# Patient Record
Sex: FEMALE | Race: WHITE | NOT HISPANIC OR LATINO | Employment: FULL TIME | URBAN - METROPOLITAN AREA
[De-identification: names, ages, dates, MRNs, and addresses within clinical notes are randomized per-mention and may not be internally consistent; named-entity substitution may affect disease eponyms.]

---

## 2017-02-28 ENCOUNTER — OFFICE VISIT (OUTPATIENT)
Dept: FAMILY MEDICINE | Facility: CLINIC | Age: 42
End: 2017-02-28
Payer: COMMERCIAL

## 2017-02-28 VITALS
HEIGHT: 65 IN | WEIGHT: 238 LBS | BODY MASS INDEX: 39.65 KG/M2 | HEART RATE: 95 BPM | TEMPERATURE: 99 F | DIASTOLIC BLOOD PRESSURE: 77 MMHG | SYSTOLIC BLOOD PRESSURE: 115 MMHG | RESPIRATION RATE: 16 BRPM

## 2017-02-28 DIAGNOSIS — J20.9 ACUTE BRONCHITIS, UNSPECIFIED ORGANISM: ICD-10-CM

## 2017-02-28 DIAGNOSIS — R07.0 THROAT PAIN: Primary | ICD-10-CM

## 2017-02-28 LAB
DEPRECATED S PYO AG THROAT QL EIA: NORMAL
MICRO REPORT STATUS: NORMAL
SPECIMEN SOURCE: NORMAL

## 2017-02-28 PROCEDURE — 87081 CULTURE SCREEN ONLY: CPT | Performed by: FAMILY MEDICINE

## 2017-02-28 PROCEDURE — 87880 STREP A ASSAY W/OPTIC: CPT | Performed by: FAMILY MEDICINE

## 2017-02-28 PROCEDURE — 99214 OFFICE O/P EST MOD 30 MIN: CPT | Performed by: FAMILY MEDICINE

## 2017-02-28 RX ORDER — ALBUTEROL SULFATE 0.83 MG/ML
1 SOLUTION RESPIRATORY (INHALATION) EVERY 6 HOURS PRN
Qty: 25 VIAL | Refills: 11 | Status: SHIPPED | OUTPATIENT
Start: 2017-02-28 | End: 2017-07-26

## 2017-02-28 RX ORDER — ALBUTEROL SULFATE 90 UG/1
2 AEROSOL, METERED RESPIRATORY (INHALATION) EVERY 6 HOURS PRN
Qty: 1 INHALER | Refills: 11 | Status: SHIPPED | OUTPATIENT
Start: 2017-02-28 | End: 2019-12-16

## 2017-02-28 NOTE — PATIENT INSTRUCTIONS
(R07.0) Throat pain  (primary encounter diagnosis)  Comment:   Plan: Rapid strep screen, Beta strep group A culture        We will do the 24 hour test and call the results. If this is positive then the Rx will be called in. This could be done for other family members, if ill in the next week.   Use the symptomatic therapies with fluids and vaporizer blowing away from you. Use cool things to drink and Tylenol and Advil as needed. Chloraseptic and Cepastat for swallowing may be used.   Avoid contagious exposures.     (J20.9) Acute bronchitis, unspecified organism  Comment:   Plan: albuterol (PROAIR HFA/PROVENTIL HFA/VENTOLIN         HFA) 108 (90 BASE) MCG/ACT Inhaler        Use the inhaler as needed. Consider the home peak flow meter.

## 2017-02-28 NOTE — NURSING NOTE
"Chief Complaint   Patient presents with     URI     Symptoms of fever, cough, sore throat for four days.  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       Initial /77  Pulse 95  Temp 99  F (37.2  C) (Tympanic)  Resp 16  Ht 5' 4.5\" (1.638 m)  Wt 238 lb (108 kg)  BMI 40.22 kg/m2 Estimated body mass index is 40.22 kg/(m^2) as calculated from the following:    Height as of this encounter: 5' 4.5\" (1.638 m).    Weight as of this encounter: 238 lb (108 kg).  Medication Reconciliation: complete  "

## 2017-02-28 NOTE — PROGRESS NOTES
"  SUBJECTIVE:                                                    Elza Greer is a 41 year old female who presents to clinic today for the following health issues:      ENT Symptoms             Symptoms: cc Present Absent Comment   Fever/Chills  X     Fatigue  X     Muscle Aches  X     Eye Irritation   X    Sneezing  X     Nasal Wili/Drg   X    Sinus Pressure/Pain   X    Loss of smell   X    Dental pain   X    Sore Throat  X     Swollen Glands  X     Ear Pain/Fullness   X    Cough  X     Wheeze  X     Chest Pain   X    Shortness of breath  X     Rash   X    Other  X  HEADACHES     Symptom duration:  FOUR DAYS   Symptom severity:  MODERATE   Treatments tried:  TYLENOL, IBUPROFEN, DAYQUIL & NYQUIL   Contacts:  POSITIVE STREP EXPOSURE         Current Outpatient Prescriptions:      albuterol (PROAIR HFA, PROVENTIL HFA, VENTOLIN HFA) 108 (90 BASE) MCG/ACT inhaler, Inhale 2 puffs into the lungs every 6 hours as needed for shortness of breath / dyspnea or wheezing, Disp: 1 Inhaler, Rfl: 1     IBUPROFEN PO, Take 600 mg by mouth every 6 hours as needed for moderate pain, Disp: , Rfl:      nystatin (MYCOSTATIN) [id number] UNIT/GM POWD, Apply topically daily as needed , Disp: , Rfl:      [DISCONTINUED] albuterol (PROAIR HFA, PROVENTIL HFA, VENTOLIN HFA) 108 (90 BASE) MCG/ACT inhaler, Inhale 1-2 puffs into the lungs every 6 hours as needed , Disp: , Rfl:     Patient Active Problem List   Diagnosis     Tobacco use disorder       Blood pressure 115/77, pulse 95, temperature 99  F (37.2  C), temperature source Tympanic, resp. rate 16, height 5' 4.5\" (1.638 m), weight 238 lb (108 kg), not currently breastfeeding.    Exam:  GENERAL APPEARANCE: healthy, alert and no distress  EYES: EOMI,  PERRL  HENT: ear canals and TM's normal and nose and mouth without ulcers or lesions  HENT: tonsillar erythema  NECK: no adenopathy, no asymmetry, masses, or scars and thyroid normal to palpation  RESP: lungs clear to auscultation - no rales, rhonchi or " wheezes  CV: regular rates and rhythm, normal S1 S2, no S3 or S4 and no murmur, click or rub -  SKIN: no suspicious lesions or rashes  PSYCH: mentation appears normal and affect normal/bright    RST is negative. 24 hour is pending.     (R07.0) Throat pain  (primary encounter diagnosis)  Comment:   Plan: Rapid strep screen, Beta strep group A culture        We will do the 24 hour test and call the results. If this is positive then the Rx will be called in. This could be done for other family members, if ill in the next week.   Use the symptomatic therapies with fluids and vaporizer blowing away from you. Use cool things to drink and Tylenol and Advil as needed. Chloraseptic and Cepastat for swallowing may be used.   Avoid contagious exposures.     (J20.9) Acute bronchitis, unspecified organism  Comment:   Plan: albuterol (PROAIR HFA/PROVENTIL HFA/VENTOLIN         HFA) 108 (90 BASE) MCG/ACT Inhaler        Use the inhaler as needed. Consider the home peak flow meter.     Jarret Ghotra

## 2017-02-28 NOTE — MR AVS SNAPSHOT
After Visit Summary   2/28/2017    Elza Greer    MRN: [id number]           Patient Information     Date Of Birth          1975        Visit Information        Provider Department      2/28/2017 9:40 AM Jarret Ghotra MD [facility]        Today's Diagnoses     Throat pain    -  1    Acute bronchitis, unspecified organism          Care Instructions    (R07.0) Throat pain  (primary encounter diagnosis)  Comment:   Plan: Rapid strep screen, Beta strep group A culture        We will do the 24 hour test and call the results. If this is positive then the Rx will be called in. This could be done for other family members, if ill in the next week.   Use the symptomatic therapies with fluids and vaporizer blowing away from you. Use cool things to drink and Tylenol and Advil as needed. Chloraseptic and Cepastat for swallowing may be used.   Avoid contagious exposures.     (J20.9) Acute bronchitis, unspecified organism  Comment:   Plan: albuterol (PROAIR HFA/PROVENTIL HFA/VENTOLIN         HFA) 108 (90 BASE) MCG/ACT Inhaler        Use the inhaler as needed. Consider the home peak flow meter.         Follow-ups after your visit        Who to contact     If you have questions or need follow up information about today's clinic visit or your schedule please contact [facility] directly at [phone].  Normal or non-critical lab and imaging results will be communicated to you by MyChart, letter or phone within 4 business days after the clinic has received the results. If you do not hear from us within 7 days, please contact the clinic through MyChart or phone. If you have a critical or abnormal lab result, we will notify you by phone as soon as possible.  Submit refill requests through [employer] or call your pharmacy and they will forward the refill request to us. Please allow 3 business days for your refill to be completed.          Additional Information About Your Visit       "  [employer]ge[employer] Information     [employer] gives you secure access to your electronic health record. If you see a primary care provider, you can also send messages to your care team and make appointments. If you have questions, please call your primary care clinic.  If you do not have a primary care provider, please call [phone] and they will assist you.        Care EveryWhere ID     This is your Care EveryWhere ID. This could be used by other organizations to access your [facility] medical records  [id number]        Your Vitals Were     Pulse Temperature Respirations Height BMI (Body Mass Index)       95 99  F (37.2  C) (Tympanic) 16 5' 4.5\" (1.638 m) 40.22 kg/m2        Blood Pressure from Last 3 Encounters:   02/28/17 115/77   11/14/16 112/75   04/04/16 132/78    Weight from Last 3 Encounters:   02/28/17 238 lb (108 kg)   11/14/16 234 lb (106.1 kg)   08/02/13 226 lb (102.5 kg)              We Performed the Following     Beta strep group A culture     Rapid strep screen          Today's Medication Changes          These changes are accurate as of: 2/28/17 10:37 AM.  If you have any questions, ask your nurse or doctor.               These medicines have changed or have updated prescriptions.        Dose/Directions    * albuterol 108 (90 BASE) MCG/ACT Inhaler   Commonly known as:  PROAIR HFA/PROVENTIL HFA/VENTOLIN HFA   This may have changed:  Another medication with the same name was added. Make sure you understand how and when to take each.   Used for:  Acute bronchitis, unspecified organism   Changed by:  Jarret Ghotra MD        Dose:  2 puff   Inhale 2 puffs into the lungs every 6 hours as needed for shortness of breath / dyspnea or wheezing   Quantity:  1 Inhaler   Refills:  11       * albuterol (2.5 MG/3ML) 0.083% neb solution   This may have changed:  You were already taking a medication with the same name, and this prescription was added. Make sure you understand how and when to take each.   Used for:  " Acute bronchitis, unspecified organism   Changed by:  Jarret Ghotra MD        Dose:  1 vial   Take 1 vial (2.5 mg) by nebulization every 6 hours as needed for shortness of breath / dyspnea or wheezing   Quantity:  25 vial   Refills:  11       * Notice:  This list has 2 medication(s) that are the same as other medications prescribed for you. Read the directions carefully, and ask your doctor or other care provider to review them with you.         Where to get your medicines      These medications were sent to [facility] [address] [place] [address]  [address] [place] [address]     Phone:  [phone]     albuterol (2.5 MG/3ML) 0.083% neb solution    albuterol 108 (90 BASE) MCG/ACT Inhaler                Primary Care Provider Office Phone # Fax #    NASH Odell [facility] [phone]       [facility] [address] [place] [address]        Thank you!     Thank you for choosing [facility]  for your care. Our goal is always to provide you with excellent care. Hearing back from our patients is one way we can continue to improve our services. Please take a few minutes to complete the written survey that you may receive in the mail after your visit with us. Thank you!             Your Updated Medication List - Protect others around you: Learn how to safely use, store and throw away your medicines at www.disposemymeds.org.          This list is accurate as of: 2/28/17 10:37 AM.  Always use your most recent med list.                   Brand Name Dispense Instructions for use    * albuterol 108 (90 BASE) MCG/ACT Inhaler    PROAIR HFA/PROVENTIL HFA/VENTOLIN HFA    1 Inhaler    Inhale 2 puffs into the lungs every 6 hours as needed for shortness of breath / dyspnea or wheezing       * albuterol (2.5 MG/3ML) 0.083% neb solution     25 vial    Take 1 vial (2.5 mg) by nebulization every 6 hours as needed for shortness of breath / dyspnea or wheezing        IBUPROFEN PO      Take 600 mg by mouth every 6 hours as needed for moderate pain       nystatin [id number] UNIT/GM Powd    MYCOSTATIN     Apply topically daily as needed       * Notice:  This list has 2 medication(s) that are the same as other medications prescribed for you. Read the directions carefully, and ask your doctor or other care provider to review them with you.

## 2017-02-28 NOTE — LETTER
[facility]  [address] [facility] [address]  Phone: [phone]    March 8, 2017    Elza Greer  [address] [facility] [address]              Dear Ms. Greer,      The results of your recent throat culture were negative.  If you have any further questions or concerns please contact the clinic              Sincerely,      Jarret Ghotra MD / carrillo

## 2017-03-02 LAB
BACTERIA SPEC CULT: NORMAL
MICRO REPORT STATUS: NORMAL
SPECIMEN SOURCE: NORMAL

## 2017-07-26 ENCOUNTER — RADIANT APPOINTMENT (OUTPATIENT)
Dept: GENERAL RADIOLOGY | Facility: CLINIC | Age: 42
End: 2017-07-26
Attending: NURSE PRACTITIONER
Payer: COMMERCIAL

## 2017-07-26 ENCOUNTER — OFFICE VISIT (OUTPATIENT)
Dept: FAMILY MEDICINE | Facility: CLINIC | Age: 42
End: 2017-07-26
Payer: COMMERCIAL

## 2017-07-26 VITALS
HEIGHT: 65 IN | WEIGHT: 238 LBS | TEMPERATURE: 98.1 F | SYSTOLIC BLOOD PRESSURE: 130 MMHG | DIASTOLIC BLOOD PRESSURE: 80 MMHG | BODY MASS INDEX: 39.65 KG/M2 | HEART RATE: 84 BPM

## 2017-07-26 DIAGNOSIS — M79.671 RIGHT FOOT PAIN: ICD-10-CM

## 2017-07-26 DIAGNOSIS — M79.671 RIGHT FOOT PAIN: Primary | ICD-10-CM

## 2017-07-26 PROCEDURE — 99213 OFFICE O/P EST LOW 20 MIN: CPT | Performed by: NURSE PRACTITIONER

## 2017-07-26 PROCEDURE — 73630 X-RAY EXAM OF FOOT: CPT | Mod: RT

## 2017-07-26 RX ORDER — HYDROCODONE BITARTRATE AND ACETAMINOPHEN 5; 325 MG/1; MG/1
1 TABLET ORAL 2 TIMES DAILY PRN
Qty: 20 TABLET | Refills: 0 | Status: SHIPPED | OUTPATIENT
Start: 2017-07-26 | End: 2018-01-17

## 2017-07-26 NOTE — NURSING NOTE
"Chief Complaint   Patient presents with     Musculoskeletal Problem     Right foot pain for one week, gets a shooting pain every time she curls her toes.        Initial /80  Pulse 84  Temp 98.1  F (36.7  C) (Tympanic)  Ht 5' 4.5\" (1.638 m)  Wt 238 lb (108 kg)  BMI 40.22 kg/m2 Estimated body mass index is 40.22 kg/(m^2) as calculated from the following:    Height as of this encounter: 5' 4.5\" (1.638 m).    Weight as of this encounter: 238 lb (108 kg).  Medication Reconciliation: complete  "

## 2017-07-26 NOTE — PROGRESS NOTES
"  SUBJECTIVE:                                                    Elza Greer is a 42 year old female who presents to clinic today for the following health issues:    Foot  Pain    Onset: one week, tripped over 10 pairs of shoes and then went paddle boating and got her foot stuck in between the paddles     Description:   Location: right foot  Character: Dull ache most of the time, sometimes get sharps pain, when she puts pressure on the bottom of her foot she feels a shooting pain up to her shin. Bruising on the top of her foot      Intensity: 4/10, when she walks 8/10    Progression of Symptoms: worse, some swelling on the top of her foot, toes are cramping     Accompanying Signs & Symptoms:  Other symptoms: radiation of pain to leg     History:   Previous similar pain: no       Precipitating factors:   Trauma or overuse: no     Alleviating factors:  Improved by: nothing    Therapies Tried and outcome: Ice, Advil       Problem list and histories reviewed & adjusted, as indicated.  Additional history: as documented    Reviewed and updated as needed this visit by clinical staffTobacco  Allergies  Med Hx  Surg Hx  Fam Hx  Soc Hx      Reviewed and updated as needed this visit by Provider         ROS:  Constitutional, HEENT, cardiovascular, pulmonary, gi and gu systems are negative, except as otherwise noted.      OBJECTIVE:   /80  Pulse 84  Temp 98.1  F (36.7  C) (Tympanic)  Ht 5' 4.5\" (1.638 m)  Wt 238 lb (108 kg)  BMI 40.22 kg/m2  Body mass index is 40.22 kg/(m^2).  GENERAL: healthy, alert and no distress  MS: Right Ankle: No visible echymosis. No visible edema or effusion. Normal DP artery pulse. Normal PT artery pulse.   Full range of motion with dorsiflexion, plantarflexion, inversion and eversion. Achilles palpably intact. Way test negative.   No instability noted on exam with anterior drawer, talar tilt or external rotation testing. Right foot-the top of the foot is tender to palpation, " there is no edema and deformities noted.  SKIN: no suspicious lesions or rashes  PSYCH: mentation appears normal, affect normal/bright    Diagnostic Test Results:  Right foot Xray-no visible fractures, or dislocations     ASSESSMENT/PLAN:     1. Right foot pain  -due to foot sprain   -ACE wraps as needed for edema  -Ice packs as needed for pain  - XR Foot Right G/E 3 Views; Future  - HYDROcodone-acetaminophen (NORCO) 5-325 MG per tablet; Take 1 tablet by mouth 2 times daily as needed for pain maximum 2 tablet(s) per day  Dispense: 20 tablet; Refill: 0  -if no improvement in 2 weeks follow up with ortho    See Patient Instructions    NASH Huston [facility]

## 2017-07-26 NOTE — MR AVS SNAPSHOT
After Visit Summary   7/26/2017    Elza Greer    MRN: [id number]           Patient Information     Date Of Birth          1975        Visit Information        Provider Department      7/26/2017 10:00 AM Di Shea APRN CNP [facility]        Today's Diagnoses     Right foot pain    -  1      Care Instructions    Norco 1 tablet twice daily as needed  ACE wraps and ice packs as needed for swelling  Avoid prolong standing, walking and running.  Avoid activities that aggravate pain  Elevate leg when possible               Follow-ups after your visit        Who to contact     If you have questions or need follow up information about today's clinic visit or your schedule please contact [facility] directly at [phone].  Normal or non-critical lab and imaging results will be communicated to you by [employer], letter or phone within 4 business days after the clinic has received the results. If you do not hear from us within 7 days, please contact the clinic through [employer] or phone. If you have a critical or abnormal lab result, we will notify you by phone as soon as possible.  Submit refill requests through [employer] or call your pharmacy and they will forward the refill request to us. Please allow 3 business days for your refill to be completed.          Additional Information About Your Visit        MyChart Information     [employer] gives you secure access to your electronic health record. If you see a primary care provider, you can also send messages to your care team and make appointments. If you have questions, please call your primary care clinic.  If you do not have a primary care provider, please call [phone] and they will assist you.        Care EveryWhere ID     This is your Care EveryWhere ID. This could be used by other organizations to access your [facility] medical records  [id number]        Your Vitals Were     Pulse Temperature Height BMI (Body  "Mass Index)          84 98.1  F (36.7  C) (Tympanic) 5' 4.5\" (1.638 m) 40.22 kg/m2         Blood Pressure from Last 3 Encounters:   07/26/17 130/80   02/28/17 115/77   11/14/16 112/75    Weight from Last 3 Encounters:   07/26/17 238 lb (108 kg)   02/28/17 238 lb (108 kg)   11/14/16 234 lb (106.1 kg)                 Today's Medication Changes          These changes are accurate as of: 7/26/17 11:06 AM.  If you have any questions, ask your nurse or doctor.               Start taking these medicines.        Dose/Directions    HYDROcodone-acetaminophen 5-325 MG per tablet   Commonly known as:  NORCO   Used for:  Right foot pain   Started by:  Di Shea APRN CNP        Dose:  1 tablet   Take 1 tablet by mouth 2 times daily as needed for pain maximum 2 tablet(s) per day   Quantity:  20 tablet   Refills:  0            Where to get your medicines      Some of these will need a paper prescription and others can be bought over the counter.  Ask your nurse if you have questions.     Bring a paper prescription for each of these medications     HYDROcodone-acetaminophen 5-325 MG per tablet                Primary Care Provider Office Phone # Fax #    Rayne Dona NASH Ramirez [phone]       [facility] [address] [facility] [address]        Equal Access to Services     SILVINA MARIN AH: Hadii lotus reyes hadasho Soantioneali, waaxda luqadaha, qaybta kaalmada adeegyada, waxay judah londono . So [facility] [phone].    ATENCIÓN: Si habla español, tiene a ibarra disposición servicios gratuitos de asistencia lingüística. Christian saldana [phone].    We comply with applicable federal civil rights laws and [place] laws. We do not discriminate on the basis of race, color, national origin, age, disability sex, sexual orientation or gender identity.            Thank you!     Thank you for choosing [facility]  for your care. Our goal is always to provide you with excellent care. " Hearing back from our patients is one way we can continue to improve our services. Please take a few minutes to complete the written survey that you may receive in the mail after your visit with us. Thank you!             Your Updated Medication List - Protect others around you: Learn how to safely use, store and throw away your medicines at www.disposemymeds.org.          This list is accurate as of: 7/26/17 11:06 AM.  Always use your most recent med list.                   Brand Name Dispense Instructions for use Diagnosis    albuterol 108 (90 BASE) MCG/ACT Inhaler    PROAIR HFA/PROVENTIL HFA/VENTOLIN HFA    1 Inhaler    Inhale 2 puffs into the lungs every 6 hours as needed for shortness of breath / dyspnea or wheezing    Acute bronchitis, unspecified organism       HYDROcodone-acetaminophen 5-325 MG per tablet    NORCO    20 tablet    Take 1 tablet by mouth 2 times daily as needed for pain maximum 2 tablet(s) per day    Right foot pain       IBUPROFEN PO      Take 600 mg by mouth every 6 hours as needed for moderate pain        nystatin [id number] UNIT/GM Powd    MYCOSTATIN     Apply topically daily as needed

## 2017-07-26 NOTE — PATIENT INSTRUCTIONS
Norco 1 tablet twice daily as needed  ACE wraps and ice packs as needed for swelling  Avoid prolong standing, walking and running.  Avoid activities that aggravate pain  Elevate leg when possible

## 2017-08-22 ENCOUNTER — TELEPHONE (OUTPATIENT)
Dept: FAMILY MEDICINE | Facility: CLINIC | Age: 42
End: 2017-08-22

## 2017-08-22 NOTE — TELEPHONE ENCOUNTER
Patient calling to inquire  About her immune status with measles  Patient had a titer drawn  So she can volunteer   Results are  telling her she must have  Another MMR   Patient  State  She had the MMR   As a child     She also had at age 22   MMRx2  Injections  - She was  Volunteering at  [employer]   Please call and advise  May Alvarez- CSS

## 2017-08-22 NOTE — TELEPHONE ENCOUNTER
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, they told her she can never receive MMR vaccinations again, if she is not immune after these doses, she will never be immune.  Patient wants to know if that remains the standard practice, or does she need the vaccines  Please advise    Routing to provider.    Gay KWOK Rn

## 2017-08-23 ENCOUNTER — ALLIED HEALTH/NURSE VISIT (OUTPATIENT)
Dept: FAMILY MEDICINE | Facility: CLINIC | Age: 42
End: 2017-08-23

## 2017-08-23 DIAGNOSIS — Z23 NEED FOR VACCINATION: Primary | ICD-10-CM

## 2017-08-23 PROCEDURE — 90707 MMR VACCINE SC: CPT

## 2017-08-23 PROCEDURE — 99207 ZZC NO CHARGE NURSE ONLY: CPT

## 2017-08-23 PROCEDURE — 90471 IMMUNIZATION ADMIN: CPT

## 2017-08-23 NOTE — TELEPHONE ENCOUNTER
I checked [facility], I don't see that MMR was ever given to her, I cannot give any recommendation based on only patients verbal reports.  General recommendations is to repeat vaccinations if titers show no immunity.     NASH Huston CNP

## 2017-08-23 NOTE — TELEPHONE ENCOUNTER
Notified her. She asked to be transferred to the appt desk to schedule the CMA only visit for MMR today.     Jesusita HADLEY

## 2018-01-17 ENCOUNTER — HOSPITAL ENCOUNTER (EMERGENCY)
Facility: CLINIC | Age: 43
Discharge: HOME | End: 2018-01-17
Attending: NURSE PRACTITIONER | Admitting: NURSE PRACTITIONER
Payer: OTHER MISCELLANEOUS

## 2018-01-17 ENCOUNTER — APPOINTMENT (OUTPATIENT)
Dept: GENERAL RADIOLOGY | Facility: CLINIC | Age: 43
End: 2018-01-17
Attending: NURSE PRACTITIONER
Payer: OTHER MISCELLANEOUS

## 2018-01-17 VITALS
WEIGHT: 241 LBS | HEIGHT: 64 IN | HEART RATE: 90 BPM | BODY MASS INDEX: 41.15 KG/M2 | RESPIRATION RATE: 18 BRPM | DIASTOLIC BLOOD PRESSURE: 89 MMHG | SYSTOLIC BLOOD PRESSURE: 140 MMHG | TEMPERATURE: 97.9 F | OXYGEN SATURATION: 97 %

## 2018-01-17 DIAGNOSIS — S30.0XXA CONTUSION, BUTTOCK, INITIAL ENCOUNTER: ICD-10-CM

## 2018-01-17 DIAGNOSIS — S30.0XXA LUMBAR CONTUSION, INITIAL ENCOUNTER: ICD-10-CM

## 2018-01-17 DIAGNOSIS — S22.32XA CLOSED FRACTURE OF ONE RIB OF LEFT SIDE, INITIAL ENCOUNTER: ICD-10-CM

## 2018-01-17 PROCEDURE — 71101 X-RAY EXAM UNILAT RIBS/CHEST: CPT | Mod: LT

## 2018-01-17 PROCEDURE — 72100 X-RAY EXAM L-S SPINE 2/3 VWS: CPT

## 2018-01-17 PROCEDURE — 99213 OFFICE O/P EST LOW 20 MIN: CPT | Performed by: NURSE PRACTITIONER

## 2018-01-17 PROCEDURE — G0463 HOSPITAL OUTPT CLINIC VISIT: HCPCS

## 2018-01-17 PROCEDURE — 73502 X-RAY EXAM HIP UNI 2-3 VIEWS: CPT

## 2018-01-17 NOTE — ED PROVIDER NOTES
"  History     Chief Complaint   Patient presents with     Hip Pain     HPI  Elza Greer is a 42 year old female who presents to urgent care for evaluation of left hip, low back and left rib pain after falling while leaving work on Sunday.  Patient reports slipping on ice at the top of stairs outside and fell landing on her left back side. Denies hitting her head or LOC. Pain has gradually worsened since this occurred.       Problem List:    Patient Active Problem List    Diagnosis Date Noted     Tobacco use disorder 11/14/2016     Priority: Medium        Past Medical History:    History reviewed. No pertinent past medical history.    Past Surgical History:    History reviewed. No pertinent surgical history.    Family History:    Family History   Problem Relation Age of Onset     Coronary Artery Disease Mother      Hypertension Mother      Hyperlipidemia Mother      Depression Mother      Colon Cancer Maternal Grandmother      Breast Cancer Maternal Grandmother      Other Cancer Maternal Grandmother        Social History:  Marital Status:   [2]  Social History   Substance Use Topics     Smoking status: Current Every Day Smoker     Packs/day: 0.25     Types: Cigarettes     Smokeless tobacco: Never Used      Comment: Electronic cigarette      Alcohol use No        Medications:      albuterol (PROAIR HFA/PROVENTIL HFA/VENTOLIN HFA) 108 (90 BASE) MCG/ACT Inhaler   IBUPROFEN PO   nystatin (MYCOSTATIN) 452167 UNIT/GM POWD         Review of Systems  As mentioned above in the history present illness. All other systems were reviewed and are negative.    Physical Exam   BP: 140/89  Pulse: 90  Temp: 97.9  F (36.6  C)  Resp: 18  Height: 162.6 cm (5' 4\")  Weight: 109.3 kg (241 lb)  SpO2: 97 %      Physical Exam   Constitutional: She is oriented to person, place, and time. She appears well-developed and well-nourished. No distress.   HENT:   Head: Normocephalic and atraumatic.   Right Ear: External ear normal.   Left Ear: " External ear normal.   Nose: Nose normal.   Mouth/Throat: Oropharynx is clear and moist.   Eyes: Conjunctivae and EOM are normal.   Neck: Normal range of motion. Neck supple.   Cardiovascular: Normal rate, regular rhythm and normal heart sounds.    Pulmonary/Chest: Effort normal and breath sounds normal. She exhibits no crepitus.   Posterior left rib tenderness.   Musculoskeletal: Normal range of motion.        Lumbar back: She exhibits tenderness. She exhibits no swelling, no edema and no deformity.        Back:    Neurological: She is alert and oriented to person, place, and time.   Skin: Skin is warm and dry.       ED Course     ED Course     Procedures           Results for orders placed or performed during the hospital encounter of 01/17/18 (from the past 24 hour(s))   Lumbar spine XR, 2-3 views    Narrative    LUMBAR SPINE 2-3 VIEWS, PELVIS AND HIP LEFT 1 VIEW 1/17/2018 4:07 PM     HISTORY: Fall, low back and left hip pain.     COMPARISON: None.      Impression    IMPRESSION:     Lumbar spine: Five functional lumbar vertebral segments. Normal  alignment. 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 No acute bony abnormality. Mild degenerative disc  space narrowing at L3-4.    Pelvis and left hip: No bony abnormality. 0.4 cm rounded calcification  projecting in the medial left thigh is likely a phlebolith.    WEST KRISHNAMURTHY MD   Ribs XR, unilat 3 views + PA chest,  left    Narrative    RIBS AND CHEST LEFT THREE VIEW 1/17/2018 4:07 PM     HISTORY: Fall, posterior left low rib pain.     COMPARISON: Two-view chest 2/19/2016.      Impression    IMPRESSION: PA chest shows no active cardiopulmonary disease or change  since the prior exam. Left rib detail views show question of a  nondisplaced fracture of the left posterolateral 10th rib, seen only  on one view.    WEST KRISHNAMURTHY MD   Pelvis XR w/ unilateral hip left    Narrative    LUMBAR SPINE 2-3 VIEWS, PELVIS AND HIP LEFT 1 VIEW  1/17/2018 4:07 PM     HISTORY: Fall, low back and left hip pain.     COMPARISON: None.      Impression    IMPRESSION:     Lumbar spine: Five functional lumbar vertebral segments. Normal  alignment. 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 No acute bony abnormality. Mild degenerative disc  space narrowing at L3-4.    Pelvis and left hip: No bony abnormality. 0.4 cm rounded calcification  projecting in the medial left thigh is likely a phlebolith.    WEST KRISHNAMURTHY MD       Labs Ordered and Resulted from Time of ED Arrival Up to the Time of Departure from the ED - No data to display    Assessments & Plan (with Medical Decision Making)   Xrays of chest/rib, lumbar and hip/pelvis reveal a nondisplaced 10th rib fracture- seen only on the posterior view. This correlates with clinical exam. I discussed the results with patient.     Patient instructed as follows:  Heat.  Tylenol or Ibuprofen.  Rest.  Lifting restrictions for the next 7 days. Workability note provided.     I have reviewed the nursing notes.    I have reviewed the findings, diagnosis, plan and need for follow up with the patient.      Discharge Medication List as of 1/17/2018  4:27 PM          Final diagnoses:   Closed fracture of one rib of left side, initial encounter- 10th rib   Lumbar contusion, initial encounter   Contusion, buttock, initial encounter       1/17/2018   [facility] EMERGENCY DEPARTMENT     sEtela Maurer, NASH CNP  01/17/18 2473

## 2018-01-17 NOTE — ED NOTES
Patient here for pain in the left hip - fell down stairs at work on Sunday. Patient presents ambualtory to the urgent care.

## 2018-01-17 NOTE — ED AVS SNAPSHOT
[facility] Emergency Department    [address] [address]    Phone:  [phone]    Fax:  [phone]                                       Elza Greer   MRN: [id number]    Department:  [facility] Emergency Department   Date of Visit:  1/17/2018           After Visit Summary Signature Page     I have received my discharge instructions, and my questions have been answered. I have discussed any challenges I see with this plan with the nurse or doctor.    ..........................................................................................................................................  Patient/Patient Representative Signature      ..........................................................................................................................................  Patient Representative Print Name and Relationship to Patient    ..................................................               ................................................  Date                                            Time    ..........................................................................................................................................  Reviewed by Signature/Title    ...................................................              ..............................................  Date                                                            Time

## 2018-01-17 NOTE — ED AVS SNAPSHOT
[facility] Emergency Department    [address] [address]    Phone:  [phone]    Fax:  [phone]                                       Elza Greer   MRN: [id number]    Department:  [facility] Emergency Department   Date of Visit:  1/17/2018           Patient Information     Date Of Birth          1975        Your diagnoses for this visit were:     Closed fracture of one rib of left side, initial encounter- 10th rib     Lumbar contusion, initial encounter     Contusion, buttock, initial encounter        You were seen by Estela Maurer APRN CNP.      Follow-up Information     Follow up with Rayne Ramirez APRN CNP.    Specialty:  Nurse Practitioner    Why:  As needed    Contact information:    [address] [facility] [address]  [phone]          Discharge Instructions       Heat.  Tylenol or Ibuprofen.  Rest.  Lifting restrictions for the next 7 days.          Back Contusion  You have a contusion to your back. A contusion is also called a bruise. There is swelling and some bleeding under the skin. The skin may be purplish. You may have muscle aching and stiffness in the area of the bruise. There are no broken bones.  Contusions heal on their own, without further treatment. However, pain and skin discoloration may take weeks to months to go away.   Home care    Rest. Avoid heavy lifting, strenuous exertion, or any activity that causes pain.    Ice the area to reduce pain and swelling. Put ice cubes in a plastic bag or use a cold pack. (Wrap the cold source in a thin towel. Don't place it directly on your skin.) Ice the injured area for 20 minutes every 1 to 2 hours the first day. Continue with ice packs 3 to 4 times a day for the next 2 days, then as needed for the relief of pain and swelling.    Take any prescribed pain medicine. If none was prescribed, take acetaminophen, ibuprofen, or naproxen to control pain, unless you have other medical conditions that  prevent taking these medicines. If you are unsure about medicines, ask your healthcare provider before you leave the hospital.  Follow-up care  Follow up with your healthcare provider, or as directed. Call if you are not better in 1 to 2 weeks.  When to seek medical advice  Call your healthcare provider for any of the following:    New or worsening pain    Increased swelling around the bruise    Pain spreads to one or both legs    Weakness or numbness in one or both legs     Loss of bowel or bladder control    Numbness in the groin or genital area    Fever of 100.4 F (38 C) or higher, or as directed by your healthcare provider  Date Last Reviewed: 7/1/2017 2000-2017 [email or web address] [address] [place] [address], [place] [address]. All rights reserved. This information is not intended as a substitute for professional medical care. Always follow your healthcare professional's instructions.          Discharge References/Attachments     RIB FRACTURE (ENGLISH)      24 Hour Appointment Hotline       To make an appointment at any [facility], call [phone] ([phone]). If you don't have a family doctor or clinic, we will help you find one. [facility] clinics are conveniently located to serve the needs of you and your family.             Review of your medicines      Our records show that you are taking the medicines listed below. If these are incorrect, please call your family doctor or clinic.        Dose / Directions Last dose taken    albuterol 108 (90 BASE) MCG/ACT Inhaler   Commonly known as:  PROAIR HFA/PROVENTIL HFA/VENTOLIN HFA   Dose:  2 puff   Quantity:  1 Inhaler        Inhale 2 puffs into the lungs every 6 hours as needed for shortness of breath / dyspnea or wheezing   Refills:  11        IBUPROFEN PO   Dose:  600 mg        Take 600 mg by mouth every 6 hours as needed for moderate pain   Refills:  0        nystatin 437044 UNIT/GM Powd   Commonly known as:  MYCOSTATIN        Apply topically  daily as needed   Refills:  0                Procedures and tests performed during your visit     Lumbar spine XR, 2-3 views    Pelvis XR w/ unilateral hip left    Ribs XR, unilat 3 views + PA chest,  left      Orders Needing Specimen Collection     None      Pending Results     Date and Time Order Name Status Description    1/17/2018 1535 Ribs XR, unilat 3 views + PA chest,  left Preliminary     1/17/2018 1535 Lumbar spine XR, 2-3 views Preliminary     1/17/2018 1535 Pelvis XR w/ unilateral hip left Preliminary             Pending Culture Results     No orders found from 1/15/2018 to 1/18/2018.            Pending Results Instructions     If you had any lab results that were not finalized at the time of your Discharge, you can call the ED Lab Result RN at [phone]. You will be contacted by this team for any positive Lab results or changes in treatment. The nurses are available 7 days a week from 10A to 6:30P.  You can leave a message 24 hours per day and they will return your call.        Test Results From Your Hospital Stay        1/17/2018  4:13 PM      Narrative     LUMBAR SPINE 2-3 VIEWS, PELVIS AND HIP LEFT 1 VIEW 1/17/2018 4:07 PM     HISTORY: Fall, low back and left hip pain.     COMPARISON: None.        Impression     IMPRESSION:     Lumbar spine: Five functional lumbar vertebral segments. Normal  alignment. 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 No acute bony abnormality. Mild degenerative disc  space narrowing at L3-4.    Pelvis and left hip: No bony abnormality. 0.4 cm rounded calcification  projecting in the medial left thigh is likely a phlebolith.         1/17/2018  4:13 PM      Narrative     LUMBAR SPINE 2-3 VIEWS, PELVIS AND HIP LEFT 1 VIEW 1/17/2018 4:07 PM     HISTORY: Fall, low back and left hip pain.     COMPARISON: None.        Impression     IMPRESSION:     Lumbar spine: Five functional lumbar vertebral segments. Normal  alignment. Congenital or acquired  limbus vertebrae anterior superior  endplate of L4. No acute bony abnormality. Mild degenerative disc  space narrowing at L3-4.    Pelvis and left hip: No bony abnormality. 0.4 cm rounded calcification  projecting in the medial left thigh is likely a phlebolith.         1/17/2018  4:14 PM      Narrative     RIBS AND CHEST LEFT THREE VIEW 1/17/2018 4:07 PM     HISTORY: Fall, posterior left low rib pain.     COMPARISON: Two-view chest 2/19/2016.        Impression     IMPRESSION: PA chest shows no active cardiopulmonary disease or change  since the prior exam. Left rib detail views show question of a  nondisplaced fracture of the left posterolateral 10th rib, seen only  on one view.                Thank you for choosing [facility]       Thank you for choosing [facility] for your care. Our goal is always to provide you with excellent care. Hearing back from our patients is one way we can continue to improve our services. Please take a few minutes to complete the written survey that you may receive in the mail after you visit with us. Thank you!        [email or web address] [facility] Information     [employer] gives you secure access to your electronic health record. If you see a primary care provider, you can also send messages to your care team and make appointments. If you have questions, please call your primary care clinic.  If you do not have a primary care provider, please call [phone] and they will assist you.        Care EveryWhere ID     This is your Care EveryWhere ID. This could be used by other organizations to access your [facility] medical records  [id number]        Equal Access to Services     SILVINA MARIN : Hadii lotus Bloom, qi tsai, qaybhumble kelsey . So [facility] [phone].    ATENCIÓN: Si habla español, tiene a ibarra disposición servicios gratuitos de asistencia lingüística. Llame al [phone].    We comply with applicable federal civil rights laws and  [place] laws. We do not discriminate on the basis of race, color, national origin, age, disability, sex, sexual orientation, or gender identity.            After Visit Summary       This is your record. Keep this with you and show to your community pharmacist(s) and doctor(s) at your next visit.

## 2018-01-17 NOTE — DISCHARGE INSTRUCTIONS
Heat.  Tylenol or Ibuprofen.  Rest.  Lifting restrictions for the next 7 days.          Back Contusion  You have a contusion to your back. A contusion is also called a bruise. There is swelling and some bleeding under the skin. The skin may be purplish. You may have muscle aching and stiffness in the area of the bruise. There are no broken bones.  Contusions heal on their own, without further treatment. However, pain and skin discoloration may take weeks to months to go away.   Home care    Rest. Avoid heavy lifting, strenuous exertion, or any activity that causes pain.    Ice the area to reduce pain and swelling. Put ice cubes in a plastic bag or use a cold pack. (Wrap the cold source in a thin towel. Don't place it directly on your skin.) Ice the injured area for 20 minutes every 1 to 2 hours the first day. Continue with ice packs 3 to 4 times a day for the next 2 days, then as needed for the relief of pain and swelling.    Take any prescribed pain medicine. If none was prescribed, take acetaminophen, ibuprofen, or naproxen to control pain, unless you have other medical conditions that prevent taking these medicines. If you are unsure about medicines, ask your healthcare provider before you leave the hospital.  Follow-up care  Follow up with your healthcare provider, or as directed. Call if you are not better in 1 to 2 weeks.  When to seek medical advice  Call your healthcare provider for any of the following:    New or worsening pain    Increased swelling around the bruise    Pain spreads to one or both legs    Weakness or numbness in one or both legs     Loss of bowel or bladder control    Numbness in the groin or genital area    Fever of 100.4 F (38 C) or higher, or as directed by your healthcare provider  Date Last Reviewed: 7/1/2017 2000-2017 [employer]. [address] [place] [address], [place] [address]. All rights reserved. This information is not intended as a substitute for professional medical  care. Always follow your healthcare professional's instructions.

## 2018-01-17 NOTE — ED NOTES
Sunday evening Slipped on outside steps 3-4 steps landed on backside has had hip & back discomfort   No LOC walks without out difficulty no CMS changes  Pain is helped by ibuprofen but has not taken any today

## 2018-02-07 ENCOUNTER — TELEPHONE (OUTPATIENT)
Dept: FAMILY MEDICINE | Facility: CLINIC | Age: 43
End: 2018-02-07

## 2018-05-18 ENCOUNTER — TELEPHONE (OUTPATIENT)
Dept: FAMILY MEDICINE | Facility: CLINIC | Age: 43
End: 2018-05-18

## 2018-05-18 NOTE — LETTER
[facility]  [address]  [facility] [address]  [phone]        May 18, 2018  Elza Greer  [address] [facility] [address]    Dear Elza,    I care about your health and have reviewed your health plan. I have reviewed your medical conditions, medication list, and lab results and am making recommendations based on this review, to better manage your health.    You are in particular need of attention regarding:  -Cervical Cancer Screening    I am recommending that you:  -schedule a PAP SMEAR EXAM which is due.  Please disregard this reminder if you have had this exam elsewhere within the last year.  It would be helpful for us to have a copy of your recent pap smear report in our file so that we can best coordinate your care.    Here is a list of Health Maintenance topics that are due now or due soon:  Health Maintenance Due   Topic Date Due     HIV SCREEN (SYSTEM ASSIGNED)  07/02/1995       Please call us at [phone] (or use [employer]) to address the above recommendations.     Thank you for trusting [facility] and we appreciate the opportunity to serve you.  We look forward to supporting your healthcare needs in the future.    Healthy Regards,    [facility] care team

## 2018-05-18 NOTE — TELEPHONE ENCOUNTER
Panel Management Review          Composite cancer screening  Chart review shows that this patient is due/due soon for the following Pap Smear  Summary:    Patient is due/failing the following:   PAP    Action needed:   Patient needs office visit for physical and pap .    Type of outreach:    Sent letter.    Questions for provider review:    None                                                                                                                                    Lia Messina

## 2018-12-08 ENCOUNTER — APPOINTMENT (OUTPATIENT)
Dept: GENERAL RADIOLOGY | Facility: CLINIC | Age: 43
End: 2018-12-08
Attending: PHYSICIAN ASSISTANT
Payer: COMMERCIAL

## 2018-12-08 ENCOUNTER — HOSPITAL ENCOUNTER (EMERGENCY)
Facility: CLINIC | Age: 43
Discharge: HOME | End: 2018-12-08
Attending: PHYSICIAN ASSISTANT | Admitting: PHYSICIAN ASSISTANT
Payer: COMMERCIAL

## 2018-12-08 VITALS
RESPIRATION RATE: 16 BRPM | SYSTOLIC BLOOD PRESSURE: 125 MMHG | TEMPERATURE: 98 F | BODY MASS INDEX: 42.34 KG/M2 | HEART RATE: 89 BPM | HEIGHT: 64 IN | DIASTOLIC BLOOD PRESSURE: 83 MMHG | WEIGHT: 248 LBS

## 2018-12-08 DIAGNOSIS — J06.9 VIRAL URI WITH COUGH: ICD-10-CM

## 2018-12-08 PROCEDURE — G0463 HOSPITAL OUTPT CLINIC VISIT: HCPCS | Mod: 25 | Performed by: PHYSICIAN ASSISTANT

## 2018-12-08 PROCEDURE — 99214 OFFICE O/P EST MOD 30 MIN: CPT | Mod: Z6 | Performed by: PHYSICIAN ASSISTANT

## 2018-12-08 PROCEDURE — 71046 X-RAY EXAM CHEST 2 VIEWS: CPT

## 2018-12-08 RX ORDER — ALBUTEROL SULFATE 90 UG/1
2 AEROSOL, METERED RESPIRATORY (INHALATION) EVERY 6 HOURS PRN
Qty: 1 INHALER | Refills: 0 | Status: SHIPPED | OUTPATIENT
Start: 2018-12-08 | End: 2019-12-16

## 2018-12-08 RX ORDER — PREDNISONE 20 MG/1
40 TABLET ORAL DAILY
Qty: 10 TABLET | Refills: 0 | Status: SHIPPED | OUTPATIENT
Start: 2018-12-08 | End: 2018-12-13

## 2018-12-08 RX ORDER — BENZONATATE 100 MG/1
100-200 CAPSULE ORAL 3 TIMES DAILY PRN
Qty: 42 CAPSULE | Refills: 0 | Status: SHIPPED | OUTPATIENT
Start: 2018-12-08 | End: 2019-12-16

## 2018-12-08 NOTE — ED AVS SNAPSHOT
[facility] Emergency Department    [address] [facility] [address]    Phone:  [phone]    Fax:  [phone]                                       Elza Greer   MRN: [id number]    Department:  [facility] Emergency Department   Date of Visit:  12/8/2018           After Visit Summary Signature Page     I have received my discharge instructions, and my questions have been answered. I have discussed any challenges I see with this plan with the nurse or doctor.    ..........................................................................................................................................  Patient/Patient Representative Signature      ..........................................................................................................................................  Patient Representative Print Name and Relationship to Patient    ..................................................               ................................................  Date                                   Time    ..........................................................................................................................................  Reviewed by Signature/Title    ...................................................              ..............................................  Date                                               Time          22EPIC Rev 08/18

## 2018-12-08 NOTE — ED AVS SNAPSHOT
[facility] Emergency Department    [address] [facility] [address]    Phone:  [phone]    Fax:  [phone]                                       Elza Greer   MRN: [id number]    Department:  [facility] Emergency Department   Date of Visit:  12/8/2018           Patient Information     Date Of Birth          1975        Your diagnoses for this visit were:     Viral URI with cough        You were seen by Lalitha Bowers PA-C.      Follow-up Information     Follow up with Rayne Ramirez APRN CNP In 1 week.    Specialty:  Nurse Practitioner    Why:  As needed, If symptoms worsen    Contact information:    [address] [facility] [address]  [phone]          Discharge Instructions         Viral Upper Respiratory Illness (Adult)  You have a viral upper respiratory illness (URI), which is another term for the common cold. This illness is contagious during the first few days. It is spread through the air by coughing and sneezing. It may also be spread by direct contact (touching the sick person and then touching your own eyes, nose, or mouth). Frequent handwashing will decrease risk of spread. Most viral illnesses go away within 7 to 10 days with rest and simple home remedies. Sometimes the illness may last for several weeks. Antibiotics will not kill a virus, and they are generally not prescribed for this condition.    Home care    If symptoms are severe, rest at home for the first 2 to 3 days. When you resume activity, don't let yourself get too tired.    Don't smoke. If you need help stopping, talk with your healthcare provider.    Avoid being exposed to cigarette smoke (yours or others ).    You may use acetaminophen or ibuprofen to control pain and fever, unless another medicine was prescribed. If you have chronic liver or kidney disease, have ever had a stomach ulcer or gastrointestinal bleeding, or are taking blood-thinning medicines, talk with your healthcare provider before  using these medicines. Aspirin should never be given to anyone under 18 years of age who is ill with a viral infection or fever. It may cause severe liver or brain damage.    Your appetite may be poor, so a light diet is fine. Stay well hydrated by drinking 6 to 8 glasses of fluids per day (water, soft drinks, juices, tea, or soup). Extra fluids will help loosen secretions in the nose and lungs.    Over-the-counter cold medicines will not shorten the length of time you re sick, but they may be helpful for the following symptoms: cough, sore throat, and nasal and sinus congestion. If you take prescription medicines, ask your healthcare provider or pharmacist which over-the-counter medicines are safe to use. (Note: Don't use decongestants if you have high blood pressure.)  Follow-up care  Follow up with your healthcare provider, or as advised.  When to seek medical advice  Call your healthcare provider right away if any of these occur:    Cough with lots of colored sputum (mucus)    Severe headache; face, neck, or ear pain    Difficulty swallowing due to throat pain    Fever of 100.4 F (38 C) or higher, or as directed by your healthcare provider  Call 911  Call 911 if any of these occur:    Chest pain, shortness of breath, wheezing, or difficulty breathing    Coughing up blood    Very severe pain with swallowing, especially if it goes along with a muffled voice   Date Last Reviewed: 6/1/2018 2000-2018 [employer]. [address] [place] [address]. All rights reserved. This information is not intended as a substitute for professional medical care. Always follow your healthcare professional's instructions.          24 Hour Appointment Hotline       To make an appointment at any [facility] clinic, call [phone] ([phone]). If you don't have a family doctor or clinic, we will help you find one. [facility] clinics are conveniently located to serve the needs of you and your family.              Review of your medicines      START taking        Dose / Directions Last dose taken    benzonatate 100 MG capsule   Commonly known as:  TESSALON   Dose:  100-200 mg   Quantity:  42 capsule        Take 1-2 capsules (100-200 mg) by mouth 3 times daily as needed for cough   Refills:  0        predniSONE 20 MG tablet   Commonly known as:  DELTASONE   Dose:  40 mg   Quantity:  10 tablet        Take 40 mg by mouth daily for 5 days.   Refills:  0          CONTINUE these medicines which may have CHANGED, or have new prescriptions. If we are uncertain of the size of tablets/capsules you have at home, strength may be listed as something that might have changed.        Dose / Directions Last dose taken    * albuterol 108 (90 Base) MCG/ACT inhaler   Commonly known as:  PROAIR HFA/PROVENTIL HFA/VENTOLIN HFA   Dose:  2 puff   What changed:  Another medication with the same name was added. Make sure you understand how and when to take each.   Quantity:  1 Inhaler        Inhale 2 puffs into the lungs every 6 hours as needed for shortness of breath / dyspnea or wheezing   Refills:  11        * albuterol 108 (90 Base) MCG/ACT inhaler   Commonly known as:  PROAIR HFA/PROVENTIL HFA/VENTOLIN HFA   Dose:  2 puff   What changed:  You were already taking a medication with the same name, and this prescription was added. Make sure you understand how and when to take each.   Quantity:  1 Inhaler        Inhale 2 puffs into the lungs every 6 hours as needed for shortness of breath / dyspnea or wheezing   Refills:  0        * Notice:  This list has 2 medication(s) that are the same as other medications prescribed for you. Read the directions carefully, and ask your doctor or other care provider to review them with you.      Our records show that you are taking the medicines listed below. If these are incorrect, please call your family doctor or clinic.        Dose / Directions Last dose taken    IBUPROFEN PO   Dose:  600 mg        Take 600  mg by mouth every 6 hours as needed for moderate pain   Refills:  0                Prescriptions were sent or printed at these locations (3 Prescriptions)                   Other Prescriptions                Printed at Department/Unit printer (3 of 3)         albuterol (PROAIR HFA/PROVENTIL HFA/VENTOLIN HFA) 108 (90 Base) MCG/ACT inhaler               benzonatate (TESSALON) 100 MG capsule               predniSONE (DELTASONE) 20 MG tablet                Procedures and tests performed during your visit     Chest XR,  PA & LAT      Orders Needing Specimen Collection     None      Pending Results     No orders found from 12/6/2018 to 12/9/2018.            Pending Culture Results     No orders found from 12/6/2018 to 12/9/2018.            Pending Results Instructions     If you had any lab results that were not finalized at the time of your Discharge, you can call the ED Lab Result RN at [phone]. You will be contacted by this team for any positive Lab results or changes in treatment. The nurses are available 7 days a week from 10A to 6:30P.  You can leave a message 24 hours per day and they will return your call.        Test Results From Your Hospital Stay        12/8/2018  8:28 PM      Narrative     XR CHEST 2 VW 12/8/2018 8:26 PM    HISTORY: Cough.     COMPARISON: January 17, 2018.        Impression     IMPRESSION: The lungs are clear. No focal pulmonary opacities. Heart  and mediastinum are unremarkable. No acute cardiopulmonary  abnormalities.    DAYNA VALLES MD                Thank you for choosing [facility]       Thank you for choosing [facility] for your care. Our goal is always to provide you with excellent care. Hearing back from our patients is one way we can continue to improve our services. Please take a few minutes to complete the written survey that you may receive in the mail after you visit with us. Thank you!        [facility] Information     [facility] gives you secure access to your electronic health  record. If you see a primary care provider, you can also send messages to your care team and make appointments. If you have questions, please call your primary care clinic.  If you do not have a primary care provider, please call [phone] and they will assist you.        Care EveryWhere ID     This is your Care EveryWhere ID. This could be used by other organizations to access your [facility] medical records  [id number]        Equal Access to Services     SILVINA MARIN : Yoav Bloom, qi tsai, rhea alejo, humble lockett. So [facility] [phone].    ATENCIÓN: Si habla español, tiene a ibarra disposición servicios gratuitos de asistencia lingüística. Llame al [phone].    We comply with applicable federal civil rights laws and [place] laws. We do not discriminate on the basis of race, color, national origin, age, disability, sex, sexual orientation, or gender identity.            After Visit Summary       This is your record. Keep this with you and show to your community pharmacist(s) and doctor(s) at your next visit.

## 2018-12-09 NOTE — ED PROVIDER NOTES
History     Chief Complaint   Patient presents with     Cough     HPI  Elza Greer is a 43 year old female who presents to the urgent care with concern over cough which has been present for approximately last 7-10 days.  Patient did have subjective fever, chills, myalgias earlier which have since improved.  She continues to complain of nasal congestion, sore throat, shortness of breath, wheezing. In the last  3-4 days she has noted diarrhea and has had worsening right sided chest pain with coughing.  She has not had any melena, hematochezia, abdominal pain. She has attempted to treat with robitussin and an OTC cold medication with minimal improvement. She states that she has had a prior history of asthma has increased her albuterol inhaler use to approximately 1-2 times daily.  She also notes that she has had prior history of pneumonia.  She is a pack-a-day smoker.      Problem List:    Patient Active Problem List    Diagnosis Date Noted     Tobacco use disorder 11/14/2016     Priority: Medium      Past Medical History:    History reviewed. No pertinent past medical history.    Past Surgical History:    Past Surgical History:   Procedure Laterality Date     HYSTERECTOMY       Family History:    Family History   Problem Relation Age of Onset     Coronary Artery Disease Mother      Hypertension Mother      Hyperlipidemia Mother      Depression Mother      Colon Cancer Maternal Grandmother      Breast Cancer Maternal Grandmother      Other Cancer Maternal Grandmother      Social History:  Marital Status:   [2]  Social History   Substance Use Topics     Smoking status: Current Every Day Smoker     Packs/day: 0.25     Types: Cigarettes     Smokeless tobacco: Never Used      Comment: Electronic cigarette      Alcohol use No      Medications:      albuterol (PROAIR HFA/PROVENTIL HFA/VENTOLIN HFA) 108 (90 BASE) MCG/ACT Inhaler   IBUPROFEN PO     Review of Systems  CONSTITUTIONAL:POSITIVE  for improved subjective  "fever, chills myalgias   INTEGUMENTARY/SKIN: NEGATIVE for worrisome rashes, moles or lesions  EYES: NEGATIVE for vision changes or irritation  ENT/MOUTH: POSITIVE for nasal congestion, sore throat and NEGATIVE for ear pain   RESP:POSITIVE for cough, shortness of breath, wheezing  GI: POSITIVE for diarrhea NEGATIVE for abdominal pain, nausea, vomiting  Physical Exam   BP: 125/83  Pulse: 89  Temp: 98  F (36.7  C)  Resp: 16  Height: 162.6 cm (5' 4\")  Weight: 112.5 kg (248 lb)  Physical Exam  GENERAL APPEARANCE: healthy, alert and no distress  EYES: EOMI,  PERRL, conjunctiva clear  HENT: ear canals and TM's normal.  Nose and mouth without ulcers, erythema or lesions  NECK: supple, nontender, no lymphadenopathy  RESP: lungs clear to auscultation - no rales, rhonchi or wheezes  CV: regular rates and rhythm, normal S1 S2, no murmur noted  ABDOMEN:  soft, nontender, no HSM or masses and bowel sounds normal  SKIN: no suspicious lesions or rashes  ED Course     ED Course     Procedures        Critical Care time:  none        Results for orders placed or performed during the hospital encounter of 12/08/18 (from the past 24 hour(s))   Chest XR,  PA & LAT    Narrative    XR CHEST 2 VW 12/8/2018 8:26 PM    HISTORY: Cough.     COMPARISON: January 17, 2018.      Impression    IMPRESSION: The lungs are clear. No focal pulmonary opacities. Heart  and mediastinum are unremarkable. No acute cardiopulmonary  abnormalities.    DAYNA VALLES MD     Medications - No data to display    Assessments & Plan (with Medical Decision Making)     I have reviewed the nursing notes.    I have reviewed the findings, diagnosis, plan and need for follow up with the patient.       This SmartLink is deprecated. Use AVSMEDLIST instead to display the medication list for a patient.        Review of your medicines      UNREVIEWED medicines. Ask your doctor about these medicines      Dose / Directions   IBUPROFEN PO      Dose:  600 mg  Take 600 mg by mouth " every 6 hours as needed for moderate pain  Refills:  0        START taking      Dose / Directions   benzonatate 100 MG capsule  Commonly known as:  TESSALON      Dose:  100-200 mg  Take 1-2 capsules (100-200 mg) by mouth 3 times daily as needed for cough  Quantity:  42 capsule  Refills:  0     predniSONE 20 MG tablet  Commonly known as:  DELTASONE      Dose:  40 mg  Take 40 mg by mouth daily for 5 days.  Quantity:  10 tablet  Refills:  0        CONTINUE these medicines which may have CHANGED, or have new prescriptions. If we are uncertain of the size of tablets/capsules you have at home, strength may be listed as something that might have changed.      Dose / Directions   * albuterol 108 (90 Base) MCG/ACT inhaler  Commonly known as:  PROAIR HFA/PROVENTIL HFA/VENTOLIN HFA  This may have changed:  Another medication with the same name was added. Make sure you understand how and when to take each.  Used for:  Acute bronchitis, unspecified organism      Dose:  2 puff  Inhale 2 puffs into the lungs every 6 hours as needed for shortness of breath / dyspnea or wheezing  Quantity:  1 Inhaler  Refills:  11     * albuterol 108 (90 Base) MCG/ACT inhaler  Commonly known as:  PROAIR HFA/PROVENTIL HFA/VENTOLIN HFA  This may have changed:  You were already taking a medication with the same name, and this prescription was added. Make sure you understand how and when to take each.      Dose:  2 puff  Inhale 2 puffs into the lungs every 6 hours as needed for shortness of breath / dyspnea or wheezing  Quantity:  1 Inhaler  Refills:  0         * This list has 2 medication(s) that are the same as other medications prescribed for you. Read the directions carefully, and ask your doctor or other care provider to review them with you.               Where to get your medicines      Some of these will need a paper prescription and others can be bought over the counter. Ask your nurse if you have questions.    Bring a paper prescription for  each of these medications    albuterol 108 (90 Base) MCG/ACT inhaler    benzonatate 100 MG capsule    predniSONE 20 MG tablet       Final diagnoses:   Viral URI with cough     43-year-old female presents to urgent care with concern over Present for Approximately Last Week.  She Had Stable Vital Signs upon Arrival.  Physical Exam Findings As Described above Were Benign.  As Part of Evaluation She Did Have Chest X-Ray Which Is Negative for Acute Infiltrate, Pneumothorax, Pleural Effusion or Change in Cardiopulmonary Vasculature.  Symptoms Most Consistent with Viral URI.  Differential Also Include Bronchitis, asthma exacerbation.  I Do Not Suspect Pertussis, PE, significant asthma exacerbation and will defer further evaluationat this time.  She was discharged home stable with prescriptions for refill of albuterol as well as tessalon, prednisone for symptomatic relief.  Follow up with PCP if no improvement in the next 7 days.  Worrisome reasons to return to ER/UC sooner discussed.     Disclaimer: This note consists of symbols derived from keyboarding, dictation, and/or voice recognition software. As a result, there may be errors in the script that have gone undetected.  Please consider this when interpreting information found in the chart.    12/8/2018   [facility] EMERGENCY DEPARTMENT     Lalitha Bowers PA-C  12/09/18 1933

## 2018-12-09 NOTE — ED TRIAGE NOTES
"persistent cough \"for months\" occassionally productive \"whitish\" is a smoker  Thinks it is turning into bronchitis no fever   "

## 2018-12-09 NOTE — DISCHARGE INSTRUCTIONS
Viral Upper Respiratory Illness (Adult)  You have a viral upper respiratory illness (URI), which is another term for the common cold. This illness is contagious during the first few days. It is spread through the air by coughing and sneezing. It may also be spread by direct contact (touching the sick person and then touching your own eyes, nose, or mouth). Frequent handwashing will decrease risk of spread. Most viral illnesses go away within 7 to 10 days with rest and simple home remedies. Sometimes the illness may last for several weeks. Antibiotics will not kill a virus, and they are generally not prescribed for this condition.    Home care    If symptoms are severe, rest at home for the first 2 to 3 days. When you resume activity, don't let yourself get too tired.    Don't smoke. If you need help stopping, talk with your healthcare provider.    Avoid being exposed to cigarette smoke (yours or others ).    You may use acetaminophen or ibuprofen to control pain and fever, unless another medicine was prescribed. If you have chronic liver or kidney disease, have ever had a stomach ulcer or gastrointestinal bleeding, or are taking blood-thinning medicines, talk with your healthcare provider before using these medicines. Aspirin should never be given to anyone under 18 years of age who is ill with a viral infection or fever. It may cause severe liver or brain damage.    Your appetite may be poor, so a light diet is fine. Stay well hydrated by drinking 6 to 8 glasses of fluids per day (water, soft drinks, juices, tea, or soup). Extra fluids will help loosen secretions in the nose and lungs.    Over-the-counter cold medicines will not shorten the length of time you re sick, but they may be helpful for the following symptoms: cough, sore throat, and nasal and sinus congestion. If you take prescription medicines, ask your healthcare provider or pharmacist which over-the-counter medicines are safe to use. (Note: Don't use  decongestants if you have high blood pressure.)  Follow-up care  Follow up with your healthcare provider, or as advised.  When to seek medical advice  Call your healthcare provider right away if any of these occur:    Cough with lots of colored sputum (mucus)    Severe headache; face, neck, or ear pain    Difficulty swallowing due to throat pain    Fever of 100.4 F (38 C) or higher, or as directed by your healthcare provider  Call 911  Call 911 if any of these occur:    Chest pain, shortness of breath, wheezing, or difficulty breathing    Coughing up blood    Very severe pain with swallowing, especially if it goes along with a muffled voice   Date Last Reviewed: 6/1/2018 2000-2018 [employer]. [address] [place] [address], [place] [address]. All rights reserved. This information is not intended as a substitute for professional medical care. Always follow your healthcare professional's instructions.

## 2019-03-18 ENCOUNTER — APPOINTMENT (OUTPATIENT)
Dept: GENERAL RADIOLOGY | Facility: CLINIC | Age: 44
End: 2019-03-18
Attending: PHYSICIAN ASSISTANT
Payer: OTHER MISCELLANEOUS

## 2019-03-18 ENCOUNTER — HOSPITAL ENCOUNTER (EMERGENCY)
Facility: CLINIC | Age: 44
Discharge: HOME | End: 2019-03-18
Attending: PHYSICIAN ASSISTANT | Admitting: PHYSICIAN ASSISTANT
Payer: OTHER MISCELLANEOUS

## 2019-03-18 VITALS
SYSTOLIC BLOOD PRESSURE: 149 MMHG | TEMPERATURE: 98.6 F | DIASTOLIC BLOOD PRESSURE: 99 MMHG | BODY MASS INDEX: 42 KG/M2 | HEIGHT: 64 IN | OXYGEN SATURATION: 98 % | WEIGHT: 246 LBS

## 2019-03-18 DIAGNOSIS — M54.2 CERVICALGIA: ICD-10-CM

## 2019-03-18 PROCEDURE — G0463 HOSPITAL OUTPT CLINIC VISIT: HCPCS

## 2019-03-18 PROCEDURE — 99213 OFFICE O/P EST LOW 20 MIN: CPT | Mod: Z6 | Performed by: PHYSICIAN ASSISTANT

## 2019-03-18 PROCEDURE — 73030 X-RAY EXAM OF SHOULDER: CPT | Mod: LT

## 2019-03-18 PROCEDURE — 72040 X-RAY EXAM NECK SPINE 2-3 VW: CPT

## 2019-03-18 RX ORDER — CYCLOBENZAPRINE HCL 10 MG
10 TABLET ORAL 3 TIMES DAILY PRN
Qty: 20 TABLET | Refills: 0 | Status: SHIPPED | OUTPATIENT
Start: 2019-03-18 | End: 2019-03-24

## 2019-03-18 ASSESSMENT — MIFFLIN-ST. JEOR: SCORE: 1765.85

## 2019-03-18 NOTE — ED AVS SNAPSHOT
[facility] Emergency Department  [address] [facility] [address]  Phone:  [phone]  Fax:  [phone]                                    Elza Greer   MRN: [id number]    Department:  [facility] Emergency Department   Date of Visit:  3/18/2019           After Visit Summary Signature Page    I have received my discharge instructions, and my questions have been answered. I have discussed any challenges I see with this plan with the nurse or doctor.    ..........................................................................................................................................  Patient/Patient Representative Signature      ..........................................................................................................................................  Patient Representative Print Name and Relationship to Patient    ..................................................               ................................................  Date                                   Time    ..........................................................................................................................................  Reviewed by Signature/Title    ...................................................              ..............................................  Date                                               Time          22EPIC Rev 08/18
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
## 2019-03-22 ASSESSMENT — ENCOUNTER SYMPTOMS
COLOR CHANGE: 0
NUMBNESS: 1
NAUSEA: 0
COUGH: 0
SEIZURES: 0
DIZZINESS: 0
WOUND: 0
NECK PAIN: 1
FEVER: 0
ABDOMINAL PAIN: 0
LIGHT-HEADEDNESS: 0
PALPITATIONS: 0
VOMITING: 0
BACK PAIN: 1
WEAKNESS: 0
HEADACHES: 1
SHORTNESS OF BREATH: 0
CHILLS: 0
DIARRHEA: 0

## 2019-04-11 ENCOUNTER — HOSPITAL ENCOUNTER (EMERGENCY)
Facility: CLINIC | Age: 44
Discharge: HOME | End: 2019-04-11
Attending: PHYSICIAN ASSISTANT | Admitting: PHYSICIAN ASSISTANT
Payer: COMMERCIAL

## 2019-04-11 ENCOUNTER — APPOINTMENT (OUTPATIENT)
Dept: GENERAL RADIOLOGY | Facility: CLINIC | Age: 44
End: 2019-04-11
Attending: PHYSICIAN ASSISTANT
Payer: COMMERCIAL

## 2019-04-11 VITALS
TEMPERATURE: 97.9 F | WEIGHT: 246 LBS | BODY MASS INDEX: 42.23 KG/M2 | SYSTOLIC BLOOD PRESSURE: 149 MMHG | OXYGEN SATURATION: 98 % | RESPIRATION RATE: 14 BRPM | DIASTOLIC BLOOD PRESSURE: 74 MMHG

## 2019-04-11 DIAGNOSIS — M25.561 ACUTE PAIN OF RIGHT KNEE: ICD-10-CM

## 2019-04-11 PROCEDURE — 99214 OFFICE O/P EST MOD 30 MIN: CPT | Mod: Z6 | Performed by: PHYSICIAN ASSISTANT

## 2019-04-11 PROCEDURE — 73562 X-RAY EXAM OF KNEE 3: CPT | Mod: RT

## 2019-04-11 PROCEDURE — G0463 HOSPITAL OUTPT CLINIC VISIT: HCPCS | Performed by: PHYSICIAN ASSISTANT

## 2019-04-11 NOTE — LETTER
[facility] EMERGENCY DEPARTMENT  [address] [address]  Phone: [phone]  Fax: [phone]    April 11, 2019        Elza Greer  [address] [facility] [address]          To whom it may concern:    RE: Elza Bertrand was evaluated in the  for a right knee injury on 4/11/19.  I recommend she rest the right knee with minimal activity only as tolerated by her pain level for the next seven days or until her next follow up appointment.  During this time she will need to avoid any kneeling, squatting and may need to avoid prolonged periods of standing.      Please contact me for questions or concerns.      Sincerely,        Lalitha Bowers PA-C

## 2019-04-11 NOTE — ED PROVIDER NOTES
History     Chief Complaint   Patient presents with     Knee Pain     fell on saturday hit right knee having right knee pain     HPI  Elza Greer is a 43 year old female who presents to the urgent care with concern of her right knee pain present for approximately last 6 days.  Patient states that she tripped on uneven ground outside and fell onto grass landing directly onto her right knee.  She had immediate swelling, pain which has been improving until she attempted to step towards something in the last 24 hours and had worsening of her symptoms.  She denies any recent fever, chills, myalgias, distal numbness or paresthesias, other areas of pain.  She has not had a history of problems in her right knee but states that she did tear tendons in her left knee which required casting for several months.  She has attempted to treat with ibuprofen, last dose was approximately 8 hours prior to arrival    Allergies:  Allergies   Allergen Reactions     Azithromycin Anaphylaxis     Latex Hives     Oxycodone Nausea and Vomiting       Problem List:    Patient Active Problem List    Diagnosis Date Noted     Tobacco use disorder 11/14/2016     Priority: Medium        Past Medical History:    No past medical history on file.    Past Surgical History:    Past Surgical History:   Procedure Laterality Date     HYSTERECTOMY         Family History:    Family History   Problem Relation Age of Onset     Coronary Artery Disease Mother      Hypertension Mother      Hyperlipidemia Mother      Depression Mother      Colon Cancer Maternal Grandmother      Breast Cancer Maternal Grandmother      Other Cancer Maternal Grandmother        Social History:  Marital Status:   [2]  Social History     Tobacco Use     Smoking status: Current Every Day Smoker     Packs/day: 0.25     Types: Cigarettes     Smokeless tobacco: Never Used     Tobacco comment: Electronic cigarette    Substance Use Topics     Alcohol use: No     Drug use: No         Medications:      albuterol (PROAIR HFA/PROVENTIL HFA/VENTOLIN HFA) 108 (90 Base) MCG/ACT inhaler   albuterol (PROAIR HFA/PROVENTIL HFA/VENTOLIN HFA) 108 (90 BASE) MCG/ACT Inhaler   benzonatate (TESSALON) 100 MG capsule   IBUPROFEN PO     Review of Systems  CONSTITUTIONAL:NEGATIVE for fever, chills, change in weight  INTEGUMENTARY/SKIN: NEGATIVE for worrisome rashes, moles or lesions  RESP:NEGATIVE for significant cough or SOB  MUSCULOSKELETAL: POSITIVE  for right knee pain, swelling and NEGATIVE for other concerning arthralgias or myalgias  NEURO: NEGATIVE for numbness, paresthesias   Physical Exam   BP: 149/74  Heart Rate: 105  Temp: 97.9  F (36.6  C)  Resp: 14  Weight: 111.6 kg (246 lb)  SpO2: 98 %  Physical Exam   Constitutional: She is oriented to person, place, and time. She appears well-developed and well-nourished. No distress.   Cardiovascular:   Pulses:       Posterior tibial pulses are 2+ on the right side.   Musculoskeletal:        Right knee: She exhibits decreased range of motion (With flexion beyond 90 degrees), swelling and ecchymosis. She exhibits no deformity, no laceration, no erythema and normal alignment. Tenderness found.        Right ankle: Normal.        Right lower leg: She exhibits tenderness. She exhibits no swelling, no edema, no deformity and no laceration.   Neurological: She is alert and oriented to person, place, and time. No sensory deficit.   Skin: Skin is warm, dry and intact. Bruising (2 cm old appearing just superior and medially to patella ) noted. No rash noted.     ED Course        Procedures        Critical Care time:  none            Results for orders placed or performed during the hospital encounter of 04/11/19   XR Knee Right 3 Views    Narrative    XR RIGHT KNEE THREE VIEWS   4/11/2019 4:34 PM     HISTORY: Pain after fall five days ago.    COMPARISON: None.       Impression    IMPRESSION: No acute fracture or dislocation.    CLAUDIA PARRA MD     Medications - No  data to display    Assessments & Plan (with Medical Decision Making)     I have reviewed the nursing notes.    I have reviewed the findings, diagnosis, plan and need for follow up with the patient.          Medication List      ASK your doctor about these medications    cyclobenzaprine 10 MG tablet  Commonly known as:  FLEXERIL  10 mg, Oral, 3 TIMES DAILY PRN  Ask about: Should I take this medication?     predniSONE 20 MG tablet  Commonly known as:  DELTASONE  40 mg, Oral, DAILY  Ask about: Should I take this medication?          Final diagnoses:   Acute pain of right knee     43-year-old female presents to the urgent care with concern over right knee pain after fall 6 days ago.  She had stable vital signs upon arrival.  Physical exam findings as described above.  As part of evaluation she did have x-ray of her right knee which is negative for acute fracture, dislocation.  Differential for her symptoms would include knee contusion, meniscal injury.  No ligamentous instability to suggest full-thickness tear.  There was no evidence of bursitis.  He did initially discuss symptomatic treatment with knee immobilizer however did not adequately fit patient.  She was given Ace wrap for comfort, crutches at home as needed.  Follow-up with Ortho for recheck within the next 3-5 days,  referral placed.    Disclaimer: This note consists of symbols derived from keyboarding, dictation, and/or voice recognition software. As a result, there may be errors in the script that have gone undetected.  Please consider this when interpreting information found in the chart.      4/11/2019   [facility] EMERGENCY DEPARTMENT     Lalitha Bowers PA-C  04/13/19 [id number]

## 2019-04-11 NOTE — ED AVS SNAPSHOT
[facility] Emergency Department  [address] [facility] [address]  Phone:  [phone]  Fax:  [phone]                                    Elza Greer   MRN: [id number]    Department:  [facility] Emergency Department   Date of Visit:  4/11/2019           After Visit Summary Signature Page    I have received my discharge instructions, and my questions have been answered. I have discussed any challenges I see with this plan with the nurse or doctor.    ..........................................................................................................................................  Patient/Patient Representative Signature      ..........................................................................................................................................  Patient Representative Print Name and Relationship to Patient    ..................................................               ................................................  Date                                   Time    ..........................................................................................................................................  Reviewed by Signature/Title    ...................................................              ..............................................  Date                                               Time          22EPIC Rev 08/18

## 2019-04-17 ENCOUNTER — HOSPITAL ENCOUNTER (OUTPATIENT)
Dept: MRI IMAGING | Facility: CLINIC | Age: 44
Discharge: HOME | End: 2019-04-17
Attending: ORTHOPAEDIC SURGERY | Admitting: ORTHOPAEDIC SURGERY
Payer: COMMERCIAL

## 2019-04-17 DIAGNOSIS — M25.569 KNEE PAIN: ICD-10-CM

## 2019-04-17 PROCEDURE — 73721 MRI JNT OF LWR EXTRE W/O DYE: CPT | Mod: RT

## 2019-07-15 ENCOUNTER — HOSPITAL ENCOUNTER (EMERGENCY)
Facility: CLINIC | Age: 44
Discharge: HOME | End: 2019-07-16
Attending: EMERGENCY MEDICINE | Admitting: EMERGENCY MEDICINE

## 2019-07-15 DIAGNOSIS — M76.60 ACHILLES TENDON PAIN: ICD-10-CM

## 2019-07-15 DIAGNOSIS — M79.662 PAIN OF LEFT LOWER LEG: ICD-10-CM

## 2019-07-15 PROCEDURE — 99284 EMERGENCY DEPT VISIT MOD MDM: CPT | Mod: Z6 | Performed by: EMERGENCY MEDICINE

## 2019-07-15 PROCEDURE — 99284 EMERGENCY DEPT VISIT MOD MDM: CPT | Performed by: EMERGENCY MEDICINE

## 2019-07-15 RX ORDER — ACETAMINOPHEN 325 MG/1
650 TABLET ORAL
Status: COMPLETED | OUTPATIENT
Start: 2019-07-15 | End: 2019-07-16

## 2019-07-15 ASSESSMENT — ENCOUNTER SYMPTOMS
EYES NEGATIVE: 1
CARDIOVASCULAR NEGATIVE: 1
RESPIRATORY NEGATIVE: 1
PSYCHIATRIC NEGATIVE: 1
CONSTITUTIONAL NEGATIVE: 1
ALLERGIC/IMMUNOLOGIC NEGATIVE: 1
NEUROLOGICAL NEGATIVE: 1
ENDOCRINE NEGATIVE: 1
GASTROINTESTINAL NEGATIVE: 1
HEMATOLOGIC/LYMPHATIC NEGATIVE: 1

## 2019-07-15 ASSESSMENT — MIFFLIN-ST. JEOR: SCORE: 1756.31

## 2019-07-15 NOTE — LETTER
July 16, 2019      To Whom It May Concern:      Elza Greer was seen in our Emergency Department today, 07/16/19.  She is excused from prolonged standing no more than 5 minutes at a time until follow-up care with orthopedic/podiatry.  If her symptoms worsen she may need to return to the emergency department for reevaluation additional care.  This work excuse is valid until July 22, 2019.       Sincerely,         Sushant Silva MD, FACEP

## 2019-07-16 ENCOUNTER — APPOINTMENT (OUTPATIENT)
Dept: GENERAL RADIOLOGY | Facility: CLINIC | Age: 44
End: 2019-07-16
Attending: EMERGENCY MEDICINE

## 2019-07-16 VITALS
HEIGHT: 64 IN | WEIGHT: 245 LBS | RESPIRATION RATE: 16 BRPM | TEMPERATURE: 98.8 F | OXYGEN SATURATION: 98 % | DIASTOLIC BLOOD PRESSURE: 90 MMHG | BODY MASS INDEX: 41.83 KG/M2 | HEART RATE: 78 BPM | SYSTOLIC BLOOD PRESSURE: 141 MMHG

## 2019-07-16 PROCEDURE — 25000132 ZZH RX MED GY IP 250 OP 250 PS 637: Performed by: EMERGENCY MEDICINE

## 2019-07-16 PROCEDURE — 73610 X-RAY EXAM OF ANKLE: CPT | Mod: LT

## 2019-07-16 RX ADMIN — ACETAMINOPHEN 650 MG: 325 TABLET, FILM COATED ORAL at 00:15

## 2019-07-16 RX ADMIN — IBUPROFEN 600 MG: 400 TABLET ORAL at 00:15

## 2019-07-16 NOTE — ED PROVIDER NOTES
"  History     Chief Complaint   Patient presents with     Leg Pain     left ankle and calf pain  heel pain worst     HPI  Elza Greer is a 44 year old female with history of tobacco use disorder who presents for evaluation for leg pain.  Patient arrived by private car with her daughter (dirk) for evaluation for 1 week history of pain and discomfort involving her left ankle and foot and tightness extending from her Achilles tendon up to her to the mid calf.  Pain worsened over the last 2 days. She reports she is not certain what happened and how she injured her leg or ankle but that she [place].  Patient reports that she was around [place] and does not know if [place].  She reports she has near complete tear of her Achilles tendon about 4 years ago \"It was 90% torn\".  She reports it was not surgically repaired because she is a smoker.  She admits that she smokes a pack per day.  She is not on any active prescriptions but reports she has Flexeril which was prescribed after her visit in urgent care in March 2019..  Because of increasing pain with weightbearing and a sense that she has numbness in her left toes and swelling about the base of her ankle and a pulling sensation  about her achilles she presents to emergency department for further care and evaluation.    Allergies:  Allergies   Allergen Reactions     Azithromycin Anaphylaxis     Latex Hives     Oxycodone Nausea and Vomiting       Problem List:    Patient Active Problem List    Diagnosis Date Noted     Tobacco use disorder 11/14/2016     Priority: Medium        Past Medical History:    No past medical history on file.    Past Surgical History:    Past Surgical History:   Procedure Laterality Date     HYSTERECTOMY         Family History:    Family History   Problem Relation Age of Onset     Coronary Artery Disease Mother      Hypertension Mother      " "Hyperlipidemia Mother      Depression Mother      Colon Cancer Maternal Grandmother      Breast Cancer Maternal Grandmother      Other Cancer Maternal Grandmother        Social History:  Marital Status:   [2]  Social History     Tobacco Use     Smoking status: Current Every Day Smoker     Packs/day: 0.25     Types: Cigarettes     Smokeless tobacco: Never Used     Tobacco comment: Electronic cigarette    Substance Use Topics     Alcohol use: No     Drug use: No        Medications:      albuterol (PROAIR HFA/PROVENTIL HFA/VENTOLIN HFA) 108 (90 Base) MCG/ACT inhaler   albuterol (PROAIR HFA/PROVENTIL HFA/VENTOLIN HFA) 108 (90 BASE) MCG/ACT Inhaler   benzonatate (TESSALON) 100 MG capsule   IBUPROFEN PO         Review of Systems   Constitutional: Negative.    HENT: Negative.    Eyes: Negative.    Respiratory: Negative.    Cardiovascular: Negative.    Gastrointestinal: Negative.    Endocrine: Negative.    Genitourinary: Negative.    Musculoskeletal:        Left ankle pain and discomfort, pain in the posterior left leg for 1 week   Skin: Negative.    Allergic/Immunologic: Negative.    Neurological: Negative.    Hematological: Negative.    Psychiatric/Behavioral: Negative.    All other systems reviewed and are negative.      Physical Exam   BP: (!) 145/95  Pulse: 93  Temp: 98.8  F (37.1  C)  Resp: 16  Height: 162.6 cm (5' 4\")  Weight: 111.1 kg (245 lb)  SpO2: 95 %      Physical Exam   Constitutional: She is oriented to person, place, and time. She appears well-developed and well-nourished. No distress.   HENT:   Head: Normocephalic and atraumatic.   Eyes: Pupils are equal, round, and reactive to light. Right eye exhibits no discharge. Left eye exhibits no discharge. No scleral icterus.   Neck: Normal range of motion. Neck supple. No JVD present. No tracheal deviation present. No thyromegaly present.   Cardiovascular: Normal rate. Exam reveals no gallop and no friction rub.   No murmur heard.  Pulmonary/Chest: Effort " "normal.   Abdominal: Soft.   Musculoskeletal: She exhibits tenderness. She exhibits no deformity.        Left foot: There is tenderness and swelling. There is normal range of motion, no crepitus, no deformity and no laceration.        Feet:    Lymphadenopathy:     She has no cervical adenopathy.   Neurological: She is alert and oriented to person, place, and time. She displays normal reflexes. No cranial nerve deficit or sensory deficit. She exhibits normal muscle tone. Coordination normal.   Skin: Capillary refill takes less than 2 seconds. She is not diaphoretic.   Psychiatric: She has a normal mood and affect. Her behavior is normal. Judgment and thought content normal.       ED Course        Procedures               Critical Care time:  none                 ED medications:  Medications   acetaminophen (TYLENOL) tablet 650 mg (650 mg Oral Given 7/16/19 0015)   ibuprofen (ADVIL/MOTRIN) tablet 600 mg (600 mg Oral Given 7/16/19 0015)       ED labs and imaging:  Results for orders placed or performed during the hospital encounter of 07/15/19   XR Ankle Left G/E 3 Views    Narrative    LEFT ANKLE 3 VIEWS  7/16/2019 12:35 AM     HISTORY: One week history of increasing pain over the base of the  ankle.    COMPARISON: None.      Impression    IMPRESSION: No visualized acute fracture, malalignment or other acute  osseous abnormality of the left ankle.           ED Vitals:  Vitals:    07/15/19 2330 07/15/19 2345 07/16/19 0015 07/16/19 0018   BP: 133/88 (!) 143/98 141/90 141/90   Pulse: 87 88  78   Resp:       Temp:       TempSrc:       SpO2: 95% 97%  98%   Weight:       Height:             Assessments & Plan (with Medical Decision Making)   Clinical impression: 44-year-old female who presented for evaluation  for leg pain and discomfort.  Because of her pain and discomfort over the posterior left ankle along the Achilles tendon is not clear. Patient reported an unclear mechanism of injury. \" I don't know what I did\".  she " "arrived by private car with her daughter (peg).  She reports she may have [place] ago.  Over the course of week and 2 days ago she had increasing pain of the base of her left heel and a pulling and tugging sensation over her posterior left lower leg.  No prior history of DVT or PE.  She reported  a history of Achilles tendon injury 4 years ago \" 90% torn \" but no repair  was completed by report because she was a poor surgical candidate because she is a smoker.  She admits to smoking a pack per day.  She took some ibuprofen earlier in the morning but no other medications prior to arrival in the department..  She also reports that she has muscle relaxers that was provided after her visit in urgent care in the spring (March/April) after knee injury.  Patient was concerned that she was having increasing pain with weightbearing about the ankle and swelling about her ankle and a tugging sensation and pulling along her Achilles tendon.  On exam she is in no acute distress.  She has some mild non-pitting soft tissue swelling about the base of her ankle.  No bony deformity about the ankle mortise and no pain to palpation about the midfoot or calcaneus.  She has no pitting leg edema.  Way test is negative, no foot drop, no calf pain to palpation.  Normal perfusion and gross sensation on  exam.  She was afebrile.  There is no erythema about the foot or leg.      ED course and Plan:  I reviewed patient's medical records including her recent visit in urgent care on March 18, 2019 and visit on April 11, 2019.  Patient was given flexeril.  We discussed options for care including x-ray versus ultrasound.  With her sense of tightening over her Achilles tendon and report of a near complete tear 4 years ago without surgical repair we agreed to proceed with ultrasound to evaluate the integrity of the Achilles tendon, and to exclude " DVT with her report of numbness in her toes, and swelling in the base of her ankle.  X-ray imaging of her left ankle and foot was also obtained to evaluate for bony process due to unclear mechanism of injury and pain with weightbearing and discomfort about the base of her left ankle.      I had a discussion with the on-duty ultrasound technician who expressed that she was not comfortable completing a musculoskeletal ultrasound to exclude an Achilles tendon injury. Ultrasound was canceled.  After patient was informed that a musculoskeletal ultrasound to assess the integrity of her Achilles tendon given prior injury could not be completed tonight in the emergency department patient declined a vascular ultrasound to exclude DVT.  Suspicion for DVT is low however with patient's report of ankle swelling, numbness in her toes and pain that feels like a tugging and pulling over the Achilles tendon in the posterior leg. We discussed the utility of ultrasound and other options for imaging.  MRI is not available after 9PM.  After reviewing risk and options for care because musculoskeletal ultrasound could not be completed including possibility of transferring her to another facility versus plan for outpatient follow-up including referral through the orthopedic  referral service with sports medicine/ podiatry  for outpatient follow-up patient elected to proceed with x-ray imaging, declined transfer.    X-ray imaging of her left foot was reviewed independently which did not show any acute bony process that would raise concern of suspicion for fracture or dislocation. no significant soft tissue changes were appreciated on my independent review.  Please see the interpreting radiologist report above.  She reports she has been followed by Dr. Jacob Hubbard for her knee injury and knee pain.  Patient reported some mild improvement in her discomfort and support with using a cam boot that was placed prior to discharge.  She  was also given Tylenol and ibuprofen.  She plans to take Flexeril which she has leftover from prior prescriptions for pain control and comfort.    An orthopedic  referral was placed to help with definitive follow-up care including discussion about next steps for diagnostic imaging. We discussed and reviewed reasons to return to the emergency department for care.  Patient expressed comfort and understanding of plan of care.        Disclaimer: This note consists of symbols derived from keyboarding, dictation and/or voice recognition software. As a result, there may be errors in the script that have gone undetected. Please consider this when interpreting information found in this chart.  I have reviewed the nursing notes.    I have reviewed the findings, diagnosis, plan and need for follow up with the patient.          Medication List      There are no discharge medications for this visit.         Final diagnoses:   Pain of left lower leg - Recent vacation 1 week ago.  2 day history of increasing pain in the base of the left ankle along the Achilles tendon, swelling and report of numbness in the toes   Achilles tendon pain - Pain and discomfort at the base of the left heel and along the Achilles tendon.  Report of history of Achilles tendon tear without repair in 2015       7/15/2019   [facility] EMERGENCY DEPARTMENT     Sabino Silva MD  07/16/19 0135

## 2019-07-16 NOTE — DISCHARGE INSTRUCTIONS
1) We  discussed and reviewed options for care.  You have declined an ultrasound of your leg to exclude blood clot.  We discussed the limitations of ultrasound and unfortunately we are unable to complete a musculoskeletal ultrasound today to evaluate the integrity of your Achilles tendon with report of prior tear without repair 4 years prior.    2) We have reviewed next steps for care including offering to transfer you for further evaluation versus a trial of outpatient care with orthopedic / podiatry referral for follow-up care.    3) because you do not currently have a primary care provider an out patient diagnostic test like an MRI could not be ordered to help facilitate outpatient care.    4) A referral has been placed to the orthopedic  referral line to help with follow-up care in the next 3 to 5 days.  You should be called for follow-up appointment.  I have offered to schedule a clinic appointment in the clinic as a back-up plan which you have declined tonight.    5) You are placed in a cam boot for comfort today.  We discussed options for pain control.  If symptoms worsen or new concerns arise please return to the emergency department for reevaluation additional care.

## 2019-07-16 NOTE — ED NOTES
[place]  Patient has wrapped it  Ice and used over the counter medication  Patient pulses Present   +1 edema noted at ankle. Patient states heel feels like it did when she injured achillis tendon.  Pulling pain felt  up into calf.   Patient still has good use of the foot able to bear weight

## 2019-07-17 ENCOUNTER — TELEPHONE (OUTPATIENT)
Dept: PODIATRY | Facility: CLINIC | Age: 44
End: 2019-07-17

## 2019-07-17 DIAGNOSIS — M76.62 TENDONITIS, ACHILLES, LEFT: Primary | ICD-10-CM

## 2019-07-17 NOTE — TELEPHONE ENCOUNTER
I can put a referral into [facility] in [place] where the provider can perform an ultrasound and provide treatment options in the same visit.

## 2019-07-17 NOTE — TELEPHONE ENCOUNTER
I can put a referral into [facility] in [place] where the provider can perform an ultrasound and provide treatment options in the same visit.

## 2019-07-17 NOTE — TELEPHONE ENCOUNTER
Basically pt will have ins in 2 weeks and doesnt want multiple visits out of pocket.  She would like an ultrasound - are these ever done at the same appointment or would she have to return.  She has a new job and doesnt want to take a lot of time off either.    achilles tendon pain

## 2019-07-18 ENCOUNTER — OFFICE VISIT (OUTPATIENT)
Dept: PODIATRY | Facility: CLINIC | Age: 44
End: 2019-07-18

## 2019-07-18 VITALS
SYSTOLIC BLOOD PRESSURE: 142 MMHG | DIASTOLIC BLOOD PRESSURE: 95 MMHG | WEIGHT: 245 LBS | BODY MASS INDEX: 41.83 KG/M2 | HEART RATE: 84 BPM | HEIGHT: 64 IN

## 2019-07-18 DIAGNOSIS — M76.62 TENDONITIS, ACHILLES, LEFT: Primary | ICD-10-CM

## 2019-07-18 PROCEDURE — 99203 OFFICE O/P NEW LOW 30 MIN: CPT | Performed by: PODIATRIST

## 2019-07-18 RX ORDER — ERGOCALCIFEROL 1.25 MG/1
50000 CAPSULE, LIQUID FILLED ORAL PRN
COMMUNITY
Start: 2014-08-18 | End: 2020-08-24

## 2019-07-18 ASSESSMENT — MIFFLIN-ST. JEOR: SCORE: 1756.31

## 2019-07-18 ASSESSMENT — PAIN SCALES - GENERAL: PAINLEVEL: SEVERE PAIN (6)

## 2019-07-18 NOTE — PROGRESS NOTES
PATIENT HISTORY:  Elza Greer is a 44 year old female who presents to clinic for a painful left ankle.  The patient describes the pain as sharp aching.  The patient relates the pain level is moderate.  The patient relates pain is located on the Achilles tendon on the left.  The patient relates the pain has been present for the past several weeks.  The patient relates pain with ambulation.  The patient has tried a cam boot with little relief.  The patient was sent by Dr. Silva for consultation on the left foot.       REVIEW OF SYSTEMS:  Constitutional, HEENT, cardiovascular, pulmonary, GI, , musculoskeletal, neuro, skin, endocrine and psych systems are negative, except as otherwise noted.     PAST MEDICAL HISTORY: History reviewed. No pertinent past medical history.     PAST SURGICAL HISTORY:   Past Surgical History:   Procedure Laterality Date     HYSTERECTOMY          MEDICATIONS:   Current Outpatient Medications:      albuterol (PROAIR HFA/PROVENTIL HFA/VENTOLIN HFA) 108 (90 Base) MCG/ACT inhaler, Inhale 2 puffs into the lungs every 6 hours as needed for shortness of breath / dyspnea or wheezing, Disp: 1 Inhaler, Rfl: 0     albuterol (PROAIR HFA/PROVENTIL HFA/VENTOLIN HFA) 108 (90 BASE) MCG/ACT Inhaler, Inhale 2 puffs into the lungs every 6 hours as needed for shortness of breath / dyspnea or wheezing, Disp: 1 Inhaler, Rfl: 11     benzonatate (TESSALON) 100 MG capsule, Take 1-2 capsules (100-200 mg) by mouth 3 times daily as needed for cough, Disp: 42 capsule, Rfl: 0     IBUPROFEN PO, Take 600 mg by mouth every 6 hours as needed for moderate pain, Disp: , Rfl:      order for DME, Knee Walker Length of use: Three months The patient was prescribed a knee walker. The patient is unsteady utilizing crutches, quad walker or a cane.  The knee walker will allow the patient to ambulate safely without the use of the operative foot and reduce risk of falls., Disp: 1 Units, Rfl: 0     vitamin D2 (ERGOCALCIFEROL) 93891  units (1250 mcg) capsule, Take 50,000 Units by mouth, Disp: , Rfl:      ALLERGIES:    Allergies   Allergen Reactions     Azithromycin Anaphylaxis     Latex Hives     Oxycodone Nausea and Vomiting        SOCIAL HISTORY:   Social History     Socioeconomic History     Marital status:      Spouse name: Not on file     Number of children: Not on file     Years of education: Not on file     Highest education level: Not on file   Occupational History     Not on file   Social Needs     Financial resource strain: Not on file     Food insecurity:     Worry: Not on file     Inability: Not on file     Transportation needs:     Medical: Not on file     Non-medical: Not on file   Tobacco Use     Smoking status: Current Every Day Smoker     Packs/day: 0.25     Types: Cigarettes     Smokeless tobacco: Never Used     Tobacco comment: Electronic cigarette    Substance and Sexual Activity     Alcohol use: No     Drug use: No     Sexual activity: Not on file   Lifestyle     Physical activity:     Days per week: Not on file     Minutes per session: Not on file     Stress: Not on file   Relationships     Social connections:     Talks on phone: Not on file     Gets together: Not on file     Attends [employer] service: Not on file     Active member of club or organization: Not on file     Attends meetings of clubs or organizations: Not on file     Relationship status: Not on file     Intimate partner violence:     Fear of current or ex partner: Not on file     Emotionally abused: Not on file     Physically abused: Not on file     Forced sexual activity: Not on file   Other Topics Concern     Parent/sibling w/ CABG, MI or angioplasty before 65F 55M? Yes     Comment: mom -- age 55 stent placement   Social History Narrative     Not on file        FAMILY HISTORY:   Family History   Problem Relation Age of Onset     Coronary Artery Disease Mother      Hypertension Mother      Hyperlipidemia Mother      Depression Mother      Colon Cancer  "Maternal Grandmother      Breast Cancer Maternal Grandmother      Other Cancer Maternal Grandmother         EXAM:Vitals: BP (!) 142/95   Pulse 84   Ht 1.626 m (5' 4\")   Wt 111.1 kg (245 lb)   BMI 42.05 kg/m    BMI= Body mass index is 42.05 kg/m .    Weight management plan: Patient was referred to their PCP to discuss a diet and exercise plan.    General appearance: Patient is alert and fully cooperative with history & exam.  No sign of distress is noted during the visit.     Psychiatric: Affect is pleasant & appropriate.  Patient appears motivated to improve health.     Respiratory: Breathing is regular & unlabored while sitting.     HEENT: Hearing is intact to spoken word.  Speech is clear.  No gross evidence of visual impairment that would impact ambulation.     Dermatologic: Skin is intact to both lower extremities without significant lesions, rash or abrasion.  No paronychia or evidence of soft tissue infection is noted.     Vascular: DP & PT pulses are intact & regular bilaterally.  No significant edema or varicosities noted.  CFT and skin temperature is normal to both lower extremities.     Neurologic: Lower extremity sensation is intact to light touch.  No evidence of weakness or contracture in the lower extremities.  No evidence of neuropathy.     Musculoskeletal: Patient is ambulatory without assistive device or brace.  No gross ankle deformity noted.  No foot or ankle joint effusion is noted.  Noted pain on palpation along the Achilles tendon on the left.  No palpable gap noted.  Negative Way's test noted.  No ecchymosis noted.    Radiographs were evaluated including AP, lateral and medial oblique views of the left foot reveals  no cortical erosions or periosteal elevation.  All joint margins appear stable.  There is no apparent fracture or tumor formation noted.  There is no evidence of foreign body.    ASSESSMENT / PLAN:     ICD-10-CM    1. Tendonitis, Achilles, left M76.62 order for DME       I " have explained to Elza  about the conditions.  We discussed the nature of the condition as well as the treatment plan and expected length of recovery.  At this time, the patient was prescribed a knee scooter to completely offload tension along the Achilles tendon on the left.  Patient will return in 1 month for reevaluation.      Disclaimer: This note consists of symbols derived from keyboarding, dictation and/or voice recognition software. As a result, there may be errors in the script that have gone undetected. Please consider this when interpreting information found in this chart.       MUMTAZ Jones D.P.M., [email or web address].

## 2019-07-18 NOTE — LETTER
7/18/2019         RE: Elza Greer  [address] [facility] [place] [address]        Dear Colleague,    Thank you for referring your patient, Elza Greer, to the [place]. Please see a copy of my visit note below.    PATIENT HISTORY:  Elza Greer is a 44 year old female who presents to clinic for a painful left ankle.  The patient describes the pain as sharp aching.  The patient relates the pain level is moderate.  The patient relates pain is located on the Achilles tendon on the left.  The patient relates the pain has been present for the past several weeks.  The patient relates pain with ambulation.  The patient has tried a cam boot with little relief.  The patient was sent by Dr. Silva for consultation on the left foot.       REVIEW OF SYSTEMS:  Constitutional, HEENT, cardiovascular, pulmonary, GI, , musculoskeletal, neuro, skin, endocrine and psych systems are negative, except as otherwise noted.     PAST MEDICAL HISTORY: History reviewed. No pertinent past medical history.     PAST SURGICAL HISTORY:   Past Surgical History:   Procedure Laterality Date     HYSTERECTOMY          MEDICATIONS:   Current Outpatient Medications:      albuterol (PROAIR HFA/PROVENTIL HFA/VENTOLIN HFA) 108 (90 Base) MCG/ACT inhaler, Inhale 2 puffs into the lungs every 6 hours as needed for shortness of breath / dyspnea or wheezing, Disp: 1 Inhaler, Rfl: 0     albuterol (PROAIR HFA/PROVENTIL HFA/VENTOLIN HFA) 108 (90 BASE) MCG/ACT Inhaler, Inhale 2 puffs into the lungs every 6 hours as needed for shortness of breath / dyspnea or wheezing, Disp: 1 Inhaler, Rfl: 11     benzonatate (TESSALON) 100 MG capsule, Take 1-2 capsules (100-200 mg) by mouth 3 times daily as needed for cough, Disp: 42 capsule, Rfl: 0     IBUPROFEN PO, Take 600 mg by mouth every 6 hours as needed for moderate pain, Disp: , Rfl:      order for DME, Knee Walker Length of use: Three months The patient was prescribed a knee walker.  The patient is unsteady utilizing crutches, quad walker or a cane.  The knee walker will allow the patient to ambulate safely without the use of the operative foot and reduce risk of falls., Disp: 1 Units, Rfl: 0     vitamin D2 (ERGOCALCIFEROL) 73398 units (1250 mcg) capsule, Take 50,000 Units by mouth, Disp: , Rfl:      ALLERGIES:    Allergies   Allergen Reactions     Azithromycin Anaphylaxis     Latex Hives     Oxycodone Nausea and Vomiting        SOCIAL HISTORY:   Social History     Socioeconomic History     Marital status:      Spouse name: Not on file     Number of children: Not on file     Years of education: Not on file     Highest education level: Not on file   Occupational History     Not on file   Social Needs     Financial resource strain: Not on file     Food insecurity:     Worry: Not on file     Inability: Not on file     Transportation needs:     Medical: Not on file     Non-medical: Not on file   Tobacco Use     Smoking status: Current Every Day Smoker     Packs/day: 0.25     Types: Cigarettes     Smokeless tobacco: Never Used     Tobacco comment: Electronic cigarette    Substance and Sexual Activity     Alcohol use: No     Drug use: No     Sexual activity: Not on file   Lifestyle     Physical activity:     Days per week: Not on file     Minutes per session: Not on file     Stress: Not on file   Relationships     Social connections:     Talks on phone: Not on file     Gets together: Not on file     Attends Restorationism service: Not on file     Active member of club or organization: Not on file     Attends meetings of clubs or organizations: Not on file     Relationship status: Not on file     Intimate partner violence:     Fear of current or ex partner: Not on file     Emotionally abused: Not on file     Physically abused: Not on file     Forced sexual activity: Not on file   Other Topics Concern     Parent/sibling w/ CABG, MI or angioplasty before 65F 55M? Yes     Comment: mom -- age 55 stent  "placement   Social History Narrative     Not on file        FAMILY HISTORY:   Family History   Problem Relation Age of Onset     Coronary Artery Disease Mother      Hypertension Mother      Hyperlipidemia Mother      Depression Mother      Colon Cancer Maternal Grandmother      Breast Cancer Maternal Grandmother      Other Cancer Maternal Grandmother         EXAM:Vitals: BP (!) 142/95   Pulse 84   Ht 1.626 m (5' 4\")   Wt 111.1 kg (245 lb)   BMI 42.05 kg/m     BMI= Body mass index is 42.05 kg/m .    Weight management plan: Patient was referred to their PCP to discuss a diet and exercise plan.    General appearance: Patient is alert and fully cooperative with history & exam.  No sign of distress is noted during the visit.     Psychiatric: Affect is pleasant & appropriate.  Patient appears motivated to improve health.     Respiratory: Breathing is regular & unlabored while sitting.     HEENT: Hearing is intact to spoken word.  Speech is clear.  No gross evidence of visual impairment that would impact ambulation.     Dermatologic: Skin is intact to both lower extremities without significant lesions, rash or abrasion.  No paronychia or evidence of soft tissue infection is noted.     Vascular: DP & PT pulses are intact & regular bilaterally.  No significant edema or varicosities noted.  CFT and skin temperature is normal to both lower extremities.     Neurologic: Lower extremity sensation is intact to light touch.  No evidence of weakness or contracture in the lower extremities.  No evidence of neuropathy.     Musculoskeletal: Patient is ambulatory without assistive device or brace.  No gross ankle deformity noted.  No foot or ankle joint effusion is noted.  Noted pain on palpation along the Achilles tendon on the left.  No palpable gap noted.  Negative Way's test noted.  No ecchymosis noted.    Radiographs were evaluated including AP, lateral and medial oblique views of the left foot reveals  no cortical " erosions or periosteal elevation.  All joint margins appear stable.  There is no apparent fracture or tumor formation noted.  There is no evidence of foreign body.    ASSESSMENT / PLAN:     ICD-10-CM    1. Tendonitis, Achilles, left M76.62 order for DME       I have explained to Elza  about the conditions.  We discussed the nature of the condition as well as the treatment plan and expected length of recovery.  At this time, the patient was prescribed a knee scooter to completely offload tension along the Achilles tendon on the left.  Patient will return in 1 month for reevaluation.      Disclaimer: This note consists of symbols derived from keyboarding, dictation and/or voice recognition software. As a result, there may be errors in the script that have gone undetected. Please consider this when interpreting information found in this chart.       JAZ Tejada.P.SARAH., F.A.C.F.A.S.        Again, thank you for allowing me to participate in the care of your patient.        Sincerely,        Serjio Jones DPM

## 2019-07-18 NOTE — PATIENT INSTRUCTIONS
SMOKING CESSATION  What's in cigarette smoke? - Cigarette smoke contains over 4,000 chemicals. Nicotine is one of the main ingredients which is an insecticide/herbicide. It is poisonous to our nervous system, increases blood clotting risk, and decreases the body's defenses to fight off infection. Another chemical is Carbon Monoxide is an asphyxiating gas that permanently binds to blood cells and blocks the transport of oxygen. This leads to tissue death and decreases your metabolism. Tar is a chemical that coats your lungs and trachea which impairs new oxygen coming in and carbon dioxide getting out of your body.   How does smoking impact surgery? - Smoking is particularly hazardous with regards to surgery. Surgery puts stress on the body and a smoker's body is already under strain from these chemicals. Putting the two together, especially for an elective surgery, could be a recipe for disaster. Smoking before and after surgery increases your risk of heart problems, slow wound healing, delayed bone healing, blood clots, wound infection and anesthesia complications.   What are the benefits of quitting? - In 20 minutes your blood pressure will drop back down to normal. In 8 hours the carbon monoxide (a toxic gas) levels in your blood stream will drop by half, and oxygen levels will return to normal. In 48 hours your chance of having a heart attack will have decreased. All nicotine will have left your body. Your sense of taste and smell will return to a normal level. In 72 hours your bronchial tubes will relax, and your energy levels will increase. In 2 weeks your circulation will increase, and it will continue to improve for the next 10 weeks.    Recommendations for elective surgery - Ideally, patients should quit smoking 8 weeks before and at least 2 weeks after elective surgery in order to avoid complications. Simply cutting back on the amount of cigarettes smoked per day does not offer any benefit or decrease the  risk of poor wound healing, heart problems, and infection. Smokers should also start taking Vitamin C and B for two weeks before surgery and two weeks after surgery.    Ways to Stop Smokin. Nicotine patches, lozenges, or gum  2. Support Groups  3. Medications (see below)    List of Medications:  1. Varenicline Tartrate (CHANTIX)   2. Bupropion HCL (WELLBUTRIN, ZYBAN) - note: make sure Wellbutrin is for smoking cessation and not other issues   3. Nicotine Patch (NICODERM)   4. Nicotine Inhaler (NICOTROL)   5. Nicotine Gum Nicotine Polacrilex   6. Nicotine Lozenge: Nicotine Polacrilex (COMMIT)   * [employer] offers a smoking support group as well!  Please visit: [email or web address]  If you are interested in these, ask about getting a prescription or talk to your primary care doctor about what may be the best way for you to quit.       Elza to follow up with Primary Care provider regarding elevated blood pressure.

## 2019-07-18 NOTE — NURSING NOTE
"Chief Complaint   Patient presents with     Consult     ///04/Frederic left tendon pain, XR 7/15/19, Torn in past 07/04/15       Initial BP (!) 142/95   Pulse 84   Ht 1.626 m (5' 4\")   Wt 111.1 kg (245 lb)   BMI 42.05 kg/m   Estimated body mass index is 42.05 kg/m  as calculated from the following:    Height as of this encounter: 1.626 m (5' 4\").    Weight as of this encounter: 111.1 kg (245 lb).  Medications and allergies reviewed.      She SEBASTIAN MA    "

## 2019-07-18 NOTE — LETTER
July 18, 2019      Elza Greer  [address] [facility] [address]        To Whom It May Concern:    Elza Greer was seen in our clinic. She may return to work with the following: limited to light duty - lifting no greater than 10 pounds, no bending/stooping, no climbing, standing limited to 2 hrs and walking limited to 2 hrs on or about 7/18/19 for approximately 2-3 months.  The patient will be reevaluated in one month and be given an updated work status.      Sincerely,        Serjio Jones DPM

## 2019-09-05 ENCOUNTER — OFFICE VISIT (OUTPATIENT)
Dept: PODIATRY | Facility: CLINIC | Age: 44
End: 2019-09-05

## 2019-09-05 VITALS
WEIGHT: 245 LBS | SYSTOLIC BLOOD PRESSURE: 134 MMHG | DIASTOLIC BLOOD PRESSURE: 89 MMHG | HEART RATE: 83 BPM | HEIGHT: 64 IN | BODY MASS INDEX: 41.83 KG/M2

## 2019-09-05 DIAGNOSIS — M76.62 TENDONITIS, ACHILLES, LEFT: Primary | ICD-10-CM

## 2019-09-05 PROCEDURE — 99213 OFFICE O/P EST LOW 20 MIN: CPT | Performed by: PODIATRIST

## 2019-09-05 ASSESSMENT — PAIN SCALES - GENERAL: PAINLEVEL: MILD PAIN (3)

## 2019-09-05 ASSESSMENT — MIFFLIN-ST. JEOR: SCORE: 1756.31

## 2019-09-05 NOTE — NURSING NOTE
"Chief Complaint   Patient presents with     RECHECK     Achilies left tendon pain       Initial /89   Pulse 83   Ht 1.626 m (5' 4\")   Wt 111.1 kg (245 lb)   BMI 42.05 kg/m   Estimated body mass index is 42.05 kg/m  as calculated from the following:    Height as of this encounter: 1.626 m (5' 4\").    Weight as of this encounter: 111.1 kg (245 lb).  Medications and allergies reviewed.      She SEBASTIAN MA    "

## 2019-09-05 NOTE — LETTER
September 5, 2019      Elza Greer  [address] [facility] [address]        To Whom It May Concern:    Elza Greer was seen in our clinic. She may return to work with the following: limited to light duty - lifting no greater than 10 pounds, no bending/stooping, no climbing, standing limited to 2 hrs and walking limited to 2 hrs on or about 9/5/19 for approximately 1-2 months.  The patient will be reevaluated in one month and be given an updated work status.      Sincerely,        Serjio Jones DPM

## 2019-09-05 NOTE — PATIENT INSTRUCTIONS
SMOKING CESSATION  What's in cigarette smoke? - Cigarette smoke contains over 4,000 chemicals. Nicotine is one of the main ingredients which is an insecticide/herbicide. It is poisonous to our nervous system, increases blood clotting risk, and decreases the body's defenses to fight off infection. Another chemical is Carbon Monoxide is an asphyxiating gas that permanently binds to blood cells and blocks the transport of oxygen. This leads to tissue death and decreases your metabolism. Tar is a chemical that coats your lungs and trachea which impairs new oxygen coming in and carbon dioxide getting out of your body.   How does smoking impact surgery? - Smoking is particularly hazardous with regards to surgery. Surgery puts stress on the body and a smoker's body is already under strain from these chemicals. Putting the two together, especially for an elective surgery, could be a recipe for disaster. Smoking before and after surgery increases your risk of heart problems, slow wound healing, delayed bone healing, blood clots, wound infection and anesthesia complications.   What are the benefits of quitting? - In 20 minutes your blood pressure will drop back down to normal. In 8 hours the carbon monoxide (a toxic gas) levels in your blood stream will drop by half, and oxygen levels will return to normal. In 48 hours your chance of having a heart attack will have decreased. All nicotine will have left your body. Your sense of taste and smell will return to a normal level. In 72 hours your bronchial tubes will relax, and your energy levels will increase. In 2 weeks your circulation will increase, and it will continue to improve for the next 10 weeks.    Recommendations for elective surgery - Ideally, patients should quit smoking 8 weeks before and at least 2 weeks after elective surgery in order to avoid complications. Simply cutting back on the amount of cigarettes smoked per day does not offer any benefit or decrease the  risk of poor wound healing, heart problems, and infection. Smokers should also start taking Vitamin C and B for two weeks before surgery and two weeks after surgery.    Ways to Stop Smokin. Nicotine patches, lozenges, or gum  2. Support Groups  3. Medications (see below)    List of Medications:  1. Varenicline Tartrate (CHANTIX)   2. Bupropion HCL (WELLBUTRIN, ZYBAN) - note: make sure Wellbutrin is for smoking cessation and not other issues   3. Nicotine Patch (NICODERM)   4. Nicotine Inhaler (NICOTROL)   5. Nicotine Gum Nicotine Polacrilex   6. Nicotine Lozenge: Nicotine Polacrilex (COMMIT)   * [employer] offers a smoking support group as well!  Please visit: [email or web address]  If you are interested in these, ask about getting a prescription or talk to your primary care doctor about what may be the best way for you to quit.     Initial musculoskeletal treatment recommendation:    1.  Wear supportive CAM boot and ankle brace  2.  Stretch the calf muscles as instructed once an hour.  3.  Massage the soft tissues around the injured area in the morning to loosen the tissue with an over the counter product like Icy Hot with Lidocaine  4.  Ice the injured area in the evening; 20 min on/off.  5. Take antiinflammatory medication as indicated.    If no improvement in symptoms within four to six weeks, return to clinic for reevaluation.

## 2019-09-05 NOTE — PROGRESS NOTES
Elza returns to the office for reevaluation of the left foot.  The patient relates following the instructions given at the last visit with noted less pain.  The patient relates overall more improvement in pain and function of the left foot.  The patient relates no other problems.    PAST MEDICAL HISTORY: No past medical history on file.    BMI= There is no height or weight on file to calculate BMI.        Physical Exam:    General: The patient appears to have a pleasant mental affect.    Lower extremity physical exam:  Neurovascular status is intact with palpable pedal pulses and intact epicritic sensations.  Muscular exam is within normal limits to major muscle groups.  Integument is intact.      One notes decreased edema.  One notes decreased pain on palpation of the Achilles tendon on the left lower extremity.  No surrounding erythema or edema noted.  One notes a tight gastroc complex on the left lower extremity.       Assessment:      ICD-10-CM    1. Tendonitis, Achilles, left M76.62        Plan:  I have explained to Elza about the conditions.  At this time, the patient was instructed on icing, stretching, tissue massage and support.  The patient was referred to [place] physical therapy for left ankle rehabilitation.  The patient will return in four weeks for reevaluation if the symptoms do not resolve.      Disclaimer: This note consists of symbols derived from keyboarding, dictation and/or voice recognition software. As a result, there may be errors in the script that have gone undetected. Please consider this when interpreting information found in this chart.       MUMTAZ Jones D.P.M., SY.PRESLEY.

## 2019-09-05 NOTE — LETTER
9/5/2019         RE: Elza Greer  [address] [facility] [address]        Dear Colleague,    Thank you for referring your patient, Elza Greer, to the [facility] [place]. Please see a copy of my visit note below.    Elza returns to the office for reevaluation of the left foot.  The patient relates following the instructions given at the last visit with noted less pain.  The patient relates overall more improvement in pain and function of the left foot.  The patient relates no other problems.    PAST MEDICAL HISTORY: No past medical history on file.    BMI= There is no height or weight on file to calculate BMI.        Physical Exam:    General: The patient appears to have a pleasant mental affect.    Lower extremity physical exam:  Neurovascular status is intact with palpable pedal pulses and intact epicritic sensations.  Muscular exam is within normal limits to major muscle groups.  Integument is intact.      One notes decreased edema.  One notes decreased pain on palpation of the Achilles tendon on the left lower extremity.  No surrounding erythema or edema noted.  One notes a tight gastroc complex on the left lower extremity.       Assessment:      ICD-10-CM    1. Tendonitis, Achilles, left M76.62        Plan:  I have explained to Elza about the conditions.  At this time, the patient was instructed on icing, stretching, tissue massage and support.  The patient was referred to [place] [facility] for left ankle rehabilitation.  The patient will return in four weeks for reevaluation if the symptoms do not resolve.      Disclaimer: This note consists of symbols derived from keyboarding, dictation and/or voice recognition software. As a result, there may be errors in the script that have gone undetected. Please consider this when interpreting information found in this chart.       MUMTAZ Jones D.P.M., FKIRAN.C.F.A.S.      Again, thank you for allowing me to participate in  the care of your patient.        Sincerely,        Serjio Jones DPM

## 2019-09-29 ENCOUNTER — HEALTH MAINTENANCE LETTER (OUTPATIENT)
Age: 44
End: 2019-09-29

## 2019-12-16 ENCOUNTER — HOSPITAL ENCOUNTER (EMERGENCY)
Facility: CLINIC | Age: 44
Discharge: HOME | End: 2019-12-16
Attending: FAMILY MEDICINE | Admitting: FAMILY MEDICINE

## 2019-12-16 ENCOUNTER — APPOINTMENT (OUTPATIENT)
Dept: GENERAL RADIOLOGY | Facility: CLINIC | Age: 44
End: 2019-12-16
Attending: FAMILY MEDICINE

## 2019-12-16 VITALS
TEMPERATURE: 98.4 F | RESPIRATION RATE: 20 BRPM | HEIGHT: 64 IN | SYSTOLIC BLOOD PRESSURE: 130 MMHG | OXYGEN SATURATION: 99 % | WEIGHT: 240 LBS | BODY MASS INDEX: 40.97 KG/M2 | DIASTOLIC BLOOD PRESSURE: 80 MMHG | HEART RATE: 92 BPM

## 2019-12-16 DIAGNOSIS — J06.9 UPPER RESPIRATORY TRACT INFECTION, UNSPECIFIED TYPE: ICD-10-CM

## 2019-12-16 PROCEDURE — 99284 EMERGENCY DEPT VISIT MOD MDM: CPT | Mod: Z6 | Performed by: FAMILY MEDICINE

## 2019-12-16 PROCEDURE — 71046 X-RAY EXAM CHEST 2 VIEWS: CPT

## 2019-12-16 PROCEDURE — 99283 EMERGENCY DEPT VISIT LOW MDM: CPT | Mod: 25 | Performed by: FAMILY MEDICINE

## 2019-12-16 RX ORDER — BENZONATATE 200 MG/1
200 CAPSULE ORAL 3 TIMES DAILY PRN
Qty: 21 CAPSULE | Refills: 0 | Status: SHIPPED | OUTPATIENT
Start: 2019-12-16 | End: 2020-09-02

## 2019-12-16 RX ORDER — ALBUTEROL SULFATE 90 UG/1
2 AEROSOL, METERED RESPIRATORY (INHALATION) EVERY 6 HOURS PRN
Qty: 1 INHALER | Refills: 0 | Status: SHIPPED | OUTPATIENT
Start: 2019-12-16 | End: 2020-12-09

## 2019-12-16 ASSESSMENT — MIFFLIN-ST. JEOR: SCORE: 1733.63

## 2019-12-16 NOTE — ED AVS SNAPSHOT
[facility] Emergency Department  [address] [facility] [address]  Phone:  [phone]  Fax:  [phone]                                    Elza Greer   MRN: [id number]    Department:  [facility] Emergency Department   Date of Visit:  12/16/2019           After Visit Summary Signature Page    I have received my discharge instructions, and my questions have been answered. I have discussed any challenges I see with this plan with the nurse or doctor.    ..........................................................................................................................................  Patient/Patient Representative Signature      ..........................................................................................................................................  Patient Representative Print Name and Relationship to Patient    ..................................................               ................................................  Date                                   Time    ..........................................................................................................................................  Reviewed by Signature/Title    ...................................................              ..............................................  Date                                               Time          22EPIC Rev 08/18

## 2019-12-17 NOTE — ED TRIAGE NOTES
Cough was productive yesterday today no    Low grade temp at home    Sore throat last night none today    Manages group homes

## 2019-12-17 NOTE — ED PROVIDER NOTES
History     Chief Complaint   Patient presents with     Cough     HPI  Elza Greer is a 44 year old female who presents with a history of tobacco abuse, intermittent asthma, prior pneumonia,  who has a cough intermittently productive for the last 2 to 3 days associated with pharyngitis some congestion.  No associated wheezing.  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  Has pain in the chest particularly on the right side when coughing.  No other chest pain.  Some sense of dyspnea.  No active cancer history or other VTE risks but does have a history of ovarian cancer 14 years ago  Denies recent prolonged travel (>3 hours) by car or plane, history or FHx of venous thromboembolism, recent surgery (last 4 weeks), active cancer history, hypercoagulable state, estrogen or other medications/conditions causing VTE or  new unilateral swelling or pain in the legs or calves.      Allergies:  Allergies   Allergen Reactions     Azithromycin Anaphylaxis     Latex Hives     Oxycodone Nausea and Vomiting       Problem List:    Patient Active Problem List    Diagnosis Date Noted     Tobacco use disorder 11/14/2016     Priority: Medium        Past Medical History:    No past medical history on file.    Past Surgical History:    Past Surgical History:   Procedure Laterality Date     HYSTERECTOMY         Family History:    Family History   Problem Relation Age of Onset     Coronary Artery Disease Mother      Hypertension Mother      Hyperlipidemia Mother      Depression Mother      Colon Cancer Maternal Grandmother      Breast Cancer Maternal Grandmother      Other Cancer Maternal Grandmother        Social History:  Marital Status:   [2]  Social History     Tobacco Use     Smoking status: Current Every Day Smoker     Packs/day: 0.25     Types: Cigarettes     Smokeless tobacco: Never Used   Substance Use Topics     Alcohol use: No     Drug use: No        Medications:    albuterol (PROAIR HFA/PROVENTIL  "HFA/VENTOLIN HFA) 108 (90 Base) MCG/ACT inhaler  albuterol (PROAIR HFA/PROVENTIL HFA/VENTOLIN HFA) 108 (90 BASE) MCG/ACT Inhaler  benzonatate (TESSALON) 100 MG capsule  IBUPROFEN PO  order for DME  vitamin D2 (ERGOCALCIFEROL) 67735 units (1250 mcg) capsule          Review of Systems ROS:  5 point ROS negative except as noted above in HPI, including Gen., Resp., CV, GI &  system review.    Physical Exam   BP: (!) 156/93  Pulse: 104  Temp: 98.4  F (36.9  C)  Resp: 20  Height: 162.6 cm (5' 4\")  Weight: 108.9 kg (240 lb)  SpO2: 98 %      Physical Exam  HENT:      Head: Atraumatic.      Right Ear: Tympanic membrane normal.      Left Ear: Tympanic membrane normal.      Nose: Nose normal.      Mouth/Throat:      Mouth: Mucous membranes are moist.   Eyes:      Conjunctiva/sclera: Conjunctivae normal.   Neck:      Musculoskeletal: Neck supple.   Cardiovascular:      Rate and Rhythm: Normal rate and regular rhythm.      Heart sounds: No murmur.   Pulmonary:      Effort: Pulmonary effort is normal. No respiratory distress.      Breath sounds: Normal breath sounds. No stridor. No wheezing or rhonchi.   Skin:     Coloration: Skin is not pale.      Findings: No rash.   Neurological:      Mental Status: She is alert.         ED Course        Procedures               Critical Care time:  none               No results found for this or any previous visit (from the past 24 hour(s)).    Medications - No data to display    Assessments & Plan (with Medical Decision Making)     MDM: Elza Greer is a 44 year old female who presented who presented with upper respiratory symptoms for the last several days prior pneumonia history and asthma history.  No VTE risk and her symptoms before this had all been low-grade fever congestion and cough.  She did have a sense of pain with the coughing at the lateral and anterior rib margin but had no VTE risk and again all of her symptoms appeared to be upper respiratory.  I have made recommendations " as below.  Have given precautions for return including for significant dyspnea, chest pain, high fever, persistent symptoms    I have reviewed the nursing notes.    I have reviewed the findings, diagnosis, plan and need for follow up with the patient.       New Prescriptions    No medications on file       Final diagnoses:   Upper respiratory tract infection, unspecified type - use nasal saline for congestion, albuterol as needed for cough spasms/wheezing related to asthma.  if needing to use the inhaler frequently (more than 2-3x/day when having a cold or daily when no having a cold, then return for further eval)       12/16/2019   [facility] EMERGENCY DEPARTMENT     Riki Smith MD  12/16/19 0148

## 2019-12-17 NOTE — DISCHARGE INSTRUCTIONS
ICD-10-CM    1. Upper respiratory tract infection, unspecified type J06.9     use nasal saline for congestion, albuterol as needed for cough spasms/wheezing related to asthma.  if needing to use the inhaler frequently (more than 2-3x/day when having a cold or daily when no having a cold, then return for further eval)

## 2019-12-17 NOTE — ED NOTES
Pt c/o cough since this weekend. Sore throat last night.  Low grade fever.  Cough is non productive.  Pain in chest and rt rib when coughing.  No n/v/d.

## 2020-01-06 ENCOUNTER — OFFICE VISIT (OUTPATIENT)
Dept: FAMILY MEDICINE | Facility: CLINIC | Age: 45
End: 2020-01-06
Payer: COMMERCIAL

## 2020-01-06 VITALS
DIASTOLIC BLOOD PRESSURE: 88 MMHG | TEMPERATURE: 98.9 F | OXYGEN SATURATION: 97 % | BODY MASS INDEX: 41.32 KG/M2 | WEIGHT: 242 LBS | RESPIRATION RATE: 15 BRPM | HEART RATE: 90 BPM | SYSTOLIC BLOOD PRESSURE: 131 MMHG | HEIGHT: 64 IN

## 2020-01-06 DIAGNOSIS — J02.9 SORE THROAT: ICD-10-CM

## 2020-01-06 DIAGNOSIS — J01.90 ACUTE SINUSITIS WITH SYMPTOMS > 10 DAYS: Primary | ICD-10-CM

## 2020-01-06 DIAGNOSIS — R68.89 FLU-LIKE SYMPTOMS: ICD-10-CM

## 2020-01-06 LAB
DEPRECATED S PYO AG THROAT QL EIA: NORMAL
FLUAV+FLUBV AG SPEC QL: NEGATIVE
FLUAV+FLUBV AG SPEC QL: NEGATIVE
SPECIMEN SOURCE: NORMAL
SPECIMEN SOURCE: NORMAL

## 2020-01-06 PROCEDURE — 87880 STREP A ASSAY W/OPTIC: CPT | Performed by: PHYSICIAN ASSISTANT

## 2020-01-06 PROCEDURE — 87804 INFLUENZA ASSAY W/OPTIC: CPT | Performed by: PHYSICIAN ASSISTANT

## 2020-01-06 PROCEDURE — 99214 OFFICE O/P EST MOD 30 MIN: CPT | Performed by: PHYSICIAN ASSISTANT

## 2020-01-06 PROCEDURE — 87070 CULTURE OTHR SPECIMN AEROBIC: CPT | Performed by: PHYSICIAN ASSISTANT

## 2020-01-06 RX ORDER — AMOXICILLIN 875 MG
875 TABLET ORAL 2 TIMES DAILY
Qty: 20 TABLET | Refills: 0 | Status: SHIPPED | OUTPATIENT
Start: 2020-01-06 | End: 2020-12-23

## 2020-01-06 ASSESSMENT — MIFFLIN-ST. JEOR: SCORE: 1742.7

## 2020-01-06 NOTE — PROGRESS NOTES
"Subjective     Elza Greer is a 44 year old female who presents to clinic today for the following health issues:    HPI   ENT Symptoms             Symptoms: cc Present Absent Comment   Fever/Chills  x  Fevers 100.2   Fatigue  x     Muscle Aches  x  Aches all over    Eye Irritation   x    Sneezing  x     Nasal Wili/Drg x x  Congestion   Sinus Pressure/Pain x x  A lot of sinus pressure and pain   Loss of smell   x    Dental pain   x    Sore Throat  x     Swollen Glands  x     Ear Pain/Fullness   x Ears feel plugged   Cough x x  When she gets into coughing fit she gets really dizzy, lightheaded   Wheeze  x     Chest Pain   x Chest tightness   Shortness of breath  x     Rash   x    Other x x  Vomiting, headache     Symptom duration:  Cold has been going on for the last 3 weeks, but on Friday she started with fever, aches, sinus pressure and pain, headaches   Symptom severity:  moderate   Treatments tried:  Dayquil with no relief, tessalon pearls with no relief, Advil for headaches. 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      Objective    /88   Pulse 90   Temp 98.9  F (37.2  C) (Tympanic)   Resp 15   Ht 1.626 m (5' 4\")   Wt 109.8 kg (242 lb)   SpO2 97%   BMI 41.54 kg/m    Body mass index is 41.54 kg/m .  Physical Exam   GENERAL: alert and no distress  EYES: Eyes grossly normal to inspection, PERRL and " "conjunctivae and sclerae normal  HENT: normal cephalic/atraumatic, both ears: clear effusion, rhinorrhea yellow, oropharynx clear, oral mucous membranes moist and sinuses: maxillary tenderness on left  NECK: no adenopathy  RESP: lungs clear to auscultation - no rales, rhonchi or wheezes  CV: regular rate and rhythm, normal S1 S2, no S3 or S4, no murmur, click or rub, no peripheral edema and peripheral pulses strong  MS: no gross musculoskeletal defects noted, no edema  SKIN: no suspicious lesions or rashes    Diagnostic Test Results:  Results for orders placed or performed in visit on 01/06/20 (from the past 24 hour(s))   Strep, Rapid Screen   Result Value Ref Range    Specimen Description Throat     Rapid Strep A Screen       NEGATIVE: No Group A streptococcal antigen detected by immunoassay, await culture report.   Influenza A/B antigen   Result Value Ref Range    Influenza A/B Agn Specimen Nasal     Influenza A Negative NEG^Negative    Influenza B Negative NEG^Negative           Assessment & Plan       ICD-10-CM    1. Acute sinusitis with symptoms > 10 days J01.90 amoxicillin (AMOXIL) 875 MG tablet   2. Flu-like symptoms R68.89 Influenza A/B antigen   3. Sore throat J02.9 Strep, Rapid Screen     Throat Culture Aerobic Bacterial        Tobacco Cessation:   reports that she has been smoking cigarettes. She has been smoking about 0.25 packs per day. She has never used smokeless tobacco.  Tobacco Cessation Action Plan: Information offered: Patient not interested at this time      BMI:   Estimated body mass index is 41.54 kg/m  as calculated from the following:    Height as of this encounter: 1.626 m (5' 4\").    Weight as of this encounter: 109.8 kg (242 lb).   Weight management plan: Patient was referred to their PCP to discuss a diet and exercise plan.        See Patient Instructions    No follow-ups on file.    Tiarra Flores PA-C  [facility]    "

## 2020-01-06 NOTE — PATIENT INSTRUCTIONS
Patient Education     Sinusitis (Antibiotic Treatment)    The sinuses are air-filled spaces within the bones of the face. They connect to the inside of the nose. Sinusitis is an inflammation of the tissue that lines the sinuses. Sinusitis can occur during a cold. It can also happen due to allergies to pollens and other particles in the air. Sinusitis can cause symptoms of sinus congestion and a feeling of fullness. A sinus infection causes fever, headache, and facial pain. There is often green or yellow fluid draining from the nose or into the back of the throat (post-nasal drip). You have been given antibiotics to treat this condition.  Home care    Take the full course of antibiotics as instructed. Do not stop taking them, even when you feel better.    Drink plenty of water, hot tea, and other liquids. This may help thin nasal mucus. It also may help your sinuses drain fluids.    Heat may help soothe painful areas of your face. Use a towel soaked in hot water. Or,  the shower and direct the warm spray onto your face. Using a vaporizer along with a menthol rub at night may also help soothe symptoms.     An expectorant with guaifenesin may help thin nasal mucus and help your sinuses drain fluids.    You can use an over-the-counter decongestant, unless a similar medicine was prescribed to you. Nasal sprays work the fastest. Use one that contains phenylephrine or oxymetazoline. First blow your nose gently. Then use the spray. Do not use these medicines more often than directed on the label. If you do, your symptoms may get worse. You may also take pills that contain pseudoephedrine. Don t use products that combine multiple medicines. This is because side effects may be increased. Read labels. You can also ask the pharmacist for help. (People with high blood pressure should not use decongestants. They can raise blood pressure.)    Over-the-counter antihistamines may help if allergies contributed to your  sinusitis.      Do not use nasal rinses or irrigation during an acute sinus infection, unless your healthcare provider tells you to. Rinsing may spread the infection to other areas in your sinuses.    Use acetaminophen or ibuprofen to control pain, unless another pain medicine was prescribed to you. If you have chronic liver or kidney disease or ever had a stomach ulcer, talk with your healthcare provider before using these medicines. (Aspirin should never be taken by anyone under age 18 who is ill with a fever. It may cause severe liver damage.)    Don't smoke. This can make symptoms worse.  Follow-up care  Follow up with your healthcare provider or our staff if you are not better in 1 week.  When to seek medical advice  Call your healthcare provider if any of these occur:    Facial pain or headache that gets worse    Stiff neck    Unusual drowsiness or confusion    Swelling of your forehead or eyelids    Vision problems, such as blurred or double vision    Fever of 100.4 F (38 C) or higher, or as directed by your healthcare provider    Seizure    Breathing problems    Symptoms don't go away in 10 days  Prevention  Here are steps you can take to help prevent an infection:    Keep good hand washing habits.    Don t have close contact with people who have sore throats, colds, or other upper respiratory infections.    Don t smoke, and stay away from secondhand smoke.    Stay up to date with of your vaccines.  Date Last Reviewed: 11/1/2017 2000-2019 [employer] [address] [place] [address], [place] [address]. All rights reserved. This information is not intended as a substitute for professional medical care. Always follow your healthcare professional's instructions.

## 2020-01-08 LAB
BACTERIA SPEC CULT: NORMAL
Lab: NORMAL
SPECIMEN SOURCE: NORMAL
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
## 2020-02-03 ENCOUNTER — OFFICE VISIT (OUTPATIENT)
Dept: FAMILY MEDICINE | Facility: CLINIC | Age: 45
End: 2020-02-03
Payer: COMMERCIAL

## 2020-02-03 VITALS
OXYGEN SATURATION: 95 % | RESPIRATION RATE: 18 BRPM | HEIGHT: 64 IN | TEMPERATURE: 98.5 F | SYSTOLIC BLOOD PRESSURE: 100 MMHG | BODY MASS INDEX: 41.86 KG/M2 | WEIGHT: 245.2 LBS | HEART RATE: 92 BPM | DIASTOLIC BLOOD PRESSURE: 70 MMHG

## 2020-02-03 DIAGNOSIS — E55.9 VITAMIN D DEFICIENCY: ICD-10-CM

## 2020-02-03 DIAGNOSIS — Z00.00 ROUTINE GENERAL MEDICAL EXAMINATION AT A HEALTH CARE FACILITY: Primary | ICD-10-CM

## 2020-02-03 DIAGNOSIS — N39.3 FEMALE STRESS INCONTINENCE: ICD-10-CM

## 2020-02-03 DIAGNOSIS — F41.9 ANXIETY: ICD-10-CM

## 2020-02-03 DIAGNOSIS — Z12.31 ENCOUNTER FOR SCREENING MAMMOGRAM FOR BREAST CANCER: ICD-10-CM

## 2020-02-03 DIAGNOSIS — R19.00 ABDOMINAL MASS, UNSPECIFIED ABDOMINAL LOCATION: ICD-10-CM

## 2020-02-03 LAB
ANION GAP SERPL CALCULATED.3IONS-SCNC: 4 MMOL/L (ref 3–14)
BUN SERPL-MCNC: 13 MG/DL (ref 7–30)
CALCIUM SERPL-MCNC: 9.2 MG/DL (ref 8.5–10.1)
CHLORIDE SERPL-SCNC: 105 MMOL/L (ref 94–109)
CHOLEST SERPL-MCNC: 187 MG/DL
CO2 SERPL-SCNC: 28 MMOL/L (ref 20–32)
CREAT SERPL-MCNC: 0.78 MG/DL (ref 0.52–1.04)
GFR SERPL CREATININE-BSD FRML MDRD: >90 ML/MIN/{1.73_M2}
GLUCOSE SERPL-MCNC: 92 MG/DL (ref 70–99)
HDLC SERPL-MCNC: 39 MG/DL
LDLC SERPL CALC-MCNC: 111 MG/DL
NONHDLC SERPL-MCNC: 148 MG/DL
POTASSIUM SERPL-SCNC: 4.2 MMOL/L (ref 3.4–5.3)
SODIUM SERPL-SCNC: 137 MMOL/L (ref 133–144)
TRIGL SERPL-MCNC: 187 MG/DL

## 2020-02-03 PROCEDURE — 80061 LIPID PANEL: CPT | Performed by: FAMILY MEDICINE

## 2020-02-03 PROCEDURE — 82306 VITAMIN D 25 HYDROXY: CPT | Performed by: FAMILY MEDICINE

## 2020-02-03 PROCEDURE — 36415 COLL VENOUS BLD VENIPUNCTURE: CPT | Performed by: FAMILY MEDICINE

## 2020-02-03 PROCEDURE — 80048 BASIC METABOLIC PNL TOTAL CA: CPT | Performed by: FAMILY MEDICINE

## 2020-02-03 PROCEDURE — 99396 PREV VISIT EST AGE 40-64: CPT | Performed by: FAMILY MEDICINE

## 2020-02-03 PROCEDURE — 99213 OFFICE O/P EST LOW 20 MIN: CPT | Mod: 25 | Performed by: FAMILY MEDICINE

## 2020-02-03 RX ORDER — BUPROPION HYDROCHLORIDE 75 MG/1
75 TABLET ORAL 2 TIMES DAILY
Qty: 60 TABLET | Refills: 0 | Status: SHIPPED | OUTPATIENT
Start: 2020-02-03 | End: 2020-09-02

## 2020-02-03 ASSESSMENT — ANXIETY QUESTIONNAIRES
2. NOT BEING ABLE TO STOP OR CONTROL WORRYING: MORE THAN HALF THE DAYS
GAD7 TOTAL SCORE: 16
6. BECOMING EASILY ANNOYED OR IRRITABLE: NEARLY EVERY DAY
IF YOU CHECKED OFF ANY PROBLEMS ON THIS QUESTIONNAIRE, HOW DIFFICULT HAVE THESE PROBLEMS MADE IT FOR YOU TO DO YOUR WORK, TAKE CARE OF THINGS AT HOME, OR GET ALONG WITH OTHER PEOPLE: VERY DIFFICULT
3. WORRYING TOO MUCH ABOUT DIFFERENT THINGS: MORE THAN HALF THE DAYS
5. BEING SO RESTLESS THAT IT IS HARD TO SIT STILL: NEARLY EVERY DAY
7. FEELING AFRAID AS IF SOMETHING AWFUL MIGHT HAPPEN: SEVERAL DAYS
1. FEELING NERVOUS, ANXIOUS, OR ON EDGE: NEARLY EVERY DAY

## 2020-02-03 ASSESSMENT — PATIENT HEALTH QUESTIONNAIRE - PHQ9
5. POOR APPETITE OR OVEREATING: MORE THAN HALF THE DAYS
SUM OF ALL RESPONSES TO PHQ QUESTIONS 1-9: 16

## 2020-02-03 ASSESSMENT — PAIN SCALES - GENERAL: PAINLEVEL: MILD PAIN (2)

## 2020-02-03 ASSESSMENT — MIFFLIN-ST. JEOR: SCORE: 1749.28

## 2020-02-03 NOTE — LETTER
February 4, 2020      Elza Greer  [address] [facility] [address]        Dear ,    We are writing to inform you of your test results.    Test results showed elevated cholesterol.   Encouraged to work on diet and exercise.   I included a packet inside to help lower cholesterol.  Other labs were within normal limits     Resulted Orders   Lipid panel reflex to direct LDL Fasting   Result Value Ref Range    Cholesterol 187 <200 mg/dL    Triglycerides 187 (H) <150 mg/dL      Comment:      Borderline high:  150-199 mg/dl  High:             200-499 mg/dl  Very high:       >499 mg/dl  Fasting specimen      HDL Cholesterol 39 (L) >49 mg/dL    LDL Cholesterol Calculated 111 (H) <100 mg/dL      Comment:      Above desirable:  100-129 mg/dl  Borderline High:  130-159 mg/dL  High:             160-189 mg/dL  Very high:       >189 mg/dl      Non HDL Cholesterol 148 (H) <130 mg/dL      Comment:      Above Desirable:  130-159 mg/dl  Borderline high:  160-189 mg/dl  High:             190-219 mg/dl  Very high:       >219 mg/dl     Basic metabolic panel   Result Value Ref Range    Sodium 137 133 - 144 mmol/L    Potassium 4.2 3.4 - 5.3 mmol/L    Chloride 105 94 - 109 mmol/L    Carbon Dioxide 28 20 - 32 mmol/L    Anion Gap 4 3 - 14 mmol/L    Glucose 92 70 - 99 mg/dL      Comment:      Fasting specimen    Urea Nitrogen 13 7 - 30 mg/dL    Creatinine 0.78 0.52 - 1.04 mg/dL    GFR Estimate >90 >60 mL/min/[1.73_m2]      Comment:      Non  GFR Calc  Starting 12/18/2018, serum creatinine based estimated GFR (eGFR) will be   calculated using the Chronic Kidney Disease Epidemiology Collaboration   (CKD-EPI) equation.      GFR Estimate If Black >90 >60 mL/min/[1.73_m2]      Comment:       GFR Calc  Starting 12/18/2018, serum creatinine based estimated GFR (eGFR) will be   calculated using the Chronic Kidney Disease Epidemiology Collaboration   (CKD-EPI) equation.      Calcium 9.2 8.5 - 10.1 mg/dL        If you have any questions or concerns, please call the clinic at the number listed above.       Sincerely,        Vic Lane MD

## 2020-02-04 LAB — DEPRECATED CALCIDIOL+CALCIFEROL SERPL-MC: 15 UG/L (ref 20–75)

## 2020-02-04 ASSESSMENT — ANXIETY QUESTIONNAIRES: GAD7 TOTAL SCORE: 16

## 2020-02-04 NOTE — RESULT ENCOUNTER NOTE
Please inform patient that test result showed elevated cholesterol.  Patient is encouraged to work on diet and exercise. Other labs were within normal limits    Thank you.     Vic Lane M.D.

## 2020-02-05 DIAGNOSIS — E55.9 VITAMIN D DEFICIENCY: Primary | ICD-10-CM

## 2020-02-05 RX ORDER — ERGOCALCIFEROL 1.25 MG/1
50000 CAPSULE, LIQUID FILLED ORAL WEEKLY
Qty: 8 CAPSULE | Refills: 0 | Status: SHIPPED | OUTPATIENT
Start: 2020-02-05 | End: 2021-06-03

## 2020-02-05 NOTE — RESULT ENCOUNTER NOTE
Please inform patient that test result vitamin d  was very low. I will recommend replacing with high dose weekly vitamin d , this will be faxed to patient's pharmacy.    Thank you.     Vic Lane M.D.

## 2020-02-10 ENCOUNTER — HOSPITAL ENCOUNTER (OUTPATIENT)
Dept: CT IMAGING | Facility: CLINIC | Age: 45
Discharge: HOME | End: 2020-02-10
Attending: FAMILY MEDICINE | Admitting: FAMILY MEDICINE
Payer: COMMERCIAL

## 2020-02-10 DIAGNOSIS — R19.00 ABDOMINAL MASS, UNSPECIFIED ABDOMINAL LOCATION: ICD-10-CM

## 2020-02-10 PROCEDURE — 74177 CT ABD & PELVIS W/CONTRAST: CPT

## 2020-02-10 PROCEDURE — 25000128 H RX IP 250 OP 636: Performed by: RADIOLOGY

## 2020-02-10 PROCEDURE — 25000125 ZZHC RX 250: Performed by: RADIOLOGY

## 2020-02-10 RX ORDER — IOPAMIDOL 755 MG/ML
100 INJECTION, SOLUTION INTRAVASCULAR ONCE
Status: COMPLETED | OUTPATIENT
Start: 2020-02-10 | End: 2020-02-10

## 2020-02-10 RX ADMIN — SODIUM CHLORIDE 70 ML: 9 INJECTION, SOLUTION INTRAVENOUS at 09:39

## 2020-02-10 RX ADMIN — IOPAMIDOL 100 ML: 755 INJECTION, SOLUTION INTRAVENOUS at 09:38

## 2020-02-11 NOTE — RESULT ENCOUNTER NOTE
Please inform patient that test result showed that she has a ventral hernia. If she wants this addressed recommend seeing the general surgeon.    Thank you.     Vic Lane M.D.

## 2020-02-18 PROBLEM — F41.9 ANXIETY: Status: ACTIVE | Noted: 2020-02-18

## 2020-02-26 ENCOUNTER — OFFICE VISIT (OUTPATIENT)
Dept: SURGERY | Facility: CLINIC | Age: 45
End: 2020-02-26
Payer: COMMERCIAL

## 2020-02-26 VITALS
HEIGHT: 64 IN | HEART RATE: 87 BPM | BODY MASS INDEX: 41.85 KG/M2 | DIASTOLIC BLOOD PRESSURE: 71 MMHG | TEMPERATURE: 97.8 F | SYSTOLIC BLOOD PRESSURE: 127 MMHG | WEIGHT: 245.15 LBS

## 2020-02-26 DIAGNOSIS — K43.2 INCISIONAL HERNIA, WITHOUT OBSTRUCTION OR GANGRENE: Primary | ICD-10-CM

## 2020-02-26 PROCEDURE — 99204 OFFICE O/P NEW MOD 45 MIN: CPT | Performed by: SURGERY

## 2020-02-26 ASSESSMENT — MIFFLIN-ST. JEOR: SCORE: 1749.06

## 2020-02-26 NOTE — NURSING NOTE
"Initial /71 (BP Location: Right arm, Patient Position: Sitting, Cuff Size: Adult Large)   Pulse 87   Temp 97.8  F (36.6  C) (Tympanic)   Ht 1.613 m (5' 3.5\")   Wt 111.2 kg (245 lb 2.4 oz)   BMI 42.75 kg/m   Estimated body mass index is 42.75 kg/m  as calculated from the following:    Height as of this encounter: 1.613 m (5' 3.5\").    Weight as of this encounter: 111.2 kg (245 lb 2.4 oz). .    Marianela Crooks MA    "

## 2020-02-26 NOTE — LETTER
2/26/2020         RE: Elza Greer  [address] [facility] [address]        Dear Colleague,    Thank you for referring your patient, Elza Greer, to the [facility]. Please see a copy of my visit note below.    44-year-old female complaining of upper abdominal mass and pain for the past several months.  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  She reported the mass showed up a few months ago and has been increasing in size.  She has localized discomfort 1-2 out of 10 without radiation.  Pain is described as dull and achy.  She denies fevers chills nausea and vomiting.  Recent CT scan was done showing  incisional hernia with incarcerated colon.  Patient also has a much smaller supraumbilical fat-containing defect as well.  Patient has had multiple abdominal operations including laparoscopic cholecystectomy complicated by bile duct injury and resulting hepaticojejunostomy.  Further, patient reports having an open abdominal wall hernia repair with mesh.  Patient is also had a hysterectomy and appendectomy both done open.    Patient Active Problem List   Diagnosis     Tobacco use disorder     Anxiety       History reviewed. No pertinent past medical history.    Past Surgical History:   Procedure Laterality Date     APPENDECTOMY OPEN       HEPATICOJEJUNOSTOMY  2000    RnY jejunostomy from bile duct injury     HERNIORRHAPHY VENTRAL      open with mesh     HYSTERECTOMY       LAPAROSCOPIC CHOLECYSTECTOMY  2000    complicated by bile duct injury       Family History   Problem Relation Age of Onset     Coronary Artery Disease Mother      Hypertension Mother      Hyperlipidemia Mother      Depression Mother      Colon Cancer Maternal Grandmother      Breast Cancer Maternal Grandmother      Other Cancer Maternal Grandmother        Social History     Tobacco Use     Smoking status: Current Every Day Smoker     Packs/day: 0.25     Types: Cigarettes     Smokeless tobacco: Never  Used   Substance Use Topics     Alcohol use: No        History   Drug Use No       Current Outpatient Medications   Medication Sig Dispense Refill     albuterol (PROAIR HFA/PROVENTIL HFA/VENTOLIN HFA) 108 (90 Base) MCG/ACT inhaler Inhale 2 puffs into the lungs every 6 hours as needed for shortness of breath / dyspnea (coughing spasms) 1 Inhaler 0     Ascorbic Acid (VITAMIN C PO) Take 1 tablet by mouth daily       benzonatate (TESSALON) 200 MG capsule Take 1 capsule (200 mg) by mouth 3 times daily as needed for cough (Patient not taking: Reported on 2/3/2020) 21 capsule 0     buPROPion (WELLBUTRIN) 75 MG tablet Take 1 tablet (75 mg) by mouth 2 times daily 60 tablet 0     IBUPROFEN PO Take 600 mg by mouth every 6 hours as needed for moderate pain       Multiple Vitamins-Calcium (ONE-A-DAY WOMENS PO) Take 1 tablet by mouth daily       vitamin D2 (ERGOCALCIFEROL) 83199 units (1250 mcg) capsule Take 1 capsule (50,000 Units) by mouth once a week 8 capsule 0     vitamin D2 (ERGOCALCIFEROL) 95280 units (1250 mcg) capsule Take 50,000 Units by mouth as needed          Allergies   Allergen Reactions     Azithromycin Anaphylaxis     Latex Hives     Oxycodone Nausea and Vomiting     CBC  Recent Labs   Lab Test 02/19/16  1500   WBC 17.7*   RBC 4.28   HGB 13.2   HCT 40.1   MCV 94   MCH 30.8   MCHC 32.9   RDW 12.9          BMP  Recent Labs   Lab Test 02/03/20  0941      POTASSIUM 4.2   DEBBIE 9.2   CHLORIDE 105   CO2 28   BUN 13   CR 0.78   GLC 92     Results for orders placed or performed during the hospital encounter of 02/10/20   CT Abdomen Pelvis w Contrast    Narrative    CT ABDOMEN PELVIS W CONTRAST 2/10/2020 9:48 AM    HISTORY: Palpable upper abdominal lesion. Epigastric pain.    CONTRAST:  100 mL Isovue 370.    TECHNIQUE: CT of the abdomen and pelvis is performed with IV contrast.    Routine assessed structures include the liver, spleen, pancreas,  adrenal glands, and kidneys. Other assessed structures include  the  retroperitoneum, abdominal aorta, visualized gastrointestinal tract,  and abdominal wall. Intrapelvic anatomy is also assessed.    Radiation dose for this scan is reduced using automated exposure  control, adjustment of the mA and/or kV according to patient size, or  iterative reconstruction technique.    COMPARISON: Chest CT dated 4/4/2016.    FINDINGS:    Abdomen: There is a ventral hernia present in the midline,  approximately 9 cm cranial to the umbilicus. This is seen best on  axial images 31-32. It measures approximately 3.5 cm in transverse  dimension. It contains a loop of transverse colon. It is not however  causing bowel obstruction at this time. Another 2.1 cm fat-containing  ventral hernia is suspected on axial image 19, approximately 3 cm  above the main/dominant hernia. However, it's difficult to see on the  discrete fascial plane discontinuity. There is some minimal  inflammatory change around this structure. There also is a minimal  fat-containing ventral hernia just above the level the umbilicus on  axial image 45. There is a subcentimeter liver lesion on axial image 9  which is stable from 2016, consistent with a benign entity. There is a  subcentimeter cyst in the left kidney.    Pelvis:  No significant abnormality is demonstrated.      Impression    IMPRESSION:  There is an upper midline ventral hernia that contains a  loop of transverse colon, as described above. Two other small  fat-containing ventral hernias are also suggested.    KRUNAL FARRELL MD     ROS  Constitutional - Denies fevers, weight loss, malaise, lethargy  Neuro - Denies tremors or seizures  Pulmon - Denies SOB, dyspnea, hemoptysis, chronic cough or use of an inhaler  CV - Denies CP, SOB, lower extremity edema, difficulty w/ stairs, has never used NTG  GI - Denies hematemesis, BRBPR, melena, chronic diarrhea or epigastric pain   - Denies hematuria, difficulty voiding, h/o STDs  Hematology - Denies blood clotting disorders,  "chronic anemias  Dermatology - No melanomas or skin cancers  Rheumatology - No h/o RA  Pysch - Denies depression, bipolar d/o or schizophrenia    Exam:/71 (BP Location: Right arm, Patient Position: Sitting, Cuff Size: Adult Large)   Pulse 87   Temp 97.8  F (36.6  C) (Tympanic)   Ht 1.613 m (5' 3.5\")   Wt 111.2 kg (245 lb 2.4 oz)   BMI 42.75 kg/m       General - Alert and Oriented X4, NAD, well nourished  HEENT - Normocephalic, atraumatic, PERRLA, Nose midline, Throat without lesions  Neck - supple, no LAD, Thyroid nonpalpable, Carotids without bruits  Lungs - Clear to auscultation bilaterally with good inspiratory effort, no tactile fremitus  CV - Heart RRR, no lift's, thrills, murmurs, rubs, or gallops. Carotid, radial, and femoral pulses 2+ bilaterally  Abdomen - Soft, tender to palpation between umbilicus and xiphoid.  Palpable nonreducible mass., +BS, no hepatosplenomegaly  Neuro - Full ROM, Strength 5/5 and major muscle groups, sensation intact  Extremities - No cyanosis, clubbing or edema    Assessment and plan: 44-year-old female with incisional hernia from multiple previous abdominal operations.  Unfortunately, hernia contains a loop of colon which is interfering with her activities of daily living.  Her complicated abdominal wall history make laparoscopic surgery not indicated.  Since it is significantly interfering with her activities of daily living, I would go ahead and repair this using the open technique without mesh.  I explained that there is a higher incidence of hernia recurrence and that she should avoid heavy lifting for at least a month after surgery and probably for the rest of her life.  Patient understood and will call when she is ready to schedule. PATIENT IS CLEARED FOR SURGERY.    Pradip Mckenzie MD     Again, thank you for allowing me to participate in the care of your patient.        Sincerely,        Pradip Mckenzie MD    "

## 2020-02-26 NOTE — PROGRESS NOTES
44-year-old female complaining of upper abdominal mass and pain for the past several months.  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  She reported the mass showed up a few months ago and has been increasing in size.  She has localized discomfort 1-2 out of 10 without radiation.  Pain is described as dull and achy.  She denies fevers chills nausea and vomiting.  Recent CT scan was done showing  incisional hernia with incarcerated colon.  Patient also has a much smaller supraumbilical fat-containing defect as well.  Patient has had multiple abdominal operations including laparoscopic cholecystectomy complicated by bile duct injury and resulting hepaticojejunostomy.  Further, patient reports having an open abdominal wall hernia repair with mesh.  Patient is also had a hysterectomy and appendectomy both done open.    Patient Active Problem List   Diagnosis     Tobacco use disorder     Anxiety       History reviewed. No pertinent past medical history.    Past Surgical History:   Procedure Laterality Date     APPENDECTOMY OPEN       HEPATICOJEJUNOSTOMY  2000    RnY jejunostomy from bile duct injury     HERNIORRHAPHY VENTRAL      open with mesh     HYSTERECTOMY       LAPAROSCOPIC CHOLECYSTECTOMY  2000    complicated by bile duct injury       Family History   Problem Relation Age of Onset     Coronary Artery Disease Mother      Hypertension Mother      Hyperlipidemia Mother      Depression Mother      Colon Cancer Maternal Grandmother      Breast Cancer Maternal Grandmother      Other Cancer Maternal Grandmother        Social History     Tobacco Use     Smoking status: Current Every Day Smoker     Packs/day: 0.25     Types: Cigarettes     Smokeless tobacco: Never Used   Substance Use Topics     Alcohol use: No        History   Drug Use No       Current Outpatient Medications   Medication Sig Dispense Refill     albuterol (PROAIR HFA/PROVENTIL HFA/VENTOLIN HFA) 108 (90 Base) MCG/ACT inhaler  Inhale 2 puffs into the lungs every 6 hours as needed for shortness of breath / dyspnea (coughing spasms) 1 Inhaler 0     Ascorbic Acid (VITAMIN C PO) Take 1 tablet by mouth daily       benzonatate (TESSALON) 200 MG capsule Take 1 capsule (200 mg) by mouth 3 times daily as needed for cough (Patient not taking: Reported on 2/3/2020) 21 capsule 0     buPROPion (WELLBUTRIN) 75 MG tablet Take 1 tablet (75 mg) by mouth 2 times daily 60 tablet 0     IBUPROFEN PO Take 600 mg by mouth every 6 hours as needed for moderate pain       Multiple Vitamins-Calcium (ONE-A-DAY WOMENS PO) Take 1 tablet by mouth daily       vitamin D2 (ERGOCALCIFEROL) 04364 units (1250 mcg) capsule Take 1 capsule (50,000 Units) by mouth once a week 8 capsule 0     vitamin D2 (ERGOCALCIFEROL) 60863 units (1250 mcg) capsule Take 50,000 Units by mouth as needed          Allergies   Allergen Reactions     Azithromycin Anaphylaxis     Latex Hives     Oxycodone Nausea and Vomiting     CBC  Recent Labs   Lab Test 02/19/16  1500   WBC 17.7*   RBC 4.28   HGB 13.2   HCT 40.1   MCV 94   MCH 30.8   MCHC 32.9   RDW 12.9          BMP  Recent Labs   Lab Test 02/03/20  0941      POTASSIUM 4.2   DEBBIE 9.2   CHLORIDE 105   CO2 28   BUN 13   CR 0.78   GLC 92     Results for orders placed or performed during the hospital encounter of 02/10/20   CT Abdomen Pelvis w Contrast    Narrative    CT ABDOMEN PELVIS W CONTRAST 2/10/2020 9:48 AM    HISTORY: Palpable upper abdominal lesion. Epigastric pain.    CONTRAST:  100 mL Isovue 370.    TECHNIQUE: CT of the abdomen and pelvis is performed with IV contrast.    Routine assessed structures include the liver, spleen, pancreas,  adrenal glands, and kidneys. Other assessed structures include the  retroperitoneum, abdominal aorta, visualized gastrointestinal tract,  and abdominal wall. Intrapelvic anatomy is also assessed.    Radiation dose for this scan is reduced using automated exposure  control, adjustment of the mA  and/or kV according to patient size, or  iterative reconstruction technique.    COMPARISON: Chest CT dated 4/4/2016.    FINDINGS:    Abdomen: There is a ventral hernia present in the midline,  approximately 9 cm cranial to the umbilicus. This is seen best on  axial images 31-32. It measures approximately 3.5 cm in transverse  dimension. It contains a loop of transverse colon. It is not however  causing bowel obstruction at this time. Another 2.1 cm fat-containing  ventral hernia is suspected on axial image 19, approximately 3 cm  above the main/dominant hernia. However, it's difficult to see on the  discrete fascial plane discontinuity. There is some minimal  inflammatory change around this structure. There also is a minimal  fat-containing ventral hernia just above the level the umbilicus on  axial image 45. There is a subcentimeter liver lesion on axial image 9  which is stable from 2016, consistent with a benign entity. There is a  subcentimeter cyst in the left kidney.    Pelvis:  No significant abnormality is demonstrated.      Impression    IMPRESSION:  There is an upper midline ventral hernia that contains a  loop of transverse colon, as described above. Two other small  fat-containing ventral hernias are also suggested.    KRUNAL FARRELL MD     ROS  Constitutional - Denies fevers, weight loss, malaise, lethargy  Neuro - Denies tremors or seizures  Pulmon - Denies SOB, dyspnea, hemoptysis, chronic cough or use of an inhaler  CV - Denies CP, SOB, lower extremity edema, difficulty w/ stairs, has never used NTG  GI - Denies hematemesis, BRBPR, melena, chronic diarrhea or epigastric pain   - Denies hematuria, difficulty voiding, h/o STDs  Hematology - Denies blood clotting disorders, chronic anemias  Dermatology - No melanomas or skin cancers  Rheumatology - No h/o RA  Pysch - Denies depression, bipolar d/o or schizophrenia    Exam:/71 (BP Location: Right arm, Patient Position: Sitting, Cuff Size: Adult  "Large)   Pulse 87   Temp 97.8  F (36.6  C) (Tympanic)   Ht 1.613 m (5' 3.5\")   Wt 111.2 kg (245 lb 2.4 oz)   BMI 42.75 kg/m      General - Alert and Oriented X4, NAD, well nourished  HEENT - Normocephalic, atraumatic, PERRLA, Nose midline, Throat without lesions  Neck - supple, no LAD, Thyroid nonpalpable, Carotids without bruits  Lungs - Clear to auscultation bilaterally with good inspiratory effort, no tactile fremitus  CV - Heart RRR, no lift's, thrills, murmurs, rubs, or gallops. Carotid, radial, and femoral pulses 2+ bilaterally  Abdomen - Soft, tender to palpation between umbilicus and xiphoid.  Palpable nonreducible mass., +BS, no hepatosplenomegaly  Neuro - Full ROM, Strength 5/5 and major muscle groups, sensation intact  Extremities - No cyanosis, clubbing or edema    Assessment and plan: 44-year-old female with incisional hernia from multiple previous abdominal operations.  Unfortunately, hernia contains a loop of colon which is interfering with her activities of daily living.  Her complicated abdominal wall history make laparoscopic surgery not indicated.  Since it is significantly interfering with her activities of daily living, I would go ahead and repair this using the open technique without mesh.  I explained that there is a higher incidence of hernia recurrence and that she should avoid heavy lifting for at least a month after surgery and probably for the rest of her life.  Patient understood and will call when she is ready to schedule. PATIENT IS CLEARED FOR SURGERY.    Pradip Mckenzie MD   "

## 2020-06-17 ENCOUNTER — HOSPITAL ENCOUNTER (EMERGENCY)
Facility: CLINIC | Age: 45
Discharge: HOME | End: 2020-06-17
Attending: EMERGENCY MEDICINE | Admitting: EMERGENCY MEDICINE
Payer: COMMERCIAL

## 2020-06-17 ENCOUNTER — APPOINTMENT (OUTPATIENT)
Dept: GENERAL RADIOLOGY | Facility: CLINIC | Age: 45
End: 2020-06-17
Attending: EMERGENCY MEDICINE
Payer: COMMERCIAL

## 2020-06-17 VITALS
TEMPERATURE: 98.5 F | SYSTOLIC BLOOD PRESSURE: 136 MMHG | BODY MASS INDEX: 42.72 KG/M2 | DIASTOLIC BLOOD PRESSURE: 88 MMHG | OXYGEN SATURATION: 97 % | WEIGHT: 245 LBS | HEART RATE: 78 BPM | RESPIRATION RATE: 20 BRPM

## 2020-06-17 DIAGNOSIS — Z20.822 SUSPECTED COVID-19 VIRUS INFECTION: ICD-10-CM

## 2020-06-17 DIAGNOSIS — J40 BRONCHITIS: ICD-10-CM

## 2020-06-17 LAB
ALBUMIN SERPL-MCNC: 3.3 G/DL (ref 3.4–5)
ALBUMIN UR-MCNC: NEGATIVE MG/DL
ALP SERPL-CCNC: 134 U/L (ref 40–150)
ALT SERPL W P-5'-P-CCNC: 25 U/L (ref 0–50)
ANION GAP SERPL CALCULATED.3IONS-SCNC: 5 MMOL/L (ref 3–14)
APPEARANCE UR: ABNORMAL
AST SERPL W P-5'-P-CCNC: 23 U/L (ref 0–45)
BACTERIA #/AREA URNS HPF: ABNORMAL /HPF
BASOPHILS # BLD AUTO: 0.1 10E9/L (ref 0–0.2)
BASOPHILS NFR BLD AUTO: 0.5 %
BILIRUB SERPL-MCNC: 0.2 MG/DL (ref 0.2–1.3)
BILIRUB UR QL STRIP: NEGATIVE
BUN SERPL-MCNC: 14 MG/DL (ref 7–30)
CALCIUM SERPL-MCNC: 8.5 MG/DL (ref 8.5–10.1)
CHLORIDE SERPL-SCNC: 106 MMOL/L (ref 94–109)
CO2 SERPL-SCNC: 27 MMOL/L (ref 20–32)
COLOR UR AUTO: YELLOW
CREAT SERPL-MCNC: 0.75 MG/DL (ref 0.52–1.04)
D DIMER PPP FEU-MCNC: 0.3 UG/ML FEU (ref 0–0.5)
DIFFERENTIAL METHOD BLD: ABNORMAL
EOSINOPHIL # BLD AUTO: 0.1 10E9/L (ref 0–0.7)
EOSINOPHIL NFR BLD AUTO: 1 %
ERYTHROCYTE [DISTWIDTH] IN BLOOD BY AUTOMATED COUNT: 12.6 % (ref 10–15)
GFR SERPL CREATININE-BSD FRML MDRD: >90 ML/MIN/{1.73_M2}
GLUCOSE SERPL-MCNC: 113 MG/DL (ref 70–99)
GLUCOSE UR STRIP-MCNC: NEGATIVE MG/DL
HCT VFR BLD AUTO: 38 % (ref 35–47)
HGB BLD-MCNC: 12.4 G/DL (ref 11.7–15.7)
HGB UR QL STRIP: ABNORMAL
IMM GRANULOCYTES # BLD: 0.1 10E9/L (ref 0–0.4)
IMM GRANULOCYTES NFR BLD: 0.5 %
KETONES UR STRIP-MCNC: NEGATIVE MG/DL
LEUKOCYTE ESTERASE UR QL STRIP: NEGATIVE
LYMPHOCYTES # BLD AUTO: 2.4 10E9/L (ref 0.8–5.3)
LYMPHOCYTES NFR BLD AUTO: 20.1 %
MCH RBC QN AUTO: 31.2 PG (ref 26.5–33)
MCHC RBC AUTO-ENTMCNC: 32.6 G/DL (ref 31.5–36.5)
MCV RBC AUTO: 96 FL (ref 78–100)
MONOCYTES # BLD AUTO: 0.7 10E9/L (ref 0–1.3)
MONOCYTES NFR BLD AUTO: 5.6 %
MUCOUS THREADS #/AREA URNS LPF: PRESENT /LPF
NEUTROPHILS # BLD AUTO: 8.7 10E9/L (ref 1.6–8.3)
NEUTROPHILS NFR BLD AUTO: 72.3 %
NITRATE UR QL: NEGATIVE
NRBC # BLD AUTO: 0 10*3/UL
NRBC BLD AUTO-RTO: 0 /100
PH UR STRIP: 5 PH (ref 5–7)
PLATELET # BLD AUTO: 215 10E9/L (ref 150–450)
POTASSIUM SERPL-SCNC: 4 MMOL/L (ref 3.4–5.3)
PROT SERPL-MCNC: 7.5 G/DL (ref 6.8–8.8)
RBC # BLD AUTO: 3.98 10E12/L (ref 3.8–5.2)
RBC #/AREA URNS AUTO: 11 /HPF (ref 0–2)
SODIUM SERPL-SCNC: 138 MMOL/L (ref 133–144)
SOURCE: ABNORMAL
SP GR UR STRIP: 1.02 (ref 1–1.03)
SQUAMOUS #/AREA URNS AUTO: 20 /HPF (ref 0–1)
TROPONIN I SERPL-MCNC: <0.015 UG/L (ref 0–0.04)
UROBILINOGEN UR STRIP-MCNC: 0 MG/DL (ref 0–2)
WBC # BLD AUTO: 12.1 10E9/L (ref 4–11)
WBC #/AREA URNS AUTO: 1 /HPF (ref 0–5)

## 2020-06-17 PROCEDURE — 93010 ELECTROCARDIOGRAM REPORT: CPT | Mod: Z6 | Performed by: EMERGENCY MEDICINE

## 2020-06-17 PROCEDURE — 94640 AIRWAY INHALATION TREATMENT: CPT | Performed by: EMERGENCY MEDICINE

## 2020-06-17 PROCEDURE — U0003 INFECTIOUS AGENT DETECTION BY NUCLEIC ACID (DNA OR RNA); SEVERE ACUTE RESPIRATORY SYNDROME CORONAVIRUS 2 (SARS-COV-2) (CORONAVIRUS DISEASE [COVID-19]), AMPLIFIED PROBE TECHNIQUE, MAKING USE OF HIGH THROUGHPUT TECHNOLOGIES AS DESCRIBED BY CMS-2020-01-R: HCPCS | Performed by: EMERGENCY MEDICINE

## 2020-06-17 PROCEDURE — 80053 COMPREHEN METABOLIC PANEL: CPT | Performed by: EMERGENCY MEDICINE

## 2020-06-17 PROCEDURE — 71045 X-RAY EXAM CHEST 1 VIEW: CPT

## 2020-06-17 PROCEDURE — C9803 HOPD COVID-19 SPEC COLLECT: HCPCS | Performed by: EMERGENCY MEDICINE

## 2020-06-17 PROCEDURE — 85379 FIBRIN DEGRADATION QUANT: CPT | Performed by: EMERGENCY MEDICINE

## 2020-06-17 PROCEDURE — 25000132 ZZH RX MED GY IP 250 OP 250 PS 637: Performed by: EMERGENCY MEDICINE

## 2020-06-17 PROCEDURE — 85025 COMPLETE CBC W/AUTO DIFF WBC: CPT | Performed by: EMERGENCY MEDICINE

## 2020-06-17 PROCEDURE — 99285 EMERGENCY DEPT VISIT HI MDM: CPT | Mod: 25 | Performed by: EMERGENCY MEDICINE

## 2020-06-17 PROCEDURE — 81001 URINALYSIS AUTO W/SCOPE: CPT | Performed by: EMERGENCY MEDICINE

## 2020-06-17 PROCEDURE — 84484 ASSAY OF TROPONIN QUANT: CPT | Performed by: EMERGENCY MEDICINE

## 2020-06-17 PROCEDURE — 99284 EMERGENCY DEPT VISIT MOD MDM: CPT | Mod: 25 | Performed by: EMERGENCY MEDICINE

## 2020-06-17 PROCEDURE — 93005 ELECTROCARDIOGRAM TRACING: CPT | Performed by: EMERGENCY MEDICINE

## 2020-06-17 RX ORDER — PREDNISONE 20 MG/1
40 TABLET ORAL DAILY
Qty: 14 TABLET | Refills: 0 | Status: SHIPPED | OUTPATIENT
Start: 2020-06-17 | End: 2020-06-24

## 2020-06-17 RX ORDER — DOXYCYCLINE 100 MG/1
100 CAPSULE ORAL 2 TIMES DAILY
Qty: 20 CAPSULE | Refills: 0 | Status: SHIPPED | OUTPATIENT
Start: 2020-06-17 | End: 2020-06-27

## 2020-06-17 RX ORDER — ALBUTEROL SULFATE 90 UG/1
2 AEROSOL, METERED RESPIRATORY (INHALATION) EVERY 6 HOURS PRN
Status: DISCONTINUED | OUTPATIENT
Start: 2020-06-17 | End: 2020-06-17 | Stop reason: HOSPADM

## 2020-06-17 RX ADMIN — ALBUTEROL SULFATE 2 PUFF: 90 AEROSOL, METERED RESPIRATORY (INHALATION) at 12:07

## 2020-06-17 NOTE — ED AVS SNAPSHOT
[facility] Emergency Department  [address] [facility] [address] [facility] [address]  Phone:  [phone]  Fax:  [phone]                                    Elza Greer   MRN: [id number]    Department:  [facility] Emergency Department   Date of Visit:  6/17/2020           After Visit Summary Signature Page    I have received my discharge instructions, and my questions have been answered. I have discussed any challenges I see with this plan with the nurse or doctor.    ..........................................................................................................................................  Patient/Patient Representative Signature      ..........................................................................................................................................  Patient Representative Print Name and Relationship to Patient    ..................................................               ................................................  Date                                   Time    ..........................................................................................................................................  Reviewed by Signature/Title    ...................................................              ..............................................  Date                                               Time          22EPIC Rev 08/18

## 2020-06-17 NOTE — ED TRIAGE NOTES
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 Pt states that she has a cough and soa.

## 2020-06-17 NOTE — ED NOTES
Pt reports chest pain, low grade fever and deep cough with no phlegm started yesterday.   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   Oxygen sats remain above 95% on room air.   Provider in room with pt when pt was roomed.

## 2020-06-17 NOTE — LETTER
June 18, 2020        Elza Greer  [address] [facility] [address]    This letter provides a written record that you were tested for COVID-19 on 6/17/20.       Your result was negative. This means that we didn t find the virus that causes COVID-19 in your sample. A test may show negative when you do actually have the virus. This can happen when the virus is in the early stages of infection, before you feel illness symptoms.    If you have symptoms   Stay home and away from others (self-isolate) until you meet ALL of the guidelines below:    You ve had no fever--and no medicine that reduces fever--for 3 full days (72 hours). And      Your other symptoms have gotten better. For example, your cough or breathing has improved. And     At least 10 days have passed since your symptoms started.    During this time:    Stay home. Don t go to work, school or anywhere else.     Stay in your own room, including for meals. Use your own bathroom if you can.    Stay away from others in your home. No hugging, kissing or shaking hands. No visitors.    Clean  high touch  surfaces often (doorknobs, counters, handles, etc.). Use a household cleaning spray or wipes. You can find a full list on the EPA website at www.epa.gov/pesticide-registration/list-n-disinfectants-use-against-sars-cov-2.    Cover your mouth and nose with a mask, tissue or washcloth to avoid spreading germs.    Wash your hands and face often with soap and water.    Going back to work  Check with your employer for any guidelines to follow for going back to work.    Employers: This document serves as formal notice that your employee tested negative for COVID-19, as of the testing date shown above.

## 2020-06-17 NOTE — DISCHARGE INSTRUCTIONS
Albuterol inhaler 2 puffs every 4 hours as needed for shortness of air  Doxycycline and prednisone as prescribed  Await results of COVID test, the test is being done early in your course of illness and may represent a false negative if results negative.  Recommend quarantine for the next 7 days, repeat testing at that time    Return here for progressive shortness of air, passing out, chest pain or any other concern.      Learn about the COVID19 Study:   A treatment study for those infected with COVID 19.      A research trial conducted by   [facility]    Background Information    Since appearing in [facility] in 2019, the severe acute respiratory syndrome coronavirus 2 (SARS-CoV-2) has caused disease (COVID-19) in over 110,000 people. Over 4,000 people have  in the past several months across 80 countries. The mortality is estimated at up to 3.4% and is especially dangerous for the elderly.    At present there is no current specific treatment for patients with COVID-19. The novel (new) coronavirus is closely related to the severe acute respiratory syndrome coronavirus (SARS-CoV) which caused an outbreak of disease (SARS) in 2003. Attempts to develop a vaccine since SARS have been unsuccessful. New vaccines will take time and may or may not be effective for this or future coronavirus pandemics (wide-spread infections of people around the globe). So far, health officials have been appropriately focused on screening, identification, and containment within and between governments to combat the spread of COVID-19.     Given its rapid spread, patients with COVID-19 would greatly benefit from treatments with an established safety profile readily available for immediate use if the treatment is effective.     What is this study?    This is a multi-center ([facility] and [facility]), double-blinded study of COVID-19 infected patients randomized 1:1 to daily losartan or  placebo for 10 days or treatment failure (hospital admission).    Why is this study being done?    The virus COVID-19 uses a specific protein on the surface of your cells to enter the cell. This protein is important to protect the lung from circulating hormones. COVID-19 blocks this protein and damages the lungs. In this study, we want to see if giving people a medication usually used for high blood pressure (called Losartan) that blocks the activity of this lung damaging hormone might help reduce problems with breathing while you recover from COVID-19.      What Happens in the Study?    People who meet eligibility criteria and agree to participate in this research study will be randomly assigned to receive either Losartan or a placebo (a pill that looks just like Losartan but doesn't have any medicine in it). People enrolled in the research study have a 50:50 chance of taking either pill during this study, like flipping a coin. People will take the study medication for 10 days, at home, with some follow-up phone calls from the research team (up to 28 days from enrolling in the study).    People will also be asked to submit a saliva swab (by pre-paid mail or ) to the research team for analysis.     What if I want to be included in the study?       If you test positive for COVID-19, someone from the COVID-19 testing center will contact you. If you want to learn more or participate in the study, contact the study team. This can be done by calling the hotline, sending an email or visiting the website to answer a few additional pre-screening questions to see if you qualify and to speak with a research study member.    To learn more about COVID19 you can visit the [facility] Website at: [email or web address]    Contact Us  COVID19 Study  [place], [place]    Phone: [phone]  E-mail: [email or web address]    Randomized Controlled  Trial of Losartan for Patients with COVID-19 is a research study to reduce severe respiratory symptoms/lung damage from COVID-19.   ____________________________________  The research study has been reviewed by the [facility] Institutional Review Board (IRB).    Cristina Nickerson protocol number: [id number]  [facility] IRB study number:  [id number]    Website:  [email or web address]

## 2020-06-17 NOTE — ED PROVIDER NOTES
History     Chief Complaint   Patient presents with     Cough     Chest Pain     HPI  Elza Greer is a 44 year old female who presents with cough low-grade fever shortness of air.  Denies history of COPD however does have mild asthma.  Smokes pack of cigarettes per day.  Works at group home.  No known exposure to COVID-19 disease.  Developed symptoms yesterday.  Describes chest tightness with breathing.  No risk factor for or history of DVT/pulmonary embolism.  Denies vomiting or diarrhea.    Allergies:  Allergies   Allergen Reactions     Azithromycin Anaphylaxis     Latex Hives     Oxycodone Nausea and Vomiting       Problem List:    Patient Active Problem List    Diagnosis Date Noted     Anxiety 02/18/2020     Priority: Medium     Tobacco use disorder 11/14/2016     Priority: Medium        Past Medical History:    Past Medical History:   Diagnosis Date     Adjustment disorder with mixed anxiety and depressed mood      IBS (irritable bowel syndrome)      Incisional hernia, without obstruction or gangrene 2020     Lactose intolerance 05/12/2010     Migraine 05/12/2010     Mild intermittent asthma without complication 05/12/2010     PCOS (polycystic ovarian syndrome)      Tobacco use disorder      Vitamin D deficiency        Past Surgical History:    Past Surgical History:   Procedure Laterality Date     APPENDECTOMY OPEN N/A      CYSTECTOMY OVARIAN BENIGN  2001     HEPATICOJEJUNOSTOMY  2000    RnY jejunostomy from bile duct injury     HERNIORRHAPHY VENTRAL N/A 02/17/2012    open with mesh     HYSTERECTOMY TOTAL ABDOMINAL, BILATERAL SALPINGO-OOPHORECTOMY, COMBINED N/A 2005     LAPAROSCOPIC CHOLECYSTECTOMY  2006    complicated by bile duct injury       Family History:    Family History   Problem Relation Age of Onset     Coronary Artery Disease Mother      Hypertension Mother      Hyperlipidemia Mother      Depression Mother      Colon Cancer Maternal Grandmother      Breast Cancer Maternal Grandmother      Other  Cancer Maternal Grandmother        Social History:  Marital Status:   [2]  Social History     Tobacco Use     Smoking status: Current Every Day Smoker     Packs/day: 0.25     Types: Cigarettes     Smokeless tobacco: Never Used   Substance Use Topics     Alcohol use: No     Drug use: No        Medications:    doxycycline hyclate (VIBRAMYCIN) 100 MG capsule  predniSONE (DELTASONE) 20 MG tablet  albuterol (PROAIR HFA/PROVENTIL HFA/VENTOLIN HFA) 108 (90 Base) MCG/ACT inhaler  Ascorbic Acid (VITAMIN C PO)  benzonatate (TESSALON) 200 MG capsule  buPROPion (WELLBUTRIN) 75 MG tablet  IBUPROFEN PO  Multiple Vitamins-Calcium (ONE-A-DAY WOMENS PO)  vitamin D2 (ERGOCALCIFEROL) 75919 units (1250 mcg) capsule  vitamin D2 (ERGOCALCIFEROL) 88159 units (1250 mcg) capsule          Review of Systems  All other systems reviewed and are negative.    Physical Exam   BP: (!) 143/92  Pulse: 92  Heart Rate: 115  Temp: 98.5  F (36.9  C)  Resp: 26  Weight: 111.1 kg (245 lb)  SpO2: 98 %      Physical Exam  Nontoxic appearing mild respiratory distress in the form of tachypnea audible expiratory wheeze, alert and oriented x3  Head atraumatic normocephalic   Neck supple full active painless range of motion  Lungs diminished, poor air movement  Heart regular no murmur  Abdomen soft nontender bowel sounds positive   Strength and sensation grossly intact throughout the extremities, gait and station normal  Speech is fluent, good eye contact, thought processes are rational  Lower extremities without swelling, redness or tenderness  Pedal pulses symmetrical and strong    ED Course        Procedures  ECG: Normal rate and rhythm, axis and intervals within normal limits, no acute ST or T wave changes. Read by Dr. Riki Burnette               Critical Care time:  none               Results for orders placed or performed during the hospital encounter of 06/17/20 (from the past 24 hour(s))   CBC with platelets differential   Result Value Ref Range     WBC 12.1 (H) 4.0 - 11.0 10e9/L    RBC Count 3.98 3.8 - 5.2 10e12/L    Hemoglobin 12.4 11.7 - 15.7 g/dL    Hematocrit 38.0 35.0 - 47.0 %    MCV 96 78 - 100 fl    MCH 31.2 26.5 - 33.0 pg    MCHC 32.6 31.5 - 36.5 g/dL    RDW 12.6 10.0 - 15.0 %    Platelet Count 215 150 - 450 10e9/L    Diff Method Automated Method     % Neutrophils 72.3 %    % Lymphocytes 20.1 %    % Monocytes 5.6 %    % Eosinophils 1.0 %    % Basophils 0.5 %    % Immature Granulocytes 0.5 %    Nucleated RBCs 0 0 /100    Absolute Neutrophil 8.7 (H) 1.6 - 8.3 10e9/L    Absolute Lymphocytes 2.4 0.8 - 5.3 10e9/L    Absolute Monocytes 0.7 0.0 - 1.3 10e9/L    Absolute Eosinophils 0.1 0.0 - 0.7 10e9/L    Absolute Basophils 0.1 0.0 - 0.2 10e9/L    Abs Immature Granulocytes 0.1 0 - 0.4 10e9/L    Absolute Nucleated RBC 0.0    Comprehensive metabolic panel   Result Value Ref Range    Sodium 138 133 - 144 mmol/L    Potassium 4.0 3.4 - 5.3 mmol/L    Chloride 106 94 - 109 mmol/L    Carbon Dioxide 27 20 - 32 mmol/L    Anion Gap 5 3 - 14 mmol/L    Glucose 113 (H) 70 - 99 mg/dL    Urea Nitrogen 14 7 - 30 mg/dL    Creatinine 0.75 0.52 - 1.04 mg/dL    GFR Estimate >90 >60 mL/min/[1.73_m2]    GFR Estimate If Black >90 >60 mL/min/[1.73_m2]    Calcium 8.5 8.5 - 10.1 mg/dL    Bilirubin Total 0.2 0.2 - 1.3 mg/dL    Albumin 3.3 (L) 3.4 - 5.0 g/dL    Protein Total 7.5 6.8 - 8.8 g/dL    Alkaline Phosphatase 134 40 - 150 U/L    ALT 25 0 - 50 U/L    AST 23 0 - 45 U/L   Troponin I   Result Value Ref Range    Troponin I ES <0.015 0.000 - 0.045 ug/L   D dimer quantitative   Result Value Ref Range    D Dimer 0.3 0.0 - 0.50 ug/ml FEU   UA reflex to Microscopic   Result Value Ref Range    Color Urine Yellow     Appearance Urine Slightly Cloudy     Glucose Urine Negative NEG^Negative mg/dL    Bilirubin Urine Negative NEG^Negative    Ketones Urine Negative NEG^Negative mg/dL    Specific Gravity Urine 1.019 1.003 - 1.035    Blood Urine Moderate (A) NEG^Negative    pH Urine 5.0 5.0 - 7.0 pH     Protein Albumin Urine Negative NEG^Negative mg/dL    Urobilinogen mg/dL 0.0 0.0 - 2.0 mg/dL    Nitrite Urine Negative NEG^Negative    Leukocyte Esterase Urine Negative NEG^Negative    Source Midstream Urine     RBC Urine 11 (H) 0 - 2 /HPF    WBC Urine 1 0 - 5 /HPF    Bacteria Urine Few (A) NEG^Negative /HPF    Squamous Epithelial /HPF Urine 20 (H) 0 - 1 /HPF    Mucous Urine Present (A) NEG^Negative /LPF   XR Chest Port 1 View    Narrative    CHEST ONE VIEW PORTABLE   6/17/2020 12:34 PM     HISTORY: Shortness of breath. Chest pain.    COMPARISON: Chest x-ray dated 12/16/2019.    FINDINGS:  Cardiac silhouette again appears enlarged which may be  partially due to technique. This still likely represents a component  of cardiomegaly. No pneumothorax, significant pleural fluid  collection, or pulmonary infiltrate is identified. Pulmonary  vascularity is grossly within normal limits. No fracture.      Impression    IMPRESSION: Cardiomegaly. No other evidence of acute cardiopulmonary  disease is seen.        Medications   albuterol (PROAIR HFA/PROVENTIL HFA/VENTOLIN HFA) 108 (90 Base) MCG/ACT inhaler 2 puff (2 puffs Inhalation Given 6/17/20 1207)       Assessments & Plan (with Medical Decision Making)  44-year-old female smoker cough shortness of air history of mild asthma presents with wheezing 2 days of symptoms.  No known COVID-19 exposure.  Chest x-ray by my review as well as radiology negative for acute finding, mild elevation white count nonspecific.  Seem to respond subjectively to albuterol inhaler.  Findings consistent with bronchitis, doxycycline, prednisone, COVID testing pending.  Quarantine for the next 7 days.  Return criteria reviewed     I have reviewed the nursing notes.    I have reviewed the findings, diagnosis, plan and need for follow up with the patient.            New Prescriptions    DOXYCYCLINE HYCLATE (VIBRAMYCIN) 100 MG CAPSULE    Take 1 capsule (100 mg) by mouth 2 times daily for 10 days     PREDNISONE (DELTASONE) 20 MG TABLET    Take 2 tablets (40 mg) by mouth daily for 7 days       Final diagnoses:   Bronchitis   Suspected COVID-19 virus infection       6/17/2020   [facility] EMERGENCY DEPARTMENT     Riki Burnette MD  06/17/20 1300

## 2020-06-18 LAB
SARS-COV-2 RNA SPEC QL NAA+PROBE: NOT DETECTED
SPECIMEN SOURCE: NORMAL

## 2020-08-24 ENCOUNTER — OFFICE VISIT (OUTPATIENT)
Dept: FAMILY MEDICINE | Facility: CLINIC | Age: 45
End: 2020-08-24
Payer: COMMERCIAL

## 2020-08-24 VITALS
HEART RATE: 84 BPM | WEIGHT: 247 LBS | TEMPERATURE: 98.2 F | DIASTOLIC BLOOD PRESSURE: 80 MMHG | BODY MASS INDEX: 43.07 KG/M2 | SYSTOLIC BLOOD PRESSURE: 118 MMHG | RESPIRATION RATE: 12 BRPM

## 2020-08-24 DIAGNOSIS — M25.572 PAIN IN JOINT INVOLVING ANKLE AND FOOT, LEFT: Primary | ICD-10-CM

## 2020-08-24 DIAGNOSIS — S86.002S ACHILLES TENDON INJURY, LEFT, SEQUELA: ICD-10-CM

## 2020-08-24 PROCEDURE — 99213 OFFICE O/P EST LOW 20 MIN: CPT | Performed by: NURSE PRACTITIONER

## 2020-08-24 ASSESSMENT — ANXIETY QUESTIONNAIRES
1. FEELING NERVOUS, ANXIOUS, OR ON EDGE: SEVERAL DAYS
GAD7 TOTAL SCORE: 9
7. FEELING AFRAID AS IF SOMETHING AWFUL MIGHT HAPPEN: SEVERAL DAYS
2. NOT BEING ABLE TO STOP OR CONTROL WORRYING: SEVERAL DAYS
6. BECOMING EASILY ANNOYED OR IRRITABLE: SEVERAL DAYS
3. WORRYING TOO MUCH ABOUT DIFFERENT THINGS: SEVERAL DAYS
5. BEING SO RESTLESS THAT IT IS HARD TO SIT STILL: MORE THAN HALF THE DAYS

## 2020-08-24 ASSESSMENT — PATIENT HEALTH QUESTIONNAIRE - PHQ9
SUM OF ALL RESPONSES TO PHQ QUESTIONS 1-9: 6
5. POOR APPETITE OR OVEREATING: MORE THAN HALF THE DAYS

## 2020-08-24 ASSESSMENT — PAIN SCALES - GENERAL: PAINLEVEL: MODERATE PAIN (4)

## 2020-08-24 NOTE — PATIENT INSTRUCTIONS
Given past history of left ankle achilles tendon injury recommend that you see podiatry to assess need for further imagining regarding this- they will call to set up appointment   Recommend wearing walking boot  Stay of off left foot as much as possible to allow to heal     Take foot out of boot three times a day and do range of motion- ABCs twice     Tylenol/ibuprofen   Ice

## 2020-08-24 NOTE — LETTER
[facility]  [address] [place] [id number]  Phone: [phone]  Fax: [phone]    August 24, 2020        Elza Greer  [address] [facility] [address]          To whom it may concern:    RE: Elza Greer  Patient may return to work 8/25/20 with the following:  Light duty-minimal weightbearing on left ankle- and keep leg elevated as much as possible until evaluated by podiatry     Please contact me for questions or concerns.      Sincerely,        Alyse Stevenson NP

## 2020-08-25 ASSESSMENT — ANXIETY QUESTIONNAIRES: GAD7 TOTAL SCORE: 9

## 2020-08-27 ENCOUNTER — ANCILLARY PROCEDURE (OUTPATIENT)
Dept: GENERAL RADIOLOGY | Facility: CLINIC | Age: 45
End: 2020-08-27
Attending: PODIATRIST
Payer: COMMERCIAL

## 2020-08-27 ENCOUNTER — OFFICE VISIT (OUTPATIENT)
Dept: PODIATRY | Facility: CLINIC | Age: 45
End: 2020-08-27
Payer: COMMERCIAL

## 2020-08-27 VITALS
HEIGHT: 64 IN | BODY MASS INDEX: 42.17 KG/M2 | SYSTOLIC BLOOD PRESSURE: 136 MMHG | DIASTOLIC BLOOD PRESSURE: 91 MMHG | WEIGHT: 247 LBS | HEART RATE: 84 BPM

## 2020-08-27 DIAGNOSIS — S86.002S ACHILLES TENDON INJURY, LEFT, SEQUELA: ICD-10-CM

## 2020-08-27 DIAGNOSIS — M25.572 PAIN IN JOINT INVOLVING ANKLE AND FOOT, LEFT: ICD-10-CM

## 2020-08-27 PROCEDURE — 99213 OFFICE O/P EST LOW 20 MIN: CPT | Performed by: PODIATRIST

## 2020-08-27 PROCEDURE — 73610 X-RAY EXAM OF ANKLE: CPT | Mod: LT

## 2020-08-27 RX ORDER — MELOXICAM 7.5 MG/1
7.5 TABLET ORAL DAILY
Qty: 30 TABLET | Refills: 1 | Status: SHIPPED | OUTPATIENT
Start: 2020-08-27 | End: 2022-03-15

## 2020-08-27 ASSESSMENT — PAIN SCALES - GENERAL: PAINLEVEL: SEVERE PAIN (6)

## 2020-08-27 ASSESSMENT — MIFFLIN-ST. JEOR: SCORE: 1752.44

## 2020-08-27 NOTE — LETTER
8/27/2020         RE: Elza Greer  [address] [facility] [address]        Dear Colleague,    Thank you for referring your patient, Elza Greer, to the [facility]. Please see a copy of my visit note below.    Elza returns to the office for reevaluation of the left foot.  The patient relates following the instructions given at the last visit with noted more pain.  The patient relates overall less  improvement in pain and function of the left foot.  The patient relates no other problems.    PAST MEDICAL HISTORY:   Past Medical History:   Diagnosis Date     Adjustment disorder with mixed anxiety and depressed mood      IBS (irritable bowel syndrome)      Incisional hernia, without obstruction or gangrene 2020     Lactose intolerance 05/12/2010     Migraine 05/12/2010     Mild intermittent asthma without complication 05/12/2010     PCOS (polycystic ovarian syndrome)      Tobacco use disorder      Vitamin D deficiency        BMI= Body mass index is 43.07 kg/m .    Weight management plan: Patient was referred to their PCP to discuss a diet and exercise plan.    Physical Exam:    General: The patient appears to have a pleasant mental affect.    Lower extremity physical exam:  Neurovascular status is intact with palpable pedal pulses and intact epicritic sensations.  Muscular exam is within normal limits to major muscle groups.  Integument is intact.      One notes decreased edema.  One notes pain on palpation over the distal aspect of the Achilles tendon on the left one notes pain at the insertion point of the Achilles tendon on the calcaneus on the left.  No surrounding erythema noted.  No significant edema of the tendon is palpated.    Radiograph evaluation including weightbearing AP, lateral and mortise views of the left ankle reveals no degenerative changes or spurring noted.    Assessment:      ICD-10-CM    1. Pain in joint involving ankle and foot, left  M25.572 Orthopedic  & Spine  Referral   2. Achilles tendon injury, left, sequela  S86.002S Orthopedic & Spine  Referral     XR Ankle Left G/E 3 Views       Plan:  I have explained to Elza about the conditions.  At this time, the patient was instructed on icing, stretching, tissue massage and support.  The patient will continue wearing her cam boot that will aid in offloading the tension forces to the soft tissues and prevent further inflammation.  To reduce the amount of current inflammation, the patient was prescribed Mobic 7.5 mg to be taken daily with food and instructed to stop taking if any stomach irritation or swelling in extremities are noted.  The patient will return in four weeks for reevaluation if the symptoms do not resolve.      Disclaimer: This note consists of symbols derived from keyboarding, dictation and/or voice recognition software. As a result, there may be errors in the script that have gone undetected. Please consider this when interpreting information found in this chart.       JAZ Tejada.P.M., F.A.C.F.A.S.    Again, thank you for allowing me to participate in the care of your patient.        Sincerely,        Serjio Jones DPM

## 2020-08-27 NOTE — PATIENT INSTRUCTIONS
Initial musculoskeletal treatment recommendation:    1.  Wear supportive foot wear (stiff soles) and/or arch supports (rigid not cushion).  2.  Stretch the calf muscles as instructed once an hour.  3.  Massage the soft tissues around the injured area in the morning to loosen the tissue with an over the counter product like Icy Hot with Lidocaine  4.  Ice the injured area in the evening; 20 min on/off.  5. Take antiinflammatory medication as indicated.    If no improvement in symptoms within four to six weeks, return to clinic for reevaluation.  SMOKING CESSATION  What's in cigarette smoke? - Cigarette smoke contains over 4,000 chemicals. Nicotine is one of the main ingredients which is an insecticide/herbicide. It is poisonous to our nervous system, increases blood clotting risk, and decreases the body's defenses to fight off infection. Another chemical is Carbon Monoxide is an asphyxiating gas that permanently binds to blood cells and blocks the transport of oxygen. This leads to tissue death and decreases your metabolism. Tar is a chemical that coats your lungs and trachea which impairs new oxygen coming in and carbon dioxide getting out of your body.   How does smoking impact surgery? - Smoking is particularly hazardous with regards to surgery. Surgery puts stress on the body and a smoker's body is already under strain from these chemicals. Putting the two together, especially for an elective surgery, could be a recipe for disaster. Smoking before and after surgery increases your risk of heart problems, slow wound healing, delayed bone healing, blood clots, wound infection and anesthesia complications.   What are the benefits of quitting? - In 20 minutes your blood pressure will drop back down to normal. In 8 hours the carbon monoxide (a toxic gas) levels in your blood stream will drop by half, and oxygen levels will return to normal. In 48 hours your chance of having a heart attack will have decreased. All  nicotine will have left your body. Your sense of taste and smell will return to a normal level. In 72 hours your bronchial tubes will relax, and your energy levels will increase. In 2 weeks your circulation will increase, and it will continue to improve for the next 10 weeks.    Recommendations for elective surgery - Ideally, patients should quit smoking 8 weeks before and at least 2 weeks after elective surgery in order to avoid complications. Simply cutting back on the amount of cigarettes smoked per day does not offer any benefit or decrease the risk of poor wound healing, heart problems, and infection. Smokers should also start taking Vitamin C and B for two weeks before surgery and two weeks after surgery.    Ways to Stop Smokin. Nicotine patches, lozenges, or gum  2. Support Groups  3. Medications (see below)    List of Medications:  1. Varenicline Tartrate (CHANTIX)   2. Bupropion HCL (WELLBUTRIN, ZYBAN) - note: make sure Wellbutrin is for smoking cessation and not other issues   3. Nicotine Patch (NICODERM)   4. Nicotine Inhaler (NICOTROL)   5. Nicotine Gum Nicotine Polacrilex   6. Nicotine Lozenge: Nicotine Polacrilex (COMMIT)   * [place] offers a smoking support group as well!  Please visit: [email or web address]  If you are interested in these, ask about getting a prescription or talk to your primary care doctor about what may be the best way for you to quit.       Elza to follow up with Primary Care provider regarding elevated blood pressure.

## 2020-08-27 NOTE — LETTER
August 27, 2020      Elza Greer  [address] [facility] [address]        To Whom It May Concern:    Elza Greer was seen in our clinic. She may return to work with the following: limited to light duty - lifting no greater than 10 pounds on or about 8/27/2020 to 9/27/2020.      Sincerely,        ROBERTO SanchesM

## 2020-08-27 NOTE — PROGRESS NOTES
Elza returns to the office for reevaluation of the left foot.  The patient relates following the instructions given at the last visit with noted more pain.  The patient relates overall less  improvement in pain and function of the left foot.  The patient relates no other problems.    PAST MEDICAL HISTORY:   Past Medical History:   Diagnosis Date     Adjustment disorder with mixed anxiety and depressed mood      IBS (irritable bowel syndrome)      Incisional hernia, without obstruction or gangrene 2020     Lactose intolerance 05/12/2010     Migraine 05/12/2010     Mild intermittent asthma without complication 05/12/2010     PCOS (polycystic ovarian syndrome)      Tobacco use disorder      Vitamin D deficiency        BMI= Body mass index is 43.07 kg/m .    Weight management plan: Patient was referred to their PCP to discuss a diet and exercise plan.    Physical Exam:    General: The patient appears to have a pleasant mental affect.    Lower extremity physical exam:  Neurovascular status is intact with palpable pedal pulses and intact epicritic sensations.  Muscular exam is within normal limits to major muscle groups.  Integument is intact.      One notes decreased edema.  One notes pain on palpation over the distal aspect of the Achilles tendon on the left one notes pain at the insertion point of the Achilles tendon on the calcaneus on the left.  No surrounding erythema noted.  No significant edema of the tendon is palpated.    Radiograph evaluation including weightbearing AP, lateral and mortise views of the left ankle reveals no degenerative changes or spurring noted.    Assessment:      ICD-10-CM    1. Pain in joint involving ankle and foot, left  M25.572 Orthopedic & Spine  Referral   2. Achilles tendon injury, left, sequela  S86.002S Orthopedic & Spine  Referral     XR Ankle Left G/E 3 Views       Plan:  I have explained to Elza about the conditions.  At this time, the patient was instructed  on icing, stretching, tissue massage and support.  The patient will continue wearing her cam boot that will aid in offloading the tension forces to the soft tissues and prevent further inflammation.  To reduce the amount of current inflammation, the patient was prescribed Mobic 7.5 mg to be taken daily with food and instructed to stop taking if any stomach irritation or swelling in extremities are noted.  The patient will return in four weeks for reevaluation if the symptoms do not resolve.      Disclaimer: This note consists of symbols derived from keyboarding, dictation and/or voice recognition software. As a result, there may be errors in the script that have gone undetected. Please consider this when interpreting information found in this chart.       MUMTAZ Jones D.P.M., F.MAHIN.C.F.A.S.

## 2020-08-27 NOTE — NURSING NOTE
"Chief Complaint   Patient presents with     Consult     Foot and ankle pain, left side       Initial BP (!) 136/91   Pulse 84   Ht 1.613 m (5' 3.5\")   Wt 112 kg (247 lb)   BMI 43.07 kg/m   Estimated body mass index is 43.07 kg/m  as calculated from the following:    Height as of this encounter: 1.613 m (5' 3.5\").    Weight as of this encounter: 112 kg (247 lb).  Medications and allergies reviewed.      She SEBASTIAN MA    "

## 2020-09-02 ENCOUNTER — OFFICE VISIT (OUTPATIENT)
Dept: FAMILY MEDICINE | Facility: CLINIC | Age: 45
End: 2020-09-02
Payer: COMMERCIAL

## 2020-09-02 ENCOUNTER — TELEPHONE (OUTPATIENT)
Dept: FAMILY MEDICINE | Facility: CLINIC | Age: 45
End: 2020-09-02

## 2020-09-02 VITALS
DIASTOLIC BLOOD PRESSURE: 88 MMHG | HEART RATE: 99 BPM | HEIGHT: 64 IN | BODY MASS INDEX: 42.08 KG/M2 | OXYGEN SATURATION: 95 % | RESPIRATION RATE: 16 BRPM | WEIGHT: 246.5 LBS | TEMPERATURE: 98.8 F | SYSTOLIC BLOOD PRESSURE: 126 MMHG

## 2020-09-02 DIAGNOSIS — J45.21 MILD INTERMITTENT ASTHMA WITH ACUTE EXACERBATION: ICD-10-CM

## 2020-09-02 DIAGNOSIS — J20.9 ACUTE BRONCHITIS, UNSPECIFIED ORGANISM: Primary | ICD-10-CM

## 2020-09-02 PROCEDURE — 99213 OFFICE O/P EST LOW 20 MIN: CPT | Performed by: NURSE PRACTITIONER

## 2020-09-02 RX ORDER — PREDNISONE 20 MG/1
40 TABLET ORAL DAILY
Qty: 10 TABLET | Refills: 0 | Status: SHIPPED | OUTPATIENT
Start: 2020-09-02 | End: 2020-09-07

## 2020-09-02 RX ORDER — BENZONATATE 200 MG/1
200 CAPSULE ORAL 3 TIMES DAILY PRN
Qty: 21 CAPSULE | Refills: 0 | Status: SHIPPED | OUTPATIENT
Start: 2020-09-02 | End: 2021-07-07

## 2020-09-02 RX ORDER — ALBUTEROL SULFATE 0.83 MG/ML
2.5 SOLUTION RESPIRATORY (INHALATION) EVERY 6 HOURS PRN
Qty: 1 BOX | Refills: 0 | Status: SHIPPED | OUTPATIENT
Start: 2020-09-02 | End: 2020-12-09

## 2020-09-02 ASSESSMENT — ENCOUNTER SYMPTOMS
COUGH: 1
APPETITE CHANGE: 0
CHILLS: 0
DIARRHEA: 0
SORE THROAT: 0
VOMITING: 0
RHINORRHEA: 1
FATIGUE: 1
NAUSEA: 0
WHEEZING: 0
SINUS PRESSURE: 1
FEVER: 0

## 2020-09-02 ASSESSMENT — MIFFLIN-ST. JEOR: SCORE: 1750.18

## 2020-09-02 NOTE — PATIENT INSTRUCTIONS
You have a viral Bronchitis. Your cough can last up to 3 weeks.  1. Antibiotics are not recommended at this time.  2. Continue to use OTC antitussives, expectorants, antihistamines, and decongestants (do not use decongestants if you have high blood pressure) for symptom management.  3. Continue to use NSAIDs (such as ibuprofen) and/or Tylenol for pain and/or fevers.  4. Push fluids and rest.  5. Use steroid as prescribed.   6. Use Benzonatate as prescribed for cough.   7. Continue to use inhaler as prescribed.  8. If symptoms do not improve or worsen over the next week, please follow-up with your PCP.

## 2020-09-02 NOTE — TELEPHONE ENCOUNTER
Reason for call:  Patient reporting a symptom    Symptom or request: Bronchitis    Duration (how long have symptoms been present): Reoccurance started 2 days ago, 8/31/2020    Have you been treated for this before? Yes    Additional comments: Patient was seen in ED two weeks ago for Bronchitis, now her chest is hurting again and has cough. Patient has had 4 negative Covid tests    Phone Number patient can be reached at:  Home number on file [phone] (home)    Best Time:  Any    Can we leave a detailed message on this number:  YES    Call taken on 9/2/2020 at 9:31 AM by Cha Braxton

## 2020-09-02 NOTE — PROGRESS NOTES
"Subjective     Elza Greer is a 45 year old female who presents to clinic today for the following health issues:    HPI       Acute Illness  Acute illness concerns: Cough   Onset/Duration: x 2-3 days   Symptoms:  Fever: no  Chills/Sweats: no  Headache (location?): no  Sinus Pressure: YES  Conjunctivitis:  no  Ear Pain: YES- fullness - bilateral ears   Rhinorrhea: YES  Congestion: YES  Sore Throat: no  Cough: YES - sometimes productive, yellow this morning. More coughing when laying down or talking fast/   Wheeze: no  Decreased Appetite: no  Nausea: no  Vomiting: no  Diarrhea: no  Dysuria/Freq.: no  Dysuria or Hematuria: no  Fatigue/Achiness: YES  Chest pain started today   Sick/Strep Exposure: no  Therapies tried and outcome: IBU.   Pt seen in ER 06/17/2020 for bronchitis - prescribed doxycycline and prednisone. States that the doxycycline made her feel sick and \"out of it\" did not finish this course of abx. Did complete the prednisone though.     Additional provider notes: Hx asthma with recurrent bronchitis. Hx of PNA if bronchitis doesn't clear up. Has not felt well lately with congestion, cough, and now sternal chest pain. States she was treated for bronchitis 2-3 months ago with prednisone and doxycycline. She had a reaction to doxy and did not finish it. Prednisone helped with symptoms.     Asthma: states her nebulizer machine she has had since 1998 recently broke. She is requesting new machine and solution.        Review of Systems   Constitutional: Positive for fatigue. Negative for appetite change, chills and fever.   HENT: Positive for congestion, rhinorrhea and sinus pressure. Negative for sore throat.    Respiratory: Positive for cough (productive, yellow). Negative for wheezing.    Cardiovascular: Positive for chest pain (sternal/right under breast).   Gastrointestinal: Negative for diarrhea, nausea and vomiting.   Genitourinary: Negative.       Past Medical History:   Diagnosis Date     Adjustment " "disorder with mixed anxiety and depressed mood      IBS (irritable bowel syndrome)      Incisional hernia, without obstruction or gangrene 2020     Lactose intolerance 05/12/2010     Migraine 05/12/2010     Mild intermittent asthma without complication 05/12/2010     PCOS (polycystic ovarian syndrome)      Tobacco use disorder      Vitamin D deficiency      Current Outpatient Medications   Medication     albuterol (PROAIR HFA/PROVENTIL HFA/VENTOLIN HFA) 108 (90 Base) MCG/ACT inhaler     albuterol (PROVENTIL) (2.5 MG/3ML) 0.083% neb solution     Ascorbic Acid (VITAMIN C PO)     benzonatate (TESSALON) 200 MG capsule     IBUPROFEN PO     Multiple Vitamins-Calcium (ONE-A-DAY WOMENS PO)     predniSONE (DELTASONE) 20 MG tablet     vitamin D2 (ERGOCALCIFEROL) 35485 units (1250 mcg) capsule     meloxicam (MOBIC) 7.5 MG tablet     No current facility-administered medications for this visit.           Objective    /88 (BP Location: Right arm, Patient Position: Sitting, Cuff Size: Adult Large)   Pulse 99   Temp 98.8  F (37.1  C) (Tympanic)   Resp 16   Ht 1.613 m (5' 3.5\")   Wt 111.8 kg (246 lb 8 oz)   SpO2 95%   BMI 42.98 kg/m    Body mass index is 42.98 kg/m .  Physical Exam  Vitals signs and nursing note reviewed.   Constitutional:       General: She is not in acute distress.     Appearance: Normal appearance. She is not ill-appearing or toxic-appearing.   HENT:      Head: Normocephalic and atraumatic.      Right Ear: Tympanic membrane, ear canal and external ear normal.      Left Ear: Tympanic membrane, ear canal and external ear normal.      Nose: Nose normal.      Mouth/Throat:      Pharynx: Posterior oropharyngeal erythema (mild) present.   Cardiovascular:      Rate and Rhythm: Normal rate and regular rhythm.      Pulses: Normal pulses.      Heart sounds: Normal heart sounds.   Pulmonary:      Effort: Pulmonary effort is normal.      Breath sounds: Normal breath sounds.   Skin:     General: Skin is warm and " dry.   Neurological:      Mental Status: She is alert and oriented to person, place, and time.   Psychiatric:         Behavior: Behavior normal.                    Assessment & Plan     Acute bronchitis, unspecified organism  Prednisone prescribed. Side effects, risks and benefits of medication were discussed with patient. Discussed how and when to take medication. Tessalon perls prescribed. Side effects, risks and benefits of medication were discussed with patient. Discussed how and when to take medication. Advised that if symptoms do not improve or worsen she can call and I will send an antibiotic script in. Concerning signs/sx that would warrant urgent evaluation were discussed.  All questions were answered, patient understands and agrees with plan.      - predniSONE (DELTASONE) 20 MG tablet; Take 2 tablets (40 mg) by mouth daily for 5 days  - benzonatate (TESSALON) 200 MG capsule; Take 1 capsule (200 mg) by mouth 3 times daily as needed for cough    Mild intermittent asthma with acute exacerbation  Nebulizer and supplies ordered as well as neb solution. Printed nebulizer script given to patient to take to pharmacy. Pharmacy will verify if insurance will cover. Patient to follow-up with PCP for further questions/concerns.     - Nebulizer and Supplies Order for DME - ONLY FOR DME  - albuterol (PROVENTIL) (2.5 MG/3ML) 0.083% neb solution; Take 1 vial (2.5 mg) by nebulization every 6 hours as needed for shortness of breath / dyspnea or wheezing       Patient Instructions   You have a viral Bronchitis. Your cough can last up to 3 weeks.  1. Antibiotics are not recommended at this time.  2. Continue to use OTC antitussives, expectorants, antihistamines, and decongestants (do not use decongestants if you have high blood pressure) for symptom management.  3. Continue to use NSAIDs (such as ibuprofen) and/or Tylenol for pain and/or fevers.  4. Push fluids and rest.  5. Use steroid as prescribed.   6. Use Benzonatate as  prescribed for cough.   7. Continue to use inhaler as prescribed.  8. If symptoms do not improve or worsen over the next week, please follow-up with your PCP.          Return if symptoms worsen or fail to improve.    [facility]

## 2020-09-02 NOTE — TELEPHONE ENCOUNTER
Patient reports she was diagnosed with bronchitis back in June (although she thought is was a few weeks ago?).  She states is cleared and feels like it has returned.  Cough, settled in chest - hurts to cough.  She is tested on a regular basis at work - all negative.  No fever.  Appt scheduled for today for evaluation.    Yuliana KWOK RN BSN

## 2020-09-14 ENCOUNTER — TELEPHONE (OUTPATIENT)
Dept: FAMILY MEDICINE | Facility: CLINIC | Age: 45
End: 2020-09-14

## 2020-09-14 NOTE — LETTER
September 14, 2020      Elza Greer  [address] [place] [id number]        Dear Elza,     Your [facility] Team works hard to make sure that you and your family receive exceptional care. Enclosed you will find a copy of the Asthma Control Test (ACT) that our clinic uses to monitor and manage your asthma. This test is an assessment tool that we use to determine how well your asthma is controlled.  Please complete the enclosed form and return in the provided envelope.  Thank you for trusting us with your health care.    Sincerely,        Your [facility]/shante

## 2020-09-14 NOTE — TELEPHONE ENCOUNTER
Patient Quality Outreach Summary      Summary:    Patient is due/failing the following:   ACT needed    Type of outreach:    Sent letter.    Questions for provider review:    None                                                                                                                    GLADYS Escobar MA

## 2020-09-15 ENCOUNTER — TELEPHONE (OUTPATIENT)
Dept: FAMILY MEDICINE | Facility: CLINIC | Age: 45
End: 2020-09-15

## 2020-09-15 DIAGNOSIS — J20.9 ACUTE BRONCHITIS, UNSPECIFIED ORGANISM: Primary | ICD-10-CM

## 2020-09-15 NOTE — TELEPHONE ENCOUNTER
Please let patient know:    Augmentin sent to pharmacy.     Recommend probiotic or yogurt daily while on antibiotic to prevent GI upset.     Increase fluid intake and rest.     Follow-up if symptoms do not improve with antibiotic treatment.     Thanks!  Nia Thornton DNP, NP-C 9/15/2020 4:29 PM

## 2020-09-15 NOTE — TELEPHONE ENCOUNTER
"S-(situation): pt calling for abx    B-(background): 9/2/2020 seen in clinic    A-(assessment):   She got better when she was on the prednisone.  The Tessalon did help with the cough.  She has the sternal chest pain again.  Nose is \"stuffed up and running at the same time.\"  She is not coughing as much now as she was then: \"not yet.\"   She has hx bronchitis turning into pneumonia.  Pt reports \"that in the clinic she said she would prescribe me an antibiotic if this still persists.\"  \"I don't have a primary care provider.\"  [facility] in [place] is preferred pharmacy.   She has never ran a fever then or now.  She says she is \"a little short of breath.\" Uses inhaler, nebs etc.  Allergies reviewed.    R-(recommendations): Routing to RICCO SWAIN RN, BSN        "

## 2020-09-15 NOTE — TELEPHONE ENCOUNTER
Reason for call:  Patient reporting a symptom    Symptom or request: Cough, Runny nose - Pt was placed on Prednisone.   Chest Congestion. Fatigue   Told to call back when she completed the Prednisone. Now the symptoms have returned.    Phone Number patient can be reached at:  Home number on file [phone] (home)    Best Time:  Any Time      Can we leave a detailed message on this number:  YES    Call taken on 9/15/2020 at 4:11 PM by Latha Cotto

## 2020-09-28 ENCOUNTER — OFFICE VISIT (OUTPATIENT)
Dept: PODIATRY | Facility: CLINIC | Age: 45
End: 2020-09-28
Payer: COMMERCIAL

## 2020-09-28 VITALS
DIASTOLIC BLOOD PRESSURE: 85 MMHG | HEIGHT: 64 IN | SYSTOLIC BLOOD PRESSURE: 128 MMHG | WEIGHT: 246 LBS | BODY MASS INDEX: 42 KG/M2 | HEART RATE: 90 BPM

## 2020-09-28 DIAGNOSIS — S86.002S ACHILLES TENDON INJURY, LEFT, SEQUELA: Primary | ICD-10-CM

## 2020-09-28 PROCEDURE — 99213 OFFICE O/P EST LOW 20 MIN: CPT | Performed by: PODIATRIST

## 2020-09-28 ASSESSMENT — PAIN SCALES - GENERAL: PAINLEVEL: MILD PAIN (2)

## 2020-09-28 ASSESSMENT — MIFFLIN-ST. JEOR: SCORE: 1747.91

## 2020-09-28 NOTE — PROGRESS NOTES
Elza returns to the office for reevaluation of the left foot.  The patient relates following the instructions given at the last visit with noted less pain.  The patient relates overall more  improvement in pain and function of the left foot.  The patient relates no other problems.    PAST MEDICAL HISTORY:   Past Medical History:   Diagnosis Date     Adjustment disorder with mixed anxiety and depressed mood      IBS (irritable bowel syndrome)      Incisional hernia, without obstruction or gangrene 2020     Lactose intolerance 05/12/2010     Migraine 05/12/2010     Mild intermittent asthma without complication 05/12/2010     PCOS (polycystic ovarian syndrome)      Tobacco use disorder      Vitamin D deficiency        BMI= Body mass index is 42.89 kg/m .         Physical Exam:    General: The patient appears to have a pleasant mental affect.    Lower extremity physical exam:  Neurovascular status is intact with palpable pedal pulses and intact epicritic sensations.  Muscular exam is within normal limits to major muscle groups.  Integument is intact.      One notes decreased edema.  One notes a palpable mass of the distal Achilles tendon on the left.  No surrounding erythema noted.  No pain on palpation noted.  On notes a tight gastroc complex on the left lower extremity.       Assessment:      ICD-10-CM    1. Achilles tendon injury, left, sequela  S86.002S        Plan:  I have explained to Elza about the conditions.  At this time, the patient was instructed on icing, stretching, tissue massage and support.  The patient was fitted with 1/4 inch heel lift that will aid in offloading the tension forces to the soft tissues and prevent further inflammation.  The patient may transition from the Cam boot to an ankle brace for continued offloading support of the Achilles tendon.  The patient will return in four weeks for reevaluation if the symptoms do not resolve.      Disclaimer: This note consists of symbols derived  from keyboarding, dictation and/or voice recognition software. As a result, there may be errors in the script that have gone undetected. Please consider this when interpreting information found in this chart.       MUMTAZ Jones D.P.M., SY.F.A.S.

## 2020-09-28 NOTE — PATIENT INSTRUCTIONS
Initial musculoskeletal treatment recommendation:    1.  Wear supportive foot wear (stiff soles) and/or arch supports (rigid not cushion).  2.  Stretch the calf muscles as instructed once an hour.  3.  Massage the soft tissues around the injured area in the morning to loosen the tissue with an over the counter product like Icy Hot with Lidocaine  4.  Ice the injured area in the evening; 20 min on/off.  5. Take antiinflammatory medication as indicated.    If no improvement in symptoms within four to six weeks, return to clinic for reevaluation.    SMOKING CESSATION  What's in cigarette smoke? - Cigarette smoke contains over 4,000 chemicals. Nicotine is one of the main ingredients which is an insecticide/herbicide. It is poisonous to our nervous system, increases blood clotting risk, and decreases the body's defenses to fight off infection. Another chemical is Carbon Monoxide is an asphyxiating gas that permanently binds to blood cells and blocks the transport of oxygen. This leads to tissue death and decreases your metabolism. Tar is a chemical that coats your lungs and trachea which impairs new oxygen coming in and carbon dioxide getting out of your body.   How does smoking impact surgery? - Smoking is particularly hazardous with regards to surgery. Surgery puts stress on the body and a smoker's body is already under strain from these chemicals. Putting the two together, especially for an elective surgery, could be a recipe for disaster. Smoking before and after surgery increases your risk of heart problems, slow wound healing, delayed bone healing, blood clots, wound infection and anesthesia complications.   What are the benefits of quitting? - In 20 minutes your blood pressure will drop back down to normal. In 8 hours the carbon monoxide (a toxic gas) levels in your blood stream will drop by half, and oxygen levels will return to normal. In 48 hours your chance of having a heart attack will have decreased. All  nicotine will have left your body. Your sense of taste and smell will return to a normal level. In 72 hours your bronchial tubes will relax, and your energy levels will increase. In 2 weeks your circulation will increase, and it will continue to improve for the next 10 weeks.    Recommendations for elective surgery - Ideally, patients should quit smoking 8 weeks before and at least 2 weeks after elective surgery in order to avoid complications. Simply cutting back on the amount of cigarettes smoked per day does not offer any benefit or decrease the risk of poor wound healing, heart problems, and infection. Smokers should also start taking Vitamin C and B for two weeks before surgery and two weeks after surgery.    Ways to Stop Smokin. Nicotine patches, lozenges, or gum  2. Support Groups  3. Medications (see below)    List of Medications:  1. Varenicline Tartrate (CHANTIX)   2. Bupropion HCL (WELLBUTRIN, ZYBAN) - note: make sure Wellbutrin is for smoking cessation and not other issues   3. Nicotine Patch (NICODERM)   4. Nicotine Inhaler (NICOTROL)   5. Nicotine Gum Nicotine Polacrilex   6. Nicotine Lozenge: Nicotine Polacrilex (COMMIT)   * [place] offers a smoking support group as well!  Please visit: [email or web address]  If you are interested in these, ask about getting a prescription or talk to your primary care doctor about what may be the best way for you to quit.

## 2020-09-28 NOTE — NURSING NOTE
"Chief Complaint   Patient presents with     RECHECK      Achilles tendon injury, left,walking with out boot for 2 days-still has mild swelling but feeling better\"        Initial /85   Pulse 90   Ht 1.613 m (5' 3.5\")   Wt 111.6 kg (246 lb)   BMI 42.89 kg/m   Estimated body mass index is 42.89 kg/m  as calculated from the following:    Height as of this encounter: 1.613 m (5' 3.5\").    Weight as of this encounter: 111.6 kg (246 lb).  Medications and allergies reviewed.      She SEBASTIAN MA    "

## 2020-09-28 NOTE — LETTER
9/28/2020         RE: Elza Greer  [address] [facility] [address]        Dear Colleague,    Thank you for referring your patient, Elza Greer, to the [facility]. Please see a copy of my visit note below.    Elza returns to the office for reevaluation of the left foot.  The patient relates following the instructions given at the last visit with noted less pain.  The patient relates overall more  improvement in pain and function of the left foot.  The patient relates no other problems.    PAST MEDICAL HISTORY:   Past Medical History:   Diagnosis Date     Adjustment disorder with mixed anxiety and depressed mood      IBS (irritable bowel syndrome)      Incisional hernia, without obstruction or gangrene 2020     Lactose intolerance 05/12/2010     Migraine 05/12/2010     Mild intermittent asthma without complication 05/12/2010     PCOS (polycystic ovarian syndrome)      Tobacco use disorder      Vitamin D deficiency        BMI= Body mass index is 42.89 kg/m .         Physical Exam:    General: The patient appears to have a pleasant mental affect.    Lower extremity physical exam:  Neurovascular status is intact with palpable pedal pulses and intact epicritic sensations.  Muscular exam is within normal limits to major muscle groups.  Integument is intact.      One notes decreased edema.  One notes a palpable mass of the distal Achilles tendon on the left.  No surrounding erythema noted.  No pain on palpation noted.  On notes a tight gastroc complex on the left lower extremity.       Assessment:      ICD-10-CM    1. Achilles tendon injury, left, sequela  S86.002S        Plan:  I have explained to Elza about the conditions.  At this time, the patient was instructed on icing, stretching, tissue massage and support.  The patient was fitted with 1/4 inch heel lift that will aid in offloading the tension forces to the soft tissues and prevent further inflammation.  The patient may  transition from the Cam boot to an ankle brace for continued offloading support of the Achilles tendon.  The patient will return in four weeks for reevaluation if the symptoms do not resolve.      Disclaimer: This note consists of symbols derived from keyboarding, dictation and/or voice recognition software. As a result, there may be errors in the script that have gone undetected. Please consider this when interpreting information found in this chart.       MUMTAZ Jones D.P.M., F.A.C.F.A.S.      Again, thank you for allowing me to participate in the care of your patient.        Sincerely,        Serjio Jones DPM

## 2020-11-12 ENCOUNTER — HOSPITAL ENCOUNTER (EMERGENCY)
Facility: CLINIC | Age: 45
Discharge: HOME | End: 2020-11-12
Attending: FAMILY MEDICINE | Admitting: FAMILY MEDICINE
Payer: COMMERCIAL

## 2020-11-12 ENCOUNTER — APPOINTMENT (OUTPATIENT)
Dept: GENERAL RADIOLOGY | Facility: CLINIC | Age: 45
End: 2020-11-12
Attending: FAMILY MEDICINE
Payer: COMMERCIAL

## 2020-11-12 VITALS
TEMPERATURE: 99.1 F | SYSTOLIC BLOOD PRESSURE: 144 MMHG | BODY MASS INDEX: 44.3 KG/M2 | DIASTOLIC BLOOD PRESSURE: 91 MMHG | HEIGHT: 63 IN | HEART RATE: 94 BPM | WEIGHT: 250 LBS | OXYGEN SATURATION: 96 %

## 2020-11-12 DIAGNOSIS — R06.2 WHEEZING: ICD-10-CM

## 2020-11-12 DIAGNOSIS — B34.9 VIRAL SYNDROME: ICD-10-CM

## 2020-11-12 LAB
DEPRECATED S PYO AG THROAT QL EIA: NEGATIVE
SPECIMEN SOURCE: NORMAL
SPECIMEN SOURCE: NORMAL
STREP GROUP A PCR: NOT DETECTED

## 2020-11-12 PROCEDURE — U0003 INFECTIOUS AGENT DETECTION BY NUCLEIC ACID (DNA OR RNA); SEVERE ACUTE RESPIRATORY SYNDROME CORONAVIRUS 2 (SARS-COV-2) (CORONAVIRUS DISEASE [COVID-19]), AMPLIFIED PROBE TECHNIQUE, MAKING USE OF HIGH THROUGHPUT TECHNOLOGIES AS DESCRIBED BY CMS-2020-01-R: HCPCS | Performed by: FAMILY MEDICINE

## 2020-11-12 PROCEDURE — 99284 EMERGENCY DEPT VISIT MOD MDM: CPT | Performed by: FAMILY MEDICINE

## 2020-11-12 PROCEDURE — C9803 HOPD COVID-19 SPEC COLLECT: HCPCS | Performed by: FAMILY MEDICINE

## 2020-11-12 PROCEDURE — 71045 X-RAY EXAM CHEST 1 VIEW: CPT

## 2020-11-12 PROCEDURE — 87651 STREP A DNA AMP PROBE: CPT | Performed by: FAMILY MEDICINE

## 2020-11-12 PROCEDURE — 999N001174 HC STATISTIC STREP A RAPID: Performed by: FAMILY MEDICINE

## 2020-11-12 RX ORDER — PREDNISONE 20 MG/1
20 TABLET ORAL 2 TIMES DAILY
Qty: 10 TABLET | Refills: 0 | Status: SHIPPED | OUTPATIENT
Start: 2020-11-12 | End: 2020-11-17

## 2020-11-12 ASSESSMENT — MIFFLIN-ST. JEOR: SCORE: 1758.12

## 2020-11-12 NOTE — ED NOTES
Since July pt has been seen mult times for cough, cp, and soa,  Pt was told she had bronchitis and was given abx, neb, and steroids with relief but symptoms come back a few weeks later.  Has had mult - covid tests.  Pt smoker.

## 2020-11-12 NOTE — RESULT ENCOUNTER NOTE
Group A Streptococcus PCR is NEGATIVE  No treatment or change in treatment [facility] ED lab result protocol - Strep protocol.

## 2020-11-12 NOTE — LETTER
November 12, 2020      To Whom It May Concern:      Elza Greer was seen in our Emergency Department today, 11/12/20.  She cannot go back to work until her testing has returned as unremarkable or negative.  This may take up to 72 hours.    Sincerely,        Calvin Aguirre MD

## 2020-11-12 NOTE — ED PROVIDER NOTES
"  HPI   The patient is a 45-year-old female presenting with no sore throat, rhinorrhea, upper respiratory congestion, and cough.  She has a relatively recent course of recurring bronchitis.  Since July, 2020 she has had multiple bouts of recurring productive cough with wheezing and occasional chest pain.  Her symptoms have consistently improved with antibiotic and prednisone.  She was recently on both of these at the end of October.  She smokes on a regular basis.  She has tried to stop multiple times in the past, all unsuccessfully.  She has both nebulizers and inhalers at home for wheezing.  She has been using these over the past week with mild improvement of symptoms.    The patient has had recurrent shortness of breath and wheezing over the past week.  With her wheezing she has occasional chest discomfort that will come on on the left side and then the right.  Her chest pain is not reproducible or predictable.  She has had a cough with yellow sputum.  She denies having a fever.  This morning she awoke with new rhinorrhea and upper respiratory congestion.  She has had fatigue, headache, and sore throat as well.  No nausea or vomiting.  No diarrhea.  No skin rash.        Allergies:  Allergies   Allergen Reactions     Azithromycin Anaphylaxis     Doxycycline Other (See Comments)     Upset stomach and felt \"really out of it\"     Latex Hives     Oxycodone Nausea and Vomiting     Problem List:    Patient Active Problem List    Diagnosis Date Noted     Anxiety 02/18/2020     Priority: Medium     Tobacco use disorder 11/14/2016     Priority: Medium     Mild intermittent asthma without complication 05/12/2010     Priority: Medium      Past Medical History:    Past Medical History:   Diagnosis Date     Adjustment disorder with mixed anxiety and depressed mood      IBS (irritable bowel syndrome)      Incisional hernia, without obstruction or gangrene 2020     Lactose intolerance 05/12/2010     Migraine 05/12/2010     Mild " "intermittent asthma without complication 05/12/2010     PCOS (polycystic ovarian syndrome)      Tobacco use disorder      Vitamin D deficiency      Past Surgical History:    Past Surgical History:   Procedure Laterality Date     APPENDECTOMY OPEN N/A      CYSTECTOMY OVARIAN BENIGN  2001     HEPATICOJEJUNOSTOMY  2000    RnY jejunostomy from bile duct injury     HERNIORRHAPHY VENTRAL N/A 02/17/2012    open with mesh     HYSTERECTOMY TOTAL ABDOMINAL, BILATERAL SALPINGO-OOPHORECTOMY, COMBINED N/A 2005     LAPAROSCOPIC CHOLECYSTECTOMY  2006    complicated by bile duct injury     Family History:    Family History   Problem Relation Age of Onset     Coronary Artery Disease Mother      Hypertension Mother      Hyperlipidemia Mother      Depression Mother      Colon Cancer Maternal Grandmother      Breast Cancer Maternal Grandmother      Other Cancer Maternal Grandmother      Social History:  Marital Status:   [2]  Social History     Tobacco Use     Smoking status: Current Every Day Smoker     Packs/day: 0.25     Types: Cigarettes     Smokeless tobacco: Never Used   Substance Use Topics     Alcohol use: No     Drug use: No      Medications:         predniSONE (DELTASONE) 20 MG tablet       albuterol (PROAIR HFA/PROVENTIL HFA/VENTOLIN HFA) 108 (90 Base) MCG/ACT inhaler       albuterol (PROVENTIL) (2.5 MG/3ML) 0.083% neb solution       Ascorbic Acid (VITAMIN C PO)       benzonatate (TESSALON) 200 MG capsule       IBUPROFEN PO       meloxicam (MOBIC) 7.5 MG tablet       Multiple Vitamins-Calcium (ONE-A-DAY WOMENS PO)       vitamin D2 (ERGOCALCIFEROL) 96266 units (1250 mcg) capsule      Review of Systems   All other systems reviewed and are negative.      PE   BP: (!) 144/91  Pulse: 94  Temp: 99.1  F (37.3  C)  Height: 160 cm (5' 3\")  Weight: 113.4 kg (250 lb)  SpO2: 96 %  Physical Exam  Vitals signs reviewed.   Constitutional:       General: She is not in acute distress.     Appearance: She is well-developed.   HENT:     "  Head: Normocephalic and atraumatic.      Right Ear: External ear normal.      Left Ear: External ear normal.      Nose: Nose normal.      Mouth/Throat:      Mouth: Mucous membranes are moist.      Pharynx: Oropharynx is clear.   Eyes:      Extraocular Movements: Extraocular movements intact.      Conjunctiva/sclera: Conjunctivae normal.      Pupils: Pupils are equal, round, and reactive to light.   Neck:      Musculoskeletal: Normal range of motion.   Cardiovascular:      Rate and Rhythm: Normal rate and regular rhythm.   Pulmonary:      Effort: Pulmonary effort is normal.      Comments: Occasional coughing.  Musculoskeletal: Normal range of motion.   Skin:     General: Skin is warm and dry.   Neurological:      Mental Status: She is alert and oriented to person, place, and time.   Psychiatric:         Behavior: Behavior normal.         ED COURSE and MDM   1019.  The patient presents with a week of recurrent wheezing and chest discomfort.  She has had new upper respiratory symptoms with headache, sore throat, myalgias since this morning.  Repeat COVID-19 swab.  Chest x-ray pending.    1147.  The patient has an unremarkable work-up here.  Low concern for pneumonia.  No emergent need for antibiotics.  Prednisone prescription given.  I am suggesting she hold onto this and use it if her wheezing is worsening.  Long-term smoking cessation discussed.    LABS  Labs Ordered and Resulted from Time of ED Arrival Up to the Time of Departure from the ED   COVID-19 VIRUS (CORONAVIRUS) BY PCR   STREPTOCOCCUS A RAPID SCR W REFLX TO PCR   GROUP A STREPTOCOCCUS PCR THROAT SWAB       IMAGING  Images reviewed by me.  Radiology report also reviewed.  XR Chest Port 1 View   Final Result   IMPRESSION: Normal chest x-ray.      WILL REYNOLDS MD          Procedures    Medications - No data to display      IMPRESSION       ICD-10-CM    1. Viral syndrome  B34.9    2. Wheezing  R06.2             Medication List      Started    predniSONE 20  MG tablet  Commonly known as: DELTASONE  20 mg, Oral, 2 TIMES DAILY                          Calvin Aguirre MD  11/12/20 2301

## 2020-11-12 NOTE — ED AVS SNAPSHOT
[facility] Emergency Dept  [address] [address]  Phone: [phone]  Fax: [phone]                                    Elaz Greer   MRN: [id number]    Department: [facility] Emergency Dept   Date of Visit: 11/12/2020           After Visit Summary Signature Page    I have received my discharge instructions, and my questions have been answered. I have discussed any challenges I see with this plan with the nurse or doctor.    ..........................................................................................................................................  Patient/Patient Representative Signature      ..........................................................................................................................................  Patient Representative Print Name and Relationship to Patient    ..................................................               ................................................  Date                                   Time    ..........................................................................................................................................  Reviewed by Signature/Title    ...................................................              ..............................................  Date                                               Time          22EPIC Rev 08/18

## 2020-11-12 NOTE — DISCHARGE INSTRUCTIONS
Return to the Emergency Room if the following occurs:     Severely worsened breathing, dehydration, or for any concern at anytime.    Or, follow-up with the following provider as we discussed:     Return to your primary doctor in two weeks for reevaluation.    Medications discussed:    Prednisone ONLY if worsened wheezing.      If you received pain-relieving or sedating medication during your time in the ER, avoid alcohol, driving automobiles, or working with machinery.  Also, a responsible adult must stay with you.        Call the Nurse Advice Line at [phone] or [phone] for any concern at anytime.

## 2020-11-13 LAB
SARS-COV-2 RNA SPEC QL NAA+PROBE: NOT DETECTED
SPECIMEN SOURCE: NORMAL

## 2020-12-08 ENCOUNTER — APPOINTMENT (OUTPATIENT)
Dept: GENERAL RADIOLOGY | Facility: CLINIC | Age: 45
End: 2020-12-08
Attending: FAMILY MEDICINE
Payer: COMMERCIAL

## 2020-12-08 ENCOUNTER — NURSE TRIAGE (OUTPATIENT)
Dept: NURSING | Facility: CLINIC | Age: 45
End: 2020-12-08

## 2020-12-08 ENCOUNTER — HOSPITAL ENCOUNTER (EMERGENCY)
Facility: CLINIC | Age: 45
Discharge: HOME | End: 2020-12-09
Attending: FAMILY MEDICINE | Admitting: FAMILY MEDICINE
Payer: COMMERCIAL

## 2020-12-08 DIAGNOSIS — B34.9 VIRAL SYNDROME: ICD-10-CM

## 2020-12-08 LAB
ALBUMIN SERPL-MCNC: 3.5 G/DL (ref 3.4–5)
ALP SERPL-CCNC: 137 U/L (ref 40–150)
ALT SERPL W P-5'-P-CCNC: 27 U/L (ref 0–50)
ANION GAP SERPL CALCULATED.3IONS-SCNC: 5 MMOL/L (ref 3–14)
AST SERPL W P-5'-P-CCNC: 48 U/L (ref 0–45)
BASOPHILS # BLD AUTO: 0 10E9/L (ref 0–0.2)
BASOPHILS NFR BLD AUTO: 0.3 %
BILIRUB SERPL-MCNC: 0.5 MG/DL (ref 0.2–1.3)
BUN SERPL-MCNC: 11 MG/DL (ref 7–30)
CALCIUM SERPL-MCNC: 8.8 MG/DL (ref 8.5–10.1)
CHLORIDE SERPL-SCNC: 105 MMOL/L (ref 94–109)
CO2 SERPL-SCNC: 30 MMOL/L (ref 20–32)
CREAT SERPL-MCNC: 0.61 MG/DL (ref 0.52–1.04)
DIFFERENTIAL METHOD BLD: ABNORMAL
EOSINOPHIL # BLD AUTO: 0.2 10E9/L (ref 0–0.7)
EOSINOPHIL NFR BLD AUTO: 1.3 %
ERYTHROCYTE [DISTWIDTH] IN BLOOD BY AUTOMATED COUNT: 13 % (ref 10–15)
GFR SERPL CREATININE-BSD FRML MDRD: >90 ML/MIN/{1.73_M2}
GLUCOSE SERPL-MCNC: 101 MG/DL (ref 70–99)
HCT VFR BLD AUTO: 38.8 % (ref 35–47)
HGB BLD-MCNC: 12.7 G/DL (ref 11.7–15.7)
IMM GRANULOCYTES # BLD: 0 10E9/L (ref 0–0.4)
IMM GRANULOCYTES NFR BLD: 0.3 %
LYMPHOCYTES # BLD AUTO: 2.3 10E9/L (ref 0.8–5.3)
LYMPHOCYTES NFR BLD AUTO: 18.9 %
MCH RBC QN AUTO: 31.3 PG (ref 26.5–33)
MCHC RBC AUTO-ENTMCNC: 32.7 G/DL (ref 31.5–36.5)
MCV RBC AUTO: 96 FL (ref 78–100)
MONOCYTES # BLD AUTO: 0.8 10E9/L (ref 0–1.3)
MONOCYTES NFR BLD AUTO: 6.6 %
NEUTROPHILS # BLD AUTO: 8.8 10E9/L (ref 1.6–8.3)
NEUTROPHILS NFR BLD AUTO: 72.6 %
NRBC # BLD AUTO: 0 10*3/UL
NRBC BLD AUTO-RTO: 0 /100
PLATELET # BLD AUTO: 149 10E9/L (ref 150–450)
POTASSIUM SERPL-SCNC: 4.8 MMOL/L (ref 3.4–5.3)
PROT SERPL-MCNC: 8 G/DL (ref 6.8–8.8)
RBC # BLD AUTO: 4.06 10E12/L (ref 3.8–5.2)
SODIUM SERPL-SCNC: 140 MMOL/L (ref 133–144)
TROPONIN I SERPL-MCNC: <0.015 UG/L (ref 0–0.04)
WBC # BLD AUTO: 12.1 10E9/L (ref 4–11)

## 2020-12-08 PROCEDURE — 84484 ASSAY OF TROPONIN QUANT: CPT | Performed by: FAMILY MEDICINE

## 2020-12-08 PROCEDURE — 99285 EMERGENCY DEPT VISIT HI MDM: CPT | Mod: 25 | Performed by: FAMILY MEDICINE

## 2020-12-08 PROCEDURE — 93005 ELECTROCARDIOGRAM TRACING: CPT | Performed by: FAMILY MEDICINE

## 2020-12-08 PROCEDURE — 93010 ELECTROCARDIOGRAM REPORT: CPT | Performed by: FAMILY MEDICINE

## 2020-12-08 PROCEDURE — 71045 X-RAY EXAM CHEST 1 VIEW: CPT

## 2020-12-08 PROCEDURE — 85025 COMPLETE CBC W/AUTO DIFF WBC: CPT | Performed by: FAMILY MEDICINE

## 2020-12-08 PROCEDURE — 80053 COMPREHEN METABOLIC PANEL: CPT | Performed by: FAMILY MEDICINE

## 2020-12-08 ASSESSMENT — MIFFLIN-ST. JEOR: SCORE: 1773.99

## 2020-12-08 NOTE — TELEPHONE ENCOUNTER
Triage Call:     tested positive for COVID yesterday and daughter also tested positive.   Patient having SOB with activity.   Pt also wondering if she should start the prednisone that she has.  RN advised that RN is unable to make that determination if she should start the prednisone and that would need to be discussed with a provider.  Patient stated understanding.    Constant heaviness in the chest, pt unsure if it is related to COVID or anxiety.  RN advised that due to the constant heaviness in the chest RN would recommend ED evaluation for symptoms.     Pt was advised of protocol recommendation/disposition of go to ED now.  Patient advised of recommendation. Pt stated she is going to try a neb treatment first.  RN again advised of recommendation to go to ED now. Patient still states she will attempt a neb treatment first.  Pt is aware of risks of not following protocol recommendation.      Penny Kenny RN 12/08/20 4:10 PM  [facility] Nurse Advisor    COVID 19 Nurse Triage Plan/Patient Instructions    Please be aware that novel coronavirus (COVID-19) may be circulating in the community. If you develop symptoms such as fever, cough, or SOB or if you have concerns about the presence of another infection including coronavirus (COVID-19), please contact your health care provider or visit [email or web address].     Disposition/Instructions    ED Visit recommended. Follow protocol based instructions.     Bring Your Own Device:  Please also bring your smart device(s) (smart phones, tablets, laptops) and their charging cables for your personal use and to communicate with your care team during your visit.    Thank you for taking steps to prevent the spread of this virus.  o Limit your contact with others.  o Wear a simple mask to cover your cough.  o Wash your hands well and often.    Resources    [facility]: About COVID-19: [email or web address]    CDC: What to Do If You're Sick:  www.cdc.gov/coronavirus/2019-ncov/about/steps-when-sick.html    CDC: Ending Home Isolation: www.cdc.gov/coronavirus/2019-ncov/hcp/disposition-in-home-patients.html     CDC: Caring for Someone: www.cdc.gov/coronavirus/2019-ncov/if-you-are-sick/care-for-someone.html     [facility]: Interim Guidance for Hospital Discharge to Home: www.health.[facility].[place]./diseases/coronavirus/hcp/hospdischarge.pdf    [facility] clinical trials (COVID-19 research studies): clinicalaffairs.[facility]-clinical-trials     Below are the COVID-19 hotlines at the [place] Department of Health ([facility]). Interpreters are available.   o For health questions: Call 300-772-8796 or 1-693.468.3912 (7 a.m. to 7 p.m.)  o For questions about schools and childcare: Call 179-647-9210 or 1-948.661.3642 (7 a.m. to 7 p.m.)       Reason for Disposition    SEVERE or constant chest pain or pressure (Exception: mild central chest pain, present only when coughing)    Additional Information    Negative: Patient sounds very sick or weak to the triager    Negative: MODERATE difficulty breathing (e.g., speaks in phrases, SOB even at rest, pulse 100-120)    Negative: [1] COVID-19 exposure AND [2] no symptoms    Negative: COVID-19 and Breastfeeding, questions about    Negative: [1] Adult with possible COVID-19 symptoms AND [2] triager concerned about severity of symptoms or other causes    Negative: SEVERE difficulty breathing (e.g., struggling for each breath, speaks in single words)    Negative: Difficult to awaken or acting confused (e.g., disoriented, slurred speech)    Negative: Bluish (or gray) lips or face now    Negative: Shock suspected (e.g., cold/pale/clammy skin, too weak to stand, low BP, rapid pulse)    Negative: Sounds like a life-threatening emergency to the triager    Protocols used: CORONAVIRUS (COVID-19) DIAGNOSED OR [id number] 8.4.20

## 2020-12-08 NOTE — ED AVS SNAPSHOT
[facility] Emergency Dept  [address] [address]  Phone: [phone]  Fax: [phone]                                    Elza Greer   MRN: [id number]    Department: [facility] Emergency Dept   Date of Visit: 12/8/2020           After Visit Summary Signature Page    I have received my discharge instructions, and my questions have been answered. I have discussed any challenges I see with this plan with the nurse or doctor.    ..........................................................................................................................................  Patient/Patient Representative Signature      ..........................................................................................................................................  Patient Representative Print Name and Relationship to Patient    ..................................................               ................................................  Date                                   Time    ..........................................................................................................................................  Reviewed by Signature/Title    ...................................................              ..............................................  Date                                               Time          22EPIC Rev 08/18

## 2020-12-09 VITALS
BODY MASS INDEX: 42.68 KG/M2 | SYSTOLIC BLOOD PRESSURE: 122 MMHG | DIASTOLIC BLOOD PRESSURE: 75 MMHG | RESPIRATION RATE: 16 BRPM | WEIGHT: 250 LBS | HEART RATE: 100 BPM | TEMPERATURE: 98.6 F | HEIGHT: 64 IN | OXYGEN SATURATION: 95 %

## 2020-12-09 RX ORDER — ALBUTEROL SULFATE 0.83 MG/ML
2.5 SOLUTION RESPIRATORY (INHALATION) EVERY 4 HOURS PRN
Qty: 1 BOX | Refills: 0 | Status: SHIPPED | OUTPATIENT
Start: 2020-12-09 | End: 2021-06-03

## 2020-12-09 NOTE — DISCHARGE INSTRUCTIONS
"Return to the Emergency Room if the following occurs:     Worsened breathing, dehydration, or for any concern at anytime.    Discharge Instructions for COVID-19 Patients  You have--or may have--COVID-19. Please follow the instructions listed below.   If you have a weakened immune system, discuss with your doctor any other actions you need to take.  How can I protect others?  If you have symptoms (fever, cough, body aches or trouble breathing):  Stay home and away from others (self-isolate) until:  At least 10 days have passed since your symptoms started, And   You've had no fever--and no medicine that reduces fever--for 1 full day (24 hours), And    Your other symptoms have resolved (gotten better).  If you don't show symptoms, but testing showed that you have COVID-19:  Stay home and away from others (self-isolate). Follow the tips under \"How do I self-isolate?\" below for 10 days (20 days if you have a weak immune system).  You don't need to be retested for COVID-19 before going back to school or work. As long as you're fever-free and feeling better, you can go back to school, work and other activities after waiting the 10 or 20 days.   How do I self-isolate?  Stay in your own room, even for meals. Use your own bathroom if you can.  Stay away from others in your home. No hugging, kissing or shaking hands. No visitors.  Don't go to work, school or anywhere else.  Clean \"high touch\" surfaces often (doorknobs, counters, handles). Use household cleaning spray or wipes. You'll find a full list of  on the EPA website: www.epa.gov/pesticide-registration/list-n-disinfectants-use-against-sars-cov-2.  Cover your mouth and nose with a mask or other face covering to avoid spreading germs.  Wash your hands and face often. Use soap and water.  Caregivers in these groups are at risk for severe illness due to COVID-19:  People 65 years and older  People who live in a nursing home or long-term care facility  People with " chronic disease (lung, heart, cancer, diabetes, kidney, liver, immunologic)  People who have a weakened immune system, including those who:  Are in cancer treatment  Take medicine that weakens the immune system, such as corticosteroids  Had a bone marrow or organ transplant  Have an immune deficiency  Have poorly controlled HIV or AIDS  Are obese (body mass index of 40 or higher)  Smoke regularly  Caregivers should wear gloves while washing dishes, handling laundry and cleaning bedrooms and bathrooms.  Use caution when washing and drying laundry: Don't shake dirty laundry and use the warmest water setting that you can.  For more tips on managing your health at home, go to www.cdc.gov/coronavirus/2019-ncov/downloads/10Things.pdf.  How can I take care of myself at home?  Get lots of rest. Drink extra fluids (unless a doctor has told you not to).    Take Tylenol (acetaminophen) for fever or pain. If you have liver or kidney problems, ask your family doctor if it's okay to take Tylenol.     Adults can take either:  650 mg (two 325 mg pills) every 4 to 6 hours, or   1,000 mg (two 500 mg pills) every 8 hours as needed.  Note: Don't take more than 3,000 mg in one day. Acetaminophen is found in many medicines (both prescribed and over-the-counter medicines). Read all labels to be sure you don't take too much.   For children, check the Tylenol bottle for the right dose. The dose is based on the child's age or weight.  If you have other health problems (like cancer, heart failure, an organ transplant or severe kidney disease): Call your specialty clinic if you don't feel better in the next 2 days.    Know when to call 911. Emergency warning signs include:  Trouble breathing or shortness of breath  Pain or pressure in the chest that doesn't go away  Feeling confused like you haven't felt before, or not being able to wake up  Bluish-colored lips or face    Your doctor may have prescribed a blood thinner medicine. Follow their  instructions.  Where can I get more information?  [facility] - About COVID-19: [email or web address]  CDC - What to Do If You're Sick: www.cdc.gov/coronavirus/2019-ncov/about/steps-when-sick.html  CDC - Ending Home Isolation: www.cdc.gov/coronavirus/2019-ncov/hcp/disposition-in-home-patients.html  CDC - Caring for Someone: www.cdc.gov/coronavirus/2019-ncov/if-you-are-sick/care-for-someone.html  [facility] - Interim Guidance for Hospital Discharge to Home: www.health.[facility].mn./diseases/coronavirus/hcp/hospdischarge.pdf  [facility] clinical trials (COVID-19 research studies): clinicalaffairs.[facility]-clinical-trials  Below are the COVID-19 hotlines at the [place] Department of Health ([facility]). Interpreters are available.  For health questions: Call 459-840-4264 or 1-639.885.8294 (7 a.m. to 7 p.m.)  For questions about schools and childcare: Call 217-359-9319 or 1-838.307.2970 (7 a.m. to 7 p.m.)    For informational purposes only. Not to replace the advice of your health care provider. Clinically reviewed by the Infection Prevention Team. Copyright   2020 [facility]. All rights reserved. [employer] [id number] - REV 08/04/20.

## 2020-12-09 NOTE — ED PROVIDER NOTES
"  HPI   The patient is a 45-year-old female presenting with chest pressure and shortness of breath.  Her  and daughter have been diagnosed with COVID-19 within the past week.  The patient was tested yesterday and is awaiting her result.  She describes fever with coughing and nausea and diarrhea since last Friday, 4 days ago.  This morning she awoke with shortness of breath.  This is been constant but waxing and waning throughout the day.  Activity seems to worsen her dyspnea.  She describes chest pressure that is constant.  She denies chest pain.  No fever today.  No leg pain or swelling.  No reflux.  No trauma or injury.  No new activities.        Allergies:  Allergies   Allergen Reactions     Azithromycin Anaphylaxis     Doxycycline Other (See Comments)     Upset stomach and felt \"really out of it\"     Latex Hives     Oxycodone Nausea and Vomiting     Problem List:    Patient Active Problem List    Diagnosis Date Noted     Anxiety 02/18/2020     Priority: Medium     Tobacco use disorder 11/14/2016     Priority: Medium     Mild intermittent asthma without complication 05/12/2010     Priority: Medium      Past Medical History:    Past Medical History:   Diagnosis Date     Adjustment disorder with mixed anxiety and depressed mood      IBS (irritable bowel syndrome)      Incisional hernia, without obstruction or gangrene 2020     Lactose intolerance 05/12/2010     Migraine 05/12/2010     Mild intermittent asthma without complication 05/12/2010     PCOS (polycystic ovarian syndrome)      Tobacco use disorder      Vitamin D deficiency      Past Surgical History:    Past Surgical History:   Procedure Laterality Date     APPENDECTOMY OPEN N/A      CYSTECTOMY OVARIAN BENIGN  2001     HEPATICOJEJUNOSTOMY  2000    RnY jejunostomy from bile duct injury     HERNIORRHAPHY VENTRAL N/A 02/17/2012    open with mesh     HYSTERECTOMY TOTAL ABDOMINAL, BILATERAL SALPINGO-OOPHORECTOMY, COMBINED N/A 2005     LAPAROSCOPIC " "CHOLECYSTECTOMY  2006    complicated by bile duct injury     Family History:    Family History   Problem Relation Age of Onset     Coronary Artery Disease Mother      Hypertension Mother      Hyperlipidemia Mother      Depression Mother      Colon Cancer Maternal Grandmother      Breast Cancer Maternal Grandmother      Other Cancer Maternal Grandmother      Social History:  Marital Status:   [2]  Social History     Tobacco Use     Smoking status: Current Every Day Smoker     Packs/day: 0.25     Types: Cigarettes     Smokeless tobacco: Never Used   Substance Use Topics     Alcohol use: No     Drug use: No      Medications:         albuterol (PROVENTIL) (2.5 MG/3ML) 0.083% neb solution       Ascorbic Acid (VITAMIN C PO)       benzonatate (TESSALON) 200 MG capsule       IBUPROFEN PO       meloxicam (MOBIC) 7.5 MG tablet       Multiple Vitamins-Calcium (ONE-A-DAY WOMENS PO)       varenicline (CHANTIX JERRY) 0.5 MG X 11 & 1 MG X 42 tablet       vitamin D2 (ERGOCALCIFEROL) 52380 units (1250 mcg) capsule      Review of Systems   All other systems reviewed and are negative.      PE   BP: (!) 163/86  Pulse: 104  Temp: 98.6  F (37  C)  Resp: 20  Height: 162.6 cm (5' 4\")  Weight: 113.4 kg (250 lb)  SpO2: 98 %  Physical Exam  Vitals signs reviewed.   Constitutional:       General: She is not in acute distress.     Appearance: She is well-developed.   HENT:      Head: Normocephalic and atraumatic.      Right Ear: External ear normal.      Left Ear: External ear normal.      Nose: Nose normal.      Mouth/Throat:      Mouth: Mucous membranes are moist.      Pharynx: Oropharynx is clear.   Eyes:      Extraocular Movements: Extraocular movements intact.      Conjunctiva/sclera: Conjunctivae normal.      Pupils: Pupils are equal, round, and reactive to light.   Neck:      Musculoskeletal: Normal range of motion.   Cardiovascular:      Rate and Rhythm: Normal rate and regular rhythm.   Pulmonary:      Effort: Pulmonary effort is " normal.      Breath sounds: Normal breath sounds.   Musculoskeletal: Normal range of motion.   Skin:     General: Skin is warm and dry.   Neurological:      Mental Status: She is alert and oriented to person, place, and time.   Psychiatric:         Behavior: Behavior normal.         ED COURSE and MDM   2252.  The patient is a 45-year-old female presenting with chest pressure and shortness of breath.  She has had infectious symptoms since 4 days ago.  Covid swab obtained yesterday and pending.  Chest x-ray pending here.  She is concerned about pneumonia versus bronchitis.  She tells me that this feels different than prior episodes of bronchitis.  She has been using her treatments at home every 3 hours today which is increased compared to her baseline.  Today she was going to start the prednisone prescription provided in mid November but she thought it was best to do have an evaluation for pneumonia first.    0014.  X-ray is read by radiology as showing no acute pathology.  No emergent need for antibiotics.  She has a prescription of prednisone available to her.  She will consider using this if she is requiring albuterol more frequently than recommended.  A refill of the albuterol is provided.  Return here for worsening as discussed.    EKG  (2248)   Interpretation performed by me.  Rate: 64     Rhythm: sinus     Axis: L  Intervals: TX (12-2) 162, QRS (<12) 90, QTc (>5) 393  P wave: nl     QRS complex: nl  ST segment / T-wave: nl  Conclusion: L axis deviation, otherwise unremarkable.    LABS  Labs Ordered and Resulted from Time of ED Arrival Up to the Time of Departure from the ED   CBC WITH PLATELETS DIFFERENTIAL - Abnormal; Notable for the following components:       Result Value    WBC 12.1 (*)     Platelet Count 149 (*)     Absolute Neutrophil 8.8 (*)     All other components within normal limits   COMPREHENSIVE METABOLIC PANEL - Abnormal; Notable for the following components:    Glucose 101 (*)     AST 48 (*)      All other components within normal limits   TROPONIN I       IMAGING  Images reviewed by me.  Radiology report also reviewed.  XR Chest Port 1 View   Final Result   IMPRESSION: Negative chest.          Procedures    Medications - No data to display      IMPRESSION       ICD-10-CM    1. Viral syndrome  B34.9             Medication List      Modified    albuterol (2.5 MG/3ML) 0.083% neb solution  Commonly known as: PROVENTIL  2.5 mg, Nebulization, EVERY 4 HOURS PRN  What changed:     when to take this    Another medication with the same name was removed. Continue taking this medication, and follow the directions you see here.                          Calvin Aguirre MD  12/09/20 0014

## 2020-12-23 ENCOUNTER — VIRTUAL VISIT (OUTPATIENT)
Dept: FAMILY MEDICINE | Facility: CLINIC | Age: 45
End: 2020-12-23
Payer: COMMERCIAL

## 2020-12-23 DIAGNOSIS — U07.1 CLINICAL DIAGNOSIS OF COVID-19: Primary | ICD-10-CM

## 2020-12-23 DIAGNOSIS — Z71.6 ENCOUNTER FOR SMOKING CESSATION COUNSELING: ICD-10-CM

## 2020-12-23 DIAGNOSIS — J40 BRONCHITIS: ICD-10-CM

## 2020-12-23 PROCEDURE — 99214 OFFICE O/P EST MOD 30 MIN: CPT | Mod: 95 | Performed by: FAMILY MEDICINE

## 2020-12-23 RX ORDER — PREDNISONE 20 MG/1
TABLET ORAL
Qty: 20 TABLET | Refills: 0 | Status: SHIPPED | OUTPATIENT
Start: 2020-12-23 | End: 2022-02-03

## 2020-12-23 RX ORDER — VARENICLINE TARTRATE 1 MG/1
1 TABLET, FILM COATED ORAL 2 TIMES DAILY
Qty: 60 TABLET | Refills: 1 | Status: SHIPPED | OUTPATIENT
Start: 2020-12-23 | End: 2021-01-21

## 2020-12-23 RX ORDER — AMOXICILLIN 875 MG
875 TABLET ORAL 2 TIMES DAILY
Qty: 20 TABLET | Refills: 0 | Status: SHIPPED | OUTPATIENT
Start: 2020-12-23 | End: 2021-01-13

## 2020-12-23 NOTE — PROGRESS NOTES
"Elza Greer is a 45 year old female who is being evaluated via a billable telephone visit.      The patient has been notified of following:     \"This telephone visit will be conducted via a call between you and your physician/provider. We have found that certain health care needs can be provided without the need for a physical exam.  This service lets us provide the care you need with a short phone conversation.  If a prescription is necessary we can send it directly to your pharmacy.  If lab work is needed we can place an order for that and you can then stop by our lab to have the test done at a later time.    Telephone visits are billed at different rates depending on your insurance coverage. During this emergency period, for some insurers they may be billed the same as an in-person visit.  Please reach out to your insurance provider with any questions.    If during the course of the call the physician/provider feels a telephone visit is not appropriate, you will not be charged for this service.\"    Patient has given verbal consent for Telephone visit?  Yes    What phone number would you like to be contacted at? [phone]    How would you like to obtain your AVS? Ryanhart    Subjective     Elza Greer is a 45 year old female who presents via phone visit today for the following health issues:    HPI     Patient is a 45 yr old female here for recheck of COVID symptoms - was diagnosed on 12/11 . She is still having cough which is productive of yellowish sputum. She is still \"out of it \" she says she is still very tired. She also smokes and is trying to quit , she is requesting refills on her Chantix . She is also needing a letter stating when she can resume work.     Medication Followup of Chantix    Taking Medication as prescribed: NO-she stopped taking meds with the Covid virus    Side Effects:  She felt 'out of it'    Medication Helping Symptoms:  yes         Concern - ongoing cough  Onset: tested positive " for Covid on 12/11  Description: productive, intermittent episodes  Intensity: mild  Progression of Symptoms:  Mild improvement and intermittent  Accompanying Signs & Symptoms: headache  Therapies tried and outcome: She is taking dayquil with some results      Review of Systems   CONSTITUTIONAL:POSITIVE  for fatigue  INTEGUMENTARY/SKIN: NEGATIVE for worrisome rashes, moles or lesions  ENT/MOUTH: NEGATIVE for ear, mouth and throat problems  RESP:POSITIVE for cough-productive, dyspnea on exertion, SOB/dyspnea and sputum yellowish  CV: NEGATIVE for chest pain, palpitations or peripheral edema  MUSCULOSKELETAL: NEGATIVE for significant arthralgias or myalgia  NEURO: NEGATIVE for weakness, dizziness or paresthesias  ENDOCRINE: NEGATIVE for temperature intolerance, skin/hair changes  PSYCHIATRIC: NEGATIVE for changes in mood or affect       Objective          Vitals:  No vitals were obtained today due to virtual visit.    healthy, alert and no distress  PSYCH: Alert and oriented times 3; coherent speech, normal   rate and volume, able to articulate logical thoughts, able   to abstract reason, no tangential thoughts, no hallucinations   or delusions  Her affect is normal  RESP: still  coughing, no audible wheezing, able to talk in full sentences  Remainder of exam unable to be completed due to telephone visits    No results found for this or any previous visit (from the past 24 hour(s)).        Assessment/Plan:    Assessment & Plan   Problem List Items Addressed This Visit     None      Visit Diagnoses     Clinical diagnosis of COVID-19    -  Primary    Bronchitis        Relevant Medications    predniSONE (DELTASONE) 20 MG tablet    amoxicillin (AMOXIL) 875 MG tablet    Encounter for smoking cessation counseling        Relevant Medications    varenicline (CHANTIX) 1 MG tablet         Antibiotics faxed- recommend rest and increase fluids.       FUTURE APPOINTMENTS:       - Follow-up visit in 1-2 weeks or sooner as  needed.  Return in about 2 weeks (around 1/6/2021), or if symptoms worsen or fail to improve, for Follow up.    Vic Lane MD  [facility]    Phone call duration:  11 minutes

## 2020-12-23 NOTE — LETTER
December 23, 2020      Elza Greer  [address] [facility] [address]        To Whom It May Concern:    Elza Greer  was seen on 12/23/2020.  Patient is still having some symptoms related to COVID and will be needing to stay home till Dec 28 th. Please call if you have any questions.         Sincerely,        Vic Lane MD

## 2021-01-13 ENCOUNTER — OFFICE VISIT (OUTPATIENT)
Dept: SURGERY | Facility: CLINIC | Age: 46
End: 2021-01-13
Payer: COMMERCIAL

## 2021-01-13 VITALS
HEART RATE: 94 BPM | WEIGHT: 250 LBS | SYSTOLIC BLOOD PRESSURE: 135 MMHG | BODY MASS INDEX: 42.68 KG/M2 | DIASTOLIC BLOOD PRESSURE: 84 MMHG | TEMPERATURE: 98.6 F | HEIGHT: 64 IN

## 2021-01-13 DIAGNOSIS — Z11.59 ENCOUNTER FOR SCREENING FOR OTHER VIRAL DISEASES: Primary | ICD-10-CM

## 2021-01-13 DIAGNOSIS — K43.9 VENTRAL HERNIA WITHOUT OBSTRUCTION OR GANGRENE: Primary | ICD-10-CM

## 2021-01-13 PROCEDURE — 99214 OFFICE O/P EST MOD 30 MIN: CPT | Performed by: SURGERY

## 2021-01-13 ASSESSMENT — MIFFLIN-ST. JEOR: SCORE: 1773.99

## 2021-01-13 NOTE — PROGRESS NOTES
"Initial There were no vitals taken for this visit. Estimated body mass index is 42.91 kg/m  as calculated from the following:    Height as of 12/8/20: 1.626 m (5' 4\").    Weight as of 12/8/20: 113.4 kg (250 lb). .    Chelsey Stanley MA on 1/13/2021 at 11:05 AM    "

## 2021-01-13 NOTE — LETTER
"    1/13/2021         RE: Elza Greer  [address] [facility] [address]        Dear Colleague,    Thank you for referring your patient, Elza Greer, to the [facility]. Please see a copy of my visit note below.    Initial There were no vitals taken for this visit. Estimated body mass index is 42.91 kg/m  as calculated from the following:    Height as of 12/8/20: 1.626 m (5' 4\").    Weight as of 12/8/20: 113.4 kg (250 lb). .    Chelsey Stanley MA on 1/13/2021 at 11:05 AM      45-year-old female complaining of upper midline mass.  Patient was seen by me about a year ago but was not symptomatic enough to warrant fixing.  After a recent diagnosis with Covid and the coughing fit, patient reports increasing pain from her upper hernia.  Pain is localized 1-2 out of 10 without radiation.  Aggravated by straining alleviated by rest.  No other associated symptoms.  Patient has had prior umbilical hernia repair with mesh and prior abdominal surgery with him to hepaticojejunostomy including an hepaticojejunostomy.    Patient Active Problem List   Diagnosis     Tobacco use disorder     Anxiety     Mild intermittent asthma without complication     Ventral hernia without obstruction or gangrene       Past Medical History:   Diagnosis Date     Adjustment disorder with mixed anxiety and depressed mood      IBS (irritable bowel syndrome)      Incisional hernia, without obstruction or gangrene 2020     Lactose intolerance 05/12/2010     Migraine 05/12/2010     Mild intermittent asthma without complication 05/12/2010     PCOS (polycystic ovarian syndrome)      Tobacco use disorder      Vitamin D deficiency        Past Surgical History:   Procedure Laterality Date     APPENDECTOMY OPEN N/A      CYSTECTOMY OVARIAN BENIGN  2001     HEPATICOJEJUNOSTOMY  2000    RnY jejunostomy from bile duct injury     HERNIORRHAPHY VENTRAL N/A 02/17/2012    open with mesh     HYSTERECTOMY TOTAL ABDOMINAL, BILATERAL " SALPINGO-OOPHORECTOMY, COMBINED N/A 2005     LAPAROSCOPIC CHOLECYSTECTOMY  2006    complicated by bile duct injury       Family History   Problem Relation Age of Onset     Coronary Artery Disease Mother      Hypertension Mother      Hyperlipidemia Mother      Depression Mother      Colon Cancer Maternal Grandmother      Breast Cancer Maternal Grandmother      Other Cancer Maternal Grandmother        Social History     Tobacco Use     Smoking status: Current Every Day Smoker     Packs/day: 0.25     Types: Cigarettes     Smokeless tobacco: Never Used   Substance Use Topics     Alcohol use: No        History   Drug Use No       Current Outpatient Medications   Medication Sig Dispense Refill     Ascorbic Acid (VITAMIN C PO) Take 1 tablet by mouth daily       dextromethorphan (TUSSIN COUGH) 15 MG/5ML syrup Take 10 mLs by mouth 4 times daily as needed for cough       IBUPROFEN PO Take 600 mg by mouth every 6 hours as needed for moderate pain       meloxicam (MOBIC) 7.5 MG tablet Take 1 tablet (7.5 mg) by mouth daily 30 tablet 1     Multiple Vitamins-Calcium (ONE-A-DAY WOMENS PO) Take 1 tablet by mouth daily       predniSONE (DELTASONE) 20 MG tablet Take 3 tabs by mouth daily x 3 days, then 2 tabs daily x 3 days, then 1 tab daily x 3 days, then 1/2 tab daily x 3 days. (Patient taking differently: as needed Take 3 tabs by mouth daily x 3 days, then 2 tabs daily x 3 days, then 1 tab daily x 3 days, then 1/2 tab daily x 3 days.) 20 tablet 0     albuterol (PROVENTIL) (2.5 MG/3ML) 0.083% neb solution Take 1 vial (2.5 mg) by nebulization every 4 hours as needed for shortness of breath / dyspnea or wheezing 1 Box 0     benzonatate (TESSALON) 200 MG capsule Take 1 capsule (200 mg) by mouth 3 times daily as needed for cough (Patient not taking: Reported on 12/23/2020) 21 capsule 0     varenicline (CHANTIX JERRY) 0.5 MG X 11 & 1 MG X 42 tablet Take 0.5 mg tab daily for 3 days, THEN 0.5 mg tab twice daily for 4 days, THEN 1 mg twice  "daily. (Patient not taking: Reported on 12/23/2020) 53 tablet 0     varenicline (CHANTIX) 1 MG tablet Take 1 tablet (1 mg) by mouth 2 times daily (Patient not taking: Reported on 1/13/2021) 60 tablet 1     vitamin D2 (ERGOCALCIFEROL) 34061 units (1250 mcg) capsule Take 1 capsule (50,000 Units) by mouth once a week (Patient not taking: Reported on 12/23/2020) 8 capsule 0       Allergies   Allergen Reactions     Azithromycin Anaphylaxis     Doxycycline Other (See Comments)     Upset stomach and felt \"really out of it\"     Latex Hives     Oxycodone Nausea and Vomiting      CBC  Recent Labs   Lab Test 12/08/20 2247   WBC 12.1*   RBC 4.06   HGB 12.7   HCT 38.8   MCV 96   MCH 31.3   MCHC 32.7   RDW 13.0   *       BMP  Recent Labs   Lab Test 12/08/20 2247      POTASSIUM 4.8   DEBBIE 8.8   CHLORIDE 105   CO2 30   BUN 11   CR 0.61   *       LFTs  Recent Labs   Lab Test 12/08/20 2247   PROTTOTAL 8.0   ALBUMIN 3.5   BILITOTAL 0.5   ALKPHOS 137   AST 48*   ALT 27     Results for orders placed or performed during the hospital encounter of 12/08/20   XR Chest Port 1 View    Narrative    EXAM: XR CHEST PORT 1 VW  LOCATION: [facility]  DATE/TIME: 12/8/2020 11:37 PM    INDICATION: Cough, shortness of breath  COMPARISON: 11/12/2020      Impression    IMPRESSION: Negative chest.     CT abdomen and pelvis from November 2020, midline incisional hernia with incarcerated colon.    ROS  Constitutional - Denies fevers, weight loss, malaise, lethargy  Neuro - Denies tremors or seizures  Pulmon - Denies SOB, dyspnea, hemoptysis, chronic cough or use of an inhaler  CV - Denies CP, SOB, lower extremity edema, difficulty w/ stairs, has never used NTG  GI - Denies hematemesis, BRBPR, melena, chronic diarrhea or epigastric pain   - Denies hematuria, difficulty voiding, h/o STDs  Hematology - Denies blood clotting disorders, chronic anemias  Dermatology - No melanomas or skin cancers  Rheumatology - No h/o " "AV Billy - Denies depression, bipolar d/o or schizophrenia    Exam:/84 (BP Location: Right arm, Patient Position: Sitting, Cuff Size: Adult Large)   Pulse 94   Temp 98.6  F (37  C) (Tympanic)   Ht 1.626 m (5' 4\")   Wt 113.4 kg (250 lb)   BMI 42.91 kg/m      General - Alert and Oriented X4, NAD, well nourished  HEENT - Normocephalic, atraumatic, PERRLA, Nose midline, Throat without lesions  Neck - supple, no LAD, Thyroid normal, Carotids without bruits  Lungs - Clear to auscultation bilaterally with good inspiratory effort, no tactile fremitus  CV - Heart RRR, no lift's, thrills, murmurs, rubs, or gallops. Carotid, radial, and femoral pulses 2+ bilaterally  Abdomen - Soft, non-tender, +BS, no hepatosplenomegaly, no palpable masses  Neuro - Full ROM, Strength 5/5 and major muscle groups, sensation intact  Extremities - No cyanosis, clubbing or edema    Assessment and plan: 45-year-old female with incarcerated ventral incisional hernia.  Patient is good candidate for open repair with primary fascial closure and at least an attempted laparoscopic mesh placement.  Given her prior surgeries, we may not be able to put the mesh in safely.  Patient understood and agrees to proceed.  We will schedule a lap assisted open ventral hernia repair.  Risks benefits alternatives and complications were discussed with the patient including the possibility of infection bleeding or hernia recurrence.  Patient understood and wished to proceed.  PATIENT IS CLEARED FOR SURGERY.    Pradip Mckenzie MD       Again, thank you for allowing me to participate in the care of your patient.        Sincerely,        Pradip Mckenzie MD    "

## 2021-01-13 NOTE — H&P (VIEW-ONLY)
45-year-old female complaining of upper midline mass.  Patient was seen by me about a year ago but was not symptomatic enough to warrant fixing.  After a recent diagnosis with Covid and the coughing fit, patient reports increasing pain from her upper hernia.  Pain is localized 1-2 out of 10 without radiation.  Aggravated by straining alleviated by rest.  No other associated symptoms.  Patient has had prior umbilical hernia repair with mesh and prior abdominal surgery with him to hepaticojejunostomy including an hepaticojejunostomy.    Patient Active Problem List   Diagnosis     Tobacco use disorder     Anxiety     Mild intermittent asthma without complication     Ventral hernia without obstruction or gangrene       Past Medical History:   Diagnosis Date     Adjustment disorder with mixed anxiety and depressed mood      IBS (irritable bowel syndrome)      Incisional hernia, without obstruction or gangrene 2020     Lactose intolerance 05/12/2010     Migraine 05/12/2010     Mild intermittent asthma without complication 05/12/2010     PCOS (polycystic ovarian syndrome)      Tobacco use disorder      Vitamin D deficiency        Past Surgical History:   Procedure Laterality Date     APPENDECTOMY OPEN N/A      CYSTECTOMY OVARIAN BENIGN  2001     HEPATICOJEJUNOSTOMY  2000    RnY jejunostomy from bile duct injury     HERNIORRHAPHY VENTRAL N/A 02/17/2012    open with mesh     HYSTERECTOMY TOTAL ABDOMINAL, BILATERAL SALPINGO-OOPHORECTOMY, COMBINED N/A 2005     LAPAROSCOPIC CHOLECYSTECTOMY  2006    complicated by bile duct injury       Family History   Problem Relation Age of Onset     Coronary Artery Disease Mother      Hypertension Mother      Hyperlipidemia Mother      Depression Mother      Colon Cancer Maternal Grandmother      Breast Cancer Maternal Grandmother      Other Cancer Maternal Grandmother        Social History     Tobacco Use     Smoking status: Current Every Day Smoker     Packs/day: 0.25     Types: Cigarettes  "    Smokeless tobacco: Never Used   Substance Use Topics     Alcohol use: No        History   Drug Use No       Current Outpatient Medications   Medication Sig Dispense Refill     Ascorbic Acid (VITAMIN C PO) Take 1 tablet by mouth daily       dextromethorphan (TUSSIN COUGH) 15 MG/5ML syrup Take 10 mLs by mouth 4 times daily as needed for cough       IBUPROFEN PO Take 600 mg by mouth every 6 hours as needed for moderate pain       meloxicam (MOBIC) 7.5 MG tablet Take 1 tablet (7.5 mg) by mouth daily 30 tablet 1     Multiple Vitamins-Calcium (ONE-A-DAY WOMENS PO) Take 1 tablet by mouth daily       predniSONE (DELTASONE) 20 MG tablet Take 3 tabs by mouth daily x 3 days, then 2 tabs daily x 3 days, then 1 tab daily x 3 days, then 1/2 tab daily x 3 days. (Patient taking differently: as needed Take 3 tabs by mouth daily x 3 days, then 2 tabs daily x 3 days, then 1 tab daily x 3 days, then 1/2 tab daily x 3 days.) 20 tablet 0     albuterol (PROVENTIL) (2.5 MG/3ML) 0.083% neb solution Take 1 vial (2.5 mg) by nebulization every 4 hours as needed for shortness of breath / dyspnea or wheezing 1 Box 0     benzonatate (TESSALON) 200 MG capsule Take 1 capsule (200 mg) by mouth 3 times daily as needed for cough (Patient not taking: Reported on 12/23/2020) 21 capsule 0     varenicline (CHANTIX JERRY) 0.5 MG X 11 & 1 MG X 42 tablet Take 0.5 mg tab daily for 3 days, THEN 0.5 mg tab twice daily for 4 days, THEN 1 mg twice daily. (Patient not taking: Reported on 12/23/2020) 53 tablet 0     varenicline (CHANTIX) 1 MG tablet Take 1 tablet (1 mg) by mouth 2 times daily (Patient not taking: Reported on 1/13/2021) 60 tablet 1     vitamin D2 (ERGOCALCIFEROL) 04021 units (1250 mcg) capsule Take 1 capsule (50,000 Units) by mouth once a week (Patient not taking: Reported on 12/23/2020) 8 capsule 0       Allergies   Allergen Reactions     Azithromycin Anaphylaxis     Doxycycline Other (See Comments)     Upset stomach and felt \"really out of it\" " "    Latex Hives     Oxycodone Nausea and Vomiting      CBC  Recent Labs   Lab Test 12/08/20 2247   WBC 12.1*   RBC 4.06   HGB 12.7   HCT 38.8   MCV 96   MCH 31.3   MCHC 32.7   RDW 13.0   *       BMP  Recent Labs   Lab Test 12/08/20 2247      POTASSIUM 4.8   DEBBIE 8.8   CHLORIDE 105   CO2 30   BUN 11   CR 0.61   *       LFTs  Recent Labs   Lab Test 12/08/20 2247   PROTTOTAL 8.0   ALBUMIN 3.5   BILITOTAL 0.5   ALKPHOS 137   AST 48*   ALT 27     Results for orders placed or performed during the hospital encounter of 12/08/20   XR Chest Port 1 View    Narrative    EXAM: XR CHEST PORT 1 VW  LOCATION: [facility]  DATE/TIME: 12/8/2020 11:37 PM    INDICATION: Cough, shortness of breath  COMPARISON: 11/12/2020      Impression    IMPRESSION: Negative chest.     CT abdomen and pelvis from November 2020, midline incisional hernia with incarcerated colon.    ROS  Constitutional - Denies fevers, weight loss, malaise, lethargy  Neuro - Denies tremors or seizures  Pulmon - Denies SOB, dyspnea, hemoptysis, chronic cough or use of an inhaler  CV - Denies CP, SOB, lower extremity edema, difficulty w/ stairs, has never used NTG  GI - Denies hematemesis, BRBPR, melena, chronic diarrhea or epigastric pain   - Denies hematuria, difficulty voiding, h/o STDs  Hematology - Denies blood clotting disorders, chronic anemias  Dermatology - No melanomas or skin cancers  Rheumatology - No h/o RA  Pysch - Denies depression, bipolar d/o or schizophrenia    Exam:/84 (BP Location: Right arm, Patient Position: Sitting, Cuff Size: Adult Large)   Pulse 94   Temp 98.6  F (37  C) (Tympanic)   Ht 1.626 m (5' 4\")   Wt 113.4 kg (250 lb)   BMI 42.91 kg/m      General - Alert and Oriented X4, NAD, well nourished  HEENT - Normocephalic, atraumatic, PERRLA, Nose midline, Throat without lesions  Neck - supple, no LAD, Thyroid normal, Carotids without bruits  Lungs - Clear to auscultation bilaterally with good " inspiratory effort, no tactile fremitus  CV - Heart RRR, no lift's, thrills, murmurs, rubs, or gallops. Carotid, radial, and femoral pulses 2+ bilaterally  Abdomen - Soft, non-tender, +BS, no hepatosplenomegaly, no palpable masses  Neuro - Full ROM, Strength 5/5 and major muscle groups, sensation intact  Extremities - No cyanosis, clubbing or edema    Assessment and plan: 45-year-old female with incarcerated ventral incisional hernia.  Patient is good candidate for open repair with primary fascial closure and at least an attempted laparoscopic mesh placement.  Given her prior surgeries, we may not be able to put the mesh in safely.  Patient understood and agrees to proceed.  We will schedule a lap assisted open ventral hernia repair.  Risks benefits alternatives and complications were discussed with the patient including the possibility of infection bleeding or hernia recurrence.  Patient understood and wished to proceed.  PATIENT IS CLEARED FOR SURGERY.    Pradip Mckenzie MD

## 2021-01-13 NOTE — PROGRESS NOTES
45-year-old female complaining of upper midline mass.  Patient was seen by me about a year ago but was not symptomatic enough to warrant fixing.  After a recent diagnosis with Covid and the coughing fit, patient reports increasing pain from her upper hernia.  Pain is localized 1-2 out of 10 without radiation.  Aggravated by straining alleviated by rest.  No other associated symptoms.  Patient has had prior umbilical hernia repair with mesh and prior abdominal surgery with him to hepaticojejunostomy including an hepaticojejunostomy.    Patient Active Problem List   Diagnosis     Tobacco use disorder     Anxiety     Mild intermittent asthma without complication     Ventral hernia without obstruction or gangrene       Past Medical History:   Diagnosis Date     Adjustment disorder with mixed anxiety and depressed mood      IBS (irritable bowel syndrome)      Incisional hernia, without obstruction or gangrene 2020     Lactose intolerance 05/12/2010     Migraine 05/12/2010     Mild intermittent asthma without complication 05/12/2010     PCOS (polycystic ovarian syndrome)      Tobacco use disorder      Vitamin D deficiency        Past Surgical History:   Procedure Laterality Date     APPENDECTOMY OPEN N/A      CYSTECTOMY OVARIAN BENIGN  2001     HEPATICOJEJUNOSTOMY  2000    RnY jejunostomy from bile duct injury     HERNIORRHAPHY VENTRAL N/A 02/17/2012    open with mesh     HYSTERECTOMY TOTAL ABDOMINAL, BILATERAL SALPINGO-OOPHORECTOMY, COMBINED N/A 2005     LAPAROSCOPIC CHOLECYSTECTOMY  2006    complicated by bile duct injury       Family History   Problem Relation Age of Onset     Coronary Artery Disease Mother      Hypertension Mother      Hyperlipidemia Mother      Depression Mother      Colon Cancer Maternal Grandmother      Breast Cancer Maternal Grandmother      Other Cancer Maternal Grandmother        Social History     Tobacco Use     Smoking status: Current Every Day Smoker     Packs/day: 0.25     Types: Cigarettes  "    Smokeless tobacco: Never Used   Substance Use Topics     Alcohol use: No        History   Drug Use No       Current Outpatient Medications   Medication Sig Dispense Refill     Ascorbic Acid (VITAMIN C PO) Take 1 tablet by mouth daily       dextromethorphan (TUSSIN COUGH) 15 MG/5ML syrup Take 10 mLs by mouth 4 times daily as needed for cough       IBUPROFEN PO Take 600 mg by mouth every 6 hours as needed for moderate pain       meloxicam (MOBIC) 7.5 MG tablet Take 1 tablet (7.5 mg) by mouth daily 30 tablet 1     Multiple Vitamins-Calcium (ONE-A-DAY WOMENS PO) Take 1 tablet by mouth daily       predniSONE (DELTASONE) 20 MG tablet Take 3 tabs by mouth daily x 3 days, then 2 tabs daily x 3 days, then 1 tab daily x 3 days, then 1/2 tab daily x 3 days. (Patient taking differently: as needed Take 3 tabs by mouth daily x 3 days, then 2 tabs daily x 3 days, then 1 tab daily x 3 days, then 1/2 tab daily x 3 days.) 20 tablet 0     albuterol (PROVENTIL) (2.5 MG/3ML) 0.083% neb solution Take 1 vial (2.5 mg) by nebulization every 4 hours as needed for shortness of breath / dyspnea or wheezing 1 Box 0     benzonatate (TESSALON) 200 MG capsule Take 1 capsule (200 mg) by mouth 3 times daily as needed for cough (Patient not taking: Reported on 12/23/2020) 21 capsule 0     varenicline (CHANTIX JERRY) 0.5 MG X 11 & 1 MG X 42 tablet Take 0.5 mg tab daily for 3 days, THEN 0.5 mg tab twice daily for 4 days, THEN 1 mg twice daily. (Patient not taking: Reported on 12/23/2020) 53 tablet 0     varenicline (CHANTIX) 1 MG tablet Take 1 tablet (1 mg) by mouth 2 times daily (Patient not taking: Reported on 1/13/2021) 60 tablet 1     vitamin D2 (ERGOCALCIFEROL) 92005 units (1250 mcg) capsule Take 1 capsule (50,000 Units) by mouth once a week (Patient not taking: Reported on 12/23/2020) 8 capsule 0       Allergies   Allergen Reactions     Azithromycin Anaphylaxis     Doxycycline Other (See Comments)     Upset stomach and felt \"really out of it\" " "    Latex Hives     Oxycodone Nausea and Vomiting      CBC  Recent Labs   Lab Test 12/08/20 2247   WBC 12.1*   RBC 4.06   HGB 12.7   HCT 38.8   MCV 96   MCH 31.3   MCHC 32.7   RDW 13.0   *       BMP  Recent Labs   Lab Test 12/08/20 2247      POTASSIUM 4.8   DEBBIE 8.8   CHLORIDE 105   CO2 30   BUN 11   CR 0.61   *       LFTs  Recent Labs   Lab Test 12/08/20 2247   PROTTOTAL 8.0   ALBUMIN 3.5   BILITOTAL 0.5   ALKPHOS 137   AST 48*   ALT 27     Results for orders placed or performed during the hospital encounter of 12/08/20   XR Chest Port 1 View    Narrative    EXAM: XR CHEST PORT 1 VW  LOCATION: [facility]  DATE/TIME: 12/8/2020 11:37 PM    INDICATION: Cough, shortness of breath  COMPARISON: 11/12/2020      Impression    IMPRESSION: Negative chest.     CT abdomen and pelvis from November 2020, midline incisional hernia with incarcerated colon.    ROS  Constitutional - Denies fevers, weight loss, malaise, lethargy  Neuro - Denies tremors or seizures  Pulmon - Denies SOB, dyspnea, hemoptysis, chronic cough or use of an inhaler  CV - Denies CP, SOB, lower extremity edema, difficulty w/ stairs, has never used NTG  GI - Denies hematemesis, BRBPR, melena, chronic diarrhea or epigastric pain   - Denies hematuria, difficulty voiding, h/o STDs  Hematology - Denies blood clotting disorders, chronic anemias  Dermatology - No melanomas or skin cancers  Rheumatology - No h/o RA  Pysch - Denies depression, bipolar d/o or schizophrenia    Exam:/84 (BP Location: Right arm, Patient Position: Sitting, Cuff Size: Adult Large)   Pulse 94   Temp 98.6  F (37  C) (Tympanic)   Ht 1.626 m (5' 4\")   Wt 113.4 kg (250 lb)   BMI 42.91 kg/m      General - Alert and Oriented X4, NAD, well nourished  HEENT - Normocephalic, atraumatic, PERRLA, Nose midline, Throat without lesions  Neck - supple, no LAD, Thyroid normal, Carotids without bruits  Lungs - Clear to auscultation bilaterally with good " inspiratory effort, no tactile fremitus  CV - Heart RRR, no lift's, thrills, murmurs, rubs, or gallops. Carotid, radial, and femoral pulses 2+ bilaterally  Abdomen - Soft, non-tender, +BS, no hepatosplenomegaly, no palpable masses  Neuro - Full ROM, Strength 5/5 and major muscle groups, sensation intact  Extremities - No cyanosis, clubbing or edema    Assessment and plan: 45-year-old female with incarcerated ventral incisional hernia.  Patient is good candidate for open repair with primary fascial closure and at least an attempted laparoscopic mesh placement.  Given her prior surgeries, we may not be able to put the mesh in safely.  Patient understood and agrees to proceed.  We will schedule a lap assisted open ventral hernia repair.  Risks benefits alternatives and complications were discussed with the patient including the possibility of infection bleeding or hernia recurrence.  Patient understood and wished to proceed.  PATIENT IS CLEARED FOR SURGERY.    Pradip Mckenzie MD

## 2021-01-14 ENCOUNTER — HEALTH MAINTENANCE LETTER (OUTPATIENT)
Age: 46
End: 2021-01-14

## 2021-01-19 ENCOUNTER — HOSPITAL ENCOUNTER (EMERGENCY)
Facility: CLINIC | Age: 46
Discharge: HOME | End: 2021-01-20
Attending: EMERGENCY MEDICINE | Admitting: EMERGENCY MEDICINE
Payer: COMMERCIAL

## 2021-01-19 DIAGNOSIS — H92.01 RIGHT EAR PAIN: ICD-10-CM

## 2021-01-19 DIAGNOSIS — R09.81 NASAL CONGESTION: ICD-10-CM

## 2021-01-19 PROCEDURE — 99284 EMERGENCY DEPT VISIT MOD MDM: CPT | Mod: 25 | Performed by: EMERGENCY MEDICINE

## 2021-01-19 PROCEDURE — 99284 EMERGENCY DEPT VISIT MOD MDM: CPT | Performed by: EMERGENCY MEDICINE

## 2021-01-19 RX ORDER — OXYMETAZOLINE HYDROCHLORIDE 0.05 G/100ML
2 SPRAY NASAL ONCE
Status: COMPLETED | OUTPATIENT
Start: 2021-01-20 | End: 2021-01-20

## 2021-01-19 ASSESSMENT — MIFFLIN-ST. JEOR: SCORE: 1801.21

## 2021-01-20 ENCOUNTER — TELEPHONE (OUTPATIENT)
Dept: SURGERY | Facility: CLINIC | Age: 46
End: 2021-01-20

## 2021-01-20 ENCOUNTER — APPOINTMENT (OUTPATIENT)
Dept: CT IMAGING | Facility: CLINIC | Age: 46
End: 2021-01-20
Attending: EMERGENCY MEDICINE
Payer: COMMERCIAL

## 2021-01-20 VITALS
TEMPERATURE: 96.4 F | WEIGHT: 256 LBS | BODY MASS INDEX: 43.71 KG/M2 | SYSTOLIC BLOOD PRESSURE: 134 MMHG | HEIGHT: 64 IN | HEART RATE: 86 BPM | RESPIRATION RATE: 16 BRPM | OXYGEN SATURATION: 99 % | DIASTOLIC BLOOD PRESSURE: 85 MMHG

## 2021-01-20 PROCEDURE — 250N000009 HC RX 250: Performed by: EMERGENCY MEDICINE

## 2021-01-20 PROCEDURE — 70480 CT ORBIT/EAR/FOSSA W/O DYE: CPT

## 2021-01-20 RX ORDER — OXYMETAZOLINE HCL 0.05 %
2 SPRAY, NON-AEROSOL (ML) NASAL 2 TIMES DAILY
Refills: 0 | COMMUNITY
Start: 2021-01-20 | End: 2021-01-23

## 2021-01-20 RX ORDER — ACETAMINOPHEN 500 MG
1000 TABLET ORAL EVERY 8 HOURS PRN
Qty: 30 TABLET | Refills: 0 | COMMUNITY
Start: 2021-01-20 | End: 2021-01-25

## 2021-01-20 RX ADMIN — Medication 2 SPRAY: at 00:15

## 2021-01-20 ASSESSMENT — ENCOUNTER SYMPTOMS
NAUSEA: 0
NECK PAIN: 0
NUMBNESS: 0
SINUS PRESSURE: 0
FATIGUE: 0
FEVER: 0
BACK PAIN: 0
APPETITE CHANGE: 0
TROUBLE SWALLOWING: 0
CHILLS: 0
WOUND: 0
COUGH: 0
WEAKNESS: 0
HEADACHES: 1
SINUS PAIN: 0
SORE THROAT: 0
LIGHT-HEADEDNESS: 0
VOICE CHANGE: 0
RHINORRHEA: 1
FACIAL SWELLING: 0
CHEST TIGHTNESS: 0
ABDOMINAL PAIN: 0
SHORTNESS OF BREATH: 0
NECK STIFFNESS: 0

## 2021-01-20 NOTE — ED PROVIDER NOTES
"  History     Chief Complaint   Patient presents with     Otalgia     HPI  Elza Greer is a 45 year old female with no significant contributing past medical history presenting for evaluation of right-sided ear pain.  Patient reports symptoms progressively worsened with worsening pain in the right ear but also with some slight new discomfort in the left ear.  Patient reports a pressure sensation in the ears with associated hyperacusis.  Denies any drainage in the ear.  Denies fever chills.  Ros reports some nasal congestion and ongoing rhinorrhea as well as an ongoing cough since suffering from Covid last month.  Does have a history of ear infections as a child and has had some allergy related ear pain in the past.  Denies fevers or chills.  Does report a right-sided headache more prominent behind her right ear.  Denies neck pain or stiffness.  Denies any trauma or injury.  Tried some over-the-counter pain relieving eardrops with minimal improvement.  Also has been taking ibuprofen over the past few days which has helped however, she is scheduled for hernia surgery in 1 week and was told to stop all NSAIDs today due to that upcoming surgery.  Without ibuprofen she has had notably increasing pain.  Tried 1000 milligrams of Tylenol several hours ago without any relief.    Allergies:  Allergies   Allergen Reactions     Azithromycin Anaphylaxis     Doxycycline Other (See Comments)     Upset stomach and felt \"really out of it\"     Latex Hives     Oxycodone Nausea and Vomiting       Problem List:    Patient Active Problem List    Diagnosis Date Noted     Ventral hernia without obstruction or gangrene 01/13/2021     Priority: Medium     Added automatically from request for surgery [id number]       Anxiety 02/18/2020     Priority: Medium     Tobacco use disorder 11/14/2016     Priority: Medium     Mild intermittent asthma without complication 05/12/2010     Priority: Medium        Past Medical History:    Past Medical " History:   Diagnosis Date     Adjustment disorder with mixed anxiety and depressed mood      IBS (irritable bowel syndrome)      Incisional hernia, without obstruction or gangrene 2020     Lactose intolerance 05/12/2010     Migraine 05/12/2010     Mild intermittent asthma without complication 05/12/2010     PCOS (polycystic ovarian syndrome)      Tobacco use disorder      Vitamin D deficiency        Past Surgical History:    Past Surgical History:   Procedure Laterality Date     APPENDECTOMY OPEN N/A      CYSTECTOMY OVARIAN BENIGN  2001     HEPATICOJEJUNOSTOMY  2000    RnY jejunostomy from bile duct injury     HERNIORRHAPHY VENTRAL N/A 02/17/2012    open with mesh     HYSTERECTOMY TOTAL ABDOMINAL, BILATERAL SALPINGO-OOPHORECTOMY, COMBINED N/A 2005     LAPAROSCOPIC CHOLECYSTECTOMY  2006    complicated by bile duct injury       Family History:    Family History   Problem Relation Age of Onset     Coronary Artery Disease Mother      Hypertension Mother      Hyperlipidemia Mother      Depression Mother      Colon Cancer Maternal Grandmother      Breast Cancer Maternal Grandmother      Other Cancer Maternal Grandmother        Social History:  Marital Status:   [2]  Social History     Tobacco Use     Smoking status: Current Every Day Smoker     Packs/day: 0.25     Types: Cigarettes     Smokeless tobacco: Never Used   Substance Use Topics     Alcohol use: No     Drug use: No        Medications:         acetaminophen (TYLENOL) 500 MG tablet       oxymetazoline (AFRIN NASAL SPRAY) 0.05 % nasal spray       albuterol (PROVENTIL) (2.5 MG/3ML) 0.083% neb solution       Ascorbic Acid (VITAMIN C PO)       benzonatate (TESSALON) 200 MG capsule       dextromethorphan (TUSSIN COUGH) 15 MG/5ML syrup       IBUPROFEN PO       meloxicam (MOBIC) 7.5 MG tablet       Multiple Vitamins-Calcium (ONE-A-DAY WOMENS PO)       predniSONE (DELTASONE) 20 MG tablet       varenicline (CHANTIX JERRY) 0.5 MG X 11 & 1 MG X 42 tablet        "varenicline (CHANTIX) 1 MG tablet       vitamin D2 (ERGOCALCIFEROL) 42756 units (1250 mcg) capsule          Review of Systems   Constitutional: Negative for appetite change, chills, fatigue and fever.   HENT: Positive for ear pain and rhinorrhea. Negative for congestion, dental problem, ear discharge, facial swelling, hearing loss (Patient reports increased sensitivity to hearing/sounds), postnasal drip, sinus pressure, sinus pain, sore throat, trouble swallowing and voice change.    Eyes: Negative for visual disturbance.   Respiratory: Negative for cough, chest tightness and shortness of breath.    Cardiovascular: Negative for chest pain.   Gastrointestinal: Negative for abdominal pain and nausea.   Musculoskeletal: Negative for back pain, neck pain and neck stiffness.   Skin: Negative for rash and wound.   Neurological: Positive for headaches. Negative for weakness, light-headedness and numbness.   All other systems reviewed and are negative.      Physical Exam   BP: (!) 163/113  Pulse: 97  Temp: 96  F (35.6  C)  Resp: 18  Height: 162.6 cm (5' 4\")  Weight: 116.1 kg (256 lb)  SpO2: 99 %      Physical Exam  Vitals signs and nursing note reviewed.   Constitutional:       Appearance: Normal appearance. She is not ill-appearing or diaphoretic.   HENT:      Head: Atraumatic.      Right Ear: Tympanic membrane, ear canal and external ear normal.      Left Ear: Tympanic membrane, ear canal and external ear normal.      Ears:      Comments: Entirely normal-appearing tympanic membrane on the right and left.  Your canals also both normal however the patient does have tenderness on the posterior aspect of the right canal when speculum is inserted to the ear.  Notable tenderness to the mastoid on the right without visible external abnormality overlying the mastoid     Nose: Nose normal. No congestion.      Right Sinus: No maxillary sinus tenderness or frontal sinus tenderness.      Left Sinus: No maxillary sinus tenderness or " frontal sinus tenderness.      Mouth/Throat:      Mouth: Mucous membranes are moist.   Eyes:      Conjunctiva/sclera: Conjunctivae normal.   Cardiovascular:      Rate and Rhythm: Normal rate and regular rhythm.      Pulses: Normal pulses.   Pulmonary:      Effort: Pulmonary effort is normal.   Musculoskeletal: Normal range of motion.   Skin:     General: Skin is warm and dry.      Capillary Refill: Capillary refill takes less than 2 seconds.   Neurological:      Mental Status: She is alert and oriented to person, place, and time.   Psychiatric:         Mood and Affect: Mood normal.         ED Course        Procedures                   Results for orders placed or performed during the hospital encounter of 01/19/21 (from the past 24 hour(s))   CT Temporal Bones wo Contrast    Narrative    EXAM: CT TEMPORAL WO CONTRAST  LOCATION: [facility]  DATE/TIME: 1/20/2021 12:06 AM    INDICATION: Mastoiditis  COMPARISON: None.  TECHNIQUE:  Routine without contrast including dedicated thin section multiplanar reformats of each temporal bone. Dose reduction techniques were used.    FINDINGS:  RIGHT TEMPORAL BONE: Normal external auditory canal and tympanic membrane. Normal middle ear cavity and ossicles. No mastoid effusion. No evidence for otosclerosis. Normal cochlea, semicircular canals, vestibule, and vestibular aqueduct. Intact bony   carotid canal and facial nerve canal.     LEFT TEMPORAL BONE: Normal external auditory canal and tympanic membrane. Normal middle ear cavity and ossicles. No mastoid effusion. No evidence for otosclerosis. Normal cochlea, semicircular canals, vestibule, and vestibular aqueduct. Intact bony   carotid canal and facial nerve canal.     OTHER: Prominent left maxillary sinus mucous retention cyst. Small right maxillary sinus mucous retention cysts are noted. A mucous retention cyst with minimal aerated secretions are noted within the left sphenoid sinus. The visualized intracranial    compartment is unremarkable.      Impression    IMPRESSION:  1.  Normal temporal bone CT.       Medications   oxymetazoline (AFRIN) 0.05 % spray 2 spray (2 sprays Both Nostrils Given 1/20/21 0015)     12:51 AM: Reassessed at bedside.  Congestion improved after oxymetazoline but still with ear pain not significantly changed.  I instilled 1 mL of 0.5% bupivacaine into the ear canal for topical analgesia.  Sent patient home with a small syringe of this for ongoing as needed pain relief.  Since there is no signs of infection, no indication for antibiotics.  Anticipate treating the congestion and symptoms will lead to resolution of the symptoms in the next few days.  Recommended follow-up in 2 days if symptoms have not notably improved    Assessments & Plan (with Medical Decision Making)  45-year-old female presenting for evaluation of roughly 2 to 3 days of progressive right ear pain.  Also with minimal left ear pain states mobile.  No fevers or chills.  Has had some nasal congestion and ongoing cough since having Covid last month.  Well-appearing in ED no distress.  Ears entirely benign appearing with no evidence of infection or inflammation in the canal or tympanic membrane.  Patient does have some congestion but without evidence of acute sinusitis.  Afrin given for her subjective congestion with hopes of improving her ability to decompress the eustachian tube and her inner ear as a possible cause of her symptoms.  Also evaluated her for mastoiditis given the notable tenderness of mastoid on exam.  CT negative for mastoiditis.  Treated with topical bupivacaine for further symptom relief.  Patient is trying to avoid NSAIDs due to surgery in 1 week for hernia repair.  Symptoms appear to be benign and not infectious at this time however recommended close follow-up in 2 days in the clinic to assure resolution     I have reviewed the nursing notes.    I have reviewed the findings, diagnosis, plan and need for follow up  with the patient.       New Prescriptions    ACETAMINOPHEN (TYLENOL) 500 MG TABLET    Take 2 tablets (1,000 mg) by mouth every 8 hours as needed for fever or pain    OXYMETAZOLINE (AFRIN NASAL SPRAY) 0.05 % NASAL SPRAY    Spray 2 sprays in nostril 2 times daily for 3 days       Final diagnoses:   Right ear pain   Nasal congestion       1/19/2021   [facility] EMERGENCY DEPT     Ralph, Magan Moseley MD  01/20/21 0055

## 2021-01-20 NOTE — ED TRIAGE NOTES
"Bilateral ear pain starting with the right approximately a few days ago. Hx of ear infections in the past but nothing for \"years.\" Now left ear bothering pt prompting evaluation. Denies fevers, drainage, other pertinent concerns.  "

## 2021-01-20 NOTE — TELEPHONE ENCOUNTER
Reason for Call:  Form, our goal is to have forms completed with 72 hours, however, some forms may require a visit or additional information.    Type of letter, form or note:  FMLA    Who is the form from?: Patient    Where did the form come from: form was faxed in    What clinic location was the form placed at?: [facility] (ENT, Neurology, Rheumatology, Surgery, Urology, Vascular Surgery)    Where the form was placed: [facility]    What number is listed as a contact on the form?: [phone]       Additional comments: forms completed and faxed back to [phone]    Call taken on 1/20/2021 at 3:09 PM by Carmita Mitchell

## 2021-01-20 NOTE — DISCHARGE INSTRUCTIONS
Otitis media topical pain relief drops:   You were given pain relieving medicine to provide pain relief for your ear infection. Before placing any drops, look in the ear to be sure there is no drainage of fluid or blood. If drainage is seen, this could indicate the ear drum has ruptured (ear drum is damaged). If there is any draining fluid, do not put in the pain relief drops as they can damage the hearing if given when there is drainage coming from the ear. As long as no drainage is present, place 0.5mL of the numbing drops in the ear every 4-6 hours as needed if the ear pain is not controlled by use of ibuprofen and acetaminophen alone.

## 2021-01-21 RX ORDER — NICOTINE 21 MG/24HR
1 PATCH, TRANSDERMAL 24 HOURS TRANSDERMAL EVERY 24 HOURS
COMMUNITY
End: 2022-03-15

## 2021-01-22 ENCOUNTER — ANESTHESIA EVENT (OUTPATIENT)
Dept: SURGERY | Facility: CLINIC | Age: 46
End: 2021-01-22
Payer: COMMERCIAL

## 2021-01-22 NOTE — ANESTHESIA PREPROCEDURE EVALUATION
"Anesthesia Pre-Procedure Evaluation    Patient: Elza Greer   MRN: [id number] : 1975        Preoperative Diagnosis: Ventral hernia without obstruction or gangrene [K43.9]   Procedure : Procedure(s):  Laparoscopic assisted open ventral hernia repair     Past Medical History:   Diagnosis Date     Adjustment disorder with mixed anxiety and depressed mood      IBS (irritable bowel syndrome)      Incisional hernia, without obstruction or gangrene      Lactose intolerance 2010     Migraine 2010     Mild intermittent asthma without complication 2010     PCOS (polycystic ovarian syndrome)      Tobacco use disorder      Vitamin D deficiency       Past Surgical History:   Procedure Laterality Date     APPENDECTOMY OPEN N/A      CYSTECTOMY OVARIAN BENIGN       HEPATICOJEJUNOSTOMY      RnY jejunostomy from bile duct injury     HERNIORRHAPHY VENTRAL N/A 2012    open with mesh     HYSTERECTOMY TOTAL ABDOMINAL, BILATERAL SALPINGO-OOPHORECTOMY, COMBINED N/A      LAPAROSCOPIC CHOLECYSTECTOMY      complicated by bile duct injury      Allergies   Allergen Reactions     Azithromycin Anaphylaxis     Doxycycline Other (See Comments)     Upset stomach and felt \"really out of it\"     Latex Hives     Oxycodone Nausea and Vomiting      Social History     Tobacco Use     Smoking status: Current Every Day Smoker     Packs/day: 0.25     Types: Cigarettes     Smokeless tobacco: Never Used   Substance Use Topics     Alcohol use: No      Wt Readings from Last 1 Encounters:   21 116.1 kg (256 lb)        Anesthesia Evaluation   Pt has had prior anesthetic. Type: General.        ROS/MED HX  ENT/Pulmonary:     (+) SHWETA risk factors, obese, tobacco use, Current use, 1 packs/day, asthma Treatment: Inhaler prn and Nebulizer prn,      Neurologic:     (+) migraines,     Cardiovascular:     (+) -----Previous cardiac testing   Echo: Date: Results:    Stress Test: Date: Results:    ECG Reviewed: Date: " 12/8/2020 Results:  Sinus Rhythm   WITHIN NORMAL LIMITS  Cath: Date: Results:      METS/Exercise Tolerance:     Hematologic:  - neg hematologic  ROS     Musculoskeletal:  - neg musculoskeletal ROS     GI/Hepatic: Comment: Hx gastric bypass 2000    (+) Inflammatory bowel disease,     Renal/Genitourinary:  - neg Renal ROS     Endo: Comment: PCOS    (+) Obesity (Morbid),     Psychiatric/Substance Use:     (+) psychiatric history anxiety and depression     Infectious Disease:  - neg infectious disease ROS     Malignancy:  - neg malignancy ROS     Other:  - neg other ROS          Physical Exam    Airway        Mallampati: III   TM distance: > 3 FB   Neck ROM: full   Mouth opening: > 3 cm    Respiratory Devices and Support         Dental       (+) upper dentures and lower dentures      Cardiovascular   cardiovascular exam normal          Pulmonary   pulmonary exam normal                OUTSIDE LABS:  CBC:   Lab Results   Component Value Date    WBC 12.1 (H) 12/08/2020    WBC 12.1 (H) 06/17/2020    HGB 12.7 12/08/2020    HGB 12.4 06/17/2020    HCT 38.8 12/08/2020    HCT 38.0 06/17/2020     (L) 12/08/2020     06/17/2020     BMP:   Lab Results   Component Value Date     12/08/2020     06/17/2020    POTASSIUM 4.8 12/08/2020    POTASSIUM 4.0 06/17/2020    CHLORIDE 105 12/08/2020    CHLORIDE 106 06/17/2020    CO2 30 12/08/2020    CO2 27 06/17/2020    BUN 11 12/08/2020    BUN 14 06/17/2020    CR 0.61 12/08/2020    CR 0.75 06/17/2020     (H) 12/08/2020     (H) 06/17/2020     COAGS: No results found for: PTT, INR, FIBR  POC: No results found for: BGM, HCG, HCGS  HEPATIC:   Lab Results   Component Value Date    ALBUMIN 3.5 12/08/2020    PROTTOTAL 8.0 12/08/2020    ALT 27 12/08/2020    AST 48 (H) 12/08/2020    ALKPHOS 137 12/08/2020    BILITOTAL 0.5 12/08/2020     OTHER:   Lab Results   Component Value Date    DEBBIE 8.8 12/08/2020       Anesthesia Plan     History & Physical Review      ASA  Status:  3. NPO Status:  NPO Appropriate. .  Plan for General with Intravenous and Propofol induction. Device: ETT Maintenance will be Balanced.     Additional equipment: Videolaryngoscope.    PONV prophylaxis:  Ondansetron (or other 5HT-3) and Dexamethasone or Solumedrol.       Consents  Anesthesia Plan(s) and associated risks, benefits, and realistic alternatives discussed.    Questions answered and patient/representative(s) expressed understanding.    Discussed with:  Patient.       Extended Intubation/Ventilatory Support Discussed No No       Postoperative Care  Postoperative pain management: Oral pain medications, IV analgesics and Multi-modal analgesia.           NASH Todd CRNA

## 2021-01-26 ENCOUNTER — HOSPITAL ENCOUNTER (OUTPATIENT)
Facility: CLINIC | Age: 46
Discharge: HOME | End: 2021-01-26
Attending: SURGERY | Admitting: SURGERY
Payer: COMMERCIAL

## 2021-01-26 ENCOUNTER — ANESTHESIA (OUTPATIENT)
Dept: SURGERY | Facility: CLINIC | Age: 46
End: 2021-01-26
Payer: COMMERCIAL

## 2021-01-26 VITALS
HEIGHT: 64 IN | TEMPERATURE: 97.8 F | WEIGHT: 256 LBS | DIASTOLIC BLOOD PRESSURE: 72 MMHG | SYSTOLIC BLOOD PRESSURE: 106 MMHG | OXYGEN SATURATION: 93 % | RESPIRATION RATE: 14 BRPM | HEART RATE: 88 BPM | BODY MASS INDEX: 43.71 KG/M2

## 2021-01-26 DIAGNOSIS — K43.9 VENTRAL HERNIA WITHOUT OBSTRUCTION OR GANGRENE: ICD-10-CM

## 2021-01-26 DIAGNOSIS — G89.18 POSTOPERATIVE PAIN: Primary | ICD-10-CM

## 2021-01-26 PROCEDURE — 250N000009 HC RX 250: Performed by: NURSE ANESTHETIST, CERTIFIED REGISTERED

## 2021-01-26 PROCEDURE — 710N000009 HC RECOVERY PHASE 1, LEVEL 1, PER MIN: Performed by: SURGERY

## 2021-01-26 PROCEDURE — 250N000009 HC RX 250: Performed by: SURGERY

## 2021-01-26 PROCEDURE — 370N000017 HC ANESTHESIA TECHNICAL FEE, PER MIN: Performed by: SURGERY

## 2021-01-26 PROCEDURE — 49568 PR REPAIR HERNIA WITH MESH: CPT | Performed by: SURGERY

## 2021-01-26 PROCEDURE — 250N000013 HC RX MED GY IP 250 OP 250 PS 637: Performed by: NURSE ANESTHETIST, CERTIFIED REGISTERED

## 2021-01-26 PROCEDURE — 250N000013 HC RX MED GY IP 250 OP 250 PS 637: Performed by: SURGERY

## 2021-01-26 PROCEDURE — 250N000011 HC RX IP 250 OP 636: Performed by: SURGERY

## 2021-01-26 PROCEDURE — 49561 PR REPAIR INITIAL INCISIONAL HERNIA; INCARCERATED OR STRANGULATED: CPT | Performed by: SURGERY

## 2021-01-26 PROCEDURE — 88302 TISSUE EXAM BY PATHOLOGIST: CPT | Mod: 26 | Performed by: PATHOLOGY

## 2021-01-26 PROCEDURE — 360N000077 HC SURGERY LEVEL 4, PER MIN: Performed by: SURGERY

## 2021-01-26 PROCEDURE — 258N000003 HC RX IP 258 OP 636: Performed by: NURSE ANESTHETIST, CERTIFIED REGISTERED

## 2021-01-26 PROCEDURE — 272N000001 HC OR GENERAL SUPPLY STERILE: Performed by: SURGERY

## 2021-01-26 PROCEDURE — 250N000011 HC RX IP 250 OP 636: Performed by: NURSE ANESTHETIST, CERTIFIED REGISTERED

## 2021-01-26 PROCEDURE — 999N000141 HC STATISTIC PRE-PROCEDURE NURSING ASSESSMENT: Performed by: SURGERY

## 2021-01-26 PROCEDURE — 88304 TISSUE EXAM BY PATHOLOGIST: CPT | Mod: TC | Performed by: SURGERY

## 2021-01-26 PROCEDURE — 710N000012 HC RECOVERY PHASE 2, PER MINUTE: Performed by: SURGERY

## 2021-01-26 PROCEDURE — 250N000025 HC SEVOFLURANE, PER MIN: Performed by: SURGERY

## 2021-01-26 PROCEDURE — 49568 PR REPAIR HERNIA WITH MESH: CPT | Mod: AS | Performed by: PHYSICIAN ASSISTANT

## 2021-01-26 PROCEDURE — 49561 PR REPAIR INITIAL INCISIONAL HERNIA; INCARCERATED OR STRANGULATED: CPT | Mod: AS | Performed by: PHYSICIAN ASSISTANT

## 2021-01-26 RX ORDER — FENTANYL CITRATE 50 UG/ML
INJECTION, SOLUTION INTRAMUSCULAR; INTRAVENOUS PRN
Status: DISCONTINUED | OUTPATIENT
Start: 2021-01-26 | End: 2021-01-26

## 2021-01-26 RX ORDER — CEFAZOLIN SODIUM 1 G/50ML
1 INJECTION, SOLUTION INTRAVENOUS SEE ADMIN INSTRUCTIONS
Status: DISCONTINUED | OUTPATIENT
Start: 2021-01-26 | End: 2021-01-26 | Stop reason: HOSPADM

## 2021-01-26 RX ORDER — ALBUTEROL SULFATE 1.25 MG/3ML
1.25 SOLUTION RESPIRATORY (INHALATION) EVERY 6 HOURS PRN
COMMUNITY
End: 2021-12-26

## 2021-01-26 RX ORDER — HYDROCODONE BITARTRATE AND ACETAMINOPHEN 10; 325 MG/1; MG/1
1 TABLET ORAL EVERY 4 HOURS PRN
Qty: 20 TABLET | Refills: 0 | Status: SHIPPED | OUTPATIENT
Start: 2021-01-26 | End: 2021-07-07

## 2021-01-26 RX ORDER — CELECOXIB 200 MG/1
200 CAPSULE ORAL ONCE
Status: COMPLETED | OUTPATIENT
Start: 2021-01-26 | End: 2021-01-26

## 2021-01-26 RX ORDER — PROPOFOL 10 MG/ML
INJECTION, EMULSION INTRAVENOUS PRN
Status: DISCONTINUED | OUTPATIENT
Start: 2021-01-26 | End: 2021-01-26

## 2021-01-26 RX ORDER — MAGNESIUM SULFATE HEPTAHYDRATE 40 MG/ML
2 INJECTION, SOLUTION INTRAVENOUS ONCE
Status: COMPLETED | OUTPATIENT
Start: 2021-01-26 | End: 2021-01-26

## 2021-01-26 RX ORDER — ACETAMINOPHEN 325 MG/1
975 TABLET ORAL ONCE
Status: COMPLETED | OUTPATIENT
Start: 2021-01-26 | End: 2021-01-26

## 2021-01-26 RX ORDER — ONDANSETRON 2 MG/ML
INJECTION INTRAMUSCULAR; INTRAVENOUS PRN
Status: DISCONTINUED | OUTPATIENT
Start: 2021-01-26 | End: 2021-01-26

## 2021-01-26 RX ORDER — HYDROXYZINE HYDROCHLORIDE 25 MG/1
25 TABLET, FILM COATED ORAL EVERY 6 HOURS PRN
Status: DISCONTINUED | OUTPATIENT
Start: 2021-01-26 | End: 2021-01-26 | Stop reason: HOSPADM

## 2021-01-26 RX ORDER — NALOXONE HYDROCHLORIDE 0.4 MG/ML
0.2 INJECTION, SOLUTION INTRAMUSCULAR; INTRAVENOUS; SUBCUTANEOUS
Status: DISCONTINUED | OUTPATIENT
Start: 2021-01-26 | End: 2021-01-26 | Stop reason: HOSPADM

## 2021-01-26 RX ORDER — FENTANYL CITRATE 50 UG/ML
25-50 INJECTION, SOLUTION INTRAMUSCULAR; INTRAVENOUS
Status: DISCONTINUED | OUTPATIENT
Start: 2021-01-26 | End: 2021-01-26 | Stop reason: HOSPADM

## 2021-01-26 RX ORDER — ALBUTEROL SULFATE 90 UG/1
AEROSOL, METERED RESPIRATORY (INHALATION) PRN
Status: DISCONTINUED | OUTPATIENT
Start: 2021-01-26 | End: 2021-01-26

## 2021-01-26 RX ORDER — ONDANSETRON 2 MG/ML
4 INJECTION INTRAMUSCULAR; INTRAVENOUS EVERY 30 MIN PRN
Status: DISCONTINUED | OUTPATIENT
Start: 2021-01-26 | End: 2021-01-26 | Stop reason: HOSPADM

## 2021-01-26 RX ORDER — HYDROXYZINE HYDROCHLORIDE 50 MG/1
50 TABLET, FILM COATED ORAL EVERY 6 HOURS PRN
Status: DISCONTINUED | OUTPATIENT
Start: 2021-01-26 | End: 2021-01-26 | Stop reason: HOSPADM

## 2021-01-26 RX ORDER — SODIUM CHLORIDE, SODIUM LACTATE, POTASSIUM CHLORIDE, CALCIUM CHLORIDE 600; 310; 30; 20 MG/100ML; MG/100ML; MG/100ML; MG/100ML
INJECTION, SOLUTION INTRAVENOUS CONTINUOUS
Status: DISCONTINUED | OUTPATIENT
Start: 2021-01-26 | End: 2021-01-26 | Stop reason: HOSPADM

## 2021-01-26 RX ORDER — GLYCOPYRROLATE 0.2 MG/ML
INJECTION, SOLUTION INTRAMUSCULAR; INTRAVENOUS PRN
Status: DISCONTINUED | OUTPATIENT
Start: 2021-01-26 | End: 2021-01-26

## 2021-01-26 RX ORDER — ACETAMINOPHEN 325 MG/1
975 TABLET ORAL ONCE
Status: CANCELLED | OUTPATIENT
Start: 2021-01-26 | End: 2021-01-26

## 2021-01-26 RX ORDER — PHENYLEPHRINE HYDROCHLORIDE 10 MG/ML
INJECTION INTRAVENOUS PRN
Status: DISCONTINUED | OUTPATIENT
Start: 2021-01-26 | End: 2021-01-26

## 2021-01-26 RX ORDER — CEFAZOLIN SODIUM 2 G/100ML
2 INJECTION, SOLUTION INTRAVENOUS
Status: COMPLETED | OUTPATIENT
Start: 2021-01-26 | End: 2021-01-26

## 2021-01-26 RX ORDER — LIDOCAINE 40 MG/G
CREAM TOPICAL
Status: DISCONTINUED | OUTPATIENT
Start: 2021-01-26 | End: 2021-01-26 | Stop reason: HOSPADM

## 2021-01-26 RX ORDER — KETAMINE HYDROCHLORIDE 10 MG/ML
INJECTION, SOLUTION INTRAMUSCULAR; INTRAVENOUS PRN
Status: DISCONTINUED | OUTPATIENT
Start: 2021-01-26 | End: 2021-01-26

## 2021-01-26 RX ORDER — DEXAMETHASONE SODIUM PHOSPHATE 4 MG/ML
INJECTION, SOLUTION INTRA-ARTICULAR; INTRALESIONAL; INTRAMUSCULAR; INTRAVENOUS; SOFT TISSUE PRN
Status: DISCONTINUED | OUTPATIENT
Start: 2021-01-26 | End: 2021-01-26

## 2021-01-26 RX ORDER — LIDOCAINE HYDROCHLORIDE 10 MG/ML
INJECTION, SOLUTION INFILTRATION; PERINEURAL PRN
Status: DISCONTINUED | OUTPATIENT
Start: 2021-01-26 | End: 2021-01-26

## 2021-01-26 RX ORDER — NALOXONE HYDROCHLORIDE 0.4 MG/ML
0.4 INJECTION, SOLUTION INTRAMUSCULAR; INTRAVENOUS; SUBCUTANEOUS
Status: DISCONTINUED | OUTPATIENT
Start: 2021-01-26 | End: 2021-01-26 | Stop reason: HOSPADM

## 2021-01-26 RX ORDER — MEPERIDINE HYDROCHLORIDE 25 MG/ML
12.5 INJECTION INTRAMUSCULAR; INTRAVENOUS; SUBCUTANEOUS
Status: DISCONTINUED | OUTPATIENT
Start: 2021-01-26 | End: 2021-01-26 | Stop reason: HOSPADM

## 2021-01-26 RX ORDER — LIDOCAINE HYDROCHLORIDE AND EPINEPHRINE 10; 10 MG/ML; UG/ML
INJECTION, SOLUTION INFILTRATION; PERINEURAL PRN
Status: DISCONTINUED | OUTPATIENT
Start: 2021-01-26 | End: 2021-01-26 | Stop reason: HOSPADM

## 2021-01-26 RX ORDER — HYDROCODONE BITARTRATE AND ACETAMINOPHEN 5; 325 MG/1; MG/1
2 TABLET ORAL EVERY 4 HOURS PRN
Status: DISCONTINUED | OUTPATIENT
Start: 2021-01-26 | End: 2021-01-26 | Stop reason: HOSPADM

## 2021-01-26 RX ORDER — ONDANSETRON 4 MG/1
4 TABLET, ORALLY DISINTEGRATING ORAL EVERY 30 MIN PRN
Status: DISCONTINUED | OUTPATIENT
Start: 2021-01-26 | End: 2021-01-26 | Stop reason: HOSPADM

## 2021-01-26 RX ADMIN — GLYCOPYRROLATE 0.2 MG: 0.2 INJECTION, SOLUTION INTRAMUSCULAR; INTRAVENOUS at 10:58

## 2021-01-26 RX ADMIN — KETAMINE HYDROCHLORIDE 20 MG: 10 INJECTION INTRAMUSCULAR; INTRAVENOUS at 12:00

## 2021-01-26 RX ADMIN — FENTANYL CITRATE 50 MCG: 50 INJECTION, SOLUTION INTRAMUSCULAR; INTRAVENOUS at 16:33

## 2021-01-26 RX ADMIN — FENTANYL CITRATE 150 MCG: 50 INJECTION, SOLUTION INTRAMUSCULAR; INTRAVENOUS at 10:58

## 2021-01-26 RX ADMIN — FENTANYL CITRATE 100 MCG: 50 INJECTION, SOLUTION INTRAMUSCULAR; INTRAVENOUS at 11:22

## 2021-01-26 RX ADMIN — HYDROCODONE BITARTRATE AND ACETAMINOPHEN 1 TABLET: 5; 325 TABLET ORAL at 14:22

## 2021-01-26 RX ADMIN — PROPOFOL 200 MG: 10 INJECTION, EMULSION INTRAVENOUS at 10:58

## 2021-01-26 RX ADMIN — LIDOCAINE HYDROCHLORIDE 0.1 ML: 10 INJECTION, SOLUTION EPIDURAL; INFILTRATION; INTRACAUDAL; PERINEURAL at 10:03

## 2021-01-26 RX ADMIN — MAGNESIUM SULFATE HEPTAHYDRATE 2 G: 40 INJECTION, SOLUTION INTRAVENOUS at 11:20

## 2021-01-26 RX ADMIN — PHENYLEPHRINE HYDROCHLORIDE 200 MCG: 10 INJECTION INTRAVENOUS at 11:44

## 2021-01-26 RX ADMIN — MIDAZOLAM 2 MG: 1 INJECTION INTRAMUSCULAR; INTRAVENOUS at 11:39

## 2021-01-26 RX ADMIN — PHENYLEPHRINE HYDROCHLORIDE 100 MCG: 10 INJECTION INTRAVENOUS at 11:29

## 2021-01-26 RX ADMIN — HYDROMORPHONE HYDROCHLORIDE 1 MG: 1 INJECTION, SOLUTION INTRAMUSCULAR; INTRAVENOUS; SUBCUTANEOUS at 11:27

## 2021-01-26 RX ADMIN — CELECOXIB 200 MG: 200 CAPSULE ORAL at 10:01

## 2021-01-26 RX ADMIN — FENTANYL CITRATE 25 MCG: 50 INJECTION, SOLUTION INTRAMUSCULAR; INTRAVENOUS at 13:11

## 2021-01-26 RX ADMIN — CEFAZOLIN SODIUM 2 G: 2 INJECTION, SOLUTION INTRAVENOUS at 10:51

## 2021-01-26 RX ADMIN — MIDAZOLAM 2 MG: 1 INJECTION INTRAMUSCULAR; INTRAVENOUS at 10:51

## 2021-01-26 RX ADMIN — ACETAMINOPHEN 975 MG: 325 TABLET, FILM COATED ORAL at 10:01

## 2021-01-26 RX ADMIN — ALBUTEROL SULFATE 6 PUFF: 90 AEROSOL, METERED RESPIRATORY (INHALATION) at 12:03

## 2021-01-26 RX ADMIN — KETAMINE HYDROCHLORIDE 30 MG: 10 INJECTION INTRAMUSCULAR; INTRAVENOUS at 10:58

## 2021-01-26 RX ADMIN — SUGAMMADEX 200 MG: 100 INJECTION, SOLUTION INTRAVENOUS at 12:07

## 2021-01-26 RX ADMIN — HYDROCODONE BITARTRATE AND ACETAMINOPHEN 1 TABLET: 5; 325 TABLET ORAL at 16:33

## 2021-01-26 RX ADMIN — SODIUM CHLORIDE, POTASSIUM CHLORIDE, SODIUM LACTATE AND CALCIUM CHLORIDE: 600; 310; 30; 20 INJECTION, SOLUTION INTRAVENOUS at 10:03

## 2021-01-26 RX ADMIN — LIDOCAINE HYDROCHLORIDE 100 MG: 10 INJECTION, SOLUTION INFILTRATION; PERINEURAL at 10:58

## 2021-01-26 RX ADMIN — ALBUTEROL SULFATE 6 PUFF: 90 AEROSOL, METERED RESPIRATORY (INHALATION) at 11:02

## 2021-01-26 RX ADMIN — DEXAMETHASONE SODIUM PHOSPHATE 8 MG: 4 INJECTION, SOLUTION INTRA-ARTICULAR; INTRALESIONAL; INTRAMUSCULAR; INTRAVENOUS; SOFT TISSUE at 10:58

## 2021-01-26 RX ADMIN — ROCURONIUM BROMIDE 50 MG: 10 INJECTION INTRAVENOUS at 10:58

## 2021-01-26 RX ADMIN — HYDROXYZINE HYDROCHLORIDE 25 MG: 25 TABLET, FILM COATED ORAL at 14:22

## 2021-01-26 RX ADMIN — SODIUM CHLORIDE, POTASSIUM CHLORIDE, SODIUM LACTATE AND CALCIUM CHLORIDE 1000 ML: 600; 310; 30; 20 INJECTION, SOLUTION INTRAVENOUS at 13:49

## 2021-01-26 RX ADMIN — ONDANSETRON 4 MG: 2 INJECTION INTRAMUSCULAR; INTRAVENOUS at 12:07

## 2021-01-26 RX ADMIN — PHENYLEPHRINE HYDROCHLORIDE 150 MCG: 10 INJECTION INTRAVENOUS at 11:37

## 2021-01-26 RX ADMIN — FENTANYL CITRATE 25 MCG: 50 INJECTION, SOLUTION INTRAMUSCULAR; INTRAVENOUS at 13:45

## 2021-01-26 ASSESSMENT — LIFESTYLE VARIABLES: TOBACCO_USE: 1

## 2021-01-26 ASSESSMENT — MIFFLIN-ST. JEOR: SCORE: 1801.21

## 2021-01-26 NOTE — OP NOTE
Preop diagnosis: Recurrent ventral hernia    Postop diagnosis:  recurrent incarcerated ventral hernia x2    Procedure: Open repair of recurrent incarcerated ventral hernia x2    Surgeon: Jonah    Assistant surgeon: James Peñaloza PA-C (needed for expertise in retraction hemostasis wound closure and suctioning)    Anesthesia: General endotracheal Styles CRNA    Procedure: Patient's anterior abdomen was cleaned and draped in a sterile manner.  1% lidocaine with epinephrine was used anesthetize the skin overlying patient's palpable ventral mass.  20 cm vertical incision made overlying patient's previous scar and subcutaneous tissues dissected to palpable mass.  There were 2 masses in upper and lower.  The upper was dissected free from the surrounding subcutaneous tissue which was densely scarred in place.  This took approximately 45 minutes until the defect was located.  It only measured about 1 cm and had protruding incarcerated preperitoneal fat which was amputated.  This defect was closed using an 0 Prolene figure-of-eight suture.  Beneath this was a much larger hernia.  This was also scarred in place and took quite some time dissecting down to the fascia.  It had incarcerated:.  The sac was opened and the colon was adhered to the inside of the sac.  Once this was dissected free to the level of the fascia of the sac was then amputated and sent to pathology.  Remaining attachments of the colon to the anterior abdominal wall were taken down to make room for closure.  Due to the amount of intra-abdominal adhesions, no attempt was made to place intra-abdominal mesh or to convert this to a laparoscopic procedure.  Instead, the fascial defect was measured approximately 8 cm was closed using 10 interrupted 0 Prolene sutures.  When complete the fascial defect was completely obliterated and the hernia contents satisfactorily reduced.  The entire fascia was then injected with lidocaine.  Subcutaneous tissue was irrigated with  normal saline.  Subcutaneous tissue was approximated with interrupted 2-0 Vicryl sutures and Karl's fascia was then approximated using a running 3-0 Vicryl deep dermal suture and finally the skin was closed with a 4-0 Vicryl running subcuticular stitch and then dressed with Dermabond.    Estimated blood loss: 10 mL    IV fluid: 700 mL    Addendum: Greater than 100 mg morphine equivalents of postoperative pain medication will be given to the patient due to the extensive nature of the operation and lysis of adhesions.

## 2021-01-26 NOTE — ANESTHESIA PROCEDURE NOTES
Airway   Date/Time: 1/26/2021 11:01 AM   Patient location during procedure: OR  Staff -   CRNA: Louise Styles APRN CRNA  Performed By: CRNA    Consent for Airway   Urgency: elective    Indications and Patient Condition  Indications for airway management: puja-procedural and airway protection  Induction type:intravenousMask difficulty assessment: 2 - vent by mask + OA or adjuvant +/- NMBA    Final Airway Details  Final airway type: endotracheal airway  Successful airway:ETT - single  Endotracheal Airway Details   ETT size (mm): 7.5  Cuffed: yes  Successful intubation technique: video laryngoscopy  Grade View of Cords: 2  Adjucts: stylet  Measured from: gums/teeth  Secured at (cm): 23  Secured with: paper tape  Bite block used: None    Post intubation assessment   Placement verified by: capnometry, equal breath sounds and chest rise   Number of attempts at approach: 1  Secured with:paper tape  Ease of procedure: easy  Dentition: Intact

## 2021-01-26 NOTE — ANESTHESIA POSTPROCEDURE EVALUATION
Patient: Elza Greer    Procedure(s):  Open recurrent incarcerated ventral hernia repair X2    Diagnosis:Ventral hernia without obstruction or gangrene [K43.9]  Diagnosis Additional Information: No value filed.    Anesthesia Type:  General    Note:  Disposition: Outpatient   Postop Pain Control: Uneventful            Sign Out: Well controlled pain   PONV: No   Neuro/Psych: Uneventful            Sign Out: Acceptable/Baseline neuro status   Airway/Respiratory: Uneventful            Sign Out: Acceptable/Baseline resp. status   CV/Hemodynamics:    Other NRE: NONE   DID A NON-ROUTINE EVENT OCCUR? No         Last vitals:  Vitals:    01/26/21 1400 01/26/21 1430 01/26/21 1450   BP: 104/72 100/67    Pulse: 81 75    Resp: 10 16    Temp:      SpO2: 98% 97% 97%       Electronically Signed By: NASH Abreu CRNA  January 26, 2021  2:59 PM

## 2021-01-26 NOTE — ANESTHESIA CARE TRANSFER NOTE
Patient: Elza Greer    Procedure(s):  Open recurrent incarcerated ventral hernia repair X2    Diagnosis: Ventral hernia without obstruction or gangrene [K43.9]  Diagnosis Additional Information: No value filed.    Anesthesia Type:   General     Note:    Oropharynx: oropharynx clear of all foreign objects  Level of Consciousness: drowsy  Oxygen Supplementation: face mask    Independent Airway: airway patency satisfactory and stable  Dentition: dentition unchanged  Vital Signs Stable: post-procedure vital signs reviewed and stable  Report to RN Given: handoff report given  Patient transferred to: PACU    Handoff Report: Identifed the Patient, Identified the Reponsible Provider, Reviewed the pertinent medical history, Discussed the surgical course, Reviewed Intra-OP anesthesia mangement and issues during anesthesia, Set expectations for post-procedure period and Allowed opportunity for questions and acknowledgement of understanding      Vitals: (Last set prior to Anesthesia Care Transfer)  CRNA VITALS  1/26/2021 1209 - 1/26/2021 1244      1/26/2021             Pulse:  106    SpO2:  98 %    Resp Rate (observed):  8        Electronically Signed By: NASH Zapata CRNA  January 26, 2021  12:44 PM

## 2021-01-26 NOTE — OR NURSING
Report given to Renea PATHAK RN. Pt care transferred.    Cortney Anthony RN on 1/26/2021 at 3:05 PM

## 2021-01-26 NOTE — DISCHARGE INSTRUCTIONS
DISCHARGE INSTRUCTIONS     1.  Wear abdominal binder as necessary for pain control.    2. No heavy lifting (>30lbs)  for 6 weeks.    3. You will have some discomfort at the incision sites. This is expected. This should improve over the next 2-3 days. Ice and pain medication will help with this pain. Use prescribed pain medication as instructed.     4. Some bruising and mild swelling is normal after surgery. The area below and around the incision(s) may be hard and elevated. This is part of the normal healing process. This will resolve slowly over the next several months.     5. Your wounds are covered with glue. The glue is water tight and so you can shower or bathe immediately following surgery.     6. Use the following medications (in addition to your normal meds) as shown:      Tylenol (acetaminophen) 500 mg every 6 hours as needed for pain.    Do not take more than 1000 mg of Tylenol every 6 hours -OR- 4g in a day    Motrin (ibuprophen) 600 mg every 6 hours as needed for pain. Take with food.     8. Notify Clinic at [phone] if:     Your discomfort is not relieved by your pain medication     You have signs of infection such as temperature above 100.4 degrees orally,  chills, or increasing daily discomfort.     Incision site is becoming more red and/or there is purulent drainage.      9. Follow up with Dr Mckenzie in 1-2 weeks.                        Same Day Surgery Discharge Instructions  Special Precautions After Surgery - Adult    1. It is not unusual to feel lightheaded or faint, up to 24 hours after surgery or while taking pain medication.  If you have these symptoms; sit for a few minutes before standing and have someone assist you when getting up.  2. You should rest and relax for the next 24 hours and must have someone stay with you for at least 24 hours after your discharge.  3. DO NOT DRIVE any vehicle or operate mechanical equipment for 24 hours following the end of your surgery.  DO NOT DRIVE while  taking narcotic pain medications that have been prescribed by your physician.  If you had a limb operated on, you must be able to use it fully to drive.  4. DO NOT drink alcoholic beverages for 24 hours following surgery or while taking prescription pain medication.  5. Drink clear liquids (apple juice, ginger ale, broth, 7-Up, etc.).  Progress to your regular diet as you feel able.  6. Any questions call your physician and do not make important decisions for 24 hours.    ACTIVITY  ? Rest today.  No activity or diet restrictions.  ? Resume activity as tolerated.  ? Restrictions: per Dr Mckenzie  ? See printed discharge sheet.     INCISIONAL CARE  ? Apply ice 1/2 hour on and 1/2 hour off while awake.  ? Be alert for signs of infection:  redness, swelling, heat, drainage of pus, and/or elevated temperature.  Contact your doctor if these occur.        Call for an appointment to return to the clinic as directed    Medications:  ? Hydrocodone (Norco, Vicodin):  Last  dose: 4:30.  ? Ibuprofen (Motrin, Advil):  Next dose: 8 pm.  ? Stool softeners to prevent constipation  ? Follow the instructions on the bottle.    __________________________________________________________________________________________________________________________________  IMPORTANT NUMBERS:    [facility] Main Number:  [phone], [phone]  Pharmacy:  [phone]  Same Day Surgery:  [phone], Monday - Friday until 8:30 p.m.  Urgent Care:  [phone]  Emergency Room:  [phone]      [facility]:  [phone]

## 2021-01-26 NOTE — ANESTHESIA POSTPROCEDURE EVALUATION
Patient: Elza Greer    Procedure(s):  Open recurrent incarcerated ventral hernia repair X2    Diagnosis:Ventral hernia without obstruction or gangrene [K43.9]  Diagnosis Additional Information: No value filed.    Anesthesia Type:  General    Note:  Disposition: Outpatient   Postop Pain Control: Uneventful            Sign Out: Well controlled pain   PONV: No   Neuro/Psych:    Airway/Respiratory: Uneventful            Sign Out: Acceptable/Baseline resp. status   CV/Hemodynamics: Uneventful            Sign Out: Acceptable CV status   Other NRE: NONE   DID A NON-ROUTINE EVENT OCCUR? No         Last vitals:  Vitals:    01/26/21 1400 01/26/21 1430 01/26/21 1450   BP: 104/72 100/67    Pulse: 81 75    Resp: 10 16    Temp:      SpO2: 98% 97% 97%       Electronically Signed By: NASH Abreu CRNA  January 26, 2021  2:57 PM

## 2021-01-28 LAB — COPATH REPORT: NORMAL

## 2021-02-03 ENCOUNTER — OFFICE VISIT (OUTPATIENT)
Dept: SURGERY | Facility: CLINIC | Age: 46
End: 2021-02-03
Payer: COMMERCIAL

## 2021-02-03 VITALS — TEMPERATURE: 98.1 F | HEART RATE: 91 BPM | DIASTOLIC BLOOD PRESSURE: 88 MMHG | SYSTOLIC BLOOD PRESSURE: 153 MMHG

## 2021-02-03 DIAGNOSIS — Z09 POSTOP CHECK: ICD-10-CM

## 2021-02-03 DIAGNOSIS — G89.18 POSTOPERATIVE PAIN: Primary | ICD-10-CM

## 2021-02-03 PROCEDURE — 99024 POSTOP FOLLOW-UP VISIT: CPT | Performed by: SURGERY

## 2021-02-03 RX ORDER — HYDROCODONE BITARTRATE AND ACETAMINOPHEN 10; 325 MG/1; MG/1
1 TABLET ORAL EVERY 4 HOURS PRN
Qty: 20 TABLET | Refills: 0 | Status: SHIPPED | OUTPATIENT
Start: 2021-02-03 | End: 2021-07-07

## 2021-02-03 NOTE — LETTER
2/3/2021         RE: Elza Greer  [address] [facility] [address]        Dear Colleague,    Thank you for referring your patient, Elza Greer, to the [facility]. Please see a copy of my visit note below.    Patient here for follow-up.  She reports some drainage from her midline wound.  It is mostly serous and bloody.  She also reports a palpable lump beneath the wound.  Otherwise pain is controlled although she still taking the pain medication mostly at night.    Exam:  Wound appears clean dry and intact but there is some areas where a hematoma is obviously decompressing.      Assessment and plan: Hematoma /seroma underneath wound not completely unexpected.  Advised patient to wash daily with soap and water expecting some drainage no need for antibiotics.  Prescription for Vicodin renewed.    Return to clinic as necessary.    Pradip Mckenzie MD       Again, thank you for allowing me to participate in the care of your patient.        Sincerely,        Pradip Mckenzie MD

## 2021-02-03 NOTE — NURSING NOTE
"Chief Complaint   Patient presents with     Surgical Followup     Ventral Hernia Repair        Initial BP (!) 153/88 (BP Location: Left arm, Patient Position: Chair, Cuff Size: Adult Large)   Pulse 91   Temp 98.1  F (36.7  C) (Tympanic)  Estimated body mass index is 43.94 kg/m  as calculated from the following:    Height as of 1/26/21: 1.626 m (5' 4\").    Weight as of 1/26/21: 116.1 kg (256 lb).  BP completed using cuff size: large  Medications and allergies reviewed.      Bozena SMITH CMA     "

## 2021-02-03 NOTE — PROGRESS NOTES
Patient here for follow-up.  She reports some drainage from her midline wound.  It is mostly serous and bloody.  She also reports a palpable lump beneath the wound.  Otherwise pain is controlled although she still taking the pain medication mostly at night.    Exam:  Wound appears clean dry and intact but there is some areas where a hematoma is obviously decompressing.      Assessment and plan: Hematoma /seroma underneath wound not completely unexpected.  Advised patient to wash daily with soap and water expecting some drainage no need for antibiotics.  Prescription for Vicodin renewed.    Return to clinic as necessary.    Pradip Mckenzie MD

## 2021-02-03 NOTE — PATIENT INSTRUCTIONS
Per physician instructions.    If you have questions or concerns on any instructions given to you by your provider today or if you need to schedule an appointment, you can reach us at [phone].    Thank you!

## 2021-02-03 NOTE — LETTER
[facility]  [address] [facility] [address]  [phone]          February 3, 2021    RE:  Elza Greer                                                                                                                                                       [address] [facility] [address]            To whom it may concern:    Elza Greer is under my professional care for recent surgery.  She is Ok to return to work on Feb 22, 2021 but can not lift greater than 10 lbs until March 15, 2021.       Sincerely,      Pradip Mckenzie MD

## 2021-03-09 ENCOUNTER — TELEPHONE (OUTPATIENT)
Dept: SURGERY | Facility: CLINIC | Age: 46
End: 2021-03-09

## 2021-03-09 NOTE — TELEPHONE ENCOUNTER
Reason for Call:  Other      Detailed comments: Pt had hernia surgery on 01/26. For the last week pt has been getting right sided pain in her upper stomach, near the incision. Has gotten really bad in the last couple days. Has been icing and taking Advil - helps some, but does not totally relieve the pain. No fever. Says the incision area does not look bruised or infected. - Please contact pt     Phone Number Patient can be reached at: Home number on file [phone] (home)    Best Time: Any    Can we leave a detailed message on this number? YES    Call taken on 3/9/2021 at 9:44 AM by Denise Behrendt

## 2021-03-09 NOTE — TELEPHONE ENCOUNTER
Call placed to discuss.  Scheduled appt for pt to obtain reassurance.    Latha SUAREZ   [facility]

## 2021-03-10 ENCOUNTER — OFFICE VISIT (OUTPATIENT)
Dept: SURGERY | Facility: CLINIC | Age: 46
End: 2021-03-10
Payer: COMMERCIAL

## 2021-03-10 VITALS
TEMPERATURE: 97.9 F | HEIGHT: 64 IN | HEART RATE: 92 BPM | SYSTOLIC BLOOD PRESSURE: 132 MMHG | WEIGHT: 262.4 LBS | DIASTOLIC BLOOD PRESSURE: 69 MMHG | BODY MASS INDEX: 44.8 KG/M2

## 2021-03-10 DIAGNOSIS — Z09 POSTOP CHECK: ICD-10-CM

## 2021-03-10 DIAGNOSIS — T81.49XA INCISIONAL INFECTION: Primary | ICD-10-CM

## 2021-03-10 PROCEDURE — 99024 POSTOP FOLLOW-UP VISIT: CPT | Performed by: SURGERY

## 2021-03-10 RX ORDER — CEPHALEXIN 500 MG/1
500 CAPSULE ORAL 2 TIMES DAILY
Qty: 28 CAPSULE | Refills: 0 | Status: SHIPPED | OUTPATIENT
Start: 2021-03-10 | End: 2021-07-07

## 2021-03-10 ASSESSMENT — MIFFLIN-ST. JEOR: SCORE: 1830.24

## 2021-03-10 NOTE — NURSING NOTE
"Initial /69 (BP Location: Right arm, Patient Position: Sitting, Cuff Size: Adult Large)   Pulse 92   Temp 97.9  F (36.6  C) (Tympanic)   Ht 1.626 m (5' 4\")   Wt 119 kg (262 lb 6.4 oz)   BMI 45.04 kg/m   Estimated body mass index is 45.04 kg/m  as calculated from the following:    Height as of this encounter: 1.626 m (5' 4\").    Weight as of this encounter: 119 kg (262 lb 6.4 oz). .    Marianela Crooks MA    "

## 2021-03-10 NOTE — PROGRESS NOTES
Patient seen for second follow-up visit.  Having some slight muscle cramping on the right side.  Her laparoscopic incision in the right upper quadrant slightly erythematous and has occasional serous drainage.  There is a small 5 mm exposed Vicryl suture in the lower edge of her midline wound.  This is not bothering her.      Assessment and plan: Small wound infection of laparoscopic incision.  Started patient on 1 week of Keflex.  Everything else is normal postoperative healing.  Return to clinic again as necessary.    Pradip Mckenzie MD

## 2021-03-10 NOTE — LETTER
3/10/2021         RE: Elza Greer  [address] [facility] [address]        Dear Colleague,    Thank you for referring your patient, Elza Greer, to the [facility]. Please see a copy of my visit note below.    Patient seen for second follow-up visit.  Having some slight muscle cramping on the right side.  Her laparoscopic incision in the right upper quadrant slightly erythematous and has occasional serous drainage.  There is a small 5 mm exposed Vicryl suture in the lower edge of her midline wound.  This is not bothering her.      Assessment and plan: Small wound infection of laparoscopic incision.  Started patient on 1 week of Keflex.  Everything else is normal postoperative healing.  Return to clinic again as necessary.    Pradip Mckenzie MD       Again, thank you for allowing me to participate in the care of your patient.        Sincerely,        Pradip Mckenzie MD

## 2021-03-14 ENCOUNTER — HEALTH MAINTENANCE LETTER (OUTPATIENT)
Age: 46
End: 2021-03-14

## 2021-04-22 ENCOUNTER — AMBULATORY - HEALTHEAST (OUTPATIENT)
Dept: NURSING | Facility: CLINIC | Age: 46
End: 2021-04-22

## 2021-05-02 ENCOUNTER — HOSPITAL ENCOUNTER (EMERGENCY)
Facility: CLINIC | Age: 46
Discharge: HOME | End: 2021-05-02
Attending: PHYSICIAN ASSISTANT | Admitting: PHYSICIAN ASSISTANT
Payer: COMMERCIAL

## 2021-05-02 ENCOUNTER — APPOINTMENT (OUTPATIENT)
Dept: GENERAL RADIOLOGY | Facility: CLINIC | Age: 46
End: 2021-05-02
Attending: PHYSICIAN ASSISTANT
Payer: COMMERCIAL

## 2021-05-02 VITALS
HEART RATE: 96 BPM | OXYGEN SATURATION: 97 % | BODY MASS INDEX: 43.77 KG/M2 | WEIGHT: 255 LBS | SYSTOLIC BLOOD PRESSURE: 142 MMHG | TEMPERATURE: 98.9 F | DIASTOLIC BLOOD PRESSURE: 91 MMHG | RESPIRATION RATE: 14 BRPM

## 2021-05-02 DIAGNOSIS — S89.91XA KNEE INJURY, RIGHT, INITIAL ENCOUNTER: ICD-10-CM

## 2021-05-02 PROCEDURE — G0463 HOSPITAL OUTPT CLINIC VISIT: HCPCS | Performed by: PHYSICIAN ASSISTANT

## 2021-05-02 PROCEDURE — 73560 X-RAY EXAM OF KNEE 1 OR 2: CPT | Mod: RT

## 2021-05-02 PROCEDURE — 99213 OFFICE O/P EST LOW 20 MIN: CPT | Performed by: PHYSICIAN ASSISTANT

## 2021-05-02 RX ORDER — MELOXICAM 7.5 MG/1
7.5 TABLET ORAL DAILY
Qty: 14 TABLET | Refills: 0 | Status: SHIPPED | OUTPATIENT
Start: 2021-05-02 | End: 2021-06-03

## 2021-05-02 ASSESSMENT — ENCOUNTER SYMPTOMS: CONSTITUTIONAL NEGATIVE: 1

## 2021-05-02 NOTE — LETTER
May 2, 2021      To Whom It May Concern:      Elza Greer was seen in our Emergency Department today, 05/02/21.  Please allow patient to rest her right knee as needed this next week.  Thank you.      Sincerely,        Cecilia Capps PA-C

## 2021-05-02 NOTE — ED PROVIDER NOTES
"  History     Chief Complaint   Patient presents with     Knee Injury     working out twisted with knee, pt ambulated into triage onset yesterday     HPI  Elza Greer is a 45 year old female who presents with complaints of right knee pain.  Patient states she injured her knee while working out yesterday.  She states she twisted her knee and has had pain and swelling throughout since that time.  Patient reports history of meniscal tear in this knee in the past.  She is able to put weight on the knee but has pain with doing so.  She states the knee also feels like it locks or catches and gives out.        Allergies:  Allergies   Allergen Reactions     Azithromycin Anaphylaxis     Doxycycline Other (See Comments)     Upset stomach and felt \"really out of it\"     Latex Hives     Oxycodone Nausea and Vomiting       Problem List:    Patient Active Problem List    Diagnosis Date Noted     Ventral hernia without obstruction or gangrene 01/13/2021     Priority: Medium     Added automatically from request for surgery [id number]       Anxiety 02/18/2020     Priority: Medium     Tobacco use disorder 11/14/2016     Priority: Medium     Mild intermittent asthma without complication 05/12/2010     Priority: Medium        Past Medical History:    Past Medical History:   Diagnosis Date     Adjustment disorder with mixed anxiety and depressed mood      IBS (irritable bowel syndrome)      Incisional hernia, without obstruction or gangrene 2020     Lactose intolerance 05/12/2010     Migraine 05/12/2010     Mild intermittent asthma without complication 05/12/2010     PCOS (polycystic ovarian syndrome)      Tobacco use disorder      Vitamin D deficiency        Past Surgical History:    Past Surgical History:   Procedure Laterality Date     APPENDECTOMY OPEN N/A      CYSTECTOMY OVARIAN BENIGN  2001     HEPATICOJEJUNOSTOMY  2000    RnY jejunostomy from bile duct injury     HERNIORRHAPHY VENTRAL N/A 02/17/2012    open with mesh     " HYSTERECTOMY TOTAL ABDOMINAL, BILATERAL SALPINGO-OOPHORECTOMY, COMBINED N/A 2005     LAPAROSCOPIC CHOLECYSTECTOMY  2006    complicated by bile duct injury     LAPAROSCOPIC HERNIORRHAPHY VENTRAL N/A 1/26/2021    Procedure: Open recurrent incarcerated ventral hernia repair X2;  Surgeon: Pradip Mckenzie MD;  Location: [facility] OR       Family History:    Family History   Problem Relation Age of Onset     Coronary Artery Disease Mother      Hypertension Mother      Hyperlipidemia Mother      Depression Mother      Colon Cancer Maternal Grandmother      Breast Cancer Maternal Grandmother      Other Cancer Maternal Grandmother        Social History:  Marital Status:   [2]  Social History     Tobacco Use     Smoking status: Current Every Day Smoker     Packs/day: 0.25     Types: Cigarettes     Smokeless tobacco: Never Used   Substance Use Topics     Alcohol use: No     Drug use: No        Medications:    meloxicam (MOBIC) 7.5 MG tablet  albuterol (ACCUNEB) 1.25 MG/3ML neb solution  albuterol (PROVENTIL) (2.5 MG/3ML) 0.083% neb solution  Ascorbic Acid (VITAMIN C PO)  benzonatate (TESSALON) 200 MG capsule  cephALEXin (KEFLEX) 500 MG capsule  dextromethorphan (TUSSIN COUGH) 15 MG/5ML syrup  HYDROcodone-acetaminophen (NORCO)  MG per tablet  HYDROcodone-acetaminophen (NORCO)  MG per tablet  IBUPROFEN PO  meloxicam (MOBIC) 7.5 MG tablet  Multiple Vitamins-Calcium (ONE-A-DAY WOMENS PO)  nicotine (NICODERM CQ) 14 MG/24HR 24 hr patch  predniSONE (DELTASONE) 20 MG tablet  vitamin D2 (ERGOCALCIFEROL) 18146 units (1250 mcg) capsule          Review of Systems   Constitutional: Negative.    Musculoskeletal:        Right knee pain and swelling   Skin: Negative.    All other systems reviewed and are negative.      Physical Exam   BP: (!) 142/91  Pulse: 96  Temp: 98.9  F (37.2  C)  Resp: 14  Weight: 115.7 kg (255 lb)  SpO2: 97 %      Physical Exam  Constitutional:       General: She is not in acute distress.     Appearance:  Normal appearance. She is not ill-appearing, toxic-appearing or diaphoretic.   HENT:      Head: Normocephalic and atraumatic.   Eyes:      Extraocular Movements: Extraocular movements intact.      Conjunctiva/sclera: Conjunctivae normal.      Pupils: Pupils are equal, round, and reactive to light.   Neck:      Musculoskeletal: Normal range of motion and neck supple.   Cardiovascular:      Pulses: Normal pulses.   Pulmonary:      Effort: Pulmonary effort is normal.   Musculoskeletal:      Right knee: She exhibits decreased range of motion, swelling and effusion. She exhibits no ecchymosis, no deformity, no laceration, no erythema, no LCL laxity and no MCL laxity. Tenderness found. Medial joint line, lateral joint line and patellar tendon tenderness noted.      Right ankle: Normal. She exhibits normal range of motion, no swelling and no ecchymosis. No tenderness.      Right lower leg: Normal. She exhibits no tenderness, no bony tenderness and no swelling.   Skin:     General: Skin is warm and dry.      Capillary Refill: Capillary refill takes less than 2 seconds.   Neurological:      General: No focal deficit present.      Mental Status: She is alert.      Sensory: Sensation is intact.      Motor: Motor function is intact.         ED Course        Procedures    Results for orders placed or performed during the hospital encounter of 05/02/21 (from the past 24 hour(s))   XR Knee Right 1/2 Views    Narrative    EXAM: XR KNEE RT 1 /2 VW  LOCATION: [facility]  DATE/TIME: 5/2/2021 5:26 PM    INDICATION: Twisting injury, pain.  COMPARISON: None.      Impression    IMPRESSION: Slight right knee joint effusion but no evidence for fracture or significant compartmental narrowing.       Medications - No data to display    Assessments & Plan (with Medical Decision Making)     Pt is a 45 year old female who presents with complaints of right knee pain.  Patient states she injured her knee while working out yesterday.   She states she twisted her knee and has had pain and swelling throughout since that time.  Patient reports history of meniscal tear in this knee in the past.      Pt is afebrile on arrival.  Exam as above.  X-rays of right knee show a slight right knee joint effusion but no fracture or malalignment.  Discussed results with patient.  Encouraged symptomatic treatments at home.  Return precautions were reviewed.  Hand-outs were provided.    Patient was sent with a refill of her meloxicam and was instructed to follow-up with orthopedics for continued care and management.  She is to return to the ED for persistent and/or worsening symptoms.  Patient expressed understanding of the diagnosis and plan and was discharged home in good condition.    I have reviewed the nursing notes.    I have reviewed the findings, diagnosis, plan and need for follow up with the patient.    Discharge Medication List as of 5/2/2021  5:54 PM      START taking these medications    Details   !! meloxicam (MOBIC) 7.5 MG tablet Take 1 tablet (7.5 mg) by mouth daily for 14 days, Disp-14 tablet, R-0, E-Prescribe       !! - Potential duplicate medications found. Please discuss with provider.          Final diagnoses:   Knee injury, right, initial encounter       5/2/2021   [facility] EMERGENCY DEPT      Disclaimer:  This note consists of symbols derived from keyboarding, dictation and/or voice recognition software.  As a result, there may be errors in the script that have gone undetected.  Please consider this when interpreting information found in this chart.     Cecilia Capps PA-C  05/02/21 0821

## 2021-05-10 ENCOUNTER — HOSPITAL ENCOUNTER (EMERGENCY)
Facility: CLINIC | Age: 46
Discharge: HOME | End: 2021-05-10
Attending: NURSE PRACTITIONER | Admitting: NURSE PRACTITIONER
Payer: COMMERCIAL

## 2021-05-10 VITALS
BODY MASS INDEX: 43.54 KG/M2 | DIASTOLIC BLOOD PRESSURE: 89 MMHG | SYSTOLIC BLOOD PRESSURE: 135 MMHG | OXYGEN SATURATION: 98 % | HEART RATE: 88 BPM | WEIGHT: 255 LBS | TEMPERATURE: 98.1 F | HEIGHT: 64 IN

## 2021-05-10 DIAGNOSIS — J02.9 SORE THROAT: ICD-10-CM

## 2021-05-10 DIAGNOSIS — J01.90 ACUTE SINUSITIS: ICD-10-CM

## 2021-05-10 LAB
DEPRECATED S PYO AG THROAT QL EIA: NEGATIVE
LABORATORY COMMENT REPORT: NORMAL
SARS-COV-2 RNA RESP QL NAA+PROBE: NEGATIVE
SPECIMEN SOURCE: NORMAL
STREP GROUP A PCR: NOT DETECTED

## 2021-05-10 PROCEDURE — 999N001174 HC STATISTIC STREP A RAPID: Performed by: NURSE PRACTITIONER

## 2021-05-10 PROCEDURE — G0463 HOSPITAL OUTPT CLINIC VISIT: HCPCS | Performed by: NURSE PRACTITIONER

## 2021-05-10 PROCEDURE — 99214 OFFICE O/P EST MOD 30 MIN: CPT | Performed by: NURSE PRACTITIONER

## 2021-05-10 PROCEDURE — 87651 STREP A DNA AMP PROBE: CPT | Performed by: NURSE PRACTITIONER

## 2021-05-10 PROCEDURE — 87635 SARS-COV-2 COVID-19 AMP PRB: CPT | Performed by: NURSE PRACTITIONER

## 2021-05-10 PROCEDURE — C9803 HOPD COVID-19 SPEC COLLECT: HCPCS | Performed by: NURSE PRACTITIONER

## 2021-05-10 RX ORDER — FLUTICASONE PROPIONATE 50 MCG
2 SPRAY, SUSPENSION (ML) NASAL DAILY
Qty: 16 G | Refills: 0 | Status: SHIPPED | OUTPATIENT
Start: 2021-05-10 | End: 2021-08-25

## 2021-05-10 ASSESSMENT — ENCOUNTER SYMPTOMS
SORE THROAT: 1
EYE DISCHARGE: 0
SINUS PRESSURE: 0
DIZZINESS: 0
RHINORRHEA: 0
ARTHRALGIAS: 0
NUMBNESS: 0
FEVER: 0
HEADACHES: 1
SHORTNESS OF BREATH: 0
TROUBLE SWALLOWING: 0
WEAKNESS: 0
VOMITING: 0
LIGHT-HEADEDNESS: 0
MYALGIAS: 0
NAUSEA: 0
COUGH: 0
FATIGUE: 0
ABDOMINAL PAIN: 0
CHILLS: 0
SINUS PAIN: 0
EYE REDNESS: 0
JOINT SWELLING: 0

## 2021-05-10 ASSESSMENT — MIFFLIN-ST. JEOR: SCORE: 1796.67

## 2021-05-10 NOTE — Clinical Note
Elza Greer was seen and treated in our emergency department on 5/10/2021.  She may return to work on 05/11/2021.       If you have any questions or concerns, please don't hesitate to call.      Rambo Rodgers, RAMAN

## 2021-05-10 NOTE — ED PROVIDER NOTES
"  History     Chief Complaint   Patient presents with     Pharyngitis     st, ear pain, headache     HPI  Elza Greer is a 45 year old female who presents urgent care for evaluation of pharyngitis, headache and ear pressure.  Concerned about strep throat.  Prior to arrival took DayQuil without improvement.  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  No fever, dizziness, congestion, cough, chest pain, shortness breath, abdominal pain, nausea, vomiting, diarrhea, dysuria and rash.    Allergies:  Allergies   Allergen Reactions     Azithromycin Anaphylaxis     Doxycycline Other (See Comments)     Upset stomach and felt \"really out of it\"     Latex Hives     Oxycodone Nausea and Vomiting       Problem List:    Patient Active Problem List    Diagnosis Date Noted     Ventral hernia without obstruction or gangrene 01/13/2021     Priority: Medium     Added automatically from request for surgery [id number]       Anxiety 02/18/2020     Priority: Medium     Tobacco use disorder 11/14/2016     Priority: Medium     Mild intermittent asthma without complication 05/12/2010     Priority: Medium        Past Medical History:    Past Medical History:   Diagnosis Date     Adjustment disorder with mixed anxiety and depressed mood      IBS (irritable bowel syndrome)      Incisional hernia, without obstruction or gangrene 2020     Lactose intolerance 05/12/2010     Migraine 05/12/2010     Mild intermittent asthma without complication 05/12/2010     PCOS (polycystic ovarian syndrome)      Tobacco use disorder      Vitamin D deficiency        Past Surgical History:    Past Surgical History:   Procedure Laterality Date     APPENDECTOMY OPEN N/A      CYSTECTOMY OVARIAN BENIGN  2001     HEPATICOJEJUNOSTOMY  2000    RnY jejunostomy from bile duct injury     HERNIORRHAPHY VENTRAL N/A 02/17/2012    open with mesh     HYSTERECTOMY TOTAL ABDOMINAL, BILATERAL SALPINGO-OOPHORECTOMY, COMBINED N/A 2005     LAPAROSCOPIC CHOLECYSTECTOMY  2006 "    complicated by bile duct injury     LAPAROSCOPIC HERNIORRHAPHY VENTRAL N/A 1/26/2021    Procedure: Open recurrent incarcerated ventral hernia repair X2;  Surgeon: Pradip Mckenzie MD;  Location: [facility] OR       Family History:    Family History   Problem Relation Age of Onset     Coronary Artery Disease Mother      Hypertension Mother      Hyperlipidemia Mother      Depression Mother      Colon Cancer Maternal Grandmother      Breast Cancer Maternal Grandmother      Other Cancer Maternal Grandmother        Social History:  Marital Status:   [2]  Social History     Tobacco Use     Smoking status: Current Every Day Smoker     Packs/day: 0.25     Types: Cigarettes     Smokeless tobacco: Never Used   Substance Use Topics     Alcohol use: No     Drug use: No        Medications:    fluticasone (FLONASE) 50 MCG/ACT nasal spray  albuterol (ACCUNEB) 1.25 MG/3ML neb solution  albuterol (PROVENTIL) (2.5 MG/3ML) 0.083% neb solution  Ascorbic Acid (VITAMIN C PO)  benzonatate (TESSALON) 200 MG capsule  cephALEXin (KEFLEX) 500 MG capsule  dextromethorphan (TUSSIN COUGH) 15 MG/5ML syrup  HYDROcodone-acetaminophen (NORCO)  MG per tablet  HYDROcodone-acetaminophen (NORCO)  MG per tablet  IBUPROFEN PO  meloxicam (MOBIC) 7.5 MG tablet  meloxicam (MOBIC) 7.5 MG tablet  Multiple Vitamins-Calcium (ONE-A-DAY WOMENS PO)  nicotine (NICODERM CQ) 14 MG/24HR 24 hr patch  predniSONE (DELTASONE) 20 MG tablet  vitamin D2 (ERGOCALCIFEROL) 46587 units (1250 mcg) capsule          Review of Systems   Constitutional: Negative for chills, fatigue and fever.   HENT: Positive for ear pain and sore throat. Negative for congestion, ear discharge, rhinorrhea, sinus pressure, sinus pain and trouble swallowing.    Eyes: Negative for discharge and redness.   Respiratory: Negative for cough and shortness of breath.    Cardiovascular: Negative for chest pain.   Gastrointestinal: Negative for abdominal pain, nausea and vomiting.   Musculoskeletal:  "Negative for arthralgias, joint swelling and myalgias.   Skin: Negative for rash.   Neurological: Positive for headaches. Negative for dizziness, weakness, light-headedness and numbness.   All other systems reviewed and are negative.    Physical Exam   BP: 135/89  Pulse: 88  Temp: 98.1  F (36.7  C)  Height: 162.6 cm (5' 4\")  Weight: 115.7 kg (255 lb)  SpO2: 98 %    Physical Exam  Constitutional:       General: She is not in acute distress.     Appearance: She is well-developed. She is not diaphoretic.   HENT:      Head: Normocephalic.      Mouth/Throat:      Mouth: Mucous membranes are moist.      Pharynx: Uvula midline. No posterior oropharyngeal erythema.      Tonsils: No tonsillar exudate. 0 on the right. 0 on the left.   Eyes:      Conjunctiva/sclera: Conjunctivae normal.      Pupils: Pupils are equal, round, and reactive to light.   Neck:      Musculoskeletal: Normal range of motion and neck supple.   Cardiovascular:      Rate and Rhythm: Normal rate and regular rhythm.      Pulses: Normal pulses.   Pulmonary:      Effort: Pulmonary effort is normal. No respiratory distress.      Breath sounds: Normal breath sounds and air entry. No decreased air movement. No decreased breath sounds, wheezing or rhonchi.   Abdominal:      General: There is no distension.      Palpations: Abdomen is soft.      Tenderness: There is no abdominal tenderness.   Musculoskeletal: Normal range of motion.   Skin:     General: Skin is warm.      Capillary Refill: Capillary refill takes less than 2 seconds.   Neurological:      General: No focal deficit present.      Mental Status: She is alert and oriented to person, place, and time.   Psychiatric:         Mood and Affect: Mood normal.         ED Course        Procedures    Results for orders placed or performed during the hospital encounter of 05/10/21 (from the past 24 hour(s))   Streptococcus A Rapid Scr w Reflx to PCR    Specimen: Throat   Result Value Ref Range    Strep Specimen " Description Throat     Streptococcus Group A Rapid Screen Negative NEG^Negative   Symptomatic SARS-CoV-2 COVID-19 Virus (Coronavirus) by PCR    Specimen: Nasopharyngeal   Result Value Ref Range    SARS-CoV-2 Virus Specimen Source Nasopharyngeal     SARS-CoV-2 PCR Result NEGATIVE     SARS-CoV-2 PCR Comment (Note)        Medications - No data to display    Assessments & Plan (with Medical Decision Making)   Elza Greer is a 45 year old female who presents urgent care for evaluation of pharyngitis, headache and ear pressure.  Vital signs normal.  No worrisome findings on physical exam.  Rapid strep negative, culture pending.  COVID-19 swab negative.  Patient updated on findings.  We will plan to discharge home with symptom management.  Encouraged to begin fluticasone nasal spray. May use over the counter medications as needed and appropriate. Increase rest and hydration. Return precautions reviewed, all questions answered. Patient agreeable to plan of care and discharged in good condition. Work note provided.     I have reviewed the nursing notes.    I have reviewed the findings, diagnosis, plan and need for follow up with the patient.    New Prescriptions    FLUTICASONE (FLONASE) 50 MCG/ACT NASAL SPRAY    Spray 2 sprays into both nostrils daily       Final diagnoses:   Acute sinusitis   Sore throat       5/10/2021   [facility] EMERGENCY DEPT     Fide Jin, APRN CNP  05/10/21 [id number]

## 2021-05-11 NOTE — RESULT ENCOUNTER NOTE
Group A Streptococcus PCR is NEGATIVE   No treatment or change in treatment [facility] ED lab result Strep Group A protocol.

## 2021-05-13 ENCOUNTER — AMBULATORY - HEALTHEAST (OUTPATIENT)
Dept: NURSING | Facility: CLINIC | Age: 46
End: 2021-05-13

## 2021-06-03 ENCOUNTER — OFFICE VISIT (OUTPATIENT)
Dept: FAMILY MEDICINE | Facility: CLINIC | Age: 46
End: 2021-06-03
Payer: COMMERCIAL

## 2021-06-03 VITALS
SYSTOLIC BLOOD PRESSURE: 124 MMHG | HEART RATE: 89 BPM | BODY MASS INDEX: 43.23 KG/M2 | OXYGEN SATURATION: 97 % | HEIGHT: 64 IN | TEMPERATURE: 98 F | WEIGHT: 253.2 LBS | DIASTOLIC BLOOD PRESSURE: 80 MMHG

## 2021-06-03 DIAGNOSIS — B36.9 FUNGAL RASH OF TORSO: Primary | ICD-10-CM

## 2021-06-03 DIAGNOSIS — N60.11 FIBROCYSTIC BREAST CHANGES OF BOTH BREASTS: ICD-10-CM

## 2021-06-03 DIAGNOSIS — N63.0 LUMP OR MASS IN BREAST: ICD-10-CM

## 2021-06-03 DIAGNOSIS — N60.12 FIBROCYSTIC BREAST CHANGES OF BOTH BREASTS: ICD-10-CM

## 2021-06-03 DIAGNOSIS — M54.2 NECK PAIN: ICD-10-CM

## 2021-06-03 DIAGNOSIS — F17.200 TOBACCO USE DISORDER: ICD-10-CM

## 2021-06-03 DIAGNOSIS — E55.9 VITAMIN D DEFICIENCY: ICD-10-CM

## 2021-06-03 PROCEDURE — 99214 OFFICE O/P EST MOD 30 MIN: CPT | Performed by: NURSE PRACTITIONER

## 2021-06-03 RX ORDER — NYSTATIN 100000 [USP'U]/G
POWDER TOPICAL 2 TIMES DAILY PRN
Qty: 45 G | Refills: 1 | Status: SHIPPED | OUTPATIENT
Start: 2021-06-03 | End: 2022-03-15

## 2021-06-03 RX ORDER — CLOTRIMAZOLE 1 %
CREAM (GRAM) TOPICAL 2 TIMES DAILY
Qty: 45 G | Refills: 1 | Status: SHIPPED | OUTPATIENT
Start: 2021-06-03 | End: 2021-08-25

## 2021-06-03 RX ORDER — ERGOCALCIFEROL 1.25 MG/1
50000 CAPSULE, LIQUID FILLED ORAL WEEKLY
Qty: 8 CAPSULE | Refills: 0 | Status: SHIPPED | OUTPATIENT
Start: 2021-06-03 | End: 2022-09-01

## 2021-06-03 ASSESSMENT — ASTHMA QUESTIONNAIRES
QUESTION_5 LAST FOUR WEEKS HOW WOULD YOU RATE YOUR ASTHMA CONTROL: SOMEWHAT CONTROLLED
QUESTION_2 LAST FOUR WEEKS HOW OFTEN HAVE YOU HAD SHORTNESS OF BREATH: THREE TO SIX TIMES A WEEK
QUESTION_3 LAST FOUR WEEKS HOW OFTEN DID YOUR ASTHMA SYMPTOMS (WHEEZING, COUGHING, SHORTNESS OF BREATH, CHEST TIGHTNESS OR PAIN) WAKE YOU UP AT NIGHT OR EARLIER THAN USUAL IN THE MORNING: TWO OR THREE NIGHTS A WEEK
QUESTION_1 LAST FOUR WEEKS HOW MUCH OF THE TIME DID YOUR ASTHMA KEEP YOU FROM GETTING AS MUCH DONE AT WORK, SCHOOL OR AT HOME: A LITTLE OF THE TIME
ACT_TOTALSCORE: 15
QUESTION_4 LAST FOUR WEEKS HOW OFTEN HAVE YOU USED YOUR RESCUE INHALER OR NEBULIZER MEDICATION (SUCH AS ALBUTEROL): TWO OR THREE TIMES PER WEEK

## 2021-06-03 ASSESSMENT — MIFFLIN-ST. JEOR: SCORE: 1788.51

## 2021-06-03 NOTE — PATIENT INSTRUCTIONS
Patient Education     What Are Fibrocystic Breasts?  Do your breasts ever feel lumpy, sore, or tender? If so, you may have fibrocystic breasts. This is a very common condition. It is not a disease, and it is not cancer. Your healthcare provider can rule out problems and help you ease your symptoms.    Normal breasts  Your breasts are made up of 3 kinds of tissue:    Glandular tissue is made up of the milk ducts and glands.    Fibrous tissue supports the breast.    Fatty tissue fills the spaces between the other tissues. It gives the breast its size.  Fibrocystic breasts  Hormones are chemicals that control your menstrual cycle. Hormones can also make glandular tissue swell. This stretches fibrous tissue, making your breasts feel sore. Hormones may also cause one or more fluid-filled lumps to form. These lumps are called cysts. They may be large or small. These cysts are not cancer. This means they are benign. Just before your period, cysts may become larger. Your breasts may feel more sore.  What you may feel  Changes in hormone levels during your menstrual cycle affect your breasts. If you have fibrocystic breasts, your breasts may become sore or even painful. This can often happen before your period. Your breasts may also swell or feel lumpier at this time.  Caring for your breasts  Breast self-awareness is the key to caring for your breasts. Self-awareness means knowing how your breasts normally look and feel. This helps you notice any changes right away. Call your provider if you notice new lumps or changes that feel different than normal. See your provider for regular exams. And have a mammogram as often as your provider suggests.  [employer] last reviewed this educational content on 6/1/2020 2000-2021 [employer]. All rights reserved. This information is not intended as a substitute for professional medical care. Always follow your healthcare professional's instructions.

## 2021-06-03 NOTE — PROGRESS NOTES
"    Assessment & Plan     Fungal rash of torso     - nystatin (MYCOSTATIN) 579346 UNIT/GM external powder  Dispense: 45 g; Refill: 1  - clotrimazole (LOTRIMIN) 1 % external cream  Dispense: 45 g; Refill: 1    Fibrocystic breast changes of both breasts  Discussed screening  High risk with MGM with breast ca and personal history of ovarian ca    - MA Diagnostic Digital Bilateral  - US Breast Left Complete 4 Quadrants    Lump or mass in breast     - MA Diagnostic Digital Bilateral  - US Breast Left Complete 4 Quadrants    Neck pain  Suspect more MS related - recommended she try using her Meloxicam which she has at home.  FOLLOW UP in 1-2 weeks if not improved.  Stretches.    Tobacco use disorder       Vitamin D deficiency     - vitamin D2 (ERGOCALCIFEROL) 41856 units (1250 mcg) capsule  Dispense: 8 capsule; Refill: 0         Tobacco Cessation:   reports that she has been smoking cigarettes. She has a 5.75 pack-year smoking history. She has never used smokeless tobacco.      BMI:   Estimated body mass index is 43.46 kg/m  as calculated from the following:    Height as of this encounter: 1.626 m (5' 4\").    Weight as of this encounter: 114.9 kg (253 lb 3.2 oz).   Weight management plan: Patient was referred to their PCP to discuss a diet and exercise plan.     Patient Instructions     Patient Education     What Are Fibrocystic Breasts?  Do your breasts ever feel lumpy, sore, or tender? If so, you may have fibrocystic breasts. This is a very common condition. It is not a disease, and it is not cancer. Your healthcare provider can rule out problems and help you ease your symptoms.    Normal breasts  Your breasts are made up of 3 kinds of tissue:    Glandular tissue is made up of the milk ducts and glands.    Fibrous tissue supports the breast.    Fatty tissue fills the spaces between the other tissues. It gives the breast its size.  Fibrocystic breasts  Hormones are chemicals that control your menstrual cycle. Hormones can " also make glandular tissue swell. This stretches fibrous tissue, making your breasts feel sore. Hormones may also cause one or more fluid-filled lumps to form. These lumps are called cysts. They may be large or small. These cysts are not cancer. This means they are benign. Just before your period, cysts may become larger. Your breasts may feel more sore.  What you may feel  Changes in hormone levels during your menstrual cycle affect your breasts. If you have fibrocystic breasts, your breasts may become sore or even painful. This can often happen before your period. Your breasts may also swell or feel lumpier at this time.  Caring for your breasts  Breast self-awareness is the key to caring for your breasts. Self-awareness means knowing how your breasts normally look and feel. This helps you notice any changes right away. Call your provider if you notice new lumps or changes that feel different than normal. See your provider for regular exams. And have a mammogram as often as your provider suggests.  [employer] last reviewed this educational content on 6/1/2020 2000-2021 [employer]. All rights reserved. This information is not intended as a substitute for professional medical care. Always follow your healthcare professional's instructions.               No follow-ups on file.    Krupa Prabhakar NP  [facility]    Mao Bertrand is a 45 year old who presents for the following health issues     HPI     Breast Concern  Onset/Duration: 1 month   Description:   Location: Left upper outer quadrant  Pain or tenderness: little bit   Redness:  no , but there is underneath both breasts. Pt requesting nystatin powder.    Breast feels heavier than the right breast.   Intensity: moderate  Progression of Symptoms: same  Accompanying Signs & Symptoms:  Any lumps in axillary region: Unknown   Movable: YES  Nipple discharge: none   Changes in the skin or nipple: none   On Hormone  "therapy:  no   Does it change with menstrual cycle: not applicable  Previous history of similar problem: history of mastitis years prior.  History of hysterectomy  due to ovarian cancer - fibroid tumors.  First degree relative with breast cancer: a positive family history for breast cancer in her maternal grandmother; had it twice and passed away - diagnosed age 60 1st -  at age 75. MGM had colon and lung cancer as well.     Maternal aunt with cervical cancer.  Precipitating factors:           Worsened by: none   Alleviating factors:            Improved by: none  Therapies tried and outcome: None  Patient's last menstrual period was 2005.    Current everyday smoker     Was getting mammograms due to high risk - post hysterectomy.  Not done in last several years.    Review of Systems   Constitutional, HEENT, cardiovascular, pulmonary, GI, , musculoskeletal, neuro, skin, endocrine and psych systems are negative, except as otherwise noted.  POS for rash under both breast present for 1-2 months, does not itch worse with sweating and heat. No new products, soaps, detergents. No other skin rashes.  POS left neck swelling, pain and stiffness - new onset in the last 1-2 weeks.    NEGATIVE recent illness, night sweats, weight changes.      Objective    /80   Pulse 89   Temp 98  F (36.7  C) (Tympanic)   Ht 1.626 m (5' 4\")   Wt 114.9 kg (253 lb 3.2 oz)   LMP 2005   SpO2 97%   BMI 43.46 kg/m    Body mass index is 43.46 kg/m .  Physical Exam   GENERAL: alert, no distress and over weight  HENT: ear canals and TM's normal, nose and mouth without ulcers or lesions  NECK: no adenopathy, no asymmetry, masses, or scars, thyroid normal to palpation and tenderness left neck/trap area on exam.  No mass on exam.  RESP: lungs clear to auscultation - no rales, rhonchi or wheezes  BREAST: no palpable axillary masses or adenopathy, fibrocystic changes bilateral and mass small pea size breast 9 o'clock - " tender.  CV: regular rate and rhythm, normal S1 S2, no S3 or S4, no murmur, click or rub, no peripheral edema and peripheral pulses strong  ABDOMEN: soft, nontender, no hepatosplenomegaly, no masses and bowel sounds normal  MS: no gross musculoskeletal defects noted, no edema

## 2021-06-04 ASSESSMENT — ASTHMA QUESTIONNAIRES: ACT_TOTALSCORE: 15

## 2021-06-11 ENCOUNTER — ANCILLARY PROCEDURE (OUTPATIENT)
Dept: MAMMOGRAPHY | Facility: CLINIC | Age: 46
End: 2021-06-11
Attending: NURSE PRACTITIONER
Payer: COMMERCIAL

## 2021-06-11 ENCOUNTER — ANCILLARY PROCEDURE (OUTPATIENT)
Dept: ULTRASOUND IMAGING | Facility: CLINIC | Age: 46
End: 2021-06-11
Attending: NURSE PRACTITIONER
Payer: COMMERCIAL

## 2021-06-11 DIAGNOSIS — N63.0 LUMP OR MASS IN BREAST: ICD-10-CM

## 2021-06-11 DIAGNOSIS — N60.11 FIBROCYSTIC BREAST CHANGES OF BOTH BREASTS: ICD-10-CM

## 2021-06-11 DIAGNOSIS — N60.12 FIBROCYSTIC BREAST CHANGES OF BOTH BREASTS: ICD-10-CM

## 2021-06-11 PROCEDURE — 76642 ULTRASOUND BREAST LIMITED: CPT | Mod: LT

## 2021-06-11 PROCEDURE — G0279 TOMOSYNTHESIS, MAMMO: HCPCS

## 2021-06-11 PROCEDURE — 77066 DX MAMMO INCL CAD BI: CPT

## 2021-07-07 ENCOUNTER — OFFICE VISIT (OUTPATIENT)
Dept: FAMILY MEDICINE | Facility: CLINIC | Age: 46
End: 2021-07-07
Payer: OTHER MISCELLANEOUS

## 2021-07-07 VITALS
TEMPERATURE: 98.5 F | HEIGHT: 64 IN | SYSTOLIC BLOOD PRESSURE: 122 MMHG | DIASTOLIC BLOOD PRESSURE: 86 MMHG | BODY MASS INDEX: 42.41 KG/M2 | HEART RATE: 93 BPM | RESPIRATION RATE: 16 BRPM | OXYGEN SATURATION: 97 % | WEIGHT: 248.4 LBS

## 2021-07-07 DIAGNOSIS — M54.50 CHRONIC BILATERAL LOW BACK PAIN WITHOUT SCIATICA: Primary | ICD-10-CM

## 2021-07-07 DIAGNOSIS — G89.29 CHRONIC BILATERAL LOW BACK PAIN WITHOUT SCIATICA: Primary | ICD-10-CM

## 2021-07-07 PROCEDURE — 99213 OFFICE O/P EST LOW 20 MIN: CPT | Performed by: NURSE PRACTITIONER

## 2021-07-07 RX ORDER — CYCLOBENZAPRINE HCL 10 MG
10 TABLET ORAL 3 TIMES DAILY PRN
Qty: 30 TABLET | Refills: 1 | Status: SHIPPED | OUTPATIENT
Start: 2021-07-07 | End: 2022-03-15

## 2021-07-07 RX ORDER — HYDROCODONE BITARTRATE AND ACETAMINOPHEN 5; 325 MG/1; MG/1
1 TABLET ORAL 2 TIMES DAILY PRN
Qty: 10 TABLET | Refills: 0 | Status: SHIPPED | OUTPATIENT
Start: 2021-07-07 | End: 2022-02-03

## 2021-07-07 ASSESSMENT — MIFFLIN-ST. JEOR: SCORE: 1761.74

## 2021-07-07 NOTE — PROGRESS NOTES
"    Assessment & Plan     Chronic bilateral low back pain without sciatica  -signed restriction form for work, recommended to avoid heavy lifting, recommended physical therapy, patient would like to think about physical therapy, she will continue with chiropractor treatment for now and will my chart me for physical therapy referral.   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        HPI         Review of Systems   Constitutional, HEENT, cardiovascular, pulmonary, gi and gu systems are negative, except as otherwise noted.      Objective    /86 (BP Location: Right arm, Patient Position: Sitting, Cuff Size: Adult Large)   Pulse 93   Temp 98.5  F (36.9  C) (Tympanic)   Resp 16   Ht 1.626 m (5' 4\")   Wt 112.7 kg (248 lb 6.4 oz)   LMP 08/26/2005   SpO2 97%   BMI 42.64 kg/m    Body mass index is 42.64 kg/m .  Physical Exam   GENERAL: healthy, alert and no distress  MS: no gross musculoskeletal defects noted, no edema  SKIN: no suspicious lesions or rashes  NEURO: Normal strength and tone, mentation intact and speech normal  Comprehensive back pain exam:  Pain limits the following motions: standing, bending, turning   PSYCH: mentation appears normal, affect normal/bright                "

## 2021-08-04 ENCOUNTER — MYC MEDICAL ADVICE (OUTPATIENT)
Dept: FAMILY MEDICINE | Facility: CLINIC | Age: 46
End: 2021-08-04

## 2021-08-04 DIAGNOSIS — M54.50 CHRONIC BILATERAL LOW BACK PAIN WITHOUT SCIATICA: Primary | ICD-10-CM

## 2021-08-04 DIAGNOSIS — G89.29 CHRONIC BILATERAL LOW BACK PAIN WITHOUT SCIATICA: Primary | ICD-10-CM

## 2021-08-09 ENCOUNTER — MYC MEDICAL ADVICE (OUTPATIENT)
Dept: FAMILY MEDICINE | Facility: CLINIC | Age: 46
End: 2021-08-09

## 2021-08-09 DIAGNOSIS — Z98.890 HISTORY OF VENTRAL HERNIA REPAIR: Primary | ICD-10-CM

## 2021-08-09 DIAGNOSIS — Z87.19 HISTORY OF VENTRAL HERNIA REPAIR: Primary | ICD-10-CM

## 2021-08-10 NOTE — TELEPHONE ENCOUNTER
Di,    Please see my chart message.  Do you want to see her or should she see Dr. Mckenzie. Amarilis CHE RN

## 2021-08-11 NOTE — TELEPHONE ENCOUNTER
Recommend follow up with Dr. Mckenzie, or other surgeon. I signed referral, please let patient know.     NASH Huston CNP

## 2021-08-13 ENCOUNTER — OFFICE VISIT (OUTPATIENT)
Dept: SURGERY | Facility: CLINIC | Age: 46
End: 2021-08-13
Payer: COMMERCIAL

## 2021-08-13 ENCOUNTER — HOSPITAL ENCOUNTER (OUTPATIENT)
Dept: CT IMAGING | Facility: CLINIC | Age: 46
Discharge: HOME | End: 2021-08-13
Attending: SURGERY | Admitting: SURGERY
Payer: COMMERCIAL

## 2021-08-13 VITALS
HEART RATE: 84 BPM | SYSTOLIC BLOOD PRESSURE: 130 MMHG | BODY MASS INDEX: 42 KG/M2 | DIASTOLIC BLOOD PRESSURE: 75 MMHG | TEMPERATURE: 98 F | WEIGHT: 246 LBS | HEIGHT: 64 IN

## 2021-08-13 DIAGNOSIS — Z87.19 HISTORY OF VENTRAL HERNIA REPAIR: ICD-10-CM

## 2021-08-13 DIAGNOSIS — Z09 POSTOP CHECK: Primary | ICD-10-CM

## 2021-08-13 DIAGNOSIS — Z98.890 HISTORY OF VENTRAL HERNIA REPAIR: ICD-10-CM

## 2021-08-13 PROCEDURE — 99212 OFFICE O/P EST SF 10 MIN: CPT | Performed by: SURGERY

## 2021-08-13 PROCEDURE — 250N000011 HC RX IP 250 OP 636: Performed by: RADIOLOGY

## 2021-08-13 PROCEDURE — 74177 CT ABD & PELVIS W/CONTRAST: CPT

## 2021-08-13 PROCEDURE — 250N000009 HC RX 250: Performed by: RADIOLOGY

## 2021-08-13 RX ORDER — IOPAMIDOL 755 MG/ML
100 INJECTION, SOLUTION INTRAVASCULAR ONCE
Status: COMPLETED | OUTPATIENT
Start: 2021-08-13 | End: 2021-08-13

## 2021-08-13 RX ADMIN — SODIUM CHLORIDE 70 ML: 9 INJECTION, SOLUTION INTRAVENOUS at 14:53

## 2021-08-13 RX ADMIN — IOPAMIDOL 100 ML: 755 INJECTION, SOLUTION INTRAVENOUS at 14:53

## 2021-08-13 ASSESSMENT — MIFFLIN-ST. JEOR: SCORE: 1750.85

## 2021-08-13 NOTE — NURSING NOTE
"Initial /75   Pulse 84   Temp 98  F (36.7  C) (Tympanic)   Ht 1.626 m (5' 4\")   Wt 111.6 kg (246 lb)   LMP 08/26/2005   BMI 42.23 kg/m   Estimated body mass index is 42.23 kg/m  as calculated from the following:    Height as of this encounter: 1.626 m (5' 4\").    Weight as of this encounter: 111.6 kg (246 lb). .    Marianela Crooks MA    "

## 2021-08-13 NOTE — PROGRESS NOTES
46-year-old female is now about 8 months out from an open ventral hernia repair.  At that time the repair was done primarily due to the dense amount of intra-abdominal adhesions.  She reports over the past several days feeling a lump beneath her scar.  She was coughing but does not remember an other inciting event.  She reports localized pain over the area.    Exam:  Upper abdomen just beneath scar is some palpable scar tissue.  I cannot appreciate a reducible mass.      Assessment and plan: 46-year-old female with upper abdominal pain and lump.  This could be scar tissue from her previous operation or recurrent hernia.  I ordered a CT scan of her abdomen to better evaluate the abdominal wall.  Should this turn out to be a recurrent hernia, I would recommend referral down to the [place] abdominal wall reconstruction team for better evaluation.    Pradip Mckenzie MD

## 2021-08-13 NOTE — LETTER
8/13/2021         RE: Elza Greer  [address] [facility] [address]        Dear Colleague,    Thank you for referring your patient, Elza Greer, to the [facility]. Please see a copy of my visit note below.    46-year-old female is now about 8 months out from an open ventral hernia repair.  At that time the repair was done primarily due to the dense amount of intra-abdominal adhesions.  She reports over the past several days feeling a lump beneath her scar.  She was coughing but does not remember an other inciting event.  She reports localized pain over the area.    Exam:  Upper abdomen just beneath scar is some palpable scar tissue.  I cannot appreciate a reducible mass.      Assessment and plan: 46-year-old female with upper abdominal pain and lump.  This could be scar tissue from her previous operation or recurrent hernia.  I ordered a CT scan of her abdomen to better evaluate the abdominal wall.  Should this turn out to be a recurrent hernia, I would recommend referral down to the [place] abdominal wall reconstruction team for better evaluation.    Pradip Mckenzie MD       Again, thank you for allowing me to participate in the care of your patient.        Sincerely,        Pradip Mckenzie MD

## 2021-08-18 ENCOUNTER — OFFICE VISIT (OUTPATIENT)
Dept: SURGERY | Facility: CLINIC | Age: 46
End: 2021-08-18
Payer: COMMERCIAL

## 2021-08-18 ENCOUNTER — HOSPITAL ENCOUNTER (OUTPATIENT)
Dept: PHYSICAL THERAPY | Facility: CLINIC | Age: 46
Setting detail: THERAPIES SERIES
End: 2021-08-18
Attending: NURSE PRACTITIONER
Payer: OTHER MISCELLANEOUS

## 2021-08-18 VITALS
HEART RATE: 85 BPM | DIASTOLIC BLOOD PRESSURE: 73 MMHG | HEIGHT: 64 IN | BODY MASS INDEX: 42 KG/M2 | TEMPERATURE: 98.7 F | SYSTOLIC BLOOD PRESSURE: 133 MMHG | WEIGHT: 246.03 LBS

## 2021-08-18 DIAGNOSIS — G89.29 CHRONIC BILATERAL LOW BACK PAIN WITHOUT SCIATICA: ICD-10-CM

## 2021-08-18 DIAGNOSIS — Z87.19 S/P REPAIR OF RECURRENT VENTRAL HERNIA: ICD-10-CM

## 2021-08-18 DIAGNOSIS — M54.50 CHRONIC BILATERAL LOW BACK PAIN WITHOUT SCIATICA: ICD-10-CM

## 2021-08-18 DIAGNOSIS — K43.2 RECURRENT VENTRAL HERNIA: Primary | ICD-10-CM

## 2021-08-18 DIAGNOSIS — Z98.890 S/P REPAIR OF RECURRENT VENTRAL HERNIA: ICD-10-CM

## 2021-08-18 PROCEDURE — 97162 PT EVAL MOD COMPLEX 30 MIN: CPT | Mod: GP | Performed by: PHYSICAL THERAPIST

## 2021-08-18 PROCEDURE — 99212 OFFICE O/P EST SF 10 MIN: CPT | Performed by: SURGERY

## 2021-08-18 PROCEDURE — 97110 THERAPEUTIC EXERCISES: CPT | Mod: GP | Performed by: PHYSICAL THERAPIST

## 2021-08-18 ASSESSMENT — MIFFLIN-ST. JEOR: SCORE: 1751

## 2021-08-18 NOTE — NURSING NOTE
"Initial /73 (BP Location: Right arm, Patient Position: Sitting, Cuff Size: Adult Large)   Pulse 85   Temp 98.7  F (37.1  C) (Tympanic)   Ht 1.626 m (5' 4\")   Wt 111.6 kg (246 lb 0.5 oz)   LMP 08/26/2005   BMI 42.23 kg/m   Estimated body mass index is 42.23 kg/m  as calculated from the following:    Height as of this encounter: 1.626 m (5' 4\").    Weight as of this encounter: 111.6 kg (246 lb 0.5 oz). .      Marianela Crooks MA    "

## 2021-08-18 NOTE — PROGRESS NOTES
46-year-old female here for follow-up of CT scan.  We reviewed the images together and her ventral hernia seems to have recurred in the same place it had been previously.  Given the fact that she has had multiple abdominal operations with adhesions and has now a second recurrence of this hernia, I am referring her to the [place] surgeons for possible robotic repair versus abdominal wall reconstruction.  Patient understood and will await a phone call from the [place].    Pradip Mckenzie MD

## 2021-08-18 NOTE — PROGRESS NOTES
Elza SARAH Greer   PHYSICAL THERAPY EVALUATION    08/18/21 0900   General Information   Type of Visit Initial OP Ortho PT Evaluation   Start of Care Date 08/18/21   Referring Physician Di Shea NP   Patient/Family Goals Statement decrease pain, be able to work regular duty, learn some core strengthening   Orders Evaluate and Treat   Date of Order 08/05/21   Medical Diagnosis chronic LBP   Surgical/Medical history reviewed Yes  (hernia repair Jan 20212)   Body Part(s)   Body Part(s) Lumbar Spine/SI   Presentation and Etiology   Pertinent history of current problem (include personal factors and/or comorbidities that impact the POC) Was lifting  a client at work 6/24/21, works at 6 person [employer] home. Could tell she did something right away. Has been seeing chiropractor. Had a failed hernia surgery, sees surgeon right after this, needs full repair, will be having surgery again. On restrictions, trying to avoid lifting, she is the manager. Sx increase with walking too long, walking her larger dog pulls on her too much, lifting , push, pull, sitting too long. Better with ice, chiro has helped. Pain 2-6/10. Occas has pain out to L butt.    Onset date of current episode/exacerbation 06/24/21   Prior Level of Function   Functional Level Prior Comment ex video, work can be physical   Current Level of Function   Patient role/employment history A. Employed   Fall Risk Screen   Fall screen completed by PT   Have you fallen 2 or more times in the past year? No   Have you fallen and had an injury in the past year? No   Is patient a fall risk? No   Abuse Screen (yes response referral indicated)   Feels Unsafe at Home or Work/School no   Feels Threatened by Someone no   Does Anyone Try to Keep You From Having Contact with Others or Doing Things Outside Your Home? no   Physical Signs of Abuse Present no   Lumbar Spine/SI Objective Findings   Posture increased lordosis   Flexion ROM 80% pulls LB, L butt   Extension  % no pain   Right Side Bending ROM WNL, pulls L LB   Left Side Bending ROM WNL   Pelvic Screen supine R ASIS slightly higher, fwd bend unremarkable   Hip Screen hip ROM WNL   Hip Abduction Strength 4   Hip Extension Strength 4   Hamstring Flexibility 90* B   Hip Flexor Flexibility mild tight B   Quadricep Flexibility WNL   SLR neg B   Palpation tender L SI, L sacral edge, piriformis   Planned Therapy Interventions   Planned Therapy Interventions manual therapy;stretching;strengthening   Clinical Impression   Criteria for Skilled Therapeutic Interventions Met yes, treatment indicated   PT Diagnosis LBP, SI dysfunction   Functional limitations due to impairments bending ,sitting, walking, lifting, push, pull, work duties   Clinical Presentation Evolving/Changing   Clinical Presentation Rationale LB, SI, post hernia surgery and 5 other abdominal surgeries   Clinical Decision Making (Complexity) Moderate complexity   Therapy Frequency 1 time/week   Predicted Duration of Therapy Intervention (days/wks) x4-6 wks, still going to chiro 2x/wk   Risk & Benefits of therapy have been explained Yes   Patient, Family & other staff in agreement with plan of care Yes   Education Assessment   Preferred Learning Style Listening   Barriers to Learning No barriers   ORTHO GOALS   PT Ortho Eval Goals 1;2   Ortho Goal 1   Goal Identifier 1   Goal Description pt will be able to work full duty in 6wk   Target Date 09/29/21   Ortho Goal 2   Goal Identifier 2   Goal Description pt will be able to walk both dogs w/o LBP in 4wk   Target Date 09/15/21   Total Evaluation Time   PT Brigid, Moderate Complexity Minutes (25990) 20   M [facility]  Kris Hoenk  PT  [facility]  [address] [place] [address]  [email or web address]   Office: [phone]  Voicemail: [phone]

## 2021-08-18 NOTE — LETTER
8/18/2021         RE: Elza Greer  [address] [facility] [address]        Dear Colleague,    Thank you for referring your patient, Elza Greer, to the [facility]. Please see a copy of my visit note below.    46-year-old female here for follow-up of CT scan.  We reviewed the images together and her ventral hernia seems to have recurred in the same place it had been previously.  Given the fact that she has had multiple abdominal operations with adhesions and has now a second recurrence of this hernia, I am referring her to the [place] surgeons for possible robotic repair versus abdominal wall reconstruction.  Patient understood and will await a phone call from the [place].    Pradip Mckenzie MD       Again, thank you for allowing me to participate in the care of your patient.        Sincerely,        Pradip Mckenzie MD

## 2021-08-18 NOTE — TELEPHONE ENCOUNTER
REFERRAL INFORMATION:    Referring Provider:  Dr. Pradip Mckenzie     Referring Clinic:  [facility]     Reason for Visit/Diagnosis: Recurrent ventral hernia        FUTURE VISIT INFORMATION:    Appointment Date: 8/27/2021    Appointment Time: 10 AM     NOTES RECORD STATUS  DETAILS   OFFICE NOTE from Referring Provider Internal 8/18/2021, 8/13/2021, 2/26/2020 Office visit with Dr. Mckenzie   OFFICE NOTE from Other Specialists N/A    HOSPITAL DISCHARGE SUMMARY/ ED VISITS  Internal 1/26/2021 ([facility])    OPERATIVE REPORT Internal Ventral hernia without obstruction or gangrene : 1/26/2021   ENDOSCOPY (EGD)  N/A    PERTINENT LABS Internal    PATHOLOGY REPORTS (RELATED) N/A    IMAGING (CT, MRI, US, XR)  Internal CT Abdomen Pelvis: 8/13/2021, 2/10/2020

## 2021-08-20 DIAGNOSIS — J01.90 ACUTE SINUSITIS WITH SYMPTOMS > 10 DAYS: Primary | ICD-10-CM

## 2021-08-20 DIAGNOSIS — B36.9 FUNGAL RASH OF TORSO: ICD-10-CM

## 2021-08-20 NOTE — TELEPHONE ENCOUNTER
Patient called for refills on her albuterol inhaler , Flonase and Lotrimin please.      Cha August, SEN [place]

## 2021-08-25 RX ORDER — FLUTICASONE PROPIONATE 50 MCG
2 SPRAY, SUSPENSION (ML) NASAL DAILY
Qty: 16 G | Refills: 0 | Status: SHIPPED | OUTPATIENT
Start: 2021-08-25 | End: 2021-12-23

## 2021-08-25 RX ORDER — CLOTRIMAZOLE 1 %
CREAM (GRAM) TOPICAL 2 TIMES DAILY
Qty: 45 G | Refills: 1 | Status: SHIPPED | OUTPATIENT
Start: 2021-08-25 | End: 2022-03-15

## 2021-08-27 ENCOUNTER — VIRTUAL VISIT (OUTPATIENT)
Dept: SURGERY | Facility: CLINIC | Age: 46
End: 2021-08-27
Attending: SURGERY
Payer: COMMERCIAL

## 2021-08-27 ENCOUNTER — PRE VISIT (OUTPATIENT)
Dept: SURGERY | Facility: CLINIC | Age: 46
End: 2021-08-27

## 2021-08-27 VITALS — BODY MASS INDEX: 41.32 KG/M2 | HEIGHT: 64 IN | WEIGHT: 242 LBS

## 2021-08-27 DIAGNOSIS — K43.2 RECURRENT VENTRAL HERNIA: ICD-10-CM

## 2021-08-27 PROCEDURE — 99214 OFFICE O/P EST MOD 30 MIN: CPT | Mod: 95 | Performed by: SURGERY

## 2021-08-27 ASSESSMENT — PAIN SCALES - GENERAL: PAINLEVEL: MILD PAIN (3)

## 2021-08-27 ASSESSMENT — MIFFLIN-ST. JEOR: SCORE: 1732.7

## 2021-08-27 NOTE — PROGRESS NOTES
Elza is a 46 year old who is being evaluated via a billable video visit.      How would you like to obtain your AVS? MyChart  If the video visit is dropped, the invitation should be resent by: Text to cell phone: [phone]  Will anyone else be joining your video visit? No      Video Start Time: 1000  Video-Visit Details    Type of service:  Video Visit    Video End Time:10:17 AM    Originating Location (pt. Location): Home    Distant Location (provider location):  [facility] GENERAL SURGERY [facility]     Platform used for Video Visit: [facility] Hernia Consultation Note      Elza Greer  [id number]  1975    Requesting Provider: Pradip Mckenzie    [facility],    I was asked by Pradip Mckenzie to see this patient for the following problem: Elza Greer is a 46 year old female who presents to clinic today for the following health issues      CHIEF COMPLAINT:  Painful bulge    ASSESSMENT/PLAN:  incisional  Hernia size is less than 5cm in size.    Assessment & Plan   Problem List Items Addressed This Visit     None      Visit Diagnoses     Recurrent ventral hernia             Small but mutliple and recurrent midline hernias. I anticipate AWR or advanced technique may be required for durable repair    -Hernia conference; she will be called w wt loss target after conference  -MWM for weight loss-  -3 mo smoking cessation required      30 minutes spent on the date of the encounter doing chart review, history and exam, documentation and further activities per the note    HISTORY OF PRESENT ILLNESS:  Location: upper ventral wall  Severity: Moderate     Multiple abdominal operations  -2012 Open IHR with 4.3cm Proceed mesh patch  -2020- Open primary IHR  -Now with recurrence, small, with colon as well as a smaller sub-umbilical defect    History of lap dm 2006, with subsequent laparotomy for bile duct injury requiring HJ  Opemn appy  Ovarian cystectomy  TAHBSO    Multiple  incisions, but now 2 hernias in midline (a xiphoid to pubis scar)        BMI 41.5, SMoker        NUTRITIONAL STATUS:  Lab Results   Component Value Date    ALBUMIN 3.5 12/08/2020       Body mass index is 41.54 kg/m .    Patient is not immunosuppressed.    Patient is a current smoker.    Past Medical History:   Diagnosis Date     Adjustment disorder with mixed anxiety and depressed mood      IBS (irritable bowel syndrome)      Incisional hernia, without obstruction or gangrene 2020     Lactose intolerance 05/12/2010     Migraine 05/12/2010     Mild intermittent asthma without complication 05/12/2010     PCOS (polycystic ovarian syndrome)      Tobacco use disorder      Vitamin D deficiency        Patient Active Problem List   Diagnosis     Tobacco use disorder     Anxiety     Mild intermittent asthma without complication     Ventral hernia without obstruction or gangrene       Past Surgical History:   Procedure Laterality Date     APPENDECTOMY OPEN N/A      CYSTECTOMY OVARIAN BENIGN  2001     HEPATICOJEJUNOSTOMY  2000    RnY jejunostomy from bile duct injury     HERNIORRHAPHY VENTRAL N/A 02/17/2012    open with mesh     HYSTERECTOMY TOTAL ABDOMINAL, BILATERAL SALPINGO-OOPHORECTOMY, COMBINED N/A 2005     LAPAROSCOPIC CHOLECYSTECTOMY  2006    complicated by bile duct injury     LAPAROSCOPIC HERNIORRHAPHY VENTRAL N/A 1/26/2021    Procedure: Open recurrent incarcerated ventral hernia repair X2;  Surgeon: Pradip Mckenzie MD;  Location: [facility] OR       MEDICATIONS:  Current Outpatient Medications   Medication     albuterol (ACCUNEB) 1.25 MG/3ML neb solution     Ascorbic Acid (VITAMIN C PO)     clotrimazole (LOTRIMIN) 1 % external cream     cyclobenzaprine (FLEXERIL) 10 MG tablet     dextromethorphan (TUSSIN COUGH) 15 MG/5ML syrup     fluticasone (FLONASE) 50 MCG/ACT nasal spray     HYDROcodone-acetaminophen (NORCO) 5-325 MG tablet     IBUPROFEN PO     meloxicam (MOBIC) 7.5 MG tablet     Multiple Vitamins-Calcium (ONE-A-DAY  "WOMENS PO)     nicotine (NICODERM CQ) 14 MG/24HR 24 hr patch     predniSONE (DELTASONE) 20 MG tablet     vitamin D2 (ERGOCALCIFEROL) 72835 units (1250 mcg) capsule     nystatin (MYCOSTATIN) 192218 UNIT/GM external powder     No current facility-administered medications for this visit.       ALLERGIES:  Allergies   Allergen Reactions     Azithromycin Anaphylaxis     Doxycycline Other (See Comments)     Upset stomach and felt \"really out of it\"     Latex Hives     Oxycodone Nausea and Vomiting       Social History     Socioeconomic History     Marital status:      Spouse name: None     Number of children: None     Years of education: None     Highest education level: None   Occupational History     None   Tobacco Use     Smoking status: Current Every Day Smoker     Packs/day: 0.50     Years: 23.00     Pack years: 11.50     Types: Cigarettes     Smokeless tobacco: Never Used   Substance and Sexual Activity     Alcohol use: Yes     Comment: social     Drug use: No     Sexual activity: None   Other Topics Concern     Parent/sibling w/ CABG, MI or angioplasty before 65F 55M? 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     Social Determinants of Health     Financial Resource Strain:      Difficulty of Paying Living Expenses:    Food Insecurity:      Worried About Running Out of Food in the Last Year:      Ran Out of Food in the Last Year:    Transportation Needs:      Lack of Transportation (Medical):      Lack of Transportation (Non-Medical):    Physical Activity:      Days of Exercise per Week:      Minutes of Exercise per Session:    Stress:      Feeling of Stress :    Social Connections:      Frequency of Communication with Friends and Family:      Frequency of Social Gatherings with Friends and Family:      Attends [employer] Services:      Active Member of Clubs or Organizations:      Attends Club or Organization Meetings:      Marital Status:  " "  Intimate Partner Violence:      Fear of Current or Ex-Partner:      Emotionally Abused:      Physically Abused:      Sexually Abused:        Family History   Problem Relation Age of Onset     Coronary Artery Disease Mother      Hypertension Mother      Hyperlipidemia Mother      Depression Mother      Colon Cancer Maternal Grandmother      Breast Cancer Maternal Grandmother      Other Cancer Maternal Grandmother        ROS    CT Reviewed    PHYSICAL EXAM:  Objective    Ht 1.626 m (5' 4\")   Wt 109.8 kg (242 lb)   LMP 08/26/2005   BMI 41.54 kg/m    Vitals - Patient Reported  Pain Score: Mild Pain (3)  Pain Loc: Abdomen        Physical Exam   GENERAL: Healthy, alert and no distress  EYES: Eyes grossly normal to inspection.  No discharge or erythema, or obvious scleral/conjunctival abnormalities.  RESP: No audible wheeze, cough, or visible cyanosis.  No visible retractions or increased work of breathing.    SKIN: Visible skin clear. No significant rash, abnormal pigmentation or lesions.  NEURO: Cranial nerves grossly intact.  Mentation and speech appropriate for age.  PSYCH: Mentation appears normal, affect normal/bright, judgement and insight intact, normal speech and appearance well-groomed.    PHYSICAL EXAM AREA OF INTEREST:  easily reducible.    IMAGING:  CT scan reviewed: yes      DISCUSSION OF RISKS:  I discussed the alternatives, benefits, risks and possible complications of hernia repair with the patient. The risks of hernia surgery with and without mesh are described below.    Based on FDA s analysis of medical device adverse event reports and of peer-reviewed, scientific literature, the most common adverse events for all surgical repair of hernias--with or without mesh--are pain, infection, hernia recurrence, scar-like tissue that sticks tissues together (adhesion), blockage of the large or small intestine (obstruction), bleeding, abnormal connection between organs, vessels, or intestines (fistula), fluid " build-up at the surgical site (seroma), and a hole in neighboring tissues or organs (perforation).  Some other potential adverse events that can occur following hernia repair with mesh are mesh migration and mesh shrinkage (contraction).    http://www.fda.gov/MedicalDevices/ProductsandMedicalProcedures/ImplantsandProsthetics/HerniaSurgicalMesh/default.htm    PLAN:  Hernia surgery is indicated        Sincerely,    Garret De Luna MD

## 2021-08-27 NOTE — NURSING NOTE
"Chief Complaint   Patient presents with     Consult     Recurrent ventral hernia.        Vitals:    08/27/21 0937   Weight: 109.8 kg (242 lb)   Height: 1.626 m (5' 4\")       Body mass index is 41.54 kg/m .                          Eboni Israel, EMT    "

## 2021-08-27 NOTE — LETTER
8/27/2021       RE: Elza Greer  [address] [facility] [address]     Dear Colleague,    Thank you for referring your patient, Elza rGeer, to the [facility] [place] at [facility]. Please see a copy of my visit note below.    New Hernia Consultation Note    Elza Greer  [id number]  1975    Requesting Provider: Pradip Mckenzie    Dear Clinic, [facility],    I was asked by Pradip Mckenzie to see this patient for the following problem: Elza Greer is a 46 year old female who presents to clinic today for the following health issues      CHIEF COMPLAINT:  Painful bulge    ASSESSMENT/PLAN:  incisional  Hernia size is less than 5cm in size.    Assessment & Plan   Problem List Items Addressed This Visit     None      Visit Diagnoses     Recurrent ventral hernia             Small but mutliple and recurrent midline hernias. I anticipate AWR or advanced technique may be required for durable repair    -Hernia conference; she will be called w wt loss target after conference  -MWM for weight loss-  -3 mo smoking cessation required      30 minutes spent on the date of the encounter doing chart review, history and exam, documentation and further activities per the note    HISTORY OF PRESENT ILLNESS:  Location: upper ventral wall  Severity: Moderate     Multiple abdominal operations  -2012 Open IHR with 4.3cm Proceed mesh patch  -2020- Open primary IHR  -Now with recurrence, small, with colon as well as a smaller sub-umbilical defect    History of lap dm 2006, with subsequent laparotomy for bile duct injury requiring HJ  Opemn appy  Ovarian cystectomy  TAHBSO    Multiple incisions, but now 2 hernias in midline (a xiphoid to pubis scar)        BMI 41.5, SMoker        NUTRITIONAL STATUS:  Lab Results   Component Value Date    ALBUMIN 3.5 12/08/2020       Body mass index is 41.54 kg/m .    Patient is not immunosuppressed.    Patient is  a current smoker.    Past Medical History:   Diagnosis Date     Adjustment disorder with mixed anxiety and depressed mood      IBS (irritable bowel syndrome)      Incisional hernia, without obstruction or gangrene 2020     Lactose intolerance 05/12/2010     Migraine 05/12/2010     Mild intermittent asthma without complication 05/12/2010     PCOS (polycystic ovarian syndrome)      Tobacco use disorder      Vitamin D deficiency        Patient Active Problem List   Diagnosis     Tobacco use disorder     Anxiety     Mild intermittent asthma without complication     Ventral hernia without obstruction or gangrene       Past Surgical History:   Procedure Laterality Date     APPENDECTOMY OPEN N/A      CYSTECTOMY OVARIAN BENIGN  2001     HEPATICOJEJUNOSTOMY  2000    RnY jejunostomy from bile duct injury     HERNIORRHAPHY VENTRAL N/A 02/17/2012    open with mesh     HYSTERECTOMY TOTAL ABDOMINAL, BILATERAL SALPINGO-OOPHORECTOMY, COMBINED N/A 2005     LAPAROSCOPIC CHOLECYSTECTOMY  2006    complicated by bile duct injury     LAPAROSCOPIC HERNIORRHAPHY VENTRAL N/A 1/26/2021    Procedure: Open recurrent incarcerated ventral hernia repair X2;  Surgeon: Pradip Mckenzie MD;  Location: [facility] OR       MEDICATIONS:  Current Outpatient Medications   Medication     albuterol (ACCUNEB) 1.25 MG/3ML neb solution     Ascorbic Acid (VITAMIN C PO)     clotrimazole (LOTRIMIN) 1 % external cream     cyclobenzaprine (FLEXERIL) 10 MG tablet     dextromethorphan (TUSSIN COUGH) 15 MG/5ML syrup     fluticasone (FLONASE) 50 MCG/ACT nasal spray     HYDROcodone-acetaminophen (NORCO) 5-325 MG tablet     IBUPROFEN PO     meloxicam (MOBIC) 7.5 MG tablet     Multiple Vitamins-Calcium (ONE-A-DAY WOMENS PO)     nicotine (NICODERM CQ) 14 MG/24HR 24 hr patch     predniSONE (DELTASONE) 20 MG tablet     vitamin D2 (ERGOCALCIFEROL) 91767 units (1250 mcg) capsule     nystatin (MYCOSTATIN) 996350 UNIT/GM external powder     No current facility-administered medications  "for this visit.       ALLERGIES:  Allergies   Allergen Reactions     Azithromycin Anaphylaxis     Doxycycline Other (See Comments)     Upset stomach and felt \"really out of it\"     Latex Hives     Oxycodone Nausea and Vomiting       Social History     Socioeconomic History     Marital status:      Spouse name: None     Number of children: None     Years of education: None     Highest education level: None   Occupational History     None   Tobacco Use     Smoking status: Current Every Day Smoker     Packs/day: 0.50     Years: 23.00     Pack years: 11.50     Types: Cigarettes     Smokeless tobacco: Never Used   Substance and Sexual Activity     Alcohol use: Yes     Comment: social     Drug use: No     Sexual activity: None   Other Topics Concern     Parent/sibling w/ CABG, MI or angioplasty before 65F 55M? 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     Social Determinants of Health     Financial Resource Strain:      Difficulty of Paying Living Expenses:    Food Insecurity:      Worried About Running Out of Food in the Last Year:      Ran Out of Food in the Last Year:    Transportation Needs:      Lack of Transportation (Medical):      Lack of Transportation (Non-Medical):    Physical Activity:      Days of Exercise per Week:      Minutes of Exercise per Session:    Stress:      Feeling of Stress :    Social Connections:      Frequency of Communication with Friends and Family:      Frequency of Social Gatherings with Friends and Family:      Attends [employer] Services:      Active Member of Clubs or Organizations:      Attends Club or Organization Meetings:      Marital Status:    Intimate Partner Violence:      Fear of Current or Ex-Partner:      Emotionally Abused:      Physically Abused:      Sexually Abused:        Family History   Problem Relation Age of Onset     Coronary Artery Disease Mother      Hypertension Mother      Hyperlipidemia Mother  " "    Depression Mother      Colon Cancer Maternal Grandmother      Breast Cancer Maternal Grandmother      Other Cancer Maternal Grandmother        ROS    CT Reviewed    PHYSICAL EXAM:  Objective    Ht 1.626 m (5' 4\")   Wt 109.8 kg (242 lb)   LMP 08/26/2005   BMI 41.54 kg/m    Vitals - Patient Reported  Pain Score: Mild Pain (3)  Pain Loc: Abdomen        Physical Exam   GENERAL: Healthy, alert and no distress  EYES: Eyes grossly normal to inspection.  No discharge or erythema, or obvious scleral/conjunctival abnormalities.  RESP: No audible wheeze, cough, or visible cyanosis.  No visible retractions or increased work of breathing.    SKIN: Visible skin clear. No significant rash, abnormal pigmentation or lesions.  NEURO: Cranial nerves grossly intact.  Mentation and speech appropriate for age.  PSYCH: Mentation appears normal, affect normal/bright, judgement and insight intact, normal speech and appearance well-groomed.    PHYSICAL EXAM AREA OF INTEREST:  easily reducible.    IMAGING:  CT scan reviewed: yes      DISCUSSION OF RISKS:  I discussed the alternatives, benefits, risks and possible complications of hernia repair with the patient. The risks of hernia surgery with and without mesh are described below.    Based on FDA s analysis of medical device adverse event reports and of peer-reviewed, scientific literature, the most common adverse events for all surgical repair of hernias--with or without mesh--are pain, infection, hernia recurrence, scar-like tissue that sticks tissues together (adhesion), blockage of the large or small intestine (obstruction), bleeding, abnormal connection between organs, vessels, or intestines (fistula), fluid build-up at the surgical site (seroma), and a hole in neighboring tissues or organs (perforation).  Some other potential adverse events that can occur following hernia repair with mesh are mesh migration and mesh shrinkage " (contraction).    http://www.fda.gov/MedicalDevices/ProductsandMedicalProcedures/ImplantsandProsthetics/HerniaSurgicalMesh/default.htm    PLAN:  Hernia surgery is indicated        Sincerely,    Garret De Luna MD

## 2021-09-21 ENCOUNTER — CARE COORDINATION (OUTPATIENT)
Dept: ENDOCRINOLOGY | Facility: CLINIC | Age: 46
End: 2021-09-21

## 2021-10-01 NOTE — PROGRESS NOTES
"Elza Greer is a 46 year old female who is being evaluated via a billable video visit.      The patient has been notified of following:     \"This video visit will be conducted via a call between you and your physician/provider. We have found that certain health care needs can be provided without the need for an in-person physical exam.  This service lets us provide the care you need with a video conversation.  If a prescription is necessary we can send it directly to your pharmacy.  If lab work is needed we can place an order for that and you can then stop by our lab to have the test done at a later time.    Video visits are billed at different rates depending on your insurance coverage.  Please reach out to your insurance provider with any questions.    If during the course of the call the physician/provider feels a video visit is not appropriate, you will not be charged for this service.\"    Patient has given verbal consent for Video visit? Yes  How would you like to obtain your AVS? MyChart  Will anyone else be joining your video visit? No  {If patient encounters technical issues they should call [phone]      Video-Visit Details    Type of service:  Video Visit    Video Start Time:9:27  Video End Time: 10:03    Originating Location (pt. Location): Home    Distant Location (provider location):  [facility] [place]     Platform used for Video Visit: [employer]    During this virtual visit the patient is located in [place], patient verifies this as the location during the entirety of this visit.     New Weight Management Nutrition Consultation    Elza Greer is a 46 year old female presents today for new weight management nutrition consultation.  Patient referred by Dr. Reno on October 4, 2021.    Patient with Co-morbidities of obesity including:  Type II DM no  Renal Failure no  Sleep apnea no  Hypertension no   Dyslipidemia no  Joint pain no  Back pain yes  GERD no " "    Hysterectomy in 2005, significant wt gain following this.   Needs to lose weight prior to hernia repair.     Anthropometrics:  Estimated body mass index is 41.54 kg/m  as calculated from the following:    Height as of 8/27/21: 1.626 m (5' 4\").    Weight as of 8/27/21: 109.8 kg (242 lb).     Pt goal to be <200 lbs    Medications for Weight Loss:  Qsymia    NUTRITION HISTORY  See MD note for details.  Does not tolerate dairy.   Vitamin/minerals: MVI and vitamin C   H/o eating disorder as a teen.   Has lost 29 lbs since starting to work on wt loss in May - reduced dining out, reduced pop work, eating more salads at dinner.   Quick breakfast and lunch meals  Advised to follow 1200 jcarlos/day.  Does not like chicken very much   Does shopping and cooking  With hernia - tougher to eat larger meals, can feel nauseas.     Recent food recall:  Breakfast: skipping lately; waffles; eggs sausage   Lunch: fast-food (switched to fish/chicken, Gyros, jalapeno popper)  Dinner: meat (beef roast, hamburger) and veggie   Snacks: Not usually during week, more on wknds  Beverages: Dr Pepper (4 cans/day, much less than she drank previously), water and Monster Coffee drink    Alcohol: lately not often, typically on Friday   Dining out: Fast-food (2 days/week), pizza     Physical Activity:  Works on her feet. Recently bought a [employer] membership.     Nutrition Prescription  Recommended energy/nutrient modification.  1200 calories/day (per MD)    Nutrition Diagnosis  Obesity r/t long history of self-monitoring deficit and excessive energy intake aeb BMI >30.    Nutrition Intervention  Materials/education provided on 1200 calorie/day diet, Volumetric eating to help satiety level on fewer calories; portion control and healthy food choices (Plate Method and Volumetrics handouts), 100 calorie snack choices, meal and snack planning and websites. Co-developed goals to work towards using motivational interviewing.     Patient demonstrates " understanding.    Expected Engagement: good  Follow-Up Plans: Meal planning     Nutrition Goals  1) Limit Fast-food to once per week   - Check menu/nutrition info before going   2) Reduce pop intake, limit 3 cans per day  3) Gym 2-3 times per week for 30 mins each time.   4) 1200 jcarlos/day - download an conrad to help count calories - My Fitness Pal, Lose It, etc.     The Plate Method  [email or web address]    Protein Sources   [email or web address]     Carbohydrates  [email or web address]     Mindful Eating  [email or web address]     Summary of Volumetrics Eating Plan  [email or web address]     Simple Low-Carb, High-Protein Recipes:  Follow suggested serving sizes   Breakfast:  - https://www.Kyn[employer]/recipes/spinach-monsalve-gruyere-breakfast-strata  - https://www.Innovari/recipe/wild-mushroom-egg-bake  - https://www.[employer]/recipe/cheesy-vegetable-moussaka  - https://wwwVela[employer] Communications/recipes/anxwti-pjvkdhriv-plcf-6-minute-egg  - Cut into at least 8 servings: https://www.Innovari/recipe/swiss-chard-ricotta-salata-egg-bake  - https://www.[employer]nov[employer]/recipe/baked-eggs-spinach-tomatoes    Lunch and Dinner:  Chicken, carrot, cucumber salad on lettuce wrap (sub homero for lettuce):   https://www.Innovari/recipe/chicken-carrot-cucumber-salad    Cuellar chicken wrap:   https://www.[employer]/recipe/chicken-sat-wraps    Caprese Chicken wrap:   https://www.Innovari/recipe/caprese-wraps-with-chicken    Turkey shreya dumpling soup:   https://www.Kynded/recipes/instant-pot-turkey-and-shreya-dumpling-soup    Greek Lemon Chicken soup:  https://www.[employer].com/recipes/lemony-greek-chicken-soup    Pan-roasted Pork Tenderloin and vegetable salad:   https://www.[employer].com/recipes/pan-roasted-pork-baby-vegetable-salad    Sesame shrimp with cucumber salad (can skip the chili flakes):  https://www.Thwapr/recipes/sesame-shrimp-smashed-cucumber-salad    Beef tenderloin and Balsamic Asparagus:  https://www.Thwapr/recipes/beef-tenderloin-balsamic-asparagus    Chicken and root Vegetable Pot Pie:  https://www.Thwapr/recipes/skillet-chicken-root-vegetable-potpie    Maple-Glazed Chicken and Apple-Brussle Sprout Slaw (can sub raisins for currants):  https://www.[employer]Fire/recipe/maple-glazed-chicken      Follow-Up:  1 month, PRN    Time spent with patient: 36 minutes.  Iveth Turcios RD, LD

## 2021-10-04 ENCOUNTER — VIRTUAL VISIT (OUTPATIENT)
Dept: ENDOCRINOLOGY | Facility: CLINIC | Age: 46
End: 2021-10-04
Payer: COMMERCIAL

## 2021-10-04 VITALS — HEIGHT: 64 IN | BODY MASS INDEX: 40.8 KG/M2 | WEIGHT: 239 LBS

## 2021-10-04 DIAGNOSIS — E66.9 OBESITY: ICD-10-CM

## 2021-10-04 DIAGNOSIS — Z71.3 NUTRITIONAL COUNSELING: Primary | ICD-10-CM

## 2021-10-04 DIAGNOSIS — K43.2 RECURRENT VENTRAL HERNIA: ICD-10-CM

## 2021-10-04 DIAGNOSIS — E66.01 MORBID OBESITY (H): Primary | ICD-10-CM

## 2021-10-04 PROCEDURE — 97802 MEDICAL NUTRITION INDIV IN: CPT | Mod: 95 | Performed by: DIETITIAN, REGISTERED

## 2021-10-04 PROCEDURE — 99203 OFFICE O/P NEW LOW 30 MIN: CPT | Mod: 95 | Performed by: INTERNAL MEDICINE

## 2021-10-04 ASSESSMENT — MIFFLIN-ST. JEOR: SCORE: 1723.07

## 2021-10-04 ASSESSMENT — PAIN SCALES - GENERAL: PAINLEVEL: SEVERE PAIN (7)

## 2021-10-04 NOTE — NURSING NOTE
"Chief Complaint   Patient presents with     New Patient     New MWM consult       Vitals:    10/04/21 0816   Weight: 108.4 kg (239 lb)   Height: 1.632 m (5' 4.25\")       Body mass index is 40.71 kg/m .                            "
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
## 2021-10-04 NOTE — PROGRESS NOTES
Elza is a 46 year old who is being evaluated via a billable video visit.      How would you like to obtain your AVS? MyChart  If the video visit is dropped, the invitation should be resent by: Text to cell phone: [phone]  Will anyone else be joining your video visit? No     During this virtual visit the patient is located in [place], patient verifies this as the location during the entirety of this visit.     Video-Visit Details    Type of service:  Video Visit    Video call duration: 30 minutes.  I explained the conditions and plans (more than 50% of time was counseling/coordination of weight management).    Originating Location (pt. Location): Home    Distant Location (provider location):  [facility] WE[facility] MANAGEMENT CLINIC [place]     Platform used for Video Visit: [employer]

## 2021-10-04 NOTE — LETTER
"10/4/2021       RE: Elza Greer  [address] [facility] [address]     Dear Colleague,    Thank you for referring your patient, Elza Greer, to the [facility] WEIGHT MANAGEMENT CLINIC [place] at [facility]. Please see a copy of my visit note below.    Elza Greer is a 46 year old female who is being evaluated via a billable video visit.      The patient has been notified of following:     \"This video visit will be conducted via a call between you and your physician/provider. We have found that certain health care needs can be provided without the need for an in-person physical exam.  This service lets us provide the care you need with a video conversation.  If a prescription is necessary we can send it directly to your pharmacy.  If lab work is needed we can place an order for that and you can then stop by our lab to have the test done at a later time.    Video visits are billed at different rates depending on your insurance coverage.  Please reach out to your insurance provider with any questions.    If during the course of the call the physician/provider feels a video visit is not appropriate, you will not be charged for this service.\"    Patient has given verbal consent for Video visit? Yes  How would you like to obtain your AVS? MyChart  Will anyone else be joining your video visit? No  {If patient encounters technical issues they should call [phone]      Video-Visit Details    Type of service:  Video Visit    Video Start Time:9:27  Video End Time: 10:03    Originating Location (pt. Location): Home    Distant Location (provider location):  [facility] WE[facility] MANAGEMENT CLINIC [place]     Platform used for Video Visit: [employer]    During this virtual visit the patient is located in [place], patient verifies this as the location during the entirety of this visit.     New Weight Management Nutrition Consultation    Elza Greer is a 46 " "year old female presents today for new weight management nutrition consultation.  Patient referred by Dr. Reno on October 4, 2021.    Patient with Co-morbidities of obesity including:  Type II DM no  Renal Failure no  Sleep apnea no  Hypertension no   Dyslipidemia no  Joint pain no  Back pain yes  GERD no     Hysterectomy in 2005, significant wt gain following this.   Needs to lose weight prior to hernia repair.     Anthropometrics:  Estimated body mass index is 41.54 kg/m  as calculated from the following:    Height as of 8/27/21: 1.626 m (5' 4\").    Weight as of 8/27/21: 109.8 kg (242 lb).     Pt goal to be <200 lbs    Medications for Weight Loss:  Qsymia    NUTRITION HISTORY  See MD note for details.  Does not tolerate dairy.   Vitamin/minerals: MVI and vitamin C   H/o eating disorder as a teen.   Has lost 29 lbs since starting to work on wt loss in May - reduced dining out, reduced pop work, eating more salads at dinner.   Quick breakfast and lunch meals  Advised to follow 1200 jcarlos/day.  Does not like chicken very much   Does shopping and cooking  With hernia - tougher to eat larger meals, can feel nauseas.     Recent food recall:  Breakfast: skipping lately; waffles; eggs sausage   Lunch: fast-food (switched to fish/chicken, Gyros, jalapeno popper)  Dinner: meat (beef roast, hamburger) and veggie   Snacks: Not usually during week, more on wknds  Beverages: Dr Pepper (4 cans/day, much less than she drank previously), water and Monster Coffee drink    Alcohol: lately not often, typically on Friday   Dining out: Fast-food (2 days/week), pizza     Physical Activity:  Works on her feet. Recently bought a [employer] membership.     Nutrition Prescription  Recommended energy/nutrient modification.  1200 calories/day (per MD)    Nutrition Diagnosis  Obesity r/t long history of self-monitoring deficit and excessive energy intake aeb BMI >30.    Nutrition Intervention  Materials/education provided on 1200 calorie/day " diet, Volumetric eating to help satiety level on fewer calories; portion control and healthy food choices (Plate Method and Volumetrics handouts), 100 calorie snack choices, meal and snack planning and websites. Co-developed goals to work towards using motivational interviewing.     Patient demonstrates understanding.    Expected Engagement: good  Follow-Up Plans: Meal planning     Nutrition Goals  1) Limit Fast-food to once per week   - Check menu/nutrition info before going   2) Reduce pop intake, limit 3 cans per day  3) Gym 2-3 times per week for 30 mins each time.   4) 1200 jcarlos/day - download an conrad to help count calories - My Fitness Pal, Lose It, etc.     The Plate Method  [email or web address]    Protein Sources   [email or web address]     Carbohydrates  [email or web address]     Mindful Eating  [email or web address]     Summary of Volumetrics Eating Plan  [email or web address]     Simple Low-Carb, High-Protein Recipes:  Follow suggested serving sizes   Breakfast:  - https://www.Bag Borrow or Steal/recipes/spinach-monsalve-gruyere-breakfast-strata  - https://www.Flipzu/recipe/wild-mushroom-egg-bake  - https://www.Flipzu/recipe/cheesy-vegetable-moussaka  - https://www.Bag Borrow or Steal/recipes/fxeiki-httciertz-ffux-6-minute-egg  - Cut into at least 8 servings: https://www.Flipzu/recipe/swiss-chard-ricotta-salata-egg-bake  - https://www.Flipzu/recipe/baked-eggs-spinach-tomatoes    Lunch and Dinner:  Chicken, carrot, cucumber salad on lettuce wrap (sub homero for lettuce):   https://www.Healthonomy.Tales2Go/recipe/chicken-carrot-cucumber-salad    Cuellar chicken wrap:   https://www.Flipzu/recipe/chicken-sat-wraps    Caprese Chicken wrap:   https://www.Healthonomy.Tales2Go/recipe/caprese-wraps-with-chicken    Turkey shreya dumpling soup:   https://www.AllPeers.Tales2Go/recipes/instant-pot-turkey-and-shreya-dumpling-soup    Greek Lemon Chicken  soup:  https://www.Camera Agroalimentos/recipes/lemony-greek-chicken-soup    Pan-roasted Pork Tenderloin and vegetable salad:   https://www.Camera Agroalimentos/recipes/pan-roasted-pork-baby-vegetable-salad    Sesame shrimp with cucumber salad (can skip the chili flakes): https://wwwTetraphase Pharmaceuticals/recipes/sesame-shrimp-smashed-cucumber-salad    Beef tenderloin and Balsamic Asparagus:  https://wwwTetraphase Pharmaceuticals/recipes/beef-tenderloin-balsamic-asparagus    Chicken and root Vegetable Pot Pie:  https://wwwTetraphase Pharmaceuticals/recipes/skillet-chicken-root-vegetable-potpie    Maple-Glazed Chicken and Apple-Brussle Sprout Slaw (can sub raisins for currants):  https://www.ProntoForms/recipe/maple-glazed-chicken      Follow-Up:  1 month, PRN    Time spent with patient: 36 minutes.  Iveth Turcios RD, LD

## 2021-10-04 NOTE — PATIENT INSTRUCTIONS
Mahamed Bertrand,     Follow-up with RD in 1 month.     Thank you,    Iveth Turcios, RD, LD  If you would like to schedule or reschedule an appointment with the RD, please call [phone]    Nutrition Goals  1) Limit Fast-food to once per week   - Check menu/nutrition info before going   2) Reduce pop intake, limit 3 cans per day  3) Gym 2-3 times per week for 30 mins each time.   4) 1200 jcarlos/day - download an conrad to help count calories - My Fitness Pal, Lose It, etc.     The Plate Method  [email or web address]    Protein Sources   [email or web address]://[email or web address]     Carbohydrates  [email or web address]     Mindful Eating  [email or web address]://[email or web address].[email or web address]     Summary of Volumetrics Eating Plan  [email or web address]     Simple Low-Carb, High-Protein Recipes:  Follow suggested serving sizes   Breakfast:  - https://www.vip.com/recipes/spinach-monsalve-gruyere-breakfast-strata  - https://www.Hightower/recipe/wild-mushroom-egg-bake  - https://www.Hightower/recipe/cheesy-vegetable-moussaka  - https://www.vip.com/recipes/belees-tgehahyoa-ygpj-6-minute-egg  - Cut into at least 8 servings: https://www.Hightower/recipe/swiss-chard-ricotta-salata-egg-bake  - https://www.Hightower/recipe/baked-eggs-spinach-tomatoes    Lunch and Dinner:  Chicken, carrot, cucumber salad on lettuce wrap (sub homero for lettuce):   https://www.Comparabien.com.[employer]/recipe/chicken-carrot-cucumber-salad    Cuellar chicken wrap:   https://www.Hightower/recipe/chicken-sat-wraps    Caprese Chicken wrap:   https://www.Hightower/recipe/caprese-wraps-with-chicken    Turkey shreya dumpling soup:   https://www.vip.com/recipes/instant-pot-turkey-and-shreya-dumpling-soup    Greek Lemon Chicken soup:  https://www.vip.com/recipes/lemony-greek-chicken-soup    Pan-roasted Pork Tenderloin and vegetable salad:   https://www.vip.com/recipes/pan-roasted-pork-baby-vegetable-salad    Sesame shrimp  with cucumber salad (can skip the chili flakes): https://www.KupiKupon/recipes/sesame-shrimp-smashed-cucumber-salad    Beef tenderloin and Balsamic Asparagus:  https://www.KupiKupon/recipes/beef-tenderloin-balsamic-asparagus    Chicken and root Vegetable Pot Pie:  https://www.KupiKupon/recipes/skillet-chicken-root-vegetable-potpie    Maple-Glazed Chicken and Apple-Brussle Sprout Slaw (can sub raisins for currants):  https://www.BigEvidence/recipe/maple-glazed-chicken    Interested in working with a health ?  Health coaches work with you to improve your overall health and wellbeing.  They look at the whole person, and may involve discussion of different areas of life, including, but not limited to the four pillars of health (sleep, exercise, nutrition, and stress management). Discuss with your care team if you would like to start working a health .    Health Coaching-3 Pack:    $99 for three health coaching visits    Visits may be done in person or via phone    Coaching is a partnership between the  and the client; Coaches do not prescribe or diagnose    Coaching helps inspire the client to reach his/her personal goals      COMPREHENSIVE WEIGHT MANAGEMENT PROGRAM  VIRTUAL SUPPORT GROUPS    For Support Group Information:      We offer support groups for patients who are working on weight loss and considering, preparing for or have had weight loss surgery.   There is no cost for this opportunity.  You are invited to attend the?Virtual Support Groups?provided by any of the following locations:    1. [facility] [place] via [employer] Teams with Summer Iglesias RN  2.   [place] via [employer] with Tomasz Booker, PhD, LP  3.   [place] via [employer] Teams with Jewell Dimas RN  4.   [facility] via [employer] Teams with Jewell Garnica [facility]    The following Support Group information can also be found on our  "website:  [email or web address]://[email or web address]/[email or web address]-loss-[email or web address]-support-groups      [facility] Weight Loss Surgery Support Group    [facility] Weight Loss Surgery Support Group  The support group is a patient-lead forum that meets monthly to share experiences, encouragement and education. It is open to those who have had weight loss surgery, are scheduled for surgery, and those who are considering surgery.   WHEN: This group meets on the 3rd Wednesday of each month from 5:00PM - 6:00PM virtually using Microsoft Teams.   FACILITATOR: Led by Summer Iglesias, the program's Clinical Coordinator.   TO REGISTER: Please contact the clinic via Skai or call the nurse line directly at [phone] to inform our staff that you would like an invite sent to you and to let us know the email you would like the invite sent to. Prior to the meeting, a link with directions on how to join the meeting will be sent to you.    2021 Meetings  August 18: \"Let's Talk\" a time for the group to share.  September 15: \"Let's Talk\" a time for the group to share.  October 20: \"Let's Talk\" a time for the group to share.  November 17: Heidy Soto RD, CORNELIUS \"Protein, Metabolism and Meal Planning\"  December 15: Josue Oliver RD, LD will speak, \"Recipe Modification\"    [facility] Support Groups    Connections: Bariatric Care Support Group?  This is open to all [facility] (and those external to this program) pre- and post- operative bariatric surgery patients as well as their support system.   WHEN: This group meets the 2nd Tuesday of each month from 6:30 PM - 8:00 PM virtually using Microsoft Teams.   FACILITATOR: Led by Tomasz Booker, Ph.D who is a Licensed Psychologist with the [facility] Comprehensive Weight Management Program.   TO REGISTER: Please send an email to Tomasz Booker, Ph.D., LP at?[email or web address]?if you would like an invitation to the " "group and to learn about using Microsoft Teams.    2021 Meetings  August 10: Open Forum  September 14: Guest Speaker: Jewell Dimas RN,CBN, CIC, [facility] Comprehensive Weight Management Program, \"Your Hospital Stay and Post-Operative Compliance\"  October 12: Open Forum  November 9: Guest Speaker: Gaby Asencio RD,LD, [facility] Comprehensive Weight Management Program,\"Holiday Eating\".  December 14: Guest Speaker Kelsea Reed MD, MPH, [facility], [facility], \"Body Contouring Surgery for Bariatric Surgery Patients\"     Connections: Post-Operative Bariatric Surgery Support Group  This is a support group for [facility] bariatric patients (and those external to [facility]) who have had bariatric surgery and are at least 3 months post-surgery.  WHEN: This support group meets the 4th Wednesday of the month from 11:00 AM - 12:00 PM virtually using Microsoft Teams.   FACILITATOR: Led by Certified Bariatric Nurse, Jewell Dimas RN.   TO REGISTER: Please send an email to Jewell at [email or web address] if you would like an invitation to the group and to learn about using Petflow.      [facility] Healthy Lifestyle Virtual Support Group    Healthy Lifestyle Virtual Support Group?  This is 60 minutes of small group guided discussion, support and resources. All are welcome who want a healthy lifestyle.  WHEN: This group meets monthly on a Friday from 12:30 PM - to 1:30 PM virtually using Microsoft Teams.   FACILITATOR: Led by National Board Certified Health , Jewell Garnica, [facility].   TO REGISTER: Please send an email to Jewell [email or web address] to receive monthly invites to the group or if you have any questions about having a health .  Prior to the meeting, a link with directions on how to join the meeting will be sent to you.    2021 Meetings  August 27: Open Forum September 24: Sleep and the 7 Types of " Rest  October 29: Open Forum  November 19: Gratitude     December 10: Open Forum
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
## 2021-10-05 ENCOUNTER — TELEPHONE (OUTPATIENT)
Dept: ENDOCRINOLOGY | Facility: CLINIC | Age: 46
End: 2021-10-05

## 2021-10-05 NOTE — TELEPHONE ENCOUNTER
lvm to schedule4 week follow up with Iveth Turcios, left call center phone number and sent [employer].

## 2021-10-06 ENCOUNTER — TELEPHONE (OUTPATIENT)
Dept: ENDOCRINOLOGY | Facility: CLINIC | Age: 46
End: 2021-10-06

## 2021-10-06 NOTE — TELEPHONE ENCOUNTER
MTM referral from: [facility] visit (referral by provider)    MTM referral outreach attempt #2 on October 6, 2021 at 1:49 PM      Outcome: Patient not reachable after several attempts, will route to MTM Pharmacist/Provider as an FYI.  MT scheduling number is [phone].  Thank you for the referral.    Johan Escoto, MTM coordinator

## 2021-10-06 NOTE — PROGRESS NOTES
Discharge Note -Physical Therapy    NAME:  Elza Greer  MRN:   [id number]    S:    Pt did not follow up for therapy as recommended. Pt had unrelated medical procedure after initial visit.    O:  Objective information is not available as pt has not returned for therapy.  Last objective information or progress note will serve as final entry.    A:   Pt did not return for further treatment.    Status of goals is unknown due to lack of followup by patient.    P:  Discharge from PT this date.      [facility]  Kris Hoenk  PT  [facility]  [address] [place] [address]  [email or web address]   Office: [phone]  Voicemail: [phone]

## 2021-10-07 NOTE — PROGRESS NOTES
Elza Greer  :  1975  DOS: 10/7/2021  MRN: [id number]    Medical Spine Clinic Visit    PCP: Mukesh, [facility]     Elza Greer is a 46 year old female with a history of anxiety, morbid obesity, migraine headaches referred by No ref. provider found for: subacute low back pain     Hurt her back at work late  while helping a patient at work (pulling).   Got an XRay with chiropractor. Was told there was swelling. Improved with chiropractic but pain worsened again after returning to work.  Pain is in left buttock at the level of her iliac crest. Radiates up her spine to upper lumbar   She also has pain radiating down posterior left thigh to popliteal fossa. Pain is constant and sharp. No numbness or tingling. No weakess. Sitting, standing, car rides are exacerbating, pushing, pulling.   On weight restrictions. Lying with pillow between legs on a hard surface on back is alleviating.     Other evaluation and/or treatments so far consists of: Ice, Ibuprofen and PT and chiropractic. 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 Likes to play mine craft for fun    Past Medical History:   Diagnosis Date     Adjustment disorder with mixed anxiety and depressed mood      IBS (irritable bowel syndrome)      Incisional hernia, without obstruction or gangrene      Lactose intolerance 2010     Migraine 2010     Mild intermittent asthma without complication 2010     PCOS (polycystic ovarian syndrome)      Tobacco use disorder      Vitamin D deficiency      Past Surgical History:   Procedure Laterality Date      APPENDECTOMY OPEN N/A      CYSTECTOMY OVARIAN BENIGN  2001     HEPATICOJEJUNOSTOMY  2000    RnY jejunostomy from bile duct injury     HERNIORRHAPHY VENTRAL N/A 02/17/2012    open with mesh     HYSTERECTOMY TOTAL ABDOMINAL, BILATERAL SALPINGO-OOPHORECTOMY, COMBINED N/A 2005     LAPAROSCOPIC CHOLECYSTECTOMY  2006    complicated by bile duct injury     LAPAROSCOPIC HERNIORRHAPHY VENTRAL N/A 1/26/2021    Procedure: Open recurrent incarcerated ventral hernia repair X2;  Surgeon: Pradip Mckenzie MD;  Location: [place] OR     Family History   Problem Relation Age of Onset     Coronary Artery Disease Mother      Hypertension Mother      Hyperlipidemia Mother      Depression Mother      Colon Cancer Maternal Grandmother      Breast Cancer Maternal Grandmother      Other Cancer Maternal Grandmother        Objective  /85   Pulse 91   LMP 08/26/2005       General: healthy, alert and in no distress      HEENT: no scleral icterus or conjunctival erythema     Skin: no suspicious lesions or rash. No jaundice.     CV: normal rate      Resp: normal respiratory effort without conversational dyspnea     Psych: normal mood and affect      Gait: nonantalgic, appropriate coordination and balance     Neuro: Motor strength as noted below     Low back exam:    THORACIC/LUMBAR SPINE  Inspection:    No gross deformity/asymmetry, scapular winging  Palpation:    Tender about the left para lumbar muscles, right para lumbar muscles, left SI joint and left sciatic notch. Otherwise remainder of landmarks are nontender.  Range of Motion:     Lumbar flexion limited slightly by pain    Lumbar extension show full range    Right rotation within normal limits but causes R buttock pain    Left rotation within normal limits  Strength:    able to toe walk, 5/5 - quadriceps, hamstrings, tibialis anterior, gastrocsoleus, and extensor hallicus longus except 4+/5 ankle plantar flexion on L, although has had L sided achilles tendon tear x2  Special Tests:     Positive: LEFT straight leg raise (left), femoral stretch test (left)  Negative: BILATERAL slump test (bilateral), facet compression test (), SI joint compression (bilateral), PARVIN (L), FADIR (L - on L caused groin pain), Gaenslen's test    Reflexes:       patellar (L3, L4) symmetric normal       achilles tendons (S1) symmetric normal  no ankle clonus bilaterally    Sensation:      grossly intact throughout lower extremities       Except decreased sensation in posterior L led at midline    Skin:       well perfused       capillary refill brisk    Radiology:  NO imaging available    Assessment:  1. Lumbar radiculopathy      47yo with subacute left LB and posterior thigh pain with associated positive SLR on L and decreased sensation to light touch in S1 distribution, consistent with L S1 radiculopathy. Pain not improving with PT and conservative care. She would like to pursue imaging and likely injection. She would like to avoid neuropathic medications due to possibility for sedation and weight gain.    Plan:  Discussed the assessment with the patient.  MRI of the lumbar spine  Possible Epidural Steroid injection done at [facility]  Follow up: I will call with MRI results    BILLING TIME DOCUMENTATION:   The total TIME spent on this patient on the date of the encounter/appointment was 32 minutes.      TOTAL TIME includes:   Time spent preparing to see the patient (reviewing records and tests) - 1 min  Time spent face to face (or over the phone) with the patient - 25 min  Time spent ordering tests, medications, procedures and referrals - 0 min  Time spent Referring and communicating with other healthcare professionals - 0 min  Time spent documenting clinical information in Epic - 5 min     Jaylin Pickett MD  [facility] Management     Disclaimer: This note consists of symbols derived from keyboarding, dictation and/or voice recognition software. As a result, there may be errors in the script  that have gone undetected. Please consider this when interpreting information found in this chart.

## 2021-10-11 ENCOUNTER — OFFICE VISIT (OUTPATIENT)
Dept: PALLIATIVE MEDICINE | Facility: CLINIC | Age: 46
End: 2021-10-11
Payer: OTHER MISCELLANEOUS

## 2021-10-11 VITALS — DIASTOLIC BLOOD PRESSURE: 85 MMHG | HEART RATE: 91 BPM | SYSTOLIC BLOOD PRESSURE: 126 MMHG

## 2021-10-11 DIAGNOSIS — M54.16 LUMBAR RADICULOPATHY: Primary | ICD-10-CM

## 2021-10-11 PROCEDURE — 99203 OFFICE O/P NEW LOW 30 MIN: CPT | Performed by: STUDENT IN AN ORGANIZED HEALTH CARE EDUCATION/TRAINING PROGRAM

## 2021-10-11 ASSESSMENT — PAIN SCALES - GENERAL: PAINLEVEL: SEVERE PAIN (7)

## 2021-10-11 NOTE — PATIENT INSTRUCTIONS
I think your low back and left leg pain is most likely due to an irritated nerve in your low back (this is called lumbar radiculopathy). It is also possible that you have a muscle strain or joint pain, but I think this is less likely     For this we will:     - Get an MRI of your low back  - Likely do an epidural steroid injection for your pain  - Take Tylenol and ibuprofen as needed.   Tylenol 1000mg up to three times a day with ibuprofen 800mg up to three times a day. Tylenol and ibuprofen tend to work best when used together.     Your next steps:  1. Schedule your MRI. [facility] [phone] - please call to schedule appointment    Follow up:   - I will call you with the results of your MRI and we can talk about next steps    Jaylin Pickett MD  [facility]     ----------------------------------------------------------------  Clinic Number:  [phone]     Call with any questions about your care and for scheduling assistance.     Calls are returned Monday through Friday between 8 AM and 4:30 PM. We usually get back to you within 2 business days depending on the issue/request.    If we are prescribing your medications:    For opioid medication refills, call the clinic or send a [facility] message 7 days in advance.  Please include:    Name of requested medication    Name of the pharmacy.    For non-opioid medications, call your pharmacy directly to request a refill. Please allow 3-4 days to be processed.     Per [place] State Law:    All controlled substance prescriptions must be filled within 30 days of being written.      For those controlled substances allowing refills, pickup must occur within 30 days of last fill.      We believe regular attendance is key to your success in our program!      Any time you are unable to keep your appointment we ask that you call us at least 24 hours in advance to cancel.This will allow us to offer the appointment time to another  patient.     Multiple missed appointments may lead to dismissal from the clinic.

## 2021-10-24 ENCOUNTER — HEALTH MAINTENANCE LETTER (OUTPATIENT)
Age: 46
End: 2021-10-24

## 2021-10-26 ENCOUNTER — HOSPITAL ENCOUNTER (OUTPATIENT)
Dept: MRI IMAGING | Facility: CLINIC | Age: 46
Discharge: HOME | End: 2021-10-26
Attending: STUDENT IN AN ORGANIZED HEALTH CARE EDUCATION/TRAINING PROGRAM | Admitting: STUDENT IN AN ORGANIZED HEALTH CARE EDUCATION/TRAINING PROGRAM
Payer: OTHER MISCELLANEOUS

## 2021-10-26 DIAGNOSIS — M54.16 LUMBAR RADICULOPATHY: ICD-10-CM

## 2021-10-26 PROCEDURE — 72148 MRI LUMBAR SPINE W/O DYE: CPT

## 2021-10-28 ENCOUNTER — TELEPHONE (OUTPATIENT)
Dept: PALLIATIVE MEDICINE | Facility: CLINIC | Age: 46
End: 2021-10-28

## 2021-10-28 DIAGNOSIS — M47.816 LUMBAR FACET ARTHROPATHY: ICD-10-CM

## 2021-10-28 DIAGNOSIS — M54.50 CHRONIC LEFT-SIDED LOW BACK PAIN WITHOUT SCIATICA: Primary | ICD-10-CM

## 2021-10-28 DIAGNOSIS — G89.29 CHRONIC LEFT-SIDED LOW BACK PAIN WITHOUT SCIATICA: Primary | ICD-10-CM

## 2021-10-28 NOTE — TELEPHONE ENCOUNTER
Called Pt discussed left L5/S1 facet arthropathy with associated edema.     Will do left L5/S1 facet joint injection    Jaylin Pickett MD  [facility] Pain Management

## 2021-11-01 ENCOUNTER — TELEPHONE (OUTPATIENT)
Dept: PALLIATIVE MEDICINE | Facility: CLINIC | Age: 46
End: 2021-11-01
Payer: COMMERCIAL

## 2021-11-01 DIAGNOSIS — Z11.52 ENCOUNTER FOR SCREENING LABORATORY TESTING FOR COVID-19 VIRUS: Primary | ICD-10-CM

## 2021-11-01 NOTE — TELEPHONE ENCOUNTER
Screening Questions for Radiology Injections:    Injection to be done at which interventional clinic site? [facility]    If [facility] location, tell patient that this procedure requires a COVID-19 lab test be done within 4 days of the procedure. Would you still like to move forward with scheduling the procedure?  YES:    If YES, let patient know that someone will call them to schedule the COVID-19 test and that they will only receive a call back if the result is positive. Route to nursing to enter order.     Instruct patient to arrive as directed prior to the scheduled appointment time:    [place] minutes before      [place]aim[place]: 30 minutes before; if IV needed 1 hour before     Procedure ordered by Piyush    Procedure ordered? Left L5/S1      Transforaminal Cervical ILIA -  [place] does NOT have providers that do these- [facility] and [facility] do have providers that do    As a reminder, receiving steroids can decrease your body's ability to fight infection.   Would you still like to move forward with scheduling the injection?  Yes    What insurance would patient like us to bill for this procedure? WC      Worker's comp or MVA (motor vehicle accident) -Any injection DO NOT SCHEDULE and route to Vanessa Baron.      [facility] - For SI joint injections, DO NOT SCHEDULE and route Vanessa Baron.       ALL BCBS, [employer] and [employer]-Route to Vanessa for review DO NOT SCHEDULE      IF SCHEDULING IN [place] AND NEEDS A PA, IT IS OKAY TO SCHEDULE. [place] HANDLES THEIR OWN PA'S AFTER THE PATIENT IS SCHEDULED. PLEASE SCHEDULE AT LEAST 1 WEEK OUT SO A PA CAN BE OBTAINED.    Any chance of pregnancy? NO   If YES, do NOT schedule and route to RN pool    Is an  needed? No     Patient has a drive home? (mandatory) YES: INFORMED    Is patient taking any blood thinners (That is: Coumadin, Warfarin, Jantoven, Pradaxa Xarelto, Eliquis, Edoxaban, Enoxaparin, Lovenox, Heparin, Arixtra,  Fondaparinux, or Fragmin? OR Antiplatelet medication such as Plavix, Brilinta, or Effient? )? No   If hold needed, do NOT schedule, route to RN pool     Is patient taking any aspirin products (includes Excedrin and Fiorinal)? No     If more than 325mg/day, OK to schedule; Instruct pt to decrease to less than 325 mg for 7 days AND route to RN pool    For CERVICAL procedures, hold all aspirin products for 6 days.     Tell pt that if aspirin product is not held for 6 days, the procedure WILL BE cancelled.      Does the patient have a bleeding or clotting disorder? No     If YES, okay to schedule AND route to RN nurse pool    For any patients with platelet count <100, must be forwarded to provider    Any allergies to contrast dye, iodine, shellfish, or numbing and steroid medications? No    If YES, add allergy information to appointment notes AND route to the RN pool     If ILIA and Contrast Dye / Iodine Allergy? DO NOT SCHEDULE, route to RN pool    Allergies: Azithromycin, Doxycycline, Latex, and Oxycodone     Is patient diabetic?  No  If YES, instruct them to bring their glucometer.    Does patient have an active infection or treated for one within the past week? No     Is patient currently taking any antibiotics?  No     For patients on chronic, preventative, or prophylactic antibiotics, procedures may be scheduled.     For patients on antibiotics for active or recent infection:antibiotic course must have been completed for 4 days    Is patient currently taking any steroid medications? (i.e. Prednisone, Medrol)  No     For patients on steroid medications, course must have been completed for 4 days    Is patient actively being treated for cancer or immunocompromised? No  If YES, do NOT schedule and route to RN pool     Are you able to get on and off an exam table with minimal or no assistance? Yes  If NO, do NOT schedule and route to RN pool    Are you able to roll over and lay on your stomach with minimal or no  assistance? Yes  If NO, do NOT schedule and route to RN pool     Has the patient had a flu shot or any other vaccinations within 7 days before or after the procedure.  No     Have you recently had a COVID vaccine or have plans to get it in the near future? No    If yes, explain that for the vaccine to work best they need to:       wait 1 week before and 1 week after getting Vaccine #1    wait 1 week before and 2 weeks after getting Vaccine #2    wait 1 week before and 2 weeks after getting Vaccine BOOSTER    If patient has concerns about the timing, send to RN pool     Does patient have an MRI/CT?  YES: 2021  Check Procedure Scheduling Grid to see if required.      Was the MRI done within the last 3 years?  Yes    If yes, where was the MRI done i.e.[facility], [facility], [place], [facility] etc? [facility]      If no, do not schedule and route to RN pool    If MRI was not done at [place], [facility] or [facility] do NOT schedule and route to RN pool.      If pt has an imaging disc, the injection MAY be scheduled but pt has to bring disc to appt.     If they show up without the disc the injection cannot be done    Procedure Specific Instructions:      If celiac plexus block, informed patient NPO for 6 hours and that it is okay to take medications with sips of water, especially blood pressure medications  Not Applicable         If this is for a cervical procedure, informed patient that aspirin needs to be held for 6 days.   Not Applicable      If IV needed:    Do not schedule procedures requiring IV placement in the first appointment of the day or first appointment after lunch. Do NOT schedule at 0745, 0815 or 1245.     Instructed pt to arrive 30 minutes early for IV start if required. (Check Procedure Scheduling Grid)  Not Applicable    Reminders:      If you are started on any steroids or antibiotics between now and your appointment, you must contact us because the procedure may need to be cancelled.  Yes      For  all procedures except radiofrequency ablations (RFAs) and spinal cord stimulator (SCS) trials, informed patient:    IV sedation is not provided for this procedure.  If you feel that an oral anti-anxiety medication is needed, you can discuss this further with your referring provider or primary care provider.  The Pain Clinic provider will discuss specifics of what the procedure includes at your appointment.  Most procedures last 10-20 minutes.  We use numbing medications to help with any discomfort during the procedure.  Not Applicable      For patients 85 or older we recommend having an adult stay w/ them for the remainder of the day.       Does the patient have any questions?  NO  Radha Valenzuela  [facility]

## 2021-11-01 NOTE — TELEPHONE ENCOUNTER
Covid  Test ordered     Riki Wolff, RN  Patient Care Supervisor   [facility] [place]

## 2021-11-11 NOTE — TELEPHONE ENCOUNTER
Pt calling to state there are no available test appointments before Wednesday for her covid test. Pt states her work does PCR testing and she can have it done there on Monday. Please call pt back.    Radha MARCUS    [facility]

## 2021-11-12 NOTE — TELEPHONE ENCOUNTER
11/18/21: lumbar epidural steroid injection.    Pt can do a PCR outside of [facility]. The results need to be faxed to [facility] radiology fax #:  [phone].    Has to be done on/after11/14/21    Called pt and relayed the above.  She will have the PCR test at her work on Monday, 11/15/21 and fax the results to the above # on Tuesday, 11/16/21 or Wednesday, 11/17/21.     Radha RN-BSN  [facility]

## 2021-11-18 ENCOUNTER — HOSPITAL ENCOUNTER (OUTPATIENT)
Dept: PALLIATIVE MEDICINE | Facility: CLINIC | Age: 46
Discharge: HOME | End: 2021-11-18
Admitting: STUDENT IN AN ORGANIZED HEALTH CARE EDUCATION/TRAINING PROGRAM
Payer: OTHER MISCELLANEOUS

## 2021-11-18 VITALS
TEMPERATURE: 96.3 F | DIASTOLIC BLOOD PRESSURE: 85 MMHG | HEART RATE: 86 BPM | SYSTOLIC BLOOD PRESSURE: 132 MMHG | RESPIRATION RATE: 16 BRPM

## 2021-11-18 DIAGNOSIS — M54.50 CHRONIC LEFT-SIDED LOW BACK PAIN WITHOUT SCIATICA: ICD-10-CM

## 2021-11-18 DIAGNOSIS — M54.16 LUMBAR RADICULOPATHY: ICD-10-CM

## 2021-11-18 DIAGNOSIS — M47.816 LUMBAR FACET ARTHROPATHY: ICD-10-CM

## 2021-11-18 DIAGNOSIS — G89.29 CHRONIC LEFT-SIDED LOW BACK PAIN WITHOUT SCIATICA: ICD-10-CM

## 2021-11-18 PROCEDURE — 64493 INJ PARAVERT F JNT L/S 1 LEV: CPT | Mod: LT | Performed by: STUDENT IN AN ORGANIZED HEALTH CARE EDUCATION/TRAINING PROGRAM

## 2021-11-18 PROCEDURE — 255N000002 HC RX 255 OP 636: Performed by: STUDENT IN AN ORGANIZED HEALTH CARE EDUCATION/TRAINING PROGRAM

## 2021-11-18 PROCEDURE — 250N000011 HC RX IP 250 OP 636: Performed by: STUDENT IN AN ORGANIZED HEALTH CARE EDUCATION/TRAINING PROGRAM

## 2021-11-18 PROCEDURE — 250N000009 HC RX 250: Performed by: STUDENT IN AN ORGANIZED HEALTH CARE EDUCATION/TRAINING PROGRAM

## 2021-11-18 RX ORDER — TRIAMCINOLONE ACETONIDE 40 MG/ML
40 INJECTION, SUSPENSION INTRA-ARTICULAR; INTRAMUSCULAR ONCE
Status: COMPLETED | OUTPATIENT
Start: 2021-11-18 | End: 2021-11-18

## 2021-11-18 RX ORDER — LIDOCAINE HYDROCHLORIDE 10 MG/ML
5 INJECTION, SOLUTION EPIDURAL; INFILTRATION; INTRACAUDAL; PERINEURAL ONCE
Status: COMPLETED | OUTPATIENT
Start: 2021-11-18 | End: 2021-11-18

## 2021-11-18 RX ORDER — BUPIVACAINE HYDROCHLORIDE 2.5 MG/ML
10 INJECTION, SOLUTION EPIDURAL; INFILTRATION; INTRACAUDAL ONCE
Status: COMPLETED | OUTPATIENT
Start: 2021-11-18 | End: 2021-11-18

## 2021-11-18 RX ORDER — IOPAMIDOL 408 MG/ML
10 INJECTION, SOLUTION INTRATHECAL ONCE
Status: COMPLETED | OUTPATIENT
Start: 2021-11-18 | End: 2021-11-18

## 2021-11-18 RX ADMIN — IOPAMIDOL 1 ML: 408 INJECTION, SOLUTION INTRATHECAL at 14:42

## 2021-11-18 RX ADMIN — TRIAMCINOLONE ACETONIDE 40 MG: 40 INJECTION, SUSPENSION INTRA-ARTICULAR; INTRAMUSCULAR at 14:42

## 2021-11-18 RX ADMIN — BUPIVACAINE HYDROCHLORIDE 1 ML: 2.5 INJECTION, SOLUTION EPIDURAL; INFILTRATION; INTRACAUDAL at 14:42

## 2021-11-18 RX ADMIN — LIDOCAINE HYDROCHLORIDE 1 ML: 10 INJECTION, SOLUTION EPIDURAL; INFILTRATION; INTRACAUDAL; PERINEURAL at 14:42

## 2021-11-18 NOTE — PROGRESS NOTES
Pre-procedure Intake  If YES to any questions or NO to having a   Please complete laminated checklist and leave on the computer keyboard for Provider, verbally inform provider if able.    For SCS Trial, RFA's or any sedation procedure:  Have you been fasting? NA    If yes, for how long?     Are you taking any any blood thinners such as Coumadin, Warfarin, Jantoven, Pradaxa Xarelto, Eliquis, Edoxaban, Enoxaparin, Lovenox, Heparin, Arixtra, Fondaparinux, or Fragmin? OR Antiplatelet medication such as Plavix, Brilinta, or Effient?   No     If yes, when did you take your last dose?     Do you take aspirin?  No    If cervical procedure, have you held aspirin for 6 days?   NA    Do you have any allergies to contrast dye, iodine, steroid and/or numbing medications?  NO    Are you currently taking antibiotics or have an active infection?  NO    Have you had a fever/elevated temperature within the past week? NO    Are you currently taking oral steroids? NO    Do you have a ? Yes    Are you pregnant or breastfeeding?  NO    Have you received the COVID-19 vaccine? Yes    If yes, was it your 1st, 2nd or only dose needed? 2nd    Date of most recent vaccine: 5/13/21    Notify provider and RNs if systolic BP >170, diastolic BP >100, P >100 or O2 sats < 90%

## 2021-11-18 NOTE — DISCHARGE INSTRUCTIONS
[facility]   Procedure Discharge Instructions    Today you saw:         Dr. Jaylin Pickett    You had an: Left L5/S1  facet joint injection       Medications used:  Lidocaine   Bupivacaine  Kenalog  IsoVue             Be cautious when walking. Numbness and/or weakness in the lower extremities may occur for up to 6-8 hours after the procedure due to effect of the local anesthetic    Do not drive for 6 hours. The effect of the local anesthetic could slow your reflexes.     You may resume your regular activities after 24 hours    Avoid strenuous activity for the first 24 hours    You may shower, however avoid swimming, tub baths or hot tubs for 24 hours following your procedure    You may have a mild to moderate increase in pain for several days following the injection.    It may take up to 14 days for the steroid medication to start working although you may feel the effect as early as a few days after the procedure.       You may use ice packs for 10-15 minutes, 3 to 4 times a day at the injection site for comfort    Do not use heat to painful areas for 6 to 8 hours. This will give the local anesthetic time to wear off and prevent you from accidentally burning your skin.     Unless you have been directed to avoid the use of anti-inflammatory medications (NSAIDS), you may use medications such as ibuprofen, Aleve or Tylenol for pain control if needed.     If you were fasting, you may resume your normal diet and medications after the procedure    Possible side effects of steroids that you may experience include flushing, elevated blood pressure, increased appetite, mild headaches and restlessness.  All of these symptoms will get better with time.    If you experience any of the following, call the Pain Clinic during work hours (Mon-Friday 8-4:30 pm) at [phone] or the Provider Line after hours at [phone]:  -Fever over 100 degree F  -Swelling, bleeding, redness, drainage, warmth  at the injection site  -Progressive weakness or numbness in your legs   -Loss of bowel or bladder function  -Unusual headache that is not relieved by Tylenol or other pain reliever  -Unusual new onset of pain that is not improving

## 2021-11-23 NOTE — PROGRESS NOTES
[facility] - Procedure Note    Date of Visit: 11/22/2021    Pre procedure Diagnosis: Lumbar facet arthropathy   Post procedure Diagnosis: Same  Procedure performed: LEFT L5/S1 facet joint injection  Anesthesia: none  Complications: none  Operators: Jaylin Pickett MD    Indications:   Elza Greer is a 46 year old female who is well known to me from previous visits, here for L5/S1 facet joint injection.  she has a history of left-sided low back pain after an accident at work.  Exam shows Tenderness to palpation of left low lumbar paraspinal muscles/facet joints and she has tried conservative treatment including medications, PT, ice, chiropractic.    Options/alternatives, benefits and risks were discussed with the patient including bleeding, infection, flared pain, tissue trauma, exposure to radiation, reaction to medications including seizure, spinal cord injury, paralysis, weakness, numbness and headache.    Questions were answered to her satisfaction and she agrees to proceed. Voluntary informed consent was obtained and signed.     Vitals were reviewed: Yes  Allergies were reviewed:  Yes   Medications were reviewed:  Yes   Pre-procedure pain score: 6/10    LUMBAR MRI: 10/26/2021   1. Moderate facet arthropathy on the left at L5-S1 with mild marrow  edema.    Procedure:  After getting informed consent, patient was brought into the procedure suite and was placed in a prone position on the procedure table.   A Pause for the Cause was performed.  Patient was prepped and draped in sterile fashion.     Under AP fluoroscopic guidance the L5/S1 facet joint on the LEFT side were identified, and the C-arm was rotated obliquely to the affected side to open the joint space. A 25 gauge 5 inch quincke type spinal needle was inserted and advanced under fluoroscopic guidance targeting the superior articular pillar of each joint. Once the needle made a contact with SAP, 1mL of preservative-free  1% lidocaine was injected around the joint and the needle was rotated and was then advanced into the joint.    AP fluoroscopic views were obtained to confirm the needle placement. Then, Isovue 300 contrast dye was injected after negative aspiration for heme and CSF in each joint, confirming appropriate placement.  A total of 1mL of Isovue 300 was used and 14mL was wasted.    The injection was then accomplished using a solution containing 0.25ml of 0.25% bupivacaine mixed 40mg of kenolog, divided between the 1 joints. The needles were removed..     Hemostasis was achieved, the area was cleaned, and bandaids were placed when appropriate.  The patient tolerated the procedure well, and was taken to the recovery room.    Images were saved to PACS.    Post-procedure pain score: 3/10  Follow-up includes:   -f/u with the referring provider    Jaylin Pickett MD   Pain Management

## 2021-12-23 ENCOUNTER — MYC REFILL (OUTPATIENT)
Dept: FAMILY MEDICINE | Facility: CLINIC | Age: 46
End: 2021-12-23
Payer: COMMERCIAL

## 2021-12-23 DIAGNOSIS — J01.90 ACUTE SINUSITIS WITH SYMPTOMS > 10 DAYS: ICD-10-CM

## 2021-12-26 ENCOUNTER — APPOINTMENT (OUTPATIENT)
Dept: GENERAL RADIOLOGY | Facility: CLINIC | Age: 46
End: 2021-12-26
Attending: EMERGENCY MEDICINE
Payer: COMMERCIAL

## 2021-12-26 ENCOUNTER — HOSPITAL ENCOUNTER (EMERGENCY)
Facility: CLINIC | Age: 46
Discharge: HOME | End: 2021-12-26
Attending: EMERGENCY MEDICINE | Admitting: EMERGENCY MEDICINE
Payer: COMMERCIAL

## 2021-12-26 VITALS
SYSTOLIC BLOOD PRESSURE: 134 MMHG | TEMPERATURE: 98.5 F | RESPIRATION RATE: 25 BRPM | OXYGEN SATURATION: 99 % | BODY MASS INDEX: 40.88 KG/M2 | WEIGHT: 240 LBS | DIASTOLIC BLOOD PRESSURE: 56 MMHG | HEART RATE: 87 BPM

## 2021-12-26 DIAGNOSIS — J45.21 MILD INTERMITTENT ASTHMA WITH EXACERBATION: ICD-10-CM

## 2021-12-26 DIAGNOSIS — R06.02 SHORTNESS OF BREATH: ICD-10-CM

## 2021-12-26 DIAGNOSIS — Z87.09 HISTORY OF BRONCHITIS: ICD-10-CM

## 2021-12-26 LAB
ANION GAP SERPL CALCULATED.3IONS-SCNC: 5 MMOL/L (ref 3–14)
BASOPHILS # BLD AUTO: 0 10E3/UL (ref 0–0.2)
BASOPHILS NFR BLD AUTO: 0 %
BUN SERPL-MCNC: 10 MG/DL (ref 7–30)
CALCIUM SERPL-MCNC: 8.9 MG/DL (ref 8.5–10.1)
CHLORIDE BLD-SCNC: 110 MMOL/L (ref 94–109)
CO2 SERPL-SCNC: 28 MMOL/L (ref 20–32)
CREAT SERPL-MCNC: 0.62 MG/DL (ref 0.52–1.04)
D DIMER PPP FEU-MCNC: <0.27 UG/ML FEU (ref 0–0.5)
EOSINOPHIL # BLD AUTO: 0.1 10E3/UL (ref 0–0.7)
EOSINOPHIL NFR BLD AUTO: 1 %
ERYTHROCYTE [DISTWIDTH] IN BLOOD BY AUTOMATED COUNT: 13 % (ref 10–15)
FLUAV RNA SPEC QL NAA+PROBE: NEGATIVE
FLUBV RNA RESP QL NAA+PROBE: NEGATIVE
GFR SERPL CREATININE-BSD FRML MDRD: >90 ML/MIN/1.73M2
GLUCOSE BLD-MCNC: 104 MG/DL (ref 70–99)
HCT VFR BLD AUTO: 39.3 % (ref 35–47)
HGB BLD-MCNC: 12.8 G/DL (ref 11.7–15.7)
HOLD SPECIMEN: NORMAL
IMM GRANULOCYTES # BLD: 0 10E3/UL
IMM GRANULOCYTES NFR BLD: 0 %
LYMPHOCYTES # BLD AUTO: 2.8 10E3/UL (ref 0.8–5.3)
LYMPHOCYTES NFR BLD AUTO: 22 %
MCH RBC QN AUTO: 30.5 PG (ref 26.5–33)
MCHC RBC AUTO-ENTMCNC: 32.6 G/DL (ref 31.5–36.5)
MCV RBC AUTO: 94 FL (ref 78–100)
MONOCYTES # BLD AUTO: 0.7 10E3/UL (ref 0–1.3)
MONOCYTES NFR BLD AUTO: 5 %
NEUTROPHILS # BLD AUTO: 9 10E3/UL (ref 1.6–8.3)
NEUTROPHILS NFR BLD AUTO: 72 %
NRBC # BLD AUTO: 0 10E3/UL
NRBC BLD AUTO-RTO: 0 /100
PLATELET # BLD AUTO: 206 10E3/UL (ref 150–450)
POTASSIUM BLD-SCNC: 3.9 MMOL/L (ref 3.4–5.3)
RBC # BLD AUTO: 4.2 10E6/UL (ref 3.8–5.2)
SARS-COV-2 RNA RESP QL NAA+PROBE: NEGATIVE
SODIUM SERPL-SCNC: 143 MMOL/L (ref 133–144)
TROPONIN I SERPL HS-MCNC: 5 NG/L
WBC # BLD AUTO: 12.6 10E3/UL (ref 4–11)

## 2021-12-26 PROCEDURE — 99285 EMERGENCY DEPT VISIT HI MDM: CPT | Mod: 25 | Performed by: EMERGENCY MEDICINE

## 2021-12-26 PROCEDURE — 93010 ELECTROCARDIOGRAM REPORT: CPT | Performed by: EMERGENCY MEDICINE

## 2021-12-26 PROCEDURE — 84484 ASSAY OF TROPONIN QUANT: CPT | Performed by: EMERGENCY MEDICINE

## 2021-12-26 PROCEDURE — 85025 COMPLETE CBC W/AUTO DIFF WBC: CPT | Performed by: EMERGENCY MEDICINE

## 2021-12-26 PROCEDURE — 85379 FIBRIN DEGRADATION QUANT: CPT | Performed by: EMERGENCY MEDICINE

## 2021-12-26 PROCEDURE — 71045 X-RAY EXAM CHEST 1 VIEW: CPT

## 2021-12-26 PROCEDURE — 93005 ELECTROCARDIOGRAM TRACING: CPT | Performed by: EMERGENCY MEDICINE

## 2021-12-26 PROCEDURE — 87636 SARSCOV2 & INF A&B AMP PRB: CPT | Performed by: EMERGENCY MEDICINE

## 2021-12-26 PROCEDURE — 82374 ASSAY BLOOD CARBON DIOXIDE: CPT | Performed by: EMERGENCY MEDICINE

## 2021-12-26 PROCEDURE — 36415 COLL VENOUS BLD VENIPUNCTURE: CPT | Performed by: EMERGENCY MEDICINE

## 2021-12-26 RX ORDER — ALBUTEROL SULFATE 1.25 MG/3ML
1.25 SOLUTION RESPIRATORY (INHALATION) EVERY 6 HOURS PRN
Qty: 90 ML | Refills: 0 | Status: SHIPPED | OUTPATIENT
Start: 2021-12-26 | End: 2022-09-01

## 2021-12-26 ASSESSMENT — ENCOUNTER SYMPTOMS
EYES NEGATIVE: 1
SHORTNESS OF BREATH: 1
MUSCULOSKELETAL NEGATIVE: 1
ALLERGIC/IMMUNOLOGIC NEGATIVE: 1
PSYCHIATRIC NEGATIVE: 1
GASTROINTESTINAL NEGATIVE: 1
NEUROLOGICAL NEGATIVE: 1
CONSTITUTIONAL NEGATIVE: 1
HEMATOLOGIC/LYMPHATIC NEGATIVE: 1
ENDOCRINE NEGATIVE: 1

## 2021-12-26 NOTE — ED NOTES
"Pt reports \"feels like an elephant sitting on my chest\" Denies hx of blood clots. Not currently on blood thinners. Ambulatory without problem.   "

## 2021-12-26 NOTE — LETTER
December 26, 2021      To Whom It May Concern:      Elza Greer was seen in our Emergency Department today, 12/26/21.  If lEza has to miss work due to her symptoms please excuse her from missing work as she would benefit from monitoring his symptoms closely.  If her symptoms are improved she may return for reevaluation given negative serial testing over the last 1 week.  If her symptoms worsen she may need to be reevaluated.  This work note is valid until December 29, 2021.      Sincerely,           Sushant Silva MD

## 2021-12-26 NOTE — ED TRIAGE NOTES
"short of breath, pressure in chest, cough since tuesday, covid test Weds negative. Vaccinated against covid, no booster. No influenza vaccine this year. Reports at home times of \" oxygen dipping to 89%\"    "

## 2021-12-26 NOTE — DISCHARGE INSTRUCTIONS
1) Your valuation suggested likely a viral process that could be triggering bronchitis and making her asthma worse.  X imaging not showing evidence of suggest COVID-19 pneumonia or bacterial pneumonia.  We have agreed that you can use albuterol nebulizers and inhaler.  We have also discussed using your home prednisone therapy as reported,    2) Based on your exam and work-up we have agreed that there is no cleat indication to initiate antibiotics at this time however follow-up care for symptom recheck in the next 5 to 7 days with your clinic provider may be helpful if symptoms worsen.    3) although you appear stable for discharge to home if your symptoms worsen or you have new concerns you should return to the emergency department to be reevaluated

## 2021-12-26 NOTE — ED PROVIDER NOTES
"  History     Chief Complaint   Patient presents with     Shortness of Breath     short of breath, pressure in chest, cough since tuesday, covid test Weds negative.      HPI  Elza Greer is a 46 year old female who presents for evaluation with shortness of breath chest pain and back pain.  Patient reports symptoms began 6 days earlier and she reports that she had a negative Covid test 5 days earlier.  Patient has a history of tobacco use disorder, anxiety, mild intermittent asthma and a history of morbid obesity.  Patient's medical record shows she is prescribed Flonase, Flexeril, phentermine, albuterol and multivitamins.  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  She works as a .  She confirmed that she is received Pfizer vaccine and is not eligible for COVID-19 booster but is yet to receive this.  She smokes about a pack per day.  She reports her family members also have developed symptoms including spouse.  Patient reports initially she felt a sensation that an elephant was standing on her chest but she currently does not have symptoms on exam.  She reports no leg swelling.  No unintentional weight gain or loss.  No night sweats.  She has reports no history of DVT or PE and no prior diagnosis of heart disease.    Allergies:  Allergies   Allergen Reactions     Azithromycin Anaphylaxis     Doxycycline Other (See Comments)     Upset stomach and felt \"really out of it\"     Latex Hives     Oxycodone Nausea and Vomiting       Problem List:    Patient Active Problem List    Diagnosis Date Noted     Morbid obesity (H) 10/04/2021     Priority: Medium     Ventral hernia without obstruction or gangrene 01/13/2021     Priority: Medium     Added automatically from request for surgery [id number]       Anxiety 02/18/2020     Priority: Medium     Tobacco use disorder 11/14/2016     Priority: Medium     Mild intermittent asthma without " complication 05/12/2010     Priority: Medium        Past Medical History:    Past Medical History:   Diagnosis Date     Adjustment disorder with mixed anxiety and depressed mood      IBS (irritable bowel syndrome)      Incisional hernia, without obstruction or gangrene 2020     Lactose intolerance 05/12/2010     Migraine 05/12/2010     Mild intermittent asthma without complication 05/12/2010     PCOS (polycystic ovarian syndrome)      Tobacco use disorder      Vitamin D deficiency        Past Surgical History:    Past Surgical History:   Procedure Laterality Date     APPENDECTOMY OPEN N/A      CYSTECTOMY OVARIAN BENIGN  2001     HEPATICOJEJUNOSTOMY  2000    RnY jejunostomy from bile duct injury     HERNIORRHAPHY VENTRAL N/A 02/17/2012    open with mesh     HYSTERECTOMY TOTAL ABDOMINAL, BILATERAL SALPINGO-OOPHORECTOMY, COMBINED N/A 2005     LAPAROSCOPIC CHOLECYSTECTOMY  2006    complicated by bile duct injury     LAPAROSCOPIC HERNIORRHAPHY VENTRAL N/A 1/26/2021    Procedure: Open recurrent incarcerated ventral hernia repair X2;  Surgeon: Pradip Mckenzie MD;  Location: [facility] OR       Family History:    Family History   Problem Relation Age of Onset     Coronary Artery Disease Mother      Hypertension Mother      Hyperlipidemia Mother      Depression Mother      Colon Cancer Maternal Grandmother      Breast Cancer Maternal Grandmother      Other Cancer Maternal Grandmother        Social History:  Marital Status:   [2]  Social History     Tobacco Use     Smoking status: Current Every Day Smoker     Packs/day: 0.50     Years: 23.00     Pack years: 11.50     Types: Cigarettes     Smokeless tobacco: Never Used   Substance Use Topics     Alcohol use: Yes     Comment: social     Drug use: No        Medications:    albuterol (ACCUNEB) 1.25 MG/3ML neb solution  Ascorbic Acid (VITAMIN C PO)  clotrimazole (LOTRIMIN) 1 % external cream  cyclobenzaprine (FLEXERIL) 10 MG tablet  dextromethorphan (TUSSIN COUGH) 15 MG/5ML  syrup  fluticasone (FLONASE) 50 MCG/ACT nasal spray  HYDROcodone-acetaminophen (NORCO) 5-325 MG tablet  IBUPROFEN PO  meloxicam (MOBIC) 7.5 MG tablet  Multiple Vitamins-Calcium (ONE-A-DAY WOMENS PO)  nicotine (NICODERM CQ) 14 MG/24HR 24 hr patch  nystatin (MYCOSTATIN) 804959 UNIT/GM external powder  Phentermine-Topiramate 7.5-46 MG CP24  predniSONE (DELTASONE) 20 MG tablet  vitamin D2 (ERGOCALCIFEROL) 55188 units (1250 mcg) capsule          Review of Systems   Constitutional: Negative.    HENT: Negative.    Eyes: Negative.    Respiratory: Positive for shortness of breath.    Cardiovascular: Positive for chest pain.   Gastrointestinal: Negative.    Endocrine: Negative.    Genitourinary: Negative.    Musculoskeletal: Negative.    Skin: Negative.    Allergic/Immunologic: Negative.    Neurological: Negative.    Hematological: Negative.    Psychiatric/Behavioral: Negative.    All other systems reviewed and are negative.      Physical Exam   BP: (!) 158/88  Pulse: 85  Temp: 98.5  F (36.9  C)  Resp: 18  Weight: 108.9 kg (240 lb)  SpO2: 96 %      Physical Exam  Constitutional:       General: She is not in acute distress.     Appearance: She is well-developed. She is not ill-appearing, toxic-appearing or diaphoretic.      Interventions: She is not intubated.  HENT:      Head: Normocephalic and atraumatic.   Eyes:      Extraocular Movements: Extraocular movements intact.      Pupils: Pupils are equal, round, and reactive to light.   Cardiovascular:      Rate and Rhythm: Normal rate and regular rhythm.   Pulmonary:      Effort: Pulmonary effort is normal. No tachypnea, bradypnea, accessory muscle usage or respiratory distress. She is not intubated.      Breath sounds: Normal breath sounds. No stridor.   Chest:      Chest wall: No mass, deformity, tenderness, crepitus or edema. There is no dullness to percussion.   Abdominal:      Palpations: There is no hepatomegaly, splenomegaly or mass.      Tenderness: There is no abdominal  tenderness. There is no guarding or rebound.   Musculoskeletal:      Cervical back: Normal range of motion and neck supple.      Right lower leg: No tenderness. No edema.      Left lower leg: No tenderness. No edema.   Skin:     Capillary Refill: Capillary refill takes less than 2 seconds.      Coloration: Skin is not cyanotic or pale.      Findings: No ecchymosis, erythema or rash.      Nails: There is no clubbing.   Neurological:      General: No focal deficit present.      Mental Status: She is alert and oriented to person, place, and time.   Psychiatric:         Mood and Affect: Mood normal. Mood is not anxious.         Behavior: Behavior normal. Behavior is not agitated.         ED Course                 Procedures              EKG Interpretation:      Interpreted by Sabino Silva MD  Time reviewed: 1305  Symptoms at time of EKG: Chest pain, SOA   Rhythm: normal sinus   Rate: Normal  Axis: Normal  Ectopy: none  Conduction: normal  ST Segments/ T Waves: Non-specific ST-T wave changes  Q Waves: nonspecific  Comparison to prior: Unchanged from 12/8/2020    Clinical Impression: no acute changes        Critical Care time:  none               ED medications: none        ED vitals:  Vitals:    12/26/21 1230 12/26/21 1300 12/26/21 1400 12/26/21 1420   BP:    134/56   Pulse: 87 94 83 87   Resp: 21 20 18 25   Temp:       TempSrc:       SpO2: 97% 96% 97% 99%   Weight:         ED labs and imaging:  Results for orders placed or performed during the hospital encounter of 12/26/21   XR Chest Port 1 View     Status: None    Narrative    EXAM: XR CHEST PORT 1 VIEW  LOCATION: [facility]  DATE/TIME: 12/26/2021 1:35 PM    INDICATION: Chest pain. Shortness of breath.  COMPARISON: 12/08/2020.      Impression    IMPRESSION: Negative chest. Lungs clear. No pneumothorax. Pulmonary vascularity is within normal limits.   Basic metabolic panel     Status: Abnormal   Result Value Ref Range    Sodium 143  133 - 144 mmol/L    Potassium 3.9 3.4 - 5.3 mmol/L    Chloride 110 (H) 94 - 109 mmol/L    Carbon Dioxide (CO2) 28 20 - 32 mmol/L    Anion Gap 5 3 - 14 mmol/L    Urea Nitrogen 10 7 - 30 mg/dL    Creatinine 0.62 0.52 - 1.04 mg/dL    Calcium 8.9 8.5 - 10.1 mg/dL    Glucose 104 (H) 70 - 99 mg/dL    GFR Estimate >90 >60 mL/min/1.73m2   D dimer quantitative     Status: Normal   Result Value Ref Range    D-Dimer Quantitative <0.27 0.00 - 0.50 ug/mL FEU    Narrative    This D-dimer assay is intended for use in conjunction with a clinical pretest probability assessment model to exclude pulmonary embolism (PE) and deep venous thrombosis (DVT) in outpatients suspected of PE or DVT. The cut-off value is 0.50 ug/mL FEU.   Symptomatic; Yes; 12/21/2021 Influenza A/B & SARS-CoV2 (COVID-19) Virus PCR Multiplex Nasopharyngeal     Status: Normal    Specimen: Nasopharyngeal; Swab   Result Value Ref Range    Influenza A PCR Negative Negative    Influenza B PCR Negative Negative    SARS CoV2 PCR Negative Negative    Narrative    Testing was performed using the irving SARS-CoV-2 & Influenza A/B Assay on the irving Emily System. This test should be ordered for the detection of SARS-CoV-2 and influenza viruses in individuals who meet clinical and/or epidemiological criteria. Test performance is unknown in asymptomatic patients. This test is for in vitro diagnostic use under the FDA EUA for laboratories certified under CLIA to perform moderate and/or high complexity testing. This test has not been FDA cleared or approved. A negative result does not rule out the presence of PCR inhibitors in the specimen or target RNA in concentration below the limit of detection for the assay. If only one viral target is positive but coinfection with multiple targets is suspected, the sample should be re-tested with another FDA cleared, approved or authorized test, if coinfection would change clinical management. [facility] are certified  under the Clinical Laboratory Improvement Amendments of 1988 (CLIA-88) as  qualified to perform moderate and/or high complexity laboratory testing.   CBC with platelets and differential     Status: Abnormal   Result Value Ref Range    WBC Count 12.6 (H) 4.0 - 11.0 10e3/uL    RBC Count 4.20 3.80 - 5.20 10e6/uL    Hemoglobin 12.8 11.7 - 15.7 g/dL    Hematocrit 39.3 35.0 - 47.0 %    MCV 94 78 - 100 fL    MCH 30.5 26.5 - 33.0 pg    MCHC 32.6 31.5 - 36.5 g/dL    RDW 13.0 10.0 - 15.0 %    Platelet Count 206 150 - 450 10e3/uL    % Neutrophils 72 %    % Lymphocytes 22 %    % Monocytes 5 %    % Eosinophils 1 %    % Basophils 0 %    % Immature Granulocytes 0 %    NRBCs per 100 WBC 0 <1 /100    Absolute Neutrophils 9.0 (H) 1.6 - 8.3 10e3/uL    Absolute Lymphocytes 2.8 0.8 - 5.3 10e3/uL    Absolute Monocytes 0.7 0.0 - 1.3 10e3/uL    Absolute Eosinophils 0.1 0.0 - 0.7 10e3/uL    Absolute Basophils 0.0 0.0 - 0.2 10e3/uL    Absolute Immature Granulocytes 0.0 <=0.4 10e3/uL    Absolute NRBCs 0.0 10e3/uL   Extra Blue Top Tube     Status: None   Result Value Ref Range    Hold Specimen JIC    Extra Red Top Tube     Status: None   Result Value Ref Range    Hold Specimen JIC    Extra Green Top (Lithium Heparin) Tube     Status: None   Result Value Ref Range    Hold Specimen JIC    Extra Purple Top Tube     Status: None   Result Value Ref Range    Hold Specimen JIC    Troponin I     Status: Normal   Result Value Ref Range    Troponin I High Sensitivity 5 <54 ng/L   CBC with platelets differential     Status: Abnormal    Narrative    The following orders were created for panel order CBC with platelets differential.  Procedure                               Abnormality         Status                     ---------                               -----------         ------                     CBC with platelets and d...[[id number]]  Abnormal            Final result                 Please view results for these tests on the individual orders.    [place] Draw     Status: None    Narrative    The following orders were created for panel order [place] Draw.  Procedure                               Abnormality         Status                     ---------                               -----------         ------                     Extra Blue Top Tube[[id number]]                              Final result               Extra Red Top Tube[[id number]]                               Final result               Extra Green Top (Lithium...[[id number]]                      Final result               Extra Purple Top Tube[[id number]]                            Final result                 Please view results for these tests on the individual orders.           Assessments & Plan (with Medical Decision Making)   Assessment Summary and Clinical Impression: 46-year-old female who presented with 6-day history of symptoms including shortness of breath chest pain and back pain.  Symptoms suspicious for a viral process query bronchitis.  Need to monitor for COVID-19 illness.  She has a history of lumbar radiculopathy morbid obesity and a known ventral hernia.  Patient admits that she smokes a pack per day.  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  She also reports that household contacts have symptoms.  She reports no history of heart disease and that she has received COVID-19 vaccine and is now eligible for booster.  Chest pain was initially described as a sensation of elephant sitting on her chest but on my exam she reported her symptoms had resolved and that she has persistent central chest pressure.  She also reports that she used her albuterol inhaler and nebulizer before coming in.  On my exam she is in no acute distress.  She is 96% on room air.  She is afebrile.  Lungs are clear to auscultation.  Patient did note that she does have prednisone at home that she is used when she  has bronchitis which she is prone to getting.  She was reassured by her evaluation discharged home with supportive care measures with plan to use her prednisone at home with no claudications initiate antibiotics at this time however low threshold to be reevaluated if symptoms persist within the next 5 to 7 days.    ED course and Plan:  Reviewed the medical record.  We discussed possible causes for her symptoms and a broad differential was considered.  Given history of asthma with use of bronchodilator therapy in a patient who smokes with symptoms that could be related to COVID-19 infection or illness blood work was obtained cardiac enzyme and D-dimer.  Chest imaging was also obtained. EKG during her ED course revealed no acute ischemia with normal sinus rhythm.  No acute change from comparison from 12/8/2020.  Work-up in the department revealed normal troponin.  White count 12.6.  D-dimer was unrevealing.  Normal electrolytes.  Glucose was normal.  Imaging was reviewed independently and the radiology reports reviewed.  No findings to suggest bacterial pneumonia or COVID-19 pneumonia.    We discussed care measures at home and I recommended that she continue to use her albuterol nebulizers and inhaler.  Patient also reported that she has prednisone which she has used in the past for bronchitis flare.  Based on her history as reported her exam and chest imaging I do not feel that antimicrobials are warranted at this point.  Patient expressed understanding we discussed follow-up in clinic if symptoms persist for recheck in the next 5- 7 days.  We also discussed quarantine measures given her concern about COVID-19 infection given household contacts and her work is .  Patient was provided a work note.      Disclaimer: This note consists of symbols derived from keyboarding, dictation and/or voice recognition software. As a result, there may be errors in the script that have gone undetected. Please  consider this when interpreting information found in this chart.  I have reviewed the nursing notes.    I have reviewed the findings, diagnosis, plan and need for follow up with the patient.       Discharge Medication List as of 12/26/2021  2:18 PM          Final diagnoses:   Shortness of breath   Mild intermittent asthma with exacerbation   History of bronchitis       12/26/2021   [facility] EMERGENCY DEPT     Sabino Silva MD  12/26/21 0089

## 2021-12-26 NOTE — LETTER
December 26, 2021      To Whom It May Concern:      Elza Greer was seen in our Emergency Department today, 12/26/21.  If Elza has to miss work due to her symptoms please excuse her from missing work she would benefit from monitoring her symptoms closely.  If her symptoms are improved she may return to work given she has had negative serial testing over the last 1 week.  If her symptoms worsen she may need to return to be reevaluated.  This work note is valid until December 29, 2021.      Sincerely,          Sushant Silva MD

## 2021-12-27 RX ORDER — FLUTICASONE PROPIONATE 50 MCG
2 SPRAY, SUSPENSION (ML) NASAL DAILY
Qty: 16 G | Refills: 0 | Status: SHIPPED | OUTPATIENT
Start: 2021-12-27 | End: 2022-09-01

## 2022-01-03 ENCOUNTER — TELEPHONE (OUTPATIENT)
Dept: FAMILY MEDICINE | Facility: CLINIC | Age: 47
End: 2022-01-03
Payer: COMMERCIAL

## 2022-01-03 NOTE — TELEPHONE ENCOUNTER
Patient has been sick with URI, same symptoms for over a week.  Was seen in ER 12/26/21 and had work-up, including cardiac and lung, blood work, influenza/COVID-19, chest x-ray, all negative.  She continues with same symptoms-congestion, SOB.  She has oximeter, which is 97% right now and usually at this level during the day, but sometimes drops to 88-94% overnight when sleeping.  She sometimes gets a bit dizzy.   Patient states on [place] she had a guest who then 2 days later found out they were COVID-19 positive.  She may want to get retested now and is also wanting another chest x-ray to r/o pneumonia.    RN advised provider visit and transferred to scheduling for f/u appt.  Reviewed symptoms that would warrant return to ED, such as worsening SOB, persistent chest pain, O2 sat consistently below 90%.  Understanding voiced.     Kalpana Bowers RN  [facility]

## 2022-01-03 NOTE — TELEPHONE ENCOUNTER
Reason for call:  Patient reporting a symptom    Symptom or request: congestion, sob    Duration (how long have symptoms been present): over a week    Have you been treated for this before? Yes    Additional comments: Pt neg for covid and influenza, still miserable and looking for direction    Phone Number patient can be reached at:  Cell number on file:    Telephone Information:   Mobile [phone]       Best Time:  any    Can we leave a detailed message on this number:  YES    Call taken on 1/3/2022 at 8:35 AM by Linette Machuca

## 2022-01-04 ENCOUNTER — APPOINTMENT (OUTPATIENT)
Dept: GENERAL RADIOLOGY | Facility: CLINIC | Age: 47
End: 2022-01-04
Attending: PHYSICIAN ASSISTANT
Payer: COMMERCIAL

## 2022-01-04 ENCOUNTER — HOSPITAL ENCOUNTER (EMERGENCY)
Facility: CLINIC | Age: 47
Discharge: HOME | End: 2022-01-04
Attending: PHYSICIAN ASSISTANT | Admitting: PHYSICIAN ASSISTANT
Payer: COMMERCIAL

## 2022-01-04 VITALS
SYSTOLIC BLOOD PRESSURE: 147 MMHG | TEMPERATURE: 98.5 F | BODY MASS INDEX: 40.88 KG/M2 | DIASTOLIC BLOOD PRESSURE: 85 MMHG | OXYGEN SATURATION: 96 % | WEIGHT: 240 LBS | HEART RATE: 93 BPM | RESPIRATION RATE: 20 BRPM

## 2022-01-04 DIAGNOSIS — K43.9 VENTRAL HERNIA: ICD-10-CM

## 2022-01-04 DIAGNOSIS — R06.2 WHEEZING: ICD-10-CM

## 2022-01-04 DIAGNOSIS — Z20.822 SUSPECTED 2019 NOVEL CORONAVIRUS INFECTION: ICD-10-CM

## 2022-01-04 LAB
ALBUMIN UR-MCNC: NEGATIVE MG/DL
APPEARANCE UR: CLEAR
BILIRUB UR QL STRIP: NEGATIVE
COLOR UR AUTO: YELLOW
DEPRECATED S PYO AG THROAT QL EIA: NEGATIVE
FLUAV RNA SPEC QL NAA+PROBE: NEGATIVE
FLUBV RNA RESP QL NAA+PROBE: NEGATIVE
GLUCOSE UR STRIP-MCNC: NEGATIVE MG/DL
HGB UR QL STRIP: ABNORMAL
KETONES UR STRIP-MCNC: NEGATIVE MG/DL
LEUKOCYTE ESTERASE UR QL STRIP: NEGATIVE
NITRATE UR QL: NEGATIVE
PH UR STRIP: 6 [PH] (ref 5–7)
RBC URINE: 2 /HPF
SARS-COV-2 RNA RESP QL NAA+PROBE: NEGATIVE
SP GR UR STRIP: 1.01 (ref 1–1.03)
SQUAMOUS EPITHELIAL: <1 /HPF
UROBILINOGEN UR STRIP-MCNC: NORMAL MG/DL
WBC URINE: 2 /HPF

## 2022-01-04 PROCEDURE — 81001 URINALYSIS AUTO W/SCOPE: CPT | Performed by: PHYSICIAN ASSISTANT

## 2022-01-04 PROCEDURE — 71045 X-RAY EXAM CHEST 1 VIEW: CPT

## 2022-01-04 PROCEDURE — 87636 SARSCOV2 & INF A&B AMP PRB: CPT | Performed by: PHYSICIAN ASSISTANT

## 2022-01-04 PROCEDURE — G0463 HOSPITAL OUTPT CLINIC VISIT: HCPCS | Mod: 25 | Performed by: PHYSICIAN ASSISTANT

## 2022-01-04 PROCEDURE — 87651 STREP A DNA AMP PROBE: CPT | Performed by: PHYSICIAN ASSISTANT

## 2022-01-04 PROCEDURE — C9803 HOPD COVID-19 SPEC COLLECT: HCPCS | Performed by: PHYSICIAN ASSISTANT

## 2022-01-04 PROCEDURE — 99214 OFFICE O/P EST MOD 30 MIN: CPT | Performed by: PHYSICIAN ASSISTANT

## 2022-01-04 RX ORDER — BENZONATATE 100 MG/1
100 CAPSULE ORAL 3 TIMES DAILY PRN
Qty: 30 CAPSULE | Refills: 0 | Status: SHIPPED | OUTPATIENT
Start: 2022-01-04 | End: 2022-01-14

## 2022-01-04 ASSESSMENT — ENCOUNTER SYMPTOMS
FEVER: 1
SORE THROAT: 1
RHINORRHEA: 1
COUGH: 1
HEADACHES: 1
GASTROINTESTINAL NEGATIVE: 1
ACTIVITY CHANGE: 0
STRIDOR: 0
MUSCULOSKELETAL NEGATIVE: 1
APPETITE CHANGE: 0
FATIGUE: 1
CARDIOVASCULAR NEGATIVE: 1
CHOKING: 0
APNEA: 0
CHEST TIGHTNESS: 0
EYES NEGATIVE: 1
SHORTNESS OF BREATH: 1
WHEEZING: 1

## 2022-01-04 NOTE — DISCHARGE INSTRUCTIONS
Symptomatic cares discussed including: pushing fluids, rest, OTC cold medication such as DayQuil/NyQuil and Zyrtec. For the sore throat patient can use salt water gargles, cough drops, chloraseptic spray/drops and tylenol/ibuprofen. Follow up with PCP if no improvement in 1 week. Seek urgent medical evaluation if there are new or worsening symptoms such as fever of 104 degrees F or greater, chest tightness, wheezing, facial pressure, severe headaches, trouble breathing, trouble swallowing, severe or worsening nausea/vomiting, or severe abdominal pain.    Continue monitoring oxygen saturation at home.  Return to clinic for urgent medical evaluation if oxygen saturation remains at 90% or less.

## 2022-01-04 NOTE — Clinical Note
Elza Greer was seen and treated in our emergency department on 1/4/2022.  She may return to work on 01/10/2022.  Elza was seen and evaluated in my clinic today.  Due to medical reasons I feel it is necessary for her to remain out of work until January 10, 2022 or until symptoms resolve with no need for pain or fever reducing medications.     If you have any questions or concerns, please don't hesitate to call.      Serjio Panda PA-C

## 2022-01-04 NOTE — ED TRIAGE NOTES
Pt here with cough, congestion, body aches fatigue. Negative covid test last week,wants another test. Pt also wants a chest xray.

## 2022-01-04 NOTE — ED PROVIDER NOTES
History     Chief Complaint   Patient presents with     Cough     Nasal Congestion     Fatigue     Pharyngitis     HPI  Elza Greer is a 46 year old female with a history of IBS who presents with complaints of URI symptoms which began on 12/21/21  Associated symptoms include dry nonproductive cough, low grade fever, fatigue/fogginess, chest discomfort, and shortness of breath with exertion.  States that she currently has chest discomfort on the right anterior rib cage which radiates to her back just underneath her shoulder blade.  The pain is exacerbated with pressure over the affected areas as well as coughing.  Chest discomfort is minimal at rest.  She has had occasional wheezing over the past 3 to 4 days as well.  Has a smart watch which measures oxygen saturation, she is consistently dipped into the upper 80s to 90% overnight for 15 to 30-minute intervals over the past week.  Sometimes she wakes up with a headache.  States that she has gotten up at night when she feels like her oxygen saturation is low, oxygen saturation improved after she gets up and moves around.   She was previously seen and evaluated at [facility] emergency department on December 26, 2021 for shortness of breath and back pain with no acute concerns identified.  Her shortness of breath has improved since being seen here on December 26.  She was sent home with prescriptions for albuterol rescue inhaler and prednisone.  Just started prescribed prednisone 2 days ago.  The patient has been taking Advil, prednisone as prescribed, albuterol inhaler, Marinelli's cough drops, Dayquil, Airborne, and albuterol nebulizer with some relief of symptoms.  She is here today because her symptoms have persisted, especially her cough and chest tightness.  Feels like similar to what she had last year when she tested positive for COVID-19.  No concerns for swallowing, abdominal pain, joint pain or rashes, headaches, acute vision changes, nausea or vomiting,  "constipation or diarrhea, or leg pain/swelling. Normal bowel and bladder function. Normal food and fluid intake. She is vaccinated for COVID-19 but has not had a booster. She has been exposed to COVID-19 over the past 2 weeks. Has a long term history of tobacco use.  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  She has been going to work every day wearing a mask.  She is concerned about potentially spreading COVID-19 to her group home members.          Allergies:  Allergies   Allergen Reactions     Azithromycin Anaphylaxis     Doxycycline Other (See Comments)     Upset stomach and felt \"really out of it\"     Latex Hives     Oxycodone Nausea and Vomiting       Problem List:    Patient Active Problem List    Diagnosis Date Noted     Morbid obesity (H) 10/04/2021     Priority: Medium     Ventral hernia without obstruction or gangrene 01/13/2021     Priority: Medium     Added automatically from request for surgery [id number]       Anxiety 02/18/2020     Priority: Medium     Tobacco use disorder 11/14/2016     Priority: Medium     Mild intermittent asthma without complication 05/12/2010     Priority: Medium        Past Medical History:    Past Medical History:   Diagnosis Date     Adjustment disorder with mixed anxiety and depressed mood      IBS (irritable bowel syndrome)      Incisional hernia, without obstruction or gangrene 2020     Lactose intolerance 05/12/2010     Migraine 05/12/2010     Mild intermittent asthma without complication 05/12/2010     PCOS (polycystic ovarian syndrome)      Tobacco use disorder      Vitamin D deficiency        Past Surgical History:    Past Surgical History:   Procedure Laterality Date     APPENDECTOMY OPEN N/A      CYSTECTOMY OVARIAN BENIGN  2001     HEPATICOJEJUNOSTOMY  2000    RnY jejunostomy from bile duct injury     HERNIORRHAPHY VENTRAL N/A 02/17/2012    open with mesh     HYSTERECTOMY TOTAL ABDOMINAL, BILATERAL SALPINGO-OOPHORECTOMY, COMBINED N/A 2005     LAPAROSCOPIC CHOLECYSTECTOMY  " 2006    complicated by bile duct injury     LAPAROSCOPIC HERNIORRHAPHY VENTRAL N/A 1/26/2021    Procedure: Open recurrent incarcerated ventral hernia repair X2;  Surgeon: Pradip Mckenzie MD;  Location: [facility] OR       Family History:    Family History   Problem Relation Age of Onset     Coronary Artery Disease Mother      Hypertension Mother      Hyperlipidemia Mother      Depression Mother      Colon Cancer Maternal Grandmother      Breast Cancer Maternal Grandmother      Other Cancer Maternal Grandmother        Social History:  Marital Status:   [2]  Social History     Tobacco Use     Smoking status: Current Every Day Smoker     Packs/day: 0.50     Years: 23.00     Pack years: 11.50     Types: Cigarettes     Smokeless tobacco: Never Used   Substance Use Topics     Alcohol use: Yes     Comment: social     Drug use: No        Medications:    amoxicillin-clavulanate (AUGMENTIN) 875-125 MG tablet  benzonatate (TESSALON) 100 MG capsule  ipratropium-albuterol (COMBIVENT RESPIMAT)  MCG/ACT inhaler  albuterol (ACCUNEB) 1.25 MG/3ML neb solution  Ascorbic Acid (VITAMIN C PO)  clotrimazole (LOTRIMIN) 1 % external cream  cyclobenzaprine (FLEXERIL) 10 MG tablet  dextromethorphan (TUSSIN COUGH) 15 MG/5ML syrup  fluticasone (FLONASE) 50 MCG/ACT nasal spray  HYDROcodone-acetaminophen (NORCO) 5-325 MG tablet  IBUPROFEN PO  meloxicam (MOBIC) 7.5 MG tablet  Multiple Vitamins-Calcium (ONE-A-DAY WOMENS PO)  nicotine (NICODERM CQ) 14 MG/24HR 24 hr patch  nystatin (MYCOSTATIN) 420926 UNIT/GM external powder  Phentermine-Topiramate 7.5-46 MG CP24  predniSONE (DELTASONE) 20 MG tablet  vitamin D2 (ERGOCALCIFEROL) 72530 units (1250 mcg) capsule      Review of Systems   Constitutional: Positive for fatigue and fever. Negative for activity change and appetite change.   HENT: Positive for congestion, rhinorrhea and sore throat. Negative for postnasal drip.    Eyes: Negative.    Respiratory: Positive for cough, shortness of breath and  wheezing. Negative for apnea, choking, chest tightness and stridor.    Cardiovascular: Negative.    Gastrointestinal: Negative.    Genitourinary: Negative.    Musculoskeletal: Negative.    Neurological: Positive for headaches.       Physical Exam   BP: (!) 147/85  Pulse: 93  Temp: 98.5  F (36.9  C)  Resp: 20  Weight: 108.9 kg (240 lb)  SpO2: 96 %      Physical Exam  Vitals reviewed.   Constitutional:       General: She is not in acute distress.     Appearance: Normal appearance. She is not ill-appearing, toxic-appearing or diaphoretic.   HENT:      Head: Normocephalic and atraumatic.      Right Ear: Tympanic membrane, ear canal and external ear normal. No drainage, swelling or tenderness. No middle ear effusion. There is no impacted cerumen. No mastoid tenderness. Tympanic membrane is not injected, perforated, erythematous, retracted or bulging.      Left Ear: Tympanic membrane, ear canal and external ear normal. No drainage, swelling or tenderness.  No middle ear effusion. There is no impacted cerumen. No mastoid tenderness. Tympanic membrane is not injected, perforated, erythematous, retracted or bulging.      Nose: Nose normal. No congestion or rhinorrhea.      Right Sinus: No maxillary sinus tenderness or frontal sinus tenderness.      Left Sinus: No maxillary sinus tenderness or frontal sinus tenderness.      Mouth/Throat:      Mouth: Mucous membranes are moist.      Pharynx: No oropharyngeal exudate or posterior oropharyngeal erythema.   Eyes:      General: No scleral icterus.        Right eye: No discharge.         Left eye: No discharge.      Extraocular Movements: Extraocular movements intact.      Conjunctiva/sclera: Conjunctivae normal.   Cardiovascular:      Rate and Rhythm: Normal rate and regular rhythm.      Pulses: Normal pulses.      Heart sounds: Normal heart sounds. No murmur heard.      Pulmonary:      Effort: Pulmonary effort is normal. No tachypnea, accessory muscle usage, prolonged expiration  or respiratory distress.      Breath sounds: Decreased air movement present. No stridor. Examination of the right-lower field reveals decreased breath sounds. Decreased breath sounds present. No wheezing, rhonchi or rales.      Comments: Slightly decreased breath sounds in the right lower lobe.  Abdominal:      General: Abdomen is flat. Bowel sounds are normal. There is no distension.      Palpations: Abdomen is soft. There is no mass.      Tenderness: There is no abdominal tenderness. There is no right CVA tenderness, left CVA tenderness, guarding or rebound.      Hernia: A hernia is present. Hernia is present in the ventral area.          Comments: Ventral hernia present, no tenderness over ventral hernia.   Musculoskeletal:      Cervical back: Neck supple. No rigidity. No muscular tenderness.   Lymphadenopathy:      Head:      Left side of head: Submandibular and preauricular adenopathy present. No posterior auricular adenopathy.      Cervical: Cervical adenopathy present.      Right cervical: No superficial, deep or posterior cervical adenopathy.     Left cervical: Superficial cervical adenopathy present. No deep or posterior cervical adenopathy.   Skin:     General: Skin is warm and dry.   Neurological:      General: No focal deficit present.      Mental Status: She is alert and oriented to person, place, and time.      Sensory: No sensory deficit.      Motor: No weakness.      Coordination: Coordination normal.   Psychiatric:         Mood and Affect: Mood normal.         Behavior: Behavior normal.         Thought Content: Thought content normal.         Judgment: Judgment normal.         ED Course                 Procedures                No results found for this or any previous visit (from the past 24 hour(s)).    Medications - No data to display    Assessments & Plan (with Medical Decision Making)   The patient is a 46 year old female with a history of IBS who presents with complaints of URI symptoms which  began on 12/21/21  Associated symptoms include dry nonproductive cough, low grade fever, fatigue/foginess, chest discomfort, and shortness of breath with exertion.  We will hold      Considered potential for pulmonary embolism today, however the patient denies having chest pain today, no shortness of breath currently, oxygen saturation is normal, is not tachycardic.  I feel there is low likelihood for pulmonary embolism today especially since her symptoms have improved since being seen 1 week ago.  D-dimer was negative on presentation to [facility] emergency department on December 26, 2021.     Completed a chest x-ray due to decreased breath sounds in the right lower lobe and pulmonary concerns.  Chest x-ray showed no acute cardiopulmonary disease today.  Also completed a UA since the patient complained of back pain in her right lower rib cage and urinary frequency.  Urinalysis is grossly normal today.    I am starting the patient on Combivent due to her long term history of tobacco use.  Discussed that she continue albuterol nebs along with Combivent.  I am also starting the patient on Augmentin due to longer-term cough or shortness of breath.  I am concerned about the presence of atypical pneumonia today even though her chest x-ray shows no evidence of consolidation.    Pt having symptoms of a viral URI.  Negative results for strep pharyngitis, influenza, and COVID-19 today.  I suspect infection with COVID-19 given her recent exposures and symptoms currently.  Symptomatic cares discussed including: pushing fluids, rest, OTC cold medication such as DayQuil/NyQuil and Zyrtec. For the sore throat patient can use salt water gargles, cough drops, chloraseptic spray/drops and tylenol/ibuprofen. Follow up with PCP if no improvement in 1 week. Seek urgent medical evaluation if there are new or worsening symptoms such as fever of 104 degrees F or greater, chest tightness, wheezing, facial pressure, severe headaches,  trouble breathing, trouble swallowing, severe or worsening nausea/vomiting, or severe abdominal pain. Pt/guardian verbalized understanding and agrees with the treatment plan.      Recommended that she remain out of work until her symptoms resolve without the need for pain or fever reducing medications.      I have reviewed the nursing notes.    I have reviewed the findings, diagnosis, plan and need for follow up with the patient.    Discharge Medication List as of 1/4/2022  5:06 PM      START taking these medications    Details   amoxicillin-clavulanate (AUGMENTIN) 875-125 MG tablet Take 1 tablet by mouth 2 times daily for 7 days, Disp-14 tablet, R-0, E-Prescribe      benzonatate (TESSALON) 100 MG capsule Take 1 capsule (100 mg) by mouth 3 times daily as needed for cough, Disp-30 capsule, R-0, E-Prescribe      ipratropium-albuterol (COMBIVENT RESPIMAT)  MCG/ACT inhaler Inhale 1 puff into the lungs 4 times daily, Disp-4 g, R-0, E-Prescribe             Final diagnoses:   Suspected 2019 novel coronavirus infection   Wheezing - concern for community-acquired pneumonia given history of long-term tobacco use and persistent wheezing   Ventral hernia       1/4/2022   [facility] EMERGENCY DEPT     Serjio Panda PA-C  01/07/22 [id number]

## 2022-01-05 LAB — GROUP A STREP BY PCR: NOT DETECTED

## 2022-01-27 ENCOUNTER — CARE COORDINATION (OUTPATIENT)
Dept: ENDOCRINOLOGY | Facility: CLINIC | Age: 47
End: 2022-01-27
Payer: COMMERCIAL

## 2022-01-27 NOTE — PROGRESS NOTES
"Elza Greer was discussed at Hernia Conference on January 26, 2022.      Attendees:  Dr. Byrnes, Dr. Jerez, Dr. De Luna, Dr. Luevano, Dr. Haley, Marisabel Veras RN, Marianela Golden RN  Background:  Recurrent hernia:  YES  Mesh in place:  No  History of multiple abdominal surgeries/previous hernia repair:  YES  Co-morbidities:    Patient Active Problem List   Diagnosis     Tobacco use disorder     Anxiety     Mild intermittent asthma without complication     Ventral hernia without obstruction or gangrene     Morbid obesity (H)      HgbA1C:  No results found for: A1C  Fistula:  No  Hernia greater than 5 cm:  No  Multiple hernias present:  YES  Is patient malnourished:  No  Is patient immunosuppressed:  No  Nicotine/Continine Test needed:  YES    Current BMI:  Estimated body mass index is 40.88 kg/m  as calculated from the following:    Height as of 10/4/21: 1.632 m (5' 4.25\").    Weight as of 1/4/22: 108.9 kg (240 lb).  Weight at time of initial consultation:  242  Required weight loss:  48 lb.(20%)  Goal weight/BMI goal:  194    Recommended Plan:   1. Stop smoking at least three months before surgery.  Must have nicotine test at least 1 month after smoking cessation.    2. Lose 20% of weight (48 lb).    3. Schedule appointment with Dr. De Luna once she has met the criteria.    Patient will be contacted by Marianela Golden RN regarding plan of care.    Patient notified. Will work with weight management and dietician.  Patient is 246 pounds as of today.  HPI      ROS      Physical Exam      "

## 2022-02-01 ENCOUNTER — TELEPHONE (OUTPATIENT)
Dept: FAMILY MEDICINE | Facility: CLINIC | Age: 47
End: 2022-02-01
Payer: COMMERCIAL

## 2022-02-01 NOTE — TELEPHONE ENCOUNTER
Reason for call:  Patient reporting a symptom    Symptom or request: On going symptoms since December patient wants to know what to do next she isn't getting any better.     Phone Number patient can be reached at:  Home number on file [phone] (home)    Best Time:  any    Can we leave a detailed message on this number:  YES    Call taken on 2/1/2022 at 2:44 PM by Mila Radford

## 2022-02-03 ENCOUNTER — OFFICE VISIT (OUTPATIENT)
Dept: FAMILY MEDICINE | Facility: CLINIC | Age: 47
End: 2022-02-03
Payer: COMMERCIAL

## 2022-02-03 ENCOUNTER — HOSPITAL ENCOUNTER (OUTPATIENT)
Dept: CT IMAGING | Facility: CLINIC | Age: 47
Discharge: HOME | End: 2022-02-03
Attending: NURSE PRACTITIONER | Admitting: NURSE PRACTITIONER
Payer: COMMERCIAL

## 2022-02-03 VITALS
HEART RATE: 100 BPM | OXYGEN SATURATION: 98 % | DIASTOLIC BLOOD PRESSURE: 86 MMHG | SYSTOLIC BLOOD PRESSURE: 138 MMHG | TEMPERATURE: 98.6 F

## 2022-02-03 DIAGNOSIS — F17.200 TOBACCO USE DISORDER: ICD-10-CM

## 2022-02-03 DIAGNOSIS — J20.9 ACUTE BRONCHITIS WITH SYMPTOMS > 10 DAYS: Primary | ICD-10-CM

## 2022-02-03 DIAGNOSIS — B37.31 YEAST INFECTION OF THE VAGINA: ICD-10-CM

## 2022-02-03 DIAGNOSIS — R06.02 SOB (SHORTNESS OF BREATH): ICD-10-CM

## 2022-02-03 DIAGNOSIS — R05.9 COUGH: ICD-10-CM

## 2022-02-03 LAB
BASOPHILS # BLD AUTO: 0 10E3/UL (ref 0–0.2)
BASOPHILS NFR BLD AUTO: 0 %
EOSINOPHIL # BLD AUTO: 0.2 10E3/UL (ref 0–0.7)
EOSINOPHIL NFR BLD AUTO: 1 %
ERYTHROCYTE [DISTWIDTH] IN BLOOD BY AUTOMATED COUNT: 13.2 % (ref 10–15)
HCT VFR BLD AUTO: 39.9 % (ref 35–47)
HGB BLD-MCNC: 12.6 G/DL (ref 11.7–15.7)
LYMPHOCYTES # BLD AUTO: 3 10E3/UL (ref 0.8–5.3)
LYMPHOCYTES NFR BLD AUTO: 20 %
MCH RBC QN AUTO: 31 PG (ref 26.5–33)
MCHC RBC AUTO-ENTMCNC: 31.6 G/DL (ref 31.5–36.5)
MCV RBC AUTO: 98 FL (ref 78–100)
MONOCYTES # BLD AUTO: 0.8 10E3/UL (ref 0–1.3)
MONOCYTES NFR BLD AUTO: 5 %
NEUTROPHILS # BLD AUTO: 10.9 10E3/UL (ref 1.6–8.3)
NEUTROPHILS NFR BLD AUTO: 73 %
PLATELET # BLD AUTO: 218 10E3/UL (ref 150–450)
RBC # BLD AUTO: 4.06 10E6/UL (ref 3.8–5.2)
WBC # BLD AUTO: 14.9 10E3/UL (ref 4–11)

## 2022-02-03 PROCEDURE — 71275 CT ANGIOGRAPHY CHEST: CPT

## 2022-02-03 PROCEDURE — 85025 COMPLETE CBC W/AUTO DIFF WBC: CPT | Performed by: NURSE PRACTITIONER

## 2022-02-03 PROCEDURE — 250N000011 HC RX IP 250 OP 636: Performed by: RADIOLOGY

## 2022-02-03 PROCEDURE — 36415 COLL VENOUS BLD VENIPUNCTURE: CPT | Performed by: NURSE PRACTITIONER

## 2022-02-03 PROCEDURE — 250N000009 HC RX 250: Performed by: RADIOLOGY

## 2022-02-03 PROCEDURE — 99214 OFFICE O/P EST MOD 30 MIN: CPT | Mod: 95 | Performed by: NURSE PRACTITIONER

## 2022-02-03 RX ORDER — PREDNISONE 20 MG/1
20 TABLET ORAL 2 TIMES DAILY
Qty: 10 TABLET | Refills: 0 | Status: SHIPPED | OUTPATIENT
Start: 2022-02-03 | End: 2022-02-08

## 2022-02-03 RX ORDER — VARENICLINE TARTRATE 1 MG/1
1 TABLET, FILM COATED ORAL 2 TIMES DAILY
Qty: 60 TABLET | Refills: 0 | Status: SHIPPED | OUTPATIENT
Start: 2022-02-03 | End: 2022-03-15

## 2022-02-03 RX ORDER — IOPAMIDOL 755 MG/ML
100 INJECTION, SOLUTION INTRAVASCULAR ONCE
Status: COMPLETED | OUTPATIENT
Start: 2022-02-03 | End: 2022-02-03

## 2022-02-03 RX ORDER — FLUCONAZOLE 150 MG/1
150 TABLET ORAL DAILY
Qty: 3 TABLET | Refills: 0 | Status: SHIPPED | OUTPATIENT
Start: 2022-02-03 | End: 2022-02-06

## 2022-02-03 RX ADMIN — SODIUM CHLORIDE 100 ML: 9 INJECTION, SOLUTION INTRAVENOUS at 15:50

## 2022-02-03 RX ADMIN — IOPAMIDOL 91 ML: 755 INJECTION, SOLUTION INTRAVENOUS at 15:49

## 2022-02-03 ASSESSMENT — ASTHMA QUESTIONNAIRES: ACT_TOTALSCORE: 15

## 2022-02-03 NOTE — TELEPHONE ENCOUNTER
Routed to provider.  ALONZO    I scheduled pt to see provider in same day appt 2/3/22 due to fever and cough.  Pt reports ongoing upper respiratory symptoms for 5 weeks but now has fever and chest heaviness.    Carla Contreras RN

## 2022-02-03 NOTE — PROGRESS NOTES
Assessment & Plan     Acute bronchitis with symptoms > 10 days    - CBC with platelets and differential; Future  - amoxicillin-clavulanate (AUGMENTIN) 875-125 MG tablet; Take 1 tablet by mouth 2 times daily for 10 days  - predniSONE (DELTASONE) 20 MG tablet; Take 1 tablet (20 mg) by mouth 2 times daily for 5 days  - CBC with platelets and differential    SOB (shortness of breath)  -STAT CT scan was ordered to rule out possible PE, or other acute pathology, results normal, discussed with patient   - CBC with platelets and differential; Future  - CBC with platelets and differential    Tobacco use disorder    - varenicline (CHANTIX JERRY) 0.5 MG X 11 & 1 MG X 42 tablet; Take 0.5 mg tab daily for 3 days, THEN 0.5 mg tab twice daily for 4 days, THEN 1 mg twice daily.  - varenicline (CHANTIX) 1 MG tablet; Take 1 tablet (1 mg) by mouth 2 times daily    Yeast infection of the vagina    - fluconazole (DIFLUCAN) 150 MG tablet; Take 1 tablet (150 mg) by mouth daily for 3 days    [id number]}     Tobacco Cessation:   reports that she has been smoking cigarettes. She has a 11.50 pack-year smoking history. She has never used smokeless tobacco.  Tobacco Cessation Action Plan: Pharmacotherapies : Chantix        Return in about 1 week (around 2/10/2022), or if symptoms worsen or fail to improve.    [facility]    Mao Bertrand is a 46 year old who presents for the following health issues shortness of breast, mild chest heaviness, states she got new apple watch with oximeter and noted that her O2 sats dropping g down at night time to 85% for the last few weeks. Complains of fevers, chills, sinus congestion. Tested Negative for Covid yesterday, PCR test at [facility]-results negative     Chief Complaint   Patient presents with     Cough     Covid test was negative today          HPI     Acute Illness  Acute illness concerns: COUGH   Onset/Duration: December 21st   Symptoms:  Fever:  YES- yesterday was 100.7, 02 drops to 88-89 when she is sleeping   Chills/Sweats: no  Headache (location?): YES   Sinus Pressure: no  Conjunctivitis:  no  Ear Pain: YES: left   Rhinorrhea: YES  Congestion: YES  Sore Throat: no  Cough: YES-non-productive, sometimes coughs of mucus   Wheeze: YES  Decreased Appetite: no  Nausea: no  Vomiting: no  Diarrhea: no  Dysuria/Freq.: no  Dysuria or Hematuria: no  Fatigue/Achiness: YES- fatigue   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      Objective    /86 (BP Location: Right arm, Patient Position: Sitting, Cuff Size: Adult Large)   Pulse 100   Temp 98.6  F (37  C) (Tympanic)   LMP 08/26/2005   SpO2 98%   There is no height or weight on file to calculate BMI.  Physical Exam   GENERAL: healthy, alert and no distress  EYES: Eyes grossly normal to inspection, PERRL and conjunctivae and sclerae normal  HENT: ear canals and TM's normal, nose and mouth without ulcers or lesions  RESP: lungs clear to auscultation - no rales, rhonchi or wheezes  CV: regular rate and rhythm, normal S1 S2, no S3 or S4, no murmur, click or rub, no peripheral edema and peripheral pulses strong  MS: no gross musculoskeletal defects noted, no edema  NEURO: Normal strength and tone, mentation intact and speech normal  PSYCH: mentation appears normal, affect normal/bright

## 2022-02-03 NOTE — TELEPHONE ENCOUNTER
Pt has had Covid-like symptoms since 12/21/22.  Pt has had covid exposures too.  Testing is negative.  Home test yesterday was negative.  PCR test for Covid is pending.    Pt reports cough, shortness of breath, fatigue    O2 sats at home have been in the mid-90's during the day but it drops to 89-90 at night.  Started running a fever again today.  100.7  Shortness of breath.  Inhalers and nebulizers relieve this symptoms.  It is improved but not resolved.  Sinus congestion.  Fatigue is persistent too.  Most mornings, pt wakes and feels rested.  By lunchtime, pt is fatigued.  Pain - pt denies.  Denies chest pain or tightness but chest feels heavy.  Has cough, a little phlegmy.    Quitting smoking too.  Wearing a nicotine patch.    Has history of asthma and allergies.    Pt is concerned that she may have pneumonia.    Carla Contreras RN

## 2022-02-14 ENCOUNTER — VIRTUAL VISIT (OUTPATIENT)
Dept: ENDOCRINOLOGY | Facility: CLINIC | Age: 47
End: 2022-02-14
Payer: COMMERCIAL

## 2022-02-14 VITALS — BODY MASS INDEX: 43.91 KG/M2 | HEIGHT: 63 IN | WEIGHT: 247.8 LBS

## 2022-02-14 DIAGNOSIS — E66.01 MORBID OBESITY (H): Primary | ICD-10-CM

## 2022-02-14 PROCEDURE — 99213 OFFICE O/P EST LOW 20 MIN: CPT | Mod: 95 | Performed by: INTERNAL MEDICINE

## 2022-02-14 RX ORDER — PHENTERMINE HYDROCHLORIDE 15 MG/1
15 CAPSULE ORAL EVERY MORNING
Qty: 30 CAPSULE | Refills: 5 | Status: SHIPPED | OUTPATIENT
Start: 2022-02-14 | End: 2022-10-31

## 2022-02-14 RX ORDER — TOPIRAMATE 25 MG/1
TABLET, FILM COATED ORAL
Qty: 90 TABLET | Refills: 11 | Status: SHIPPED | OUTPATIENT
Start: 2022-02-14 | End: 2022-10-31

## 2022-02-14 ASSESSMENT — MIFFLIN-ST. JEOR: SCORE: 1743.14

## 2022-02-14 ASSESSMENT — PAIN SCALES - GENERAL: PAINLEVEL: MILD PAIN (3)

## 2022-02-14 NOTE — PROGRESS NOTES
Elza is a 46 year old who is being evaluated via a billable video visit.      How would you like to obtain your AVS? MyChart  If the video visit is dropped, the invitation should be resent by: Text to cell phone: [phone]  Will anyone else be joining your video visit? No    Video-Visit Details    Type of service:  Video Visit    Start: 02/14/2022 08:46 am  Stop: 02/14/2022 09:00 am    Originating Location (pt. Location): Home    Distant Location (provider location):  [facility] [place]     Platform used for Video Visit: [employer]

## 2022-02-14 NOTE — NURSING NOTE
"(   Chief Complaint   Patient presents with     Follow Up     4 month weight management follow up.    )    ( Weight: 247 lb 12.8 oz )  ( Height: 5' 3\" )  ( BMI (Calculated): 43.9 )  (   )  ( Cumulative weight loss (lbs): -8.8 )  ( Last Visits Weight: 239 lb )  ( Wt change since last visit (lbs): 8.8 )  (   )  (   )  (   Patient Active Problem List   Diagnosis     Tobacco use disorder     Anxiety     Mild intermittent asthma without complication     Ventral hernia without obstruction or gangrene     Morbid obesity (H)    )  (   Current Outpatient Medications   Medication Sig Dispense Refill     fluticasone (FLONASE) 50 MCG/ACT nasal spray Spray 2 sprays into both nostrils daily 16 g 0     IBUPROFEN PO Take 600 mg by mouth every 6 hours as needed for moderate pain       ipratropium-albuterol (COMBIVENT RESPIMAT)  MCG/ACT inhaler Inhale 1 puff into the lungs 4 times daily 4 g 0     vitamin D2 (ERGOCALCIFEROL) 52385 units (1250 mcg) capsule Take 1 capsule (50,000 Units) by mouth once a week 8 capsule 0     albuterol (ACCUNEB) 1.25 MG/3ML neb solution Take 1 vial (1.25 mg) by nebulization every 6 hours as needed for shortness of breath / dyspnea or wheezing 90 mL 0     Ascorbic Acid (VITAMIN C PO) Take 1 tablet by mouth daily (Patient not taking: Reported on 10/11/2021)       clotrimazole (LOTRIMIN) 1 % external cream Apply topically 2 times daily (Patient not taking: Reported on 2/3/2022) 45 g 1     cyclobenzaprine (FLEXERIL) 10 MG tablet Take 1 tablet (10 mg) by mouth 3 times daily as needed for muscle spasms (Patient not taking: Reported on 10/11/2021) 30 tablet 1     meloxicam (MOBIC) 7.5 MG tablet Take 1 tablet (7.5 mg) by mouth daily (Patient not taking: Reported on 10/11/2021) 30 tablet 1     Multiple Vitamins-Calcium (ONE-A-DAY WOMENS PO) Take 1 tablet by mouth daily (Patient not taking: Reported on 10/11/2021)       nicotine (NICODERM CQ) 14 MG/24HR 24 hr patch Place 1 patch onto the skin every 24 hours " PRN (Patient not taking: Reported on 2/14/2022)       nystatin (MYCOSTATIN) 156151 UNIT/GM external powder Apply topically 2 times daily as needed (Patient not taking: Reported on 7/7/2021) 45 g 1     Phentermine-Topiramate 7.5-46 MG CP24 Take 1 capsule by mouth daily (Patient not taking: Reported on 10/11/2021) 30 capsule 5     varenicline (CHANTIX JERRY) 0.5 MG X 11 & 1 MG X 42 tablet Take 0.5 mg tab daily for 3 days, THEN 0.5 mg tab twice daily for 4 days, THEN 1 mg twice daily. (Patient not taking: Reported on 2/14/2022) 53 tablet 0     varenicline (CHANTIX) 1 MG tablet Take 1 tablet (1 mg) by mouth 2 times daily (Patient not taking: Reported on 2/14/2022) 60 tablet 0    )  ( Diabetes Eval:    )    ( Pain Eval:  Mild Pain (3) )    ( Wound Eval:       )    (   History   Smoking Status     Current Every Day Smoker     Packs/day: 0.50     Years: 23.00     Types: Cigarettes   Smokeless Tobacco     Never Used     Comment: 4 cigs daily     )    ( Signed By:  Miranda Viera CMA; February 14, 2022; 8:22 AM )

## 2022-02-14 NOTE — PROGRESS NOTES
"    Return Medical Weight Management Note     Elza Greer  MRN:  [id number]  :  1975  MCKAYLA:  2022    Dear NASH Huston CNP,    I had the pleasure of seeing your patient Elza Greer.  She is a 46 year old female who I am continuing to see for treatment of obesity related to:     10/3/2021   I have the following health issues associated with obesity: Polycystic Ovarian Syndrome, Asthma   I have the following symptoms associated with obesity: Back Pain     CURRENT WEIGHT:   247 lbs 12.8 oz    Wt Readings from Last 4 Encounters:   22 112.4 kg (247 lb 12.8 oz)   22 108.9 kg (240 lb)   21 108.9 kg (240 lb)   10/04/21 108.4 kg (239 lb)     Height:  5' 3\"  Body Mass Index:  Body mass index is 43.9 kg/m .  Vitals:  B/P: Data Unavailable, P: Data Unavailable    Initial consult weight was 239 on 10/04/21.  Weight change since last seen on 10/04/21 is up 8 pounds.   Total gain is 8 pounds.    INTERVAL HISTORY:  Making some changes to diet, still confined by Covid sequela. Has had prednisone twice. Had lost ~30 lb before illness.Could not get Qsymia due to insurance hostility to health. She wants to try splitting to phentermine and topiramate.    Diet Recall Review with Patient 10/3/2021   Do you typically eat breakfast? Yes   If you do eat breakfast, what types of food do you eat? Waffles, eggs, or cereal   Do you typically eat lunch? Yes   If you do eat lunch, what types of food do you typically eat?  Fast food or healthy choice meals   Do you typically eat supper? Yes   If you do eat supper, what types of food do you typically eat? Homemade   Do you typically eat snacks? No   Do you like vegetables?  Yes   Do you drink water? Yes   How many glasses of juice do you drink in a typical day? 0   How many of glasses of milk do you drink in a typical day? 0   If you do drink milk, what type? 1%   How many 8oz glasses of sugar containing drinks such as Dimitri-Aid/sweet tea do you drink in a " day? 0   How many cans/bottles of sugar pop/soda/tea/sports drinks do you drink in a day? 4   How many cans/bottles of diet pop/soda/tea or sports drink do you drink in a day? 0   How often do you have a drink of alcohol? 2-4 Times a Month   If you do drink, how many drinks might you have in a day? 5-6     MEDICATIONS:   Current Outpatient Medications   Medication     fluticasone (FLONASE) 50 MCG/ACT nasal spray     IBUPROFEN PO     ipratropium-albuterol (COMBIVENT RESPIMAT)  MCG/ACT inhaler     vitamin D2 (ERGOCALCIFEROL) 02862 units (1250 mcg) capsule     albuterol (ACCUNEB) 1.25 MG/3ML neb solution     Ascorbic Acid (VITAMIN C PO)     clotrimazole (LOTRIMIN) 1 % external cream     cyclobenzaprine (FLEXERIL) 10 MG tablet     meloxicam (MOBIC) 7.5 MG tablet     Multiple Vitamins-Calcium (ONE-A-DAY WOMENS PO)     nicotine (NICODERM CQ) 14 MG/24HR 24 hr patch     nystatin (MYCOSTATIN) 394395 UNIT/GM external powder     Phentermine-Topiramate 7.5-46 MG CP24     varenicline (CHANTIX JERRY) 0.5 MG X 11 & 1 MG X 42 tablet     varenicline (CHANTIX) 1 MG tablet     No current facility-administered medications for this visit.     Weight Loss Medication History Reviewed With Patient 2/11/2022   Which weight loss medications are you currently taking on a regular basis?  None   If you are not taking a weight loss medication that was prescribed to you, please indicate why: My insurance does not cover the cost     ASSESSMENT:   Will trial phentermine 15/d and topiramate 25=>75 HS. Reinforce moving away from 4+ cans pop/d.    FOLLOW-UP:    12 weeks.    Sincerely,  Nathan Reno MD

## 2022-02-14 NOTE — LETTER
"2022       RE: Elza Greer  [address] [facility] [address]     Dear Colleague,    Thank you for referring your patient, Elza Greer, to the [facility] [place] at [facility]. Please see a copy of my visit note below.    Elza is a 46 year old who is being evaluated via a billable video visit.      How would you like to obtain your AVS? MyChart  If the video visit is dropped, the invitation should be resent by: Text to cell phone: [phone]  Will anyone else be joining your video visit? No    Video-Visit Details    Type of service:  Video Visit    Start: 2022 08:46 am  Stop: 2022 09:00 am    Originating Location (pt. Location): Home    Distant Location (provider location):  [facility] [place]     Platform used for Video Visit: [employer] Medical Weight Management Note     Elza Greer  MRN:  [id number]  :  1975  MCKAYLA:  2022    Dear Di Shea, NASH CNP,    I had the pleasure of seeing your patient Elza Greer.  She is a 46 year old female who I am continuing to see for treatment of obesity related to:     10/3/2021   I have the following health issues associated with obesity: Polycystic Ovarian Syndrome, Asthma   I have the following symptoms associated with obesity: Back Pain     CURRENT WEIGHT:   247 lbs 12.8 oz    Wt Readings from Last 4 Encounters:   22 112.4 kg (247 lb 12.8 oz)   22 108.9 kg (240 lb)   21 108.9 kg (240 lb)   10/04/21 108.4 kg (239 lb)     Height:  5' 3\"  Body Mass Index:  Body mass index is 43.9 kg/m .  Vitals:  B/P: Data Unavailable, P: Data Unavailable    Initial consult weight was 239 on 10/04/21.  Weight change since last seen on 10/04/21 is up 8 pounds.   Total gain is 8 pounds.    INTERVAL HISTORY:  Making some changes to diet, still confined by Covid sequela. Has had prednisone twice. Had " lost ~30 lb before illness.Could not get Qsymia due to insurance hostility to health. She wants to try splitting to phentermine and topiramate.    Diet Recall Review with Patient 10/3/2021   Do you typically eat breakfast? Yes   If you do eat breakfast, what types of food do you eat? Waffles, eggs, or cereal   Do you typically eat lunch? Yes   If you do eat lunch, what types of food do you typically eat?  Fast food or healthy choice meals   Do you typically eat supper? Yes   If you do eat supper, what types of food do you typically eat? Homemade   Do you typically eat snacks? No   Do you like vegetables?  Yes   Do you drink water? Yes   How many glasses of juice do you drink in a typical day? 0   How many of glasses of milk do you drink in a typical day? 0   If you do drink milk, what type? 1%   How many 8oz glasses of sugar containing drinks such as Dimitri-Aid/sweet tea do you drink in a day? 0   How many cans/bottles of sugar pop/soda/tea/sports drinks do you drink in a day? 4   How many cans/bottles of diet pop/soda/tea or sports drink do you drink in a day? 0   How often do you have a drink of alcohol? 2-4 Times a Month   If you do drink, how many drinks might you have in a day? 5-6     MEDICATIONS:   Current Outpatient Medications   Medication     fluticasone (FLONASE) 50 MCG/ACT nasal spray     IBUPROFEN PO     ipratropium-albuterol (COMBIVENT RESPIMAT)  MCG/ACT inhaler     vitamin D2 (ERGOCALCIFEROL) 17928 units (1250 mcg) capsule     albuterol (ACCUNEB) 1.25 MG/3ML neb solution     Ascorbic Acid (VITAMIN C PO)     clotrimazole (LOTRIMIN) 1 % external cream     cyclobenzaprine (FLEXERIL) 10 MG tablet     meloxicam (MOBIC) 7.5 MG tablet     Multiple Vitamins-Calcium (ONE-A-DAY WOMENS PO)     nicotine (NICODERM CQ) 14 MG/24HR 24 hr patch     nystatin (MYCOSTATIN) 959956 UNIT/GM external powder     Phentermine-Topiramate 7.5-46 MG CP24     varenicline (CHANTIX JERRY) 0.5 MG X 11 & 1 MG X 42 tablet      varenicline (CHANTIX) 1 MG tablet     No current facility-administered medications for this visit.     Weight Loss Medication History Reviewed With Patient 2/11/2022   Which weight loss medications are you currently taking on a regular basis?  None   If you are not taking a weight loss medication that was prescribed to you, please indicate why: My insurance does not cover the cost     ASSESSMENT:   Will trial phentermine 15/d and topiramate 25=>75 HS. Reinforce moving away from 4+ cans pop/d.    FOLLOW-UP:    12 weeks.    Sincerely,  Nathan Reno MD

## 2022-03-14 ENCOUNTER — ANCILLARY PROCEDURE (OUTPATIENT)
Dept: GENERAL RADIOLOGY | Facility: CLINIC | Age: 47
End: 2022-03-14
Attending: NURSE PRACTITIONER
Payer: COMMERCIAL

## 2022-03-14 ENCOUNTER — OFFICE VISIT (OUTPATIENT)
Dept: FAMILY MEDICINE | Facility: CLINIC | Age: 47
End: 2022-03-14
Payer: COMMERCIAL

## 2022-03-14 VITALS
OXYGEN SATURATION: 97 % | BODY MASS INDEX: 43.94 KG/M2 | TEMPERATURE: 99.1 F | SYSTOLIC BLOOD PRESSURE: 126 MMHG | WEIGHT: 248 LBS | HEIGHT: 63 IN | DIASTOLIC BLOOD PRESSURE: 82 MMHG | HEART RATE: 100 BPM

## 2022-03-14 DIAGNOSIS — M25.572 PAIN IN JOINT INVOLVING ANKLE AND FOOT, LEFT: Primary | ICD-10-CM

## 2022-03-14 DIAGNOSIS — M25.572 PAIN IN JOINT INVOLVING ANKLE AND FOOT, LEFT: ICD-10-CM

## 2022-03-14 PROCEDURE — 99213 OFFICE O/P EST LOW 20 MIN: CPT | Performed by: NURSE PRACTITIONER

## 2022-03-14 PROCEDURE — 73610 X-RAY EXAM OF ANKLE: CPT | Mod: RT | Performed by: RADIOLOGY

## 2022-03-14 NOTE — PROGRESS NOTES
Assessment/Plan:   1. Pain in joint involving ankle and foot, left  DD: tendon etiology, ankle sprain, ligamentous injury. Patient has a history of achilles tendon tear on the left ankle. She has some concern that it could be an issue with her right achilles tendon. Achilles tendon is not ruptured. Likely an ankle sprain. I placed an ultrasound order per patient request. We discussed that usually a MRI is what diagnoses Achilles tendon. I spoke to the radiologist at [place] and he said that an Ultrasound is a good method of evaluating the achilles tendon but it is difficulty to find a technician is able to do it. Consider a referral to podiatry.   Walking boot placed on right ankle.   - XR Ankle Right G/E 3 Views; Future  - US MSK Limited; Future    Return in about 3 weeks (around 4/4/2022) for Follow up.      Elena Berry, APRN, CNP    Subjective:   Elza Greer is a 46 year old female who presents today in the clinic with right ankle pain. Pain started last week; she isn't quite clear if this was a trauma or not. She states that she is clumsy and often falls and trips. She may or may not have done that last week. She has right posterior heel pain and ankle lateral malleolus swelling. Difficulty with walking. 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     Patient Active Problem List   Diagnosis     Tobacco use disorder     Anxiety     Mild intermittent asthma without complication     Ventral hernia without obstruction or gangrene     Morbid obesity (H)       Current Outpatient Medications:      fluticasone (FLONASE) 50 MCG/ACT nasal spray, Spray 2 sprays into both nostrils daily, Disp: 16 g, Rfl: 0     IBUPROFEN PO, Take 600 mg by mouth every 6 hours as needed for moderate pain, Disp: , Rfl:      ipratropium-albuterol (COMBIVENT RESPIMAT)  MCG/ACT inhaler, Inhale 1 puff into the lungs 4 times daily, Disp: 4 g, Rfl: 0     phentermine (ADIPEX-P) 15 MG capsule, Take 1  capsule (15 mg) by mouth every morning, Disp: 30 capsule, Rfl: 5     Phentermine-Topiramate 7.5-46 MG CP24, Take 1 capsule by mouth daily, Disp: 30 capsule, Rfl: 5     topiramate (TOPAMAX) 25 MG tablet, 25 mg at bedtime for 1 week, 50 mg at bedtime for 1 week and 75 mg daily at bedtime thereafter, Disp: 90 tablet, Rfl: 11     vitamin D2 (ERGOCALCIFEROL) 14732 units (1250 mcg) capsule, Take 1 capsule (50,000 Units) by mouth once a week, Disp: 8 capsule, Rfl: 0     albuterol (ACCUNEB) 1.25 MG/3ML neb solution, Take 1 vial (1.25 mg) by nebulization every 6 hours as needed for shortness of breath / dyspnea or wheezing, Disp: 90 mL, Rfl: 0     Ascorbic Acid (VITAMIN C PO), Take 1 tablet by mouth daily (Patient not taking: Reported on 10/11/2021), Disp: , Rfl:      clotrimazole (LOTRIMIN) 1 % external cream, Apply topically 2 times daily (Patient not taking: Reported on 2/3/2022), Disp: 45 g, Rfl: 1     cyclobenzaprine (FLEXERIL) 10 MG tablet, Take 1 tablet (10 mg) by mouth 3 times daily as needed for muscle spasms (Patient not taking: Reported on 10/11/2021), Disp: 30 tablet, Rfl: 1     meloxicam (MOBIC) 7.5 MG tablet, Take 1 tablet (7.5 mg) by mouth daily (Patient not taking: Reported on 10/11/2021), Disp: 30 tablet, Rfl: 1     Multiple Vitamins-Calcium (ONE-A-DAY WOMENS PO), Take 1 tablet by mouth daily (Patient not taking: Reported on 10/11/2021), Disp: , Rfl:      nicotine (NICODERM CQ) 14 MG/24HR 24 hr patch, Place 1 patch onto the skin every 24 hours PRN (Patient not taking: Reported on 2/14/2022), Disp: , Rfl:      nystatin (MYCOSTATIN) 221248 UNIT/GM external powder, Apply topically 2 times daily as needed (Patient not taking: Reported on 7/7/2021), Disp: 45 g, Rfl: 1     varenicline (CHANTIX JERRY) 0.5 MG X 11 & 1 MG X 42 tablet, Take 0.5 mg tab daily for 3 days, THEN 0.5 mg tab twice daily for 4 days, THEN 1 mg twice daily. (Patient not taking: Reported on 2/14/2022), Disp: 53 tablet, Rfl: 0     varenicline  "(CHANTIX) 1 MG tablet, Take 1 tablet (1 mg) by mouth 2 times daily (Patient not taking: Reported on 2/14/2022), Disp: 60 tablet, Rfl: 0  Past Medical History:   Diagnosis Date     Adjustment disorder with mixed anxiety and depressed mood      IBS (irritable bowel syndrome)      Incisional hernia, without obstruction or gangrene 2020     Lactose intolerance 05/12/2010     Migraine 05/12/2010     Mild intermittent asthma without complication 05/12/2010     PCOS (polycystic ovarian syndrome)      Tobacco use disorder      Vitamin D deficiency      Allergies   Allergen Reactions     Azithromycin Anaphylaxis     Doxycycline Other (See Comments)     Upset stomach and felt \"really out of it\"     Latex Hives     Oxycodone Nausea and Vomiting       ROS   ROS: 10 point ROS neg other than the symptoms noted above in the HPI.      Objective:  /82 (BP Location: Right arm, Patient Position: Sitting, Cuff Size: Adult Large)   Pulse 100   Temp 99.1  F (37.3  C) (Tympanic)   Ht 1.6 m (5' 3\")   Wt 112.5 kg (248 lb)   LMP 08/26/2005   SpO2 97%   Breastfeeding No   BMI 43.93 kg/m    General: Patient appears to be in no acute distress.  MentalStatus: cooperative and well groomed. Cognitive: Oriented to time, place, and person. Reasoning and memory intact. Normal affect. Speech clear and well enunciated.  Right ankle: has swelling in right posterior lateral malleolus, tenderness in the same area into the heel. No bruising or laxitity of the ankle joint. Limping with walking            "

## 2022-03-16 DIAGNOSIS — M25.571 PAIN IN JOINT INVOLVING ANKLE AND FOOT, RIGHT: Primary | ICD-10-CM

## 2022-03-17 ENCOUNTER — HOSPITAL ENCOUNTER (OUTPATIENT)
Dept: MRI IMAGING | Facility: CLINIC | Age: 47
Discharge: HOME | End: 2022-03-17
Attending: NURSE PRACTITIONER | Admitting: NURSE PRACTITIONER
Payer: COMMERCIAL

## 2022-03-17 DIAGNOSIS — M25.571 PAIN IN JOINT INVOLVING ANKLE AND FOOT, RIGHT: ICD-10-CM

## 2022-03-17 DIAGNOSIS — M72.2 PLANTAR FASCIA SYNDROME: Primary | ICD-10-CM

## 2022-03-17 PROCEDURE — 73721 MRI JNT OF LWR EXTRE W/O DYE: CPT | Mod: RT

## 2022-03-17 PROCEDURE — 73721 MRI JNT OF LWR EXTRE W/O DYE: CPT | Mod: 26 | Performed by: RADIOLOGY

## 2022-03-24 ENCOUNTER — OFFICE VISIT (OUTPATIENT)
Dept: PODIATRY | Facility: CLINIC | Age: 47
End: 2022-03-24
Attending: NURSE PRACTITIONER
Payer: COMMERCIAL

## 2022-03-24 VITALS
HEIGHT: 63 IN | WEIGHT: 248 LBS | BODY MASS INDEX: 43.94 KG/M2 | DIASTOLIC BLOOD PRESSURE: 82 MMHG | HEART RATE: 97 BPM | SYSTOLIC BLOOD PRESSURE: 118 MMHG

## 2022-03-24 DIAGNOSIS — M76.61 TENDONITIS, ACHILLES, RIGHT: Primary | ICD-10-CM

## 2022-03-24 PROCEDURE — 99213 OFFICE O/P EST LOW 20 MIN: CPT | Performed by: PODIATRIST

## 2022-03-24 RX ORDER — MELOXICAM 7.5 MG/1
7.5 TABLET ORAL DAILY
Qty: 30 TABLET | Refills: 1 | Status: SHIPPED | OUTPATIENT
Start: 2022-03-24 | End: 2023-09-06

## 2022-03-24 ASSESSMENT — PAIN SCALES - GENERAL: PAINLEVEL: EXTREME PAIN (8)

## 2022-03-24 NOTE — LETTER
3/24/2022         RE: Elza Greer  [address] [facility] [address]        Dear Colleague,    Thank you for referring your patient, Elza Greer, to the [facility] [place]. Please see a copy of my visit note below.    PATIENT HISTORY:  Elza Greer is a 46 year old female who presents to clinic in consultation at the request of  Elena Reyes C.N.P. with a chief complaint of right ankle injury.  The patient is seen by themselves.  The patient relates the pain is primarily located around the lateal side of ankle, back of the ankle and bottom of heel into leg.  Patient describes injury as she is unsure what happened that has been going on for 2 week(s).  The patient has previously tried walking cast, ice, elevation, rest and advil with little relief.  Denies any prior history of similar issues..  The patient is currently employed as .  Any previous notes and studies that pertain to the patient's condition were reviewed.         Past Medical History:   Past Medical History:   Diagnosis Date     Adjustment disorder with mixed anxiety and depressed mood      IBS (irritable bowel syndrome)      Incisional hernia, without obstruction or gangrene 2020     Lactose intolerance 05/12/2010     Migraine 05/12/2010     Mild intermittent asthma without complication 05/12/2010     PCOS (polycystic ovarian syndrome)      Tobacco use disorder      Vitamin D deficiency        Medications:   Current Outpatient Medications:      meloxicam (MOBIC) 7.5 MG tablet, Take 1 tablet (7.5 mg) by mouth daily, Disp: 30 tablet, Rfl: 1     albuterol (ACCUNEB) 1.25 MG/3ML neb solution, Take 1 vial (1.25 mg) by nebulization every 6 hours as needed for shortness of breath / dyspnea or wheezing, Disp: 90 mL, Rfl: 0     fluticasone (FLONASE) 50 MCG/ACT nasal spray, Spray 2 sprays into both nostrils daily, Disp: 16 g, Rfl: 0     IBUPROFEN PO, Take 600 mg by mouth every 6 hours as needed for  "moderate pain, Disp: , Rfl:      ipratropium-albuterol (COMBIVENT RESPIMAT)  MCG/ACT inhaler, Inhale 1 puff into the lungs 4 times daily, Disp: 4 g, Rfl: 0     phentermine (ADIPEX-P) 15 MG capsule, Take 1 capsule (15 mg) by mouth every morning, Disp: 30 capsule, Rfl: 5     Phentermine-Topiramate 7.5-46 MG CP24, Take 1 capsule by mouth daily, Disp: 30 capsule, Rfl: 5     topiramate (TOPAMAX) 25 MG tablet, 25 mg at bedtime for 1 week, 50 mg at bedtime for 1 week and 75 mg daily at bedtime thereafter, Disp: 90 tablet, Rfl: 11     vitamin D2 (ERGOCALCIFEROL) 85996 units (1250 mcg) capsule, Take 1 capsule (50,000 Units) by mouth once a week, Disp: 8 capsule, Rfl: 0     Allergies:    Allergies   Allergen Reactions     Azithromycin Anaphylaxis     Doxycycline Other (See Comments)     Upset stomach and felt \"really out of it\"     Latex Hives     Oxycodone Nausea and Vomiting       Vitals: /82   Pulse 97   Ht 1.6 m (5' 3\")   Wt 112.5 kg (248 lb)   LMP 08/26/2005   BMI 43.93 kg/m    BMI= Body mass index is 43.93 kg/m .    LOWER EXTREMITY PHYSICAL EXAM    Dermatologic: Skin is intact to right lower extremity without significant lesions, rash or abrasion.        Vascular: DP & PT pulses are intact & regular on the right.   CFT and skin temperature is normal to the right lower extremity.     Neurologic: Lower extremity sensation is intact to light touch.  No evidence of weakness in the right lower extremity.        Musculoskeletal: Patient is ambulatory without assistive device or brace.  No gross ankle deformity noted.  No foot or ankle joint effusion is noted.  Noted pain on palpation over the distal aspect of the Achilles tendon on the right.  No surrounding erythema or edema noted.  No ecchymosis noted.  No palpable gap noted.    Diagnostics: Recent radiographs included three views of the right ankle demonstrating mild calcaneal spurring.  No cortical erosions or periosteal elevation.  All joint margins " appear stable.  There is no apparent fracture or tumor formation noted.  There is no evidence of foreign body.  The images were independently reviewed by myself along with the patient explaining the findings.      ASSESSMENT / PLAN:     ICD-10-CM    1. Tendonitis, Achilles, right  M76.61 meloxicam (MOBIC) 7.5 MG tablet       I have explained to Elza about the conditions.  We discussed the underlying contributing factors to the condition as well as treatment options along with expected length of recovery.  At this time, the patient was educated on the importance of offloading supportive shoes and other devices.  I demonstrated to the patient calf stretches to perform every hour daily until symptoms resolve.  After symptoms resolve, the patient was advised to perform the stretches 3 times daily to prevent future recurrence.  The patient was instructed to perform warm soaks with Epson salt after which to also apply over-the-counter Voltaren gel to deeply massage the injured tissue.  The patient was instructed to do this on a daily basis until symptoms resolve.  The patient may also take over-the-counter NSAID medication, if tolerated, to help further reduce the inflammation tissue.   The patient was advised to take this type of medication with food to prevent stomach irritation and to stop taking the medication if stomach irritation occurs.   To reduce the amount of current inflammation, the patient was prescribed Mobic 7.5 mg to be taken daily with food and instructed to stop taking if any stomach irritation or swelling in extremities are noted.  The patient will return in four weeks for reevaluation if the symptoms do not resolve.      Elza verbalized agreement with and understanding of the rational for the diagnosis and treatment plan.  All questions were answered to best of my ability and the patient's satisfaction. The patient was advised to contact the clinic with any questions that may arise after the clinic  visit.      Disclaimer: This note consists of symbols derived from keyboarding, dictation and/or voice recognition software. As a result, there may be errors in the script that have gone undetected. Please consider this when interpreting information found in this chart.       MUMTAZ Jones D.P.M., F.SINAC.F.A.S.        Again, thank you for allowing me to participate in the care of your patient.        Sincerely,        Serjio Jones DPM

## 2022-03-24 NOTE — PROGRESS NOTES
PATIENT HISTORY:  Elza Greer is a 46 year old female who presents to clinic in consultation at the request of  Elena Reyes C.N.P. with a chief complaint of right ankle injury.  The patient is seen by themselves.  The patient relates the pain is primarily located around the lateal side of ankle, back of the ankle and bottom of heel into leg.  Patient describes injury as she is unsure what happened that has been going on for 2 week(s).  The patient has previously tried walking cast, ice, elevation, rest and advil with little relief.  Denies any prior history of similar issues..  The patient is currently employed as .  Any previous notes and studies that pertain to the patient's condition were reviewed.         Past Medical History:   Past Medical History:   Diagnosis Date     Adjustment disorder with mixed anxiety and depressed mood      IBS (irritable bowel syndrome)      Incisional hernia, without obstruction or gangrene 2020     Lactose intolerance 05/12/2010     Migraine 05/12/2010     Mild intermittent asthma without complication 05/12/2010     PCOS (polycystic ovarian syndrome)      Tobacco use disorder      Vitamin D deficiency        Medications:   Current Outpatient Medications:      meloxicam (MOBIC) 7.5 MG tablet, Take 1 tablet (7.5 mg) by mouth daily, Disp: 30 tablet, Rfl: 1     albuterol (ACCUNEB) 1.25 MG/3ML neb solution, Take 1 vial (1.25 mg) by nebulization every 6 hours as needed for shortness of breath / dyspnea or wheezing, Disp: 90 mL, Rfl: 0     fluticasone (FLONASE) 50 MCG/ACT nasal spray, Spray 2 sprays into both nostrils daily, Disp: 16 g, Rfl: 0     IBUPROFEN PO, Take 600 mg by mouth every 6 hours as needed for moderate pain, Disp: , Rfl:      ipratropium-albuterol (COMBIVENT RESPIMAT)  MCG/ACT inhaler, Inhale 1 puff into the lungs 4 times daily, Disp: 4 g, Rfl: 0     phentermine (ADIPEX-P) 15 MG capsule, Take 1 capsule (15 mg) by mouth every morning, Disp:  "30 capsule, Rfl: 5     Phentermine-Topiramate 7.5-46 MG CP24, Take 1 capsule by mouth daily, Disp: 30 capsule, Rfl: 5     topiramate (TOPAMAX) 25 MG tablet, 25 mg at bedtime for 1 week, 50 mg at bedtime for 1 week and 75 mg daily at bedtime thereafter, Disp: 90 tablet, Rfl: 11     vitamin D2 (ERGOCALCIFEROL) 16925 units (1250 mcg) capsule, Take 1 capsule (50,000 Units) by mouth once a week, Disp: 8 capsule, Rfl: 0     Allergies:    Allergies   Allergen Reactions     Azithromycin Anaphylaxis     Doxycycline Other (See Comments)     Upset stomach and felt \"really out of it\"     Latex Hives     Oxycodone Nausea and Vomiting       Vitals: /82   Pulse 97   Ht 1.6 m (5' 3\")   Wt 112.5 kg (248 lb)   LMP 08/26/2005   BMI 43.93 kg/m    BMI= Body mass index is 43.93 kg/m .    LOWER EXTREMITY PHYSICAL EXAM    Dermatologic: Skin is intact to right lower extremity without significant lesions, rash or abrasion.        Vascular: DP & PT pulses are intact & regular on the right.   CFT and skin temperature is normal to the right lower extremity.     Neurologic: Lower extremity sensation is intact to light touch.  No evidence of weakness in the right lower extremity.        Musculoskeletal: Patient is ambulatory without assistive device or brace.  No gross ankle deformity noted.  No foot or ankle joint effusion is noted.  Noted pain on palpation over the distal aspect of the Achilles tendon on the right.  No surrounding erythema or edema noted.  No ecchymosis noted.  No palpable gap noted.    Diagnostics: Recent radiographs included three views of the right ankle demonstrating mild calcaneal spurring.  No cortical erosions or periosteal elevation.  All joint margins appear stable.  There is no apparent fracture or tumor formation noted.  There is no evidence of foreign body.  The images were independently reviewed by myself along with the patient explaining the findings.      ASSESSMENT / PLAN:     ICD-10-CM    1. " Tendonitis, Achilles, right  M76.61 meloxicam (MOBIC) 7.5 MG tablet       I have explained to Elza about the conditions.  We discussed the underlying contributing factors to the condition as well as treatment options along with expected length of recovery.  At this time, the patient was educated on the importance of offloading supportive shoes and other devices.  I demonstrated to the patient calf stretches to perform every hour daily until symptoms resolve.  After symptoms resolve, the patient was advised to perform the stretches 3 times daily to prevent future recurrence.  The patient was instructed to perform warm soaks with Epson salt after which to also apply over-the-counter Voltaren gel to deeply massage the injured tissue.  The patient was instructed to do this on a daily basis until symptoms resolve.  The patient may also take over-the-counter NSAID medication, if tolerated, to help further reduce the inflammation tissue.   The patient was advised to take this type of medication with food to prevent stomach irritation and to stop taking the medication if stomach irritation occurs.   To reduce the amount of current inflammation, the patient was prescribed Mobic 7.5 mg to be taken daily with food and instructed to stop taking if any stomach irritation or swelling in extremities are noted.  The patient will return in four weeks for reevaluation if the symptoms do not resolve.      Elza verbalized agreement with and understanding of the rational for the diagnosis and treatment plan.  All questions were answered to best of my ability and the patient's satisfaction. The patient was advised to contact the clinic with any questions that may arise after the clinic visit.      Disclaimer: This note consists of symbols derived from keyboarding, dictation and/or voice recognition software. As a result, there may be errors in the script that have gone undetected. Please consider this when interpreting information  found in this chart.       MUMTAZ Jones D.P.M., SY.F.SINAS.

## 2022-03-24 NOTE — PATIENT INSTRUCTIONS
Next Steps:      1. Support:  a. Wear supportive shoes, sandals, boots and/or inserts that have a rigid supportive sole.    i. This will offload the majority of tension forces that travel through your feet every step you take.    1. Skechers Max Cushioning Elite/Premier   2. Skechers Relax Fit D'Lux Walker  3. Reebok Walk Ultra 7 DMX MAX   4. Hoka Bondi walking shoes  5. Superfeet inserts ([email or web address])  b. It is important that you also wear supportive shoe wear in the house to continue providing support to your feet.    c. You may always use a cushioned liner for your shoes if that makes your feet feel better.  2. Stretching  a. Calf stretching is essential to offload the tension forces that travel through your feet every step you take  b. Preferred calf stretch is the Runner's Stretch  i. Place one foot behind the other foot, flat against the ground (it is important to keep the heel on the ground).  The back leg is the one that will be stretched.  1. Start with the knee straight and lean your hips into the wall, counter or whatever you are leaning into - count to ten.  2. Next, bend the knee.  You should feel the stretch lower in the calf muscle - count to ten.  c. Repeat this stretch once an hour to start off with.  When symptoms subside, I recommend performing the stretch 3 times daily to prevent any future problems.                3. Tissue Massage  a. It is important that you physically loosen the inflammation tissue to help your body heal the injured tissue.  b. I recommend soaking your foot in warm water to increase the microcirculation to the soft tissues.  You may add Epson salt to the water if you prefer.  c. You may apply an over-the-counter muscle rub, such as Voltaren gel, and deeply massage the injured tissue.  4. Reduce Inflammation  a. You can ice the injured tissue with an ice pack with a light cloth covering or soaking in ice water 20 minutes to reduce any acute inflammation, typically at the  end of the day.  b. If tolerated, you may take a Non-Steroidal Antiinflammatory medication (NSAID), such as Advil or Aleve, to help reduce the inflammation tissue.  This can help the overall healing of the injured tissue.  i. It is important to take food with any NSAID medication as the most common side effect is stomach irritation.  If you encounter any problems when taking NSAID, it is recommended that you stop taking the medication and notify your provider.    It is important to understand that most problems that develop in the foot and ankle are caused by excessive tension that cause microinjury to the soft tissues and inflammation in the foot and ankle.  By addressing the underlying causes with support and stretching as well as treating the current inflammatory conditions with tissue massage and anti-inflammatory treatments, most foot and ankle musculoskeletal conditions will resolve.  This may take time to heal.  However, if symptoms persist past 4 weeks you should return to the office for reevaluation to determine further treatment options.      SMOKING CESSATION  What's in cigarette smoke? - Cigarette smoke contains over 4,000 chemicals. Nicotine is one of the main ingredients which is an insecticide/herbicide. It is poisonous to our nervous system, increases blood clotting risk, and decreases the body's defenses to fight off infection. Another chemical is Carbon Monoxide is an asphyxiating gas that permanently binds to blood cells and blocks the transport of oxygen. This leads to tissue death and decreases your metabolism. Tar is a chemical that coats your lungs and trachea which impairs new oxygen coming in and carbon dioxide getting out of your body.   How does smoking impact surgery? - Smoking is particularly hazardous with regards to surgery. Surgery puts stress on the body and a smoker's body is already under strain from these chemicals. Putting the two together, especially for an elective surgery,  could be a recipe for disaster. Smoking before and after surgery increases your risk of heart problems, slow wound healing, delayed bone healing, blood clots, wound infection and anesthesia complications.   What are the benefits of quitting? - In 20 minutes your blood pressure will drop back down to normal. In 8 hours the carbon monoxide (a toxic gas) levels in your blood stream will drop by half, and oxygen levels will return to normal. In 48 hours your chance of having a heart attack will have decreased. All nicotine will have left your body. Your sense of taste and smell will return to a normal level. In 72 hours your bronchial tubes will relax, and your energy levels will increase. In 2 weeks your circulation will increase, and it will continue to improve for the next 10 weeks.    Recommendations for elective surgery - Ideally, patients should quit smoking 8 weeks before and at least 2 weeks after elective surgery in order to avoid complications. Simply cutting back on the amount of cigarettes smoked per day does not offer any benefit or decrease the risk of poor wound healing, heart problems, and infection. Smokers should also start taking Vitamin C and B for two weeks before surgery and two weeks after surgery.    Ways to Stop Smokin. Nicotine patches, lozenges, or gum  2. Support Groups  3. Medications (see below)    List of Medications:  1. Varenicline Tartrate (CHANTIX) (may be discontinued by FDA as of 2021)  2. Bupropion HCL (WELLBUTRIN, ZYBAN) - note: make sure Wellbutrin is for smoking cessation and not other issues   3. Nicotine Patch (NICODERM)   4. Nicotine Inhaler (NICOTROL)   5. Nicotine Gum Nicotine Polacrilex   6. Nicotine Lozenge: Nicotine Polacrilex (COMMIT)   * [place] offers a smoking support group as well!  Please visit: [email or web address]  If you are interested in these, ask about getting a prescription or talk to your primary care doctor about what may be  the best way for you to quit.

## 2022-03-24 NOTE — NURSING NOTE
"Chief Complaint   Patient presents with     Consult     right ankle injury       Initial /82   Pulse 97   Ht 1.6 m (5' 3\")   Wt 112.5 kg (248 lb)   LMP 08/26/2005   BMI 43.93 kg/m   Estimated body mass index is 43.93 kg/m  as calculated from the following:    Height as of this encounter: 1.6 m (5' 3\").    Weight as of this encounter: 112.5 kg (248 lb).  Medications and allergies reviewed.      She SEBASTIAN MA    "

## 2022-03-24 NOTE — LETTER
March 24, 2022      Elza Greer  [address] [facility] [place] [address]        To Whom It May Concern:    Elza Greer was seen in our clinic. She may return to work with the following: limited to light duty - lifting no greater than 10 pounds, no climbing, standing limited to 3 hrs and walking limited to 3 hrs on or about 3/24/2022 for one month.  The patient will return in one month for reevaluation when the work status will be modified..      Sincerely,        Serjio Jones DPM

## 2022-04-10 ENCOUNTER — HEALTH MAINTENANCE LETTER (OUTPATIENT)
Age: 47
End: 2022-04-10

## 2022-04-12 ENCOUNTER — E-VISIT (OUTPATIENT)
Dept: FAMILY MEDICINE | Facility: CLINIC | Age: 47
End: 2022-04-12
Payer: COMMERCIAL

## 2022-04-12 ENCOUNTER — HOSPITAL ENCOUNTER (EMERGENCY)
Facility: CLINIC | Age: 47
Discharge: HOME | End: 2022-04-12
Attending: PHYSICIAN ASSISTANT | Admitting: PHYSICIAN ASSISTANT
Payer: COMMERCIAL

## 2022-04-12 VITALS
HEART RATE: 102 BPM | SYSTOLIC BLOOD PRESSURE: 153 MMHG | TEMPERATURE: 98.1 F | WEIGHT: 245 LBS | RESPIRATION RATE: 16 BRPM | DIASTOLIC BLOOD PRESSURE: 97 MMHG | BODY MASS INDEX: 43.4 KG/M2 | OXYGEN SATURATION: 97 %

## 2022-04-12 DIAGNOSIS — R51.9 ACUTE NONINTRACTABLE HEADACHE, UNSPECIFIED HEADACHE TYPE: ICD-10-CM

## 2022-04-12 DIAGNOSIS — Z71.89 ADVICE GIVEN ABOUT 2019-NCOV INFECTION: Primary | ICD-10-CM

## 2022-04-12 DIAGNOSIS — H60.502 ACUTE OTITIS EXTERNA OF LEFT EAR: ICD-10-CM

## 2022-04-12 LAB
DEPRECATED S PYO AG THROAT QL EIA: NEGATIVE
FLUAV RNA SPEC QL NAA+PROBE: NEGATIVE
FLUBV RNA RESP QL NAA+PROBE: NEGATIVE
SARS-COV-2 RNA RESP QL NAA+PROBE: NEGATIVE

## 2022-04-12 PROCEDURE — 87636 SARSCOV2 & INF A&B AMP PRB: CPT | Performed by: PHYSICIAN ASSISTANT

## 2022-04-12 PROCEDURE — C9803 HOPD COVID-19 SPEC COLLECT: HCPCS | Performed by: PHYSICIAN ASSISTANT

## 2022-04-12 PROCEDURE — 99207 PR NON-BILLABLE SERV PER CHARTING: CPT | Performed by: PHYSICIAN ASSISTANT

## 2022-04-12 PROCEDURE — 99214 OFFICE O/P EST MOD 30 MIN: CPT | Mod: FS | Performed by: PHYSICIAN ASSISTANT

## 2022-04-12 PROCEDURE — 87651 STREP A DNA AMP PROBE: CPT | Performed by: PHYSICIAN ASSISTANT

## 2022-04-12 PROCEDURE — 96372 THER/PROPH/DIAG INJ SC/IM: CPT | Performed by: PHYSICIAN ASSISTANT

## 2022-04-12 PROCEDURE — G0463 HOSPITAL OUTPT CLINIC VISIT: HCPCS | Performed by: PHYSICIAN ASSISTANT

## 2022-04-12 PROCEDURE — 250N000013 HC RX MED GY IP 250 OP 250 PS 637: Performed by: PHYSICIAN ASSISTANT

## 2022-04-12 PROCEDURE — 250N000011 HC RX IP 250 OP 636: Performed by: PHYSICIAN ASSISTANT

## 2022-04-12 RX ORDER — NEOMYCIN SULFATE, POLYMYXIN B SULFATE AND HYDROCORTISONE 10; 3.5; 1 MG/ML; MG/ML; [USP'U]/ML
4 SUSPENSION/ DROPS AURICULAR (OTIC) EVERY 8 HOURS SCHEDULED
Status: DISCONTINUED | OUTPATIENT
Start: 2022-04-12 | End: 2022-04-12 | Stop reason: HOSPADM

## 2022-04-12 RX ORDER — KETOROLAC TROMETHAMINE 30 MG/ML
15 INJECTION, SOLUTION INTRAMUSCULAR; INTRAVENOUS ONCE
Status: COMPLETED | OUTPATIENT
Start: 2022-04-12 | End: 2022-04-12

## 2022-04-12 RX ORDER — INDOMETHACIN 25 MG/1
50 CAPSULE ORAL ONCE
Status: COMPLETED | OUTPATIENT
Start: 2022-04-12 | End: 2022-04-12

## 2022-04-12 RX ORDER — CIPROFLOXACIN AND DEXAMETHASONE 3; 1 MG/ML; MG/ML
4 SUSPENSION/ DROPS AURICULAR (OTIC) 2 TIMES DAILY
Status: DISCONTINUED | OUTPATIENT
Start: 2022-04-12 | End: 2022-04-12

## 2022-04-12 RX ORDER — INDOMETHACIN 50 MG/1
50 CAPSULE ORAL
Qty: 30 CAPSULE | Refills: 0 | Status: SHIPPED | OUTPATIENT
Start: 2022-04-12 | End: 2022-07-24

## 2022-04-12 RX ADMIN — KETOROLAC TROMETHAMINE 15 MG: 30 INJECTION, SOLUTION INTRAMUSCULAR at 20:59

## 2022-04-12 RX ADMIN — INDOMETHACIN 50 MG: 25 CAPSULE ORAL at 21:11

## 2022-04-12 RX ADMIN — NEOMYCIN SULFATE, POLYMYXIN B SULFATE AND HYDROCORTISONE 4 DROP: 10; 3.5; 1 SUSPENSION/ DROPS AURICULAR (OTIC) at 21:20

## 2022-04-12 ASSESSMENT — ENCOUNTER SYMPTOMS
MUSCULOSKELETAL NEGATIVE: 1
COUGH: 1
GASTROINTESTINAL NEGATIVE: 1
RHINORRHEA: 1
CARDIOVASCULAR NEGATIVE: 1
CONSTITUTIONAL NEGATIVE: 1

## 2022-04-12 NOTE — PATIENT INSTRUCTIONS
Dear Elza Greer,    We are sorry you are not feeling well. Based on the responses you provided, it is recommended that you be seen in-person in urgent care so we can better evaluate your symptoms. Please click here to find the nearest urgent care location to you.   You will not be charged for this Visit. Thank you for trusting us with your care.    Kayla Godinez PA-C

## 2022-04-12 NOTE — Clinical Note
Elza Greer was seen and treated in our emergency department on 4/12/2022.  She may return to work on 04/18/2022.  Elza was seen and evaluated my clinic today.  Due to medical reasons, I feel it is needful for her to remain out of work until symptoms resolve without the need for pain or fever reducing medications.     If you have any questions or concerns, please don't hesitate to call.      Serjio Panda PA-C

## 2022-04-13 LAB — GROUP A STREP BY PCR: NOT DETECTED

## 2022-04-13 NOTE — ED TRIAGE NOTES
Pt presents with headache, left earache, sore throat since Sunday. Denies fever, n/v/d. Was told to be seen in Urgent Care by e-visit. Has intermittently taken motrin and dayquil (last 2pm) with some relief.

## 2022-04-13 NOTE — DISCHARGE INSTRUCTIONS
Do not take ibuprofen while taking indomethacin.  Symptomatic cares for ear pain include tylenol, jaw maneuvers, and warm compresses (warm pack heated to body temperature, warm wash cloth) 4 times per day. Follow up with pcp if no improvement in 3 days, sooner if there are new or worsening symptoms such as fever up to 102 degrees F or greater, chest tightness, wheezing, facial pressure, headaches, pain/redness/swelling behind the ears or around the eyes, trouble breathing, or trouble swallowing. Pt/guardian verbalized understanding and agrees with the treatment plan.

## 2022-04-13 NOTE — ED PROVIDER NOTES
"  History     Chief Complaint   Patient presents with     Otalgia     HPI  Elza Greer is a 46 year old female who presents with complaints of URI symptoms which began 2 days.  Associated symptoms include occasional cough, fever up to 100.5 degrees F, sore throat, runny, nasal congestion, left ear pain, and headaches. The patient has been taking ibuprofen and Dayquil with some relief of symptoms. Hurts a little to swallow. No concerns for breathing, chest pain, shortness of breath, abdominal pain, joint pain or rashes, acute vision changes, nausea or vomiting, constipation or diarrhea, or leg pain/swelling. Normal bowel and bladder function. Normal food and fluid intake.  She has had two initial vaccinations for COVID-19.      Allergies:  Allergies   Allergen Reactions     Azithromycin Anaphylaxis     Doxycycline Other (See Comments)     Upset stomach and felt \"really out of it\"     Latex Hives     Oxycodone Nausea and Vomiting       Problem List:    Patient Active Problem List    Diagnosis Date Noted     Morbid obesity (H) 10/04/2021     Priority: Medium     Ventral hernia without obstruction or gangrene 01/13/2021     Priority: Medium     Added automatically from request for surgery [id number]       Anxiety 02/18/2020     Priority: Medium     Tobacco use disorder 11/14/2016     Priority: Medium     Mild intermittent asthma without complication 05/12/2010     Priority: Medium        Past Medical History:    Past Medical History:   Diagnosis Date     Adjustment disorder with mixed anxiety and depressed mood      IBS (irritable bowel syndrome)      Incisional hernia, without obstruction or gangrene 2020     Lactose intolerance 05/12/2010     Migraine 05/12/2010     Mild intermittent asthma without complication 05/12/2010     PCOS (polycystic ovarian syndrome)      Tobacco use disorder      Vitamin D deficiency        Past Surgical History:    Past Surgical History:   Procedure Laterality Date     APPENDECTOMY OPEN " N/A      CYSTECTOMY OVARIAN BENIGN  2001     HEPATICOJEJUNOSTOMY  2000    RnY jejunostomy from bile duct injury     HERNIORRHAPHY VENTRAL N/A 02/17/2012    open with mesh     HYSTERECTOMY TOTAL ABDOMINAL, BILATERAL SALPINGO-OOPHORECTOMY, COMBINED N/A 2005     LAPAROSCOPIC CHOLECYSTECTOMY  2006    complicated by bile duct injury     LAPAROSCOPIC HERNIORRHAPHY VENTRAL N/A 1/26/2021    Procedure: Open recurrent incarcerated ventral hernia repair X2;  Surgeon: Pradip Mckenzie MD;  Location: [facility] OR       Family History:    Family History   Problem Relation Age of Onset     Coronary Artery Disease Mother      Hypertension Mother      Hyperlipidemia Mother      Depression Mother      Colon Cancer Maternal Grandmother      Breast Cancer Maternal Grandmother      Other Cancer Maternal Grandmother        Social History:  Marital Status:   [2]  Social History     Tobacco Use     Smoking status: Current Every Day Smoker     Packs/day: 0.50     Years: 23.00     Pack years: 11.50     Types: Cigarettes     Smokeless tobacco: Never Used     Tobacco comment: 4 cigs daily    Substance Use Topics     Alcohol use: Yes     Comment: social     Drug use: No        Medications:    indomethacin (INDOCIN) 50 MG capsule  albuterol (ACCUNEB) 1.25 MG/3ML neb solution  fluticasone (FLONASE) 50 MCG/ACT nasal spray  IBUPROFEN PO  ipratropium-albuterol (COMBIVENT RESPIMAT)  MCG/ACT inhaler  meloxicam (MOBIC) 7.5 MG tablet  phentermine (ADIPEX-P) 15 MG capsule  Phentermine-Topiramate 7.5-46 MG CP24  topiramate (TOPAMAX) 25 MG tablet  vitamin D2 (ERGOCALCIFEROL) 74219 units (1250 mcg) capsule      Review of Systems   Constitutional: Negative.    HENT: Positive for congestion, ear discharge, ear pain and rhinorrhea.    Respiratory: Positive for cough.    Cardiovascular: Negative.    Gastrointestinal: Negative.    Musculoskeletal: Negative.        Physical Exam   BP: (!) 153/97  Pulse: 102  Temp: 98.1  F (36.7  C)  Resp: 16  Weight: 111.1 kg  (245 lb)  SpO2: 97 %      Physical Exam  Constitutional:       General: She is not in acute distress.     Appearance: Normal appearance. She is not ill-appearing, toxic-appearing or diaphoretic.   HENT:      Head: Normocephalic and atraumatic.      Right Ear: Tympanic membrane, ear canal and external ear normal. No drainage, swelling or tenderness. No middle ear effusion. There is no impacted cerumen. No mastoid tenderness. Tympanic membrane is not injected, perforated, erythematous, retracted or bulging.      Left Ear: Tympanic membrane and ear canal normal. Swelling and tenderness present. No drainage.  No middle ear effusion. There is no impacted cerumen. There is mastoid tenderness. Tympanic membrane is not injected, perforated, erythematous, retracted or bulging.      Ears:      Comments: Has moderate tenderness over the mastoid muscle but no erythema or swelling.     Nose: Nose normal. No congestion or rhinorrhea.      Mouth/Throat:      Mouth: Mucous membranes are moist.      Pharynx: No oropharyngeal exudate or posterior oropharyngeal erythema.   Eyes:      General: No scleral icterus.        Right eye: No discharge.         Left eye: No discharge.      Extraocular Movements: Extraocular movements intact.      Conjunctiva/sclera: Conjunctivae normal.   Cardiovascular:      Rate and Rhythm: Normal rate and regular rhythm.      Pulses: Normal pulses.      Heart sounds: Normal heart sounds. No murmur heard.  Pulmonary:      Effort: Pulmonary effort is normal. No respiratory distress.      Breath sounds: Normal breath sounds. No stridor. No wheezing or rhonchi.   Musculoskeletal:      Cervical back: Neck supple. No rigidity. No muscular tenderness.   Lymphadenopathy:      Cervical: Cervical adenopathy present.      Right cervical: No superficial, deep or posterior cervical adenopathy.     Left cervical: Superficial cervical adenopathy present. No deep or posterior cervical adenopathy.   Skin:     General: Skin  is warm and dry.   Neurological:      General: No focal deficit present.      Mental Status: She is alert and oriented to person, place, and time.      Sensory: No sensory deficit.      Motor: No weakness.      Coordination: Coordination normal.   Psychiatric:         Mood and Affect: Mood normal.         Behavior: Behavior normal.         Thought Content: Thought content normal.         Judgment: Judgment normal.         ED Course     Mental Health Risk Assessment      PSS-3    Date and Time Over the past 2 weeks have you felt down, depressed, or hopeless? Over the past 2 weeks have you had thoughts of killing yourself? Have you ever attempted to kill yourself? When did this last happen? User   04/12/22 2002 no no yes more than 6 months ago JLS                Item Assessment   Suicidal Ideation None   Plan N/A   Intent N/A   Suicidal or self-harm behaviors None   Risk Factors None   Protective Factors Identified reasons for living              Procedures                Results for orders placed or performed during the hospital encounter of 04/12/22 (from the past 24 hour(s))   Streptococcus A Rapid Screen w/Reflex to PCR    Specimen: Throat; Swab   Result Value Ref Range    Group A Strep antigen Negative Negative       Medications   neomycin-polymyxin-hydrocortisone (CORTISPORIN) otic suspension 4 drop (4 drops Left Ear Given 4/12/22 2120)   ketorolac (TORADOL) injection 15 mg (15 mg Intramuscular Given 4/12/22 2059)   indomethacin (INDOCIN) capsule 50 mg (50 mg Oral Given 4/12/22 2111)       Assessments & Plan (with Medical Decision Making)     The patient's presentation and physical exam are most consistent with headache in the parietal region and mastoid tenderness.  Her presentation is consistent with trigeminal cervicalgia.    Mastoiditis less likely today considering the patient has had little to no fevers, mild swelling in the ear, and no erythema.    Starting indomethacin tomorrow for treatment of  trigeminal cervicalgia.  Recommend close follow-up with primary care for further evaluation and management.    Treat her pain with Toradol while in clinic, headache significantly improved.    I am also concerned about tenderness/pain/drainage in her left external ear canal.  Starting her on Corticosporin drops for treatment of presumed otitis externa.    Do not take ibuprofen while taking indomethacin.  Symptomatic cares for ear pain include tylenol, jaw maneuvers, and warm compresses (warm pack heated to body temperature, warm wash cloth) 4 times per day. Follow up with pcp if no improvement in 3 days, sooner if there are new or worsening symptoms such as fever up to 102 degrees F or greater, chest tightness, wheezing, facial pressure, headaches, pain/redness/swelling behind the ears or around the eyes, trouble breathing, or trouble swallowing. Pt/guardian verbalized understanding and agrees with the treatment plan.       I have reviewed the nursing notes.    I have reviewed the findings, diagnosis, plan and need for follow up with the patient.    New Prescriptions    INDOMETHACIN (INDOCIN) 50 MG CAPSULE    Take 1 capsule (50 mg) by mouth 3 times daily (with meals) for 10 days       Final diagnoses:   Acute nonintractable headache, unspecified headache type   Acute otitis externa of left ear       4/12/2022   [facility] EMERGENCY DEPT     Serjio Panda PA-C  04/12/22 [id number]

## 2022-04-21 ENCOUNTER — OFFICE VISIT (OUTPATIENT)
Dept: PODIATRY | Facility: CLINIC | Age: 47
End: 2022-04-21
Payer: COMMERCIAL

## 2022-04-21 VITALS
BODY MASS INDEX: 43.41 KG/M2 | HEIGHT: 63 IN | DIASTOLIC BLOOD PRESSURE: 89 MMHG | SYSTOLIC BLOOD PRESSURE: 129 MMHG | HEART RATE: 85 BPM | WEIGHT: 245 LBS

## 2022-04-21 DIAGNOSIS — M76.61 TENDONITIS, ACHILLES, RIGHT: ICD-10-CM

## 2022-04-21 DIAGNOSIS — M72.2 PLANTAR FASCIITIS, RIGHT: Primary | ICD-10-CM

## 2022-04-21 PROCEDURE — 99213 OFFICE O/P EST LOW 20 MIN: CPT | Performed by: PODIATRIST

## 2022-04-21 ASSESSMENT — PAIN SCALES - GENERAL: PAINLEVEL: MODERATE PAIN (4)

## 2022-04-21 NOTE — NURSING NOTE
"Chief Complaint   Patient presents with     RECHECK     Right foot \"feels better when she is off of foot\"       Initial /89   Pulse 85   Ht 1.6 m (5' 3\")   Wt 111.1 kg (245 lb)   LMP 08/26/2005   BMI 43.40 kg/m   Estimated body mass index is 43.4 kg/m  as calculated from the following:    Height as of this encounter: 1.6 m (5' 3\").    Weight as of this encounter: 111.1 kg (245 lb).  Medications and allergies reviewed.      She SEBASTIAN MA    "

## 2022-04-21 NOTE — PROGRESS NOTES
"Elza returns to the office for reevaluation of the right foot.  The patient relates following the instructions given at the last visit with noted overall less pain and more improvement in function of the right foot.   The patient relates no other problems.    Vitals: /89   Pulse 85   Ht 1.6 m (5' 3\")   Wt 111.1 kg (245 lb)   LMP 08/26/2005   BMI 43.40 kg/m    BMI= Body mass index is 43.4 kg/m .    Lower Extremity Physical Exam:      Neurovascular status remains unchanged.  Muscular exam is within normal limits to major muscle groups.  Integument is intact.      Noted pain on palpation of the distal aspect of the Achilles tendon as well as the plantar fascial insertion point to the right heel bone.         Assessment:     ICD-10-CM    1. Plantar fasciitis, right  M72.2 Ankle/Foot Bracing Supplies DME   2. Tendonitis, Achilles, right  M76.61 Ankle/Foot Bracing Supplies DME       Plan:    I have explained to Elza about the progress of the conditions.  At this time, the patient was educated on the importance of offloading supportive shoes and other devices.  I demonstrated to the patient calf stretches to perform every hour daily until symptoms resolve.  After symptoms resolve, the patient was advised to perform the stretches 3 times daily to prevent future recurrence.  The patient was instructed to perform warm soaks with Epson salt after which to also apply over-the-counter Voltaren gel to deeply massage the injured tissue.  The patient was instructed to do this on a daily basis until symptoms resolve.   The patient was fitted with a Tri-Lock ankle brace that will aid in offloading the tension forces to the soft tissues and prevent further inflammation.   The patient will return in four weeks for reevaluation and to determine if any further treatment will be needed.      Elza verbalized agreement with and understanding of the rational for the diagnosis and treatment plan.  All questions were answered " to best of my ability and the patient's satisfaction. The patient was advised to contact the clinic with any questions that may arise after the clinic visit.      Disclaimer: This note consists of symbols derived from keyboarding, dictation and/or voice recognition software. As a result, there may be errors in the script that have gone undetected. Please consider this when interpreting information found in this chart.       MUMTAZ Jones D.P.M., F.[email or web address].S.

## 2022-04-21 NOTE — PATIENT INSTRUCTIONS

## 2022-04-21 NOTE — LETTER
"    4/21/2022         RE: Elza Greer  [address] [facility] [address]        Dear Colleague,    Thank you for referring your patient, Elza Greer, to the [facility]. Please see a copy of my visit note below.    Elza returns to the office for reevaluation of the right foot.  The patient relates following the instructions given at the last visit with noted overall less pain and more improvement in function of the right foot.   The patient relates no other problems.    Vitals: /89   Pulse 85   Ht 1.6 m (5' 3\")   Wt 111.1 kg (245 lb)   LMP 08/26/2005   BMI 43.40 kg/m    BMI= Body mass index is 43.4 kg/m .    Lower Extremity Physical Exam:      Neurovascular status remains unchanged.  Muscular exam is within normal limits to major muscle groups.  Integument is intact.      Noted pain on palpation of the distal aspect of the Achilles tendon as well as the plantar fascial insertion point to the right heel bone.         Assessment:     ICD-10-CM    1. Plantar fasciitis, right  M72.2 Ankle/Foot Bracing Supplies DME   2. Tendonitis, Achilles, right  M76.61 Ankle/Foot Bracing Supplies DME       Plan:    I have explained to Elza about the progress of the conditions.  At this time, the patient was educated on the importance of offloading supportive shoes and other devices.  I demonstrated to the patient calf stretches to perform every hour daily until symptoms resolve.  After symptoms resolve, the patient was advised to perform the stretches 3 times daily to prevent future recurrence.  The patient was instructed to perform warm soaks with Epson salt after which to also apply over-the-counter Voltaren gel to deeply massage the injured tissue.  The patient was instructed to do this on a daily basis until symptoms resolve.   The patient was fitted with a Tri-Lock ankle brace that will aid in offloading the tension forces to the soft tissues and prevent further inflammation. "   The patient will return in four weeks for reevaluation and to determine if any further treatment will be needed.      Elza verbalized agreement with and understanding of the rational for the diagnosis and treatment plan.  All questions were answered to best of my ability and the patient's satisfaction. The patient was advised to contact the clinic with any questions that may arise after the clinic visit.      Disclaimer: This note consists of symbols derived from keyboarding, dictation and/or voice recognition software. As a result, there may be errors in the script that have gone undetected. Please consider this when interpreting information found in this chart.       MUMTAZ Jones D.P.M., F.A.C.F.A.S.        Again, thank you for allowing me to participate in the care of your patient.        Sincerely,        Serjio Jones DPM

## 2022-04-21 NOTE — LETTER
April 21, 2022      Elza Greer  [address] [facility] [address]        To Whom It May Concern:    Elza Greer was seen in our clinic. She may return to work with the following: limited to light duty - pushing or pulling no greater than 50 pounds, no climbing, standing limited to 3 hrs and walking limited to 3 hrs on or about 5/21/2022 for one month.  The patient will return in one month for reevaluation when the work status will be modified..    Sincerely,        Serjio Jones DPM

## 2022-07-11 ENCOUNTER — TELEPHONE (OUTPATIENT)
Dept: FAMILY MEDICINE | Facility: CLINIC | Age: 47
End: 2022-07-11

## 2022-07-11 NOTE — LETTER
July 19, 2022        Elza Greer  [address] [facility] [address]        Dear Elza,         Your healthcare team cares about your health. To provide you with the best care,   we have reviewed your chart and based on our findings, we see that you are due to:     - ASTHMA FOLLOW UP:  Complete and return the attached Asthma Control Test.  If your total score is 19 or less or you have been to the ER or urgent care for your asthma, then please schedule an asthma follow-up appointment.  - BREAST CANCER SCREENING:  Schedule Annual Mammogram. [facility] scheduling number - [phone] or schedule in MyChart (self referall)  - COLON CANCER SCREENING:  Call or mychart the clinic to schedule your colonoscopy or schedule/ your FIT Test, or Cologuard test    If you have already completed these items, please contact the clinic via phone or   [employer] so your care team can review and update your records. Thank you for   choosing [facility] for your healthcare needs. For any questions,   concerns, or to schedule an appointment please contact the clinic.       Healthy Regards,      Your [facility] Care Team      Sincerely,        NASH Huston CNP

## 2022-07-11 NOTE — TELEPHONE ENCOUNTER
Patient Quality Outreach    Patient is due for the following:   Asthma  -  ACT needed  Colon Cancer Screening -    Breast Cancer Screening - Mammogram    NEXT STEPS:   complete act, mammo and colon cancer screening    Type of outreach:    Sent [employer] message.  Will postpone x 1 week, if not viewed or completed please mail ACT.         Questions for provider review:    None     Lulu Escobar, CMA

## 2022-07-19 NOTE — TELEPHONE ENCOUNTER
Patient Quality Outreach    Patient is due for the following:   Asthma  -  ACT needed  Colon Cancer Screening -  FIT, Colonoscopy and Cologuard  Breast Cancer Screening - Mammogram    NEXT STEPS:   Due for Colon cancer screen, mammogram.Update ACT    Type of outreach:    Sent letter. and Copy of ACT mailed to patient.      Questions for provider review:    None     Ricardo Shore, CMA

## 2022-07-24 ENCOUNTER — APPOINTMENT (OUTPATIENT)
Dept: CT IMAGING | Facility: CLINIC | Age: 47
End: 2022-07-24
Attending: FAMILY MEDICINE
Payer: COMMERCIAL

## 2022-07-24 ENCOUNTER — HOSPITAL ENCOUNTER (EMERGENCY)
Facility: CLINIC | Age: 47
Discharge: HOME | End: 2022-07-24
Attending: FAMILY MEDICINE | Admitting: FAMILY MEDICINE
Payer: COMMERCIAL

## 2022-07-24 VITALS
SYSTOLIC BLOOD PRESSURE: 111 MMHG | RESPIRATION RATE: 105 BRPM | WEIGHT: 240 LBS | OXYGEN SATURATION: 97 % | TEMPERATURE: 100.4 F | BODY MASS INDEX: 42.51 KG/M2 | HEART RATE: 122 BPM | DIASTOLIC BLOOD PRESSURE: 66 MMHG

## 2022-07-24 DIAGNOSIS — R07.9 CHEST PAIN, UNSPECIFIED TYPE: ICD-10-CM

## 2022-07-24 DIAGNOSIS — R30.0 DYSURIA: ICD-10-CM

## 2022-07-24 DIAGNOSIS — M54.50 ACUTE RIGHT-SIDED LOW BACK PAIN WITHOUT SCIATICA: ICD-10-CM

## 2022-07-24 LAB
ALBUMIN SERPL-MCNC: 3.3 G/DL (ref 3.4–5)
ALBUMIN UR-MCNC: NEGATIVE MG/DL
ALP SERPL-CCNC: 136 U/L (ref 40–150)
ALT SERPL W P-5'-P-CCNC: 49 U/L (ref 0–50)
ANION GAP SERPL CALCULATED.3IONS-SCNC: 4 MMOL/L (ref 3–14)
APPEARANCE UR: CLEAR
AST SERPL W P-5'-P-CCNC: 25 U/L (ref 0–45)
BASOPHILS # BLD AUTO: 0 10E3/UL (ref 0–0.2)
BASOPHILS NFR BLD AUTO: 0 %
BILIRUB SERPL-MCNC: 0.3 MG/DL (ref 0.2–1.3)
BILIRUB UR QL STRIP: NEGATIVE
BUN SERPL-MCNC: 8 MG/DL (ref 7–30)
CALCIUM SERPL-MCNC: 8.7 MG/DL (ref 8.5–10.1)
CHLORIDE BLD-SCNC: 106 MMOL/L (ref 94–109)
CO2 SERPL-SCNC: 28 MMOL/L (ref 20–32)
COLOR UR AUTO: YELLOW
CREAT SERPL-MCNC: 0.67 MG/DL (ref 0.52–1.04)
CRP SERPL-MCNC: 45.7 MG/L (ref 0–8)
EOSINOPHIL # BLD AUTO: 0 10E3/UL (ref 0–0.7)
EOSINOPHIL NFR BLD AUTO: 0 %
ERYTHROCYTE [DISTWIDTH] IN BLOOD BY AUTOMATED COUNT: 13 % (ref 10–15)
FLUAV RNA SPEC QL NAA+PROBE: NEGATIVE
FLUBV RNA RESP QL NAA+PROBE: NEGATIVE
GFR SERPL CREATININE-BSD FRML MDRD: >90 ML/MIN/1.73M2
GLUCOSE BLD-MCNC: 103 MG/DL (ref 70–99)
GLUCOSE UR STRIP-MCNC: NEGATIVE MG/DL
HCT VFR BLD AUTO: 39.2 % (ref 35–47)
HGB BLD-MCNC: 12.7 G/DL (ref 11.7–15.7)
HGB UR QL STRIP: ABNORMAL
IMM GRANULOCYTES # BLD: 0.1 10E3/UL
IMM GRANULOCYTES NFR BLD: 0 %
KETONES UR STRIP-MCNC: NEGATIVE MG/DL
LACTATE SERPL-SCNC: 1.4 MMOL/L (ref 0.7–2)
LEUKOCYTE ESTERASE UR QL STRIP: NEGATIVE
LIPASE SERPL-CCNC: 70 U/L (ref 73–393)
LYMPHOCYTES # BLD AUTO: 1 10E3/UL (ref 0.8–5.3)
LYMPHOCYTES NFR BLD AUTO: 6 %
MCH RBC QN AUTO: 30.8 PG (ref 26.5–33)
MCHC RBC AUTO-ENTMCNC: 32.4 G/DL (ref 31.5–36.5)
MCV RBC AUTO: 95 FL (ref 78–100)
MONOCYTES # BLD AUTO: 0.9 10E3/UL (ref 0–1.3)
MONOCYTES NFR BLD AUTO: 5 %
MUCOUS THREADS #/AREA URNS LPF: PRESENT /LPF
NEUTROPHILS # BLD AUTO: 15.3 10E3/UL (ref 1.6–8.3)
NEUTROPHILS NFR BLD AUTO: 89 %
NITRATE UR QL: NEGATIVE
NRBC # BLD AUTO: 0 10E3/UL
NRBC BLD AUTO-RTO: 0 /100
PH UR STRIP: 7.5 [PH] (ref 5–7)
PLATELET # BLD AUTO: 198 10E3/UL (ref 150–450)
POTASSIUM BLD-SCNC: 3.9 MMOL/L (ref 3.4–5.3)
PROT SERPL-MCNC: 7.2 G/DL (ref 6.8–8.8)
RBC # BLD AUTO: 4.13 10E6/UL (ref 3.8–5.2)
RBC URINE: 3 /HPF
SARS-COV-2 RNA RESP QL NAA+PROBE: NEGATIVE
SODIUM SERPL-SCNC: 138 MMOL/L (ref 133–144)
SP GR UR STRIP: 1.01 (ref 1–1.03)
SQUAMOUS EPITHELIAL: 2 /HPF
TROPONIN I SERPL HS-MCNC: 4 NG/L
UROBILINOGEN UR STRIP-MCNC: NORMAL MG/DL
WBC # BLD AUTO: 17.3 10E3/UL (ref 4–11)
WBC URINE: <1 /HPF

## 2022-07-24 PROCEDURE — 250N000011 HC RX IP 250 OP 636: Performed by: FAMILY MEDICINE

## 2022-07-24 PROCEDURE — 74177 CT ABD & PELVIS W/CONTRAST: CPT

## 2022-07-24 PROCEDURE — 80053 COMPREHEN METABOLIC PANEL: CPT | Performed by: FAMILY MEDICINE

## 2022-07-24 PROCEDURE — 83605 ASSAY OF LACTIC ACID: CPT | Performed by: FAMILY MEDICINE

## 2022-07-24 PROCEDURE — 81001 URINALYSIS AUTO W/SCOPE: CPT | Performed by: FAMILY MEDICINE

## 2022-07-24 PROCEDURE — C9803 HOPD COVID-19 SPEC COLLECT: HCPCS | Performed by: FAMILY MEDICINE

## 2022-07-24 PROCEDURE — 87636 SARSCOV2 & INF A&B AMP PRB: CPT | Performed by: FAMILY MEDICINE

## 2022-07-24 PROCEDURE — 84484 ASSAY OF TROPONIN QUANT: CPT | Performed by: FAMILY MEDICINE

## 2022-07-24 PROCEDURE — 99285 EMERGENCY DEPT VISIT HI MDM: CPT | Mod: CS,25 | Performed by: FAMILY MEDICINE

## 2022-07-24 PROCEDURE — 36415 COLL VENOUS BLD VENIPUNCTURE: CPT | Performed by: FAMILY MEDICINE

## 2022-07-24 PROCEDURE — 96375 TX/PRO/DX INJ NEW DRUG ADDON: CPT | Performed by: FAMILY MEDICINE

## 2022-07-24 PROCEDURE — 86140 C-REACTIVE PROTEIN: CPT | Performed by: FAMILY MEDICINE

## 2022-07-24 PROCEDURE — 93005 ELECTROCARDIOGRAM TRACING: CPT | Performed by: FAMILY MEDICINE

## 2022-07-24 PROCEDURE — 99285 EMERGENCY DEPT VISIT HI MDM: CPT | Mod: CS | Performed by: FAMILY MEDICINE

## 2022-07-24 PROCEDURE — 83690 ASSAY OF LIPASE: CPT | Performed by: FAMILY MEDICINE

## 2022-07-24 PROCEDURE — 250N000009 HC RX 250: Performed by: FAMILY MEDICINE

## 2022-07-24 PROCEDURE — 93010 ELECTROCARDIOGRAM REPORT: CPT | Performed by: FAMILY MEDICINE

## 2022-07-24 PROCEDURE — 85025 COMPLETE CBC W/AUTO DIFF WBC: CPT | Performed by: FAMILY MEDICINE

## 2022-07-24 PROCEDURE — 70450 CT HEAD/BRAIN W/O DYE: CPT

## 2022-07-24 PROCEDURE — 96374 THER/PROPH/DIAG INJ IV PUSH: CPT | Mod: 59 | Performed by: FAMILY MEDICINE

## 2022-07-24 RX ORDER — OXYCODONE HYDROCHLORIDE 5 MG/1
5 TABLET ORAL EVERY 6 HOURS PRN
Qty: 12 TABLET | Refills: 0 | Status: SHIPPED | OUTPATIENT
Start: 2022-07-24 | End: 2022-07-27

## 2022-07-24 RX ORDER — ONDANSETRON 2 MG/ML
4 INJECTION INTRAMUSCULAR; INTRAVENOUS
Status: DISCONTINUED | OUTPATIENT
Start: 2022-07-24 | End: 2022-07-24 | Stop reason: HOSPADM

## 2022-07-24 RX ORDER — HYDROMORPHONE HYDROCHLORIDE 1 MG/ML
0.5 INJECTION, SOLUTION INTRAMUSCULAR; INTRAVENOUS; SUBCUTANEOUS ONCE
Status: COMPLETED | OUTPATIENT
Start: 2022-07-24 | End: 2022-07-24

## 2022-07-24 RX ORDER — KETOROLAC TROMETHAMINE 15 MG/ML
10 INJECTION, SOLUTION INTRAMUSCULAR; INTRAVENOUS ONCE
Status: COMPLETED | OUTPATIENT
Start: 2022-07-24 | End: 2022-07-24

## 2022-07-24 RX ORDER — HYDROCODONE BITARTRATE AND ACETAMINOPHEN 5; 325 MG/1; MG/1
1 TABLET ORAL EVERY 6 HOURS PRN
Qty: 18 TABLET | Refills: 0 | Status: SHIPPED | OUTPATIENT
Start: 2022-07-24 | End: 2022-07-27

## 2022-07-24 RX ORDER — IOPAMIDOL 755 MG/ML
82 INJECTION, SOLUTION INTRAVASCULAR ONCE
Status: COMPLETED | OUTPATIENT
Start: 2022-07-24 | End: 2022-07-24

## 2022-07-24 RX ADMIN — KETOROLAC TROMETHAMINE 10 MG: 15 INJECTION, SOLUTION INTRAMUSCULAR; INTRAVENOUS at 12:08

## 2022-07-24 RX ADMIN — IOPAMIDOL 82 ML: 755 INJECTION, SOLUTION INTRAVENOUS at 14:04

## 2022-07-24 RX ADMIN — HYDROMORPHONE HYDROCHLORIDE 0.5 MG: 1 INJECTION, SOLUTION INTRAMUSCULAR; INTRAVENOUS; SUBCUTANEOUS at 14:22

## 2022-07-24 RX ADMIN — SODIUM CHLORIDE 100 ML: 9 INJECTION, SOLUTION INTRAVENOUS at 14:04

## 2022-07-24 ASSESSMENT — ENCOUNTER SYMPTOMS
EYES NEGATIVE: 1
PSYCHIATRIC NEGATIVE: 1
ENDOCRINE NEGATIVE: 1
BACK PAIN: 1
NEUROLOGICAL NEGATIVE: 1
SHORTNESS OF BREATH: 1
APPETITE CHANGE: 1
FREQUENCY: 1
ALLERGIC/IMMUNOLOGIC NEGATIVE: 1
ABDOMINAL PAIN: 1
ACTIVITY CHANGE: 1
FATIGUE: 1
HEMATOLOGIC/LYMPHATIC NEGATIVE: 1

## 2022-07-24 NOTE — ED PROVIDER NOTES
"  History     Chief Complaint   Patient presents with     Chest Pain     Back pain     HPI  Elza Greer is a 47 year old female, past medical history is significant for morbid obesity, ventral hernia, anxiety, tobacco use disorder, asthma, IBS, adjustment disorder with mixed anxiety and depressed mood, polycystic ovarian syndrome, vitamin D deficiency, migraine headaches, lactose intolerance, presents to the emergency department with concerns of right low back pain beginning yesterday and then radiating to the right chest and shoulder area today sharp worse with deep breathing or coughing.  Short of breath.  The patient states that some of this happens when she gets asthma flareups.  She tried her asthma medication albuterol and it did not really help very much.  She presents now with concerns of right-sided chest pain right-sided abdominal pain right-sided back pain shortness of air.  She also notes that she has had tingling and numbness to the left side of her face since her mother passed away, approximately 1.5 months constant, and wonders if we can look at this and evaluated for her here today.  No nausea or vomiting.  No fever chills or sweats.  No URI type symptoms.  She notes urinary frequency and urgency but equivocal dysuria.  No recent change in bowel habits and denies constipation or diarrhea.  The patient states that she has had some ongoing issues since having COVID in May of this year.  Allergies:  Allergies   Allergen Reactions     Azithromycin Anaphylaxis     Doxycycline Other (See Comments)     Upset stomach and felt \"really out of it\"     Latex Hives     Oxycodone Nausea and Vomiting       Problem List:    Patient Active Problem List    Diagnosis Date Noted     Morbid obesity (H) 10/04/2021     Priority: Medium     Ventral hernia without obstruction or gangrene 01/13/2021     Priority: Medium     Added automatically from request for surgery [id number]       Anxiety 02/18/2020     Priority: Medium "     Tobacco use disorder 11/14/2016     Priority: Medium     Mild intermittent asthma without complication 05/12/2010     Priority: Medium        Past Medical History:    Past Medical History:   Diagnosis Date     Adjustment disorder with mixed anxiety and depressed mood      IBS (irritable bowel syndrome)      Incisional hernia, without obstruction or gangrene 2020     Lactose intolerance 05/12/2010     Migraine 05/12/2010     Mild intermittent asthma without complication 05/12/2010     PCOS (polycystic ovarian syndrome)      Tobacco use disorder      Vitamin D deficiency        Past Surgical History:    Past Surgical History:   Procedure Laterality Date     APPENDECTOMY OPEN N/A      CYSTECTOMY OVARIAN BENIGN  2001     HEPATICOJEJUNOSTOMY  2000    RnY jejunostomy from bile duct injury     HERNIORRHAPHY VENTRAL N/A 02/17/2012    open with mesh     HYSTERECTOMY TOTAL ABDOMINAL, BILATERAL SALPINGO-OOPHORECTOMY, COMBINED N/A 2005     LAPAROSCOPIC CHOLECYSTECTOMY  2006    complicated by bile duct injury     LAPAROSCOPIC HERNIORRHAPHY VENTRAL N/A 1/26/2021    Procedure: Open recurrent incarcerated ventral hernia repair X2;  Surgeon: Pradip Mckenzie MD;  Location: [facility] OR       Family History:    Family History   Problem Relation Age of Onset     Coronary Artery Disease Mother      Hypertension Mother      Hyperlipidemia Mother      Depression Mother      Colon Cancer Maternal Grandmother      Breast Cancer Maternal Grandmother      Other Cancer Maternal Grandmother        Social History:  Marital Status:   [2]  Social History     Tobacco Use     Smoking status: Current Every Day Smoker     Packs/day: 0.50     Years: 23.00     Pack years: 11.50     Types: Cigarettes     Smokeless tobacco: Never Used     Tobacco comment: 4 cigs daily    Substance Use Topics     Alcohol use: Yes     Comment: social     Drug use: No        Medications:    albuterol (ACCUNEB) 1.25 MG/3ML neb solution  amoxicillin-clavulanate (AUGMENTIN)  875-125 MG tablet  fluticasone (FLONASE) 50 MCG/ACT nasal spray  IBUPROFEN PO  ipratropium-albuterol (COMBIVENT RESPIMAT)  MCG/ACT inhaler  meloxicam (MOBIC) 7.5 MG tablet  oxyCODONE (ROXICODONE) 5 MG tablet  phentermine (ADIPEX-P) 15 MG capsule  topiramate (TOPAMAX) 25 MG tablet  vitamin D2 (ERGOCALCIFEROL) 45436 units (1250 mcg) capsule          Review of Systems   Constitutional: Positive for activity change, appetite change and fatigue.   HENT: Negative.    Eyes: Negative.    Respiratory: Positive for shortness of breath.    Gastrointestinal: Positive for abdominal pain.   Endocrine: Negative.    Genitourinary: Positive for frequency and urgency.   Musculoskeletal: Positive for back pain.   Skin: Negative.    Allergic/Immunologic: Negative.    Neurological: Negative.    Hematological: Negative.    Psychiatric/Behavioral: Negative.        Physical Exam   BP: (!) 146/83  Pulse: 118  Temp: 99.7  F (37.6  C)  Resp: 18  Weight: 108.9 kg (240 lb)  SpO2: 98 %      Physical Exam  Vitals and nursing note reviewed.   Constitutional:       General: She is not in acute distress.     Appearance: Normal appearance. She is obese. She is not ill-appearing.      Comments: Alert, anxious, does not appear ill or toxic.   HENT:      Head: Normocephalic and atraumatic.      Right Ear: Tympanic membrane normal.      Left Ear: Tympanic membrane normal.      Nose: Nose normal.      Mouth/Throat:      Mouth: Mucous membranes are moist.      Pharynx: Oropharynx is clear.   Eyes:      Extraocular Movements: Extraocular movements intact.      Conjunctiva/sclera: Conjunctivae normal.      Pupils: Pupils are equal, round, and reactive to light.   Cardiovascular:      Rate and Rhythm: Normal rate and regular rhythm.      Pulses: Normal pulses.      Heart sounds: Normal heart sounds.   Pulmonary:      Effort: Pulmonary effort is normal.      Breath sounds: Normal breath sounds.   Abdominal:      General: Bowel sounds are normal.       Palpations: Abdomen is soft.   Musculoskeletal:         General: Normal range of motion.      Cervical back: Normal range of motion and neck supple.   Skin:     General: Skin is warm and dry.      Capillary Refill: Capillary refill takes less than 2 seconds.   Neurological:      General: No focal deficit present.      Mental Status: She is alert and oriented to person, place, and time.   Psychiatric:         Mood and Affect: Mood normal.         Behavior: Behavior normal.         ED Course             EKG Interpretation:      Interpreted by Sukhdev Lopes MD  Time reviewed: Time obtained 1117 time interpreted same.  Sinus tachycardia 104 bpm normal axis, normal intervals, no acute ST-T wave changes.  Impression sinus tachycardia           Procedures              Critical Care time:  none               Results for orders placed or performed during the hospital encounter of 07/24/22 (from the past 24 hour(s))   UA with Microscopic reflex to Culture    Specimen: Urine, Clean Catch   Result Value Ref Range    Color Urine Yellow Colorless, Straw, Light Yellow, Yellow    Appearance Urine Clear Clear    Glucose Urine Negative Negative mg/dL    Bilirubin Urine Negative Negative    Ketones Urine Negative Negative mg/dL    Specific Gravity Urine 1.010 1.003 - 1.035    Blood Urine Moderate (A) Negative    pH Urine 7.5 (H) 5.0 - 7.0    Protein Albumin Urine Negative Negative mg/dL    Urobilinogen Urine Normal Normal, 2.0 mg/dL    Nitrite Urine Negative Negative    Leukocyte Esterase Urine Negative Negative    Mucus Urine Present (A) None Seen /LPF    RBC Urine 3 (H) <=2 /HPF    WBC Urine <1 <=5 /HPF    Squamous Epithelials Urine 2 (H) <=1 /HPF    Narrative    Urine Culture not indicated   Lactic acid whole blood   Result Value Ref Range    Lactic Acid 1.4 0.7 - 2.0 mmol/L   CBC with platelets differential    Narrative    The following orders were created for panel order CBC with platelets differential.  Procedure                                Abnormality         Status                     ---------                               -----------         ------                     CBC with platelets and d...[[id number]]  Abnormal            Final result                 Please view results for these tests on the individual orders.   CBC with platelets and differential   Result Value Ref Range    WBC Count 17.3 (H) 4.0 - 11.0 10e3/uL    RBC Count 4.13 3.80 - 5.20 10e6/uL    Hemoglobin 12.7 11.7 - 15.7 g/dL    Hematocrit 39.2 35.0 - 47.0 %    MCV 95 78 - 100 fL    MCH 30.8 26.5 - 33.0 pg    MCHC 32.4 31.5 - 36.5 g/dL    RDW 13.0 10.0 - 15.0 %    Platelet Count 198 150 - 450 10e3/uL    % Neutrophils 89 %    % Lymphocytes 6 %    % Monocytes 5 %    % Eosinophils 0 %    % Basophils 0 %    % Immature Granulocytes 0 %    NRBCs per 100 WBC 0 <1 /100    Absolute Neutrophils 15.3 (H) 1.6 - 8.3 10e3/uL    Absolute Lymphocytes 1.0 0.8 - 5.3 10e3/uL    Absolute Monocytes 0.9 0.0 - 1.3 10e3/uL    Absolute Eosinophils 0.0 0.0 - 0.7 10e3/uL    Absolute Basophils 0.0 0.0 - 0.2 10e3/uL    Absolute Immature Granulocytes 0.1 <=0.4 10e3/uL    Absolute NRBCs 0.0 10e3/uL   CRP inflammation   Result Value Ref Range    CRP Inflammation 45.7 (H) 0.0 - 8.0 mg/L   Comprehensive metabolic panel   Result Value Ref Range    Sodium 138 133 - 144 mmol/L    Potassium 3.9 3.4 - 5.3 mmol/L    Chloride 106 94 - 109 mmol/L    Carbon Dioxide (CO2) 28 20 - 32 mmol/L    Anion Gap 4 3 - 14 mmol/L    Urea Nitrogen 8 7 - 30 mg/dL    Creatinine 0.67 0.52 - 1.04 mg/dL    Calcium 8.7 8.5 - 10.1 mg/dL    Glucose 103 (H) 70 - 99 mg/dL    Alkaline Phosphatase 136 40 - 150 U/L    AST 25 0 - 45 U/L    ALT 49 0 - 50 U/L    Protein Total 7.2 6.8 - 8.8 g/dL    Albumin 3.3 (L) 3.4 - 5.0 g/dL    Bilirubin Total 0.3 0.2 - 1.3 mg/dL    GFR Estimate >90 >60 mL/min/1.73m2   Lipase   Result Value Ref Range    Lipase 70 (L) 73 - 393 U/L   Troponin I   Result Value Ref Range    Troponin I High Sensitivity  4 <54 ng/L   CT Head w/o Contrast    Narrative    EXAM: CT HEAD W/O CONTRAST  LOCATION: [facility]  DATE/TIME: 7/24/2022 1:55 PM    INDICATION: Left sided paresthesias x1.5 months  COMPARISON: None.  TECHNIQUE: Routine CT Head without IV contrast. Multiplanar reformats. Dose reduction techniques were used.    FINDINGS:  INTRACRANIAL CONTENTS: No intracranial hemorrhage, extraaxial collection, or mass effect.  No CT evidence of acute infarct. Normal parenchymal attenuation. Normal ventricles and sulci.     VISUALIZED ORBITS/SINUSES/MASTOIDS: No intraorbital abnormality. No paranasal sinus mucosal disease. No middle ear or mastoid effusion. Moderate left and mild right TMJ arthropathy.    BONES/SOFT TISSUES: No acute abnormality.      Impression    IMPRESSION:  1.  Normal head CT.   CT Chest (PE) Abdomen Pelvis w Contrast    Narrative    EXAM: CT CHEST PE,  ABDOMEN AND PELVIS WITH CONTRAST  LOCATION: [facility]  DATE/TIME: 07/24/2022, 1:56 PM    INDICATION: Right chest and flank pain. Shortness of breath.  COMPARISON: Chest CT performed 02/03/2022. CT of the abdomen and pelvis performed 08/13/2021.  TECHNIQUE: CT chest pulmonary angiogram and routine CT abdomen pelvis with IV contrast. Arterial phase through the chest and venous phase through the abdomen and pelvis. Multiplanar reformats and MIP reconstructions were performed. Dose reduction   techniques were used.   CONTRAST: 82 mL Isovue 370.    FINDINGS:  ANGIOGRAM CHEST: Pulmonary arteries are normal caliber and negative for pulmonary emboli. Thoracic aorta is negative for dissection.     LUNGS AND PLEURA: No pleural effusions. The lungs are clear. No lung masses or consolidations.    MEDIASTINUM/AXILLAE: No enlarged lymph nodes in the chest. No pericardial effusion.    CORONARY ARTERY CALCIFICATION: None.    HEPATOBILIARY: Cholecystectomy. No hepatic masses.    PANCREAS: Normal.    SPLEEN: Mild splenomegaly  is unchanged. The spleen measures 13.3 cm in length.    ADRENAL GLANDS: Normal.    KIDNEYS/BLADDER: No significant mass, stone, or hydronephrosis.    BOWEL: Moderate-sized ventral hernia in the midabdomen right of midline has increased in size, and contains a loop of nondilated transverse colon. A smaller fat-containing ventral hernia more inferiorly right of midline is not significantly changed. No   bowel obstruction. No evidence for colitis or diverticulitis.    LYMPH NODES: No lymphadenopathy.    VASCULATURE: Unremarkable.    PELVIC ORGANS: Hysterectomy.    MUSCULOSKELETAL: Unremarkable.      Impression    IMPRESSION:  1.  No acute abnormality. No evidence for pulmonary embolism.  2.  Moderate-sized ventral hernia in the right midabdomen has increased in size, and contains a loop of nondilated transverse colon.  3.  Mild splenomegaly is unchanged.     Symptomatic; Unknown Influenza A/B & SARS-CoV2 (COVID-19) Virus PCR Multiplex Nasopharyngeal    Specimen: Nasopharyngeal; Swab   Result Value Ref Range    Influenza A PCR Negative Negative    Influenza B PCR Negative Negative    SARS CoV2 PCR Negative Negative    Narrative    Testing was performed using the irving SARS-CoV-2 & Influenza A/B Assay on the irving Emily System. This test should be ordered for the detection of SARS-CoV-2 and influenza viruses in individuals who meet clinical and/or epidemiological criteria. Test performance is unknown in asymptomatic patients. This test is for in vitro diagnostic use under the FDA EUA for laboratories certified under CLIA to perform moderate and/or high complexity testing. This test has not been FDA cleared or approved. A negative result does not rule out the presence of PCR inhibitors in the specimen or target RNA in concentration below the limit of detection for the assay. If only one viral target is positive but coinfection with multiple targets is suspected, the sample should be re-tested with another FDA cleared,  approved or authorized test, if coinfection would change clinical management. [facility] are certified under the Clinical Laboratory Improvement Amendments of 1988 (CLIA-88) as  qualified to perform moderate and/or high complexity laboratory testing.       Medications   ondansetron (ZOFRAN) injection 4 mg (4 mg Intravenous Not Given 7/24/22 1210)   ketorolac (TORADOL) injection 10 mg (10 mg Intravenous Given 7/24/22 1208)   iopamidol (ISOVUE-370) solution 82 mL (82 mLs Intravenous Given 7/24/22 1404)   sodium chloride 0.9 % bag 500mL for CT scan flush use (100 mLs As instructed Given 7/24/22 1404)   HYDROmorphone (PF) (DILAUDID) injection 0.5 mg (0.5 mg Intravenous Given 7/24/22 1422)   3:59 PM  The patient has been up to void several times during her ER visit consistent with noted frequency and urgency.  Not really much in the way of dysuria.  Reviewed all lab diagnostics and imaging in the room with the patient for the most part reassuring however her WBC is elevated, this could just be stress demargination however she has had temps of 100.4 here.  CT imaging does not reveal any acute process CT PE chest abdomen pelvis.  Her urine is notable only for a few red cells had a couple of squamous epithelial cells.  Behavior of symptoms I would be suspicious of potentially a urinary tract etiology even in the context of her normal lactate and clean appearing urine.  I sent this for culture and plan to treat her today with Augmentin until cultures are resulted.  Oxycodone as needed for pain that does not respond to Tylenol/ibuprofen.  I have instructed to return to the emergency department if worse or changes and to otherwise follow-up in clinic with her primary care provider.    Assessments & Plan (with Medical Decision Making)   Assessments and plan with medical decision making at the time stamp above.      Disclaimer: This note consists of symbols derived from keyboarding, dictation and/or voice  recognition software. As a result, there may be errors in the script that have gone undetected. Please consider this when interpreting information found in this chart.      I have reviewed the nursing notes.    I have reviewed the findings, diagnosis, plan and need for follow up with the patient.       New Prescriptions    AMOXICILLIN-CLAVULANATE (AUGMENTIN) 875-125 MG TABLET    Take 1 tablet by mouth 2 times daily for 10 days    OXYCODONE (ROXICODONE) 5 MG TABLET    Take 1 tablet (5 mg) by mouth every 6 hours as needed for breakthrough pain       Final diagnoses:   Acute right-sided low back pain without sciatica   Chest pain, unspecified type   Dysuria       7/24/2022   [facility] EMERGENCY DEPT     Sukhdev Lopes MD  07/24/22 1600

## 2022-07-24 NOTE — ED NOTES
Had onset of right low back pain yesterday, frequency of urination, today pain is going up right side, into breast and shoulder, has had trouble controlling asthma ever since having covid in May

## 2022-07-24 NOTE — ED NOTES
IV attempt X1, unable to access, heat placed to both AC's, informed will give 5-10 minutes in hopes we will be able to visualize veins better

## 2022-07-24 NOTE — ED NOTES
Went to discharge, states allergic to oxycodone, discussed side effect versus allergy, states still can't have it, knows has had other pain meds in past without problem but unsure what, will request MD to change

## 2022-07-24 NOTE — DISCHARGE INSTRUCTIONS
Push fluids, rest.  Tylenol/ibuprofen as needed for pain.  Oxycodone for breakthrough pain for the next couple of days as needed.  Augmentin 875 p.o. twice daily x10 days.      
verbal cues/1 person assist

## 2022-07-24 NOTE — ED TRIAGE NOTES
Lower back pain since yesterday with pain radiating to right side of chest and shoulder today.  Patient had COVID over Mother's day and has had problems with breathing since.  Patient did a neb this morning with some improvement.     Triage Assessment     Row Name 07/24/22 1034       Triage Assessment (Adult)    Airway WDL WDL       Respiratory WDL    Respiratory WDL X;rhythm/pattern    Rhythm/Pattern, Respiratory shortness of breath       Skin Circulation/Temperature WDL    Skin Circulation/Temperature WDL WDL       Cardiac WDL    Cardiac WDL X;chest pain       Chest Pain Assessment    Chest Pain Location anterior chest, right    Chest Pain Radiation shoulder;back    Character sharp       Peripheral/Neurovascular WDL    Peripheral Neurovascular WDL WDL       Cognitive/Neuro/Behavioral WDL    Cognitive/Neuro/Behavioral WDL WDL

## 2022-07-27 ENCOUNTER — TELEPHONE (OUTPATIENT)
Dept: FAMILY MEDICINE | Facility: CLINIC | Age: 47
End: 2022-07-27

## 2022-07-27 NOTE — TELEPHONE ENCOUNTER
Reason for call:  Patient reporting a symptom    Symptom or request: Patient calling stating that she was seen in ER and has been taking her antibiotics 7/24 she has had a fever since. The fever is going down with Advil this am 7:30am and now the temp is 100.3    Duration (how long have symptoms been present): 7/24/2022 was seen in ER    Have you been treated for this before? Yes    Phone Number patient can be reached at:  Home number on file [phone] (home)    Best Time:  any    Can we leave a detailed message on this number:  YES    Call taken on 7/27/2022 at 1:22 PM by Mila Radford

## 2022-07-27 NOTE — TELEPHONE ENCOUNTER
"I spoke with pt.    She was seen in ED 7/24 for presumed UTI and given 10 day course of Augmentin to treat.    Pt wants to be seen by provider for follow up due to severity of illness.  Pt reports that today is the first day her fever has not been 103.  Today is 101.1.  Also, today is first day that she has had an appetite and the first day she has eaten solid food since started Augmentin.  Pt says that she was \"out of it\" and has slept much of the time since her ED visit.  Her  kept waking her to take her abx and to drink water.     Encouraged to complete her 10 day course of abx and to drink plenty of fluids.      Pt asks when should she be seen by PCP?  She is due to complete abx 8/2/22.      Reminded pt to be seen promptly if fails to continue to improve or worsens.    Carla Contreras RN      "

## 2022-07-27 NOTE — TELEPHONE ENCOUNTER
I am out of office next week, I can see patient on Friday if same day appointment still open, r follow up with any of my colleagues next week.    NASH Huston CNP

## 2022-08-03 ENCOUNTER — ANCILLARY PROCEDURE (OUTPATIENT)
Dept: MAMMOGRAPHY | Facility: CLINIC | Age: 47
End: 2022-08-03
Attending: NURSE PRACTITIONER
Payer: COMMERCIAL

## 2022-08-03 DIAGNOSIS — Z12.31 VISIT FOR SCREENING MAMMOGRAM: ICD-10-CM

## 2022-08-03 PROCEDURE — 77067 SCR MAMMO BI INCL CAD: CPT | Mod: TC | Performed by: RADIOLOGY

## 2022-08-03 PROCEDURE — 77063 BREAST TOMOSYNTHESIS BI: CPT | Mod: TC | Performed by: RADIOLOGY

## 2022-08-08 ENCOUNTER — OFFICE VISIT (OUTPATIENT)
Dept: FAMILY MEDICINE | Facility: CLINIC | Age: 47
End: 2022-08-08
Payer: COMMERCIAL

## 2022-08-08 VITALS
WEIGHT: 246.6 LBS | HEART RATE: 95 BPM | BODY MASS INDEX: 43.68 KG/M2 | RESPIRATION RATE: 20 BRPM | TEMPERATURE: 98.8 F | OXYGEN SATURATION: 98 % | SYSTOLIC BLOOD PRESSURE: 128 MMHG | DIASTOLIC BLOOD PRESSURE: 78 MMHG

## 2022-08-08 DIAGNOSIS — R79.82 CRP ELEVATED: ICD-10-CM

## 2022-08-08 DIAGNOSIS — R35.0 URINARY FREQUENCY: Primary | ICD-10-CM

## 2022-08-08 DIAGNOSIS — D72.829 LEUKOCYTOSIS, UNSPECIFIED TYPE: ICD-10-CM

## 2022-08-08 LAB
ALBUMIN UR-MCNC: NEGATIVE MG/DL
APPEARANCE UR: CLEAR
BACTERIA #/AREA URNS HPF: ABNORMAL /HPF
BASOPHILS # BLD AUTO: 0 10E3/UL (ref 0–0.2)
BASOPHILS NFR BLD AUTO: 0 %
BILIRUB UR QL STRIP: NEGATIVE
COLOR UR AUTO: YELLOW
CRP SERPL-MCNC: 16.7 MG/L (ref 0–8)
EOSINOPHIL # BLD AUTO: 0.1 10E3/UL (ref 0–0.7)
EOSINOPHIL NFR BLD AUTO: 1 %
ERYTHROCYTE [DISTWIDTH] IN BLOOD BY AUTOMATED COUNT: 13.2 % (ref 10–15)
GLUCOSE UR STRIP-MCNC: NEGATIVE MG/DL
HCT VFR BLD AUTO: 38.6 % (ref 35–47)
HGB BLD-MCNC: 12.4 G/DL (ref 11.7–15.7)
HGB UR QL STRIP: ABNORMAL
KETONES UR STRIP-MCNC: NEGATIVE MG/DL
LEUKOCYTE ESTERASE UR QL STRIP: NEGATIVE
LYMPHOCYTES # BLD AUTO: 2.6 10E3/UL (ref 0.8–5.3)
LYMPHOCYTES NFR BLD AUTO: 19 %
MCH RBC QN AUTO: 31.2 PG (ref 26.5–33)
MCHC RBC AUTO-ENTMCNC: 32.1 G/DL (ref 31.5–36.5)
MCV RBC AUTO: 97 FL (ref 78–100)
MONOCYTES # BLD AUTO: 0.7 10E3/UL (ref 0–1.3)
MONOCYTES NFR BLD AUTO: 5 %
MUCOUS THREADS #/AREA URNS LPF: PRESENT /LPF
NEUTROPHILS # BLD AUTO: 10.2 10E3/UL (ref 1.6–8.3)
NEUTROPHILS NFR BLD AUTO: 75 %
NITRATE UR QL: NEGATIVE
PH UR STRIP: 5.5 [PH] (ref 5–7)
PLATELET # BLD AUTO: 246 10E3/UL (ref 150–450)
RBC # BLD AUTO: 3.97 10E6/UL (ref 3.8–5.2)
RBC #/AREA URNS AUTO: ABNORMAL /HPF
SP GR UR STRIP: 1.02 (ref 1–1.03)
SQUAMOUS #/AREA URNS AUTO: ABNORMAL /LPF
UROBILINOGEN UR STRIP-ACNC: 0.2 E.U./DL
WBC # BLD AUTO: 13.6 10E3/UL (ref 4–11)
WBC #/AREA URNS AUTO: ABNORMAL /HPF

## 2022-08-08 PROCEDURE — 36415 COLL VENOUS BLD VENIPUNCTURE: CPT | Performed by: FAMILY MEDICINE

## 2022-08-08 PROCEDURE — 87086 URINE CULTURE/COLONY COUNT: CPT | Performed by: FAMILY MEDICINE

## 2022-08-08 PROCEDURE — 81001 URINALYSIS AUTO W/SCOPE: CPT | Performed by: FAMILY MEDICINE

## 2022-08-08 PROCEDURE — 99214 OFFICE O/P EST MOD 30 MIN: CPT | Performed by: FAMILY MEDICINE

## 2022-08-08 PROCEDURE — 85025 COMPLETE CBC W/AUTO DIFF WBC: CPT | Performed by: FAMILY MEDICINE

## 2022-08-08 PROCEDURE — 86140 C-REACTIVE PROTEIN: CPT | Performed by: FAMILY MEDICINE

## 2022-08-08 RX ORDER — CIPROFLOXACIN 500 MG/1
500 TABLET, FILM COATED ORAL 2 TIMES DAILY
Qty: 14 TABLET | Refills: 0 | Status: SHIPPED | OUTPATIENT
Start: 2022-08-08 | End: 2022-08-15

## 2022-08-08 ASSESSMENT — PAIN SCALES - GENERAL: PAINLEVEL: MILD PAIN (2)

## 2022-08-08 NOTE — PROGRESS NOTES
"  Assessment & Plan     Urinary frequency  Patient is still having urinary frequency and dysuria.  Will treat with a different course of an antibiotic.  In addition set up a urine culture to make sure.  She is in a monogamous relationship and has no vaginal discharge. Reviewed ED visit and will also check CBC and CRP again.    - UA macro with reflex to Microscopic and Culture - Clinc Collect  - Urine Culture Aerobic Bacterial - lab collect; Future  - Urine Microscopic  - ciprofloxacin (CIPRO) 500 MG tablet; Take 1 tablet (500 mg) by mouth 2 times daily for 7 days    CRP elevated    - CRP inflammation; Future    Leukocytosis, unspecified type    - CBC with Platelets & Differential; Future           BMI:   Estimated body mass index is 43.68 kg/m  as calculated from the following:    Height as of 4/21/22: 1.6 m (5' 3\").    Weight as of this encounter: 111.9 kg (246 lb 9.6 oz).       See Patient Instructions    Return in about 1 week (around 8/15/2022) for If not better.    Rosalino Way MD  [facility]MINN[facility] Bertrand is a 47 year old, presenting for the following health issues:  ER F/U ([facility] 7/24/22 chest pain, right sided low back pain, and dysuria.)      HPI     ED/UC Followup:    Facility:  [facility]  Date of visit: 7/24/22  Reason for visit: Right sided low back pain, chest pain, dysuria  Current Status: Patient still having lower right sided back pain, pain is still up into her right shoulder and right ear. Patient states she does not think the Amoxicillin got all of the infection. Does still have urinary urgency and frequency.    She has right ear pain.    She has now normal bowel movements.  She was having some issues recently.  Has known ventral hernia and loop of transverse colon.  Low grade fever.  Has h/o cholecystectomy, appy, and total hysterectomy along with ovaries removed.      Review of Systems         Objective    /78   Pulse 95   Temp " 98.8  F (37.1  C) (Tympanic)   Resp 20   Wt 111.9 kg (246 lb 9.6 oz)   LMP 08/26/2005   SpO2 98%   BMI 43.68 kg/m    Body mass index is 43.68 kg/m .  Physical Exam   GENERAL APPEARANCE: alert, no distress, cooperative and Nourishment morbidly obese   HENT: ear canals and TM's normal  ABDOMEN: soft, nontender, without hepatosplenomegaly or masses and bowel sounds normal  MS: extremities normal- no gross deformities noted  SKIN: no suspicious lesions or rashes  NEURO: Normal strength and tone, mentation intact and speech normal  PSYCH: mentation appears normal and affect normal/bright    Results for orders placed or performed in visit on 08/08/22   UA macro with reflex to Microscopic and Culture - Clinc Collect     Status: Abnormal    Specimen: Urine, Clean Catch   Result Value Ref Range    Color Urine Yellow Colorless, Straw, Light Yellow, Yellow    Appearance Urine Clear Clear    Glucose Urine Negative Negative mg/dL    Bilirubin Urine Negative Negative    Ketones Urine Negative Negative mg/dL    Specific Gravity Urine 1.020 1.003 - 1.035    Blood Urine Small (A) Negative    pH Urine 5.5 5.0 - 7.0    Protein Albumin Urine Negative Negative mg/dL    Urobilinogen Urine 0.2 0.2, 1.0 E.U./dL    Nitrite Urine Negative Negative    Leukocyte Esterase Urine Negative Negative   Urine Microscopic     Status: Abnormal   Result Value Ref Range    Bacteria Urine Few (A) None Seen /HPF    RBC Urine 2-5 (A) 0-2 /HPF /HPF    WBC Urine None Seen 0-5 /HPF /HPF    Squamous Epithelials Urine Few (A) None Seen /LPF    Mucus Urine Present (A) None Seen /LPF    Narrative    Urine Culture not indicated         Reviewed ED labs and images in from July.            .  ..

## 2022-08-08 NOTE — PATIENT INSTRUCTIONS
I am going to treat for a bladder infection, using a different antibiotic call ciprofloxacin.    Drink fluids.    I did set up a urine culture to make sure.    Follow up if not better.          Thank you for choosing [facility].  You may be receiving an email and/or telephone survey request from [facility] Customer Experience regarding your visit today.  Please take a few minutes to respond to the survey to let us know how we are doing.      If you have questions or concerns, please contact us via [employer] or you can contact your care team at [phone] option [phone].    Our Clinic hours are:  Monday - Thursday 7am-6pm  Friday 7am-5pm    The [place] outpatient lab hours are:  Monday - Friday 7am-4:30pm    Appointments are required, call [phone]    If you have clinical questions after hours or would like to schedule an appointment,  call the clinic at [phone].

## 2022-08-10 LAB — BACTERIA UR CULT: NORMAL

## 2022-08-31 ENCOUNTER — TELEPHONE (OUTPATIENT)
Dept: FAMILY MEDICINE | Facility: CLINIC | Age: 47
End: 2022-08-31

## 2022-08-31 NOTE — TELEPHONE ENCOUNTER
Pt feels that she still has UTI symptoms.  She has frequency of urination and she says the stream is slow and small amount of urine passing.  Patient has finished her antibiotics.  Appt was made for tomorrow.

## 2022-09-01 ENCOUNTER — OFFICE VISIT (OUTPATIENT)
Dept: FAMILY MEDICINE | Facility: CLINIC | Age: 47
End: 2022-09-01
Payer: COMMERCIAL

## 2022-09-01 VITALS
HEART RATE: 94 BPM | SYSTOLIC BLOOD PRESSURE: 134 MMHG | OXYGEN SATURATION: 98 % | BODY MASS INDEX: 43.75 KG/M2 | TEMPERATURE: 99.1 F | WEIGHT: 247 LBS | DIASTOLIC BLOOD PRESSURE: 90 MMHG | RESPIRATION RATE: 18 BRPM

## 2022-09-01 DIAGNOSIS — J45.20 MILD INTERMITTENT ASTHMA WITHOUT COMPLICATION: ICD-10-CM

## 2022-09-01 DIAGNOSIS — T14.8XXA BRUISING: ICD-10-CM

## 2022-09-01 DIAGNOSIS — R09.81 NASAL CONGESTION: ICD-10-CM

## 2022-09-01 DIAGNOSIS — E55.9 VITAMIN D DEFICIENCY: ICD-10-CM

## 2022-09-01 DIAGNOSIS — G43.909 MIGRAINE WITHOUT STATUS MIGRAINOSUS, NOT INTRACTABLE, UNSPECIFIED MIGRAINE TYPE: ICD-10-CM

## 2022-09-01 DIAGNOSIS — R31.9 HEMATURIA, UNSPECIFIED TYPE: Primary | ICD-10-CM

## 2022-09-01 LAB
ALBUMIN SERPL BCG-MCNC: 3.8 G/DL (ref 3.5–5.2)
ALBUMIN UR-MCNC: NEGATIVE MG/DL
ALP SERPL-CCNC: 130 U/L (ref 35–104)
ALT SERPL W P-5'-P-CCNC: 17 U/L (ref 10–35)
ANION GAP SERPL CALCULATED.3IONS-SCNC: 9 MMOL/L (ref 7–15)
APPEARANCE UR: CLEAR
APTT PPP: 28 SECONDS (ref 22–38)
AST SERPL W P-5'-P-CCNC: 15 U/L (ref 10–35)
BACTERIA #/AREA URNS HPF: ABNORMAL /HPF
BILIRUB DIRECT SERPL-MCNC: <0.2 MG/DL (ref 0–0.3)
BILIRUB SERPL-MCNC: 0.2 MG/DL
BILIRUB UR QL STRIP: NEGATIVE
BUN SERPL-MCNC: 12 MG/DL (ref 6–20)
CALCIUM SERPL-MCNC: 9 MG/DL (ref 8.6–10)
CHLORIDE SERPL-SCNC: 104 MMOL/L (ref 98–107)
CLUE CELLS: NORMAL
COLOR UR AUTO: YELLOW
CREAT SERPL-MCNC: 0.68 MG/DL (ref 0.51–0.95)
DEPRECATED HCO3 PLAS-SCNC: 27 MMOL/L (ref 22–29)
ERYTHROCYTE [DISTWIDTH] IN BLOOD BY AUTOMATED COUNT: 13.7 % (ref 10–15)
GFR SERPL CREATININE-BSD FRML MDRD: >90 ML/MIN/1.73M2
GLUCOSE SERPL-MCNC: 111 MG/DL (ref 70–99)
GLUCOSE UR STRIP-MCNC: NEGATIVE MG/DL
HCT VFR BLD AUTO: 35.8 % (ref 35–47)
HGB BLD-MCNC: 11.4 G/DL (ref 11.7–15.7)
HGB UR QL STRIP: ABNORMAL
INR PPP: 1.06 (ref 0.85–1.15)
KETONES UR STRIP-MCNC: NEGATIVE MG/DL
LEUKOCYTE ESTERASE UR QL STRIP: NEGATIVE
MCH RBC QN AUTO: 31.2 PG (ref 26.5–33)
MCHC RBC AUTO-ENTMCNC: 31.8 G/DL (ref 31.5–36.5)
MCV RBC AUTO: 98 FL (ref 78–100)
NITRATE UR QL: NEGATIVE
PH UR STRIP: 5.5 [PH] (ref 5–7)
PLATELET # BLD AUTO: 198 10E3/UL (ref 150–450)
POTASSIUM SERPL-SCNC: 3.8 MMOL/L (ref 3.4–5.3)
PROT SERPL-MCNC: 7.1 G/DL (ref 6.4–8.3)
RBC # BLD AUTO: 3.65 10E6/UL (ref 3.8–5.2)
RBC #/AREA URNS AUTO: ABNORMAL /HPF
SODIUM SERPL-SCNC: 140 MMOL/L (ref 136–145)
SP GR UR STRIP: <=1.005 (ref 1–1.03)
SQUAMOUS #/AREA URNS AUTO: ABNORMAL /LPF
TRICHOMONAS, WET PREP: NORMAL
UROBILINOGEN UR STRIP-ACNC: 0.2 E.U./DL
WBC # BLD AUTO: 12.7 10E3/UL (ref 4–11)
WBC #/AREA URNS AUTO: ABNORMAL /HPF
WBC'S/HIGH POWER FIELD, WET PREP: NORMAL
YEAST, WET PREP: NORMAL

## 2022-09-01 PROCEDURE — 80053 COMPREHEN METABOLIC PANEL: CPT | Performed by: NURSE PRACTITIONER

## 2022-09-01 PROCEDURE — 85610 PROTHROMBIN TIME: CPT | Performed by: NURSE PRACTITIONER

## 2022-09-01 PROCEDURE — 85730 THROMBOPLASTIN TIME PARTIAL: CPT | Performed by: NURSE PRACTITIONER

## 2022-09-01 PROCEDURE — 36415 COLL VENOUS BLD VENIPUNCTURE: CPT | Performed by: NURSE PRACTITIONER

## 2022-09-01 PROCEDURE — 81001 URINALYSIS AUTO W/SCOPE: CPT | Performed by: NURSE PRACTITIONER

## 2022-09-01 PROCEDURE — 99214 OFFICE O/P EST MOD 30 MIN: CPT | Performed by: NURSE PRACTITIONER

## 2022-09-01 PROCEDURE — 82306 VITAMIN D 25 HYDROXY: CPT | Performed by: NURSE PRACTITIONER

## 2022-09-01 PROCEDURE — 87086 URINE CULTURE/COLONY COUNT: CPT | Performed by: NURSE PRACTITIONER

## 2022-09-01 PROCEDURE — 85027 COMPLETE CBC AUTOMATED: CPT | Performed by: NURSE PRACTITIONER

## 2022-09-01 PROCEDURE — 82248 BILIRUBIN DIRECT: CPT | Performed by: NURSE PRACTITIONER

## 2022-09-01 PROCEDURE — 87210 SMEAR WET MOUNT SALINE/INK: CPT | Performed by: NURSE PRACTITIONER

## 2022-09-01 RX ORDER — ERGOCALCIFEROL 1.25 MG/1
50000 CAPSULE, LIQUID FILLED ORAL WEEKLY
Qty: 8 CAPSULE | Refills: 0 | Status: SHIPPED | OUTPATIENT
Start: 2022-09-01 | End: 2022-10-31

## 2022-09-01 RX ORDER — RIZATRIPTAN BENZOATE 10 MG/1
10 TABLET ORAL
Qty: 30 TABLET | Refills: 1 | Status: SHIPPED | OUTPATIENT
Start: 2022-09-01 | End: 2024-07-11

## 2022-09-01 RX ORDER — ALBUTEROL SULFATE 1.25 MG/3ML
1.25 SOLUTION RESPIRATORY (INHALATION) EVERY 6 HOURS PRN
Qty: 90 ML | Refills: 0 | Status: ON HOLD | OUTPATIENT
Start: 2022-09-01 | End: 2024-07-12

## 2022-09-01 RX ORDER — ALBUTEROL SULFATE 90 UG/1
2 AEROSOL, METERED RESPIRATORY (INHALATION) EVERY 6 HOURS
Qty: 18 G | Refills: 3 | Status: ON HOLD | OUTPATIENT
Start: 2022-09-01 | End: 2023-10-23

## 2022-09-01 RX ORDER — FLUTICASONE PROPIONATE 50 MCG
2 SPRAY, SUSPENSION (ML) NASAL DAILY
Qty: 16 G | Refills: 0 | Status: SHIPPED | OUTPATIENT
Start: 2022-09-01 | End: 2023-07-26

## 2022-09-01 ASSESSMENT — ASTHMA QUESTIONNAIRES
QUESTION_1 LAST FOUR WEEKS HOW MUCH OF THE TIME DID YOUR ASTHMA KEEP YOU FROM GETTING AS MUCH DONE AT WORK, SCHOOL OR AT HOME: NONE OF THE TIME
QUESTION_5 LAST FOUR WEEKS HOW WOULD YOU RATE YOUR ASTHMA CONTROL: SOMEWHAT CONTROLLED
QUESTION_4 LAST FOUR WEEKS HOW OFTEN HAVE YOU USED YOUR RESCUE INHALER OR NEBULIZER MEDICATION (SUCH AS ALBUTEROL): TWO OR THREE TIMES PER WEEK
QUESTION_3 LAST FOUR WEEKS HOW OFTEN DID YOUR ASTHMA SYMPTOMS (WHEEZING, COUGHING, SHORTNESS OF BREATH, CHEST TIGHTNESS OR PAIN) WAKE YOU UP AT NIGHT OR EARLIER THAN USUAL IN THE MORNING: ONCE OR TWICE
ACT_TOTALSCORE: 19
QUESTION_2 LAST FOUR WEEKS HOW OFTEN HAVE YOU HAD SHORTNESS OF BREATH: ONCE OR TWICE A WEEK
ACT_TOTALSCORE: 19

## 2022-09-01 NOTE — PATIENT INSTRUCTIONS
Schedule CT at [phone]  Labs today, I'll let you know what they show.  Restart topiramate for headaches. Start at 25mg for a week, then increase by 25mg per week up to 100mg.  Can use Maxalt when you feel a headache coming on.

## 2022-09-01 NOTE — PROGRESS NOTES
Assessment & Plan     Hematuria, unspecified type  Hematuria noted on UA, urine culture pending. Wet prep negative. Patient has accompanied lower back pain, not CVA tenderness, CT of abdomen ordered to rule out nephrolithiasis. Will consider referral to urology needed.    - UA macro with reflex to Microscopic and Culture - Clinc Collect  - Wet prep - Clinic Collect  - Urine Microscopic  - Urine Culture Aerobic Bacterial - lab collect; Future  - CT Abdomen Pelvis w/o Contrast; Future    Migraine without status migrainosus, not intractable, unspecified migraine type  Patient has a history of migraine headaches. neurological exam intact and no abnormalities present. Started patient on preventative medication of Topamax 25 mg daily for 7 days, 50 mg for 7 days, 75 mg for 7 days and then final dose for 100 mg daily. Abortive medication Maxalt prescribed and patient instructed to take medication at start of migraine.   - rizatriptan (MAXALT) 10 MG tablet; Take 1 tablet (10 mg) by mouth at onset of headache for migraine May repeat in 2 hours. Max 3 tablets/24 hours.    Bruising  Thre ecchymotic areas noted on abdomen, patient denies any recent trauma to abdominal area. No pain associated. CBC with platelets, BMP, coags, LFTs will be drawn to assess any issues related to bruising present.   - CBC with platelets; Future  - Basic metabolic panel  (Ca, Cl, CO2, Creat, Gluc, K, Na, BUN); Future  - Hepatic panel (Albumin, ALT, AST, Bili, Alk Phos, TP); Future  - INR; Future  - Partial thromboplastin time; Future  - CBC with platelets  - Basic metabolic panel  (Ca, Cl, CO2, Creat, Gluc, K, Na, BUN)  - Hepatic panel (Albumin, ALT, AST, Bili, Alk Phos, TP)  - INR  - Partial thromboplastin time    Vitamin D deficiency  Patient has a history of Vitamin D difiency and has been off of vitamin D2 (ERGOCALCIFEROL) 97488 units for some time. Drawn to check where Vitamin D level is currently.   - Vitamin D Deficiency; Future  - vitamin  "D2 (ERGOCALCIFEROL) 66946 units (1250 mcg) capsule; Take 1 capsule (50,000 Units) by mouth once a week  - Vitamin D Deficiency    Mild intermittent asthma without complication  Patient has a history of intermittent asthma and needs refill provided.   - albuterol (ACCUNEB) 1.25 MG/3ML neb solution; Take 1 vial (1.25 mg) by nebulization every 6 hours as needed for shortness of breath / dyspnea or wheezing  - albuterol (PROAIR HFA/PROVENTIL HFA/VENTOLIN HFA) 108 (90 Base) MCG/ACT inhaler; Inhale 2 puffs into the lungs every 6 hours    Nasal congestion  Patient has history of nasal congestion and uses Flonase and is need of a refill, refill provided.   - fluticasone (FLONASE) 50 MCG/ACT nasal spray; Spray 2 sprays into both nostrils daily        {Provider  Link to [facility] Help Grid :222756}     BMI:   Estimated body mass index is 43.75 kg/m  as calculated from the following:    Height as of 4/21/22: 1.6 m (5' 3\").    Weight as of this encounter: 112 kg (247 lb).       Patient Instructions   Schedule CT at [phone]  Labs today, I'll let you know what they show.  Restart topiramate for headaches. Start at 25mg for a week, then increase by 25mg per week up to 100mg.  Can use Maxalt when you feel a headache coming on.        No follow-ups on file.    PORTILLO Hansen-S  [facility]    Mao Bertrand is a 47 year old, presenting for the following health issues:  UTI and Headache      History of Present Illness       Headaches:   Since the patient's last clinic visit, headaches are: worsened  The patient is getting headaches:  Couple times per week  She is not able to do normal daily activities when she has a migraine.  The patient is taking the following rescue/relief medications:  Ibuprofen (Advil, Motrin)   Patient states \"I get some relief\" from the rescue/relief medications.   The patient is taking the following medications to prevent migraines:  No medications to prevent migraines  In the past " 4 weeks, the patient has gone to an Urgent Care or Emergency Room 0 times times due to headaches.    Reason for visit:  Follow up on not feeling well and UTI symptoms    She eats 2-3 servings of fruits and vegetables daily.She consumes 5 sweetened beverage(s) daily.She exercises with enough effort to increase her heart rate 10 to 19 minutes per day.  She exercises with enough effort to increase her heart rate 3 or less days per week. She is missing 1 dose(s) of medications per week.  She is not taking prescribed medications regularly due to remembering to take.     Reviewed HPI above.Patient reports finishing second course of antibiotics on last Saturday and felt better for a couple days. By mid week last week patient stated she started to experience urgency and foul smelling urine. Patient also report she gets daily headaches but recently in past few weeks has noticed at lest two times a week is expiiencing migraine headaches with aura and accompanied nausea and vomiting. Patient has a history of migraines but is not on any medication currently to treat.     Review of Systems   Constitutional, HEENT, cardiovascular, pulmonary, gi and gu systems are negative, except as otherwise noted.      Objective    BP (!) 134/90 (BP Location: Left arm, Patient Position: Sitting, Cuff Size: Adult Large)   Pulse 94   Temp 99.1  F (37.3  C) (Tympanic)   Resp 18   Wt 112 kg (247 lb)   LMP 08/26/2005   SpO2 98%   BMI 43.75 kg/m    Body mass index is 43.75 kg/m .   Exam:  Constitutional: healthy, alert and no distress  Head: Normocephalic.   Neck: Neck supple.   ENT: ENT exam normal, PERRLA  Cardiovascular: negative,  RRR. No murmurs, clicks gallops or rub  Respiratory: negative,Lungs clear  Gastrointestinal: Abdomen soft, non-tender. BS normal. No masses, organomegaly, no CVA tenderness noted upon assessment.  : Deferred  Musculoskeletal: extremities normal- no gross deformities noted, gait normal and normal muscle  tone  Skin: no suspicious lesions or rashes and ecchymoses - abdomen  Neurologic: Gait normal. Reflexes normal and symmetric. Sensation grossly WNL.  Psychiatric: mentation appears normal and affect normal/bright

## 2022-09-02 LAB — DEPRECATED CALCIDIOL+CALCIFEROL SERPL-MC: 19 UG/L (ref 20–75)

## 2022-09-03 LAB — BACTERIA UR CULT: NORMAL

## 2022-09-09 ENCOUNTER — HOSPITAL ENCOUNTER (OUTPATIENT)
Dept: CT IMAGING | Facility: CLINIC | Age: 47
Discharge: HOME | End: 2022-09-09
Attending: NURSE PRACTITIONER | Admitting: NURSE PRACTITIONER
Payer: COMMERCIAL

## 2022-09-09 DIAGNOSIS — R31.9 HEMATURIA, UNSPECIFIED TYPE: ICD-10-CM

## 2022-09-09 PROCEDURE — 74176 CT ABD & PELVIS W/O CONTRAST: CPT

## 2022-09-27 ENCOUNTER — HOSPITAL ENCOUNTER (EMERGENCY)
Facility: CLINIC | Age: 47
Discharge: HOME | End: 2022-09-27
Attending: NURSE PRACTITIONER | Admitting: NURSE PRACTITIONER
Payer: COMMERCIAL

## 2022-09-27 ENCOUNTER — APPOINTMENT (OUTPATIENT)
Dept: GENERAL RADIOLOGY | Facility: CLINIC | Age: 47
End: 2022-09-27
Attending: NURSE PRACTITIONER
Payer: COMMERCIAL

## 2022-09-27 VITALS
HEIGHT: 64 IN | SYSTOLIC BLOOD PRESSURE: 150 MMHG | RESPIRATION RATE: 22 BRPM | WEIGHT: 240 LBS | TEMPERATURE: 99.7 F | HEART RATE: 110 BPM | BODY MASS INDEX: 40.97 KG/M2 | DIASTOLIC BLOOD PRESSURE: 88 MMHG | OXYGEN SATURATION: 99 %

## 2022-09-27 DIAGNOSIS — J06.9 VIRAL URI WITH COUGH: ICD-10-CM

## 2022-09-27 LAB
FLUAV RNA SPEC QL NAA+PROBE: NEGATIVE
FLUBV RNA RESP QL NAA+PROBE: NEGATIVE
SARS-COV-2 RNA RESP QL NAA+PROBE: NEGATIVE

## 2022-09-27 PROCEDURE — 87636 SARSCOV2 & INF A&B AMP PRB: CPT | Performed by: NURSE PRACTITIONER

## 2022-09-27 PROCEDURE — G0463 HOSPITAL OUTPT CLINIC VISIT: HCPCS | Mod: CS,25 | Performed by: NURSE PRACTITIONER

## 2022-09-27 PROCEDURE — 71046 X-RAY EXAM CHEST 2 VIEWS: CPT

## 2022-09-27 PROCEDURE — C9803 HOPD COVID-19 SPEC COLLECT: HCPCS | Performed by: NURSE PRACTITIONER

## 2022-09-27 PROCEDURE — 99214 OFFICE O/P EST MOD 30 MIN: CPT | Mod: CS | Performed by: NURSE PRACTITIONER

## 2022-09-27 RX ORDER — LIDOCAINE 4 G/G
1-3 PATCH TOPICAL EVERY 24 HOURS
Qty: 30 PATCH | Refills: 0 | Status: SHIPPED | OUTPATIENT
Start: 2022-09-27 | End: 2023-05-25

## 2022-09-27 RX ORDER — PREDNISONE 20 MG/1
TABLET ORAL
Qty: 10 TABLET | Refills: 0 | Status: SHIPPED | OUTPATIENT
Start: 2022-09-27 | End: 2022-10-31

## 2022-09-27 RX ORDER — GUAIFENESIN 600 MG/1
600-1200 TABLET, EXTENDED RELEASE ORAL 2 TIMES DAILY
Qty: 40 TABLET | Refills: 0 | Status: SHIPPED | OUTPATIENT
Start: 2022-09-27 | End: 2022-10-07

## 2022-09-27 RX ORDER — BENZONATATE 200 MG/1
200 CAPSULE ORAL 3 TIMES DAILY PRN
Qty: 30 CAPSULE | Refills: 0 | Status: SHIPPED | OUTPATIENT
Start: 2022-09-27 | End: 2022-09-27

## 2022-09-27 ASSESSMENT — ACTIVITIES OF DAILY LIVING (ADL): ADLS_ACUITY_SCORE: 35

## 2022-09-27 NOTE — Clinical Note
Elza Greer was seen and treated in our emergency department on 9/27/2022.  She may return to work on 09/30/2022.       If you have any questions or concerns, please don't hesitate to call.      Rayne Flores APRN CNP

## 2022-09-27 NOTE — ED PROVIDER NOTES
"  History     Chief Complaint   Patient presents with     Cough     HPI  Elza Greer is a 47 year old female who presents to urgent care with concerns of cough, nasal congestion, fever 101.4, right upper shoulder pain and right side pain with reported onset of symptoms Saturday.  Pt states previous hx of pain similar with pneumonia.  PT states is taking dayquil and advil without improvement.  Pt reports using albuterol inhaler and reports it is not helping.  PT reports  and daughter ill with some respiratory infection.   seen yesterday and given prednisone and advised of viral illness.    Allergies:  Allergies   Allergen Reactions     Azithromycin Anaphylaxis     Doxycycline Other (See Comments)     Upset stomach and felt \"really out of it\"     Latex Hives     Oxycodone Nausea and Vomiting       Problem List:    Patient Active Problem List    Diagnosis Date Noted     Morbid obesity (H) 10/04/2021     Priority: Medium     Ventral hernia without obstruction or gangrene 01/13/2021     Priority: Medium     Added automatically from request for surgery [id number]       Anxiety 02/18/2020     Priority: Medium     Tobacco use disorder 11/14/2016     Priority: Medium     Mild intermittent asthma without complication 05/12/2010     Priority: Medium        Past Medical History:    Past Medical History:   Diagnosis Date     Adjustment disorder with mixed anxiety and depressed mood      IBS (irritable bowel syndrome)      Incisional hernia, without obstruction or gangrene 2020     Lactose intolerance 05/12/2010     Migraine 05/12/2010     Mild intermittent asthma without complication 05/12/2010     PCOS (polycystic ovarian syndrome)      Tobacco use disorder      Vitamin D deficiency        Past Surgical History:    Past Surgical History:   Procedure Laterality Date     APPENDECTOMY OPEN N/A      CYSTECTOMY OVARIAN BENIGN  2001     HEPATICOJEJUNOSTOMY  2000    RnY jejunostomy from bile duct injury     HERNIORRHAPHY " "VENTRAL N/A 02/17/2012    open with mesh     HYSTERECTOMY TOTAL ABDOMINAL, BILATERAL SALPINGO-OOPHORECTOMY, COMBINED N/A 2005     LAPAROSCOPIC CHOLECYSTECTOMY  2006    complicated by bile duct injury     LAPAROSCOPIC HERNIORRHAPHY VENTRAL N/A 1/26/2021    Procedure: Open recurrent incarcerated ventral hernia repair X2;  Surgeon: Pradip Mckenzie MD;  Location: [facility] OR       Family History:    Family History   Problem Relation Age of Onset     Dementia Mother      Coronary Artery Disease Mother      Hypertension Mother      Hyperlipidemia Mother      Depression Mother      Colon Cancer Maternal Grandmother      Breast Cancer Maternal Grandmother      Other Cancer Maternal Grandmother        Social History:  Marital Status:   [2]  Social History     Tobacco Use     Smoking status: Current Every Day Smoker     Packs/day: 0.50     Years: 23.00     Pack years: 11.50     Types: Cigarettes     Smokeless tobacco: Never Used     Tobacco comment: 4 cigs daily    Vaping Use     Vaping Use: Some days     Substances: Nicotine   Substance Use Topics     Alcohol use: Yes     Comment: social     Drug use: No        Medications:    guaiFENesin (MUCINEX) 600 MG 12 hr tablet  Lidocaine (LIDOCARE) 4 % Patch  predniSONE (DELTASONE) 20 MG tablet  albuterol (ACCUNEB) 1.25 MG/3ML neb solution  albuterol (PROAIR HFA/PROVENTIL HFA/VENTOLIN HFA) 108 (90 Base) MCG/ACT inhaler  fluticasone (FLONASE) 50 MCG/ACT nasal spray  IBUPROFEN PO  meloxicam (MOBIC) 7.5 MG tablet  phentermine (ADIPEX-P) 15 MG capsule  rizatriptan (MAXALT) 10 MG tablet  topiramate (TOPAMAX) 25 MG tablet  vitamin D2 (ERGOCALCIFEROL) 54551 units (1250 mcg) capsule      Review of Systems  As mentioned above in the history present illness. All other systems were reviewed and are negative.    Physical Exam   BP: (!) 150/88  Pulse: 110  Temp: 99.7  F (37.6  C)  Resp: 22  Height: 162.6 cm (5' 4\")  Weight: 108.9 kg (240 lb)  SpO2: 99 %      Physical Exam  Vitals and nursing note " reviewed.   Constitutional:       General: She is not in acute distress.     Appearance: She is well-developed. She is ill-appearing. She is not toxic-appearing or diaphoretic.   HENT:      Head: Normocephalic and atraumatic.      Jaw: No trismus.      Right Ear: Hearing, tympanic membrane, ear canal and external ear normal. There is no impacted cerumen.      Left Ear: Hearing, tympanic membrane, ear canal and external ear normal. There is no impacted cerumen.      Nose: Nose normal. No mucosal edema, congestion or rhinorrhea.      Right Sinus: No maxillary sinus tenderness or frontal sinus tenderness.      Left Sinus: No maxillary sinus tenderness or frontal sinus tenderness.      Mouth/Throat:      Pharynx: Uvula midline. No oropharyngeal exudate, posterior oropharyngeal erythema or uvula swelling.      Tonsils: No tonsillar exudate or tonsillar abscesses. 0 on the right. 0 on the left.   Eyes:      General:         Right eye: No discharge.         Left eye: No discharge.      Conjunctiva/sclera: Conjunctivae normal.   Cardiovascular:      Rate and Rhythm: Normal rate and regular rhythm.      Heart sounds: Normal heart sounds.   Pulmonary:      Effort: Pulmonary effort is normal. No respiratory distress.      Breath sounds: Normal breath sounds. No stridor. No rales.   Musculoskeletal:         General: No tenderness.      Cervical back: Normal range of motion and neck supple.   Lymphadenopathy:      Cervical: Cervical adenopathy present.   Skin:     General: Skin is warm.      Findings: No erythema or rash.   Neurological:      Mental Status: She is alert and oriented to person, place, and time.   Psychiatric:         Mood and Affect: Mood normal.         ED Course                 Procedures      Results for orders placed or performed during the hospital encounter of 09/27/22 (from the past 24 hour(s))   Symptomatic; Unknown Influenza A/B & SARS-CoV2 (COVID-19) Virus PCR Multiplex Nose    Specimen: Nose; Swab    Result Value Ref Range    Influenza A PCR Negative Negative    Influenza B PCR Negative Negative    SARS CoV2 PCR Negative Negative    Narrative    Testing was performed using the irving SARS-CoV-2 & Influenza A/B Assay on the irving Emily System. This test should be ordered for the detection of SARS-CoV-2 and influenza viruses in individuals who meet clinical and/or epidemiological criteria. Test performance is unknown in asymptomatic patients. This test is for in vitro diagnostic use under the FDA EUA for laboratories certified under CLIA to perform moderate and/or high complexity testing. This test has not been FDA cleared or approved. A negative result does not rule out the presence of PCR inhibitors in the specimen or target RNA in concentration below the limit of detection for the assay. If only one viral target is positive but coinfection with multiple targets is suspected, the sample should be re-tested with another FDA cleared, approved or authorized test, if coinfection would change clinical management. [facility] are certified under the Clinical Laboratory Improvement Amendments of 1988 (CLIA-88) as  qualified to perform moderate and/or high complexity laboratory testing.   XR Chest 2 Views    Narrative    EXAM: XR CHEST 2 VIEWS  LOCATION: [facility]  DATE/TIME: 9/27/2022 7:16 PM    INDICATION: Cough worsening, history of pneumonia.  COMPARISON: Chest x-ray 1/4/2022.      Impression    IMPRESSION: Negative chest.       Medications - No data to display    Assessments & Plan (with Medical Decision Making)     I have reviewed the nursing notes.    I have reviewed the findings, diagnosis, plan and need for follow up with the patient.  47-year-old female presents to urgent care with concerns of cough, nasal congestion and fever of 101.4, for the past 4 days.   ill with similar symptoms.  He was seen yesterday in urgent care and diagnosed with viral etiology  and prescribed prednisone.  Patient states she has had similar symptoms in the past with shoulder pain and side pain when she has had her hernia.  Patient admits that this may be due to coughing.  On exam patient appears to be ill but nontoxic.  Temp is 99 7, pulse is 110, blood pressure 150/88, respiratory rate is 22, O2 saturation 99%.  Lung sounds are clear, chest x-ray obtained and reveals no acute infiltrates.  COVID and influenza are negative.  Recommend likely viral etiology.  Will treat with Tessalon Perles, Mucinex, prednisone as patient is a smoker and may be having exacerbation of some underlying COPD.  Recommend follow-up in 1 week if no improvement in symptoms.  Patient discharged in stable condition.    Patient declined Tessalon Perles and says that it does not help her.  Patient wondering what she could have done to her ribs or chest area that could cause this pain.  Discussed possible viral etiology, versus pulled muscles versus costochondritis type pain.  Advised return or proceed to emergency department with acute or worsening pain.  Patient discharged in stable condition.    New Prescriptions    GUAIFENESIN (MUCINEX) 600 MG 12 HR TABLET    Take 1-2 tablets (600-1,200 mg) by mouth 2 times daily for 10 days    LIDOCAINE (LIDOCARE) 4 % PATCH    Place 1-3 patches onto the skin every 24 hours To prevent lidocaine toxicity, patient should be patch free for 12 hrs daily.    PREDNISONE (DELTASONE) 20 MG TABLET    Take two tablets (= 40mg) each day for 5 (five) days       Final diagnoses:   Viral URI with cough       9/27/2022   [facility] EMERGENCY DEPT     Rayne Flores APRN CNP  09/27/22 1941       Rayne Flores APRN CNP  09/27/22 1949

## 2022-09-28 NOTE — DISCHARGE INSTRUCTIONS
Recommend going home and starting the prednisone take 2 tablets tonight and then continue by taking 2 tablets daily for 4 more days.  Use Tessalon Perles 3 times daily as needed for cough.  Please be sure that you are drinking plenty of fluids.  Mucinex may help loosen up the secretions the dosing is 6 to 1200 mg by mouth twice daily as needed.  Follow-up in 1 week if no improvement in symptoms  Patient declined Tessalon Perles and using lidocaine patches and apply 1-3 patches on the right rib area for pain management.

## 2022-10-15 ENCOUNTER — HEALTH MAINTENANCE LETTER (OUTPATIENT)
Age: 47
End: 2022-10-15

## 2022-10-30 ASSESSMENT — ENCOUNTER SYMPTOMS
DYSURIA: 0
PARESTHESIAS: 0
CHILLS: 0
NERVOUS/ANXIOUS: 0
FREQUENCY: 1
SORE THROAT: 0
ABDOMINAL PAIN: 0
HEMATOCHEZIA: 0
DIZZINESS: 0
HEADACHES: 1
NAUSEA: 0
HEARTBURN: 0
BREAST MASS: 0
COUGH: 0
DIARRHEA: 0
ARTHRALGIAS: 1
CONSTIPATION: 0
PALPITATIONS: 0
MYALGIAS: 1
EYE PAIN: 0
SHORTNESS OF BREATH: 0
JOINT SWELLING: 0
WEAKNESS: 0
FEVER: 0
HEMATURIA: 0

## 2022-10-30 ASSESSMENT — ASTHMA QUESTIONNAIRES
QUESTION_3 LAST FOUR WEEKS HOW OFTEN DID YOUR ASTHMA SYMPTOMS (WHEEZING, COUGHING, SHORTNESS OF BREATH, CHEST TIGHTNESS OR PAIN) WAKE YOU UP AT NIGHT OR EARLIER THAN USUAL IN THE MORNING: ONCE OR TWICE
ACT_TOTALSCORE: 21
QUESTION_5 LAST FOUR WEEKS HOW WOULD YOU RATE YOUR ASTHMA CONTROL: WELL CONTROLLED
ACT_TOTALSCORE: 21
QUESTION_1 LAST FOUR WEEKS HOW MUCH OF THE TIME DID YOUR ASTHMA KEEP YOU FROM GETTING AS MUCH DONE AT WORK, SCHOOL OR AT HOME: NONE OF THE TIME
QUESTION_2 LAST FOUR WEEKS HOW OFTEN HAVE YOU HAD SHORTNESS OF BREATH: ONCE OR TWICE A WEEK
QUESTION_4 LAST FOUR WEEKS HOW OFTEN HAVE YOU USED YOUR RESCUE INHALER OR NEBULIZER MEDICATION (SUCH AS ALBUTEROL): ONCE A WEEK OR LESS

## 2022-10-31 ENCOUNTER — OFFICE VISIT (OUTPATIENT)
Dept: FAMILY MEDICINE | Facility: CLINIC | Age: 47
End: 2022-10-31
Payer: COMMERCIAL

## 2022-10-31 VITALS
RESPIRATION RATE: 16 BRPM | TEMPERATURE: 96.3 F | HEART RATE: 89 BPM | BODY MASS INDEX: 41.01 KG/M2 | DIASTOLIC BLOOD PRESSURE: 84 MMHG | SYSTOLIC BLOOD PRESSURE: 130 MMHG | HEIGHT: 64 IN | WEIGHT: 240.2 LBS | OXYGEN SATURATION: 98 %

## 2022-10-31 DIAGNOSIS — E55.9 VITAMIN D DEFICIENCY: ICD-10-CM

## 2022-10-31 DIAGNOSIS — L40.9 PSORIASIS: ICD-10-CM

## 2022-10-31 DIAGNOSIS — Z23 NEED FOR VACCINATION: ICD-10-CM

## 2022-10-31 DIAGNOSIS — B37.2 YEAST INFECTION OF THE SKIN: ICD-10-CM

## 2022-10-31 DIAGNOSIS — L30.9 ECZEMA, UNSPECIFIED TYPE: ICD-10-CM

## 2022-10-31 DIAGNOSIS — Z23 NEED FOR PROPHYLACTIC VACCINATION AND INOCULATION AGAINST INFLUENZA: ICD-10-CM

## 2022-10-31 DIAGNOSIS — Z00.00 ANNUAL PHYSICAL EXAM: Primary | ICD-10-CM

## 2022-10-31 DIAGNOSIS — Z12.11 SCREEN FOR COLON CANCER: ICD-10-CM

## 2022-10-31 DIAGNOSIS — E66.01 MORBID OBESITY (H): ICD-10-CM

## 2022-10-31 DIAGNOSIS — B37.31 YEAST INFECTION OF THE VAGINA: ICD-10-CM

## 2022-10-31 LAB
ALBUMIN SERPL BCG-MCNC: 4.1 G/DL (ref 3.5–5.2)
ALP SERPL-CCNC: 145 U/L (ref 35–104)
ALT SERPL W P-5'-P-CCNC: 33 U/L (ref 10–35)
ANION GAP SERPL CALCULATED.3IONS-SCNC: 9 MMOL/L (ref 7–15)
AST SERPL W P-5'-P-CCNC: 21 U/L (ref 10–35)
BILIRUB SERPL-MCNC: 0.2 MG/DL
BUN SERPL-MCNC: 13.9 MG/DL (ref 6–20)
CALCIUM SERPL-MCNC: 9.7 MG/DL (ref 8.6–10)
CHLORIDE SERPL-SCNC: 104 MMOL/L (ref 98–107)
CHOLEST SERPL-MCNC: 150 MG/DL
CREAT SERPL-MCNC: 0.71 MG/DL (ref 0.51–0.95)
DEPRECATED HCO3 PLAS-SCNC: 27 MMOL/L (ref 22–29)
GFR SERPL CREATININE-BSD FRML MDRD: >90 ML/MIN/1.73M2
GLUCOSE SERPL-MCNC: 96 MG/DL (ref 70–99)
HDLC SERPL-MCNC: 30 MG/DL
LDLC SERPL CALC-MCNC: 72 MG/DL
NONHDLC SERPL-MCNC: 120 MG/DL
POTASSIUM SERPL-SCNC: 4.3 MMOL/L (ref 3.4–5.3)
PROT SERPL-MCNC: 7.2 G/DL (ref 6.4–8.3)
SODIUM SERPL-SCNC: 140 MMOL/L (ref 136–145)
TRIGL SERPL-MCNC: 241 MG/DL
TSH SERPL DL<=0.005 MIU/L-ACNC: 1.6 UIU/ML (ref 0.3–4.2)

## 2022-10-31 PROCEDURE — 36415 COLL VENOUS BLD VENIPUNCTURE: CPT | Performed by: NURSE PRACTITIONER

## 2022-10-31 PROCEDURE — 90472 IMMUNIZATION ADMIN EACH ADD: CPT | Performed by: NURSE PRACTITIONER

## 2022-10-31 PROCEDURE — 90686 IIV4 VACC NO PRSV 0.5 ML IM: CPT | Performed by: NURSE PRACTITIONER

## 2022-10-31 PROCEDURE — 99396 PREV VISIT EST AGE 40-64: CPT | Mod: 25 | Performed by: NURSE PRACTITIONER

## 2022-10-31 PROCEDURE — 80061 LIPID PANEL: CPT | Performed by: NURSE PRACTITIONER

## 2022-10-31 PROCEDURE — 84443 ASSAY THYROID STIM HORMONE: CPT | Performed by: NURSE PRACTITIONER

## 2022-10-31 PROCEDURE — 80053 COMPREHEN METABOLIC PANEL: CPT | Performed by: NURSE PRACTITIONER

## 2022-10-31 PROCEDURE — 90714 TD VACC NO PRESV 7 YRS+ IM: CPT | Performed by: NURSE PRACTITIONER

## 2022-10-31 PROCEDURE — 99214 OFFICE O/P EST MOD 30 MIN: CPT | Mod: 25 | Performed by: NURSE PRACTITIONER

## 2022-10-31 PROCEDURE — 90471 IMMUNIZATION ADMIN: CPT | Performed by: NURSE PRACTITIONER

## 2022-10-31 RX ORDER — ERGOCALCIFEROL 1.25 MG/1
50000 CAPSULE, LIQUID FILLED ORAL WEEKLY
Qty: 12 CAPSULE | Refills: 1 | Status: ON HOLD | OUTPATIENT
Start: 2022-10-31 | End: 2023-10-23

## 2022-10-31 RX ORDER — CLOBETASOL PROPIONATE 0.05 G/100ML
SHAMPOO TOPICAL DAILY
Qty: 118 ML | Refills: 1 | Status: SHIPPED | OUTPATIENT
Start: 2022-10-31 | End: 2022-11-04

## 2022-10-31 RX ORDER — FLUCONAZOLE 150 MG/1
150 TABLET ORAL
Qty: 3 TABLET | Refills: 0 | Status: SHIPPED | OUTPATIENT
Start: 2022-10-31 | End: 2022-11-07

## 2022-10-31 RX ORDER — NYSTATIN 100000 U/G
CREAM TOPICAL 2 TIMES DAILY
Qty: 30 G | Refills: 1 | Status: ON HOLD | OUTPATIENT
Start: 2022-10-31 | End: 2024-07-12 | Stop reason: DRUGHIGH

## 2022-10-31 RX ORDER — TOPIRAMATE 25 MG/1
75 TABLET, FILM COATED ORAL DAILY
Qty: 90 TABLET | Refills: 11 | Status: ON HOLD | OUTPATIENT
Start: 2022-10-31 | End: 2023-10-23

## 2022-10-31 RX ORDER — TRIAMCINOLONE ACETONIDE 1 MG/G
CREAM TOPICAL 2 TIMES DAILY
Qty: 80 G | Refills: 0 | Status: ON HOLD | OUTPATIENT
Start: 2022-10-31 | End: 2023-10-23

## 2022-10-31 ASSESSMENT — PAIN SCALES - GENERAL: PAINLEVEL: NO PAIN (0)

## 2022-10-31 NOTE — PROGRESS NOTES
SUBJECTIVE:   CC: Elza is an 47 year old who presents for preventive health visit.     Patient has been advised of split billing requirements and indicates understanding: Yes  Healthy Habits:     Getting at least 3 servings of Calcium per day:  NO    Bi-annual eye exam:  Yes    Dental care twice a year:  NO    Sleep apnea or symptoms of sleep apnea:  Daytime drowsiness and Excessive snoring    Diet:  Other    Frequency of exercise:  2-3 days/week    Duration of exercise:  15-30 minutes    Taking medications regularly:  Yes    PHQ-2 Total Score: 2    Additional concerns today:  Yes (Would like referral to derm and urology.  Left hip pain for the past two months. )    Today's PHQ-2 Score:   PHQ-2 ( 1999 Pfizer) 10/30/2022   Q1: Little interest or pleasure in doing things 1   Q2: Feeling down, depressed or hopeless 1   PHQ-2 Score 2   PHQ-2 Total Score (12-17 Years)- Positive if 3 or more points; Administer PHQ-A if positive -   Q1: Little interest or pleasure in doing things Several days   Q2: Feeling down, depressed or hopeless Several days   PHQ-2 Score 2       Abuse: Current or Past (Physical, Sexual or Emotional) - No  Do you feel safe in your environment? Yes    Have you ever done Advance Care Planning? (For example, a Health Directive, POLST, or a discussion with a medical provider or your loved ones about your wishes): No, advance care planning information given to patient to review.  Patient declined advance care planning discussion at this time.    Social History     Tobacco Use     Smoking status: Every Day     Packs/day: 1.00     Years: 23.00     Pack years: 23.00     Types: Cigarettes     Smokeless tobacco: Never     Tobacco comments:     4 cigs daily    Substance Use Topics     Alcohol use: Yes     Comment: social     If you drink alcohol do you typically have >3 drinks per day or >7 drinks per week? No    Alcohol Use 10/31/2022   Prescreen: >3 drinks/day or >7 drinks/week? -   Prescreen: >3  drinks/day or >7 drinks/week? No       Reviewed orders with patient.  Reviewed health maintenance and updated orders accordingly - Yes  Lab work is in process  Labs reviewed in EPIC  BP Readings from Last 3 Encounters:   10/31/22 130/84   09/27/22 (!) 150/88   09/01/22 (!) 134/90    Wt Readings from Last 3 Encounters:   10/31/22 109 kg (240 lb 3.2 oz)   09/27/22 108.9 kg (240 lb)   09/01/22 112 kg (247 lb)                    Breast Cancer Screening:    FHS-7:   Breast CA Risk Assessment (FHS-7) 8/3/2022 10/30/2022   Did any of your first-degree relatives have breast or ovarian cancer? Yes Yes   Did any of your relatives have bilateral breast cancer? No Yes   Did any man in your family have breast cancer? No No   Did any woman in your family have breast and ovarian cancer? No Yes   Did any woman in your family have breast cancer before age 50 y? No No   Do you have 2 or more relatives with breast and/or ovarian cancer? Yes Yes   Do you have 2 or more relatives with breast and/or bowel cancer? No Yes     click delete button to remove this line now  Mammogram Screening: Recommended annual mammography  Pertinent mammograms are reviewed under the imaging tab.    History of abnormal Pap smear: Status post benign hysterectomy. Health Maintenance and Surgical History updated.     Reviewed and updated as needed this visit by clinical staff   Tobacco  Allergies  Meds   Med Hx  Surg Hx  Fam Hx          Reviewed and updated as needed this visit by Provider                   CONSTITUTIONAL: NEGATIVE for fever, chills, change in weight  INTEGUMENTARY/SKIN: POSITIVE for rash  EYES: NEGATIVE for vision changes or irritation  ENT: NEGATIVE for ear, mouth and throat problems  RESP: NEGATIVE for significant cough or SOB  BREAST: NEGATIVE for masses, tenderness or discharge  CV: NEGATIVE for chest pain, palpitations or peripheral edema  GI: NEGATIVE for nausea, abdominal pain, heartburn, or change in bowel habits  : NEGATIVE  "for unusual urinary or vaginal symptoms. No vaginal bleeding.  MUSCULOSKELETAL: NEGATIVE for significant arthralgias or myalgia  NEURO: NEGATIVE for weakness, dizziness or paresthesias  PSYCHIATRIC: NEGATIVE for changes in mood or affect      OBJECTIVE:   /84 (BP Location: Left arm, Patient Position: Sitting, Cuff Size: Adult Large)   Pulse 89   Temp (!) 96.3  F (35.7  C) (Tympanic)   Resp 16   Ht 1.626 m (5' 4\")   Wt 109 kg (240 lb 3.2 oz)   LMP 08/26/2005   SpO2 98%   BMI 41.23 kg/m    Physical Exam  GENERAL: healthy, alert and no distress  EYES: Eyes grossly normal to inspection, PERRL and conjunctivae and sclerae normal  HENT: ear canals and TM's normal, nose and mouth without ulcers or lesions  NECK: no adenopathy, no asymmetry, masses, or scars and thyroid normal to palpation  RESP: lungs clear to auscultation - no rales, rhonchi or wheezes  CV: regular rate and rhythm, normal S1 S2, no S3 or S4, no murmur, click or rub, no peripheral edema and peripheral pulses strong  MS: no gross musculoskeletal defects noted, no edema  SKIN: mild skin erythema under bilateral breasts, scalp psoriasis patch, mild skin erythema on the right side forearm   NEURO: Normal strength and tone, mentation intact and speech normal  PSYCH: mentation appears normal, affect normal/bright      ASSESSMENT/PLAN:   (Z00.00) Annual physical exam  (primary encounter diagnosis)  Comment:   Plan: REVIEW OF HEALTH MAINTENANCE PROTOCOL ORDERS,         Lipid panel reflex to direct LDL Fasting,         Comprehensive metabolic panel (BMP + Alb, Alk         Phos, ALT, AST, Total. Bili, TP)    (Z12.11) Screen for colon cancer    Plan: Colonoscopy Screening  Referral         (Z23) Need for prophylactic vaccination and inoculation against influenza  Plan: INFLUENZA VACCINE IM > 6 MONTHS VALENT IIV4         (AFLURIA/FLUZONE)      (Z23) Need for vaccination  Plan: TD, PF, 7+YRS (TENIVAC/DECAVAC)      (E55.9) Vitamin D " "deficiency    Plan: vitamin D2 (ERGOCALCIFEROL) 38043 units (1250         mcg) capsule, CANCELED: Vitamin D Deficiency            (E66.01) Morbid obesity (H)  Comment: patient continue to work on weight loss   Plan: TSH with free T4 reflex, topiramate (TOPAMAX)         25 MG tablet      (L30.9) Eczema, unspecified type  Plan: triamcinolone (KENALOG) 0.1 % external cream,         Adult Dermatology Referral      (B37.2) Yeast infection of the skin  Plan: nystatin (MYCOSTATIN) 462395 UNIT/GM external         cream, Adult Dermatology Referral      (B37.31) Yeast infection of the vagina    Plan: fluconazole (DIFLUCAN) 150 MG tablet      (L40.9) Psoriasis  Plan: clobetasol propionate (CLOBEX) 0.05 % external         shampoo, Adult Dermatology Referral         COUNSELING:  Reviewed preventive health counseling, as reflected in patient instructions       Regular exercise       Healthy diet/nutrition       Osteoporosis prevention/bone health       Colorectal Cancer Screening    Estimated body mass index is 41.23 kg/m  as calculated from the following:    Height as of this encounter: 1.626 m (5' 4\").    Weight as of this encounter: 109 kg (240 lb 3.2 oz).    Weight management plan: Discussed healthy diet and exercise guidelines    Counseling Resources:  ATP IV Guidelines  Pooled Cohorts Equation Calculator  Breast Cancer Risk Calculator  BRCA-Related Cancer Risk Assessment: FHS-7 Tool  FRAX Risk Assessment  ICSI Preventive Guidelines  Dietary Guidelines for Americans, 2010  USDA's MyPlate  ASA Prophylaxis  Lung CA Screening    [facility]  "

## 2022-10-31 NOTE — PATIENT INSTRUCTIONS
Kenalog apply on your hand and neck area twice daily     Nystatin apply twice daily under breasts and groin area    Diflucan daily every 3 days for 3 doses     Shampoo daily for up to 2 weeks     Labs

## 2022-11-02 ENCOUNTER — TELEPHONE (OUTPATIENT)
Dept: FAMILY MEDICINE | Facility: CLINIC | Age: 47
End: 2022-11-02

## 2022-11-02 DIAGNOSIS — L40.9 SCALP PSORIASIS: Primary | ICD-10-CM

## 2022-11-03 NOTE — TELEPHONE ENCOUNTER
Central Prior Authorization Team   Phone: [phone]      PRIOR AUTHORIZATION DENIED    Medication: Clobetasol propionate - EPA DENIED     Denial Date: 11/2/2022    Denial Rational:       Appeal Information:

## 2022-11-03 NOTE — TELEPHONE ENCOUNTER
Prior Authorization Retail Medication Request    Medication/Dose: Clobetasol propionate   ICD code (if different than what is on RX):    Previously Tried and Failed:  Lotrimin; kenalog  Rationale:     Insurance Name:  BC/SHERIDAN ARRINGTON  Insurance ID:  Not provided  [facility]; [id number]      Pharmacy Information (if different than what is on RX)  Name:    Phone:

## 2022-11-04 RX ORDER — FLUOCINOLONE ACETONIDE 0.11 MG/ML
OIL TOPICAL
Qty: 118.28 ML | Refills: 0 | Status: SHIPPED | OUTPATIENT
Start: 2022-11-04

## 2022-11-04 NOTE — TELEPHONE ENCOUNTER
MONET Rhoades for clobetasol propionate has been denied.    Patient has to have tried and failed three formulary alternatives:

## 2022-11-14 ENCOUNTER — TELEPHONE (OUTPATIENT)
Dept: FAMILY MEDICINE | Facility: CLINIC | Age: 47
End: 2022-11-14

## 2022-11-14 NOTE — LETTER
November 14, 2022      Elza M Percy  [address] [facility] [address]    Your team at [facility] cares about your health. We have reviewed your chart and based on our findings; we are making the following recommendations to better manage your health.     You are in particular need of attention regarding the following:     Call or MyChart message your clinic to schedule a colonoscopy, schedule/ a FIT Test, or order a Cologuard test. If you are unsure what type of test you need, please call your clinic and speak to clinic staff.   Colon cancer is now the second leading cause of cancer-related deaths in the [facility] for both men and women and there are over 130,000 new cases and 50,000 deaths per year from colon cancer. Colonoscopies can prevent 90-95% of these deaths. Problem lesions can be removed before they ever become cancer. This test is not only looking for cancer, but also getting rid of precancerous lesions.   If you are under/uninsured, we recommend you contact the [employer] Program.[employer] is a free colorectal cancer screening program that provides colonoscopies for eligible under/uninsured [place] men and women. If you are interested in receiving a free colonoscopy, please call [employer] at t [phone] (mention code [employer]) to see if you're eligible. Please have them send us the results.     If you have already completed these items, please contact the clinic via phone or   [facility] so your care team can review and update your records. Thank you for   choosing [facility] for your healthcare needs. For any questions,   concerns, or to schedule an appointment please contact our clinic at [phone].    Healthy Regards,      Your [facility] Care Team

## 2022-11-14 NOTE — TELEPHONE ENCOUNTER
Patient Quality Outreach    Patient is due for the following:   Colon Cancer Screening    Next Steps:   No follow up needed at this time.    Type of outreach:    Sent letter.    Next Steps:  Reach out within 90 days via [employer].    Max number of attempts reached: No. Will try again in 90 days if patient still on fail list.    Questions for provider review:    None     MUMTAZ Armas LPN

## 2023-02-01 ENCOUNTER — HOSPITAL ENCOUNTER (EMERGENCY)
Facility: CLINIC | Age: 48
Discharge: HOME | End: 2023-02-01
Attending: PHYSICIAN ASSISTANT | Admitting: PHYSICIAN ASSISTANT
Payer: COMMERCIAL

## 2023-02-01 VITALS
SYSTOLIC BLOOD PRESSURE: 146 MMHG | RESPIRATION RATE: 20 BRPM | OXYGEN SATURATION: 97 % | DIASTOLIC BLOOD PRESSURE: 92 MMHG | TEMPERATURE: 98.5 F | HEART RATE: 88 BPM

## 2023-02-01 DIAGNOSIS — J45.901 EXACERBATION OF ASTHMA, UNSPECIFIED ASTHMA SEVERITY, UNSPECIFIED WHETHER PERSISTENT: ICD-10-CM

## 2023-02-01 DIAGNOSIS — J06.9 VIRAL URI WITH COUGH: ICD-10-CM

## 2023-02-01 LAB
DEPRECATED S PYO AG THROAT QL EIA: NEGATIVE
FLUAV RNA SPEC QL NAA+PROBE: NEGATIVE
FLUBV RNA RESP QL NAA+PROBE: NEGATIVE
GROUP A STREP BY PCR: NOT DETECTED
RSV RNA SPEC NAA+PROBE: NEGATIVE
SARS-COV-2 RNA RESP QL NAA+PROBE: NEGATIVE

## 2023-02-01 PROCEDURE — 87637 SARSCOV2&INF A&B&RSV AMP PRB: CPT | Performed by: PHYSICIAN ASSISTANT

## 2023-02-01 PROCEDURE — 99214 OFFICE O/P EST MOD 30 MIN: CPT | Mod: CS | Performed by: PHYSICIAN ASSISTANT

## 2023-02-01 PROCEDURE — G0463 HOSPITAL OUTPT CLINIC VISIT: HCPCS | Mod: CS | Performed by: PHYSICIAN ASSISTANT

## 2023-02-01 PROCEDURE — C9803 HOPD COVID-19 SPEC COLLECT: HCPCS | Performed by: PHYSICIAN ASSISTANT

## 2023-02-01 PROCEDURE — 87651 STREP A DNA AMP PROBE: CPT | Performed by: PHYSICIAN ASSISTANT

## 2023-02-01 RX ORDER — PREDNISONE 20 MG/1
TABLET ORAL
Qty: 10 TABLET | Refills: 0 | Status: SHIPPED | OUTPATIENT
Start: 2023-02-01 | End: 2023-05-25

## 2023-02-01 RX ORDER — BENZONATATE 200 MG/1
200 CAPSULE ORAL 3 TIMES DAILY PRN
Qty: 30 CAPSULE | Refills: 0 | Status: ON HOLD | OUTPATIENT
Start: 2023-02-01 | End: 2023-10-23

## 2023-02-01 RX ORDER — ALBUTEROL SULFATE 90 UG/1
1-2 AEROSOL, METERED RESPIRATORY (INHALATION) EVERY 4 HOURS PRN
Qty: 18 G | Refills: 0 | Status: SHIPPED | OUTPATIENT
Start: 2023-02-01 | End: 2023-07-26

## 2023-02-01 ASSESSMENT — ENCOUNTER SYMPTOMS
COUGH: 1
HEADACHES: 1
CARDIOVASCULAR NEGATIVE: 1
EYES NEGATIVE: 1
SHORTNESS OF BREATH: 1
SINUS PAIN: 0
SINUS PRESSURE: 1
STRIDOR: 0
SORE THROAT: 1
CHEST TIGHTNESS: 1
RHINORRHEA: 1
WHEEZING: 1
GASTROINTESTINAL NEGATIVE: 1
MYALGIAS: 1
FEVER: 1

## 2023-02-01 ASSESSMENT — ACTIVITIES OF DAILY LIVING (ADL): ADLS_ACUITY_SCORE: 33

## 2023-02-01 NOTE — ED PROVIDER NOTES
"  History     Chief Complaint   Patient presents with     Pharyngitis     Cough     Shortness of Breath     Sick since monday     Dysuria     HPI  Elza Greer is a 47 year old female who presents today with uri symptoms for the past 3 days. Patient states fevers up to 101F, cough, congestion, sore throat, body aches, and some shortness of breath. She has asthma and has been using inhalers. She denies chest pain, palpitations, abdominal pain, nausea/vomiting, diarrhea, rash, ear pain, or sinus pain. She has been taking over the counter medications and inhalers for symptoms relief.     Allergies:  Allergies   Allergen Reactions     Azithromycin Anaphylaxis     Doxycycline Other (See Comments)     Upset stomach and felt \"really out of it\"     Latex Hives     Oxycodone Nausea and Vomiting       Problem List:    Patient Active Problem List    Diagnosis Date Noted     Morbid obesity (H) 10/04/2021     Priority: Medium     Ventral hernia without obstruction or gangrene 01/13/2021     Priority: Medium     Added automatically from request for surgery [id number]       Anxiety 02/18/2020     Priority: Medium     Tobacco use disorder 11/14/2016     Priority: Medium     Mild intermittent asthma without complication 05/12/2010     Priority: Medium        Past Medical History:    Past Medical History:   Diagnosis Date     Adjustment disorder with mixed anxiety and depressed mood      Arthritis      COPD (chronic obstructive pulmonary disease) (H)      Depressive disorder      History of blood transfusion      IBS (irritable bowel syndrome)      Incisional hernia, without obstruction or gangrene 2020     Lactose intolerance 05/12/2010     Migraine 05/12/2010     Mild intermittent asthma without complication 05/12/2010     PCOS (polycystic ovarian syndrome)      Tobacco use disorder      Vitamin D deficiency        Past Surgical History:    Past Surgical History:   Procedure Laterality Date     ABDOMEN SURGERY       APPENDECTOMY " OPEN N/A      CYSTECTOMY OVARIAN BENIGN  2001     HEPATICOJEJUNOSTOMY  2000    RnY jejunostomy from bile duct injury     HERNIORRHAPHY VENTRAL N/A 02/17/2012    open with mesh     HYSTERECTOMY TOTAL ABDOMINAL, BILATERAL SALPINGO-OOPHORECTOMY, COMBINED N/A 2005     LAPAROSCOPIC CHOLECYSTECTOMY  2006    complicated by bile duct injury     LAPAROSCOPIC HERNIORRHAPHY VENTRAL N/A 01/26/2021    Procedure: Open recurrent incarcerated ventral hernia repair X2;  Surgeon: Pradip Mckenzie MD;  Location: [facility] OR       Family History:    Family History   Problem Relation Age of Onset     Dementia Mother      Coronary Artery Disease Mother      Hypertension Mother      Hyperlipidemia Mother      Depression Mother      Mental Illness Father         Schizophrenia     Colon Cancer Maternal Grandmother      Breast Cancer Maternal Grandmother      Other Cancer Maternal Grandmother      Osteoporosis Other        Social History:  Marital Status:   [2]  Social History     Tobacco Use     Smoking status: Every Day     Packs/day: 1.00     Years: 23.00     Pack years: 23.00     Types: Cigarettes     Smokeless tobacco: Never     Tobacco comments:     4 cigs daily    Vaping Use     Vaping Use: Some days     Substances: Nicotine   Substance Use Topics     Alcohol use: Yes     Comment: social     Drug use: No        Medications:    albuterol (PROAIR HFA/PROVENTIL HFA/VENTOLIN HFA) 108 (90 Base) MCG/ACT inhaler  benzonatate (TESSALON) 200 MG capsule  predniSONE (DELTASONE) 20 MG tablet  albuterol (ACCUNEB) 1.25 MG/3ML neb solution  albuterol (PROAIR HFA/PROVENTIL HFA/VENTOLIN HFA) 108 (90 Base) MCG/ACT inhaler  Fluocinolone Acetonide Scalp 0.01 % OIL oil  fluticasone (FLONASE) 50 MCG/ACT nasal spray  IBUPROFEN PO  Lidocaine (LIDOCARE) 4 % Patch  meloxicam (MOBIC) 7.5 MG tablet  nystatin (MYCOSTATIN) 815086 UNIT/GM external cream  rizatriptan (MAXALT) 10 MG tablet  topiramate (TOPAMAX) 25 MG tablet  triamcinolone (KENALOG) 0.1 % external  cream  vitamin D2 (ERGOCALCIFEROL) 86223 units (1250 mcg) capsule          Review of Systems   Constitutional: Positive for fever.   HENT: Positive for congestion, postnasal drip, rhinorrhea, sinus pressure and sore throat. Negative for ear pain and sinus pain.    Eyes: Negative.    Respiratory: Positive for cough, chest tightness, shortness of breath and wheezing. Negative for stridor.    Cardiovascular: Negative.    Gastrointestinal: Negative.    Genitourinary: Negative.    Musculoskeletal: Positive for myalgias.   Skin: Negative.    Neurological: Positive for headaches.   All other systems reviewed and are negative.      Physical Exam   BP: (!) 146/92  Pulse: 88  Temp: 98.5  F (36.9  C)  Resp: 20  SpO2: 97 %      Physical Exam  Vitals and nursing note reviewed.   Constitutional:       General: She is not in acute distress.     Appearance: Normal appearance. She is ill-appearing. She is not toxic-appearing or diaphoretic.   HENT:      Right Ear: Tympanic membrane and ear canal normal.      Left Ear: Tympanic membrane and ear canal normal.      Nose: Congestion and rhinorrhea present.      Mouth/Throat:      Mouth: Mucous membranes are moist.      Pharynx: Posterior oropharyngeal erythema (minimal) present. No oropharyngeal exudate.   Eyes:      Extraocular Movements: Extraocular movements intact.      Conjunctiva/sclera: Conjunctivae normal.      Pupils: Pupils are equal, round, and reactive to light.   Neck:      Vascular: No carotid bruit.   Cardiovascular:      Rate and Rhythm: Normal rate and regular rhythm.      Heart sounds: Normal heart sounds.   Pulmonary:      Effort: Pulmonary effort is normal.      Breath sounds: Normal breath sounds.   Musculoskeletal:      Cervical back: Normal range of motion and neck supple. No rigidity or tenderness.   Lymphadenopathy:      Cervical: Cervical adenopathy present.   Skin:     General: Skin is warm.      Capillary Refill: Capillary refill takes less than 2 seconds.       Findings: No rash.   Neurological:      General: No focal deficit present.      Mental Status: She is alert and oriented to person, place, and time.      Gait: Gait normal.   Psychiatric:         Mood and Affect: Mood normal.         Behavior: Behavior normal.         Thought Content: Thought content normal.         Judgment: Judgment normal.         ED Course                 Procedures             Critical Care time:  none               Results for orders placed or performed during the hospital encounter of 02/01/23 (from the past 24 hour(s))   Symptomatic Influenza A/B & SARS-CoV2 (COVID-19) Virus PCR Multiplex Nasopharyngeal    Specimen: Nasopharyngeal; Swab   Result Value Ref Range    Influenza A PCR Negative Negative    Influenza B PCR Negative Negative    RSV PCR Negative Negative    SARS CoV2 PCR Negative Negative    Narrative    Testing was performed using the Xpert Xpress CoV2/Flu/RSV Assay on the [employer] Easypert Instrument. This test should be ordered for the detection of SARS-CoV-2 and influenza viruses in individuals who meet clinical and/or epidemiological criteria. Test performance is unknown in asymptomatic patients. This test is for in vitro diagnostic use under the FDA EUA for laboratories certified under CLIA to perform high or moderate complexity testing. This test has not been FDA cleared or approved. A negative result does not rule out the presence of PCR inhibitors in the specimen or target RNA in concentration below the limit of detection for the assay. If only one viral target is positive but coinfection with multiple targets is suspected, the sample should be re-tested with another FDA cleared, approved, or authorized test, if coinfection would change clinical management. This test was validated by the [facility]. These laboratories are certified under the Clinical Laboratory Improvement Amendments of 1988 (CLIA-88) as qualified to perform high complexity laboratory  testing.   Streptococcus A Rapid Scr w Reflx to PCR    Specimen: Throat; Swab   Result Value Ref Range    Group A Strep antigen Negative Negative       Medications - No data to display    Assessments & Plan (with Medical Decision Making)     I have reviewed the nursing notes.    I have reviewed the findings, diagnosis, plan and need for follow up with the patient.    Elza Greer is a 47 year old female who presents today with uri symptoms for the past 3 days. Patient states fevers up to 101F, cough, congestion, sore throat, body aches, and some shortness of breath. She has asthma and has been using inhalers. She denies chest pain, palpitations, abdominal pain, nausea/vomiting, diarrhea, rash, ear pain, or sinus pain. She has been taking over the counter medications and inhalers for symptoms relief.     Vitals within normal limits other than slightly elevated blood pressure.  Lungs clear to auscultation bilaterally throughout with no indication for chest x-ray at this time.  Rapid strep today was negative.  COVID, influenza and RSV today were negative.  Discussed with patient symptoms appear to be viral in origin and no antibiotics indicated.  We will refill albuterol inhaler, prescription for Tessalon Perles and prednisone for asthma exacerbation sent to the pharmacy to use as directed.  Side effects with regards to prednisone and medications were discussed with patient and given on discharge paperwork.  Patient discharged in stable condition.      Discharge Medication List as of 2/1/2023  2:18 PM      START taking these medications    Details   !! albuterol (PROAIR HFA/PROVENTIL HFA/VENTOLIN HFA) 108 (90 Base) MCG/ACT inhaler Inhale 1-2 puffs into the lungs every 4 hours as needed for shortness of breath, wheezing or cough, Disp-18 g, R-0, E-PrescribePharmacy may dispense brand covered by insurance (Proair, or proventil or ventolin or generic albuterol inhaler)      benzonatate (TESSALON) 200 MG capsule Take 1  capsule (200 mg) by mouth 3 times daily as needed for cough, Disp-30 capsule, R-0, E-Prescribe      predniSONE (DELTASONE) 20 MG tablet Take two tablets (= 40mg) each day for 5 (five) days, Disp-10 tablet, R-0, E-Prescribe       !! - Potential duplicate medications found. Please discuss with provider.          Final diagnoses:   Viral URI with cough   Exacerbation of asthma, unspecified asthma severity, unspecified whether persistent       2/1/2023   [facility] EMERGENCY DEPT

## 2023-02-01 NOTE — LETTER
February 1, 2023      To Whom It May Concern:      Elza Greer was seen in our Emergency Department today, 02/01/23.  I expect her condition to improve over the next few days.  Please excuse her from work today and tomorrow. She may return to work/school when symptoms improve.    Sincerely,        Bozena Goldman PA-C

## 2023-02-06 ENCOUNTER — HOSPITAL ENCOUNTER (EMERGENCY)
Facility: CLINIC | Age: 48
Discharge: HOME | End: 2023-02-06
Attending: PHYSICIAN ASSISTANT | Admitting: PHYSICIAN ASSISTANT
Payer: COMMERCIAL

## 2023-02-06 ENCOUNTER — APPOINTMENT (OUTPATIENT)
Dept: GENERAL RADIOLOGY | Facility: CLINIC | Age: 48
End: 2023-02-06
Attending: PHYSICIAN ASSISTANT
Payer: COMMERCIAL

## 2023-02-06 VITALS
SYSTOLIC BLOOD PRESSURE: 147 MMHG | RESPIRATION RATE: 18 BRPM | OXYGEN SATURATION: 95 % | TEMPERATURE: 98.5 F | DIASTOLIC BLOOD PRESSURE: 70 MMHG | HEART RATE: 84 BPM

## 2023-02-06 DIAGNOSIS — J45.901 EXACERBATION OF ASTHMA, UNSPECIFIED ASTHMA SEVERITY, UNSPECIFIED WHETHER PERSISTENT: ICD-10-CM

## 2023-02-06 DIAGNOSIS — J20.9 ACUTE BRONCHITIS, UNSPECIFIED ORGANISM: ICD-10-CM

## 2023-02-06 DIAGNOSIS — J06.9 UPPER RESPIRATORY TRACT INFECTION, UNSPECIFIED TYPE: ICD-10-CM

## 2023-02-06 LAB
FLUAV RNA SPEC QL NAA+PROBE: NEGATIVE
FLUBV RNA RESP QL NAA+PROBE: NEGATIVE
RSV RNA SPEC NAA+PROBE: NEGATIVE
SARS-COV-2 RNA RESP QL NAA+PROBE: NEGATIVE

## 2023-02-06 PROCEDURE — 99214 OFFICE O/P EST MOD 30 MIN: CPT | Mod: CS | Performed by: PHYSICIAN ASSISTANT

## 2023-02-06 PROCEDURE — 71046 X-RAY EXAM CHEST 2 VIEWS: CPT

## 2023-02-06 PROCEDURE — G0463 HOSPITAL OUTPT CLINIC VISIT: HCPCS | Mod: 25,CS | Performed by: PHYSICIAN ASSISTANT

## 2023-02-06 PROCEDURE — 87637 SARSCOV2&INF A&B&RSV AMP PRB: CPT | Performed by: PHYSICIAN ASSISTANT

## 2023-02-06 PROCEDURE — C9803 HOPD COVID-19 SPEC COLLECT: HCPCS | Performed by: PHYSICIAN ASSISTANT

## 2023-02-06 RX ORDER — PREDNISONE 10 MG/1
TABLET ORAL
Qty: 28 TABLET | Refills: 0 | Status: SHIPPED | OUTPATIENT
Start: 2023-02-06 | End: 2023-02-16

## 2023-02-06 ASSESSMENT — ENCOUNTER SYMPTOMS
CONSTITUTIONAL NEGATIVE: 1
MUSCULOSKELETAL NEGATIVE: 1
WHEEZING: 1
COUGH: 1
RHINORRHEA: 1
GASTROINTESTINAL NEGATIVE: 1
HEADACHES: 1
CARDIOVASCULAR NEGATIVE: 1
CHEST TIGHTNESS: 1

## 2023-02-06 ASSESSMENT — ACTIVITIES OF DAILY LIVING (ADL): ADLS_ACUITY_SCORE: 35

## 2023-02-06 NOTE — LETTER
February 6, 2023      To Whom It May Concern:      Elza Greer was seen in our Emergency Department today, 02/06/23.  Please excuse patient from work due to illness.  She can return to work when she is feeling better and fever free for 24 hours.      Sincerely,        Bozena Goldman PA-C

## 2023-02-06 NOTE — DISCHARGE INSTRUCTIONS
Use medications as directed.    Follow-up with primary care doctor for recheck in 1 week.    The emergency department if symptoms worsen or change this includes persistent shortness of breath, chest pain, heart racing or skipping beats, change or worsening of symptoms

## 2023-02-07 NOTE — ED PROVIDER NOTES
"  History     Chief Complaint   Patient presents with     Cough     Cough, congestion for one week. Patient reports having \"coughing attacks.\" Dx with URI on Wednesday.     HPI  Elza Greer is a 47 year old female who is urgent care with cough, congestion for the past week.  Patient states that she has been having coughing attacks for over a week and has been diagnosed with URI symptoms.  At that time she was negative for COVID and influenza but would like retesting since she has tested negative multiple times in the past when she had COVID.  She feels like symptoms are worsening.  She has been using the ProAir inhaler, Tessalon Perles, and steroids with no improvement of symptoms.  She states that the neb treatment seems to worsen her coughing and the cold air worsens the cough.  She denies any chest pain, heart racing or skipping beats at this time.  She does have a history of asthma.    Allergies:  Allergies   Allergen Reactions     Azithromycin Anaphylaxis     Doxycycline Other (See Comments)     Upset stomach and felt \"really out of it\"     Latex Hives     Oxycodone Nausea and Vomiting       Problem List:    Patient Active Problem List    Diagnosis Date Noted     Morbid obesity (H) 10/04/2021     Priority: Medium     Ventral hernia without obstruction or gangrene 01/13/2021     Priority: Medium     Added automatically from request for surgery [id number]       Anxiety 02/18/2020     Priority: Medium     Tobacco use disorder 11/14/2016     Priority: Medium     Mild intermittent asthma without complication 05/12/2010     Priority: Medium        Past Medical History:    Past Medical History:   Diagnosis Date     Adjustment disorder with mixed anxiety and depressed mood      Arthritis      COPD (chronic obstructive pulmonary disease) (H)      Depressive disorder      History of blood transfusion      IBS (irritable bowel syndrome)      Incisional hernia, without obstruction or gangrene 2020     Lactose intolerance " 05/12/2010     Migraine 05/12/2010     Mild intermittent asthma without complication 05/12/2010     PCOS (polycystic ovarian syndrome)      Tobacco use disorder      Vitamin D deficiency        Past Surgical History:    Past Surgical History:   Procedure Laterality Date     ABDOMEN SURGERY       APPENDECTOMY OPEN N/A      CYSTECTOMY OVARIAN BENIGN  2001     HEPATICOJEJUNOSTOMY  2000    RnY jejunostomy from bile duct injury     HERNIORRHAPHY VENTRAL N/A 02/17/2012    open with mesh     HYSTERECTOMY TOTAL ABDOMINAL, BILATERAL SALPINGO-OOPHORECTOMY, COMBINED N/A 2005     LAPAROSCOPIC CHOLECYSTECTOMY  2006    complicated by bile duct injury     LAPAROSCOPIC HERNIORRHAPHY VENTRAL N/A 01/26/2021    Procedure: Open recurrent incarcerated ventral hernia repair X2;  Surgeon: Pradip Mckenzie MD;  Location: [facility] OR       Family History:    Family History   Problem Relation Age of Onset     Dementia Mother      Coronary Artery Disease Mother      Hypertension Mother      Hyperlipidemia Mother      Depression Mother      Mental Illness Father         Schizophrenia     Colon Cancer Maternal Grandmother      Breast Cancer Maternal Grandmother      Other Cancer Maternal Grandmother      Osteoporosis Other        Social History:  Marital Status:   [2]  Social History     Tobacco Use     Smoking status: Every Day     Packs/day: 1.00     Years: 23.00     Pack years: 23.00     Types: Cigarettes     Smokeless tobacco: Never     Tobacco comments:     4 cigs daily    Vaping Use     Vaping Use: Some days     Substances: Nicotine   Substance Use Topics     Alcohol use: Yes     Comment: social     Drug use: No        Medications:    amoxicillin-clavulanate (AUGMENTIN) 875-125 MG tablet  predniSONE (DELTASONE) 10 MG tablet  albuterol (ACCUNEB) 1.25 MG/3ML neb solution  albuterol (PROAIR HFA/PROVENTIL HFA/VENTOLIN HFA) 108 (90 Base) MCG/ACT inhaler  albuterol (PROAIR HFA/PROVENTIL HFA/VENTOLIN HFA) 108 (90 Base) MCG/ACT  inhaler  benzonatate (TESSALON) 200 MG capsule  Fluocinolone Acetonide Scalp 0.01 % OIL oil  fluticasone (FLONASE) 50 MCG/ACT nasal spray  IBUPROFEN PO  Lidocaine (LIDOCARE) 4 % Patch  meloxicam (MOBIC) 7.5 MG tablet  nystatin (MYCOSTATIN) 487220 UNIT/GM external cream  predniSONE (DELTASONE) 20 MG tablet  rizatriptan (MAXALT) 10 MG tablet  topiramate (TOPAMAX) 25 MG tablet  triamcinolone (KENALOG) 0.1 % external cream  vitamin D2 (ERGOCALCIFEROL) 19964 units (1250 mcg) capsule          Review of Systems   Constitutional: Negative.    HENT: Positive for congestion and rhinorrhea.    Respiratory: Positive for cough, chest tightness and wheezing.    Cardiovascular: Negative.    Gastrointestinal: Negative.    Genitourinary: Negative.    Musculoskeletal: Negative.    Skin: Negative.    Neurological: Positive for headaches.   All other systems reviewed and are negative.      Physical Exam   BP: (!) 147/70  Pulse: 84  Temp: 98.5  F (36.9  C)  Resp: 18  SpO2: 95 %      Physical Exam  Vitals and nursing note reviewed.   Constitutional:       General: She is not in acute distress.     Appearance: Normal appearance. She is normal weight. She is ill-appearing. She is not toxic-appearing or diaphoretic.   HENT:      Right Ear: Tympanic membrane and ear canal normal.      Left Ear: Tympanic membrane and ear canal normal.      Mouth/Throat:      Mouth: Mucous membranes are moist.   Eyes:      General: No scleral icterus.     Extraocular Movements: Extraocular movements intact.      Conjunctiva/sclera: Conjunctivae normal.      Pupils: Pupils are equal, round, and reactive to light.   Cardiovascular:      Rate and Rhythm: Normal rate and regular rhythm.      Heart sounds: Normal heart sounds.   Pulmonary:      Effort: Pulmonary effort is normal.      Breath sounds: Wheezing present. No rhonchi.   Chest:      Chest wall: No tenderness.   Musculoskeletal:      Cervical back: Normal range of motion and neck supple.   Lymphadenopathy:       Cervical: Cervical adenopathy present.   Skin:     General: Skin is warm.      Capillary Refill: Capillary refill takes less than 2 seconds.   Neurological:      General: No focal deficit present.      Mental Status: She is alert and oriented to person, place, and time.   Psychiatric:         Mood and Affect: Mood normal.         Behavior: Behavior normal.         Thought Content: Thought content normal.         Judgment: Judgment normal.         ED Course                 Procedures             Critical Care time:  none               Results for orders placed or performed during the hospital encounter of 02/06/23 (from the past 24 hour(s))   Symptomatic Influenza A/B & SARS-CoV2 (COVID-19) Virus PCR Multiplex Nose    Specimen: Nose; Swab   Result Value Ref Range    Influenza A PCR Negative Negative    Influenza B PCR Negative Negative    RSV PCR Negative Negative    SARS CoV2 PCR Negative Negative    Narrative    Testing was performed using the Xpert Xpress CoV2/Flu/RSV Assay on the Cepheid GeneXpert Instrument. This test should be ordered for the detection of SARS-CoV-2 and influenza viruses in individuals who meet clinical and/or epidemiological criteria. Test performance is unknown in asymptomatic patients. This test is for in vitro diagnostic use under the FDA EUA for laboratories certified under CLIA to perform high or moderate complexity testing. This test has not been FDA cleared or approved. A negative result does not rule out the presence of PCR inhibitors in the specimen or target RNA in concentration below the limit of detection for the assay. If only one viral target is positive but coinfection with multiple targets is suspected, the sample should be re-tested with another FDA cleared, approved, or authorized test, if coinfection would change clinical management. This test was validated by the [facility]. These laboratories are certified under the Clinical Laboratory Improvement  Amendments of 1988 (CLIA-88) as qualified to perform high complexity laboratory testing.   XR Chest 2 Views    Narrative    EXAM: XR CHEST 2 VIEWS  LOCATION: [facility]  DATE/TIME: 2/6/2023 4:52 PM    INDICATION: cough  COMPARISON: 01/04/2022      Impression    IMPRESSION:     Heart size is normal. Lungs are clear bilaterally. Mediastinum and visualized bony structures are unremarkable.       Medications - No data to display    Assessments & Plan (with Medical Decision Making)     I have reviewed the nursing notes.    I have reviewed the findings, diagnosis, plan and need for follow up with the patient.    Elza Greer is a 47 year old female who is urgent care with cough, congestion for the past week.  Patient states that she has been having coughing attacks for over a week and has been diagnosed with URI symptoms.  At that time she was negative for COVID and influenza but would like retesting since she has tested negative multiple times in the past when she had COVID.  She feels like symptoms are worsening.  She has been using the ProAir inhaler, Tessalon Perles, and steroids with no improvement of symptoms.  She states that the neb treatment seems to worsen her coughing and the cold air worsens the cough.  She denies any chest pain, heart racing or skipping beats at this time.  She does have a history of asthma.      Vitals within normal limits other than elevated blood pressure.  She is wheezy.  Chest x-ray obtained today and was negative.  Repeat COVID and influenza were negative today.  Will treat with Augmentin and taper prednisone course in the next few days to see if this improves patient's symptoms.  Recommend follow-up with primary care doctor for recheck if symptoms persist.  Patient to go the emergency department if symptoms worsen or change.  Patient discharged in stable condition in agreement with plan.    Discharge Medication List as of 2/6/2023  5:26 PM      START  taking these medications    Details   amoxicillin-clavulanate (AUGMENTIN) 875-125 MG tablet Take 1 tablet by mouth 2 times daily for 10 days, Disp-20 tablet, R-0, E-Prescribe      !! predniSONE (DELTASONE) 10 MG tablet Take 4 tablets daily for 4 days,  take 2 tablets daily for 3 days, take 1 tablet daily for 3 days, take half a tablet for 3 days., Disp-28 tablet, R-0, E-Prescribe       !! - Potential duplicate medications found. Please discuss with provider.          Final diagnoses:   Upper respiratory tract infection, unspecified type   Acute bronchitis, unspecified organism   Exacerbation of asthma, unspecified asthma severity, unspecified whether persistent       2/6/2023   [facility] EMERGENCY DEPT

## 2023-03-02 ENCOUNTER — OFFICE VISIT (OUTPATIENT)
Dept: DERMATOLOGY | Facility: CLINIC | Age: 48
End: 2023-03-02
Attending: NURSE PRACTITIONER
Payer: COMMERCIAL

## 2023-03-02 DIAGNOSIS — L30.9 ECZEMA, UNSPECIFIED TYPE: ICD-10-CM

## 2023-03-02 DIAGNOSIS — M25.50 ARTHRALGIA, UNSPECIFIED JOINT: Primary | ICD-10-CM

## 2023-03-02 DIAGNOSIS — L40.9 PSORIASIS: ICD-10-CM

## 2023-03-02 DIAGNOSIS — B37.2 YEAST INFECTION OF THE SKIN: ICD-10-CM

## 2023-03-02 PROCEDURE — 11900 INJECT SKIN LESIONS </W 7: CPT | Performed by: PHYSICIAN ASSISTANT

## 2023-03-02 PROCEDURE — 99204 OFFICE O/P NEW MOD 45 MIN: CPT | Mod: 25 | Performed by: PHYSICIAN ASSISTANT

## 2023-03-02 RX ORDER — TACROLIMUS 1 MG/G
OINTMENT TOPICAL
Qty: 30 G | Refills: 4 | Status: ON HOLD | OUTPATIENT
Start: 2023-03-02 | End: 2023-10-23

## 2023-03-02 RX ORDER — CLOBETASOL PROPIONATE 0.05 G/100ML
SHAMPOO TOPICAL
Qty: 118 ML | Refills: 5 | Status: SHIPPED | OUTPATIENT
Start: 2023-03-02

## 2023-03-02 ASSESSMENT — PAIN SCALES - GENERAL: PAINLEVEL: NO PAIN (1)

## 2023-03-02 NOTE — LETTER
3/2/2023         RE: Elza Greer  [address] [facility] [address]        Dear Colleague,    Thank you for referring your patient, Elza Greer, to the [facility]. Please see a copy of my visit note below.    Elza Greer is an extremely pleasant 47 year old year old female patient here today for rash. She notes she has psoriasis on her scalp for the past year. She has been treating her scalp with clobetasol solution and fluocinolone. She notes she is also developing .  Patient has no other skin complaints today.  Remainder of the HPI, Meds, PMH, Allergies, FH, and SH was reviewed in chart.    Pertinent Hx:   Psoriasis   Past Medical History:   Diagnosis Date     Adjustment disorder with mixed anxiety and depressed mood      Arthritis      COPD (chronic obstructive pulmonary disease) (H)      Depressive disorder      History of blood transfusion     Had a blood transfusion after my hysterectomy in 2005     IBS (irritable bowel syndrome)      Incisional hernia, without obstruction or gangrene 2020     Lactose intolerance 05/12/2010     Migraine 05/12/2010     Mild intermittent asthma without complication 05/12/2010     PCOS (polycystic ovarian syndrome)      Tobacco use disorder      Vitamin D deficiency        Past Surgical History:   Procedure Laterality Date     ABDOMEN SURGERY       APPENDECTOMY OPEN N/A      CYSTECTOMY OVARIAN BENIGN  2001     HEPATICOJEJUNOSTOMY  2000    RnY jejunostomy from bile duct injury     HERNIORRHAPHY VENTRAL N/A 02/17/2012    open with mesh     HYSTERECTOMY TOTAL ABDOMINAL, BILATERAL SALPINGO-OOPHORECTOMY, COMBINED N/A 2005     LAPAROSCOPIC CHOLECYSTECTOMY  2006    complicated by bile duct injury     LAPAROSCOPIC HERNIORRHAPHY VENTRAL N/A 01/26/2021    Procedure: Open recurrent incarcerated ventral hernia repair X2;  Surgeon: Pradip Mckenzie MD;  Location: [facility] OR        Family History   Problem Relation Age of Onset     Dementia Mother      Coronary  Artery Disease Mother      Hypertension Mother      Hyperlipidemia Mother      Depression Mother      Mental Illness Father         Schizophrenia     Colon Cancer Maternal Grandmother      Breast Cancer Maternal Grandmother      Other Cancer Maternal Grandmother      Osteoporosis Other        Social History     Socioeconomic History     Marital status:      Spouse name: Not on file     Number of children: Not on file     Years of education: Not on file     Highest education level: Not on file   Occupational History     Not on file   Tobacco Use     Smoking status: Every Day     Packs/day: 1.00     Years: 23.00     Pack years: 23.00     Types: Cigarettes     Smokeless tobacco: Never     Tobacco comments:     4 cigs daily    Vaping Use     Vaping Use: Some days     Substances: Nicotine   Substance and Sexual Activity     Alcohol use: Yes     Comment: social     Drug use: No     Sexual activity: Yes     Partners: Male   Other Topics Concern     Parent/sibling w/ CABG, MI or angioplasty before 65F 55M? 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     Social Determinants of Health     Financial Resource Strain: Not on file   Food Insecurity: Not on file   Transportation Needs: Not on file   Physical Activity: Not on file   Stress: Not on file   Social Connections: Not on file   Intimate Partner Violence: Not on file   Housing Stability: Not on file       Outpatient Encounter Medications as of 3/2/2023   Medication Sig Dispense Refill     clobetasol propionate (CLOBEX) 0.05 % external shampoo Leave on scalp for 15 minutes then rinse. Use 1-2 times weekly. 118 mL 5     tacrolimus (PROTOPIC) 0.1 % external ointment Apply twice daily to eczema on hands as needed. 30 g 4     albuterol (ACCUNEB) 1.25 MG/3ML neb solution Take 1 vial (1.25 mg) by nebulization every 6 hours as needed for shortness of breath / dyspnea or wheezing 90 mL 0     albuterol (PROAIR HFA/PROVENTIL HFA/VENTOLIN HFA) 108 (90 Base)  MCG/ACT inhaler Inhale 1-2 puffs into the lungs every 4 hours as needed for shortness of breath, wheezing or cough 18 g 0     albuterol (PROAIR HFA/PROVENTIL HFA/VENTOLIN HFA) 108 (90 Base) MCG/ACT inhaler Inhale 2 puffs into the lungs every 6 hours 18 g 3     benzonatate (TESSALON) 200 MG capsule Take 1 capsule (200 mg) by mouth 3 times daily as needed for cough 30 capsule 0     Fluocinolone Acetonide Scalp 0.01 % OIL oil Daily as needed for scalp psoriasis 118.28 mL 0     fluticasone (FLONASE) 50 MCG/ACT nasal spray Spray 2 sprays into both nostrils daily 16 g 0     IBUPROFEN PO Take 600 mg by mouth every 6 hours as needed for moderate pain       Lidocaine (LIDOCARE) 4 % Patch Place 1-3 patches onto the skin every 24 hours To prevent lidocaine toxicity, patient should be patch free for 12 hrs daily. 30 patch 0     meloxicam (MOBIC) 7.5 MG tablet Take 1 tablet (7.5 mg) by mouth daily (Patient not taking: Reported on 9/1/2022) 30 tablet 1     nystatin (MYCOSTATIN) 597619 UNIT/GM external cream Apply topically 2 times daily 30 g 1     predniSONE (DELTASONE) 20 MG tablet Take two tablets (= 40mg) each day for 5 (five) days 10 tablet 0     rizatriptan (MAXALT) 10 MG tablet Take 1 tablet (10 mg) by mouth at onset of headache for migraine May repeat in 2 hours. Max 3 tablets/24 hours. 30 tablet 1     topiramate (TOPAMAX) 25 MG tablet Take 3 tablets (75 mg) by mouth daily 90 tablet 11     triamcinolone (KENALOG) 0.1 % external cream Apply topically 2 times daily 80 g 0     vitamin D2 (ERGOCALCIFEROL) 70725 units (1250 mcg) capsule Take 1 capsule (50,000 Units) by mouth once a week 12 capsule 1     Facility-Administered Encounter Medications as of 3/2/2023   Medication Dose Route Frequency Provider Last Rate Last Admin     [COMPLETED] triamcinolone acetonide (KENALOG-10) injection 10 mg  10 mg Intra-Lesional Once Cony Short PA-C   10 mg at 03/02/23 1613             O:   NAD, WDWN, Alert & Oriented, Mood &  Affect wnl, Vitals stable   Here today alone   [facility] 08/26/2005    General appearance normal   Vitals stable   Alert, oriented and in no acute distress     Thick psoriasiform plaque on left side of scalp  Mild eczematous plaques on hands, forearms       Eyes: Conjunctivae/lids:Normal     ENT: Lips: normal    MSK:Normal    Pulm: Breathing Normal    Neuro/Psych: Orientation:Alert and Orientedx3 ; Mood/Affect:normal   A/P:  1. Scalp psoriasis   IL TAC: PGACAC discussed.  Risks including but not limited to injection site reaction, bruising, no resolution.  All questions answered and entertained to patient s satisfaction.  Informed consent obtained.  IL TAC in concentration of 10mg/ml was injected ID to scalp psoriasis.  Total injected was  0.4 ml.  Patient tolerated without complications and given wound care instructions, including not to move product around.  Return in 4 weeks for follow-up and possible additional IL TAC.    Use clobetasol shampoo, leave on 15 minutes then rinse.   She notes joint pains/swelling hand and feet, she would like rheumatology consult.     2. Atopic dermatitis  Use daily moisturizers.   Apply protopic twice daily as needed.         Again, thank you for allowing me to participate in the care of your patient.        Sincerely,        Cony Ha PA-C

## 2023-03-03 NOTE — PROGRESS NOTES
Elza Greer is an extremely pleasant 47 year old year old female patient here today for rash. She notes she has psoriasis on her scalp for the past year. She has been treating her scalp with clobetasol solution and fluocinolone. She notes she is also developing .  Patient has no other skin complaints today.  Remainder of the HPI, Meds, PMH, Allergies, FH, and SH was reviewed in chart.    Pertinent Hx:   Psoriasis   Past Medical History:   Diagnosis Date     Adjustment disorder with mixed anxiety and depressed mood      Arthritis      COPD (chronic obstructive pulmonary disease) (H)      Depressive disorder      History of blood transfusion     Had a blood transfusion after my hysterectomy in 2005     IBS (irritable bowel syndrome)      Incisional hernia, without obstruction or gangrene 2020     Lactose intolerance 05/12/2010     Migraine 05/12/2010     Mild intermittent asthma without complication 05/12/2010     PCOS (polycystic ovarian syndrome)      Tobacco use disorder      Vitamin D deficiency        Past Surgical History:   Procedure Laterality Date     ABDOMEN SURGERY       APPENDECTOMY OPEN N/A      CYSTECTOMY OVARIAN BENIGN  2001     HEPATICOJEJUNOSTOMY  2000    RnY jejunostomy from bile duct injury     HERNIORRHAPHY VENTRAL N/A 02/17/2012    open with mesh     HYSTERECTOMY TOTAL ABDOMINAL, BILATERAL SALPINGO-OOPHORECTOMY, COMBINED N/A 2005     LAPAROSCOPIC CHOLECYSTECTOMY  2006    complicated by bile duct injury     LAPAROSCOPIC HERNIORRHAPHY VENTRAL N/A 01/26/2021    Procedure: Open recurrent incarcerated ventral hernia repair X2;  Surgeon: Pradip Mckenzie MD;  Location: [facility]        Family History   Problem Relation Age of Onset     Dementia Mother      Coronary Artery Disease Mother      Hypertension Mother      Hyperlipidemia Mother      Depression Mother      Mental Illness Father         Schizophrenia     Colon Cancer Maternal Grandmother      Breast Cancer Maternal Grandmother      Other Cancer  Maternal Grandmother      Osteoporosis Other        Social History     Socioeconomic History     Marital status:      Spouse name: Not on file     Number of children: Not on file     Years of education: Not on file     Highest education level: Not on file   Occupational History     Not on file   Tobacco Use     Smoking status: Every Day     Packs/day: 1.00     Years: 23.00     Pack years: 23.00     Types: Cigarettes     Smokeless tobacco: Never     Tobacco comments:     4 cigs daily    Vaping Use     Vaping Use: Some days     Substances: Nicotine   Substance and Sexual Activity     Alcohol use: Yes     Comment: social     Drug use: No     Sexual activity: Yes     Partners: Male   Other Topics Concern     Parent/sibling w/ CABG, MI or angioplasty before 65F 55M? 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     Social Determinants of Health     Financial Resource Strain: Not on file   Food Insecurity: Not on file   Transportation Needs: Not on file   Physical Activity: Not on file   Stress: Not on file   Social Connections: Not on file   Intimate Partner Violence: Not on file   Housing Stability: Not on file       Outpatient Encounter Medications as of 3/2/2023   Medication Sig Dispense Refill     clobetasol propionate (CLOBEX) 0.05 % external shampoo Leave on scalp for 15 minutes then rinse. Use 1-2 times weekly. 118 mL 5     tacrolimus (PROTOPIC) 0.1 % external ointment Apply twice daily to eczema on hands as needed. 30 g 4     albuterol (ACCUNEB) 1.25 MG/3ML neb solution Take 1 vial (1.25 mg) by nebulization every 6 hours as needed for shortness of breath / dyspnea or wheezing 90 mL 0     albuterol (PROAIR HFA/PROVENTIL HFA/VENTOLIN HFA) 108 (90 Base) MCG/ACT inhaler Inhale 1-2 puffs into the lungs every 4 hours as needed for shortness of breath, wheezing or cough 18 g 0     albuterol (PROAIR HFA/PROVENTIL HFA/VENTOLIN HFA) 108 (90 Base) MCG/ACT inhaler Inhale 2 puffs into the lungs every 6  hours 18 g 3     benzonatate (TESSALON) 200 MG capsule Take 1 capsule (200 mg) by mouth 3 times daily as needed for cough 30 capsule 0     Fluocinolone Acetonide Scalp 0.01 % OIL oil Daily as needed for scalp psoriasis 118.28 mL 0     fluticasone (FLONASE) 50 MCG/ACT nasal spray Spray 2 sprays into both nostrils daily 16 g 0     IBUPROFEN PO Take 600 mg by mouth every 6 hours as needed for moderate pain       Lidocaine (LIDOCARE) 4 % Patch Place 1-3 patches onto the skin every 24 hours To prevent lidocaine toxicity, patient should be patch free for 12 hrs daily. 30 patch 0     meloxicam (MOBIC) 7.5 MG tablet Take 1 tablet (7.5 mg) by mouth daily (Patient not taking: Reported on 9/1/2022) 30 tablet 1     nystatin (MYCOSTATIN) 798312 UNIT/GM external cream Apply topically 2 times daily 30 g 1     predniSONE (DELTASONE) 20 MG tablet Take two tablets (= 40mg) each day for 5 (five) days 10 tablet 0     rizatriptan (MAXALT) 10 MG tablet Take 1 tablet (10 mg) by mouth at onset of headache for migraine May repeat in 2 hours. Max 3 tablets/24 hours. 30 tablet 1     topiramate (TOPAMAX) 25 MG tablet Take 3 tablets (75 mg) by mouth daily 90 tablet 11     triamcinolone (KENALOG) 0.1 % external cream Apply topically 2 times daily 80 g 0     vitamin D2 (ERGOCALCIFEROL) 77653 units (1250 mcg) capsule Take 1 capsule (50,000 Units) by mouth once a week 12 capsule 1     Facility-Administered Encounter Medications as of 3/2/2023   Medication Dose Route Frequency Provider Last Rate Last Admin     [COMPLETED] triamcinolone acetonide (KENALOG-10) injection 10 mg  10 mg Intra-Lesional Once Cony Short PA-C   10 mg at 03/02/23 1613             O:   NAD, WDWN, Alert & Oriented, Mood & Affect wnl, Vitals stable   Here today alone   LMP 08/26/2005    General appearance normal   Vitals stable   Alert, oriented and in no acute distress     Thick psoriasiform plaque on left side of scalp  Mild eczematous plaques on hands, forearms        Eyes: Conjunctivae/lids:Normal     ENT: Lips: normal    MSK:Normal    Pulm: Breathing Normal    Neuro/Psych: Orientation:Alert and Orientedx3 ; Mood/Affect:normal   A/P:  1. Scalp psoriasis   IL TAC: PGACAC discussed.  Risks including but not limited to injection site reaction, bruising, no resolution.  All questions answered and entertained to patient s satisfaction.  Informed consent obtained.  IL TAC in concentration of 10mg/ml was injected ID to scalp psoriasis.  Total injected was  0.4 ml.  Patient tolerated without complications and given wound care instructions, including not to move product around.  Return in 4 weeks for follow-up and possible additional IL TAC.    Use clobetasol shampoo, leave on 15 minutes then rinse.   She notes joint pains/swelling hand and feet, she would like rheumatology consult.     2. Atopic dermatitis  Use daily moisturizers.   Apply protopic twice daily as needed.

## 2023-05-01 ENCOUNTER — TELEPHONE (OUTPATIENT)
Dept: DERMATOLOGY | Facility: CLINIC | Age: 48
End: 2023-05-01
Payer: COMMERCIAL

## 2023-05-01 NOTE — TELEPHONE ENCOUNTER
See note below.     Looks like 5-4-23 appt is for Eczema follow up.     Do you want to switch this to a video visit? Or?..    Please advise. Ophelia Sapp RN

## 2023-05-01 NOTE — TELEPHONE ENCOUNTER
[facility] Call Center    Phone Message    May a detailed message be left on voicemail: yes     Reason for Call: Other: Pt. Is supposed to have appt on 05/4/2023 to get RX refilled. However pt. Cannot afford each visit cost every 2-3 months. Pt is wondering if she has to be seen this often or if there was any other more cost effective options. Please call pt. To discuss. Thanks!      Action Taken: Other: Derm    Travel Screening: Not Applicable

## 2023-05-02 NOTE — TELEPHONE ENCOUNTER
"Spoke to patient and she stated: \"My scalp isn't really any better at all\"    \"Can you let her know that it was $498 to see her per visit per my insurance, and I can't afford to do that and I still have to see the Rheumatologist, too, but that isn't until August... I have a high deductible insurance and they did cover $280 of the last bill, but not the $498...\"    \"I will talk to my  and see if I should keep my appointment or not, she did want to maybe biopsy a spot, but I am not sure if I can afford to see her...\" \"If I decide to cancel, I will just wagner the appointment online..\"     Ophelia Sapp RN         "

## 2023-05-02 NOTE — TELEPHONE ENCOUNTER
The visit was to see how in the injections did and repeat injections if needed. Did she feel like this helped her scalp?  We can also discuss systemic medication.

## 2023-05-03 NOTE — TELEPHONE ENCOUNTER
We can discuss systemic treatment options at next visit if wanted since her scalp did not improve with injections or she can wait to see rheumatology, if she has psoriatic arthritis they will discuss systemic medication as well.

## 2023-05-11 ENCOUNTER — TELEPHONE (OUTPATIENT)
Dept: FAMILY MEDICINE | Facility: CLINIC | Age: 48
End: 2023-05-11
Payer: COMMERCIAL

## 2023-05-11 DIAGNOSIS — Z12.11 COLON CANCER SCREENING: Primary | ICD-10-CM

## 2023-05-11 NOTE — LETTER
May 11, 2023    To  Elza Greer  [address] [place] [id number]    Your team at [facility] cares about your health. We have reviewed your chart and based on our findings; we are making the following recommendations to better manage your health.     You are in particular need of attention regarding the following:     Call or MyChart message your clinic to schedule a colonoscopy, schedule/ a FIT Test, or order a Cologuard test. If you are unsure what type of test you need, please call your clinic and speak to clinic staff.   Colon cancer is now the second leading cause of cancer-related deaths in the [place] for both men and women and there are over 130,000 new cases and 50,000 deaths per year from colon cancer. Colonoscopies can prevent 90-95% of these deaths. Problem lesions can be removed before they ever become cancer. This test is not only looking for cancer, but also getting rid of precancerous lesions.   Please call [phone] to schedule a colonoscopy.  Contact your clinic to schedule/ your FIT Test.     If you have already completed these items, please contact the clinic via phone or   [facility] so your care team can review and update your records. Thank you for   choosing [facility] for your healthcare needs. For any questions,   concerns, or to schedule an appointment please contact our clinic.    Healthy Regards,      Your [facility] Care Team

## 2023-05-11 NOTE — TELEPHONE ENCOUNTER
Patient Quality Outreach    Patient is due for the following:   Colon Cancer Screening    Next Steps:   schedule colonoscopy     Type of outreach:    Sent letter.    Next Steps:  Reach out within 90 days via Letter.    Max number of attempts reached: No. Will try again in 90 days if patient still on fail list.    Questions for provider review:    None           Lia Messina CMA  Chart routed to none.

## 2023-05-25 ENCOUNTER — OFFICE VISIT (OUTPATIENT)
Dept: FAMILY MEDICINE | Facility: CLINIC | Age: 48
End: 2023-05-25
Payer: COMMERCIAL

## 2023-05-25 VITALS
WEIGHT: 246 LBS | HEART RATE: 91 BPM | SYSTOLIC BLOOD PRESSURE: 134 MMHG | RESPIRATION RATE: 18 BRPM | DIASTOLIC BLOOD PRESSURE: 86 MMHG | TEMPERATURE: 98.7 F | BODY MASS INDEX: 42.23 KG/M2 | OXYGEN SATURATION: 95 %

## 2023-05-25 DIAGNOSIS — N63.20 MASS OF LEFT BREAST, UNSPECIFIED QUADRANT: Primary | ICD-10-CM

## 2023-05-25 DIAGNOSIS — N61.0 CELLULITIS OF BREAST: ICD-10-CM

## 2023-05-25 PROCEDURE — 99213 OFFICE O/P EST LOW 20 MIN: CPT | Performed by: INTERNAL MEDICINE

## 2023-05-25 RX ORDER — SULFAMETHOXAZOLE/TRIMETHOPRIM 800-160 MG
1 TABLET ORAL 2 TIMES DAILY
Qty: 14 TABLET | Refills: 0 | Status: SHIPPED | OUTPATIENT
Start: 2023-05-25 | End: 2023-06-01

## 2023-05-25 ASSESSMENT — ASTHMA QUESTIONNAIRES
QUESTION_5 LAST FOUR WEEKS HOW WOULD YOU RATE YOUR ASTHMA CONTROL: SOMEWHAT CONTROLLED
QUESTION_2 LAST FOUR WEEKS HOW OFTEN HAVE YOU HAD SHORTNESS OF BREATH: THREE TO SIX TIMES A WEEK
ACT_TOTALSCORE: 18
ACT_TOTALSCORE: 18
QUESTION_4 LAST FOUR WEEKS HOW OFTEN HAVE YOU USED YOUR RESCUE INHALER OR NEBULIZER MEDICATION (SUCH AS ALBUTEROL): ONE OR TWO TIMES PER DAY
QUESTION_3 LAST FOUR WEEKS HOW OFTEN DID YOUR ASTHMA SYMPTOMS (WHEEZING, COUGHING, SHORTNESS OF BREATH, CHEST TIGHTNESS OR PAIN) WAKE YOU UP AT NIGHT OR EARLIER THAN USUAL IN THE MORNING: NOT AT ALL
QUESTION_1 LAST FOUR WEEKS HOW MUCH OF THE TIME DID YOUR ASTHMA KEEP YOU FROM GETTING AS MUCH DONE AT WORK, SCHOOL OR AT HOME: NONE OF THE TIME

## 2023-05-25 ASSESSMENT — PAIN SCALES - GENERAL: PAINLEVEL: MODERATE PAIN (4)

## 2023-05-25 NOTE — PROGRESS NOTES
Assessment & Plan   Problem List Items Addressed This Visit    None  Visit Diagnoses     Mass of left breast, unspecified quadrant    -  Primary    Cellulitis of breast        Relevant Medications    sulfamethoxazole-trimethoprim (BACTRIM DS) 800-160 MG tablet           Inflammatory breast cancer considered, but exam is most consistent with abscess and cellulitis.  Start antibiotic, low threshold for diagnostic imaging  If not improving  Patient Instructions   1. Appears to be infection with overlying cellulitis  2. Start antibiotic Sulfa (Bactrim) twice daily x 7 week  3. Pain, redness, lump should be nearly 100% gone by the time the antibiotic is finished  4. If not, send message to myself or PCP for diagnostic mammogram and ultrasound         Rayne Jackson, [facility] Bertrand is a 47 year old, presenting for the following health issues:  Breast Problem        5/25/2023     9:53 AM   Additional Questions   Roomed by Alyse STOCKTON   Accompanied by self         5/25/2023     9:53 AM   Patient Reported Additional Medications   Patient reports taking the following new medications Airborne, hair skin and nails, weight loss gummy, multivitamin      HPI     Breast Concern  Onset/Duration: Noticed it about 2 days ago   Description:   Location: lower outer quadrant on left breast   Pain or tenderness: YES  Redness: YES  Intensity: mild  Progression of Symptoms: same  Accompanying Signs & Symptoms:  Any lumps in axillary region: No  Movable: N/A  Nipple discharge: no   Changes in the skin or nipple: redness around lump   On Hormone therapy: No  Does it change with menstrual cycle: hysterectomy when 30  Previous history of similar problem: no, but clogged milk duct in the past   First degree relative with breast cancer: a positive family history for breast cancer in her maternal grandmother.  Precipitating factors:           Worsened by: tender to the touch   Alleviating factors:             Improved by: no   Therapies tried and outcome: slightly covered with neosporin;   Patient's last menstrual period was 08/26/2005.  --there is tender red lump in the skin of the left breast, noted 2-3 days ago; doesn't think is getting larger  --she reports 2 months of intermittent fevers, lasting a few hours          Current Outpatient Medications   Medication Sig Dispense Refill     albuterol (ACCUNEB) 1.25 MG/3ML neb solution Take 1 vial (1.25 mg) by nebulization every 6 hours as needed for shortness of breath / dyspnea or wheezing 90 mL 0     albuterol (PROAIR HFA/PROVENTIL HFA/VENTOLIN HFA) 108 (90 Base) MCG/ACT inhaler Inhale 1-2 puffs into the lungs every 4 hours as needed for shortness of breath, wheezing or cough 18 g 0     albuterol (PROAIR HFA/PROVENTIL HFA/VENTOLIN HFA) 108 (90 Base) MCG/ACT inhaler Inhale 2 puffs into the lungs every 6 hours 18 g 3     benzonatate (TESSALON) 200 MG capsule Take 1 capsule (200 mg) by mouth 3 times daily as needed for cough 30 capsule 0     clobetasol propionate (CLOBEX) 0.05 % external shampoo Leave on scalp for 15 minutes then rinse. Use 1-2 times weekly. 118 mL 5     Fluocinolone Acetonide Scalp 0.01 % OIL oil Daily as needed for scalp psoriasis 118.28 mL 0     fluticasone (FLONASE) 50 MCG/ACT nasal spray Spray 2 sprays into both nostrils daily 16 g 0     IBUPROFEN PO Take 600 mg by mouth every 6 hours as needed for moderate pain       meloxicam (MOBIC) 7.5 MG tablet Take 1 tablet (7.5 mg) by mouth daily 30 tablet 1     nystatin (MYCOSTATIN) 441021 UNIT/GM external cream Apply topically 2 times daily 30 g 1     predniSONE (DELTASONE) 20 MG tablet Take two tablets (= 40mg) each day for 5 (five) days 10 tablet 0     rizatriptan (MAXALT) 10 MG tablet Take 1 tablet (10 mg) by mouth at onset of headache for migraine May repeat in 2 hours. Max 3 tablets/24 hours. 30 tablet 1     tacrolimus (PROTOPIC) 0.1 % external ointment Apply twice daily to eczema on hands as  needed. 30 g 4     topiramate (TOPAMAX) 25 MG tablet Take 3 tablets (75 mg) by mouth daily 90 tablet 11     triamcinolone (KENALOG) 0.1 % external cream Apply topically 2 times daily 80 g 0     vitamin D2 (ERGOCALCIFEROL) 19124 units (1250 mcg) capsule Take 1 capsule (50,000 Units) by mouth once a week 12 capsule 1         Review of Systems   Constitutional, HEENT, cardiovascular, pulmonary, gi and gu systems are negative, except as otherwise noted.      Objective    BP (!) 138/98   Pulse 91   Temp 98.7  F (37.1  C) (Tympanic)   Resp 18   Wt 111.6 kg (246 lb)   LMP 08/26/2005   SpO2 95%   BMI 42.23 kg/m    Body mass index is 42.23 kg/m .  Physical Exam   GENERAL APPEARANCE: healthy, alert, no distress and over weight  BREAST: normal without other masses, tenderness or nipple discharge and no palpable axillary masses or adenopathy  SKIN: erythematous tender nodule which appears to be in the subQ layer of tissue.  Spreading erythema and warmth

## 2023-05-25 NOTE — TELEPHONE ENCOUNTER
Patient was seen in  Clinic today and would like a referral for a colonoscopy       Lia Messina MA

## 2023-05-25 NOTE — PATIENT INSTRUCTIONS
Appears to be infection with overlying cellulitis  Start antibiotic Sulfa (Bactrim) twice daily x 7 week  Pain, redness, lump should be nearly 100% gone by the time the antibiotic is finished  If not, send message to myself or PCP for diagnostic mammogram and ultrasound

## 2023-07-05 ENCOUNTER — PATIENT OUTREACH (OUTPATIENT)
Dept: CARE COORDINATION | Facility: CLINIC | Age: 48
End: 2023-07-05
Payer: COMMERCIAL

## 2023-07-15 ENCOUNTER — HOSPITAL ENCOUNTER (EMERGENCY)
Facility: CLINIC | Age: 48
Discharge: HOME | End: 2023-07-15
Attending: EMERGENCY MEDICINE | Admitting: EMERGENCY MEDICINE
Payer: COMMERCIAL

## 2023-07-15 VITALS
TEMPERATURE: 100.6 F | WEIGHT: 245 LBS | HEART RATE: 106 BPM | HEIGHT: 64 IN | OXYGEN SATURATION: 97 % | RESPIRATION RATE: 24 BRPM | DIASTOLIC BLOOD PRESSURE: 92 MMHG | BODY MASS INDEX: 41.83 KG/M2 | SYSTOLIC BLOOD PRESSURE: 153 MMHG

## 2023-07-15 DIAGNOSIS — J18.9 PNEUMONIA OF RIGHT LUNG DUE TO INFECTIOUS ORGANISM, UNSPECIFIED PART OF LUNG: ICD-10-CM

## 2023-07-15 PROCEDURE — 99283 EMERGENCY DEPT VISIT LOW MDM: CPT | Performed by: EMERGENCY MEDICINE

## 2023-07-15 PROCEDURE — 99284 EMERGENCY DEPT VISIT MOD MDM: CPT | Performed by: EMERGENCY MEDICINE

## 2023-07-15 PROCEDURE — 250N000013 HC RX MED GY IP 250 OP 250 PS 637: Performed by: EMERGENCY MEDICINE

## 2023-07-15 RX ORDER — PREDNISONE 20 MG/1
20 TABLET ORAL DAILY
Qty: 10 TABLET | Refills: 0 | Status: ON HOLD | OUTPATIENT
Start: 2023-07-15 | End: 2023-10-23

## 2023-07-15 RX ORDER — ACETAMINOPHEN 325 MG/1
975 TABLET ORAL ONCE
Status: COMPLETED | OUTPATIENT
Start: 2023-07-15 | End: 2023-07-15

## 2023-07-15 RX ORDER — CODEINE PHOSPHATE AND GUAIFENESIN 10; 100 MG/5ML; MG/5ML
1-2 SOLUTION ORAL EVERY 4 HOURS PRN
Qty: 120 ML | Refills: 0 | Status: ON HOLD | OUTPATIENT
Start: 2023-07-15 | End: 2023-10-23

## 2023-07-15 RX ADMIN — ACETAMINOPHEN 975 MG: 325 TABLET, FILM COATED ORAL at 21:08

## 2023-07-15 ASSESSMENT — ACTIVITIES OF DAILY LIVING (ADL): ADLS_ACUITY_SCORE: 33

## 2023-07-16 NOTE — ED PROVIDER NOTES
"  History     Chief Complaint   Patient presents with    Cough     Productive yellow    Shortness of Breath     HPI  Elza Greer is a 48 year old female who has medical history significant for anxiety, tobacco abuse, asthma, presenting the emergency department with approximately 10-day history of cough, productive of yellowish sputum.  Patient has had some ongoing episodes of shortness of breath.   was ill with similar types of symptoms, however his cough resolved.  [place] and did not take her temperature.  Has had chills.  Arrives febrile at 100.6 degrees currently.  Some slight right-sided chest pain, worsened with coughing.  No exertional type symptoms.  Denies any abdominal pains.  No recent antibiotic use.  No other long distance travel.  No lower extremity swelling.  No prior history of blood clots.    Allergies:  Allergies   Allergen Reactions    Azithromycin Anaphylaxis    Doxycycline Other (See Comments)     Upset stomach and felt \"really out of it\"    Latex Hives    Oxycodone Nausea and Vomiting       Problem List:    Patient Active Problem List    Diagnosis Date Noted    Morbid obesity (H) 10/04/2021     Priority: Medium    Ventral hernia without obstruction or gangrene 01/13/2021     Priority: Medium     Added automatically from request for surgery [id number]      Anxiety 02/18/2020     Priority: Medium    Tobacco use disorder 11/14/2016     Priority: Medium    Mild intermittent asthma without complication 05/12/2010     Priority: Medium        Past Medical History:    Past Medical History:   Diagnosis Date    Adjustment disorder with mixed anxiety and depressed mood     Arthritis     COPD (chronic obstructive pulmonary disease) (H)     Depressive disorder     History of blood transfusion     IBS (irritable bowel syndrome)     Incisional hernia, without obstruction or gangrene 2020    Lactose intolerance 05/12/2010    Migraine 05/12/2010    Mild intermittent " asthma without complication 05/12/2010    PCOS (polycystic ovarian syndrome)     Tobacco use disorder     Vitamin D deficiency        Past Surgical History:    Past Surgical History:   Procedure Laterality Date    ABDOMEN SURGERY      APPENDECTOMY OPEN N/A     CYSTECTOMY OVARIAN BENIGN  2001    HEPATICOJEJUNOSTOMY  2000    RnY jejunostomy from bile duct injury    HERNIORRHAPHY VENTRAL N/A 02/17/2012    open with mesh    HYSTERECTOMY TOTAL ABDOMINAL, BILATERAL SALPINGO-OOPHORECTOMY, COMBINED N/A 2005    LAPAROSCOPIC CHOLECYSTECTOMY  2006    complicated by bile duct injury    LAPAROSCOPIC HERNIORRHAPHY VENTRAL N/A 01/26/2021    Procedure: Open recurrent incarcerated ventral hernia repair X2;  Surgeon: Pradip Mckenzie MD;  Location: [facility] OR       Family History:    Family History   Problem Relation Age of Onset    Dementia Mother     Coronary Artery Disease Mother     Hypertension Mother     Hyperlipidemia Mother     Depression Mother     Mental Illness Father         Schizophrenia    Colon Cancer Maternal Grandmother     Breast Cancer Maternal Grandmother     Other Cancer Maternal Grandmother     Osteoporosis Other        Social History:  Marital Status:   [2]  Social History     Tobacco Use    Smoking status: Every Day     Packs/day: 1.00     Years: 23.00     Pack years: 23.00     Types: Cigarettes    Smokeless tobacco: Never    Tobacco comments:     Starting patches    Vaping Use    Vaping Use: Former    Substances: Nicotine   Substance Use Topics    Alcohol use: Yes     Comment: social    Drug use: No        Medications:    amoxicillin-clavulanate (AUGMENTIN) 875-125 MG tablet  guaiFENesin-codeine (ROBITUSSIN AC) 100-10 MG/5ML solution  predniSONE (DELTASONE) 20 MG tablet  albuterol (ACCUNEB) 1.25 MG/3ML neb solution  albuterol (PROAIR HFA/PROVENTIL HFA/VENTOLIN HFA) 108 (90 Base) MCG/ACT inhaler  albuterol (PROAIR HFA/PROVENTIL HFA/VENTOLIN HFA) 108 (90 Base) MCG/ACT inhaler  benzonatate (TESSALON) 200 MG  "capsule  clobetasol propionate (CLOBEX) 0.05 % external shampoo  Fluocinolone Acetonide Scalp 0.01 % OIL oil  fluticasone (FLONASE) 50 MCG/ACT nasal spray  IBUPROFEN PO  meloxicam (MOBIC) 7.5 MG tablet  nystatin (MYCOSTATIN) 000926 UNIT/GM external cream  rizatriptan (MAXALT) 10 MG tablet  tacrolimus (PROTOPIC) 0.1 % external ointment  topiramate (TOPAMAX) 25 MG tablet  triamcinolone (KENALOG) 0.1 % external cream  vitamin D2 (ERGOCALCIFEROL) 23953 units (1250 mcg) capsule          Review of Systems  See HPI  Physical Exam   BP: (!) 153/92  Pulse: 106  Temp: (!) 100.6  F (38.1  C)  Resp: 24  Height: 162.6 cm (5' 4\")  Weight: 111.1 kg (245 lb)  SpO2: 97 %      Physical Exam  BP (!) 153/92   Pulse 106   Temp (!) 100.6  F (38.1  C) (Oral)   Resp 24   Ht 1.626 m (5' 4\")   Wt 111.1 kg (245 lb)   LMP 08/26/2005   SpO2 97%   BMI 42.05 kg/m    General: alert and in moderate acute distress  Head: atraumatic, normocephalic  Abd: nondistended  Lungs:  nonlabored.    Musculoskel/Extremities: normal extremities, no apparent edema, and full AROM of major joints  Skin: no rashes, no diaphoresis and skin color normal  Neuro: Patient awake, alert, oriented, speech is fluent, gait is normal  Psychiatric: affect/mood normal, cooperative, normal judgement/insight and memory intact    ED Course                 Procedures              Critical Care time:  none               No results found for this or any previous visit (from the past 24 hour(s)).    Medications   acetaminophen (TYLENOL) tablet 975 mg (975 mg Oral $Given 7/15/23 2108)   ibuprofen (ADVIL/MOTRIN) tablet 600 mg (600 mg Oral Not Given 7/15/23 2109)       Assessments & Plan (with Medical Decision Making)  48 year old female presenting with cough, productive of yellowish sputum.  Cough present over the past 10 days.  Patient arrives febrile at 100.6 degrees, and patient does have underlying history of asthma.  She has allergies to doxycycline, in addition to " azithromycin.    Based on the ongoing cough, fever, productive of yellowish sputum, will treat for pneumonia with Augmentin.  Prednisone prescription also given.  Patient encouraged to follow-up in clinic if not improving, and return instructions discussed if worsening symptoms.  We will treat as community-acquired pneumonia.  No appreciable wheezing on exam.  Patient is encouraged continued nebulizer and inhaler use.     I have reviewed the nursing notes.    I have reviewed the findings, diagnosis, plan and need for follow up with the patient.             Discharge Medication List as of 7/15/2023  9:28 PM        START taking these medications    Details   amoxicillin-clavulanate (AUGMENTIN) 875-125 MG tablet Take 1 tablet by mouth 2 times daily, Disp-20 tablet, R-0, [facility]      guaiFENesin-codeine (ROBITUSSIN AC) 100-10 MG/5ML solution Take 5-10 mLs by mouth every 4 hours as needed for cough, Disp-120 mL, R-0, [facility]      predniSONE (DELTASONE) 20 MG tablet Take 1 tablet (20 mg) by mouth daily, Disp-10 tablet, R-0, [facility]             Final diagnoses:   Pneumonia of right lung due to infectious organism, unspecified part of lung       7/15/2023   [facility] EMERGENCY DEPT       Jarret Kearney MD  07/15/23 1678

## 2023-07-16 NOTE — ED TRIAGE NOTES
10 days of a cough. There is a productive cough with yellow phlegm. There has been right sided chest pain since ~ 09:00 today.

## 2023-07-16 NOTE — DISCHARGE INSTRUCTIONS
Tylenol and ibuprofen as needed for pain and fever/chills.   Antibiotics as prescribed.   Follow up as needed.

## 2023-07-26 ENCOUNTER — ANCILLARY PROCEDURE (OUTPATIENT)
Dept: GENERAL RADIOLOGY | Facility: CLINIC | Age: 48
End: 2023-07-26
Attending: NURSE PRACTITIONER
Payer: COMMERCIAL

## 2023-07-26 ENCOUNTER — HOSPITAL ENCOUNTER (OUTPATIENT)
Facility: CLINIC | Age: 48
End: 2023-07-26
Attending: SURGERY | Admitting: SURGERY
Payer: COMMERCIAL

## 2023-07-26 ENCOUNTER — OFFICE VISIT (OUTPATIENT)
Dept: FAMILY MEDICINE | Facility: CLINIC | Age: 48
End: 2023-07-26
Payer: COMMERCIAL

## 2023-07-26 ENCOUNTER — TELEPHONE (OUTPATIENT)
Dept: GASTROENTEROLOGY | Facility: CLINIC | Age: 48
End: 2023-07-26

## 2023-07-26 VITALS
RESPIRATION RATE: 18 BRPM | DIASTOLIC BLOOD PRESSURE: 82 MMHG | HEART RATE: 90 BPM | BODY MASS INDEX: 43.02 KG/M2 | TEMPERATURE: 98.9 F | HEIGHT: 64 IN | SYSTOLIC BLOOD PRESSURE: 132 MMHG | WEIGHT: 252 LBS | OXYGEN SATURATION: 97 %

## 2023-07-26 DIAGNOSIS — R05.1 ACUTE COUGH: ICD-10-CM

## 2023-07-26 DIAGNOSIS — J20.9 ACUTE BRONCHITIS WITH SYMPTOMS > 10 DAYS: Primary | ICD-10-CM

## 2023-07-26 DIAGNOSIS — Z12.11 SPECIAL SCREENING FOR MALIGNANT NEOPLASMS, COLON: Primary | ICD-10-CM

## 2023-07-26 DIAGNOSIS — R09.81 NASAL CONGESTION: ICD-10-CM

## 2023-07-26 DIAGNOSIS — E55.9 VITAMIN D DEFICIENCY: ICD-10-CM

## 2023-07-26 LAB — L PNEUMO1 AG UR QL IA: NEGATIVE

## 2023-07-26 PROCEDURE — 87899 AGENT NOS ASSAY W/OPTIC: CPT | Performed by: NURSE PRACTITIONER

## 2023-07-26 PROCEDURE — 71046 X-RAY EXAM CHEST 2 VIEWS: CPT | Mod: TC | Performed by: RADIOLOGY

## 2023-07-26 PROCEDURE — 99214 OFFICE O/P EST MOD 30 MIN: CPT | Performed by: NURSE PRACTITIONER

## 2023-07-26 RX ORDER — ERGOCALCIFEROL 1.25 MG/1
50000 CAPSULE, LIQUID FILLED ORAL WEEKLY
Qty: 12 CAPSULE | Refills: 1 | Status: CANCELLED | OUTPATIENT
Start: 2023-07-26

## 2023-07-26 RX ORDER — FLUTICASONE PROPIONATE 50 MCG
2 SPRAY, SUSPENSION (ML) NASAL DAILY
Qty: 16 G | Refills: 0 | Status: SHIPPED | OUTPATIENT
Start: 2023-07-26

## 2023-07-26 RX ORDER — ALBUTEROL SULFATE 90 UG/1
1-2 AEROSOL, METERED RESPIRATORY (INHALATION) EVERY 4 HOURS PRN
Qty: 18 G | Refills: 0 | Status: SHIPPED | OUTPATIENT
Start: 2023-07-26 | End: 2024-04-01

## 2023-07-26 RX ORDER — NICOTINE 21 MG/24HR
1 PATCH, TRANSDERMAL 24 HOURS TRANSDERMAL EVERY 24 HOURS
COMMUNITY

## 2023-07-26 RX ORDER — DOXYCYCLINE 100 MG/1
100 CAPSULE ORAL 2 TIMES DAILY
Qty: 14 CAPSULE | Refills: 0 | Status: SHIPPED | OUTPATIENT
Start: 2023-07-26 | End: 2023-08-02

## 2023-07-26 ASSESSMENT — PAIN SCALES - GENERAL: PAINLEVEL: MODERATE PAIN (4)

## 2023-07-26 ASSESSMENT — ASTHMA QUESTIONNAIRES: ACT_TOTALSCORE: 13

## 2023-07-26 NOTE — PATIENT INSTRUCTIONS
Doxycycline twice daily for 7 days    Labs pending    Refills sent to the pharmacy    Follow up if symptoms do not improve or worsen.     No

## 2023-07-26 NOTE — TELEPHONE ENCOUNTER
Screening Questions  BLUE  KIND OF PREP RED  LOCATION [review exclusion criteria] GREEN  SEDATION TYPE        Y Are you active on mychart?       FORMOGEY Ordering/Referring Provider?        BCBS What type of coverage do you have?      N Have you had a positive covid test in the last 14 days?     42.05 1. BMI  [BMI 40+ - review exclusion criteria& smart-phrase document]    Y  2. Are you able to give consent for your medical care? [IF NO,RN REVIEW]          N  3. Are you taking any prescription pain medications on a routine schedule   (ex narcotics: oxycodone, roxicodone, oxycontin,  and percocet)? [RN Review]        N  3a. EXTENDED PREP What kind of prescription?     N 4. Do you have any chemical dependencies such as alcohol, street drugs, or methadone?        **If yes 3- 5 , please schedule with MAC sedation.**          IF YES TO ANY 6 - 10 - HOSPITAL SETTING ONLY.     N 6.   Do you need assistance transferring?     N 7.   Have you had a heart or lung transplant?    N 8.   Are you currently on dialysis?   N 9.   Do you use daily home oxygen?   N 10. Do you take nitroglycerin?   10a. N If yes, how often?     N 11. Are you currently pregnant?    11a. N If yes, how many weeks? [ Greater than 12 weeks, OR NEEDED]    N 12. Do you have Pulmonary Hypertension? *NEED PAC APPT AT [facility] w/ [facility]*     N 13. [review exclusion criteria]  Do you have any implantable devices in your body (pacemaker, defib, LVAD)?    N 14. In the past 6 months, have you had any heart related issues including cardiomyopathy or heart attack?     14a. N If yes, did it require cardiac stenting if so when?     N 15. Have you had a stroke or Transient ischemic attack (TIA - aka  mini stroke ) within 6 months?      N 16. Do you have mod to severe Obstructive Sleep Apnea?  [Hospital only]    Y 17. Do you have SEVERE AND UNCONTROLLED asthma? *NEED PAC APPT AT [facility] w/MAC* CONTROLLED/COPD    18.Do you take blood thinners?  No    N 19. Do you take any of the  "following medications?    N Phentermine    N Ozempic    N Wegovy (Semaglutide)      19a. If yes, \"Hold for 7 days before procedure.  Please consult your prescribing provider if you have questions about holding this medication.\"     N  20. Do you have chronic kidney disease?      N  21. Do you have a diagnosis of diabetes?     N  22. On a regular basis do you go 3-5 days between bowel movements?     Y 23. Preferred [facility] for Pre Prescription         [facility] - [place], [place] - [address]        - CLOSING REMINDERS -  You will receive a call from a Nurse to review instructions and health history.  This assessment must be completed prior to your procedure.  Failure to complete the Nurse assessment may result in the procedure being cancelled.    On the day of your procedure, please designatean adult(s) who can drive you home stay with you for the next 24 hours. The medicines used in the exam will make you sleepy. You will not be able to drive.    You cannot take public transportation, ride share services, or non-medical taxi service without a responsible caregiver.  Medical transport services are allowed with the requirement that a responsible caregiver will receive you at your destination.  We require that drivers and caregivers are confirmed prior to your procedure.      - SCHEDULING DETAILS -  N & N Hospital Setting Required & If yes, what is the exclusion?   PETERSON  Surgeon    10/30/23  Date of Procedure  Lower Endoscopy [Colonoscopy]  Type of Procedure Scheduled  [facility] - If you answer yes to questions #1, #3, #22 ([place] and CF pts)Which Colonoscopy Prep was Sent?     GENERAL Sedation Type     N PAC / Pre-op Required      "

## 2023-07-26 NOTE — PROGRESS NOTES
Assessment & Plan     Acute bronchitis with symptoms > 10 days  No acute distress.  Some improvement in symptoms but symptoms persist despite antibiotics, x ray completed today which was negative for pneumonia.  Legionella completed due to symptoms starting after vacationing up [place] where outbreak originated which was also negative.  Plan a trial of doxycycline, did have side effects to medication in the past and is on allergy list but Elza willing to try and let me know if she has side effects again.  Symptomatic care and follow up discussed  - doxycycline monohydrate (MONODOX) 100 MG capsule; Take 1 capsule (100 mg) by mouth 2 times daily for 7 days    Acute cough  Per above.   - albuterol (PROAIR HFA/PROVENTIL HFA/VENTOLIN HFA) 108 (90 Base) MCG/ACT inhaler; Inhale 1-2 puffs into the lungs every 4 hours as needed for shortness of breath, wheezing or cough  - XR Chest 2 Views; Future  - Legionella pneumophila antigen urine; Future  - Legionella pneumophila antigen urine    Vitamin D deficiency  History of significant vitamin D deficiency on chronic replacement, recommend vitamin D 3, replacement sent to the pharmacy  - vitamin D3 (CHOLECALCIFEROL) 1.25 MG (49106 UT) capsule; Take 1 capsule (50,000 Units) by mouth every 7 days    Nasal congestion  Refills sent to the pharmacy   - fluticasone (FLONASE) 50 MCG/ACT nasal spray; Spray 2 sprays into both nostrils daily       MED REC REQUIRED  Post Medication Reconciliation Status: discharge medications reconciled and changed, per note/orders      NASH White CNP  [facility]    Mao Bertrand is a 48 year old, presenting for the following health issues:  ER F/U        7/26/2023     4:15 PM   Additional Questions   Roomed by Radha PETTY     ED/UC Followup:    [facility]  Date of visit: 7/15/2023  Reason for visit: Pneumonia of right lung due to infectious organism   Current Status: Still having pain in back of right  "lower lung, left front upper and feels she may have done something to her hernia due to pain     Was in [place] but just traveled through. Stopped and ate at [employer]. Concerns for Legionnaire's Disease.         Review of Systems   Constitutional, HEENT, cardiovascular, pulmonary, gi and gu systems are negative, except as otherwise noted.      Objective    /82   Pulse 90   Temp 98.9  F (37.2  C) (Tympanic)   Resp 18   Ht 1.626 m (5' 4\")   Wt 114.3 kg (252 lb)   LMP 08/26/2005   SpO2 97%   BMI 43.26 kg/m    Body mass index is 43.26 kg/m .  Physical Exam   GENERAL: healthy, alert and no distress  HENT: ear canals and TM's normal, nose and mouth without ulcers or lesions  NECK: no adenopathy, no asymmetry, masses, or scars and thyroid normal to palpation  RESP: rales R lower posterior  CV: regular rate and rhythm, normal S1 S2, no S3 or S4, no murmur, click or rub, no peripheral edema and peripheral pulses strong  PSYCH: mentation appears normal, affect normal/bright    Results for orders placed or performed in visit on 07/26/23   XR Chest 2 Views     Status: None    Narrative    EXAM: XR CHEST 2 VIEWS  LOCATION: [facility]  DATE: 7/26/2023    INDICATION:  Acute cough  COMPARISON: 09/27/2022.      Impression    IMPRESSION: Negative chest.   Results for orders placed or performed in visit on 07/26/23   Legionella pneumophila antigen urine     Status: Normal    Specimen: Urine, NOS   Result Value Ref Range    Legionella pneumophila serogroup 1 urinary antigen Negative Negative                 "

## 2023-07-31 NOTE — PROGRESS NOTES
Rheumatology Clinic Visit  [facility]  Estella Rocio, KATHERINE     Elza Greer MRN# [id number]   YOB: 1975 Age: 48 year old   Date of Visit: 08/02/2023  Primary care provider: Di Shea          Assessment and Plan:     1.  Polyarthralgia  2.  Psoriasis    Patient presents today for an initial evaluation of polyarthralgia.  She states that this has been going on for many years and a couple of years ago and ER provider told her that based off of the foot x-ray she had rheumatoid arthritis.  She states that she had not heard this prior.  She gets pain in her knuckles.  Her feet cramp up as well.  She notices swelling over her knuckles and swelling in her ankles.  Mornings do tend to be worse for her.  She does currently use topical medications for her psoriasis.  Physical examination today does not show any active synovitis or dactylitis, there is no tenosynovitis.  She had full fist formation and normal  strength.  There was some questionable swelling of the right second toe with some tenderness to palpation.  She had some tenderness to palpation around the left SI joint as well.  Previous laboratory evaluations and imaging studies were reviewed, results below.    I recommend that we start with a laboratory work-up.  I did consider checking her inflammatory markers, the CRP and sed rate, however she is currently being treated for a pneumonia, therefore I would not know if it was the infection or joint inflammation and if they were to return elevated, therefore we will wait on them at this time.  Discussed with the patient that we will get x-rays as well and if everything returns normal/negative, we could consider getting an MRI to further evaluate for inflammation.  Another option may be to consider utilizing the medication methotrexate.  I would want to make sure that her upper respiratory infection has cleared prior to starting this medication.  We did review the potential side  effects of this medication.  Would start at a low-dose of 10 mg weekly.  Discussed that we will check labs 1 month after starting it and then every 3 months after that.  I will contact the patient with the results of her evaluation once it is complete.    Plan:     Schedule follow-up with Estella Chan PA-C in 3 months.   Imaging: xray feet-consider MRI if negative  Labs: CCP antibody, Rheumatoid factor, CBC, creatinine, AST, ALT  Medication recommendations:   Consider Methotrexate: Would start you on 10mg (4 tablets) WEEKLY. While on Methotrexate:  -check labs (ALT/AST, albumin, CBC with platelets and creatinine) 1 month after starting the medication and then every 3 months after  -Limit alcohol to 2 drinks weekly  -Take Folic Acid 1mg daily   -Tylenol 500-1000mg can be used as needed up to three times daily for nausea/headache associated with dosing  # Methotrexate Risks and Benefits: The risks and benefits of methotrexate were discussed in detail and the patient verbalized understanding.  The risks discussed include, but are not limited to, the risk for hypersensitivity, anaphylaxis, anaphylactoid reactions, infections, bone marrow suppression, renal toxicity, hepatotoxicity, pulmonary toxicity, malignancy, impaired fertility, GI upset, alopecia, and oral and nasal sores.  Folic acid supplementation is recommended during methotrexate therapy to help prevent some of the side effects. Pregnancy prevention and planning was discussed; it is recommended that women of childbearing potential use reliable contraception during therapy.  The risks of taking both methotrexate and alcohol were reviewed; complete alcohol avoidance was discussed.  Routine laboratory monitoring is required during methotrexate therapy. Taking MTX once weekly, all within a 24 hour period was stressed and the patient verbalized this instruction back to me.  I encouraged reviewing the package insert and asking any questions about the  "medication.    KATHERINE Leblanc  Rheumatology         History of Present Illness:   Elza Greer presents for evaluation of joint pain, psoriasis.      She states that she tends to get sick easier. She states that she has joint pain as well. She states that a couple of years ago, an ER provider told her that based on xray she had RA. She states that she has pain in her knuckles. She gets cramps in her feet. She has knee pain as well. She notices swelling in her knuckles. Her ankles swell up \"a lot\". For her psoriasis, she uses topical medications and has had injections in her scalp. She reports having stiffness in her back. She states that mornings are worse. She states that it takes about 1 hour of moving around to feel loosened up. Her back can be severe at times and was told that she has degenerative disc issues. She recently had pneumonia. She is still on the antibiotic.     Mother had psoriasis, dermatitis and eczema.          Review of Systems:     Constitutional: negative  Skin: negative  Eyes: negative  Ears/Nose/Throat: negative  Respiratory: No shortness of breath, dyspnea on exertion, cough, or hemoptysis  Cardiovascular: negative  Gastrointestinal: negative  Genitourinary: negative  Musculoskeletal: as above  Neurologic: negative  Psychiatric: negative  Hematologic/Lymphatic/Immunologic: negative  Endocrine: negative         Active Problem List:     Patient Active Problem List    Diagnosis Date Noted    Morbid obesity (H) 10/04/2021     Priority: Medium    Ventral hernia without obstruction or gangrene 01/13/2021     Priority: Medium     Added automatically from request for surgery [id number]      Anxiety 02/18/2020     Priority: Medium    Tobacco use disorder 11/14/2016     Priority: Medium    Mild intermittent asthma without complication 05/12/2010     Priority: Medium            Past Medical History:     Past Medical History:   Diagnosis Date    Adjustment disorder with mixed anxiety and depressed " mood     Arthritis     COPD (chronic obstructive pulmonary disease) (H)     Depressive disorder     History of blood transfusion     Had a blood transfusion after my hysterectomy in 2005    IBS (irritable bowel syndrome)     Incisional hernia, without obstruction or gangrene 2020    Lactose intolerance 05/12/2010    Migraine 05/12/2010    Mild intermittent asthma without complication 05/12/2010    PCOS (polycystic ovarian syndrome)     Tobacco use disorder     Vitamin D deficiency      Past Surgical History:   Procedure Laterality Date    ABDOMEN SURGERY      APPENDECTOMY OPEN N/A     CYSTECTOMY OVARIAN BENIGN  2001    HEPATICOJEJUNOSTOMY  2000    RnY jejunostomy from bile duct injury    HERNIORRHAPHY VENTRAL N/A 02/17/2012    open with mesh    HYSTERECTOMY TOTAL ABDOMINAL, BILATERAL SALPINGO-OOPHORECTOMY, COMBINED N/A 2005    LAPAROSCOPIC CHOLECYSTECTOMY  2006    complicated by bile duct injury    LAPAROSCOPIC HERNIORRHAPHY VENTRAL N/A 01/26/2021    Procedure: Open recurrent incarcerated ventral hernia repair X2;  Surgeon: Pradip Mckenzie MD;  Location: [facility] OR            Social History:     Social History     Socioeconomic History    Marital status:      Spouse name: Not on file    Number of children: Not on file    Years of education: Not on file    Highest education level: Not on file   Occupational History    Not on file   Tobacco Use    Smoking status: Every Day     Packs/day: 1.00     Years: 23.00     Pack years: 23.00     Types: Cigarettes    Smokeless tobacco: Never    Tobacco comments:     Starting patches    Vaping Use    Vaping Use: Former    Substances: Nicotine   Substance and Sexual Activity    Alcohol use: Yes     Comment: social    Drug use: No    Sexual activity: Yes     Partners: Male   Other Topics Concern    Parent/sibling w/ CABG, MI or angioplasty before 65F 55M? 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     Social Determinants of Health     Financial Resource Strain:  "Not on file   Food Insecurity: Not on file   Transportation Needs: Not on file   Physical Activity: Not on file   Stress: Not on file   Social Connections: Not on file   Intimate Partner Violence: Not on file   Housing Stability: Not on file          Family History:     Family History   Problem Relation Age of Onset    Dementia Mother     Coronary Artery Disease Mother     Hypertension Mother     Hyperlipidemia Mother     Depression Mother     Mental Illness Father         Schizophrenia    Colon Cancer Maternal Grandmother     Breast Cancer Maternal Grandmother     Other Cancer Maternal Grandmother     Osteoporosis Other             Allergies:     Allergies   Allergen Reactions    Azithromycin Anaphylaxis    Doxycycline Other (See Comments)     Upset stomach and felt \"really out of it\"    Latex Hives    Oxycodone Nausea and Vomiting            Medications:     Current Outpatient Medications   Medication Sig Dispense Refill    albuterol (ACCUNEB) 1.25 MG/3ML neb solution Take 1 vial (1.25 mg) by nebulization every 6 hours as needed for shortness of breath / dyspnea or wheezing 90 mL 0    albuterol (PROAIR HFA/PROVENTIL HFA/VENTOLIN HFA) 108 (90 Base) MCG/ACT inhaler Inhale 2 puffs into the lungs every 6 hours 18 g 3    benzonatate (TESSALON) 200 MG capsule Take 1 capsule (200 mg) by mouth 3 times daily as needed for cough 30 capsule 0    clobetasol propionate (CLOBEX) 0.05 % external shampoo Leave on scalp for 15 minutes then rinse. Use 1-2 times weekly. 118 mL 5    doxycycline monohydrate (MONODOX) 100 MG capsule Take 1 capsule (100 mg) by mouth 2 times daily for 7 days 14 capsule 0    Fluocinolone Acetonide Scalp 0.01 % OIL oil Daily as needed for scalp psoriasis 118.28 mL 0    fluticasone (FLONASE) 50 MCG/ACT nasal spray Spray 2 sprays into both nostrils daily 16 g 0    guaiFENesin-codeine (ROBITUSSIN AC) 100-10 MG/5ML solution Take 5-10 mLs by mouth every 4 hours as needed for cough 120 mL 0    IBUPROFEN PO Take " "600 mg by mouth every 6 hours as needed for moderate pain      meloxicam (MOBIC) 7.5 MG tablet Take 1 tablet (7.5 mg) by mouth daily (Patient taking differently: Take 7.5 mg by mouth daily as needed) 30 tablet 1    Multiple Vitamins-Minerals (MULTIVITAMIN WOMEN PO)       nicotine (NICODERM CQ) 21 MG/24HR 24 hr patch Place 1 patch onto the skin every 24 hours      nicotine polacrilex (NICORETTE) 4 MG gum Place 4 mg inside cheek every hour as needed for smoking cessation      nystatin (MYCOSTATIN) 872020 UNIT/GM external cream Apply topically 2 times daily 30 g 1    rizatriptan (MAXALT) 10 MG tablet Take 1 tablet (10 mg) by mouth at onset of headache for migraine May repeat in 2 hours. Max 3 tablets/24 hours. 30 tablet 1    tacrolimus (PROTOPIC) 0.1 % external ointment Apply twice daily to eczema on hands as needed. 30 g 4    triamcinolone (KENALOG) 0.1 % external cream Apply topically 2 times daily 80 g 0    UNABLE TO FIND MEDICATION NAME: hydroxycut      vitamin D2 (ERGOCALCIFEROL) 87374 units (1250 mcg) capsule Take 1 capsule (50,000 Units) by mouth once a week 12 capsule 1    albuterol (PROAIR HFA/PROVENTIL HFA/VENTOLIN HFA) 108 (90 Base) MCG/ACT inhaler Inhale 1-2 puffs into the lungs every 4 hours as needed for shortness of breath, wheezing or cough (Patient not taking: Reported on 8/2/2023) 18 g 0    predniSONE (DELTASONE) 20 MG tablet Take 1 tablet (20 mg) by mouth daily (Patient not taking: Reported on 8/2/2023) 10 tablet 0    topiramate (TOPAMAX) 25 MG tablet Take 3 tablets (75 mg) by mouth daily (Patient not taking: Reported on 7/26/2023) 90 tablet 11    vitamin D3 (CHOLECALCIFEROL) 1.25 MG (57901 UT) capsule Take 1 capsule (50,000 Units) by mouth every 7 days (Patient not taking: Reported on 8/2/2023) 12 capsule 1            Physical Exam:   Blood pressure 130/77, pulse 102, temperature 99  F (37.2  C), temperature source Tympanic, resp. rate 20, height 1.626 m (5' 4\"), weight 114.3 kg (252 lb), last " menstrual period 08/26/2005, SpO2 96 %, not currently breastfeeding.  Wt Readings from Last 6 Encounters:   07/26/23 114.3 kg (252 lb)   07/15/23 111.1 kg (245 lb)   05/25/23 111.6 kg (246 lb)   10/31/22 109 kg (240 lb 3.2 oz)   09/27/22 108.9 kg (240 lb)   09/01/22 112 kg (247 lb)     Constitutional: well-developed, appearing stated age; cooperative  Eyes: nl PERRLA, conjunctiva, sclera  ENT: nl external ears, nose, hearing, lips, teeth, gums, throat. No mucositis.   No mucous membrane lesions, normal saliva pool  Neck: no mass or thyroid enlargement  Resp: lungs clear to auscultation  CV: RRR, no murmurs, rubs or gallops, no edema  Lymph: no cervical, supraclavicular or epitrochlear nodes  MS: The TMJ, neck, shoulder, elbow, wrist, MCP/PIP/DIP, spine, knee, ankle, and foot MTP/IP joints were examined and found normal. No active synovitis or altered joint anatomy. Full joint ROM. Normal  strength. No dactylitis,  tenosynovitis, enthesopathy.  Some tenderness to palpation of the right second toe.  There was some questionable swelling around this toe but nothing definitive.  Psych: nl judgement, orientation, memory, affect.           Data:   Imaging:  MRI right ankle  Impression:  1. Central and lateral cord plantar fasciitis with low-grade  undersurface tearing at the calcaneal attachment.  2. Focal bone marrow edema in the C2 (middle) cuneiform, the cuboid at  the calcaneocuboid articulation, consistent with early degenerative  changes of the midfoot.  3. Mild peroneal tenosynovitis.  4. Low-grade sprain of the anterior component of the deltoid complex,  majority of fibers appear intact.     Laboratory:  None recent

## 2023-08-02 ENCOUNTER — OFFICE VISIT (OUTPATIENT)
Dept: RHEUMATOLOGY | Facility: CLINIC | Age: 48
End: 2023-08-02
Attending: PHYSICIAN ASSISTANT
Payer: COMMERCIAL

## 2023-08-02 ENCOUNTER — ANCILLARY PROCEDURE (OUTPATIENT)
Dept: GENERAL RADIOLOGY | Facility: CLINIC | Age: 48
End: 2023-08-02
Attending: PHYSICIAN ASSISTANT
Payer: COMMERCIAL

## 2023-08-02 VITALS
SYSTOLIC BLOOD PRESSURE: 130 MMHG | HEIGHT: 64 IN | RESPIRATION RATE: 20 BRPM | HEART RATE: 102 BPM | TEMPERATURE: 99 F | DIASTOLIC BLOOD PRESSURE: 77 MMHG | WEIGHT: 252 LBS | BODY MASS INDEX: 43.02 KG/M2 | OXYGEN SATURATION: 96 %

## 2023-08-02 DIAGNOSIS — M25.50 POLYARTHRALGIA: Primary | ICD-10-CM

## 2023-08-02 DIAGNOSIS — L40.9 PSORIASIS: ICD-10-CM

## 2023-08-02 DIAGNOSIS — M25.50 POLYARTHRALGIA: ICD-10-CM

## 2023-08-02 LAB
ALT SERPL W P-5'-P-CCNC: 20 U/L (ref 0–50)
AST SERPL W P-5'-P-CCNC: 21 U/L (ref 0–45)
CREAT SERPL-MCNC: 0.81 MG/DL (ref 0.51–0.95)
ERYTHROCYTE [DISTWIDTH] IN BLOOD BY AUTOMATED COUNT: 12.8 % (ref 10–15)
GFR SERPL CREATININE-BSD FRML MDRD: 90 ML/MIN/1.73M2
HCT VFR BLD AUTO: 40.4 % (ref 35–47)
HGB BLD-MCNC: 12.8 G/DL (ref 11.7–15.7)
MCH RBC QN AUTO: 30.2 PG (ref 26.5–33)
MCHC RBC AUTO-ENTMCNC: 31.7 G/DL (ref 31.5–36.5)
MCV RBC AUTO: 95 FL (ref 78–100)
PLATELET # BLD AUTO: 224 10E3/UL (ref 150–450)
RBC # BLD AUTO: 4.24 10E6/UL (ref 3.8–5.2)
WBC # BLD AUTO: 11.7 10E3/UL (ref 4–11)

## 2023-08-02 PROCEDURE — 36415 COLL VENOUS BLD VENIPUNCTURE: CPT | Performed by: PHYSICIAN ASSISTANT

## 2023-08-02 PROCEDURE — 99214 OFFICE O/P EST MOD 30 MIN: CPT | Performed by: PHYSICIAN ASSISTANT

## 2023-08-02 PROCEDURE — 86431 RHEUMATOID FACTOR QUANT: CPT | Performed by: PHYSICIAN ASSISTANT

## 2023-08-02 PROCEDURE — 73630 X-RAY EXAM OF FOOT: CPT | Mod: TC | Performed by: RADIOLOGY

## 2023-08-02 PROCEDURE — 85027 COMPLETE CBC AUTOMATED: CPT | Performed by: PHYSICIAN ASSISTANT

## 2023-08-02 PROCEDURE — 84460 ALANINE AMINO (ALT) (SGPT): CPT | Performed by: PHYSICIAN ASSISTANT

## 2023-08-02 PROCEDURE — 82565 ASSAY OF CREATININE: CPT | Performed by: PHYSICIAN ASSISTANT

## 2023-08-02 PROCEDURE — 86200 CCP ANTIBODY: CPT | Performed by: PHYSICIAN ASSISTANT

## 2023-08-02 PROCEDURE — 84450 TRANSFERASE (AST) (SGOT): CPT | Performed by: PHYSICIAN ASSISTANT

## 2023-08-02 ASSESSMENT — PAIN SCALES - GENERAL: PAINLEVEL: MILD PAIN (2)

## 2023-08-02 NOTE — PATIENT INSTRUCTIONS
After Visit Instructions:     Thank you for coming to [facility] for your care. It is my goal to partner with you to help you reach your optimal state of health.       Plan:     Schedule follow-up with Estella Chan PA-C in 3 months.   Imaging: xray feet-consider MRI if negative  Labs: CCP antibody, Rheumatoid factor, CBC, creatinine, AST, ALT  Medication recommendations:   Consider Methotrexate: Would start you on 10mg (4 tablets) WEEKLY. While on Methotrexate:  -check labs (ALT/AST, albumin, CBC with platelets and creatinine) 1 month after starting the medication and then every 3 months after  -Limit alcohol to 2 drinks weekly  -Take Folic Acid 1mg daily   -Tylenol 500-1000mg can be used as needed up to three times daily for nausea/headache associated with dosing  # Methotrexate Risks and Benefits: The risks and benefits of methotrexate were discussed in detail and the patient verbalized understanding.  The risks discussed include, but are not limited to, the risk for hypersensitivity, anaphylaxis, anaphylactoid reactions, infections, bone marrow suppression, renal toxicity, hepatotoxicity, pulmonary toxicity, malignancy, impaired fertility, GI upset, alopecia, and oral and nasal sores.  Folic acid supplementation is recommended during methotrexate therapy to help prevent some of the side effects. Pregnancy prevention and planning was discussed; it is recommended that women of childbearing potential use reliable contraception during therapy.  The risks of taking both methotrexate and alcohol were reviewed; complete alcohol avoidance was discussed.  Routine laboratory monitoring is required during methotrexate therapy. Taking MTX once weekly, all within a 24 hour period was stressed and the patient verbalized this instruction back to me.  I encouraged reviewing the package insert and asking any questions about the medication.          Estella Chan PA-C  [facility]  Rheumatology  [facility]    Contact information: [facility]  Clinic Number:  [phone]  Please call or send a [employer] message with any questions about your care

## 2023-08-03 LAB
CCP AB SER IA-ACNC: 1.5 U/ML
RHEUMATOID FACT SER NEPH-ACNC: 12 IU/ML

## 2023-08-16 ENCOUNTER — MYC MEDICAL ADVICE (OUTPATIENT)
Dept: FAMILY MEDICINE | Facility: CLINIC | Age: 48
End: 2023-08-16
Payer: COMMERCIAL

## 2023-08-18 ENCOUNTER — HOSPITAL ENCOUNTER (OUTPATIENT)
Dept: MAMMOGRAPHY | Facility: CLINIC | Age: 48
Discharge: HOME | End: 2023-08-18
Attending: NURSE PRACTITIONER | Admitting: NURSE PRACTITIONER
Payer: COMMERCIAL

## 2023-08-18 DIAGNOSIS — Z12.31 VISIT FOR SCREENING MAMMOGRAM: ICD-10-CM

## 2023-08-18 PROCEDURE — 77067 SCR MAMMO BI INCL CAD: CPT

## 2023-09-05 ENCOUNTER — APPOINTMENT (OUTPATIENT)
Dept: GENERAL RADIOLOGY | Facility: CLINIC | Age: 48
End: 2023-09-05
Attending: FAMILY MEDICINE
Payer: COMMERCIAL

## 2023-09-05 ENCOUNTER — HOSPITAL ENCOUNTER (EMERGENCY)
Facility: CLINIC | Age: 48
Discharge: HOME | End: 2023-09-05
Admitting: PHYSICIAN ASSISTANT
Payer: COMMERCIAL

## 2023-09-05 ENCOUNTER — APPOINTMENT (OUTPATIENT)
Dept: ULTRASOUND IMAGING | Facility: CLINIC | Age: 48
End: 2023-09-05
Attending: FAMILY MEDICINE
Payer: COMMERCIAL

## 2023-09-05 ENCOUNTER — HOSPITAL ENCOUNTER (EMERGENCY)
Facility: CLINIC | Age: 48
Discharge: HOME | End: 2023-09-06
Attending: FAMILY MEDICINE | Admitting: FAMILY MEDICINE
Payer: COMMERCIAL

## 2023-09-05 VITALS
SYSTOLIC BLOOD PRESSURE: 164 MMHG | HEART RATE: 104 BPM | DIASTOLIC BLOOD PRESSURE: 105 MMHG | RESPIRATION RATE: 16 BRPM | OXYGEN SATURATION: 96 %

## 2023-09-05 DIAGNOSIS — D72.829 LEUKOCYTOSIS, UNSPECIFIED TYPE: ICD-10-CM

## 2023-09-05 DIAGNOSIS — R07.1 CHEST PAIN ON BREATHING: ICD-10-CM

## 2023-09-05 DIAGNOSIS — R03.0 ELEVATED BP WITHOUT DIAGNOSIS OF HYPERTENSION: ICD-10-CM

## 2023-09-05 DIAGNOSIS — J98.01 POST-INFECTION BRONCHOSPASM: ICD-10-CM

## 2023-09-05 DIAGNOSIS — R60.0 LOWER EXTREMITY EDEMA: ICD-10-CM

## 2023-09-05 LAB
ALBUMIN UR-MCNC: NEGATIVE MG/DL
APPEARANCE UR: CLEAR
BACTERIA #/AREA URNS HPF: ABNORMAL /HPF
BASOPHILS # BLD AUTO: 0 10E3/UL (ref 0–0.2)
BASOPHILS NFR BLD AUTO: 0 %
BILIRUB UR QL STRIP: NEGATIVE
COLOR UR AUTO: YELLOW
EOSINOPHIL # BLD AUTO: 0.2 10E3/UL (ref 0–0.7)
EOSINOPHIL NFR BLD AUTO: 2 %
ERYTHROCYTE [DISTWIDTH] IN BLOOD BY AUTOMATED COUNT: 13.3 % (ref 10–15)
GLUCOSE UR STRIP-MCNC: NEGATIVE MG/DL
HCT VFR BLD AUTO: 37.8 % (ref 35–47)
HGB BLD-MCNC: 12.5 G/DL (ref 11.7–15.7)
HGB UR QL STRIP: ABNORMAL
IMM GRANULOCYTES # BLD: 0.1 10E3/UL
IMM GRANULOCYTES NFR BLD: 0 %
KETONES UR STRIP-MCNC: NEGATIVE MG/DL
LEUKOCYTE ESTERASE UR QL STRIP: NEGATIVE
LYMPHOCYTES # BLD AUTO: 3.3 10E3/UL (ref 0.8–5.3)
LYMPHOCYTES NFR BLD AUTO: 26 %
MCH RBC QN AUTO: 31.1 PG (ref 26.5–33)
MCHC RBC AUTO-ENTMCNC: 33.1 G/DL (ref 31.5–36.5)
MCV RBC AUTO: 94 FL (ref 78–100)
MONOCYTES # BLD AUTO: 0.8 10E3/UL (ref 0–1.3)
MONOCYTES NFR BLD AUTO: 7 %
MUCOUS THREADS #/AREA URNS LPF: PRESENT /LPF
NEUTROPHILS # BLD AUTO: 8.1 10E3/UL (ref 1.6–8.3)
NEUTROPHILS NFR BLD AUTO: 65 %
NITRATE UR QL: NEGATIVE
NRBC # BLD AUTO: 0 10E3/UL
NRBC BLD AUTO-RTO: 0 /100
PH UR STRIP: 6 [PH] (ref 5–7)
PLATELET # BLD AUTO: 210 10E3/UL (ref 150–450)
RBC # BLD AUTO: 4.02 10E6/UL (ref 3.8–5.2)
RBC URINE: 3 /HPF
SP GR UR STRIP: 1.02 (ref 1–1.03)
SQUAMOUS EPITHELIAL: 4 /HPF
UROBILINOGEN UR STRIP-MCNC: NORMAL MG/DL
WBC # BLD AUTO: 12.5 10E3/UL (ref 4–11)
WBC URINE: 1 /HPF

## 2023-09-05 PROCEDURE — 93005 ELECTROCARDIOGRAM TRACING: CPT

## 2023-09-05 PROCEDURE — 85379 FIBRIN DEGRADATION QUANT: CPT | Performed by: FAMILY MEDICINE

## 2023-09-05 PROCEDURE — 87637 SARSCOV2&INF A&B&RSV AMP PRB: CPT | Performed by: FAMILY MEDICINE

## 2023-09-05 PROCEDURE — 36415 COLL VENOUS BLD VENIPUNCTURE: CPT | Performed by: FAMILY MEDICINE

## 2023-09-05 PROCEDURE — 99285 EMERGENCY DEPT VISIT HI MDM: CPT | Mod: 25

## 2023-09-05 PROCEDURE — 93010 ELECTROCARDIOGRAM REPORT: CPT | Performed by: FAMILY MEDICINE

## 2023-09-05 PROCEDURE — G0463 HOSPITAL OUTPT CLINIC VISIT: HCPCS | Performed by: PHYSICIAN ASSISTANT

## 2023-09-05 PROCEDURE — 80053 COMPREHEN METABOLIC PANEL: CPT | Performed by: FAMILY MEDICINE

## 2023-09-05 PROCEDURE — 99284 EMERGENCY DEPT VISIT MOD MDM: CPT | Mod: 25 | Performed by: FAMILY MEDICINE

## 2023-09-05 PROCEDURE — 71046 X-RAY EXAM CHEST 2 VIEWS: CPT

## 2023-09-05 PROCEDURE — 83690 ASSAY OF LIPASE: CPT | Performed by: FAMILY MEDICINE

## 2023-09-05 PROCEDURE — 84443 ASSAY THYROID STIM HORMONE: CPT | Performed by: FAMILY MEDICINE

## 2023-09-05 PROCEDURE — 84484 ASSAY OF TROPONIN QUANT: CPT | Performed by: FAMILY MEDICINE

## 2023-09-05 PROCEDURE — 85025 COMPLETE CBC W/AUTO DIFF WBC: CPT | Performed by: FAMILY MEDICINE

## 2023-09-05 PROCEDURE — 81001 URINALYSIS AUTO W/SCOPE: CPT | Performed by: FAMILY MEDICINE

## 2023-09-05 PROCEDURE — 93971 EXTREMITY STUDY: CPT | Mod: RT

## 2023-09-05 ASSESSMENT — ENCOUNTER SYMPTOMS
SHORTNESS OF BREATH: 1
VOMITING: 0
COUGH: 1
CHILLS: 0
CONSTIPATION: 0
DYSURIA: 0
SORE THROAT: 0
PALPITATIONS: 0
FEVER: 0
BLOOD IN STOOL: 0
FREQUENCY: 0
WHEEZING: 0
DIAPHORESIS: 0
ABDOMINAL PAIN: 0
SINUS PRESSURE: 0
NAUSEA: 0
HEADACHES: 0
DIARRHEA: 0

## 2023-09-05 ASSESSMENT — ACTIVITIES OF DAILY LIVING (ADL): ADLS_ACUITY_SCORE: 33

## 2023-09-05 NOTE — ED PROVIDER NOTES
BP (!) 164/105   Pulse 104   Resp 16   LMP 08/26/2005   SpO2 96%     Elza Greer is a 48-year-old female presenting with complaints of right-sided flank pain.  Patient also complains of right-sided leg swelling.  She does feel short of breath as well.  Patient has been on 2 courses of antibiotics for pneumonia over the past 2 months.  Given patient's history, I recommended she be evaluated in the emergency department.  We discussed that she will likely require further testing and/or treatment beyond the capabilities of the current urgent care setting including labs and potential imaging.  Patient expresses understanding of this and agreement with the plan.       Cecilia Capps PA-C  09/05/23 1555

## 2023-09-05 NOTE — ED TRIAGE NOTES
Writer and provider spoke with pt about treatment and diagnostic options in ED vs. UC. Pt agreed to be evaluated in the ED.

## 2023-09-06 ENCOUNTER — PATIENT OUTREACH (OUTPATIENT)
Dept: ONCOLOGY | Facility: CLINIC | Age: 48
End: 2023-09-06
Payer: COMMERCIAL

## 2023-09-06 VITALS
HEIGHT: 64 IN | HEART RATE: 96 BPM | SYSTOLIC BLOOD PRESSURE: 173 MMHG | OXYGEN SATURATION: 94 % | TEMPERATURE: 99.5 F | BODY MASS INDEX: 41.83 KG/M2 | DIASTOLIC BLOOD PRESSURE: 113 MMHG | WEIGHT: 245 LBS | RESPIRATION RATE: 16 BRPM

## 2023-09-06 LAB
ALBUMIN SERPL BCG-MCNC: 4.2 G/DL (ref 3.5–5.2)
ALP SERPL-CCNC: 134 U/L (ref 35–104)
ALT SERPL W P-5'-P-CCNC: 18 U/L (ref 0–50)
ANION GAP SERPL CALCULATED.3IONS-SCNC: 11 MMOL/L (ref 7–15)
AST SERPL W P-5'-P-CCNC: 18 U/L (ref 0–45)
BILIRUB SERPL-MCNC: 0.2 MG/DL
BUN SERPL-MCNC: 10.5 MG/DL (ref 6–20)
CALCIUM SERPL-MCNC: 9.6 MG/DL (ref 8.6–10)
CHLORIDE SERPL-SCNC: 102 MMOL/L (ref 98–107)
CREAT SERPL-MCNC: 0.83 MG/DL (ref 0.51–0.95)
D DIMER PPP FEU-MCNC: 0.42 UG/ML FEU (ref 0–0.5)
DEPRECATED HCO3 PLAS-SCNC: 26 MMOL/L (ref 22–29)
FLUAV RNA SPEC QL NAA+PROBE: NEGATIVE
FLUBV RNA RESP QL NAA+PROBE: NEGATIVE
GFR SERPL CREATININE-BSD FRML MDRD: 88 ML/MIN/1.73M2
GLUCOSE SERPL-MCNC: 99 MG/DL (ref 70–99)
LIPASE SERPL-CCNC: 26 U/L (ref 13–60)
POTASSIUM SERPL-SCNC: 3.5 MMOL/L (ref 3.4–5.3)
PROT SERPL-MCNC: 7.3 G/DL (ref 6.4–8.3)
RSV RNA SPEC NAA+PROBE: NEGATIVE
SARS-COV-2 RNA RESP QL NAA+PROBE: NEGATIVE
SODIUM SERPL-SCNC: 139 MMOL/L (ref 136–145)
TROPONIN T SERPL HS-MCNC: <6 NG/L
TSH SERPL DL<=0.005 MIU/L-ACNC: 2.62 UIU/ML (ref 0.3–4.2)

## 2023-09-06 PROCEDURE — 250N000013 HC RX MED GY IP 250 OP 250 PS 637: Performed by: FAMILY MEDICINE

## 2023-09-06 RX ORDER — SULINDAC 200 MG/1
200 TABLET ORAL ONCE
Status: COMPLETED | OUTPATIENT
Start: 2023-09-06 | End: 2023-09-06

## 2023-09-06 RX ORDER — SULINDAC 200 MG/1
200 TABLET ORAL 2 TIMES DAILY WITH MEALS
Qty: 20 TABLET | Refills: 0 | Status: ON HOLD | OUTPATIENT
Start: 2023-09-06 | End: 2023-10-23

## 2023-09-06 RX ORDER — SULINDAC 200 MG/1
200 TABLET ORAL 2 TIMES DAILY
Status: DISCONTINUED | OUTPATIENT
Start: 2023-09-06 | End: 2023-09-06

## 2023-09-06 RX ADMIN — SULINDAC 200 MG: 200 TABLET ORAL at 01:23

## 2023-09-06 ASSESSMENT — ACTIVITIES OF DAILY LIVING (ADL): ADLS_ACUITY_SCORE: 35

## 2023-09-06 NOTE — ED TRIAGE NOTES
Seen at urgent care this afternoon. With right flank pain and a cough. Deemed too complex for the urgent care and referred to the ED. Went home and back with the above tonight.

## 2023-09-06 NOTE — ED PROVIDER NOTES
History     Chief Complaint   Patient presents with     Back Pain     Cough     HPI  Elza Greer is a 48 year old female who presents with history of pneumonia and July treated with Augmentin seen in late July and had a persistent bronchitis treated with steroids and azithromycin tested for Legionella negative.  Presents here with an on and off cough since that time but now with lateral chest pain above the level of the flank on the right side worse with deep breathing and cough and also notes a new onset of right leg swelling.  Possible dyspnea on exertion.  No associated fever.  No history of VTE no active cancer no significant travel history but does sit for long periods at work.    Allergies:  Allergies   Allergen Reactions     Azithromycin Anaphylaxis     Latex Hives     Oxycodone Nausea and Vomiting       Problem List:    Patient Active Problem List    Diagnosis Date Noted     Morbid obesity (H) 10/04/2021     Priority: Medium     Ventral hernia without obstruction or gangrene 01/13/2021     Priority: Medium     Added automatically from request for surgery [id number]       Anxiety 02/18/2020     Priority: Medium     Tobacco use disorder 11/14/2016     Priority: Medium     Mild intermittent asthma without complication 05/12/2010     Priority: Medium        Past Medical History:    Past Medical History:   Diagnosis Date     Adjustment disorder with mixed anxiety and depressed mood      Arthritis      COPD (chronic obstructive pulmonary disease) (H)      Depressive disorder      History of blood transfusion      IBS (irritable bowel syndrome)      Incisional hernia, without obstruction or gangrene 2020     Lactose intolerance 05/12/2010     Migraine 05/12/2010     Mild intermittent asthma without complication 05/12/2010     PCOS (polycystic ovarian syndrome)      Tobacco use disorder      Vitamin D deficiency        Past Surgical History:    Past Surgical History:   Procedure Laterality Date     ABDOMEN SURGERY        APPENDECTOMY OPEN N/A      CYSTECTOMY OVARIAN BENIGN  2001     HEPATICOJEJUNOSTOMY  2000    RnY jejunostomy from bile duct injury     HERNIORRHAPHY VENTRAL N/A 02/17/2012    open with mesh     HYSTERECTOMY TOTAL ABDOMINAL, BILATERAL SALPINGO-OOPHORECTOMY, COMBINED N/A 2005     LAPAROSCOPIC CHOLECYSTECTOMY  2006    complicated by bile duct injury     LAPAROSCOPIC HERNIORRHAPHY VENTRAL N/A 01/26/2021    Procedure: Open recurrent incarcerated ventral hernia repair X2;  Surgeon: Pradip Mckenzie MD;  Location: [facility] OR       Family History:    Family History   Problem Relation Age of Onset     Dementia Mother      Coronary Artery Disease Mother      Hypertension Mother      Hyperlipidemia Mother      Depression Mother      Mental Illness Father         Schizophrenia     Colon Cancer Maternal Grandmother      Breast Cancer Maternal Grandmother      Other Cancer Maternal Grandmother      Osteoporosis Other        Social History:  Marital Status:   [2]  Social History     Tobacco Use     Smoking status: Every Day     Packs/day: 1.00     Years: 23.00     Pack years: 23.00     Types: Cigarettes     Smokeless tobacco: Never     Tobacco comments:     Starting patches    Vaping Use     Vaping Use: Former     Substances: Nicotine   Substance Use Topics     Alcohol use: Yes     Comment: social     Drug use: No        Medications:    albuterol (ACCUNEB) 1.25 MG/3ML neb solution  albuterol (PROAIR HFA/PROVENTIL HFA/VENTOLIN HFA) 108 (90 Base) MCG/ACT inhaler  albuterol (PROAIR HFA/PROVENTIL HFA/VENTOLIN HFA) 108 (90 Base) MCG/ACT inhaler  benzonatate (TESSALON) 200 MG capsule  clobetasol propionate (CLOBEX) 0.05 % external shampoo  Fluocinolone Acetonide Scalp 0.01 % OIL oil  fluticasone (FLONASE) 50 MCG/ACT nasal spray  guaiFENesin-codeine (ROBITUSSIN AC) 100-10 MG/5ML solution  IBUPROFEN PO  meloxicam (MOBIC) 7.5 MG tablet  Multiple Vitamins-Minerals (MULTIVITAMIN WOMEN PO)  nicotine (NICODERM CQ) 21 MG/24HR 24 hr  "patch  nicotine polacrilex (NICORETTE) 4 MG gum  nystatin (MYCOSTATIN) 702498 UNIT/GM external cream  predniSONE (DELTASONE) 20 MG tablet  rizatriptan (MAXALT) 10 MG tablet  tacrolimus (PROTOPIC) 0.1 % external ointment  topiramate (TOPAMAX) 25 MG tablet  triamcinolone (KENALOG) 0.1 % external cream  UNABLE TO FIND  vitamin D2 (ERGOCALCIFEROL) 16996 units (1250 mcg) capsule  vitamin D3 (CHOLECALCIFEROL) 1.25 MG (94091 UT) capsule          Review of Systems   Constitutional:  Negative for chills, diaphoresis and fever.   HENT:  Negative for ear pain, sinus pressure and sore throat.    Eyes:  Negative for visual disturbance.   Respiratory:  Positive for cough and shortness of breath. Negative for wheezing.    Cardiovascular:  Positive for chest pain. Negative for palpitations.   Gastrointestinal:  Negative for abdominal pain, blood in stool, constipation, diarrhea, nausea and vomiting.   Genitourinary:  Negative for dysuria, frequency and urgency.   Skin:  Negative for rash.   Neurological:  Negative for headaches.   All other systems reviewed and are negative.      Physical Exam   BP: (!) 159/96  Pulse: 105  Temp: 99.5  F (37.5  C)  Resp: 16  Height: 162.6 cm (5' 4\")  Weight: 111.1 kg (245 lb)  SpO2: 97 %      Physical Exam  Constitutional:       General: She is in acute distress.   HENT:      Head: Atraumatic.   Eyes:      Conjunctiva/sclera: Conjunctivae normal.   Cardiovascular:      Rate and Rhythm: Normal rate and regular rhythm.      Heart sounds: No murmur heard.  Pulmonary:      Effort: Pulmonary effort is normal. No respiratory distress.      Breath sounds: Normal breath sounds. No stridor. No wheezing or rhonchi.   Abdominal:      General: Abdomen is flat. There is no distension.      Palpations: Abdomen is soft. There is no mass.      Tenderness: There is no abdominal tenderness. There is no guarding.   Musculoskeletal:      Cervical back: Neck supple.      Right lower leg: Edema present.      Left lower " leg: No edema.   Skin:     Coloration: Skin is not pale.      Findings: No rash.   Neurological:      Mental Status: She is alert.      Motor: No weakness.         ED Course                 Procedures                EKG Interpretation:      Interpreted by Riki Smith MD  EKG done at 1213 hrs. demonstrates a sinus rhythm at 86 bpm with normal axis.  There is no ST change.  No T wave changes.  Normal R progression and no Q waves.  Normal intervals.  Normal conduction.  No ectopy.  Impression sinus rhythm 86 bpm no acute change.      Critical Care time:  none               Results for orders placed or performed during the hospital encounter of 09/05/23 (from the past 24 hour(s))   Symptomatic Influenza A/B, RSV, & SARS-CoV2 PCR (COVID-19) Nasopharyngeal    Specimen: Nasopharyngeal; Swab   Result Value Ref Range    Influenza A PCR Negative Negative    Influenza B PCR Negative Negative    RSV PCR Negative Negative    SARS CoV2 PCR Negative Negative    Narrative    Testing was performed using the Xpert Xpress CoV2/Flu/RSV Assay on the [employer] GeneXpert Instrument. This test should be ordered for the detection of SARS-CoV-2, influenza, and RSV viruses in individuals who meet clinical and/or epidemiological criteria. Test performance is unknown in asymptomatic patients. This test is for in vitro diagnostic use under the FDA EUA for laboratories certified under CLIA to perform high or moderate complexity testing. This test has not been FDA cleared or approved. A negative result does not rule out the presence of PCR inhibitors in the specimen or target RNA in concentration below the limit of detection for the assay. If only one viral target is positive but coinfection with multiple targets is suspected, the sample should be re-tested with another FDA cleared, approved, or authorized test, if coinfection would change clinical management. This test was validated by the [facility]. These laboratories are  certified under the Clinical Laboratory Improvement Amendments of 1988 (CLIA-88) as qualified to perform high complexity laboratory testing.   UA with Microscopic reflex to Culture    Specimen: Urine, Clean Catch   Result Value Ref Range    Color Urine Yellow Colorless, Straw, Light Yellow, Yellow    Appearance Urine Clear Clear    Glucose Urine Negative Negative mg/dL    Bilirubin Urine Negative Negative    Ketones Urine Negative Negative mg/dL    Specific Gravity Urine 1.017 1.003 - 1.035    Blood Urine Moderate (A) Negative    pH Urine 6.0 5.0 - 7.0    Protein Albumin Urine Negative Negative mg/dL    Urobilinogen Urine Normal Normal, 2.0 mg/dL    Nitrite Urine Negative Negative    Leukocyte Esterase Urine Negative Negative    Bacteria Urine Few (A) None Seen /HPF    Mucus Urine Present (A) None Seen /LPF    RBC Urine 3 (H) <=2 /HPF    WBC Urine 1 <=5 /HPF    Squamous Epithelials Urine 4 (H) <=1 /HPF    Narrative    Urine Culture not indicated   CBC with platelets differential    Narrative    The following orders were created for panel order CBC with platelets differential.  Procedure                               Abnormality         Status                     ---------                               -----------         ------                     CBC with platelets and d...[[id number]]  Abnormal            Final result                 Please view results for these tests on the individual orders.   Comprehensive metabolic panel   Result Value Ref Range    Sodium 139 136 - 145 mmol/L    Potassium 3.5 3.4 - 5.3 mmol/L    Chloride 102 98 - 107 mmol/L    Carbon Dioxide (CO2) 26 22 - 29 mmol/L    Anion Gap 11 7 - 15 mmol/L    Urea Nitrogen 10.5 6.0 - 20.0 mg/dL    Creatinine 0.83 0.51 - 0.95 mg/dL    Calcium 9.6 8.6 - 10.0 mg/dL    Glucose 99 70 - 99 mg/dL    Alkaline Phosphatase 134 (H) 35 - 104 U/L    AST 18 0 - 45 U/L    ALT 18 0 - 50 U/L    Protein Total 7.3 6.4 - 8.3 g/dL    Albumin 4.2 3.5 - 5.2 g/dL    Bilirubin  Total 0.2 <=1.2 mg/dL    GFR Estimate 88 >60 mL/min/1.73m2   D dimer quantitative   Result Value Ref Range    D-Dimer Quantitative 0.42 0.00 - 0.50 ug/mL FEU    Narrative    This D-dimer assay is intended for use in conjunction with a clinical pretest probability assessment model to exclude pulmonary embolism (PE) and deep venous thrombosis (DVT) in outpatients suspected of PE or DVT. The cut-off value is 0.50 ug/mL FEU.   Lipase   Result Value Ref Range    Lipase 26 13 - 60 U/L   CBC with platelets and differential   Result Value Ref Range    WBC Count 12.5 (H) 4.0 - 11.0 10e3/uL    RBC Count 4.02 3.80 - 5.20 10e6/uL    Hemoglobin 12.5 11.7 - 15.7 g/dL    Hematocrit 37.8 35.0 - 47.0 %    MCV 94 78 - 100 fL    MCH 31.1 26.5 - 33.0 pg    MCHC 33.1 31.5 - 36.5 g/dL    RDW 13.3 10.0 - 15.0 %    Platelet Count 210 150 - 450 10e3/uL    % Neutrophils 65 %    % Lymphocytes 26 %    % Monocytes 7 %    % Eosinophils 2 %    % Basophils 0 %    % Immature Granulocytes 0 %    NRBCs per 100 WBC 0 <1 /100    Absolute Neutrophils 8.1 1.6 - 8.3 10e3/uL    Absolute Lymphocytes 3.3 0.8 - 5.3 10e3/uL    Absolute Monocytes 0.8 0.0 - 1.3 10e3/uL    Absolute Eosinophils 0.2 0.0 - 0.7 10e3/uL    Absolute Basophils 0.0 0.0 - 0.2 10e3/uL    Absolute Immature Granulocytes 0.1 <=0.4 10e3/uL    Absolute NRBCs 0.0 10e3/uL   Troponin T, High Sensitivity   Result Value Ref Range    Troponin T, High Sensitivity <6 <=14 ng/L   TSH with free T4 reflex   Result Value Ref Range    TSH 2.62 0.30 - 4.20 uIU/mL   US Lower Extremity Venous Duplex Right    Narrative    EXAM: US LOWER EXTREMITY VENOUS DUPLEX RIGHT  LOCATION: [facility]  DATE: 9/6/2023    INDICATION: Acute right leg edema.  COMPARISON: None available.  TECHNIQUE: Venous Duplex ultrasound of the right lower extremity with and without compression, augmentation and duplex. Color flow and spectral Doppler with waveform analysis performed.    FINDINGS: Exam includes the  common femoral, femoral, popliteal, and contralateral common femoral veins as well as segmentally visualized deep calf veins and greater saphenous vein.     RIGHT: No deep vein thrombosis. No superficial thrombophlebitis. No popliteal cyst.      Impression    IMPRESSION:  1.  No deep venous thrombosis in the right lower extremity.   Chest XR,  PA & LAT    Narrative    EXAM: XR CHEST 2 VIEWS  LOCATION: [facility]  DATE/TIME: 9/6/2023 12:19 AM CDT    INDICATION: Chest pain.  COMPARISON: Chest x-ray on 9/27/2022 and 7/26/2023      Impression    IMPRESSION: PA and lateral views of the chest were obtained. Cardiomediastinal silhouette is within normal limits. No suspicious focal pulmonary opacities. No significant pleural effusion or pneumothorax.           Medications - No data to display    Assessments & Plan (with Medical Decision Making)     MDM: Elza Greer is a 48 year old female presenting with right lateral and lower chest pain on the right side pleuritic associate with prolonged cough.  New onset leg swelling right side.  Differential diagnosis includes VTE.  Ultrasound of leg and evaluate with D-dimer if positive will perform CT chest.  Also obtain labs for referred pain CHEM panel CBC EKG.    Findings are reassuring with no signs of DVT on ultrasound.  Chest imaging is negative for any infiltrate.  Her D-dimer is negative.  EKG reassuring other laboratory testing normal.  We reviewed these findings in light of the findings on her exam including a elevated diastolic blood pressure.  Hypertensive labs are performed.  Discussed the treatment of recurrent symptoms.  The pain at the lateral aspect of the chest wall I have written for symptomatic management.  There is a risk of elevated blood pressure with that but blood pressures today could be more related to pain.    She need to follow-up on this.  I have given precautions for return.    I have reviewed the nursing notes.    I have  reviewed the findings, diagnosis, plan and need for follow up with the patient.           Medical Decision Making  The patient's presentation was of moderate complexity (an acute illness with systemic symptoms).    The patient's evaluation involved:  ordering and/or review of 3+ test(s) in this encounter (see separate area of note for details)    The patient's management necessitated moderate risk (prescription drug management including medications given in the ED).        New Prescriptions    No medications on file       Final diagnoses:   Post-infection bronchospasm - suspect cough should resolve over tiome.  no serious findings  on imaging   Chest pain on breathing - evaluation roday reassuring with no recurrent pneumonia presentg, no signs of PE or DVT.  suspect  musculoske;letal cause.  cough fracture is possible.  lat dorsi spasm possible.  referred pain from abd less likely. relatively  normal urine .  take sulindac orally twice daily for 7-10 days - always with food or milk.   never take this with ibuprofen/alleve   Leukocytosis, unspecified type - unclear cause - but  dloes not appear associated with current significant infection.  however on review  of the last year it  is always >11,000 and would recommend discussing with hematology,.   Lower extremity edema - this was greater on the right than the left.  negative for DVT by u/s and d-dimer.  no signs of CHF by chest xray, history.  no signs of liver/kidney injury.  suspect this is heat/tropical edema, dependent edema or venous stasis.    Elevated BP without diagnosis of hypertension - BP elevated here.  recheck in clinic.  htn labs completed here.  follow NIH DASH diet until follow-up       9/5/2023   [facility] EMERGENCY DEPT       Riki Smith MD  09/06/23 0901

## 2023-09-06 NOTE — PROGRESS NOTES
Hematology referral reviewed for [facility], see below.    Referral reason: leukocytosis with intermitten neutrophilia. Patient also has had months of acute vs prolonged airway illnesses    Current abnormal labs: Available in Chart Review    Outreach: Call not placed to patient regarding referral.    Plan: Triage instructions updated and sent to NPS for completion.

## 2023-09-06 NOTE — DISCHARGE INSTRUCTIONS
ICD-10-CM    1. Post-infection bronchospasm  J98.01 Primary Care Referral    suspect cough should resolve over tiome.  no serious findings  on imaging      2. Chest pain on breathing  R07.1 Primary Care Referral    evaluation roday reassuring with no recurrent pneumonia presentg, no signs of PE or DVT.  suspect  musculoske;letal cause.  cough fracture is possible.  lat dorsi spasm possible.  referred pain from abd less likely. relatively  normal urine .  take sulindac orally twice daily for 7-10 days - always with food or milk.   never take this with ibuprofen/alleve      3. Leukocytosis, unspecified type  D72.829 Adult Oncology/Hematology  Referral     Primary Care Referral    unclear cause - but  dloes not appear associated with current significant infection.  however on review  of the last year it  is always >11,000 and would recommend discussing with hematology,.      4. Lower extremity edema  R60.0 Primary Care Referral    this was greater on the right than the left.  negative for DVT by u/s and d-dimer.  no signs of CHF by chest xray, history.  no signs of liver/kidney injury.  suspect this is heat/tropical edema, dependent edema or venous stasis.       5. Elevated BP without diagnosis of hypertension  R03.0 Primary Care Referral    BP elevated here.  recheck in clinic.  htn labs completed here.  follow NIH DASH diet until follow-up

## 2023-09-07 ENCOUNTER — PATIENT OUTREACH (OUTPATIENT)
Dept: CARE COORDINATION | Facility: CLINIC | Age: 48
End: 2023-09-07
Payer: COMMERCIAL

## 2023-09-07 NOTE — LETTER
Elza Greer  [address] [place] [facility] [place] [address]    Dear Elza Greer,      I am a team member within the [facility] with [facility]. I recently contacted you to ensure you were doing well following a recent visit within our health system. I also wanted to take this chance to introduce Clinic Care Coordination should you have any interest in this program in the future.    Below is a description of Clinic Care Coordination and how this team can further assist you:       The Clinic Care Coordination team is made up of a Registered Nurse, , Financial Resource Worker, and a Community Health Worker who understand and can help navigate the health care system. The goal of clinic care coordination is to help you manage your health, improve access to care, and achieve optimal health outcomes. They work alongside your provider to assist you in determining your health and social needs, obtain health care and community resources, and provide you with necessary information and education. Clinic Care Coordination can work with you through any barriers and develop a care plan that helps coordinate and strengthen the relationship between you and your care team.    If you wish to connect with the Clinic Care Coordination Team, please let your [facility] Primary Care Provider or Clinic Care Team know and they can place a referral. The Clinic Care Coordination team will then reach out by phone to further support you.    We are focused on providing you with the highest-quality healthcare experience possible.    Sincerely,   Your care team with [facility]

## 2023-09-07 NOTE — PROGRESS NOTES
Clinic Care Coordination Contact  Community Health Worker Initial Outreach    CHW Initial Information Gathering:  Referral Source: ED Follow-Up  CHW Additional Questions  MyChart active?: Yes    Patient accepts CC: No, patient declined at this itme. Patient will be sent Care Coordination introduction letter for future reference.     Jewell Roper  Community Health Worker  [facility] Care Resource [facility]

## 2023-09-16 ASSESSMENT — ASTHMA QUESTIONNAIRES: ACT_TOTALSCORE: 19

## 2023-09-18 ENCOUNTER — OFFICE VISIT (OUTPATIENT)
Dept: FAMILY MEDICINE | Facility: CLINIC | Age: 48
End: 2023-09-18
Attending: FAMILY MEDICINE
Payer: COMMERCIAL

## 2023-09-18 VITALS
OXYGEN SATURATION: 98 % | DIASTOLIC BLOOD PRESSURE: 80 MMHG | TEMPERATURE: 98 F | BODY MASS INDEX: 43.41 KG/M2 | SYSTOLIC BLOOD PRESSURE: 138 MMHG | RESPIRATION RATE: 16 BRPM | HEART RATE: 94 BPM | WEIGHT: 252.9 LBS

## 2023-09-18 DIAGNOSIS — Z87.898 HISTORY OF SPLENOMEGALY: ICD-10-CM

## 2023-09-18 DIAGNOSIS — R74.8 ABNORMAL LIVER ENZYMES: ICD-10-CM

## 2023-09-18 DIAGNOSIS — R60.0 LOWER EXTREMITY EDEMA: ICD-10-CM

## 2023-09-18 DIAGNOSIS — E66.01 MORBID OBESITY (H): ICD-10-CM

## 2023-09-18 DIAGNOSIS — R10.9 FLANK PAIN: ICD-10-CM

## 2023-09-18 DIAGNOSIS — R06.02 SOB (SHORTNESS OF BREATH): ICD-10-CM

## 2023-09-18 DIAGNOSIS — D72.829 LEUKOCYTOSIS, UNSPECIFIED TYPE: Primary | ICD-10-CM

## 2023-09-18 DIAGNOSIS — M05.79 RHEUMATOID ARTHRITIS INVOLVING MULTIPLE SITES WITH POSITIVE RHEUMATOID FACTOR (H): ICD-10-CM

## 2023-09-18 DIAGNOSIS — R03.0 ELEVATED BP WITHOUT DIAGNOSIS OF HYPERTENSION: ICD-10-CM

## 2023-09-18 PROBLEM — M06.9 RHEUMATOID ARTHRITIS (H): Status: ACTIVE | Noted: 2023-09-18

## 2023-09-18 LAB
ALBUMIN SERPL BCG-MCNC: 4.2 G/DL (ref 3.5–5.2)
ALP SERPL-CCNC: 142 U/L (ref 35–104)
ALT SERPL W P-5'-P-CCNC: 23 U/L (ref 0–50)
ANION GAP SERPL CALCULATED.3IONS-SCNC: 8 MMOL/L (ref 7–15)
AST SERPL W P-5'-P-CCNC: 22 U/L (ref 0–45)
BASOPHILS # BLD AUTO: 0 10E3/UL (ref 0–0.2)
BASOPHILS NFR BLD AUTO: 0 %
BILIRUB SERPL-MCNC: 0.2 MG/DL
BUN SERPL-MCNC: 7 MG/DL (ref 6–20)
CALCIUM SERPL-MCNC: 9.9 MG/DL (ref 8.6–10)
CHLORIDE SERPL-SCNC: 106 MMOL/L (ref 98–107)
CREAT SERPL-MCNC: 0.65 MG/DL (ref 0.51–0.95)
DEPRECATED HCO3 PLAS-SCNC: 28 MMOL/L (ref 22–29)
EGFRCR SERPLBLD CKD-EPI 2021: >90 ML/MIN/1.73M2
EOSINOPHIL # BLD AUTO: 0.2 10E3/UL (ref 0–0.7)
EOSINOPHIL NFR BLD AUTO: 1 %
ERYTHROCYTE [DISTWIDTH] IN BLOOD BY AUTOMATED COUNT: 13.1 % (ref 10–15)
GLUCOSE SERPL-MCNC: 109 MG/DL (ref 70–99)
HCT VFR BLD AUTO: 38.3 % (ref 35–47)
HGB BLD-MCNC: 12.3 G/DL (ref 11.7–15.7)
IMM GRANULOCYTES # BLD: 0 10E3/UL
IMM GRANULOCYTES NFR BLD: 0 %
LYMPHOCYTES # BLD AUTO: 2.4 10E3/UL (ref 0.8–5.3)
LYMPHOCYTES NFR BLD AUTO: 21 %
MCH RBC QN AUTO: 30.7 PG (ref 26.5–33)
MCHC RBC AUTO-ENTMCNC: 32.1 G/DL (ref 31.5–36.5)
MCV RBC AUTO: 96 FL (ref 78–100)
MONOCYTES # BLD AUTO: 0.7 10E3/UL (ref 0–1.3)
MONOCYTES NFR BLD AUTO: 6 %
NEUTROPHILS # BLD AUTO: 8.3 10E3/UL (ref 1.6–8.3)
NEUTROPHILS NFR BLD AUTO: 71 %
NT-PROBNP SERPL-MCNC: 56 PG/ML (ref 0–450)
PLATELET # BLD AUTO: 212 10E3/UL (ref 150–450)
POTASSIUM SERPL-SCNC: 3.8 MMOL/L (ref 3.4–5.3)
PROT SERPL-MCNC: 7.2 G/DL (ref 6.4–8.3)
RBC # BLD AUTO: 4.01 10E6/UL (ref 3.8–5.2)
SODIUM SERPL-SCNC: 142 MMOL/L (ref 136–145)
WBC # BLD AUTO: 11.6 10E3/UL (ref 4–11)

## 2023-09-18 PROCEDURE — 99214 OFFICE O/P EST MOD 30 MIN: CPT | Performed by: NURSE PRACTITIONER

## 2023-09-18 PROCEDURE — 36415 COLL VENOUS BLD VENIPUNCTURE: CPT | Performed by: NURSE PRACTITIONER

## 2023-09-18 PROCEDURE — 83880 ASSAY OF NATRIURETIC PEPTIDE: CPT | Performed by: NURSE PRACTITIONER

## 2023-09-18 PROCEDURE — 80053 COMPREHEN METABOLIC PANEL: CPT | Performed by: NURSE PRACTITIONER

## 2023-09-18 PROCEDURE — 82977 ASSAY OF GGT: CPT | Performed by: NURSE PRACTITIONER

## 2023-09-18 PROCEDURE — 85025 COMPLETE CBC W/AUTO DIFF WBC: CPT | Performed by: NURSE PRACTITIONER

## 2023-09-18 ASSESSMENT — PAIN SCALES - GENERAL: PAINLEVEL: NO PAIN (0)

## 2023-09-18 NOTE — PROGRESS NOTES
Assessment & Plan     Leukocytosis, unspecified type  -likely due to smoking   -due to abnormal liver enzymes, flank pain, history of kidney stones recommended abdominal US   - US Abdomen Complete; Future    Lower extremity edema  -due to varicosis   - Comprehensive metabolic panel (BMP + Alb, Alk Phos, ALT, AST, Total. Bili, TP); Future  - US Abdomen Complete; Future  - Comprehensive metabolic panel (BMP + Alb, Alk Phos, ALT, AST, Total. Bili, TP)    Elevated BP without diagnosis of hypertension  -recommended monitoring, also advised patient to try to work on weight loss   - Primary Care Referral  - Comprehensive metabolic panel (BMP + Alb, Alk Phos, ALT, AST, Total. Bili, TP); Future  - Comprehensive metabolic panel (BMP + Alb, Alk Phos, ALT, AST, Total. Bili, TP)    History of splenomegaly  -recommended to repeat US  - Comprehensive metabolic panel (BMP + Alb, Alk Phos, ALT, AST, Total. Bili, TP); Future  - CBC with platelets and differential; Future  - US Abdomen Complete; Future  - Comprehensive metabolic panel (BMP + Alb, Alk Phos, ALT, AST, Total. Bili, TP)  - CBC with platelets and differential    Rheumatoid arthritis involving multiple sites with positive rheumatoid factor (H)  -following rheumatologist     Morbid obesity (H)  -recommended to work on weight loss     SOB (shortness of breath)  -labs and exam normal, patient had concerns about possible heart failure, I advised her that this will be unlikely at her age, BNP level normal, patient denies chest pain, palpitations, dizziness   -recommended patient to quit smoking   - BNP-N terminal pro; Future  - BNP-N terminal pro    Flank pain  -UA was normal  -due to history of kidney stones recommended US   - US Abdomen Complete; Future    Abnormal liver enzymes    - US Abdomen Complete; Future  - GGT; Future  - GGT        NASH Huston CNP  [facility]    Mao Bertrand is a 48 year old, presenting for the following health  issues:  RECHECK        9/18/2023     2:15 PM   Additional Questions   Roomed by fabiano         ED/UC Followup:    Facility:  [facility]   Date of visit: 9/5/3   Reason for visit: back pain, cough improved , flank pain   Current Status: still has back pain         Review of Systems   Constitutional, HEENT, cardiovascular, pulmonary, gi and gu systems are negative, except as otherwise noted.      Objective    /80   Pulse 94   Temp 98  F (36.7  C) (Tympanic)   Resp 16   Wt 114.7 kg (252 lb 14.4 oz)   LMP 08/26/2005   SpO2 98%   BMI 43.41 kg/m    Body mass index is 43.41 kg/m .  Physical Exam   GENERAL: healthy, alert and no distress  EYES: Eyes grossly normal to inspection, PERRL and conjunctivae and sclerae normal  HENT: ear canals and TM's normal, nose and mouth without ulcers or lesions  NECK: no adenopathy, no asymmetry, masses, or scars and thyroid normal to palpation  RESP: lungs clear to auscultation - no rales, rhonchi or wheezes  CV: regular rate and rhythm, normal S1 S2, no S3 or S4, no murmur, click or rub, no peripheral edema and peripheral pulses strong  ABDOMEN: soft, nontender, no hepatosplenomegaly, no masses and bowel sounds normal  SKIN: no suspicious lesions or rashes  NEURO: Normal strength and tone, mentation intact and speech normal  PSYCH: mentation appears normal, affect normal/bright

## 2023-09-21 NOTE — PROGRESS NOTES
Hematology/Medical Oncology Consultation Note      September 29, 2023    Referring provider:  MD Di Narayanan MD    Reason for visit:  Elza Greer is a 48 year old  for [facility] from [place] who who is referred for hematologic evaluation and consultation regarding chronic, nonprogressive, neutrophilic leukocytosis.    Impression:  Chronic, nonprogressive neutrophilic leukocytosis due to chronic tobacco use; the overall study does not fit anything for chronic leukemia or a myeloproliferative disorder.  Recent diagnosis of seropositive rheumatoid arthritis    Recommendation, plan, instructions:  Recommended no additional testing or follow-up  Discussed the benefit of tobacco cessation with respect to her overall general health but also abstinence will result in probably normalization of white count in 1-2 years    Time with patient including review, documentation, history, examination, coordination of care and counseling was 25 minutes.    History of present illness:  Review of her [place] medical record demonstrates neutrophilic leukocytosis since February, 2016 when her white count was 17,700, hemoglobin 13.2, platelets 179,000 with 15,500 absolute neutrophil counts.  Most recent 9/5/2023 CBC revealed a white count of 12,500, hemoglobin 12.5, platelets 219,000 with 8,100 absolute neutrophils.  However review of her [facility] record dating back to 2005, demonstrates persistent, intermittent but nonprogressive neutrophilic leukocytosis there also.    On 9/15/2023, she had been seen in the [place] ED because of persistent postinfectious bronchospasm, 30-pack-year tobacco use since age 18 with studies showing no evidence for lower extremity deep venous thrombosis or overt pneumonia.    She had a 2022 CT abdomen pelvis which suggested borderline splenomegaly although not confirmed in a 2023 abdominal ultrasound.    She has recently been diagnosed with  seropositive rheumatoid arthritis and psoriasis for which methotrexate has been offered.    Past medical history:  Past Medical History:   Diagnosis Date    Adjustment disorder with mixed anxiety and depressed mood     Arthritis     COPD (chronic obstructive pulmonary disease) (H)     Depressive disorder     History of blood transfusion     Had a blood transfusion after my hysterectomy in 2005    IBS (irritable bowel syndrome)     Incisional hernia, without obstruction or gangrene 2020    Lactose intolerance 05/12/2010    Migraine 05/12/2010    Mild intermittent asthma without complication 05/12/2010    PCOS (polycystic ovarian syndrome)     Tobacco use disorder     Vitamin D deficiency          Family history:  I have reviewed this patient's family history and updated it with pertinent information if needed.  Family History   Problem Relation Age of Onset    Dementia Mother     Coronary Artery Disease Mother     Hypertension Mother     Hyperlipidemia Mother     Depression Mother     Mental Illness Father         Schizophrenia    Colon Cancer Maternal Grandmother     Breast Cancer Maternal Grandmother     Other Cancer Maternal Grandmother     Osteoporosis Other          Medications:  Current Outpatient Medications   Medication    albuterol (ACCUNEB) 1.25 MG/3ML neb solution    albuterol (PROAIR HFA/PROVENTIL HFA/VENTOLIN HFA) 108 (90 Base) MCG/ACT inhaler    clobetasol propionate (CLOBEX) 0.05 % external shampoo    Fluocinolone Acetonide Scalp 0.01 % OIL oil    fluticasone (FLONASE) 50 MCG/ACT nasal spray    Multiple Vitamins-Minerals (MULTIVITAMIN WOMEN PO)    nicotine (NICODERM CQ) 21 MG/24HR 24 hr patch    nicotine polacrilex (NICORETTE) 4 MG gum    nystatin (MYCOSTATIN) 238638 UNIT/GM external cream    rizatriptan (MAXALT) 10 MG tablet    tacrolimus (PROTOPIC) 0.1 % external ointment    triamcinolone (KENALOG) 0.1 % external cream    UNABLE TO FIND    vitamin D3 (CHOLECALCIFEROL) 1.25 MG (17610 UT) capsule     "albuterol (PROAIR HFA/PROVENTIL HFA/VENTOLIN HFA) 108 (90 Base) MCG/ACT inhaler    benzonatate (TESSALON) 200 MG capsule    guaiFENesin-codeine (ROBITUSSIN AC) 100-10 MG/5ML solution    predniSONE (DELTASONE) 20 MG tablet    sulindac (CLINORIL) 200 MG tablet    topiramate (TOPAMAX) 25 MG tablet    vitamin D2 (ERGOCALCIFEROL) 84560 units (1250 mcg) capsule     No current facility-administered medications for this visit.       Allergies:  Allergies   Allergen Reactions    Azithromycin Anaphylaxis    Latex Hives    Oxycodone Nausea and Vomiting       Review of systems:  Change in vision over the last 5 years  Morning phlegm  Urinary frequency  Chronic low back pain following past injury    Except as noted in the note above, the patient denies headaches, diplopia, hearing loss or dizziness; dyspnea, cough, hemoptysis, pleurisy; chest pain/pressure, palpitations, lightheadedness; anorexia, nausea, vomiting, abdominal pain, diarrhea, constipation, melena or rectal bleeding; dysuria, frequency,  blood in the urine; vaginal bleeding or discharge; fever, chills, sweats, hot flashes; tingling, numbness, loss of balance; insomnia, depression, anxiety.    Physical examination:  BP (!) 141/84 (BP Location: Right arm, Patient Position: Sitting, Cuff Size: Adult Large)   Pulse 102   Temp 98.7  F (37.1  C) (Tympanic)   Resp 12   Ht 1.626 m (5' 4\")   Wt 114.8 kg (253 lb)   LMP 08/26/2005   SpO2 96%   BMI 43.43 kg/m      The patient is alert and oriented. Throat and oral mucosa are normal-appearing. Thyroid gland is not enlarged. Adenopathy is absent in the neck, axilla, groin and supraclavicular fossa; Lungs are clear to auscultation and percussion without rales, wheezes or rubs; heart rhythm is regular, heart sounds are without murmurs or gallops; the abdomen is soft and flat without hepatosplenomegaly, masses, ascites or tenderness.; extremities and skin reveal no unusual skin lesions, joint swelling or edema;      Naseem " GIOVANNY Carvalho MD

## 2023-09-22 LAB — GGT SERPL-CCNC: 40 U/L (ref 5–36)

## 2023-09-25 ENCOUNTER — HOSPITAL ENCOUNTER (OUTPATIENT)
Dept: ULTRASOUND IMAGING | Facility: CLINIC | Age: 48
Discharge: HOME | End: 2023-09-25
Attending: NURSE PRACTITIONER | Admitting: NURSE PRACTITIONER
Payer: COMMERCIAL

## 2023-09-25 DIAGNOSIS — Z87.898 HISTORY OF SPLENOMEGALY: ICD-10-CM

## 2023-09-25 DIAGNOSIS — R60.0 LOWER EXTREMITY EDEMA: ICD-10-CM

## 2023-09-25 DIAGNOSIS — R74.8 ABNORMAL LIVER ENZYMES: ICD-10-CM

## 2023-09-25 DIAGNOSIS — D72.829 LEUKOCYTOSIS, UNSPECIFIED TYPE: ICD-10-CM

## 2023-09-25 DIAGNOSIS — R03.0 ELEVATED BP WITHOUT DIAGNOSIS OF HYPERTENSION: ICD-10-CM

## 2023-09-25 DIAGNOSIS — R10.9 FLANK PAIN: ICD-10-CM

## 2023-09-25 PROCEDURE — 76700 US EXAM ABDOM COMPLETE: CPT

## 2023-09-29 ENCOUNTER — PRE VISIT (OUTPATIENT)
Dept: ONCOLOGY | Facility: CLINIC | Age: 48
End: 2023-09-29
Payer: COMMERCIAL

## 2023-09-29 ENCOUNTER — ONCOLOGY VISIT (OUTPATIENT)
Dept: ONCOLOGY | Facility: CLINIC | Age: 48
End: 2023-09-29
Attending: FAMILY MEDICINE
Payer: COMMERCIAL

## 2023-09-29 VITALS
SYSTOLIC BLOOD PRESSURE: 141 MMHG | OXYGEN SATURATION: 96 % | HEIGHT: 64 IN | HEART RATE: 102 BPM | BODY MASS INDEX: 43.19 KG/M2 | TEMPERATURE: 98.7 F | WEIGHT: 253 LBS | RESPIRATION RATE: 12 BRPM | DIASTOLIC BLOOD PRESSURE: 84 MMHG

## 2023-09-29 DIAGNOSIS — D72.829 LEUKOCYTOSIS, UNSPECIFIED TYPE: ICD-10-CM

## 2023-09-29 PROCEDURE — 99204 OFFICE O/P NEW MOD 45 MIN: CPT | Mod: Q6 | Performed by: INTERNAL MEDICINE

## 2023-09-29 PROCEDURE — 99213 OFFICE O/P EST LOW 20 MIN: CPT | Performed by: INTERNAL MEDICINE

## 2023-09-29 ASSESSMENT — PAIN SCALES - GENERAL: PAINLEVEL: MILD PAIN (3)

## 2023-09-29 NOTE — LETTER
9/29/2023         RE: Elza Greer  [address] [facility] [address]        Dear Colleague,    Thank you for referring your patient, Elza Greer, to the [facility]. Please see a copy of my visit note below.    Hematology/Medical Oncology Consultation Note      September 29, 2023    Referring provider:  MD Di Narayanan MD    Reason for visit:  Elza Greer is a 48 year old  for [facility] from [place] who who is referred for hematologic evaluation and consultation regarding chronic, nonprogressive, neutrophilic leukocytosis.    Impression:  Chronic, nonprogressive neutrophilic leukocytosis due to chronic tobacco use; the overall study does not fit anything for chronic leukemia or a myeloproliferative disorder.  Recent diagnosis of seropositive rheumatoid arthritis    Recommendation, plan, instructions:  Recommended no additional testing or follow-up  Discussed the benefit of tobacco cessation with respect to her overall general health but also abstinence will result in probably normalization of white count in 1-2 years    Time with patient including review, documentation, history, examination, coordination of care and counseling was 25 minutes.    History of present illness:  Review of her [place] medical record demonstrates neutrophilic leukocytosis since February, 2016 when her white count was 17,700, hemoglobin 13.2, platelets 179,000 with 15,500 absolute neutrophil counts.  Most recent 9/5/2023 CBC revealed a white count of 12,500, hemoglobin 12.5, platelets 219,000 with 8,100 absolute neutrophils.  However review of her [facility] record dating back to 2005, demonstrates persistent, intermittent but nonprogressive neutrophilic leukocytosis there also.    On 9/15/2023, she had been seen in the [place] ED because of persistent postinfectious bronchospasm, 30-pack-year tobacco use since age 18 with studies showing  no evidence for lower extremity deep venous thrombosis or overt pneumonia.    She had a 2022 CT abdomen pelvis which suggested borderline splenomegaly although not confirmed in a 2023 abdominal ultrasound.    She has recently been diagnosed with seropositive rheumatoid arthritis and psoriasis for which methotrexate has been offered.    Past medical history:  Past Medical History:   Diagnosis Date     Adjustment disorder with mixed anxiety and depressed mood      Arthritis      COPD (chronic obstructive pulmonary disease) (H)      Depressive disorder      History of blood transfusion     Had a blood transfusion after my hysterectomy in 2005     IBS (irritable bowel syndrome)      Incisional hernia, without obstruction or gangrene 2020     Lactose intolerance 05/12/2010     Migraine 05/12/2010     Mild intermittent asthma without complication 05/12/2010     PCOS (polycystic ovarian syndrome)      Tobacco use disorder      Vitamin D deficiency          Family history:  I have reviewed this patient's family history and updated it with pertinent information if needed.  Family History   Problem Relation Age of Onset     Dementia Mother      Coronary Artery Disease Mother      Hypertension Mother      Hyperlipidemia Mother      Depression Mother      Mental Illness Father         Schizophrenia     Colon Cancer Maternal Grandmother      Breast Cancer Maternal Grandmother      Other Cancer Maternal Grandmother      Osteoporosis Other          Medications:  Current Outpatient Medications   Medication     albuterol (ACCUNEB) 1.25 MG/3ML neb solution     albuterol (PROAIR HFA/PROVENTIL HFA/VENTOLIN HFA) 108 (90 Base) MCG/ACT inhaler     clobetasol propionate (CLOBEX) 0.05 % external shampoo     Fluocinolone Acetonide Scalp 0.01 % OIL oil     fluticasone (FLONASE) 50 MCG/ACT nasal spray     Multiple Vitamins-Minerals (MULTIVITAMIN WOMEN PO)     nicotine (NICODERM CQ) 21 MG/24HR 24 hr patch     nicotine polacrilex (NICORETTE) 4  "MG gum     nystatin (MYCOSTATIN) 761890 UNIT/GM external cream     rizatriptan (MAXALT) 10 MG tablet     tacrolimus (PROTOPIC) 0.1 % external ointment     triamcinolone (KENALOG) 0.1 % external cream     UNABLE TO FIND     vitamin D3 (CHOLECALCIFEROL) 1.25 MG (57211 UT) capsule     albuterol (PROAIR HFA/PROVENTIL HFA/VENTOLIN HFA) 108 (90 Base) MCG/ACT inhaler     benzonatate (TESSALON) 200 MG capsule     guaiFENesin-codeine (ROBITUSSIN AC) 100-10 MG/5ML solution     predniSONE (DELTASONE) 20 MG tablet     sulindac (CLINORIL) 200 MG tablet     topiramate (TOPAMAX) 25 MG tablet     vitamin D2 (ERGOCALCIFEROL) 82486 units (1250 mcg) capsule     No current facility-administered medications for this visit.       Allergies:  Allergies   Allergen Reactions     Azithromycin Anaphylaxis     Latex Hives     Oxycodone Nausea and Vomiting       Review of systems:  Change in vision over the last 5 years  Morning phlegm  Urinary frequency  Chronic low back pain following past injury    Except as noted in the note above, the patient denies headaches, diplopia, hearing loss or dizziness; dyspnea, cough, hemoptysis, pleurisy; chest pain/pressure, palpitations, lightheadedness; anorexia, nausea, vomiting, abdominal pain, diarrhea, constipation, melena or rectal bleeding; dysuria, frequency,  blood in the urine; vaginal bleeding or discharge; fever, chills, sweats, hot flashes; tingling, numbness, loss of balance; insomnia, depression, anxiety.    Physical examination:  BP (!) 141/84 (BP Location: Right arm, Patient Position: Sitting, Cuff Size: Adult Large)   Pulse 102   Temp 98.7  F (37.1  C) (Tympanic)   Resp 12   Ht 1.626 m (5' 4\")   Wt 114.8 kg (253 lb)   LMP 08/26/2005   SpO2 96%   BMI 43.43 kg/m      The patient is alert and oriented. Throat and oral mucosa are normal-appearing. Thyroid gland is not enlarged. Adenopathy is absent in the neck, axilla, groin and supraclavicular fossa; Lungs are clear to auscultation and " "percussion without rales, wheezes or rubs; heart rhythm is regular, heart sounds are without murmurs or gallops; the abdomen is soft and flat without hepatosplenomegaly, masses, ascites or tenderness.; extremities and skin reveal no unusual skin lesions, joint swelling or edema;      Naseem Carvalho MD              Oncology Rooming Note    September 29, 2023 3:37 PM   Elza Greer is a 48 year old female who presents for:    Chief Complaint   Patient presents with     Oncology Clinic Visit     Leukocytosis, unspecified type - New consult     Initial Vitals: BP (!) 141/84 (BP Location: Right arm, Patient Position: Sitting, Cuff Size: Adult Large)   Pulse 102   Temp 98.7  F (37.1  C) (Tympanic)   Resp 12   Ht 1.626 m (5' 4\")   Wt 114.8 kg (253 lb)   LMP 08/26/2005   SpO2 96%   BMI 43.43 kg/m   Estimated body mass index is 43.43 kg/m  as calculated from the following:    Height as of this encounter: 1.626 m (5' 4\").    Weight as of this encounter: 114.8 kg (253 lb). Body surface area is 2.28 meters squared.  Mild Pain (3) Comment: Data Unavailable   Patient's last menstrual period was 08/26/2005.  Allergies reviewed: Yes  Medications reviewed: Yes    Medications: Medication refills not needed today.  Pharmacy name entered into [employer]: [employer]:    [id number] [place] - [address] [place] [address] [address] [facility] - [place], [place] - [address] [facility]    Clinical concerns:  New consult      Nia Hunt CMA                Again, thank you for allowing me to participate in the care of your patient.        Sincerely,        Naseem Carvalho MD  "

## 2023-09-29 NOTE — PROGRESS NOTES
"Oncology Rooming Note    September 29, 2023 3:37 PM   Elza Greer is a 48 year old female who presents for:    Chief Complaint   Patient presents with    Oncology Clinic Visit     Leukocytosis, unspecified type - New consult     Initial Vitals: BP (!) 141/84 (BP Location: Right arm, Patient Position: Sitting, Cuff Size: Adult Large)   Pulse 102   Temp 98.7  F (37.1  C) (Tympanic)   Resp 12   Ht 1.626 m (5' 4\")   Wt 114.8 kg (253 lb)   LMP 08/26/2005   SpO2 96%   BMI 43.43 kg/m   Estimated body mass index is 43.43 kg/m  as calculated from the following:    Height as of this encounter: 1.626 m (5' 4\").    Weight as of this encounter: 114.8 kg (253 lb). Body surface area is 2.28 meters squared.  Mild Pain (3) Comment: Data Unavailable   Patient's last menstrual period was 08/26/2005.  Allergies reviewed: Yes  Medications reviewed: Yes    Medications: Medication refills not needed today.  Pharmacy name entered into [employer]:    [id number] - [place] - [address] [place] [address] AT [address] [facility] - [place], [place] - [address] [facility]    Clinical concerns:  New consult      Nia Hunt CMA              "

## 2023-10-02 ENCOUNTER — PATIENT OUTREACH (OUTPATIENT)
Dept: CARE COORDINATION | Facility: CLINIC | Age: 48
End: 2023-10-02
Payer: COMMERCIAL

## 2023-10-16 ENCOUNTER — PATIENT OUTREACH (OUTPATIENT)
Dept: CARE COORDINATION | Facility: CLINIC | Age: 48
End: 2023-10-16
Payer: COMMERCIAL

## 2023-10-19 RX ORDER — BISACODYL 5 MG/1
TABLET, DELAYED RELEASE ORAL
Qty: 4 TABLET | Refills: 0 | Status: SHIPPED | OUTPATIENT
Start: 2023-10-19 | End: 2023-10-30 | Stop reason: HOSPADM

## 2023-10-23 ENCOUNTER — APPOINTMENT (OUTPATIENT)
Dept: ULTRASOUND IMAGING | Facility: CLINIC | Age: 48
End: 2023-10-23
Attending: STUDENT IN AN ORGANIZED HEALTH CARE EDUCATION/TRAINING PROGRAM
Payer: COMMERCIAL

## 2023-10-23 ENCOUNTER — APPOINTMENT (OUTPATIENT)
Dept: CT IMAGING | Facility: CLINIC | Age: 48
End: 2023-10-23
Attending: EMERGENCY MEDICINE
Payer: COMMERCIAL

## 2023-10-23 ENCOUNTER — HOSPITAL ENCOUNTER (INPATIENT)
Facility: CLINIC | Age: 48
LOS: 4 days | Discharge: HOME | End: 2023-10-27
Attending: EMERGENCY MEDICINE | Admitting: STUDENT IN AN ORGANIZED HEALTH CARE EDUCATION/TRAINING PROGRAM
Payer: COMMERCIAL

## 2023-10-23 ENCOUNTER — APPOINTMENT (OUTPATIENT)
Dept: CT IMAGING | Facility: CLINIC | Age: 48
End: 2023-10-23
Payer: COMMERCIAL

## 2023-10-23 DIAGNOSIS — A49.8 ESCHERICHIA COLI (E. COLI) INFECTION: Primary | ICD-10-CM

## 2023-10-23 DIAGNOSIS — R79.89 ELEVATED LFTS: ICD-10-CM

## 2023-10-23 DIAGNOSIS — M79.7 FIBROMYALGIA: ICD-10-CM

## 2023-10-23 DIAGNOSIS — M05.79 RHEUMATOID ARTHRITIS INVOLVING MULTIPLE SITES WITH POSITIVE RHEUMATOID FACTOR (H): ICD-10-CM

## 2023-10-23 DIAGNOSIS — R10.10 UPPER ABDOMINAL PAIN: ICD-10-CM

## 2023-10-23 DIAGNOSIS — J96.01 ACUTE RESPIRATORY FAILURE WITH HYPOXIA (H): ICD-10-CM

## 2023-10-23 PROBLEM — A41.9 SEPSIS (H): Status: ACTIVE | Noted: 2023-10-23

## 2023-10-23 LAB
ALBUMIN SERPL BCG-MCNC: 4.6 G/DL (ref 3.5–5.2)
ALBUMIN UR-MCNC: NEGATIVE MG/DL
ALP SERPL-CCNC: 220 U/L (ref 35–104)
ALT SERPL W P-5'-P-CCNC: 226 U/L (ref 0–50)
ANION GAP SERPL CALCULATED.3IONS-SCNC: 13 MMOL/L (ref 7–15)
APPEARANCE UR: CLEAR
AST SERPL W P-5'-P-CCNC: 349 U/L (ref 0–45)
BACTERIA #/AREA URNS HPF: ABNORMAL /HPF
BASO+EOS+MONOS # BLD AUTO: ABNORMAL 10*3/UL
BASO+EOS+MONOS NFR BLD AUTO: ABNORMAL %
BASOPHILS # BLD AUTO: 0 10E3/UL (ref 0–0.2)
BASOPHILS NFR BLD AUTO: 0 %
BILIRUB SERPL-MCNC: 0.8 MG/DL
BILIRUB UR QL STRIP: NEGATIVE
BUN SERPL-MCNC: 13.1 MG/DL (ref 6–20)
CALCIUM SERPL-MCNC: 9.5 MG/DL (ref 8.6–10)
CHLORIDE SERPL-SCNC: 99 MMOL/L (ref 98–107)
COLOR UR AUTO: YELLOW
CREAT SERPL-MCNC: 0.62 MG/DL (ref 0.51–0.95)
CREAT SERPL-MCNC: 0.71 MG/DL (ref 0.51–0.95)
DEPRECATED HCO3 PLAS-SCNC: 25 MMOL/L (ref 22–29)
EGFRCR SERPLBLD CKD-EPI 2021: >90 ML/MIN/1.73M2
EGFRCR SERPLBLD CKD-EPI 2021: >90 ML/MIN/1.73M2
EOSINOPHIL # BLD AUTO: 0 10E3/UL (ref 0–0.7)
EOSINOPHIL NFR BLD AUTO: 0 %
ERYTHROCYTE [DISTWIDTH] IN BLOOD BY AUTOMATED COUNT: 13.1 % (ref 10–15)
FLUAV RNA SPEC QL NAA+PROBE: NEGATIVE
FLUBV RNA RESP QL NAA+PROBE: NEGATIVE
GLUCOSE SERPL-MCNC: 145 MG/DL (ref 70–99)
GLUCOSE UR STRIP-MCNC: NEGATIVE MG/DL
HAV IGM SERPL QL IA: NONREACTIVE
HBV CORE AB SERPL QL IA: NONREACTIVE
HBV SURFACE AB SERPL IA-ACNC: 358.23 M[IU]/ML
HBV SURFACE AB SERPL IA-ACNC: REACTIVE M[IU]/ML
HBV SURFACE AG SERPL QL IA: NONREACTIVE
HCT VFR BLD AUTO: 40.8 % (ref 35–47)
HCV AB SERPL QL IA: NONREACTIVE
HGB BLD-MCNC: 13.5 G/DL (ref 11.7–15.7)
HGB UR QL STRIP: ABNORMAL
HOLD SPECIMEN: NORMAL
IMM GRANULOCYTES # BLD: 0.1 10E3/UL
IMM GRANULOCYTES NFR BLD: 0 %
KETONES UR STRIP-MCNC: NEGATIVE MG/DL
LACTATE SERPL-SCNC: 1.6 MMOL/L (ref 0.7–2)
LACTATE SERPL-SCNC: 1.9 MMOL/L (ref 0.7–2)
LEUKOCYTE ESTERASE UR QL STRIP: NEGATIVE
LIPASE SERPL-CCNC: 20 U/L (ref 13–60)
LYMPHOCYTES # BLD AUTO: 1.2 10E3/UL (ref 0.8–5.3)
LYMPHOCYTES NFR BLD AUTO: 6 %
MCH RBC QN AUTO: 31.6 PG (ref 26.5–33)
MCHC RBC AUTO-ENTMCNC: 33.1 G/DL (ref 31.5–36.5)
MCV RBC AUTO: 96 FL (ref 78–100)
MONOCYTES # BLD AUTO: 0.8 10E3/UL (ref 0–1.3)
MONOCYTES NFR BLD AUTO: 4 %
MUCOUS THREADS #/AREA URNS LPF: PRESENT /LPF
NEUTROPHILS # BLD AUTO: 17.7 10E3/UL (ref 1.6–8.3)
NEUTROPHILS NFR BLD AUTO: 90 %
NITRATE UR QL: NEGATIVE
NRBC # BLD AUTO: 0 10E3/UL
NRBC BLD AUTO-RTO: 0 /100
PH UR STRIP: 5 [PH] (ref 5–7)
PLATELET # BLD AUTO: 204 10E3/UL (ref 150–450)
POTASSIUM SERPL-SCNC: 3.9 MMOL/L (ref 3.4–5.3)
PROT SERPL-MCNC: 7.8 G/DL (ref 6.4–8.3)
RBC # BLD AUTO: 4.27 10E6/UL (ref 3.8–5.2)
RBC URINE: 17 /HPF
RSV RNA SPEC NAA+PROBE: NEGATIVE
SARS-COV-2 RNA RESP QL NAA+PROBE: NEGATIVE
SODIUM SERPL-SCNC: 137 MMOL/L (ref 135–145)
SP GR UR STRIP: 1.01 (ref 1–1.03)
SQUAMOUS EPITHELIAL: 1 /HPF
TROPONIN T SERPL HS-MCNC: <6 NG/L
UROBILINOGEN UR STRIP-MCNC: NORMAL MG/DL
WBC # BLD AUTO: 19.9 10E3/UL (ref 4–11)
WBC URINE: 1 /HPF

## 2023-10-23 PROCEDURE — 86381 MITOCHONDRIAL ANTIBODY EACH: CPT

## 2023-10-23 PROCEDURE — 84484 ASSAY OF TROPONIN QUANT: CPT | Performed by: EMERGENCY MEDICINE

## 2023-10-23 PROCEDURE — 85025 COMPLETE CBC W/AUTO DIFF WBC: CPT | Performed by: EMERGENCY MEDICINE

## 2023-10-23 PROCEDURE — 86706 HEP B SURFACE ANTIBODY: CPT

## 2023-10-23 PROCEDURE — 76705 ECHO EXAM OF ABDOMEN: CPT

## 2023-10-23 PROCEDURE — 71275 CT ANGIOGRAPHY CHEST: CPT

## 2023-10-23 PROCEDURE — 250N000013 HC RX MED GY IP 250 OP 250 PS 637: Performed by: FAMILY MEDICINE

## 2023-10-23 PROCEDURE — 250N000011 HC RX IP 250 OP 636

## 2023-10-23 PROCEDURE — 36415 COLL VENOUS BLD VENIPUNCTURE: CPT | Performed by: EMERGENCY MEDICINE

## 2023-10-23 PROCEDURE — 120N000004 HC R&B MS OVERFLOW

## 2023-10-23 PROCEDURE — 96374 THER/PROPH/DIAG INJ IV PUSH: CPT | Mod: 59 | Performed by: EMERGENCY MEDICINE

## 2023-10-23 PROCEDURE — 250N000011 HC RX IP 250 OP 636: Mod: JZ

## 2023-10-23 PROCEDURE — 86376 MICROSOMAL ANTIBODY EACH: CPT

## 2023-10-23 PROCEDURE — 83605 ASSAY OF LACTIC ACID: CPT

## 2023-10-23 PROCEDURE — 74177 CT ABD & PELVIS W/CONTRAST: CPT

## 2023-10-23 PROCEDURE — 250N000011 HC RX IP 250 OP 636: Mod: JZ | Performed by: EMERGENCY MEDICINE

## 2023-10-23 PROCEDURE — 87077 CULTURE AEROBIC IDENTIFY: CPT

## 2023-10-23 PROCEDURE — 250N000009 HC RX 250: Performed by: EMERGENCY MEDICINE

## 2023-10-23 PROCEDURE — 96361 HYDRATE IV INFUSION ADD-ON: CPT | Performed by: EMERGENCY MEDICINE

## 2023-10-23 PROCEDURE — 83690 ASSAY OF LIPASE: CPT | Performed by: EMERGENCY MEDICINE

## 2023-10-23 PROCEDURE — 81001 URINALYSIS AUTO W/SCOPE: CPT

## 2023-10-23 PROCEDURE — 258N000003 HC RX IP 258 OP 636

## 2023-10-23 PROCEDURE — G0378 HOSPITAL OBSERVATION PER HR: HCPCS

## 2023-10-23 PROCEDURE — 250N000011 HC RX IP 250 OP 636: Performed by: EMERGENCY MEDICINE

## 2023-10-23 PROCEDURE — 83605 ASSAY OF LACTIC ACID: CPT | Performed by: EMERGENCY MEDICINE

## 2023-10-23 PROCEDURE — 99285 EMERGENCY DEPT VISIT HI MDM: CPT | Performed by: EMERGENCY MEDICINE

## 2023-10-23 PROCEDURE — 258N000003 HC RX IP 258 OP 636: Performed by: EMERGENCY MEDICINE

## 2023-10-23 PROCEDURE — 86709 HEPATITIS A IGM ANTIBODY: CPT

## 2023-10-23 PROCEDURE — 86803 HEPATITIS C AB TEST: CPT

## 2023-10-23 PROCEDURE — 99222 1ST HOSP IP/OBS MODERATE 55: CPT | Mod: FS

## 2023-10-23 PROCEDURE — 96375 TX/PRO/DX INJ NEW DRUG ADDON: CPT

## 2023-10-23 PROCEDURE — 250N000013 HC RX MED GY IP 250 OP 250 PS 637: Performed by: INTERNAL MEDICINE

## 2023-10-23 PROCEDURE — 250N000009 HC RX 250

## 2023-10-23 PROCEDURE — 86704 HEP B CORE ANTIBODY TOTAL: CPT

## 2023-10-23 PROCEDURE — 87340 HEPATITIS B SURFACE AG IA: CPT

## 2023-10-23 PROCEDURE — 250N000013 HC RX MED GY IP 250 OP 250 PS 637

## 2023-10-23 PROCEDURE — 99207 PR APP CREDIT; MD BILLING SHARED VISIT: CPT | Mod: FS | Performed by: STUDENT IN AN ORGANIZED HEALTH CARE EDUCATION/TRAINING PROGRAM

## 2023-10-23 PROCEDURE — 82565 ASSAY OF CREATININE: CPT

## 2023-10-23 PROCEDURE — 96376 TX/PRO/DX INJ SAME DRUG ADON: CPT

## 2023-10-23 PROCEDURE — 258N000003 HC RX IP 258 OP 636: Performed by: FAMILY MEDICINE

## 2023-10-23 PROCEDURE — 99285 EMERGENCY DEPT VISIT HI MDM: CPT | Mod: 25 | Performed by: EMERGENCY MEDICINE

## 2023-10-23 PROCEDURE — 86038 ANTINUCLEAR ANTIBODIES: CPT

## 2023-10-23 PROCEDURE — 87149 DNA/RNA DIRECT PROBE: CPT

## 2023-10-23 PROCEDURE — 87637 SARSCOV2&INF A&B&RSV AMP PRB: CPT | Performed by: EMERGENCY MEDICINE

## 2023-10-23 PROCEDURE — 96375 TX/PRO/DX INJ NEW DRUG ADDON: CPT | Performed by: EMERGENCY MEDICINE

## 2023-10-23 PROCEDURE — 80053 COMPREHEN METABOLIC PANEL: CPT | Performed by: EMERGENCY MEDICINE

## 2023-10-23 PROCEDURE — 36415 COLL VENOUS BLD VENIPUNCTURE: CPT

## 2023-10-23 RX ORDER — ENOXAPARIN SODIUM 100 MG/ML
30 INJECTION SUBCUTANEOUS EVERY 12 HOURS
Status: DISCONTINUED | OUTPATIENT
Start: 2023-10-23 | End: 2023-10-23 | Stop reason: DRUGHIGH

## 2023-10-23 RX ORDER — ACETAMINOPHEN 325 MG/1
650 TABLET ORAL EVERY 4 HOURS PRN
Status: DISCONTINUED | OUTPATIENT
Start: 2023-10-23 | End: 2023-10-23

## 2023-10-23 RX ORDER — HYDROMORPHONE HYDROCHLORIDE 1 MG/ML
0.5 INJECTION, SOLUTION INTRAMUSCULAR; INTRAVENOUS; SUBCUTANEOUS
Status: DISCONTINUED | OUTPATIENT
Start: 2023-10-23 | End: 2023-10-23

## 2023-10-23 RX ORDER — LEVALBUTEROL INHALATION SOLUTION 1.25 MG/3ML
1.25 SOLUTION RESPIRATORY (INHALATION)
Status: COMPLETED | OUTPATIENT
Start: 2023-10-23 | End: 2023-10-24

## 2023-10-23 RX ORDER — NALOXONE HYDROCHLORIDE 0.4 MG/ML
0.2 INJECTION, SOLUTION INTRAMUSCULAR; INTRAVENOUS; SUBCUTANEOUS
Status: DISCONTINUED | OUTPATIENT
Start: 2023-10-23 | End: 2023-10-27 | Stop reason: HOSPADM

## 2023-10-23 RX ORDER — PIPERACILLIN SODIUM, TAZOBACTAM SODIUM 3; .375 G/15ML; G/15ML
3.38 INJECTION, POWDER, LYOPHILIZED, FOR SOLUTION INTRAVENOUS EVERY 6 HOURS
Status: DISCONTINUED | OUTPATIENT
Start: 2023-10-23 | End: 2023-10-23

## 2023-10-23 RX ORDER — ONDANSETRON 2 MG/ML
4 INJECTION INTRAMUSCULAR; INTRAVENOUS EVERY 6 HOURS PRN
Status: DISCONTINUED | OUTPATIENT
Start: 2023-10-23 | End: 2023-10-27 | Stop reason: HOSPADM

## 2023-10-23 RX ORDER — IBUPROFEN 200 MG
200 TABLET ORAL EVERY 6 HOURS PRN
Status: DISCONTINUED | OUTPATIENT
Start: 2023-10-23 | End: 2023-10-26

## 2023-10-23 RX ORDER — ALBUTEROL SULFATE 1.25 MG/3ML
1.25 SOLUTION RESPIRATORY (INHALATION) EVERY 6 HOURS PRN
Status: DISCONTINUED | OUTPATIENT
Start: 2023-10-23 | End: 2023-10-23 | Stop reason: ALTCHOICE

## 2023-10-23 RX ORDER — ONDANSETRON 2 MG/ML
4 INJECTION INTRAMUSCULAR; INTRAVENOUS
Status: COMPLETED | OUTPATIENT
Start: 2023-10-23 | End: 2023-10-23

## 2023-10-23 RX ORDER — IOPAMIDOL 755 MG/ML
92 INJECTION, SOLUTION INTRAVASCULAR ONCE
Status: COMPLETED | OUTPATIENT
Start: 2023-10-23 | End: 2023-10-23

## 2023-10-23 RX ORDER — METOPROLOL TARTRATE 25 MG/1
50 TABLET, FILM COATED ORAL ONCE
Status: DISCONTINUED | OUTPATIENT
Start: 2023-10-23 | End: 2023-10-23

## 2023-10-23 RX ORDER — METOPROLOL TARTRATE 1 MG/ML
5 INJECTION, SOLUTION INTRAVENOUS EVERY 5 MIN PRN
Status: DISCONTINUED | OUTPATIENT
Start: 2023-10-23 | End: 2023-10-27 | Stop reason: HOSPADM

## 2023-10-23 RX ORDER — PIPERACILLIN SODIUM, TAZOBACTAM SODIUM 4; .5 G/20ML; G/20ML
4.5 INJECTION, POWDER, LYOPHILIZED, FOR SOLUTION INTRAVENOUS EVERY 6 HOURS
Status: DISCONTINUED | OUTPATIENT
Start: 2023-10-23 | End: 2023-10-24

## 2023-10-23 RX ORDER — IOPAMIDOL 755 MG/ML
100 INJECTION, SOLUTION INTRAVASCULAR ONCE
Status: COMPLETED | OUTPATIENT
Start: 2023-10-23 | End: 2023-10-23

## 2023-10-23 RX ORDER — HYDROMORPHONE HYDROCHLORIDE 2 MG/1
2 TABLET ORAL
Status: DISCONTINUED | OUTPATIENT
Start: 2023-10-23 | End: 2023-10-25

## 2023-10-23 RX ORDER — IBUPROFEN 400 MG/1
400 TABLET, FILM COATED ORAL ONCE
Status: COMPLETED | OUTPATIENT
Start: 2023-10-23 | End: 2023-10-23

## 2023-10-23 RX ORDER — DEXTROSE, SODIUM CHLORIDE, SODIUM LACTATE, POTASSIUM CHLORIDE, AND CALCIUM CHLORIDE 5; .6; .31; .03; .02 G/100ML; G/100ML; G/100ML; G/100ML; G/100ML
INJECTION, SOLUTION INTRAVENOUS CONTINUOUS
Status: DISCONTINUED | OUTPATIENT
Start: 2023-10-23 | End: 2023-10-24

## 2023-10-23 RX ORDER — NALOXONE HYDROCHLORIDE 0.4 MG/ML
0.4 INJECTION, SOLUTION INTRAMUSCULAR; INTRAVENOUS; SUBCUTANEOUS
Status: DISCONTINUED | OUTPATIENT
Start: 2023-10-23 | End: 2023-10-27 | Stop reason: HOSPADM

## 2023-10-23 RX ORDER — NICOTINE 21 MG/24HR
1 PATCH, TRANSDERMAL 24 HOURS TRANSDERMAL DAILY
Status: DISCONTINUED | OUTPATIENT
Start: 2023-10-23 | End: 2023-10-27 | Stop reason: HOSPADM

## 2023-10-23 RX ORDER — OXYCODONE HYDROCHLORIDE 5 MG/1
5 TABLET ORAL EVERY 4 HOURS PRN
Status: DISCONTINUED | OUTPATIENT
Start: 2023-10-23 | End: 2023-10-23

## 2023-10-23 RX ORDER — DROPERIDOL 2.5 MG/ML
2.5 INJECTION, SOLUTION INTRAMUSCULAR; INTRAVENOUS ONCE
Status: COMPLETED | OUTPATIENT
Start: 2023-10-23 | End: 2023-10-23

## 2023-10-23 RX ORDER — POLYETHYLENE GLYCOL 3350 17 G/17G
17 POWDER, FOR SOLUTION ORAL DAILY PRN
Status: DISCONTINUED | OUTPATIENT
Start: 2023-10-23 | End: 2023-10-25

## 2023-10-23 RX ORDER — OXYCODONE HYDROCHLORIDE 5 MG/1
10 TABLET ORAL EVERY 4 HOURS PRN
Status: DISCONTINUED | OUTPATIENT
Start: 2023-10-23 | End: 2023-10-23

## 2023-10-23 RX ORDER — ALBUTEROL SULFATE 90 UG/1
2 AEROSOL, METERED RESPIRATORY (INHALATION) EVERY 6 HOURS PRN
Status: DISCONTINUED | OUTPATIENT
Start: 2023-10-23 | End: 2023-10-27 | Stop reason: HOSPADM

## 2023-10-23 RX ORDER — ACETAMINOPHEN 325 MG/1
325 TABLET ORAL EVERY 6 HOURS PRN
Status: DISCONTINUED | OUTPATIENT
Start: 2023-10-23 | End: 2023-10-27 | Stop reason: HOSPADM

## 2023-10-23 RX ORDER — METOPROLOL TARTRATE 1 MG/ML
5 INJECTION, SOLUTION INTRAVENOUS EVERY 5 MIN PRN
Status: DISCONTINUED | OUTPATIENT
Start: 2023-10-23 | End: 2023-10-23

## 2023-10-23 RX ORDER — ONDANSETRON 4 MG/1
4 TABLET, ORALLY DISINTEGRATING ORAL EVERY 6 HOURS PRN
Status: DISCONTINUED | OUTPATIENT
Start: 2023-10-23 | End: 2023-10-27 | Stop reason: HOSPADM

## 2023-10-23 RX ORDER — ENOXAPARIN SODIUM 100 MG/ML
40 INJECTION SUBCUTANEOUS EVERY 12 HOURS
Status: DISCONTINUED | OUTPATIENT
Start: 2023-10-23 | End: 2023-10-27 | Stop reason: HOSPADM

## 2023-10-23 RX ADMIN — PIPERACILLIN AND TAZOBACTAM 4.5 G: 4; .5 INJECTION, POWDER, FOR SOLUTION INTRAVENOUS at 09:51

## 2023-10-23 RX ADMIN — IOPAMIDOL 100 ML: 755 INJECTION, SOLUTION INTRAVENOUS at 04:30

## 2023-10-23 RX ADMIN — SODIUM CHLORIDE 100 ML: 9 INJECTION, SOLUTION INTRAVENOUS at 10:10

## 2023-10-23 RX ADMIN — HYDROMORPHONE HYDROCHLORIDE 1 MG: 2 TABLET ORAL at 16:53

## 2023-10-23 RX ADMIN — SODIUM CHLORIDE, POTASSIUM CHLORIDE, SODIUM LACTATE AND CALCIUM CHLORIDE 1000 ML: 600; 310; 30; 20 INJECTION, SOLUTION INTRAVENOUS at 04:23

## 2023-10-23 RX ADMIN — IBUPROFEN 400 MG: 400 TABLET ORAL at 07:57

## 2023-10-23 RX ADMIN — HYDROMORPHONE HYDROCHLORIDE 1 MG: 2 TABLET ORAL at 14:41

## 2023-10-23 RX ADMIN — ONDANSETRON 4 MG: 2 INJECTION INTRAMUSCULAR; INTRAVENOUS at 04:14

## 2023-10-23 RX ADMIN — IOPAMIDOL 92 ML: 755 INJECTION, SOLUTION INTRAVENOUS at 10:10

## 2023-10-23 RX ADMIN — SODIUM CHLORIDE 1000 ML: 9 INJECTION, SOLUTION INTRAVENOUS at 07:54

## 2023-10-23 RX ADMIN — HYDROMORPHONE HYDROCHLORIDE 0.5 MG: 1 INJECTION, SOLUTION INTRAMUSCULAR; INTRAVENOUS; SUBCUTANEOUS at 07:22

## 2023-10-23 RX ADMIN — ENOXAPARIN SODIUM 40 MG: 100 INJECTION SUBCUTANEOUS at 11:45

## 2023-10-23 RX ADMIN — SODIUM CHLORIDE 1500 ML: 9 INJECTION, SOLUTION INTRAVENOUS at 11:46

## 2023-10-23 RX ADMIN — PIPERACILLIN AND TAZOBACTAM 4.5 G: 4; .5 INJECTION, POWDER, FOR SOLUTION INTRAVENOUS at 22:11

## 2023-10-23 RX ADMIN — HYDROMORPHONE HYDROCHLORIDE 0.5 MG: 1 INJECTION, SOLUTION INTRAMUSCULAR; INTRAVENOUS; SUBCUTANEOUS at 04:23

## 2023-10-23 RX ADMIN — PIPERACILLIN AND TAZOBACTAM 4.5 G: 4; .5 INJECTION, POWDER, FOR SOLUTION INTRAVENOUS at 16:09

## 2023-10-23 RX ADMIN — DROPERIDOL 2.5 MG: 2.5 INJECTION, SOLUTION INTRAMUSCULAR; INTRAVENOUS at 06:08

## 2023-10-23 RX ADMIN — ONDANSETRON 4 MG: 2 INJECTION INTRAMUSCULAR; INTRAVENOUS at 16:50

## 2023-10-23 RX ADMIN — ALBUTEROL SULFATE 2 PUFF: 90 AEROSOL, METERED RESPIRATORY (INHALATION) at 21:07

## 2023-10-23 RX ADMIN — HYDROMORPHONE HYDROCHLORIDE 2 MG: 2 TABLET ORAL at 19:59

## 2023-10-23 RX ADMIN — SODIUM CHLORIDE, SODIUM LACTATE, POTASSIUM CHLORIDE, CALCIUM CHLORIDE AND DEXTROSE MONOHYDRATE: 5; 600; 310; 30; 20 INJECTION, SOLUTION INTRAVENOUS at 07:22

## 2023-10-23 RX ADMIN — SODIUM CHLORIDE 71 ML: 9 INJECTION, SOLUTION INTRAVENOUS at 04:30

## 2023-10-23 RX ADMIN — NICOTINE 1 PATCH: 21 PATCH, EXTENDED RELEASE TRANSDERMAL at 11:46

## 2023-10-23 RX ADMIN — SODIUM CHLORIDE, SODIUM LACTATE, POTASSIUM CHLORIDE, CALCIUM CHLORIDE AND DEXTROSE MONOHYDRATE: 5; 600; 310; 30; 20 INJECTION, SOLUTION INTRAVENOUS at 19:28

## 2023-10-23 ASSESSMENT — ENCOUNTER SYMPTOMS
HEADACHES: 0
ABDOMINAL DISTENTION: 1
SHORTNESS OF BREATH: 0
DIARRHEA: 0
APPETITE CHANGE: 1
FATIGUE: 0
LIGHT-HEADEDNESS: 0
FEVER: 0
ABDOMINAL PAIN: 1
BACK PAIN: 0
NAUSEA: 1
VOMITING: 1
CHEST TIGHTNESS: 0
CONSTIPATION: 0

## 2023-10-23 ASSESSMENT — ACTIVITIES OF DAILY LIVING (ADL)
ADLS_ACUITY_SCORE: 35
ADLS_ACUITY_SCORE: 25
ADLS_ACUITY_SCORE: 26
ADLS_ACUITY_SCORE: 23
ADLS_ACUITY_SCORE: 26
ADLS_ACUITY_SCORE: 23
ADLS_ACUITY_SCORE: 26
ADLS_ACUITY_SCORE: 35
ADLS_ACUITY_SCORE: 35
ADLS_ACUITY_SCORE: 26

## 2023-10-23 NOTE — ED PROVIDER NOTES
History     Chief Complaint   Patient presents with    Abdominal Pain    Nausea, Vomiting, & Diarrhea    Chest Pain     HPI  Elza Greer is a 48 year old female with history of obesity and multiple previous abdominal surgeries as well as abdominal obstructions in the past presenting for evaluation of abrupt onset of upper abdominal pain radiating to her chest with associated nausea and vomiting.  Symptoms began abruptly and have continued through the night.  She first vomited just before coming to the ED.  Patient reports she has a hernia and her pain is in the area where the hernia is.  Denies fevers or chills.  Denies cough or difficulty breathing.    Allergies:  Allergies   Allergen Reactions    Azithromycin Anaphylaxis    Latex Hives    Oxycodone Nausea and Vomiting       Problem List:    Patient Active Problem List    Diagnosis Date Noted    Elevated LFTs 10/23/2023     Priority: Medium    Upper abdominal pain 10/23/2023     Priority: Medium    Rheumatoid arthritis (H) 09/18/2023     Priority: Medium    Morbid obesity (H) 10/04/2021     Priority: Medium    Ventral hernia without obstruction or gangrene 01/13/2021     Priority: Medium     Added automatically from request for surgery [id number]      Anxiety 02/18/2020     Priority: Medium    Tobacco use disorder 11/14/2016     Priority: Medium    Mild intermittent asthma without complication 05/12/2010     Priority: Medium        Past Medical History:    Past Medical History:   Diagnosis Date    Adjustment disorder with mixed anxiety and depressed mood     Arthritis     COPD (chronic obstructive pulmonary disease) (H)     Depressive disorder     History of blood transfusion     IBS (irritable bowel syndrome)     Incisional hernia, without obstruction or gangrene 2020    Lactose intolerance 05/12/2010    Migraine 05/12/2010    Mild intermittent asthma without complication 05/12/2010    PCOS (polycystic ovarian syndrome)     Tobacco use disorder     Vitamin D  What Type Of Note Output Would You Prefer (Optional)?: Bullet Format How Severe Is Your Acne?: moderate deficiency        Past Surgical History:    Past Surgical History:   Procedure Laterality Date    ABDOMEN SURGERY      APPENDECTOMY OPEN N/A     CYSTECTOMY OVARIAN BENIGN  2001    HEPATICOJEJUNOSTOMY  2000    RnY jejunostomy from bile duct injury    HERNIORRHAPHY VENTRAL N/A 02/17/2012    open with mesh    HYSTERECTOMY TOTAL ABDOMINAL, BILATERAL SALPINGO-OOPHORECTOMY, COMBINED N/A 2005    LAPAROSCOPIC CHOLECYSTECTOMY  2006    complicated by bile duct injury    LAPAROSCOPIC HERNIORRHAPHY VENTRAL N/A 01/26/2021    Procedure: Open recurrent incarcerated ventral hernia repair X2;  Surgeon: Pradip Mckenzie MD;  Location: [facility] OR       Family History:    Family History   Problem Relation Age of Onset    Dementia Mother     Coronary Artery Disease Mother     Hypertension Mother     Hyperlipidemia Mother     Depression Mother     Mental Illness Father         Schizophrenia    Colon Cancer Maternal Grandmother     Breast Cancer Maternal Grandmother     Other Cancer Maternal Grandmother     Osteoporosis Other        Social History:  Marital Status:   [2]  Social History     Tobacco Use    Smoking status: Every Day     Packs/day: 1.00     Years: 23.00     Additional pack years: 0.00     Total pack years: 23.00     Types: Cigarettes    Smokeless tobacco: Never    Tobacco comments:     Starting patches    Vaping Use    Vaping Use: Former    Substances: Nicotine   Substance Use Topics    Alcohol use: Yes     Comment: social    Drug use: No        Medications:    albuterol (ACCUNEB) 1.25 MG/3ML neb solution  albuterol (PROAIR HFA/PROVENTIL HFA/VENTOLIN HFA) 108 (90 Base) MCG/ACT inhaler  albuterol (PROAIR HFA/PROVENTIL HFA/VENTOLIN HFA) 108 (90 Base) MCG/ACT inhaler  benzonatate (TESSALON) 200 MG capsule  bisacodyl (DULCOLAX) 5 MG EC tablet  clobetasol propionate (CLOBEX) 0.05 % external shampoo  Fluocinolone Acetonide Scalp 0.01 % OIL oil  fluticasone (FLONASE) 50 MCG/ACT nasal spray  guaiFENesin-codeine (ROBITUSSIN AC)  100-10 MG/5ML solution  Multiple Vitamins-Minerals (MULTIVITAMIN WOMEN PO)  nicotine (NICODERM CQ) 21 MG/24HR 24 hr patch  nicotine polacrilex (NICORETTE) 4 MG gum  nystatin (MYCOSTATIN) 282765 UNIT/GM external cream  polyethylene glycol (GOLYTELY) 236 g suspension  predniSONE (DELTASONE) 20 MG tablet  rizatriptan (MAXALT) 10 MG tablet  sulindac (CLINORIL) 200 MG tablet  tacrolimus (PROTOPIC) 0.1 % external ointment  topiramate (TOPAMAX) 25 MG tablet  triamcinolone (KENALOG) 0.1 % external cream  UNABLE TO FIND  vitamin D2 (ERGOCALCIFEROL) 15280 units (1250 mcg) capsule  vitamin D3 (CHOLECALCIFEROL) 1.25 MG (41579 UT) capsule          Review of Systems   Constitutional:  Positive for appetite change. Negative for fatigue and fever.   Respiratory:  Negative for chest tightness and shortness of breath.    Cardiovascular:  Positive for chest pain.   Gastrointestinal:  Positive for abdominal distention, abdominal pain, nausea and vomiting. Negative for constipation and diarrhea (Last bowel movement tonight was reportedly normal).   Genitourinary:  Negative for decreased urine volume.   Musculoskeletal:  Negative for back pain.   Neurological:  Negative for light-headedness and headaches.   All other systems reviewed and are negative.      Physical Exam   BP: (!) 142/122  Pulse: 105  Temp: 97.9  F (36.6  C)  Resp: 18  SpO2: 96 %      Physical Exam  Vitals and nursing note reviewed.   Constitutional:       Appearance: She is well-developed. She is obese. She is ill-appearing. She is not diaphoretic.      Comments: Patient clearly uncomfortable appearing holding her upper abdomen.   HENT:      Mouth/Throat:      Mouth: Mucous membranes are moist.   Cardiovascular:      Rate and Rhythm: Regular rhythm. Tachycardia present.      Heart sounds: Normal heart sounds.   Pulmonary:      Effort: Pulmonary effort is normal.   Abdominal:      General: Abdomen is protuberant. Bowel sounds are decreased.      Palpations: Abdomen is  Is This A New Presentation, Or A Follow-Up?: Follow Up Acne soft.      Tenderness: There is generalized abdominal tenderness.   Skin:     General: Skin is warm and dry.   Neurological:      Mental Status: She is alert and oriented to person, place, and time.   Psychiatric:         Mood and Affect: Mood normal.         ED Course                 Procedures                  Results for orders placed or performed during the hospital encounter of 10/23/23 (from the past 24 hour(s))   Symptomatic Influenza A/B, RSV, & SARS-CoV2 PCR (COVID-19) Nasopharyngeal    Specimen: Nasopharyngeal; Swab   Result Value Ref Range    Influenza A PCR Negative Negative    Influenza B PCR Negative Negative    RSV PCR Negative Negative    SARS CoV2 PCR Negative Negative    Narrative    Testing was performed using the Xpert Xpress CoV2/Flu/RSV Assay on the [employer] GeneXpert Instrument. This test should be ordered for the detection of SARS-CoV-2, influenza, and RSV viruses in individuals who meet clinical and/or epidemiological criteria. Test performance is unknown in asymptomatic patients. This test is for in vitro diagnostic use under the FDA EUA for laboratories certified under CLIA to perform high or moderate complexity testing. This test has not been FDA cleared or approved. A negative result does not rule out the presence of PCR inhibitors in the specimen or target RNA in concentration below the limit of detection for the assay. If only one viral target is positive but coinfection with multiple targets is suspected, the sample should be re-tested with another FDA cleared, approved, or authorized test, if coinfection would change clinical management. This test was validated by the [facility]. These laboratories are certified under the Clinical Laboratory Improvement Amendments of 1988 (CLIA-88) as qualified to perform high complexity laboratory testing.   Comprehensive metabolic panel   Result Value Ref Range    Sodium 137 135 - 145 mmol/L    Potassium 3.9 3.4 - 5.3 mmol/L     Carbon Dioxide (CO2) 25 22 - 29 mmol/L    Anion Gap 13 7 - 15 mmol/L    Urea Nitrogen 13.1 6.0 - 20.0 mg/dL    Creatinine 0.71 0.51 - 0.95 mg/dL    GFR Estimate >90 >60 mL/min/1.73m2    Calcium 9.5 8.6 - 10.0 mg/dL    Chloride 99 98 - 107 mmol/L    Glucose 145 (H) 70 - 99 mg/dL    Alkaline Phosphatase 220 (H) 35 - 104 U/L     (H) 0 - 45 U/L     (H) 0 - 50 U/L    Protein Total 7.8 6.4 - 8.3 g/dL    Albumin 4.6 3.5 - 5.2 g/dL    Bilirubin Total 0.8 <=1.2 mg/dL   CBC with platelets, differential    Narrative    The following orders were created for panel order CBC with platelets, differential.  Procedure                               Abnormality         Status                     ---------                               -----------         ------                     CBC with platelets and d...[[id number]]  Abnormal            Final result                 Please view results for these tests on the individual orders.   Troponin T, High Sensitivity   Result Value Ref Range    Troponin T, High Sensitivity <6 <=14 ng/L   [facility] Draw    Narrative    The following orders were created for panel order [facility] Draw.  Procedure                               Abnormality         Status                     ---------                               -----------         ------                     Extra Blue Top Tube[[id number]]                              Final result                 Please view results for these tests on the individual orders.   Lipase   Result Value Ref Range    Lipase 20 13 - 60 U/L   CBC with platelets and differential   Result Value Ref Range    WBC Count 19.9 (H) 4.0 - 11.0 10e3/uL    RBC Count 4.27 3.80 - 5.20 10e6/uL    Hemoglobin 13.5 11.7 - 15.7 g/dL    Hematocrit 40.8 35.0 - 47.0 %    MCV 96 78 - 100 fL    MCH 31.6 26.5 - 33.0 pg    MCHC 33.1 31.5 - 36.5 g/dL    RDW 13.1 10.0 - 15.0 %    Platelet Count 204 150 - 450 10e3/uL    % Neutrophils 90 %    % Lymphocytes 6 %    % Monocytes 4 %    Mids %  (Monos, Eos, Basos)      % Eosinophils 0 %    % Basophils 0 %    % Immature Granulocytes 0 %    NRBCs per 100 WBC 0 <1 /100    Absolute Neutrophils 17.7 (H) 1.6 - 8.3 10e3/uL    Absolute Lymphocytes 1.2 0.8 - 5.3 10e3/uL    Absolute Monocytes 0.8 0.0 - 1.3 10e3/uL    Mids Abs (Monos, Eos, Basos)      Absolute Eosinophils 0.0 0.0 - 0.7 10e3/uL    Absolute Basophils 0.0 0.0 - 0.2 10e3/uL    Absolute Immature Granulocytes 0.1 <=0.4 10e3/uL    Absolute NRBCs 0.0 10e3/uL   Extra Blue Top Tube   Result Value Ref Range    Hold Specimen JIC    CT Abdomen Pelvis w Contrast    Narrative    EXAM: CT ABDOMEN PELVIS W CONTRAST  LOCATION: [facility]  DATE: 10/23/2023    INDICATION: upper abdominal pain, nausea and vomiting,  COMPARISON: Abdominal ultrasound 09/25/2023. CT abdomen and pelvis 09/09/2022.  TECHNIQUE: CT scan of the abdomen and pelvis was performed following injection of IV contrast. Multiplanar reformats were obtained. Dose reduction techniques were used.  CONTRAST: 100 mL Isovue 370    FINDINGS:   LOWER CHEST: Normal.    HEPATOBILIARY: Postcholecystectomy. Mild biliary dilatation probably on the basis of reservoir effect after cholecystectomy.    PANCREAS: Normal.    SPLEEN: Normal.    ADRENAL GLANDS: Normal.    KIDNEYS/BLADDER: Tiny probable benign cysts of the kidneys do not warrant follow-up.    BOWEL: No bowel obstruction or inflammation. Moderate-sized ventral abdominal wall hernia in the epigastric region contains nonobstructed transverse colon. Fat deposition in the wall the colon consistent with prior inflammation. Small bowel anastomosis   in the left abdomen.    LYMPH NODES: Normal.    VASCULATURE: Unremarkable.    PELVIC ORGANS: Normal.    MUSCULOSKELETAL: Normal.      Impression    IMPRESSION:   1.  No acute process in the abdomen or pelvis.  2.  Moderate-sized ventral abdominal wall hernia in the epigastric region contains nonobstructed transverse colon.   Lactic acid whole  blood   Result Value Ref Range    Lactic Acid 1.9 0.7 - 2.0 mmol/L       Medications   HYDROmorphone (PF) (DILAUDID) injection 0.5 mg (0.5 mg Intravenous $Given 10/23/23 0423)   ondansetron (ZOFRAN) injection 4 mg (4 mg Intravenous $Given 10/23/23 0414)   lactated ringers BOLUS 1,000 mL (0 mLs Intravenous Stopped 10/23/23 0603)   iopamidol (ISOVUE-370) solution 100 mL (100 mLs Intravenous $Given 10/23/23 0430)   sodium chloride 0.9 % bag 500mL for CT scan flush use (71 mLs Intravenous $Given 10/23/23 0430)   droPERidol (INAPSINE) injection 2.5 mg (2.5 mg Intravenous $Given 10/23/23 0608)     6:24 AM: Discussed with hospitalist, Jo Mackenzie. Accepts for obs.     Assessments & Plan (with Medical Decision Making)  42-year-old female with multiple previous abdominal surgeries.  For evaluation of progressive abdominal pain with nausea and vomiting tonight.  Severely uncomfortable appearing upon arrival in ED with diffuse upper abdominal and chest pains.  Pain is in the location around her previous hernia.  Concern for possible recurrent obstruction or incarceration given her presentation.  Labs including lactic acid obtained.  She does have quite a white count but normal lactic acid.  Lipase was normal however her LFTs were moderately elevated.  She has previous cholecystectomy.  No other known sick contacts or bad food exposure.  Symptoms improved but not fully controlled in the ED.  Admitted to observation for continued symptomatic treatment and trending of LFTs.     I have reviewed the nursing notes.    I have reviewed the findings, diagnosis, plan and need for follow up with the patient.                 New Prescriptions    No medications on file       Final diagnoses:   Upper abdominal pain   Elevated LFTs       10/23/2023   [facility] EMERGENCY DEPT       Ralph, Magan Moseley MD  10/23/23 0629

## 2023-10-23 NOTE — ED TRIAGE NOTES
Pt presents with upper abd pain, N/V/D, CP that radiates to the shoulder that started around 1900. Ibuprofen taken at 2300. Pt denied cardiac hx, SOB, urinary symptoms. LBM 10/23.Pt ha hx of abd surgeries. Pt is A/Ox4     Triage Assessment (Adult)       Row Name 10/23/23 0353          Triage Assessment    Airway WDL WDL        Respiratory WDL    Respiratory WDL WDL        Skin Circulation/Temperature WDL    Skin Circulation/Temperature WDL WDL        Cardiac WDL    Cardiac WDL X;chest pain        Chest Pain Assessment    Chest Pain Location epigastric     Chest Pain Radiation shoulder     Character pressure     Precipitating Factors nothing     Chest Pain Intervention cardiac biomarkers drawn;cardiac monitor placed;12-lead ECG obtained;activity minimized        Peripheral/Neurovascular WDL    Peripheral Neurovascular WDL WDL        Cognitive/Neuro/Behavioral WDL    Cognitive/Neuro/Behavioral WDL WDL

## 2023-10-23 NOTE — ED NOTES
[facility]   Admission Handoff    The patient is Elza Greer, 48 year old who arrived in the ED by CAR from home with a complaint of Abdominal Pain; Nausea, Vomiting, & Diarrhea; and Chest Pain  . The patient's current symptoms are new and during this time the symptoms have remained the same. In the ED, patient was diagnosed with   Final diagnoses:   Upper abdominal pain   Elevated LFTs         Needed?: No    Allergies:    Allergies   Allergen Reactions    Azithromycin Anaphylaxis    Latex Hives    Oxycodone Nausea and Vomiting       Past Medical Hx:   Past Medical History:   Diagnosis Date    Adjustment disorder with mixed anxiety and depressed mood     Arthritis     COPD (chronic obstructive pulmonary disease) (H)     Depressive disorder     History of blood transfusion     Had a blood transfusion after my hysterectomy in 2005    IBS (irritable bowel syndrome)     Incisional hernia, without obstruction or gangrene 2020    Lactose intolerance 05/12/2010    Migraine 05/12/2010    Mild intermittent asthma without complication 05/12/2010    PCOS (polycystic ovarian syndrome)     Tobacco use disorder     Vitamin D deficiency        Initial vitals were: BP: (!) 142/122  Pulse: 105  Temp: 97.9  F (36.6  C)  Resp: 18  SpO2: 96 %   Recent vital Signs: BP (!) 189/100   Pulse 112   Temp 97.9  F (36.6  C) (Oral)   Resp 20   LMP 08/26/2005   SpO2 97%     Elimination Status: Continent: Yes     Activity Level: Independent    Fall Status: Reason for falls risk: High Risk Medications  nonskid shoes/slippers when out of bed, arm band in place, patient and family education, and assistive device/personal items within reach    Baseline Mental status: WDL  Current Mental Status changes: at basesline    Infection present or suspected this encounter: no  Sepsis suspected: No    Isolation type: NA    Bariatric equipment needed?: No    In the ED these meds were given:   Medications    HYDROmorphone (PF) (DILAUDID) injection 0.5 mg (0.5 mg Intravenous $Given 10/23/23 0423)   dextrose 5% in lactated ringers infusion (has no administration in time range)   ondansetron (ZOFRAN) injection 4 mg (4 mg Intravenous $Given 10/23/23 0414)   lactated ringers BOLUS 1,000 mL (0 mLs Intravenous Stopped 10/23/23 0603)   iopamidol (ISOVUE-370) solution 100 mL (100 mLs Intravenous $Given 10/23/23 0430)   sodium chloride 0.9 % bag 500mL for CT scan flush use (71 mLs Intravenous $Given 10/23/23 0430)   droPERidol (INAPSINE) injection 2.5 mg (2.5 mg Intravenous $Given 10/23/23 0608)       Drips running?  No    Home pump  No    Current LDAs: Peripheral IV: Site right forearm; Gauge 20  D5W/LR     Results:   Labs/Imaging  Ordered and Resulted from Time of ED Arrival Up to the Time of Departure from the ED  Results for orders placed or performed during the hospital encounter of 10/23/23 (from the past 24 hour(s))   Symptomatic Influenza A/B, RSV, & SARS-CoV2 PCR (COVID-19) Nasopharyngeal    Specimen: Nasopharyngeal; Swab   Result Value Ref Range    Influenza A PCR Negative Negative    Influenza B PCR Negative Negative    RSV PCR Negative Negative    SARS CoV2 PCR Negative Negative    Narrative    Testing was performed using the Xpert Xpress CoV2/Flu/RSV Assay on the [employer] GeneXpert Instrument. This test should be ordered for the detection of SARS-CoV-2, influenza, and RSV viruses in individuals who meet clinical and/or epidemiological criteria. Test performance is unknown in asymptomatic patients. This test is for in vitro diagnostic use under the FDA EUA for laboratories certified under CLIA to perform high or moderate complexity testing. This test has not been FDA cleared or approved. A negative result does not rule out the presence of PCR inhibitors in the specimen or target RNA in concentration below the limit of detection for the assay. If only one viral target is positive but coinfection with multiple targets  is suspected, the sample should be re-tested with another FDA cleared, approved, or authorized test, if coinfection would change clinical management. This test was validated by the [facility] [employer]. These laboratories are certified under the Clinical Laboratory Improvement Amendments of 1988 (CLIA-88) as qualified to perform high complexity laboratory testing.   Comprehensive metabolic panel   Result Value Ref Range    Sodium 137 135 - 145 mmol/L    Potassium 3.9 3.4 - 5.3 mmol/L    Carbon Dioxide (CO2) 25 22 - 29 mmol/L    Anion Gap 13 7 - 15 mmol/L    Urea Nitrogen 13.1 6.0 - 20.0 mg/dL    Creatinine 0.71 0.51 - 0.95 mg/dL    GFR Estimate >90 >60 mL/min/1.73m2    Calcium 9.5 8.6 - 10.0 mg/dL    Chloride 99 98 - 107 mmol/L    Glucose 145 (H) 70 - 99 mg/dL    Alkaline Phosphatase 220 (H) 35 - 104 U/L     (H) 0 - 45 U/L     (H) 0 - 50 U/L    Protein Total 7.8 6.4 - 8.3 g/dL    Albumin 4.6 3.5 - 5.2 g/dL    Bilirubin Total 0.8 <=1.2 mg/dL   CBC with platelets, differential    Narrative    The following orders were created for panel order CBC with platelets, differential.  Procedure                               Abnormality         Status                     ---------                               -----------         ------                     CBC with platelets and d...[[id number]]  Abnormal            Final result                 Please view results for these tests on the individual orders.   Troponin T, High Sensitivity   Result Value Ref Range    Troponin T, High Sensitivity <6 <=14 ng/L   [facility] Draw    Narrative    The following orders were created for panel order [facility] Draw.  Procedure                               Abnormality         Status                     ---------                               -----------         ------                     Extra Blue Top Tube[[id number]]                              Final result                 Please view results for these tests on the  individual orders.   Lipase   Result Value Ref Range    Lipase 20 13 - 60 U/L   CBC with platelets and differential   Result Value Ref Range    WBC Count 19.9 (H) 4.0 - 11.0 10e3/uL    RBC Count 4.27 3.80 - 5.20 10e6/uL    Hemoglobin 13.5 11.7 - 15.7 g/dL    Hematocrit 40.8 35.0 - 47.0 %    MCV 96 78 - 100 fL    MCH 31.6 26.5 - 33.0 pg    MCHC 33.1 31.5 - 36.5 g/dL    RDW 13.1 10.0 - 15.0 %    Platelet Count 204 150 - 450 10e3/uL    % Neutrophils 90 %    % Lymphocytes 6 %    % Monocytes 4 %    Mids % (Monos, Eos, Basos)      % Eosinophils 0 %    % Basophils 0 %    % Immature Granulocytes 0 %    NRBCs per 100 WBC 0 <1 /100    Absolute Neutrophils 17.7 (H) 1.6 - 8.3 10e3/uL    Absolute Lymphocytes 1.2 0.8 - 5.3 10e3/uL    Absolute Monocytes 0.8 0.0 - 1.3 10e3/uL    Mids Abs (Monos, Eos, Basos)      Absolute Eosinophils 0.0 0.0 - 0.7 10e3/uL    Absolute Basophils 0.0 0.0 - 0.2 10e3/uL    Absolute Immature Granulocytes 0.1 <=0.4 10e3/uL    Absolute NRBCs 0.0 10e3/uL   Extra Blue Top Tube   Result Value Ref Range    Hold Specimen JIC    CT Abdomen Pelvis w Contrast    Narrative    EXAM: CT ABDOMEN PELVIS W CONTRAST  LOCATION: [facility]  DATE: 10/23/2023    INDICATION: upper abdominal pain, nausea and vomiting,  COMPARISON: Abdominal ultrasound 09/25/2023. CT abdomen and pelvis 09/09/2022.  TECHNIQUE: CT scan of the abdomen and pelvis was performed following injection of IV contrast. Multiplanar reformats were obtained. Dose reduction techniques were used.  CONTRAST: 100 mL Isovue 370    FINDINGS:   LOWER CHEST: Normal.    HEPATOBILIARY: Postcholecystectomy. Mild biliary dilatation probably on the basis of reservoir effect after cholecystectomy.    PANCREAS: Normal.    SPLEEN: Normal.    ADRENAL GLANDS: Normal.    KIDNEYS/BLADDER: Tiny probable benign cysts of the kidneys do not warrant follow-up.    BOWEL: No bowel obstruction or inflammation. Moderate-sized ventral abdominal wall hernia in  the epigastric region contains nonobstructed transverse colon. Fat deposition in the wall the colon consistent with prior inflammation. Small bowel anastomosis   in the left abdomen.    LYMPH NODES: Normal.    VASCULATURE: Unremarkable.    PELVIC ORGANS: Normal.    MUSCULOSKELETAL: Normal.      Impression    IMPRESSION:   1.  No acute process in the abdomen or pelvis.  2.  Moderate-sized ventral abdominal wall hernia in the epigastric region contains nonobstructed transverse colon.   Lactic acid whole blood   Result Value Ref Range    Lactic Acid 1.9 0.7 - 2.0 mmol/L       For the majority of the shift this patient's behavior was Green     Cardiac Rhythm: Tachycardia  Pt needs tele? No  Skin/wound Issues:  none noted in limited ER focuses assessments.    Code Status: No code status on file. Will need MD order on admission.     Pain control: fair    Nausea control: fair    Abnormal labs/tests/findings requiring intervention: See CT. NPO and IV pain medications.     Patient tested for COVID 19 prior to admission: YES     OBS brochure/video discussed/provided to patient/family: Yes     Family present during ED course? Yes     Family Comments/Social Situation comments: None.    Tasks needing completion:  Start IV fluids.     Gina Spivey RN

## 2023-10-23 NOTE — LETTER
[facility] INTENSIVE CARE  [address] [facility] [address]  Phone: [phone]  Fax: [phone]    October 27, 2023        Elza Greer  [address] [facility] [address]          To whom it may concern:    RE: Elza Greer    Patient may return to work 11/03/23 with the following:  No restrictions    Please contact me for questions or concerns.      Sincerely,        No name on file  
[facility] INTENSIVE CARE  [address] [address]  Phone: [phone]  Fax: [phone]    October 27, 2023        Elza Greer  [address] [facility] [address]          To whom it may concern:    RE: Elaz Greer    Patient may return to work 11/03/23 with the following:  No restrictions    Please contact me for questions or concerns.      Sincerely,        Dr. Manuel Burgos  
Resident

## 2023-10-23 NOTE — PLAN OF CARE
Goal Outcome Evaluation:         End Of Shift Note    Situation: abd pain, nausea    Plan: Pain control, clear liquid diet    Subjective/Objective:    Neuro: A & O x 4    Cardiac: SR/ST    Resp: LS clear, RA, Sats >89%    GI/: Voiding in bathroom. Had BM in evening, missed getting specimen sent to lab.      MSK: SBA, steady, alert    Skin: no concerns at this time.    LDAs: x 1 PIV.    Cortney Tejeda RN BSN

## 2023-10-23 NOTE — ED NOTES
Pt's heart rate elevated in 130's, hypertensive.  Oral temp 102.2.  Provider notified, hold Metoprolol.  Normal saline bolus and ibuprofen administered.  Pt also assisted to bedside commode, up with assistance of one.  Fall risk due to pain medications.  Pt has expiratory wheezes-her  patient is a heavy smoker.

## 2023-10-23 NOTE — PROGRESS NOTES
[facility] ADMISSION NOTE    Patient admitted to room 1007 at approximately 0900 via cart from emergency room. Patient was accompanied by spouse.     Verbal SBAR report received from Elmira GONCALVES prior to patient arrival.     Patient trasferred to bed via self. Patient alert and oriented X 3. The patient is not having any pain.  . Admission vital signs: Blood pressure (!) 130/94, pulse (!) 135, temperature (!) 103  F (39.4  C), temperature source Oral, resp. rate 16, weight 115.3 kg (254 lb 3.1 oz), last menstrual period 08/26/2005, SpO2 95%, not currently breastfeeding. Patient was oriented to plan of care, call light, bed controls, tv, telephone, bathroom, and visiting hours.     Risk Assessment    The following safety risks were identified during admission: none. Yellow risk band applied: NO.     Skin Initial Assessment    This writer admitted this patient and completed a full skin assessment and Rafa score in the Adult PCS flowsheet. Appropriate interventions initiated as needed.     Secondary skin check completed by SARAH Young RN.    Rafa Risk Assessment  Sensory Perception: 3-->slightly limited  Moisture: 3-->occasionally moist  Activity: 3-->walks occasionally  Mobility: 3-->slightly limited  Nutrition: 3-->adequate  Friction and Shear: 3-->no apparent problem  Rafa Score: 18  Moisture Interventions: Incontinence pad  Mattress: Low air loss (DOMINIQUE pump, Isolibrium, Skin Guard, Pulsate, Isoair, etc.)    Education    Patient has a [place] to Observation order: Yes  Observation education completed and documented: Yes      Cortney Tejeda RN

## 2023-10-23 NOTE — MEDICATION SCRIBE - ADMISSION MEDICATION HISTORY
Medication Scribe Admission Medication History    Admission medication history is complete. The information provided in this note is only as accurate as the sources available at the time of the update.    Information Source(s): Patient and CareEverywhere/SureScripts via  in room with patient.    Pertinent Information: Dulcolax and Peg 3350 are for future use.  Does not like to use Nicotine lozenges.    Changes made to PTA medication list:  Added: None  Deleted: Albuterol Inhaler from 9/2022, Benzonatate 200 mg from 2/1/23, Ergocalciferol 1250 mcg from 2022, robitussin AC from 7/15/2023, Prednisone 20 mg, Sulindac 200 mg, Tacrolimus 0.1%, Topiramate 25 mg from 2022, Hydroxycut(Unable to find).  Changed: None    Medication Affordability:  Not including over the counter (OTC) medications, was there a time in the past 3 months when you did not take your medications as prescribed because of cost?: No    Allergies reviewed with patient and updates made in EHR: yes, no change.    Medication History Completed By: Estephania Eisenberg 10/23/2023 10:39 AM    Prior to Admission medications    Medication Sig Last Dose Taking? Auth Provider Long Term End Date   albuterol (ACCUNEB) 1.25 MG/3ML neb solution Take 1 vial (1.25 mg) by nebulization every 6 hours as needed for shortness of breath / dyspnea or wheezing Past Week at prn Yes Lillian Brown, APRN CNP Yes    albuterol (PROAIR HFA/PROVENTIL HFA/VENTOLIN HFA) 108 (90 Base) MCG/ACT inhaler Inhale 1-2 puffs into the lungs every 4 hours as needed for shortness of breath, wheezing or cough Past Week at prn Yes Sofiya Randhawa APRN CNP Yes    bisacodyl (DULCOLAX) 5 MG EC tablet 2 days prior to procedure, take 2 tablets at 4 pm. 1 day prior to procedure, take 2 tablets at 4 pm. For additional instructions refer to your colonoscopy prep instructions. future use at on hand Yes Chris Wyatt MD     clobetasol propionate (CLOBEX) 0.05 % external shampoo Leave on scalp for 15 minutes  then rinse. Use 1-2 times weekly. More than a month at ? Yes Cony Short PA-C     Fluocinolone Acetonide Scalp 0.01 % OIL oil Daily as needed for scalp psoriasis More than a month at ? Yes Di Shea APRN CNP     fluticasone (FLONASE) 50 MCG/ACT nasal spray Spray 2 sprays into both nostrils daily Past Week at ? Yes Sofiya Randhawa APRN CNP     Multiple Vitamins-Minerals (MULTIVITAMIN WOMEN PO) Take 2 chew tab by mouth daily gummy 10/22/2023 at am Yes Reported, Patient     nicotine (NICODERM CQ) 21 MG/24HR 24 hr patch Place 1 patch onto the skin every 24 hours Past Week at on hand Yes Reported, Patient     nicotine polacrilex (NICORETTE) 4 MG gum Place 4 mg inside cheek every hour as needed for smoking cessation uses at work at ? Yes Reported, Patient     nystatin (MYCOSTATIN) 029984 UNIT/GM external cream Apply topically 2 times daily 10/22/2023 at ? Yes Di Shea APRN CNP     polyethylene glycol (GOLYTELY) 236 g suspension 2 days prior at 5pm, mix and drink half of a jug of Golytely. Drink an 8 oz. glass of Golytely every 15 minutes until half of the jug is gone. Place remainder of Golytely in the refrigerator. 1 day prior at 5 pm, drink the 2nd half of a jug of Golytely bowel prep. 6 hours before your check-in time, drink an 8 oz. glass of Golytely every 15 minutes until half of the 2nd jug of Golytely is gone. Discard remainder of second jug. future use at on hand Yes Jose Manuel Wyatt-MD Rubia     rizatriptan (MAXALT) 10 MG tablet Take 1 tablet (10 mg) by mouth at onset of headache for migraine May repeat in 2 hours. Max 3 tablets/24 hours. Unknown at on hand if needed Yes Lillian Brown APRN CNP     vitamin D3 (CHOLECALCIFEROL) 1.25 MG (99756 UT) capsule Take 1 capsule (50,000 Units) by mouth every 7 days 10/16/2023 at am Yes Sofiya Randhawa APRN CNP

## 2023-10-23 NOTE — H&P
[facility]    History and Physical  Hospital Medicine       Date of Admission:  10/23/2023  Date of Service: 10/23/2023     Assessment & Plan   Elza Greer is a 48 year old female who presents on 10/23/2023 with abdominal pain. She is being admitted for ongoing workup & management.     Sepsis with unclear source  Leukocytosis, acute on chronic   Septic based on leukocytosis (19.9), tachycardia (134bpm), febrile (103F). Source not entirely clear: has abdominal pain & nausea but CT does not show any obvious acute enteritis, UA does not appear infected, skin intact, meningeal signs negative (but difficult to assess with somnolence). Suspect sepsis is most likely GI source based on symptoms but workup thus far cannot confirm.  Notably, baseline WBC around 13.0. Has seen oncology for neutrophilic non-progressive leukocytosis and etiology determined to be tobacco use.  -Piperacillin-tazobactam 4.5g Q6hrs  -CT PE to evaluate for pulmonary source or PE  -Blood cultures pending  -Additional 1.5L fluid bolus ordered (to total 3.5L bolus which would complete sepsis bolusing)  -Dextrose 5% in lr infusion 125/hr continuous for fluid maintenance  -CBC in AM  -Ibuprofen & acetaminophen (reduced dose & frequency) for fever    Addendum: CT PE does not show any obvious source, but does not hepatosplenomegaly. RUQ US pending.     Elevated LFTs  Moderate, acute, mixed pattern. On presentation: alk phos 220, , . Total bili (0.8) WNL. CT abdomen pelvis shows patient is post-cholecystectomy with mild bilary dilatation but no other abnormalities. Denies IVDU, excessive ETOH, or known hepatitis exposures. Differential includes acute viral or autoimmune hepatitis, or more generally secondary to sepsis from other infection.   -Hepatitis A, B, C tests pending  -Autoimmune labs pending: mitochondrial M2 Ab IgG, SEVERINO Ab IgG by IFA, liver kidney microsomal Ab IgG  -CMP in AM  -Avoid hepatotoxins  "(acetaminophen is ordered, but at reduced dose & frequency for high fever)    Polyarthralgia  Psoriasis  Ongoing polyarthralgia, saw rheumatology Aug 2023 at which time lab workup showed minimally elevated rheumatoid factor (12). Other inflammatory labs were deferred due to recent treatment for pneumonia at that time. Not on any outpatient pharmacologic management for arthritis. Increases risk of other autoimmune conditions. Monitor.     Mild intermittent asthma without complication  Mild expiratory wheezing bilaterally on admission. Oxygenating well on room air. Managed PTA with albuterol inhaler & neb, benzonatate, fluticasone nasal spray.   -One time levalbuterol neb (for wheezing with tachycardia)  -Continue PTA albuterol neb for wheezing    Ventral hernia without obstruction or gangrene  CT abdomen pelvis 10/23 shows moderate-seize ventral abdominal wall hernia in epigastric region containing non-obstructed transverse colon. Unable to reduce on exam due to patient positioning. No acute interventions warranted at this time.     Tobacco use disorder  Nicotine patch ordered. Encourage cessation.     Morbid obesity   Contributes to overall morbidity and mortality.       Clinically Significant Risk Factors Present on Admission                       # Severe Obesity: Estimated body mass index is 43.43 kg/m  as calculated from the following:    Height as of 9/29/23: 1.626 m (5' 4\").    Weight as of 9/29/23: 114.8 kg (253 lb).       # Asthma: noted on problem list           Diet: NPO for Medical/Clinical Reasons Except for: Meds    DVT Prophylaxis: Enoxaparin (Lovenox) SQ  Portillo Catheter: Not present  Code Status:  full code  Lines: PIV    Disposition Plan      Expected Discharge Date: 10/24/2023             Entered: Jo Torres PA-C 10/23/2023, 7:30 AM     Status: Patient is appropriate for inpatient  Jo Torres PA-C        The patient's care was discussed with the Attending Physician, Dr. Johns " Seamus, bedside RN, and the patient .    Primary Care Physician   Di Shea [phone]    History is obtained from the patient, who is a very poor historian, patient's  Michael who is at the bedside and provides all HPI, handoff from ER provider, and review of old records via the EMR.    History of Present Illness   Elza Greer is a 48 year old female with past medical history of anxiety, rheumatoid arthritis, tobacco use disorder, multiple abdominal surgeries now presents on 10/23/2023 with abdominal pain.     Per patient's , patient had sudden onset severe abdominal pain beginning evening 10/22 around 4pm. Patient's pain was generalized and associated with nausea. Only episode of emesis was upon arrival to ER.    reports that usually when patient has this pain it means she has another bowel obstruction. She has a history of multiple abdominal surgeries and has had bowel obstructions in the past.   Patient's  says he does not think the patient has had any recent medication changes. They have eaten the same foods over the past day and  feels fine.   As far as he knows, the patient has no history of current or previous IVDU. He is unsure if she is vaccinate for hepatitis.   Patient has chronic diarrhea but  said she had not been complaining of any changes in Bms.  She had a fever at home overnight 10/22 - 10/23.   She drank ETOH on Friday 10/20, but does not have a history of significant frequent ETOH use.   History of cholecystectomy, hysterectomy. Has not taken any medications since AM 10/22.    Upon arrival to ER patient received lab and imaging workup. Received droperidol, 1L fluid bolus, ondansetron.     Addendum: checked on patient PM 10/23 after admission. Is doing better: more alert, fever has broken, BP closer to normal. Patient feels abdominal pain is improving. Pain began evening prior to admission. It did radiate up into her chest. Usually when  she gets pain it means she is getting pneumonia, but she felt this was different.     Review of Systems   Unable to complete since patient does not stay awake long enough to provider verbal answers to questioning.     Past Medical History     COPD (chronic obstructive pulmonary disease) (H)     IBS (irritable bowel syndrome)     Mild intermittent asthma without complication 05/12/2010    PCOS (polycystic ovarian syndrome)       Rheumatoid arthritis (H) 09/18/2023     Priority: Medium    Morbid obesity (H) 10/04/2021     Priority: Medium    Ventral hernia without obstruction or gangrene 01/13/2021     Priority: Medium    Anxiety 02/18/2020     Priority: Medium    Tobacco use disorder 11/14/2016     Priority: Medium    Mild intermittent asthma without complication 05/12/2010     Priority: Medium      Past Surgical History   Past Surgical History:   Procedure Laterality Date    ABDOMEN SURGERY      APPENDECTOMY OPEN N/A     CYSTECTOMY OVARIAN BENIGN  2001    HEPATICOJEJUNOSTOMY  2000    RnY jejunostomy from bile duct injury    HERNIORRHAPHY VENTRAL N/A 02/17/2012    open with mesh    HYSTERECTOMY TOTAL ABDOMINAL, BILATERAL SALPINGO-OOPHORECTOMY, COMBINED N/A 2005    LAPAROSCOPIC CHOLECYSTECTOMY  2006    complicated by bile duct injury    LAPAROSCOPIC HERNIORRHAPHY VENTRAL N/A 01/26/2021    Procedure: Open recurrent incarcerated ventral hernia repair X2;  Surgeon: Pradip Mckenzie MD;  Location: [facility] OR      Prior to Admission Medications   Prior to Admission Medications   Prescriptions Last Dose Informant Patient Reported? Taking?   Fluocinolone Acetonide Scalp 0.01 % OIL oil   No No   Sig: Daily as needed for scalp psoriasis   Multiple Vitamins-Minerals (MULTIVITAMIN WOMEN PO)   Yes No   UNABLE TO FIND   Yes No   Sig: MEDICATION NAME: hydroxycut   albuterol (ACCUNEB) 1.25 MG/3ML neb solution   No No   Sig: Take 1 vial (1.25 mg) by nebulization every 6 hours as needed for shortness of breath / dyspnea or wheezing    albuterol (PROAIR HFA/PROVENTIL HFA/VENTOLIN HFA) 108 (90 Base) MCG/ACT inhaler   No No   Sig: Inhale 2 puffs into the lungs every 6 hours   albuterol (PROAIR HFA/PROVENTIL HFA/VENTOLIN HFA) 108 (90 Base) MCG/ACT inhaler   No No   Sig: Inhale 1-2 puffs into the lungs every 4 hours as needed for shortness of breath, wheezing or cough   Patient not taking: Reported on 2023   benzonatate (TESSALON) 200 MG capsule   No No   Sig: Take 1 capsule (200 mg) by mouth 3 times daily as needed for cough   Patient not taking: Reported on 2023   bisacodyl (DULCOLAX) 5 MG EC tablet   No No   Si days prior to procedure, take 2 tablets at 4 pm. 1 day prior to procedure, take 2 tablets at 4 pm. For additional instructions refer to your colonoscopy prep instructions.   clobetasol propionate (CLOBEX) 0.05 % external shampoo   No No   Sig: Leave on scalp for 15 minutes then rinse. Use 1-2 times weekly.   fluticasone (FLONASE) 50 MCG/ACT nasal spray   No No   Sig: Spray 2 sprays into both nostrils daily   guaiFENesin-codeine (ROBITUSSIN AC) 100-10 MG/5ML solution   No No   Sig: Take 5-10 mLs by mouth every 4 hours as needed for cough   Patient not taking: Reported on 2023   nicotine (NICODERM CQ) 21 MG/24HR 24 hr patch   Yes No   Sig: Place 1 patch onto the skin every 24 hours   nicotine polacrilex (NICORETTE) 4 MG gum   Yes No   Sig: Place 4 mg inside cheek every hour as needed for smoking cessation   nystatin (MYCOSTATIN) 029774 UNIT/GM external cream   No No   Sig: Apply topically 2 times daily   polyethylene glycol (GOLYTELY) 236 g suspension   No No   Si days prior at 5pm, mix and drink half of a jug of Golytely. Drink an 8 oz. glass of Golytely every 15 minutes until half of the jug is gone. Place remainder of Golytely in the refrigerator. 1 day prior at 5 pm, drink the 2nd half of a jug of Golytely bowel prep. 6 hours before your check-in time, drink an 8 oz. glass of Golytely every 15 minutes until half of  the 2nd jug of Golytely is gone. Discard remainder of second jug.   predniSONE (DELTASONE) 20 MG tablet   No No   Sig: Take 1 tablet (20 mg) by mouth daily   Patient not taking: Reported on 8/2/2023   rizatriptan (MAXALT) 10 MG tablet   No No   Sig: Take 1 tablet (10 mg) by mouth at onset of headache for migraine May repeat in 2 hours. Max 3 tablets/24 hours.   sulindac (CLINORIL) 200 MG tablet   No No   Sig: Take 1 tablet (200 mg) by mouth 2 times daily (with meals)   Patient not taking: Reported on 9/29/2023   tacrolimus (PROTOPIC) 0.1 % external ointment   No No   Sig: Apply twice daily to eczema on hands as needed.   topiramate (TOPAMAX) 25 MG tablet   No No   Sig: Take 3 tablets (75 mg) by mouth daily   Patient not taking: Reported on 7/26/2023   triamcinolone (KENALOG) 0.1 % external cream   No No   Sig: Apply topically 2 times daily   vitamin D2 (ERGOCALCIFEROL) 56521 units (1250 mcg) capsule   No No   Sig: Take 1 capsule (50,000 Units) by mouth once a week   Patient not taking: Reported on 9/18/2023   vitamin D3 (CHOLECALCIFEROL) 1.25 MG (41753 UT) capsule   No No   Sig: Take 1 capsule (50,000 Units) by mouth every 7 days      Facility-Administered Medications: None     Allergies   Allergies   Allergen Reactions    Azithromycin Anaphylaxis    Latex Hives    Oxycodone Nausea and Vomiting     Family History    Family History   Problem Relation Age of Onset    Dementia Mother     Coronary Artery Disease Mother     Hypertension Mother     Hyperlipidemia Mother     Depression Mother     Mental Illness Father         Schizophrenia    Colon Cancer Maternal Grandmother     Breast Cancer Maternal Grandmother     Other Cancer Maternal Grandmother     Osteoporosis Other      Social History   Social History     Socioeconomic History    Marital status:      Physical Exam   BP (!) 186/99   Pulse 118   Temp 97.9  F (36.6  C) (Oral)   Resp 15   LMP 08/26/2005   SpO2 93%      Weight: 0 lbs 0 oz There is no height  or weight on file to calculate BMI.     Constitutional: Sleeping, appears comfortable but acutely ill.  HENT:  No evidence of cranial trauma.  Cardiovascular: Tachycardic, regular rhythm, normal S1 and S2, and no murmur noted. Good peripheral pulses in wrists bilaterally. No pitting lower extremity edema.  Respiratory: Unlabored on 2L NC. Expiratory wheezing present throughout all lung fields bilaterally. No rhonchi or crackles.   GI: Soft, normal bowel sounds, nondistended, no guarding. Laying on side and sleeping so does not arouse to palpation but also difficult to palpate & attempt reduction of hernia.   Genitourinary: Deferred  Musculoskeletal: Normal muscle bulk, no obvious joint deformities.   Skin: Warm and dry, no rashes.   Neurologic: Neck supple. Sleeping and arouses very briefly to voice. No tremor.     Data   Data reviewed today:   Recent Labs   Lab 10/23/23  0413   WBC 19.9*   HGB 13.5   MCV 96         POTASSIUM 3.9   CHLORIDE 99   CO2 25   BUN 13.1   CR 0.71   ANIONGAP 13   DEBBIE 9.5   *   ALBUMIN 4.6   PROTTOTAL 7.8   BILITOTAL 0.8   ALKPHOS 220*   *   *   LIPASE 20       Recent Results (from the past 24 hour(s))   CT Abdomen Pelvis w Contrast    Narrative    EXAM: CT ABDOMEN PELVIS W CONTRAST  LOCATION: [facility]  DATE: 10/23/2023    INDICATION: upper abdominal pain, nausea and vomiting,  COMPARISON: Abdominal ultrasound 09/25/2023. CT abdomen and pelvis 09/09/2022.  TECHNIQUE: CT scan of the abdomen and pelvis was performed following injection of IV contrast. Multiplanar reformats were obtained. Dose reduction techniques were used.  CONTRAST: 100 mL Isovue 370    FINDINGS:   LOWER CHEST: Normal.    HEPATOBILIARY: Postcholecystectomy. Mild biliary dilatation probably on the basis of reservoir effect after cholecystectomy.    PANCREAS: Normal.    SPLEEN: Normal.    ADRENAL GLANDS: Normal.    KIDNEYS/BLADDER: Tiny probable benign cysts of  the kidneys do not warrant follow-up.    BOWEL: No bowel obstruction or inflammation. Moderate-sized ventral abdominal wall hernia in the epigastric region contains nonobstructed transverse colon. Fat deposition in the wall the colon consistent with prior inflammation. Small bowel anastomosis   in the left abdomen.    LYMPH NODES: Normal.    VASCULATURE: Unremarkable.    PELVIC ORGANS: Normal.    MUSCULOSKELETAL: Normal.      Impression    IMPRESSION:   1.  No acute process in the abdomen or pelvis.  2.  Moderate-sized ventral abdominal wall hernia in the epigastric region contains nonobstructed transverse colon.     I personally reviewed the EKG tracing showing sinus rhythm.

## 2023-10-24 LAB
ACINETOBACTER SPECIES: NOT DETECTED
ALBUMIN SERPL BCG-MCNC: 3.2 G/DL (ref 3.5–5.2)
ALP SERPL-CCNC: 183 U/L (ref 35–104)
ALT SERPL W P-5'-P-CCNC: 123 U/L (ref 0–50)
ANA SER QL IF: NEGATIVE
ANION GAP SERPL CALCULATED.3IONS-SCNC: 8 MMOL/L (ref 7–15)
AST SERPL W P-5'-P-CCNC: 76 U/L (ref 0–45)
BILIRUB SERPL-MCNC: 0.8 MG/DL
BUN SERPL-MCNC: 6.9 MG/DL (ref 6–20)
CALCIUM SERPL-MCNC: 8.4 MG/DL (ref 8.6–10)
CHLORIDE SERPL-SCNC: 102 MMOL/L (ref 98–107)
CITROBACTER SPECIES: NOT DETECTED
CREAT SERPL-MCNC: 0.68 MG/DL (ref 0.51–0.95)
CRP SERPL-MCNC: 188.63 MG/L
CTX-M: NOT DETECTED
DEPRECATED HCO3 PLAS-SCNC: 27 MMOL/L (ref 22–29)
EGFRCR SERPLBLD CKD-EPI 2021: >90 ML/MIN/1.73M2
ENTEROBACTER SPECIES: NOT DETECTED
ERYTHROCYTE [DISTWIDTH] IN BLOOD BY AUTOMATED COUNT: 13.7 % (ref 10–15)
ESCHERICHIA COLI: DETECTED
GLUCOSE SERPL-MCNC: 131 MG/DL (ref 70–99)
HCT VFR BLD AUTO: 32.2 % (ref 35–47)
HGB BLD-MCNC: 10.5 G/DL (ref 11.7–15.7)
IMP: NOT DETECTED
KLEBSIELLA OXYTOCA: NOT DETECTED
KLEBSIELLA PNEUMONIAE: NOT DETECTED
KPC: NOT DETECTED
MCH RBC QN AUTO: 31.7 PG (ref 26.5–33)
MCHC RBC AUTO-ENTMCNC: 32.6 G/DL (ref 31.5–36.5)
MCV RBC AUTO: 97 FL (ref 78–100)
NDM: NOT DETECTED
OXA (DETECTED/NOT DETECTED): NOT DETECTED
PLATELET # BLD AUTO: 151 10E3/UL (ref 150–450)
POTASSIUM SERPL-SCNC: 3.4 MMOL/L (ref 3.4–5.3)
PROT SERPL-MCNC: 6.2 G/DL (ref 6.4–8.3)
PROTEUS SPECIES: NOT DETECTED
PSEUDOMONAS AERUGINOSA: NOT DETECTED
RBC # BLD AUTO: 3.31 10E6/UL (ref 3.8–5.2)
SODIUM SERPL-SCNC: 137 MMOL/L (ref 135–145)
VIM: NOT DETECTED
WBC # BLD AUTO: 16.1 10E3/UL (ref 4–11)

## 2023-10-24 PROCEDURE — 87040 BLOOD CULTURE FOR BACTERIA: CPT | Performed by: STUDENT IN AN ORGANIZED HEALTH CARE EDUCATION/TRAINING PROGRAM

## 2023-10-24 PROCEDURE — 120N000004 HC R&B MS OVERFLOW

## 2023-10-24 PROCEDURE — 250N000011 HC RX IP 250 OP 636: Mod: JZ

## 2023-10-24 PROCEDURE — 80053 COMPREHEN METABOLIC PANEL: CPT

## 2023-10-24 PROCEDURE — 99233 SBSQ HOSP IP/OBS HIGH 50: CPT | Performed by: STUDENT IN AN ORGANIZED HEALTH CARE EDUCATION/TRAINING PROGRAM

## 2023-10-24 PROCEDURE — 250N000011 HC RX IP 250 OP 636: Mod: JZ | Performed by: STUDENT IN AN ORGANIZED HEALTH CARE EDUCATION/TRAINING PROGRAM

## 2023-10-24 PROCEDURE — 250N000011 HC RX IP 250 OP 636

## 2023-10-24 PROCEDURE — 85027 COMPLETE CBC AUTOMATED: CPT

## 2023-10-24 PROCEDURE — 250N000009 HC RX 250

## 2023-10-24 PROCEDURE — 86140 C-REACTIVE PROTEIN: CPT

## 2023-10-24 PROCEDURE — 36415 COLL VENOUS BLD VENIPUNCTURE: CPT

## 2023-10-24 PROCEDURE — 250N000013 HC RX MED GY IP 250 OP 250 PS 637

## 2023-10-24 PROCEDURE — 36415 COLL VENOUS BLD VENIPUNCTURE: CPT | Performed by: STUDENT IN AN ORGANIZED HEALTH CARE EDUCATION/TRAINING PROGRAM

## 2023-10-24 RX ORDER — CEFTRIAXONE 2 G/1
2 INJECTION, POWDER, FOR SOLUTION INTRAMUSCULAR; INTRAVENOUS EVERY 24 HOURS
Status: DISCONTINUED | OUTPATIENT
Start: 2023-10-24 | End: 2023-10-25

## 2023-10-24 RX ORDER — FUROSEMIDE 10 MG/ML
40 INJECTION INTRAMUSCULAR; INTRAVENOUS ONCE
Status: COMPLETED | OUTPATIENT
Start: 2023-10-24 | End: 2023-10-24

## 2023-10-24 RX ORDER — CEFTRIAXONE 1 G/1
1 INJECTION, POWDER, FOR SOLUTION INTRAMUSCULAR; INTRAVENOUS EVERY 24 HOURS
Status: DISCONTINUED | OUTPATIENT
Start: 2023-10-24 | End: 2023-10-24

## 2023-10-24 RX ADMIN — ACETAMINOPHEN 325 MG: 325 TABLET, FILM COATED ORAL at 23:32

## 2023-10-24 RX ADMIN — SODIUM CHLORIDE, SODIUM LACTATE, POTASSIUM CHLORIDE, CALCIUM CHLORIDE AND DEXTROSE MONOHYDRATE: 5; 600; 310; 30; 20 INJECTION, SOLUTION INTRAVENOUS at 04:29

## 2023-10-24 RX ADMIN — ONDANSETRON 4 MG: 4 TABLET, ORALLY DISINTEGRATING ORAL at 14:14

## 2023-10-24 RX ADMIN — ALBUTEROL SULFATE 2 PUFF: 90 AEROSOL, METERED RESPIRATORY (INHALATION) at 10:11

## 2023-10-24 RX ADMIN — CEFTRIAXONE SODIUM 2 G: 2 INJECTION, POWDER, FOR SOLUTION INTRAMUSCULAR; INTRAVENOUS at 16:18

## 2023-10-24 RX ADMIN — PIPERACILLIN AND TAZOBACTAM 4.5 G: 4; .5 INJECTION, POWDER, FOR SOLUTION INTRAVENOUS at 10:11

## 2023-10-24 RX ADMIN — ACETAMINOPHEN 325 MG: 325 TABLET, FILM COATED ORAL at 14:14

## 2023-10-24 RX ADMIN — IBUPROFEN 200 MG: 200 TABLET, FILM COATED ORAL at 07:51

## 2023-10-24 RX ADMIN — ACETAMINOPHEN 325 MG: 325 TABLET, FILM COATED ORAL at 07:51

## 2023-10-24 RX ADMIN — HYDROMORPHONE HYDROCHLORIDE 1 MG: 2 TABLET ORAL at 07:51

## 2023-10-24 RX ADMIN — HYDROMORPHONE HYDROCHLORIDE 1 MG: 2 TABLET ORAL at 11:48

## 2023-10-24 RX ADMIN — IBUPROFEN 200 MG: 200 TABLET, FILM COATED ORAL at 14:14

## 2023-10-24 RX ADMIN — ENOXAPARIN SODIUM 40 MG: 100 INJECTION SUBCUTANEOUS at 23:32

## 2023-10-24 RX ADMIN — IBUPROFEN 200 MG: 200 TABLET, FILM COATED ORAL at 23:32

## 2023-10-24 RX ADMIN — ENOXAPARIN SODIUM 40 MG: 100 INJECTION SUBCUTANEOUS at 11:49

## 2023-10-24 RX ADMIN — HYDROMORPHONE HYDROCHLORIDE 2 MG: 2 TABLET ORAL at 16:18

## 2023-10-24 RX ADMIN — PIPERACILLIN AND TAZOBACTAM 4.5 G: 4; .5 INJECTION, POWDER, FOR SOLUTION INTRAVENOUS at 04:12

## 2023-10-24 RX ADMIN — FUROSEMIDE 40 MG: 10 INJECTION, SOLUTION INTRAMUSCULAR; INTRAVENOUS at 11:49

## 2023-10-24 RX ADMIN — NICOTINE 1 PATCH: 21 PATCH, EXTENDED RELEASE TRANSDERMAL at 07:53

## 2023-10-24 RX ADMIN — HYDROMORPHONE HYDROCHLORIDE 2 MG: 2 TABLET ORAL at 19:45

## 2023-10-24 RX ADMIN — LEVALBUTEROL HYDROCHLORIDE 1.25 MG: 1.25 SOLUTION RESPIRATORY (INHALATION) at 11:48

## 2023-10-24 RX ADMIN — ONDANSETRON 4 MG: 2 INJECTION INTRAMUSCULAR; INTRAVENOUS at 07:52

## 2023-10-24 RX ADMIN — HYDROMORPHONE HYDROCHLORIDE 2 MG: 2 TABLET ORAL at 04:28

## 2023-10-24 RX ADMIN — HYDROMORPHONE HYDROCHLORIDE 2 MG: 2 TABLET ORAL at 01:27

## 2023-10-24 RX ADMIN — ENOXAPARIN SODIUM 40 MG: 100 INJECTION SUBCUTANEOUS at 00:05

## 2023-10-24 RX ADMIN — ONDANSETRON 4 MG: 2 INJECTION INTRAMUSCULAR; INTRAVENOUS at 20:25

## 2023-10-24 ASSESSMENT — ACTIVITIES OF DAILY LIVING (ADL)
ADLS_ACUITY_SCORE: 23
ADLS_ACUITY_SCORE: 25
ADLS_ACUITY_SCORE: 23
ADLS_ACUITY_SCORE: 26
ADLS_ACUITY_SCORE: 25
ADLS_ACUITY_SCORE: 25
ADLS_ACUITY_SCORE: 23
ADLS_ACUITY_SCORE: 25
ADLS_ACUITY_SCORE: 23
ADLS_ACUITY_SCORE: 25

## 2023-10-24 NOTE — PROGRESS NOTES
"Assumed care:  1900 to 0700    Neuro:  A/o x4.  Makes needs known.     Cardiac:  SR.  VSS.     Respiratory:  LS diminished.  Frequent cough.  Reports feeling \"Like I have pneumonia\".  Requests O2 via nc at times.  Sats in low 90s.      GI/:  Up to bathroom to void.  No BM.  Some nausea.     Activity:  SBA to BR.     Pain:  Dilaudid q 3 hours.      Skin/wound:  intact.     LDA's:  PIV x 1 with maintenance fluids and IV ABX.       "

## 2023-10-24 NOTE — PROGRESS NOTES
Antimicrobial Stewardship Team Note    Antimicrobial Stewardship Program - A joint venture between [facility] and  Physicians to optimize antibiotic management.  NOT a formal consult - Restricted Antimicrobial Review     Patient: Elza Greer  MRN: [id number]  Allergies: Azithromycin, Latex, and Oxycodone    Brief Summary:   Elza is a 48 year old female with a PMHx significant for rheumatoid arthritis, obesity, depression/anxiety, IBS, Asthma/COPD, ventral hernia repaired x2. The patient has a complex surgical hx including appendectomy, cystectomy, RnY jejunostomy, hysterectomy, and cholecystectomy. She was admitted on 10/23 with a hernia and upper abdominal pain radiating to her chest associated with N/V.     History of Present Illness:   On 10/23 the patient reported complaints of abrupt upper abdominal pain, N/V, hernia, and chest pain. Presentation to the ED the patient was febrile (Tmax 102.2F), hypertensive (142/122 mmHg), tachycardiac (134 bpm), and requiring 2L of supplemental oxygen. Her labs were remarkable for a leukocytosis (WBC 19.9, ANC 17.7), elevated CRP (188.63), elevated liver enzymes (, , Alk phos 220). Her hepatitis A/B/C antibody test were negative, FLU/RSV/COVID pcr was also negative.     CT A/P -  revealed moderate-sized ventral abdominal wall hernia in the epigastric region contains nonobstructed transverse colon. CT Chest was negative for acute pulmonary embolism, no consolidative airspace disease, and there was hepatosplenomegaly. US of abdomen revealed mild intrahepatic biliary ductal dilatation without extrahepatic ductal dilatation and may be due to reservoir effect from cholecystectomy though is indeterminate. Blood cultures resulted positive for Gram-negative bacilli on 10/23 in 4 of 4 bottles, later identified on 10/24 as E.Coli. The patient was started on empiric Zosyn (10/23-10/24) for bacteremia.            Active Anti-infective Medications    (From admission, onward)                 Start     Stop    10/24/23 1600  cefTRIAXone  2 g,   Intravenous,   EVERY 24 HOURS        Bacteremia       --                  Assessment: Sepsis, E.coli bacteremia in the presence of moderate-sized ventral abdominal wall hernia   Today the patient is afebrile (97.9F), normotensive, not requiring supplemental oxygen. Her leukocytosis is still present (WBC 16.1), and CRP  remains elevated  (188.63). Today her blood culture resulted positive for E.coli and the team discontinued Zosyn therapy, and started her on ceftriaxone. Would consider likely GI source of E.coli bacteremia with translocation possibly secondary to chronic hernia vs. potential cholangitis. The patient does not have any CVC, chronic lines/ports placed that could be a source for bacteremia. Given the patient's clinical status I agree with decision to continue ceftriaxone at this time. Recommend surgical consult to evaluate for possible incarcerated hernia or additional intervention needed. Would recommend obtaining daily serial blood cultures until 48 hours of negative and planning for a total antibiotic course of at least 7 days from the 1st set of negative blood cultures, pending source of infection identification and source control established. Could consider transition to fluoroquinolone (e.g. ciprofloxacin 500mg Q12H) pending clinical improvement and susceptibilities.     Recommendations:  Agree with continuing ceftriaxone at this time - may transition to fluoroquinolone (e.g. ciprofloxacin 500mg Q12H) pending clinical improvement.   Obtain serial blood cultures daily until 48 hours of negative.   Recommend surgical consult to evaluate for possible incarcerated hernia or additional intervention needed.   Plan for at least a 7 day total antibiotic course from first negative set of blood cultures, pending source identification and source control.               Pharmacy took the following actions:  Called/paged provider, Sticky note reminder created, Electronic note created    Discussed with ID Staff Renea Garnica MD, MPH and Linette Lopez, PharmD, BCIDP   Jeff Sharp, 2024 PharmD Candidate Phone#: [phone]     Vital Signs/Clinical Features:  Vitals         10/22 0700  10/23 0659 10/23 0700  10/24 0659 10/24 0700  10/24 1546   Most Recent      Temp ( F)   97.9    98.7 -  103    97.9 -  98.7     98.7 (37.1) 10/24 1525    Pulse 101 -  117    110 -  144    98 -  103     98 10/24 1525    Resp 18 -  27    14 -  27      18     18 10/24 1525    /122 -  189/100    130/94 -  197/108    127/82 -  130/90     130/90 10/24 1525    SpO2 (%) 92 -  97    90 -  96    92 -  99     99 10/24 1525            Labs  Estimated Creatinine Clearance: 128.8 mL/min (based on SCr of 0.68 mg/dL).  Recent Labs   Lab Test 08/02/23  1342 09/05/23  2347 09/18/23  1502 10/23/23  0413 10/23/23  0919 10/24/23  0557   CR 0.81 0.83 0.65 0.71 0.62 0.68       Recent Labs   Lab Test 02/19/16  1500 06/17/20  1050 12/08/20  2247 12/26/21  1212 09/01/22  1618 08/02/23  1342 09/05/23  2347 09/18/23  1502 10/23/23  0413 10/24/23  0557   WBC 17.7* 12.1* 12.1*   < > 12.7* 11.7* 12.5* 11.6* 19.9* 16.1*   ANEU 15.5* 8.7* 8.8*  --   --   --   --   --   --   --    ALYM 0.9 2.4 2.3  --   --   --   --   --   --   --    VIJAY 1.3 0.7 0.8  --   --   --   --   --   --   --    AEOS 0.0 0.1 0.2  --   --   --   --   --   --   --    HGB 13.2 12.4 12.7   < > 11.4* 12.8 12.5 12.3 13.5 10.5*   HCT 40.1 38.0 38.8   < > 35.8 40.4 37.8 38.3 40.8 32.2*   MCV 94 96 96   < > 98 95 94 96 96 97    215 149*   < > 198 224 210 212 204 151    < > = values in this interval not displayed.       Recent Labs   Lab Test 09/01/22  1618 10/31/22  0747 08/02/23  1342 09/05/23  2347 09/18/23  1502 10/23/23  0413 10/24/23  0557   BILITOTAL 0.2 0.2  --  0.2 0.2 0.8 0.8   ALKPHOS 130* 145*  --  134* 142* 220* 183*   ALBUMIN 3.8 4.1  --  4.2 4.2 4.6 3.2*   AST 15 21 21 18 22 349* 76*    ALT 17 33 20 18 23 226* 123*       Recent Labs   Lab Test 07/24/22  1353 07/24/22  1357 08/08/22  1713 10/23/23  0444 10/23/23  1620 10/24/23  0557   LACT 1.4  --   --  1.9 1.6  --    CRP  --  45.7* 16.7*  --   --   --    CRPI  --   --   --   --   --  188.63*             Culture Results:  7-Day Micro Results       Procedure Component Value Units Date/Time    Blood Culture Hand, Right [[id number]]  (Abnormal) Collected: 10/23/23 0919    Order Status: Completed Lab Status: Preliminary result Updated: 10/24/23 1154    Specimen: Blood from Hand, Right      Culture Positive on the 1st day of incubation      Escherichia coli     Comment: 2 of 2 bottles  Susceptibilities done on previous cultures       Blood Culture Arm, Left [[id number]]  (Abnormal) Collected: 10/23/23 0911    Order Status: Completed Lab Status: Preliminary result Updated: 10/24/23 1338    Specimen: Blood from Arm, Left      Culture Positive on the 1st day of incubation      Escherichia coli     Comment: 2 of 2 bottles       Verigene GN Panel [[id number]]  (Abnormal) Collected: 10/23/23 0911    Order Status: Completed Lab Status: Final result Updated: 10/24/23 0045    Specimen: Blood from Arm, Left      Acinetobacter species Not Detected     Citrobacter species Not Detected     Enterobacter species Not Detected     Proteus species Not Detected     Escherichia coli Detected     Comment: Positive for Escherichia coli by Verigene multiplex nucleic acid test. Final identification and antimicrobial susceptibility testing will be verified by standard methods. Verigene test will not distinguish E. coli from Shigella species including Shigella dysenteriae, Shigella flexneri, Shigella boydii, and Shigella sonnei. Specimens containing Shigella species or E. coli will be reported as positive for E. coli.        Klebsiella pneumoniae Not Detected     Klebsiella oxytoca Not Detected     Pseudomonas aeruginosa Not Detected     CTX-M Not Detected     KPC Not  Detected     NDM Not Detected     VIM Not Detected     IMP Not Detected     OXA Not Detected    Narrative:      Specimen tested with Verigene multiplex, gram-negative blood culture nucleic acid test for the following targets: Acinetobacter species, Citrobacter species, Enterobacter species, Proteus species, Escherichia coli, Klebsiella pneumoniae, Klebsiella oxytoca, Pseudomonas aeruginosa, and the following resistance markers: CTX-M, KPC, NDM, VIM, IMP and OXA.    Enteric Bacteria and Virus Panel PCR     Order Status: Sent Lab Status: No result     Specimen: Stool from Per Rectum             Recent Labs   Lab Test 07/24/22  1052 08/08/22  1632 09/01/22  1453 09/05/23  2322 10/23/23  0857   URINEPH 7.5* 5.5 5.5 6.0 5.0   NITRITE Negative Negative Negative Negative Negative   LEUKEST Negative Negative Negative Negative Negative   WBCU <1 None Seen None Seen 1 1                         Imaging: US Abdomen Limited    Result Date: 10/23/2023  US ABDOMEN LIMITED   10/23/2023 12:56 PM HISTORY:  LFT elevation COMPARISON: CT abdomen and pelvis October 23, 2023 FINDINGS:  Gallbladder: Cholecystectomy. Bile ducts:  Mild central intrahepatic biliary ductal dilatation. No extrahepatic biliary ductal dilatation with the common bile duct measuring 3 mm. Liver: Smooth contour of the surface of liver appeared homogeneous echotexture. No evidence for discrete hepatic mass. Pancreas:  Not visualized due to adjacent bowel gas. Right kidney:  12.9 cm in length. Normal renal cortical echogenicity. No hydronephrosis. Aorta and IVC:  Not specifically assessed.     IMPRESSION:  1. Mild intrahepatic biliary ductal dilatation without extrahepatic ductal dilatation and may be due to reservoir effect from cholecystectomy though is indeterminate. ALISSA CHAMBERS MD   SYSTEM ID:  [id number]    CT Chest Pulmonary Embolism w Contrast    Result Date: 10/23/2023  CT CHEST PULMONARY EMBOLISM W CONTRAST 10/23/2023 10:19 AM CLINICAL HISTORY: sepsis;  fever of unknown source. Tachycardic tachypnic. TECHNIQUE: CT angiogram chest during pulmonary arterial phase injection IV contrast. 2D and 3D MIP reconstructions were performed by the CT technologist. Dose reduction techniques were used. CONTRAST: 92mL isovue 370 COMPARISON: None. FINDINGS: PULMONARY ARTERY CT ANGIOGRAM: No filling defects. LUNGS AND PLEURA: Subsegmental atelectasis in the lung bases. No pleural effusions or pneumothorax. MEDIASTINUM/AXILLAE: The visualized thyroid gland is unremarkable. No thoracic adenopathy. Mild cardiomegaly. No pericardial effusion. Normal caliber thoracic aorta. No acute abnormality. No coronary artery calcifications. UPPER ABDOMEN: Spleen is at least 14.2 cm. Liver also appears enlarged. There is an 8 mm low density possible cyst in the liver. There is an anterior abdominal wall hernia containing nonobstructed transverse colon and transverse mesocolon. MUSCULOSKELETAL: Normal.     IMPRESSION: 1.  Negative for acute pulmonary embolism. 2.  No consolidative airspace disease. 3.  There is hepatosplenomegaly. BONIFACIO MENDIOLA MD   SYSTEM ID:  [id number]    CT Abdomen Pelvis w Contrast    Result Date: 10/23/2023  EXAM: CT ABDOMEN PELVIS W CONTRAST LOCATION: [facility] DATE: 10/23/2023 INDICATION: upper abdominal pain, nausea and vomiting, COMPARISON: Abdominal ultrasound 09/25/2023. CT abdomen and pelvis 09/09/2022. TECHNIQUE: CT scan of the abdomen and pelvis was performed following injection of IV contrast. Multiplanar reformats were obtained. Dose reduction techniques were used. CONTRAST: 100 mL Isovue 370 FINDINGS: LOWER CHEST: Normal. HEPATOBILIARY: Postcholecystectomy. Mild biliary dilatation probably on the basis of reservoir effect after cholecystectomy. PANCREAS: Normal. SPLEEN: Normal. ADRENAL GLANDS: Normal. KIDNEYS/BLADDER: Tiny probable benign cysts of the kidneys do not warrant follow-up. BOWEL: No bowel obstruction or inflammation.  Moderate-sized ventral abdominal wall hernia in the epigastric region contains nonobstructed transverse colon. Fat deposition in the wall the colon consistent with prior inflammation. Small bowel anastomosis in the left abdomen. LYMPH NODES: Normal. VASCULATURE: Unremarkable. PELVIC ORGANS: Normal. MUSCULOSKELETAL: Normal.     IMPRESSION: 1.  No acute process in the abdomen or pelvis. 2.  Moderate-sized ventral abdominal wall hernia in the epigastric region contains nonobstructed transverse colon.

## 2023-10-24 NOTE — PROGRESS NOTES
[facility]    Medicine Progress Note - Hospitalist Service    Date of Admission:  10/23/2023    Assessment & Plan   Elza Greer is a 48 year old female who presents on 10/23/2023 with abdominal pain. She is being admitted for ongoing workup & management.     E. Coli bacteremia  Sepsis without septic shock  Leukocytosis, acute on chronic   Septic based on leukocytosis (19.9), tachycardia (134 bpm), febrile (103F). Source not entirely clear on admission, however she had symptoms of abdominal pain & nausea. CT did not show any obvious acute enteritis, UA does not appear infected, skin intact, meningeal signs negative (but difficult to assess with somnolence secondary to IV narcotics). Likely GI source of E. Coli with translocation possibly secondary to chronic hernia vs previous small intestine anastomosis. Notably, baseline WBC around 13.0. Has seen oncology for neutrophilic non-progressive leukocytosis and etiology determined to be secondary tobacco use.   - Plan to switch from Piperacillin-tazobactam to Rocephin 2 g IV daily, will likely need 5-7-day course of antibiotics  - Discontinued IVF given dyspnea and hypoxemia  - CBC in AM  - Ibuprofen & acetaminophen (reduced dose & frequency) for fever  - Scheduled for colonoscopy 10/30/2023, discussed with patient that this might be beneficial given the recent E. coli bacteremia and may be able to determine etiology.     Acute hypoxic respiratory failure  Currently requiring 1-2 LPM since admission.  Previously not on any supplemental oxygen, however likely fluid shifts secondary to critical illness/ARDS.  - Encourage patient to mobilize in the room  - Encourage patient to stop smoking following discharge from hospital, nicotine patch in place currently  - Continuous pulse oximetry  - Furosemide 40 mg IV once given generalized edema, blood pressure has remained in range    Elevated LFTs, improving  Moderate, acute, mixed pattern. On  presentation: alk phos 220, , . Total bili (0.8) WNL. CT abdomen pelvis shows patient is post-cholecystectomy with mild bilary dilatation but no other abnormalities. Denies IVDU, excessive ETOH, or known hepatitis exposures. Differential includes acute viral or autoimmune hepatitis, or more generally secondary to sepsis from other infection. Hepatitis A, B, C tests nonreactive, hepatitis B serology shows previous immunization.  - Autoimmune labs pending: mitochondrial M2 Ab IgG, SEVERINO Ab IgG by IFA, liver kidney microsomal Ab IgG  - CMP in AM  - Avoid hepatotoxins (acetaminophen is ordered, but at reduced dose & frequency for high fever)    Polyarthralgia  Psoriasis  Ongoing polyarthralgia, saw rheumatology Aug 2023 at which time lab workup showed minimally elevated rheumatoid factor (12). Other inflammatory labs were deferred due to recent treatment for pneumonia at that time. Not on any outpatient pharmacologic management for arthritis. Increases risk of other autoimmune conditions. Monitor.  Has a history of psoriasis and may have a increased risk of malignancy and/or IBD which may have contributed to her E. coli bacteremia and presentation.  - Recommend follow-up with rheumatology for further investigation and/or treatment    Mild intermittent asthma without complication  Mild expiratory wheezing bilaterally on admission. Oxygenating well on room air. Managed PTA with albuterol inhaler & neb, benzonatate, fluticasone nasal spray.   -One time levalbuterol neb (for wheezing with tachycardia)  -Continue PTA albuterol neb for wheezing    Ventral hernia without obstruction or gangrene  CT abdomen pelvis 10/23 shows moderate-seize ventral abdominal wall hernia in epigastric region containing non-obstructed transverse colon. Unable to reduce on exam due to patient positioning. No acute interventions warranted at this time.     Tobacco use disorder  Nicotine patch ordered. Encourage cessation.     Morbid  "obesity   Contributes to overall morbidity and mortality.           Diet: Full Liquid Diet    DVT Prophylaxis: Enoxaparin (Lovenox) SQ  [place] Catheter: Not present  Lines: None     Cardiac Monitoring: None  Code Status: Full Code      Clinically Significant Risk Factors          # Hypocalcemia: Lowest Ca = 8.4 mg/dL in last 2 days, will monitor and replace as appropriate  # Hypercalcemia: corrected calcium is >10.1, will monitor as appropriate    # Hypoalbuminemia: Lowest albumin = 3.2 g/dL at 10/24/2023  5:57 AM, will monitor as appropriate            # Severe Obesity: Estimated body mass index is 43.63 kg/m  as calculated from the following:    Height as of 9/29/23: 1.626 m (5' 4\").    Weight as of this encounter: 115.3 kg (254 lb 3.1 oz)., PRESENT ON ADMISSION     # Asthma: noted on problem list        Disposition Plan     Expected Discharge Date: 10/25/2023      Destination: home with family              Manuel Stein,   Hospitalist Service  [facility]  Securely message with [facility] (more info)  Text page via [employer] Paging/Directory   ______________________________________________________________________    Interval History   No acute events overnight.  Patient was much more with it this morning when talking with her and her .  She is still complaining of cough and shortness of breath, in addition to generalized swelling in her hands and legs.  Patient's reports some fatigue and issues with sleeping.  Discussed continuing IV antibiotics and de-escalating with hopeful discharge tomorrow if all goes to plan.    Physical Exam   Vital Signs: Temp: 97.9  F (36.6  C) Temp src: Axillary BP: 127/82 Pulse: 101   Resp: 18 SpO2: 92 % O2 Device: None (Room air) Oxygen Delivery: 2 LPM  Weight: 254 lbs 3.05 oz    Constitutional: awake, alert, cooperative, no apparent distress, and appears stated age  Respiratory: Coarse breath sounds bilaterally, no wheezing  Cardiovascular: Normal apical " impulse, regular rate and rhythm, normal S1 and S2, no S3 or S4, and no murmur noted  GI: No scars, normal bowel sounds, soft, non-distended, non-tender, no masses palpated, no hepatosplenomegally    Medical Decision Making       55 MINUTES SPENT BY ME on the date of service doing chart review, history, exam, documentation & further activities per the note.      Data     I have personally reviewed the following data over the past 24 hrs:    16.1 (H)  \   10.5 (L)   / 151     137 102 6.9 /  131 (H)   3.4 27 0.68 \     ALT: 123 (H) AST: 76 (H) AP: 183 (H) TBILI: 0.8   ALB: 3.2 (L) TOT PROTEIN: 6.2 (L) LIPASE: N/A     Procal: N/A CRP: 188.63 (H) Lactic Acid: 1.6         Imaging results reviewed over the past 24 hrs:   Recent Results (from the past 24 hour(s))   US Abdomen Limited    Narrative    US ABDOMEN LIMITED   10/23/2023 12:56 PM     HISTORY:  LFT elevation    COMPARISON: CT abdomen and pelvis October 23, 2023    FINDINGS:    Gallbladder: Cholecystectomy.    Bile ducts:  Mild central intrahepatic biliary ductal dilatation. No  extrahepatic biliary ductal dilatation with the common bile duct  measuring 3 mm.    Liver: Smooth contour of the surface of liver appeared homogeneous  echotexture. No evidence for discrete hepatic mass.    Pancreas:  Not visualized due to adjacent bowel gas.    Right kidney:  12.9 cm in length. Normal renal cortical echogenicity.  No hydronephrosis.    Aorta and IVC:  Not specifically assessed.       Impression    IMPRESSION:    1. Mild intrahepatic biliary ductal dilatation without extrahepatic  ductal dilatation and may be due to reservoir effect from  cholecystectomy though is indeterminate.    ALISSA CHAMBERS MD         SYSTEM ID:  [id number]

## 2023-10-25 ENCOUNTER — APPOINTMENT (OUTPATIENT)
Dept: GENERAL RADIOLOGY | Facility: CLINIC | Age: 48
End: 2023-10-25
Attending: STUDENT IN AN ORGANIZED HEALTH CARE EDUCATION/TRAINING PROGRAM
Payer: COMMERCIAL

## 2023-10-25 ENCOUNTER — APPOINTMENT (OUTPATIENT)
Dept: CARDIOLOGY | Facility: CLINIC | Age: 48
End: 2023-10-25
Attending: STUDENT IN AN ORGANIZED HEALTH CARE EDUCATION/TRAINING PROGRAM
Payer: COMMERCIAL

## 2023-10-25 LAB
ALBUMIN SERPL BCG-MCNC: 3.2 G/DL (ref 3.5–5.2)
ALP SERPL-CCNC: 187 U/L (ref 35–104)
ALT SERPL W P-5'-P-CCNC: 80 U/L (ref 0–50)
ANION GAP SERPL CALCULATED.3IONS-SCNC: 10 MMOL/L (ref 7–15)
AST SERPL W P-5'-P-CCNC: 32 U/L (ref 0–45)
BACTERIA BLD CULT: ABNORMAL
BACTERIA BLD CULT: ABNORMAL
BILIRUB SERPL-MCNC: 0.6 MG/DL
BUN SERPL-MCNC: 8.4 MG/DL (ref 6–20)
CALCIUM SERPL-MCNC: 9.1 MG/DL (ref 8.6–10)
CHLORIDE SERPL-SCNC: 100 MMOL/L (ref 98–107)
CREAT SERPL-MCNC: 0.65 MG/DL (ref 0.51–0.95)
CRP SERPL-MCNC: 218.36 MG/L
DEPRECATED HCO3 PLAS-SCNC: 29 MMOL/L (ref 22–29)
EGFRCR SERPLBLD CKD-EPI 2021: >90 ML/MIN/1.73M2
ERYTHROCYTE [DISTWIDTH] IN BLOOD BY AUTOMATED COUNT: 13.5 % (ref 10–15)
GLUCOSE SERPL-MCNC: 90 MG/DL (ref 70–99)
HCT VFR BLD AUTO: 32.8 % (ref 35–47)
HGB BLD-MCNC: 10.5 G/DL (ref 11.7–15.7)
HOLD SPECIMEN: NORMAL
LKM AB TITR SER IF: NORMAL {TITER}
LVEF ECHO: NORMAL
MCH RBC QN AUTO: 31.3 PG (ref 26.5–33)
MCHC RBC AUTO-ENTMCNC: 32 G/DL (ref 31.5–36.5)
MCV RBC AUTO: 98 FL (ref 78–100)
PLATELET # BLD AUTO: 151 10E3/UL (ref 150–450)
POTASSIUM SERPL-SCNC: 3.4 MMOL/L (ref 3.4–5.3)
PROCALCITONIN SERPL IA-MCNC: 0.93 NG/ML
PROT SERPL-MCNC: 6.3 G/DL (ref 6.4–8.3)
RBC # BLD AUTO: 3.35 10E6/UL (ref 3.8–5.2)
SODIUM SERPL-SCNC: 139 MMOL/L (ref 135–145)
WBC # BLD AUTO: 11.3 10E3/UL (ref 4–11)

## 2023-10-25 PROCEDURE — 999N000208 ECHOCARDIOGRAM COMPLETE

## 2023-10-25 PROCEDURE — 99233 SBSQ HOSP IP/OBS HIGH 50: CPT | Performed by: STUDENT IN AN ORGANIZED HEALTH CARE EDUCATION/TRAINING PROGRAM

## 2023-10-25 PROCEDURE — 36415 COLL VENOUS BLD VENIPUNCTURE: CPT

## 2023-10-25 PROCEDURE — 250N000013 HC RX MED GY IP 250 OP 250 PS 637: Performed by: INTERNAL MEDICINE

## 2023-10-25 PROCEDURE — 84145 PROCALCITONIN (PCT): CPT | Performed by: STUDENT IN AN ORGANIZED HEALTH CARE EDUCATION/TRAINING PROGRAM

## 2023-10-25 PROCEDURE — 250N000011 HC RX IP 250 OP 636: Mod: JZ | Performed by: STUDENT IN AN ORGANIZED HEALTH CARE EDUCATION/TRAINING PROGRAM

## 2023-10-25 PROCEDURE — 250N000013 HC RX MED GY IP 250 OP 250 PS 637

## 2023-10-25 PROCEDURE — 86140 C-REACTIVE PROTEIN: CPT

## 2023-10-25 PROCEDURE — 255N000002 HC RX 255 OP 636: Performed by: STUDENT IN AN ORGANIZED HEALTH CARE EDUCATION/TRAINING PROGRAM

## 2023-10-25 PROCEDURE — 85027 COMPLETE CBC AUTOMATED: CPT | Performed by: STUDENT IN AN ORGANIZED HEALTH CARE EDUCATION/TRAINING PROGRAM

## 2023-10-25 PROCEDURE — 250N000011 HC RX IP 250 OP 636: Mod: JZ

## 2023-10-25 PROCEDURE — 120N000004 HC R&B MS OVERFLOW

## 2023-10-25 PROCEDURE — 94640 AIRWAY INHALATION TREATMENT: CPT

## 2023-10-25 PROCEDURE — 71046 X-RAY EXAM CHEST 2 VIEWS: CPT

## 2023-10-25 PROCEDURE — 250N000009 HC RX 250: Performed by: STUDENT IN AN ORGANIZED HEALTH CARE EDUCATION/TRAINING PROGRAM

## 2023-10-25 PROCEDURE — 80053 COMPREHEN METABOLIC PANEL: CPT | Performed by: STUDENT IN AN ORGANIZED HEALTH CARE EDUCATION/TRAINING PROGRAM

## 2023-10-25 PROCEDURE — 250N000013 HC RX MED GY IP 250 OP 250 PS 637: Performed by: STUDENT IN AN ORGANIZED HEALTH CARE EDUCATION/TRAINING PROGRAM

## 2023-10-25 PROCEDURE — 93306 TTE W/DOPPLER COMPLETE: CPT | Mod: 26 | Performed by: INTERNAL MEDICINE

## 2023-10-25 RX ORDER — AMOXICILLIN 250 MG
1 CAPSULE ORAL AT BEDTIME
Status: DISCONTINUED | OUTPATIENT
Start: 2023-10-25 | End: 2023-10-27 | Stop reason: HOSPADM

## 2023-10-25 RX ORDER — POLYETHYLENE GLYCOL 3350 17 G/17G
17 POWDER, FOR SOLUTION ORAL DAILY
Status: DISCONTINUED | OUTPATIENT
Start: 2023-10-25 | End: 2023-10-27 | Stop reason: HOSPADM

## 2023-10-25 RX ORDER — RIZATRIPTAN BENZOATE 10 MG/1
10 TABLET, ORALLY DISINTEGRATING ORAL
Status: COMPLETED | OUTPATIENT
Start: 2023-10-25 | End: 2023-10-25

## 2023-10-25 RX ORDER — IPRATROPIUM BROMIDE AND ALBUTEROL SULFATE 2.5; .5 MG/3ML; MG/3ML
3 SOLUTION RESPIRATORY (INHALATION)
Status: DISCONTINUED | OUTPATIENT
Start: 2023-10-25 | End: 2023-10-27 | Stop reason: HOSPADM

## 2023-10-25 RX ORDER — IBUPROFEN 600 MG/1
600 TABLET, FILM COATED ORAL ONCE
Status: COMPLETED | OUTPATIENT
Start: 2023-10-25 | End: 2023-10-25

## 2023-10-25 RX ORDER — FUROSEMIDE 10 MG/ML
40 INJECTION INTRAMUSCULAR; INTRAVENOUS ONCE
Status: COMPLETED | OUTPATIENT
Start: 2023-10-25 | End: 2023-10-25

## 2023-10-25 RX ORDER — PIPERACILLIN SODIUM, TAZOBACTAM SODIUM 3; .375 G/15ML; G/15ML
3.38 INJECTION, POWDER, LYOPHILIZED, FOR SOLUTION INTRAVENOUS EVERY 6 HOURS
Status: DISCONTINUED | OUTPATIENT
Start: 2023-10-25 | End: 2023-10-25

## 2023-10-25 RX ORDER — CEFTRIAXONE 2 G/1
2 INJECTION, POWDER, FOR SOLUTION INTRAMUSCULAR; INTRAVENOUS EVERY 24 HOURS
Status: DISCONTINUED | OUTPATIENT
Start: 2023-10-26 | End: 2023-10-27 | Stop reason: HOSPADM

## 2023-10-25 RX ADMIN — IBUPROFEN 200 MG: 200 TABLET, FILM COATED ORAL at 07:48

## 2023-10-25 RX ADMIN — SENNOSIDES AND DOCUSATE SODIUM 1 TABLET: 8.6; 5 TABLET ORAL at 07:48

## 2023-10-25 RX ADMIN — HYDROMORPHONE HYDROCHLORIDE 2 MG: 2 TABLET ORAL at 04:06

## 2023-10-25 RX ADMIN — ONDANSETRON 4 MG: 4 TABLET, ORALLY DISINTEGRATING ORAL at 20:15

## 2023-10-25 RX ADMIN — POLYETHYLENE GLYCOL 3350 17 G: 17 POWDER, FOR SOLUTION ORAL at 07:50

## 2023-10-25 RX ADMIN — CEFTRIAXONE SODIUM 2 G: 2 INJECTION, POWDER, FOR SOLUTION INTRAMUSCULAR; INTRAVENOUS at 15:03

## 2023-10-25 RX ADMIN — HUMAN ALBUMIN MICROSPHERES AND PERFLUTREN 2 ML: 10; .22 INJECTION, SOLUTION INTRAVENOUS at 14:53

## 2023-10-25 RX ADMIN — NICOTINE 1 PATCH: 21 PATCH, EXTENDED RELEASE TRANSDERMAL at 08:11

## 2023-10-25 RX ADMIN — IBUPROFEN 600 MG: 600 TABLET ORAL at 20:05

## 2023-10-25 RX ADMIN — ACETAMINOPHEN 325 MG: 325 TABLET, FILM COATED ORAL at 15:02

## 2023-10-25 RX ADMIN — ONDANSETRON 4 MG: 4 TABLET, ORALLY DISINTEGRATING ORAL at 06:31

## 2023-10-25 RX ADMIN — ACETAMINOPHEN 325 MG: 325 TABLET, FILM COATED ORAL at 07:53

## 2023-10-25 RX ADMIN — RIZATRIPTAN BENZOATE 10 MG: 10 TABLET, ORALLY DISINTEGRATING ORAL at 11:58

## 2023-10-25 RX ADMIN — ALBUTEROL SULFATE 2 PUFF: 90 AEROSOL, METERED RESPIRATORY (INHALATION) at 04:06

## 2023-10-25 RX ADMIN — ONDANSETRON 4 MG: 2 INJECTION INTRAMUSCULAR; INTRAVENOUS at 13:41

## 2023-10-25 RX ADMIN — FUROSEMIDE 40 MG: 10 INJECTION, SOLUTION INTRAMUSCULAR; INTRAVENOUS at 15:03

## 2023-10-25 RX ADMIN — HYDROMORPHONE HYDROCHLORIDE 2 MG: 2 TABLET ORAL at 07:53

## 2023-10-25 RX ADMIN — IPRATROPIUM BROMIDE AND ALBUTEROL SULFATE 3 ML: 2.5; .5 SOLUTION RESPIRATORY (INHALATION) at 19:36

## 2023-10-25 RX ADMIN — ENOXAPARIN SODIUM 40 MG: 100 INJECTION SUBCUTANEOUS at 23:55

## 2023-10-25 RX ADMIN — HYDROMORPHONE HYDROCHLORIDE 2 MG: 2 TABLET ORAL at 13:50

## 2023-10-25 RX ADMIN — IBUPROFEN 200 MG: 200 TABLET, FILM COATED ORAL at 15:02

## 2023-10-25 RX ADMIN — ENOXAPARIN SODIUM 40 MG: 100 INJECTION SUBCUTANEOUS at 11:56

## 2023-10-25 ASSESSMENT — ACTIVITIES OF DAILY LIVING (ADL)
ADLS_ACUITY_SCORE: 23
ADLS_ACUITY_SCORE: 22
ADLS_ACUITY_SCORE: 25
ADLS_ACUITY_SCORE: 22
ADLS_ACUITY_SCORE: 25
ADLS_ACUITY_SCORE: 25
ADLS_ACUITY_SCORE: 23
ADLS_ACUITY_SCORE: 22
ADLS_ACUITY_SCORE: 23
ADLS_ACUITY_SCORE: 25
ADLS_ACUITY_SCORE: 22
ADLS_ACUITY_SCORE: 25

## 2023-10-25 NOTE — PLAN OF CARE
"  Problem: Pain Acute  Goal: Optimal Pain Control and Function  Outcome: Not Progressing  Intervention: Develop Pain Management Plan  Recent Flowsheet Documentation  Taken 10/24/2023 2330 by Rosalie Herman RN  Pain Management Interventions: medication (see MAR)  Intervention: Prevent or Manage Pain  Recent Flowsheet Documentation  Taken 10/24/2023 2330 by Rosalie Herman RN  Bowel Elimination Promotion: ambulation promoted     Problem: Nausea and Vomiting  Goal: Nausea and Vomiting Relief  Outcome: Not Progressing  Intervention: Prevent and Manage Nausea and Vomiting  Recent Flowsheet Documentation  Taken 10/24/2023 2330 by Rosalie Herman RN  Nausea/Vomiting Interventions:   antiemetic   sips of clear liquids given   slow deep breathing encouraged  Environmental Support: calm environment promoted   End Of Shift Note    Situation: Abdominal pain. Constipation. +Blood cultures: E. Coli.     Plan: Pain control. ABX.     Subjective/Objective:    Neuro: A&O x 4. Can make needs known.    Cardiac: Normotensive.     Resp: Dyspnea with activity. O2 saturations above 90 on room air. Pt is requesting that she continues to wear O2.    GI/: C/o nausea. No vomiting. Receiving Zofran. Constipated per pt \"hasn't had a BM in 3 days\". States she is feeling \"something moving\" and is requesting to have a stool softener. C/o abdominal pain- receiving PO Dilaudid.     Endo: No concerns    MSK: Independent in room. Continued to encourage ambulation.     Skin: No concerns    LDAs: PIV SL    C/o headache. Given Tylenol and Ibuprofen and heat pack for neck. Effective.                           "

## 2023-10-25 NOTE — PROGRESS NOTES
End Of Shift Note     Situation: Abdominal pain. Constipation. +Blood cultures: E. Coli.      Plan: Pain control. ABX.      Subjective/Objective:     Neuro: A&O x 4.      Cardiac: VSS     Resp: Dyspnea with activity. While at rest the pt's O2 sats on RA are mid to high 90's. Pt needs O2 at 2 lpm to keep O2 sats above 90 with Activity. Pt instructed on how to use the incentive spironmeter and she is able to reach between 500ml to 700ml and reports that she is using it approx 10x Qhr while she is awake.     GI/: C/o nausea. No vomiting. Receiving Zofran. Pt had one large loose BM this shift  Endo: No concerns     MSK: Independent in room. Continued to encourage ambulation.      Skin: No concerns     LDAs: PIV SL    Echo done- EF 60%-65%

## 2023-10-25 NOTE — PROGRESS NOTES
[facility]    Medicine Progress Note - Hospitalist Service    Date of Admission:  10/23/2023    Assessment & Plan   Elza Greer is a 48 year old female who presents on 10/23/2023 with abdominal pain. She is being admitted for ongoing workup & management.     E. Coli bacteremia  Sepsis without septic shock  Leukocytosis, acute on chronic   Septic based on leukocytosis (19.9), tachycardia (134 bpm), febrile (103F). Source not entirely clear on admission, however she had symptoms of abdominal pain & nausea. CT did not show any obvious acute enteritis, UA does not appear infected, skin intact, meningeal signs negative (but difficult to assess with somnolence secondary to IV narcotics). Likely GI source of E. Coli with translocation possibly secondary to chronic hernia vs previous small intestine anastomosis. Notably, baseline WBC around 13.0. Has seen oncology for neutrophilic non-progressive leukocytosis and etiology determined to be secondary tobacco use. CRP increasing from 188 to 218 despite antibiotics. Procal 0.93, but not previously check on admission so no comparison for improvement.   - Continue Rocephin 2 g IV daily, will likely need 5-7-day course of antibiotics  - CMP, CBC, and CRP in AM  - Ibuprofen & acetaminophen (reduced dose & frequency) for fever  - Scheduled for colonoscopy 10/30/2023, discussed with patient that this might be beneficial given the recent E. coli bacteremia and may be able to determine etiology.     Acute hypoxic respiratory failure  Lobar consolidation  Currently requiring 1-2 LPM since admission.  Previously not on any supplemental oxygen, however likely fluid shifts secondary to critical illness/ARDS. CXR with evidence of mild effusions bilaterally L > R. Patient walked in the hallway without oxygen with desaturation to 78% on RA with recovery in 2-3 mins. Patient with history of recurrent PNA and smoking hx.   - Encouraged patient to mobilize in the  room and in the hallway  - Encourage patient to stop smoking following discharge from hospital, nicotine patch in place currently  - Incentive spirometer  - Continuous pulse oximetry  - Furosemide 40 mg IV once     Elevated LFTs, improving  Moderate, acute, mixed pattern. On presentation: alk phos 220, , . Total bili (0.8) WNL. CT abdomen pelvis shows patient is post-cholecystectomy with mild bilary dilatation but no other abnormalities. Denies IVDU, excessive ETOH, or known hepatitis exposures. Differential includes acute viral or autoimmune hepatitis, or more generally secondary to sepsis from other infection. Hepatitis A, B, C tests nonreactive, hepatitis B serology shows previous immunization. SEVERINO negative.   - Autoimmune labs pending: mitochondrial M2 Ab IgG, liver kidney microsomal Ab IgG  - CMP in AM    Polyarthralgia  Psoriasis  Ongoing polyarthralgia, saw rheumatology Aug 2023 at which time lab workup showed minimally elevated rheumatoid factor (12). Other inflammatory labs were deferred due to recent treatment for pneumonia at that time. Not on any outpatient pharmacologic management for arthritis. Increases risk of other autoimmune conditions. Monitor.  Has a history of psoriasis and may have a increased risk of malignancy and/or IBD which may have contributed to her E. coli bacteremia and presentation.  - Recommend follow-up with rheumatology for further investigation and/or treatment    Mild intermittent asthma without complication  Mild expiratory wheezing bilaterally on admission. Oxygenating well on room air. Managed PTA with albuterol inhaler & neb, benzonatate, fluticasone nasal spray.   - Continue PTA albuterol neb for wheezing  - Duo-neb QID     Ventral hernia without obstruction or gangrene  CT abdomen pelvis 10/23 shows moderate-seize ventral abdominal wall hernia in epigastric region containing non-obstructed transverse colon. Unable to reduce on exam due to patient positioning.  "No acute interventions warranted at this time.     Tobacco use disorder  Nicotine patch ordered. Encourage cessation.     Morbid obesity   Contributes to overall morbidity and mortality.           Diet: Regular Diet Adult (NO RED DYE, ONLY SKIM MILK)    DVT Prophylaxis: Enoxaparin (Lovenox) SQ  Portillo Catheter: Not present  Lines: None     Cardiac Monitoring: None  Code Status: Full Code      Clinically Significant Risk Factors          # Hypocalcemia: Lowest Ca = 8.4 mg/dL in last 2 days, will monitor and replace as appropriate     # Hypoalbuminemia: Lowest albumin = 3.2 g/dL at 10/25/2023  6:59 AM, will monitor as appropriate            # Severe Obesity: Estimated body mass index is 43.63 kg/m  as calculated from the following:    Height as of 9/29/23: 1.626 m (5' 4\").    Weight as of this encounter: 115.3 kg (254 lb 3.1 oz)., PRESENT ON ADMISSION     # Asthma: noted on problem list        Disposition Plan     Expected Discharge Date: 10/25/2023      Destination: home with family              Manuel SteinDO  Hospitalist Service  [facility]  Securely message with [employer] (more info)  Text page via [facility] Paging/Directory   ___________________________________________________________    Interval History   No acute events overnight. Patient with headache this morning and continued nausea. Discussed with her goal of getting her oxygen and increasing ambulating in the room. She tolerated full liquid yesterday, but had little appetite and discussed moving forward with regular diet.     Physical Exam   Vital Signs: Temp: 98.5  F (36.9  C) Temp src: Oral BP: 131/84 Pulse: 94   Resp: 18 SpO2: 90 % O2 Device: None (Room air) Oxygen Delivery: 2 LPM  Weight: 254 lbs 3.05 oz    Constitutional: awake, alert, cooperative, no apparent distress, and appears stated age  Respiratory: Coarse breath sounds bilaterally, no wheezing  Cardiovascular: Normal apical impulse, regular rate and rhythm, normal S1 and " S2, no S3 or S4, no murmur noted, edema 1+ bilateral lower extremities  GI: No scars, normal bowel sounds, soft, non-distended, non-tender, no masses palpated, no hepatosplenomegally    Medical Decision Making       60 MINUTES SPENT BY ME on the date of service doing chart review, history, exam, documentation & further activities per the note.      Data     I have personally reviewed the following data over the past 24 hrs:    11.3 (H)  \   10.5 (L)   / 151     139 100 8.4 /  90   3.4 29 0.65 \     ALT: 80 (H) AST: 32 AP: 187 (H) TBILI: 0.6   ALB: 3.2 (L) TOT PROTEIN: 6.3 (L) LIPASE: N/A     Procal: 0.93 (H) CRP: 218.36 (H) Lactic Acid: N/A         Imaging results reviewed over the past 24 hrs:   Recent Results (from the past 24 hour(s))   Chest 2 Views*    Narrative    XR CHEST 2 VIEWS 10/25/2023 11:45 AM    HISTORY: Hypoxia    COMPARISON: 9/5/2023    FINDINGS: New mild airspace disease in the left greater than right  lung bases with very mild effusions. No pneumothorax. Unchanged  cardiac size.    BONIFACIO MENDIOLA MD         SYSTEM ID:  [id number]

## 2023-10-25 NOTE — PROGRESS NOTES
Pt walked about 120 ft  out of her room, she reported that she felt sob and a little dizzy. Pt's oxygen was 78% on RA with the ambulation, it took approx 2-3 mins of rest before her oxygen reached 90% on RA.

## 2023-10-26 LAB
ANION GAP SERPL CALCULATED.3IONS-SCNC: 9 MMOL/L (ref 7–15)
BACTERIA BLD CULT: ABNORMAL
BACTERIA BLD CULT: ABNORMAL
BUN SERPL-MCNC: 7.1 MG/DL (ref 6–20)
CALCIUM SERPL-MCNC: 9.1 MG/DL (ref 8.6–10)
CHLORIDE SERPL-SCNC: 102 MMOL/L (ref 98–107)
CREAT SERPL-MCNC: 0.71 MG/DL (ref 0.51–0.95)
CRP SERPL-MCNC: 225.14 MG/L
DEPRECATED HCO3 PLAS-SCNC: 30 MMOL/L (ref 22–29)
EGFRCR SERPLBLD CKD-EPI 2021: >90 ML/MIN/1.73M2
ERYTHROCYTE [DISTWIDTH] IN BLOOD BY AUTOMATED COUNT: 13.2 % (ref 10–15)
GLUCOSE SERPL-MCNC: 105 MG/DL (ref 70–99)
HCT VFR BLD AUTO: 30.2 % (ref 35–47)
HGB BLD-MCNC: 10 G/DL (ref 11.7–15.7)
MAGNESIUM SERPL-MCNC: 1.7 MG/DL (ref 1.7–2.3)
MCH RBC QN AUTO: 31.3 PG (ref 26.5–33)
MCHC RBC AUTO-ENTMCNC: 33.1 G/DL (ref 31.5–36.5)
MCV RBC AUTO: 95 FL (ref 78–100)
PLATELET # BLD AUTO: 128 10E3/UL (ref 150–450)
POTASSIUM SERPL-SCNC: 3.2 MMOL/L (ref 3.4–5.3)
POTASSIUM SERPL-SCNC: 3.2 MMOL/L (ref 3.4–5.3)
POTASSIUM SERPL-SCNC: 3.3 MMOL/L (ref 3.4–5.3)
RBC # BLD AUTO: 3.19 10E6/UL (ref 3.8–5.2)
SODIUM SERPL-SCNC: 141 MMOL/L (ref 135–145)
WBC # BLD AUTO: 9.4 10E3/UL (ref 4–11)

## 2023-10-26 PROCEDURE — 250N000013 HC RX MED GY IP 250 OP 250 PS 637: Performed by: STUDENT IN AN ORGANIZED HEALTH CARE EDUCATION/TRAINING PROGRAM

## 2023-10-26 PROCEDURE — 85041 AUTOMATED RBC COUNT: CPT | Performed by: STUDENT IN AN ORGANIZED HEALTH CARE EDUCATION/TRAINING PROGRAM

## 2023-10-26 PROCEDURE — 120N000004 HC R&B MS OVERFLOW

## 2023-10-26 PROCEDURE — 99232 SBSQ HOSP IP/OBS MODERATE 35: CPT | Performed by: STUDENT IN AN ORGANIZED HEALTH CARE EDUCATION/TRAINING PROGRAM

## 2023-10-26 PROCEDURE — 94640 AIRWAY INHALATION TREATMENT: CPT

## 2023-10-26 PROCEDURE — 999N000157 HC STATISTIC RCP TIME EA 10 MIN

## 2023-10-26 PROCEDURE — 250N000013 HC RX MED GY IP 250 OP 250 PS 637

## 2023-10-26 PROCEDURE — 86140 C-REACTIVE PROTEIN: CPT | Performed by: STUDENT IN AN ORGANIZED HEALTH CARE EDUCATION/TRAINING PROGRAM

## 2023-10-26 PROCEDURE — 250N000011 HC RX IP 250 OP 636: Mod: JZ | Performed by: STUDENT IN AN ORGANIZED HEALTH CARE EDUCATION/TRAINING PROGRAM

## 2023-10-26 PROCEDURE — 250N000013 HC RX MED GY IP 250 OP 250 PS 637: Performed by: INTERNAL MEDICINE

## 2023-10-26 PROCEDURE — 94640 AIRWAY INHALATION TREATMENT: CPT | Mod: 76

## 2023-10-26 PROCEDURE — 80048 BASIC METABOLIC PNL TOTAL CA: CPT | Performed by: STUDENT IN AN ORGANIZED HEALTH CARE EDUCATION/TRAINING PROGRAM

## 2023-10-26 PROCEDURE — 250N000011 HC RX IP 250 OP 636: Mod: JZ

## 2023-10-26 PROCEDURE — 36415 COLL VENOUS BLD VENIPUNCTURE: CPT | Performed by: STUDENT IN AN ORGANIZED HEALTH CARE EDUCATION/TRAINING PROGRAM

## 2023-10-26 PROCEDURE — 250N000009 HC RX 250: Performed by: STUDENT IN AN ORGANIZED HEALTH CARE EDUCATION/TRAINING PROGRAM

## 2023-10-26 PROCEDURE — 83735 ASSAY OF MAGNESIUM: CPT | Performed by: STUDENT IN AN ORGANIZED HEALTH CARE EDUCATION/TRAINING PROGRAM

## 2023-10-26 PROCEDURE — 84132 ASSAY OF SERUM POTASSIUM: CPT | Performed by: STUDENT IN AN ORGANIZED HEALTH CARE EDUCATION/TRAINING PROGRAM

## 2023-10-26 RX ORDER — RIZATRIPTAN BENZOATE 10 MG/1
10 TABLET, ORALLY DISINTEGRATING ORAL 2 TIMES DAILY PRN
Status: COMPLETED | OUTPATIENT
Start: 2023-10-26 | End: 2023-10-26

## 2023-10-26 RX ORDER — RIZATRIPTAN BENZOATE 10 MG/1
10 TABLET ORAL 2 TIMES DAILY PRN
Status: DISCONTINUED | OUTPATIENT
Start: 2023-10-26 | End: 2023-10-27 | Stop reason: HOSPADM

## 2023-10-26 RX ORDER — GABAPENTIN 100 MG/1
100 CAPSULE ORAL 3 TIMES DAILY
Status: DISCONTINUED | OUTPATIENT
Start: 2023-10-26 | End: 2023-10-27 | Stop reason: HOSPADM

## 2023-10-26 RX ORDER — POTASSIUM CHLORIDE 1500 MG/1
40 TABLET, EXTENDED RELEASE ORAL ONCE
Status: COMPLETED | OUTPATIENT
Start: 2023-10-26 | End: 2023-10-26

## 2023-10-26 RX ORDER — FLUTICASONE PROPIONATE 50 MCG
2 SPRAY, SUSPENSION (ML) NASAL DAILY
Status: DISCONTINUED | OUTPATIENT
Start: 2023-10-26 | End: 2023-10-27 | Stop reason: HOSPADM

## 2023-10-26 RX ORDER — IBUPROFEN 600 MG/1
600 TABLET, FILM COATED ORAL EVERY 6 HOURS
Status: DISCONTINUED | OUTPATIENT
Start: 2023-10-26 | End: 2023-10-27 | Stop reason: HOSPADM

## 2023-10-26 RX ORDER — GUAIFENESIN 600 MG/1
1200 TABLET, EXTENDED RELEASE ORAL 2 TIMES DAILY
Status: DISCONTINUED | OUTPATIENT
Start: 2023-10-26 | End: 2023-10-27 | Stop reason: HOSPADM

## 2023-10-26 RX ORDER — FLUTICASONE PROPIONATE 50 MCG
1 SPRAY, SUSPENSION (ML) NASAL DAILY
Status: DISCONTINUED | OUTPATIENT
Start: 2023-10-26 | End: 2023-10-26

## 2023-10-26 RX ORDER — RIZATRIPTAN BENZOATE 10 MG/1
10 TABLET, ORALLY DISINTEGRATING ORAL
Status: COMPLETED | OUTPATIENT
Start: 2023-10-26 | End: 2023-10-26

## 2023-10-26 RX ADMIN — IBUPROFEN 200 MG: 200 TABLET, FILM COATED ORAL at 09:25

## 2023-10-26 RX ADMIN — POTASSIUM CHLORIDE 40 MEQ: 1500 TABLET, EXTENDED RELEASE ORAL at 08:52

## 2023-10-26 RX ADMIN — IPRATROPIUM BROMIDE AND ALBUTEROL SULFATE 3 ML: 2.5; .5 SOLUTION RESPIRATORY (INHALATION) at 11:58

## 2023-10-26 RX ADMIN — IPRATROPIUM BROMIDE AND ALBUTEROL SULFATE 3 ML: 2.5; .5 SOLUTION RESPIRATORY (INHALATION) at 08:11

## 2023-10-26 RX ADMIN — ONDANSETRON 4 MG: 2 INJECTION INTRAMUSCULAR; INTRAVENOUS at 17:59

## 2023-10-26 RX ADMIN — ENOXAPARIN SODIUM 40 MG: 100 INJECTION SUBCUTANEOUS at 23:45

## 2023-10-26 RX ADMIN — FLUTICASONE PROPIONATE 2 SPRAY: 50 SPRAY, METERED NASAL at 12:47

## 2023-10-26 RX ADMIN — SENNOSIDES AND DOCUSATE SODIUM 1 TABLET: 8.6; 5 TABLET ORAL at 20:37

## 2023-10-26 RX ADMIN — GUAIFENESIN 1200 MG: 600 TABLET ORAL at 20:32

## 2023-10-26 RX ADMIN — GABAPENTIN 100 MG: 100 CAPSULE ORAL at 14:40

## 2023-10-26 RX ADMIN — HYDROMORPHONE HYDROCHLORIDE 1 MG: 2 TABLET ORAL at 05:00

## 2023-10-26 RX ADMIN — ONDANSETRON 4 MG: 2 INJECTION INTRAMUSCULAR; INTRAVENOUS at 09:25

## 2023-10-26 RX ADMIN — POTASSIUM CHLORIDE 40 MEQ: 1500 TABLET, EXTENDED RELEASE ORAL at 15:24

## 2023-10-26 RX ADMIN — ALBUTEROL SULFATE 2 PUFF: 90 AEROSOL, METERED RESPIRATORY (INHALATION) at 09:30

## 2023-10-26 RX ADMIN — IPRATROPIUM BROMIDE AND ALBUTEROL SULFATE 3 ML: 2.5; .5 SOLUTION RESPIRATORY (INHALATION) at 16:14

## 2023-10-26 RX ADMIN — GUAIFENESIN 1200 MG: 600 TABLET ORAL at 11:10

## 2023-10-26 RX ADMIN — ACETAMINOPHEN 325 MG: 325 TABLET, FILM COATED ORAL at 03:08

## 2023-10-26 RX ADMIN — CEFTRIAXONE SODIUM 2 G: 2 INJECTION, POWDER, FOR SOLUTION INTRAMUSCULAR; INTRAVENOUS at 14:40

## 2023-10-26 RX ADMIN — GABAPENTIN 100 MG: 100 CAPSULE ORAL at 20:32

## 2023-10-26 RX ADMIN — IBUPROFEN 200 MG: 200 TABLET, FILM COATED ORAL at 03:08

## 2023-10-26 RX ADMIN — ACETAMINOPHEN 325 MG: 325 TABLET, FILM COATED ORAL at 15:27

## 2023-10-26 RX ADMIN — POTASSIUM CHLORIDE 40 MEQ: 1500 TABLET, EXTENDED RELEASE ORAL at 20:32

## 2023-10-26 RX ADMIN — RIZATRIPTAN BENZOATE 10 MG: 10 TABLET, ORALLY DISINTEGRATING ORAL at 12:06

## 2023-10-26 RX ADMIN — RIZATRIPTAN BENZOATE 10 MG: 10 TABLET, ORALLY DISINTEGRATING ORAL at 09:30

## 2023-10-26 RX ADMIN — ENOXAPARIN SODIUM 40 MG: 100 INJECTION SUBCUTANEOUS at 11:10

## 2023-10-26 RX ADMIN — ACETAMINOPHEN 325 MG: 325 TABLET, FILM COATED ORAL at 09:25

## 2023-10-26 RX ADMIN — NICOTINE 1 PATCH: 21 PATCH, EXTENDED RELEASE TRANSDERMAL at 08:13

## 2023-10-26 RX ADMIN — IBUPROFEN 600 MG: 600 TABLET ORAL at 15:27

## 2023-10-26 RX ADMIN — ONDANSETRON 4 MG: 4 TABLET, ORALLY DISINTEGRATING ORAL at 03:08

## 2023-10-26 RX ADMIN — IBUPROFEN 600 MG: 600 TABLET ORAL at 20:32

## 2023-10-26 RX ADMIN — IPRATROPIUM BROMIDE AND ALBUTEROL SULFATE 3 ML: 2.5; .5 SOLUTION RESPIRATORY (INHALATION) at 20:22

## 2023-10-26 ASSESSMENT — ACTIVITIES OF DAILY LIVING (ADL)
ADLS_ACUITY_SCORE: 22

## 2023-10-26 NOTE — PROGRESS NOTES
End Of Shift Note     Situation: Abdominal pain. Hernia, Acute RF + Blood cultures: E. Coli.      Plan: Pain control. ABX.      Subjective/Objective:     Neuro: A&O x 4.      Cardiac: VSS     Resp: Dyspnea with activity. While at rest the pt's O2 sats on RA are mid to high 90's. Pt walked to the kitchen area and back to her room not on oxygen, O2 sats were 88% on RA, recovered within a minute to 90% on RA. Lung sounds are clear to all fields    GI/: C/o nausea. No vomiting. Receiving Zofran. Pt had 2 large loose BM's this shift     Endo: No concerns     MSK: Independent in room. Continued to encourage ambulation.      Skin: No concerns     LDAs: PIV SL    Pt has c/o a HA all shift. Pt given prn tylenol/ibuprofen, ibuprofen changed to scheduled and she was given her prn rizatriptan x2.

## 2023-10-26 NOTE — PROGRESS NOTES
Dr. Loretta jurado, pt having c/o continued HA, and is requesting her Rizatriptan 10 mg, if doesn't work and retake in 2 hrs, prn 600mg ibuprofen and her Flonase.     Writer connected a humidifier to the pt's O2 tubing, having c/o dryness in her nares. Pt instructed to only use oxygen for ambulation and RA at rest. Her O2 sats at rest are in the high 90's.

## 2023-10-26 NOTE — PROGRESS NOTES
[facility]    Medicine Progress Note - Hospitalist Service    Date of Admission:  10/23/2023    Assessment & Plan   Elza Greer is a 48 year old female who presents on 10/23/2023 with abdominal pain. She is being admitted for ongoing workup & management.     E. Coli bacteremia  Sepsis without septic shock  Leukocytosis, acute on chronic   Septic based on leukocytosis (19.9), tachycardia (134 bpm), febrile (103F). Source not entirely clear on admission, however she had symptoms of abdominal pain & nausea. CT did not show any obvious acute enteritis, UA does not appear infected, skin intact, meningeal signs negative (but difficult to assess with somnolence secondary to IV narcotics). Likely GI source of E. Coli with translocation possibly secondary to chronic hernia vs previous small intestine anastomosis. Notably, baseline WBC around 13.0. Has seen oncology for neutrophilic non-progressive leukocytosis and etiology determined to be secondary tobacco use. CRP increasing from 188 to 218 to 225 despite antibiotics. Procal 0.93, but not previously check on admission so no comparison for improvement.  No hemodynamic changes or recurrent fever so resistant infection lower likelihood.  - Continue Rocephin 2 g IV daily, will likely need 5-7-day course of antibiotics  - CMP, CBC, and CRP in AM  - Repeat blood cultures 10/24 with no growth to date  - Scheduled for colonoscopy 10/30/2023, discussed with patient that this might be beneficial given the recent E. coli bacteremia and may be able to determine etiology.     Acute hypoxic respiratory failure  Lobar consolidation  Currently requiring 1-2 LPM since admission.  Previously not on any supplemental oxygen, however likely fluid shifts secondary to critical illness/ARDS. CXR with evidence of mild effusions bilaterally L > R. Patient walked in the hallway without oxygen with desaturation to 78% on RA with recovery in 2-3 mins. Patient with  history of recurrent PNA and smoking hx. echocardiogram with dilated IVC however no RVSP documented so hard to determine if there is a component of pulmonary hypertension or if this is truly hypervolemia from fluid resuscitation and sepsis.  - Encouraged patient to mobilize in the room and in the hallway  - Encourage patient to stop smoking following discharge from hospital, nicotine patch in place currently  - Incentive spirometer  - Continuous pulse oximetry  - Mucinex 1200 mg BID  - Added home Flonase 50 mcg daily   - Added scheduled Duo-nebs    Normocytic anemia  Hgb 13.5 on admission. Likely hemoconcentration from dehydration and now subsequent anemia after adequate fluid resuscitation. No acute signs of bleeding or blood loss.   - Continue to monitor with daily CBC  - Iron panel and B12 pending     Elevated LFTs, improving  Moderate, acute, mixed pattern. On presentation: alk phos 220, , . Total bili (0.8) WNL. CT abdomen pelvis shows patient is post-cholecystectomy with mild bilary dilatation but no other abnormalities. Denies IVDU, excessive ETOH, or known hepatitis exposures. Differential includes acute viral or autoimmune hepatitis, or more generally secondary to sepsis from other infection. Hepatitis A, B, C tests nonreactive, hepatitis B serology shows previous immunization. SEVERINO negative. Mitochondrial M2 Ab IgG and liver kidney microsomal Ab IgG normal.  - CMP in AM    Polyarthralgia  Psoriasis  Ongoing polyarthralgia, saw rheumatology Aug 2023 at which time lab workup showed minimally elevated rheumatoid factor (12). Other inflammatory labs were deferred due to recent treatment for pneumonia at that time. Not on any outpatient pharmacologic management for arthritis. Increases risk of other autoimmune conditions. Monitor.  Has a history of psoriasis and may have a increased risk of malignancy and/or IBD which may have contributed to her E. coli bacteremia and presentation.  - Recommend  follow-up with rheumatology for further investigation and/or treatment    Mild intermittent asthma without complication  Mild expiratory wheezing bilaterally on admission. Oxygenating well on room air. Managed PTA with albuterol inhaler & neb, benzonatate, fluticasone nasal spray.   - Continue PTA albuterol neb for wheezing  - Duo-neb QID     Ventral hernia without obstruction or gangrene  CT abdomen pelvis 10/23 shows moderate-seize ventral abdominal wall hernia in epigastric region containing non-obstructed transverse colon. Unable to reduce on exam due to patient positioning. No acute interventions warranted at this time.     Hypokalemia  -RN protocol for replacement ordered    Migraine headache disorder  History of migraines and takes rizatriptan at home.  Patient has had daily migraines here in the hospital requiring rizatriptan, ibuprofen, and Tylenol.  Discussed acute illnesses trigger and will add on scheduled ibuprofen for relief in addition to her home rizatriptan for termination therapy.  - Ibuprofen 600 mg 6 hours & acetaminophen as needed for fever and additional pain relief  - Rizatriptan 10 mg every 2 hours as needed for headaches, maximum 3 tablets in 24  - Will initiate gabapentin 100 mg TID, may have component of fibromyalgia  - Discussed avoiding opiates given risk of constipation   - Recommend outpatient sleep study, patient likely has undiagnosed SHWETA as evidenced by a.m. headaches and elevated bicarb on BMP.    Tobacco use disorder  Nicotine patch ordered. Encourage cessation.     Morbid obesity   Contributes to overall morbidity and mortality.           Diet: Regular Diet Adult (NO RED DYE, ONLY SKIM MILK)    DVT Prophylaxis: Enoxaparin (Lovenox) SQ  Portillo Catheter: Not present  Lines: None     Cardiac Monitoring: None  Code Status: Full Code      Clinically Significant Risk Factors        # Hypokalemia: Lowest K = 3.2 mmol/L in last 2 days, will replace as needed       # Hypoalbuminemia:  "Lowest albumin = 3.2 g/dL at 10/25/2023  6:59 AM, will monitor as appropriate   # Thrombocytopenia: Lowest platelets = 128 in last 2 days, will monitor for bleeding          # Severe Obesity: Estimated body mass index is 43.63 kg/m  as calculated from the following:    Height as of 9/29/23: 1.626 m (5' 4\").    Weight as of this encounter: 115.3 kg (254 lb 3.1 oz)., PRESENT ON ADMISSION     # Asthma: noted on problem list        Disposition Plan      Expected Discharge Date: 10/27/2023      Destination: home with family              Manuel Stein,   Hospitalist Service  [facility]  Securely message with [employer] (more info)  Text page via [employer] Paging/Directory   ___________________________________________________________    Interval History   No acute events overnight.  Patient with persistent shortness of breath with ambulation and exertion, however improved since admission.  Patient's main complaint now is persistent migraine headaches.  Discussed goal of discharge tomorrow and working towards that goal.  Patient was able to tolerate a regular diet yesterday evening.    Physical Exam   Vital Signs: Temp: 98.8  F (37.1  C) Temp src: Oral BP: 137/87 Pulse: 111   Resp: 18 SpO2: 91 % O2 Device: None (Room air) Oxygen Delivery: 2 LPM  Weight: 254 lbs 3.05 oz    Constitutional: awake, alert, cooperative, no apparent distress, and appears stated age  Respiratory: Clear to auscultation bilaterally, no wheezing  Cardiovascular: Normal apical impulse, regular rate and rhythm, normal S1 and S2, no S3 or S4, no murmur noted, trace edema   GI: No scars, normal bowel sounds, soft, non-distended, non-tender, no masses palpated, no hepatosplenomegally    Medical Decision Making       60 MINUTES SPENT BY ME on the date of service doing chart review, history, exam, documentation & further activities per the note.      Data     I have personally reviewed the following data over the past 24 hrs:    9.4  \   " 10.0 (L)   / 128 (L)     141 102 7.1 /  105 (H)   3.2 (L) 30 (H) 0.71 \     Procal: N/A CRP: 225.14 (H) Lactic Acid: N/A         Imaging results reviewed over the past 24 hrs:   Recent Results (from the past 24 hour(s))   Echocardiogram Complete   Result Value    LVEF  60-65%    Narrative    [id number]  [id number]  [id number]  [id number]^PAMELLA^CHRISTOPHER     [facility]  Echocardiography Laboratory  [address] [place] [address]     Name: FATIMAH LEE  MRN: [id number]  : 1975  Study Date: 10/25/2023 02:19 PM  Age: 48 yrs  Gender: Female  Patient Location: [facility]  Reason For Study: Dyspnea  Ordering Physician: CHRISTOPHER CAN  Referring Physician: Di Shea  Performed By: Melany De La Vega RDCS     BSA: 2.2 m2  Height: 64 in  Weight: 254 lb  HR: 138  BP: 131/84 mmHg  ______________________________________________________________________________  Procedure  Complete Portable Echo Adult. Optison (NDC #[id number]) given intravenously.  ______________________________________________________________________________  Interpretation Summary     Left ventricular systolic function is normal.  The visual ejection fraction is 60-65%.  No significant valve dysfunction.  There is no pericardial effusion.  Dilation of the inferior vena cava is present with abnormal respiratory  variation in diameter.  Patient is tachycardic.     No prior for comparison.  ______________________________________________________________________________  Left Ventricle  The left ventricle is normal in structure, function and size. There is normal  left ventricular wall thickness. Left ventricular systolic function is normal.  The visual ejection fraction is 60-65%. Left ventricular diastolic function is  not assessable.     Atria  Normal left atrial size. Right atrial size is normal.     Mitral Valve  The mitral valve is normal in structure and function.     Tricuspid Valve  The tricuspid valve is normal in structure and function.  Right ventricular  systolic pressure could not be approximated due to inadequate tricuspid  regurgitation.     Aortic Valve  The aortic valve is normal in structure and function.     Pulmonic Valve  The pulmonic valve is normal in structure and function.     Vessels  The aortic root is normal size. Normal size ascending aorta. Dilation of the  inferior vena cava is present with abnormal respiratory variation in diameter.     Pericardium  There is no pericardial effusion.     ______________________________________________________________________________  MMode/2D Measurements & Calculations  IVSd: 1.0 cm  LVIDd: 5.0 cm  LVIDs: 3.4 cm  LVPWd: 1.0 cm  FS: 31.5 %  LV mass(C)d: 185.1 grams  LV mass(C)dI: 85.5 grams/m2  Ao root diam: 2.6 cm  LA dimension: 3.2 cm  asc Aorta Diam: 3.4 cm  LA/Ao: 1.2     Ao root diam index Ht(cm/m): 1.6  Ao root diam index BSA (cm/m2): 1.2  Asc Ao diam index BSA (cm/m2): 1.6  Asc Ao diam index Ht(cm/m): 2.1  LA Volume (BP): 39.9 ml  LA Volume Index (BP): 18.4 ml/m2  RWT: 0.42     Doppler Measurements & Calculations  MV E max bharti: 113.0 cm/sec  MV A max bharti: 140.0 cm/sec  MV E/A: 0.81     E/E' avg: 10.0  Lateral E/e': 9.8  Medial E/e': 10.3     ______________________________________________________________________________  Report approved by: Georgia Campbell 10/25/2023 03:24 PM

## 2023-10-26 NOTE — PLAN OF CARE
"Pt alert and oriented. Pleasant and cooperative with staff. Up ind in room. Continues to have migrane headache not mediated by PRN medications. Pt stated the one time dose of dilaudid was effective for, \"a little while\" but got worse after lab came for blood. Pt wore O2 while ambulating to rest room otherwise sats maintaining 90-95% on RA while at rest. Makes needs known.  "

## 2023-10-27 ENCOUNTER — ANESTHESIA EVENT (OUTPATIENT)
Dept: GASTROENTEROLOGY | Facility: CLINIC | Age: 48
End: 2023-10-27
Payer: COMMERCIAL

## 2023-10-27 VITALS
WEIGHT: 254.19 LBS | BODY MASS INDEX: 43.63 KG/M2 | DIASTOLIC BLOOD PRESSURE: 71 MMHG | RESPIRATION RATE: 18 BRPM | SYSTOLIC BLOOD PRESSURE: 133 MMHG | HEART RATE: 90 BPM | TEMPERATURE: 99.4 F | OXYGEN SATURATION: 92 %

## 2023-10-27 LAB
ANION GAP SERPL CALCULATED.3IONS-SCNC: 7 MMOL/L (ref 7–15)
BUN SERPL-MCNC: 8.2 MG/DL (ref 6–20)
CALCIUM SERPL-MCNC: 9.2 MG/DL (ref 8.6–10)
CHLORIDE SERPL-SCNC: 104 MMOL/L (ref 98–107)
CREAT SERPL-MCNC: 0.73 MG/DL (ref 0.51–0.95)
CRP SERPL-MCNC: 190.28 MG/L
DEPRECATED HCO3 PLAS-SCNC: 31 MMOL/L (ref 22–29)
EGFRCR SERPLBLD CKD-EPI 2021: >90 ML/MIN/1.73M2
ERYTHROCYTE [DISTWIDTH] IN BLOOD BY AUTOMATED COUNT: 13.3 % (ref 10–15)
FERRITIN SERPL-MCNC: 397 NG/ML (ref 6–175)
GLUCOSE SERPL-MCNC: 109 MG/DL (ref 70–99)
HCT VFR BLD AUTO: 31.7 % (ref 35–47)
HGB BLD-MCNC: 10.3 G/DL (ref 11.7–15.7)
IRON BINDING CAPACITY (ROCHE): 219 UG/DL (ref 240–430)
IRON SATN MFR SERPL: 8 % (ref 15–46)
IRON SERPL-MCNC: 17 UG/DL (ref 37–145)
MAGNESIUM SERPL-MCNC: 1.9 MG/DL (ref 1.7–2.3)
MCH RBC QN AUTO: 30.8 PG (ref 26.5–33)
MCHC RBC AUTO-ENTMCNC: 32.5 G/DL (ref 31.5–36.5)
MCV RBC AUTO: 95 FL (ref 78–100)
MITOCHONDRIA M2 IGG SER-ACNC: 1.1 U/ML
PLATELET # BLD AUTO: 127 10E3/UL (ref 150–450)
POTASSIUM SERPL-SCNC: 3.3 MMOL/L (ref 3.4–5.3)
POTASSIUM SERPL-SCNC: 3.9 MMOL/L (ref 3.4–5.3)
POTASSIUM SERPL-SCNC: 3.9 MMOL/L (ref 3.4–5.3)
RBC # BLD AUTO: 3.34 10E6/UL (ref 3.8–5.2)
SODIUM SERPL-SCNC: 142 MMOL/L (ref 135–145)
VIT B12 SERPL-MCNC: 1080 PG/ML (ref 232–1245)
WBC # BLD AUTO: 6.8 10E3/UL (ref 4–11)

## 2023-10-27 PROCEDURE — 85027 COMPLETE CBC AUTOMATED: CPT | Performed by: STUDENT IN AN ORGANIZED HEALTH CARE EDUCATION/TRAINING PROGRAM

## 2023-10-27 PROCEDURE — 80048 BASIC METABOLIC PNL TOTAL CA: CPT | Performed by: STUDENT IN AN ORGANIZED HEALTH CARE EDUCATION/TRAINING PROGRAM

## 2023-10-27 PROCEDURE — 82607 VITAMIN B-12: CPT | Performed by: STUDENT IN AN ORGANIZED HEALTH CARE EDUCATION/TRAINING PROGRAM

## 2023-10-27 PROCEDURE — 83735 ASSAY OF MAGNESIUM: CPT | Performed by: STUDENT IN AN ORGANIZED HEALTH CARE EDUCATION/TRAINING PROGRAM

## 2023-10-27 PROCEDURE — 84132 ASSAY OF SERUM POTASSIUM: CPT | Performed by: STUDENT IN AN ORGANIZED HEALTH CARE EDUCATION/TRAINING PROGRAM

## 2023-10-27 PROCEDURE — 99239 HOSP IP/OBS DSCHRG MGMT >30: CPT | Performed by: STUDENT IN AN ORGANIZED HEALTH CARE EDUCATION/TRAINING PROGRAM

## 2023-10-27 PROCEDURE — 250N000011 HC RX IP 250 OP 636: Mod: JZ

## 2023-10-27 PROCEDURE — 94640 AIRWAY INHALATION TREATMENT: CPT

## 2023-10-27 PROCEDURE — 86140 C-REACTIVE PROTEIN: CPT | Performed by: STUDENT IN AN ORGANIZED HEALTH CARE EDUCATION/TRAINING PROGRAM

## 2023-10-27 PROCEDURE — 250N000009 HC RX 250: Performed by: STUDENT IN AN ORGANIZED HEALTH CARE EDUCATION/TRAINING PROGRAM

## 2023-10-27 PROCEDURE — 250N000013 HC RX MED GY IP 250 OP 250 PS 637

## 2023-10-27 PROCEDURE — 83550 IRON BINDING TEST: CPT | Performed by: STUDENT IN AN ORGANIZED HEALTH CARE EDUCATION/TRAINING PROGRAM

## 2023-10-27 PROCEDURE — 82728 ASSAY OF FERRITIN: CPT | Performed by: STUDENT IN AN ORGANIZED HEALTH CARE EDUCATION/TRAINING PROGRAM

## 2023-10-27 PROCEDURE — 36415 COLL VENOUS BLD VENIPUNCTURE: CPT | Performed by: STUDENT IN AN ORGANIZED HEALTH CARE EDUCATION/TRAINING PROGRAM

## 2023-10-27 PROCEDURE — 250N000013 HC RX MED GY IP 250 OP 250 PS 637: Performed by: STUDENT IN AN ORGANIZED HEALTH CARE EDUCATION/TRAINING PROGRAM

## 2023-10-27 RX ORDER — GUAIFENESIN 600 MG/1
1200 TABLET, EXTENDED RELEASE ORAL 2 TIMES DAILY
Qty: 40 TABLET | Refills: 0 | Status: SHIPPED | OUTPATIENT
Start: 2023-10-27 | End: 2023-11-06

## 2023-10-27 RX ORDER — GABAPENTIN 100 MG/1
100 CAPSULE ORAL 3 TIMES DAILY
Qty: 90 CAPSULE | Refills: 0 | Status: SHIPPED | OUTPATIENT
Start: 2023-10-27 | End: 2023-12-26

## 2023-10-27 RX ORDER — ONDANSETRON 2 MG/ML
4 INJECTION INTRAMUSCULAR; INTRAVENOUS EVERY 30 MIN PRN
Status: CANCELLED | OUTPATIENT
Start: 2023-10-27

## 2023-10-27 RX ORDER — POTASSIUM CHLORIDE 1500 MG/1
40 TABLET, EXTENDED RELEASE ORAL ONCE
Status: COMPLETED | OUTPATIENT
Start: 2023-10-27 | End: 2023-10-27

## 2023-10-27 RX ORDER — CIPROFLOXACIN 500 MG/1
500 TABLET, FILM COATED ORAL 2 TIMES DAILY
Qty: 8 TABLET | Refills: 0 | Status: SHIPPED | OUTPATIENT
Start: 2023-10-27 | End: 2023-10-31

## 2023-10-27 RX ORDER — IBUPROFEN 600 MG/1
600 TABLET, FILM COATED ORAL EVERY 6 HOURS PRN
Qty: 30 TABLET | Refills: 0 | Status: SHIPPED | OUTPATIENT
Start: 2023-10-27 | End: 2023-12-15

## 2023-10-27 RX ORDER — ONDANSETRON 4 MG/1
4 TABLET, ORALLY DISINTEGRATING ORAL EVERY 30 MIN PRN
Status: CANCELLED | OUTPATIENT
Start: 2023-10-27

## 2023-10-27 RX ADMIN — GUAIFENESIN 1200 MG: 600 TABLET ORAL at 08:03

## 2023-10-27 RX ADMIN — IBUPROFEN 600 MG: 600 TABLET ORAL at 03:00

## 2023-10-27 RX ADMIN — GABAPENTIN 100 MG: 100 CAPSULE ORAL at 08:03

## 2023-10-27 RX ADMIN — FLUTICASONE PROPIONATE 2 SPRAY: 50 SPRAY, METERED NASAL at 08:03

## 2023-10-27 RX ADMIN — IPRATROPIUM BROMIDE AND ALBUTEROL SULFATE 3 ML: 2.5; .5 SOLUTION RESPIRATORY (INHALATION) at 13:14

## 2023-10-27 RX ADMIN — ENOXAPARIN SODIUM 40 MG: 100 INJECTION SUBCUTANEOUS at 11:53

## 2023-10-27 RX ADMIN — IBUPROFEN 600 MG: 600 TABLET ORAL at 08:41

## 2023-10-27 RX ADMIN — POTASSIUM CHLORIDE 40 MEQ: 1500 TABLET, EXTENDED RELEASE ORAL at 03:00

## 2023-10-27 RX ADMIN — NICOTINE 1 PATCH: 21 PATCH, EXTENDED RELEASE TRANSDERMAL at 06:50

## 2023-10-27 RX ADMIN — ACETAMINOPHEN 325 MG: 325 TABLET, FILM COATED ORAL at 08:09

## 2023-10-27 ASSESSMENT — COPD QUESTIONNAIRES: COPD: 1

## 2023-10-27 ASSESSMENT — ACTIVITIES OF DAILY LIVING (ADL)
ADLS_ACUITY_SCORE: 22

## 2023-10-27 ASSESSMENT — LIFESTYLE VARIABLES: TOBACCO_USE: 1

## 2023-10-27 NOTE — PROGRESS NOTES
YOLANDA RAMOSG DISCHARGE NOTE    Patient discharged to home at 1:57 PM via wheel chair. Accompanied by daughter and staff. Discharge instructions reviewed with patient, opportunity offered to ask questions. Prescriptions sent to patients preferred pharmacy. All belongings sent with patient. IV removed before discharge. Home Oxygen sent with patient.     Linette Hernandez RN

## 2023-10-27 NOTE — PLAN OF CARE
Pt alert and oriented. Pleasant and cooperative with staff. Up Ind in room. Pain improved from previous night being controlled with scheduled medications. Slept well throughout night. Using 2L O2 while ambulating to rest room otherwise sats fine on RA at rest. K replaced again during NOC. Awaiting lab results. Makes needs known.

## 2023-10-27 NOTE — ANESTHESIA PREPROCEDURE EVALUATION
Anesthesia Pre-Procedure Evaluation    Patient: Elza Greer   MRN: [id number] : 1975        Procedure : Procedure(s):  Colonoscopy          Past Medical History:   Diagnosis Date    Adjustment disorder with mixed anxiety and depressed mood     Arthritis     COPD (chronic obstructive pulmonary disease) (H)     Depressive disorder     History of blood transfusion     Had a blood transfusion after my hysterectomy in     IBS (irritable bowel syndrome)     Incisional hernia, without obstruction or gangrene     Lactose intolerance 2010    Migraine 2010    Mild intermittent asthma without complication 2010    PCOS (polycystic ovarian syndrome)     Tobacco use disorder     Vitamin D deficiency       Past Surgical History:   Procedure Laterality Date    ABDOMEN SURGERY      APPENDECTOMY OPEN N/A     CYSTECTOMY OVARIAN BENIGN      HEPATICOJEJUNOSTOMY      RnY jejunostomy from bile duct injury    HERNIORRHAPHY VENTRAL N/A 2012    open with mesh    HYSTERECTOMY TOTAL ABDOMINAL, BILATERAL SALPINGO-OOPHORECTOMY, COMBINED N/A     LAPAROSCOPIC CHOLECYSTECTOMY      complicated by bile duct injury    LAPAROSCOPIC HERNIORRHAPHY VENTRAL N/A 2021    Procedure: Open recurrent incarcerated ventral hernia repair X2;  Surgeon: Pradip Mckenzie MD;  Location: [facility]      Allergies   Allergen Reactions    Azithromycin Anaphylaxis    Latex Hives    Oxycodone Nausea and Vomiting      Social History     Tobacco Use    Smoking status: Every Day     Packs/day: 1.00     Years: 23.00     Additional pack years: 0.00     Total pack years: 23.00     Types: Cigarettes    Smokeless tobacco: Never    Tobacco comments:     Starting patches    Substance Use Topics    Alcohol use: Yes     Comment: social      Wt Readings from Last 1 Encounters:   10/23/23 115.3 kg (254 lb 3.1 oz)        Anesthesia Evaluation   Pt has had prior anesthetic. Type: General and MAC.        ROS/MED HX  ENT/Pulmonary:    "  (+)                tobacco use, Past use,   Intermittent, asthma    COPD,              Neurologic:     (+)      migraines,                          Cardiovascular:       METS/Exercise Tolerance:     Hematologic:       Musculoskeletal:   (+)  arthritis,             GI/Hepatic: Comment: Elevated LFTs    (+)       Inflammatory bowel disease,             Renal/Genitourinary:       Endo:     (+)               Obesity,       Psychiatric/Substance Use:     (+) psychiatric history depression       Infectious Disease:       Malignancy:       Other:               OUTSIDE LABS:  CBC:   Lab Results   Component Value Date    WBC 9.4 10/26/2023    WBC 11.3 (H) 10/25/2023    HGB 10.0 (L) 10/26/2023    HGB 10.5 (L) 10/25/2023    HCT 30.2 (L) 10/26/2023    HCT 32.8 (L) 10/25/2023     (L) 10/26/2023     10/25/2023     BMP:   Lab Results   Component Value Date     10/26/2023     10/25/2023    POTASSIUM 3.3 (L) 10/27/2023    POTASSIUM 3.3 (L) 10/26/2023    CHLORIDE 102 10/26/2023    CHLORIDE 100 10/25/2023    CO2 30 (H) 10/26/2023    CO2 29 10/25/2023    BUN 7.1 10/26/2023    BUN 8.4 10/25/2023    CR 0.71 10/26/2023    CR 0.65 10/25/2023     (H) 10/26/2023    GLC 90 10/25/2023     COAGS:   Lab Results   Component Value Date    PTT 28 09/01/2022    INR 1.06 09/01/2022     POC: No results found for: \"BGM\", \"HCG\", \"HCGS\"  HEPATIC:   Lab Results   Component Value Date    ALBUMIN 3.2 (L) 10/25/2023    PROTTOTAL 6.3 (L) 10/25/2023    ALT 80 (H) 10/25/2023    AST 32 10/25/2023    GGT 40 (H) 09/18/2023    ALKPHOS 187 (H) 10/25/2023    BILITOTAL 0.6 10/25/2023     OTHER:   Lab Results   Component Value Date    LACT 1.6 10/23/2023    DEBBIE 9.1 10/26/2023    MAG 1.7 10/26/2023    LIPASE 20 10/23/2023    TSH 2.62 09/05/2023    CRP 16.7 (H) 08/08/2022               Bowen Addison CRNA, APRN CRNA  "

## 2023-10-27 NOTE — PROGRESS NOTES
Patient has been assessed for Home Oxygen needs. Oxygen readings:    *Pulse oximetry (SpO2) = 93% on room air at rest while awake.    *SpO2 = 88% on room air during activity/with exercise.    *SpO2 improved to 94% on 2liters/minute during activity/with exercise.

## 2023-10-27 NOTE — INTERIM SUMMARY
Patient has been assessed for Home Oxygen needs. Oxygen readings:    *Pulse oximetry (SpO2) = 93% on room air at rest while awake.      *SpO2 = 88% on room air during activity/with exercise.    *SpO2 improved to 94% on 2liters/minute during activity/with exercise.    Oxygen Documentation  I certify that this patient, Elza Greer has been under my care (or a nurse practitioner or physican's assistant working with me). This is the face-to-face encounter for oxygen medical necessity.      At the time of this encounter supplemental oxygen is reasonable and necessary and is expected to improve the patient's condition in a home setting.       Patient has continued oxygen desaturation due to COPD J44.9.    If portability is ordered, is the patient mobile within the home? yes

## 2023-10-29 ENCOUNTER — PATIENT OUTREACH (OUTPATIENT)
Dept: CARE COORDINATION | Facility: CLINIC | Age: 48
End: 2023-10-29
Payer: COMMERCIAL

## 2023-10-29 LAB
BACTERIA BLD CULT: NO GROWTH
BACTERIA BLD CULT: NO GROWTH

## 2023-10-29 NOTE — PROGRESS NOTES
Clinic Care Coordination Contact  [facility]/Voicemail       Clinical Data: Care Coordinator Outreach  Outreach attempted x 2.  Left message on patient's voicemail with call back information and requested return call.  Plan:  Care Coordinator will do no further outreaches at this time.    Nina BALTAZAR Community Health Worker  Clinic Care Coordination  [facility]  Phone: [phone]

## 2023-10-30 ENCOUNTER — TELEPHONE (OUTPATIENT)
Dept: FAMILY MEDICINE | Facility: CLINIC | Age: 48
End: 2023-10-30
Payer: COMMERCIAL

## 2023-10-30 ENCOUNTER — ANESTHESIA (OUTPATIENT)
Dept: GASTROENTEROLOGY | Facility: CLINIC | Age: 48
End: 2023-10-30
Payer: COMMERCIAL

## 2023-10-30 NOTE — TELEPHONE ENCOUNTER
"S-(situation): Call transferred to writer. The soonest hospital follow up appt is 11/08 which thomas is scheduled for.     B-(background): Went to ER Monday 10/23 at 4 AM vomiting, has no recollection of her hospitalization until about Thursday. Discharged from hospital with Ecoli infection on Fri 10/27, has 2 doses left of cipro.  Has a note to return to work Friday 11/03 but if she continues to feel this way, she won't be able to and would need an extension. She does have the ability to work form home. Does not feel safe enough to drive.     A-(assessment): Thomas says she feels better than when she first went in. Still having low grade fevers, 99.2-100.2 but mostly in the 99 range. She feels foggy and dizzy sometimes. Is drinking adequately. Has left shoulder pain similar to after having surgery from gas pains, described as aching. She is having a couple of \"mushy\" bowel movements daily. She feels more fatigued by 3 pm and nauseated by dinner. She is using home oxygen at 1.5 LPM at night and with exertion. She does drop into the 80s with exertion on room air otherwise.     R-(recommendations): Return to ER if symptoms worsen, develops high fever, confusion or any other concerning symptom.   Dr Jackson, Can we use a same day spot on Wednesday for hospital follow up with you?   Thank you, May SWAIN RN        "

## 2023-10-30 NOTE — TELEPHONE ENCOUNTER
Symptoms    Describe your symptoms: patient called stating that she that she was discharged from hospital 10/23/2023-- patient reports continued fatigue, left shoulder pain--feels foggy and low grade fevers 99.2.  out of antibiotics tomorrow AM.     Patient had a ecoli infection.     Nov 10, 2023  8:00 AM  (Arrive by 7:45 AM)  Return Visit with Estella Chan PA-C  [facility] ([facility] ) [address] [address] [phone]             [facility] - [place], [place] - [address]   [address] [facility] [address]  Phone: [phone] Fax: [phone]        Could we send this information to you in [facility] or would you prefer to receive a phone call?:   Patient would prefer a phone call   Okay to leave a detailed message?: Yes at Cell number on file:    Telephone Information:   Mobile [phone]

## 2023-10-30 NOTE — DISCHARGE SUMMARY
[facility]  Hospitalist Discharge Summary      Date of Admission:  10/23/2023  Date of Discharge:  10/27/2023  1:59 PM  Discharging Provider: Manuel tSein DO  Discharge Service: Hospitalist Service    Discharge Diagnoses   E. Coli bacteremia   Sepsis without shock  Leukocytosis, acute on chronic  Acute hypoxic respiratory failure  Pleural effusions  Normocytic anemia  Hepatitis, likely related to sepsis   Seronegative rheumatoid arthritis  Psoriasis   Fibromyalgia  Mild intermittent asthma without complication   Hypokalemia, resolved  Migraine headache disorder  Tobacco use disorder  Morbid obesity    Clinically Significant Risk Factors          Follow-ups Needed After Discharge   Follow-up Appointments     Follow-up and recommended labs and tests       Follow up with primary care provider, Di Shea, within 7 days to   evaluate medication change and for hospital follow- up.  The following   labs/tests are recommended: BMP, CBC.            Discharge Disposition   Discharged to home  Condition at discharge: Good    Hospital Course   Elza Greer is a 48 year old female who presents on 10/23/2023 with abdominal pain. She is being admitted for ongoing workup & management.      E. Coli bacteremia  Sepsis without septic shock  Leukocytosis, acute on chronic   Septic based on leukocytosis (19.9), tachycardia (134 bpm), febrile (103F). Source not entirely clear on admission, however she had symptoms of abdominal pain & nausea. CT did not show any obvious acute enteritis, UA does not appear infected, skin intact, meningeal signs negative (but difficult to assess with somnolence secondary to IV narcotics). Likely GI source of E. Coli with translocation possibly secondary to chronic hernia vs previous small intestine anastomosis. Notably, baseline WBC around 13.0. Has seen oncology for neutrophilic non-progressive leukocytosis and etiology determined to be secondary tobacco use. CRP  increasing from 188 to 218 to 225 despite antibiotics, however trended down prior to discharging home. Procal 0.93, but not previously checked on admission so no comparison for improvement.    - Continued Rocephin for 4 days while hospitalized, switched to ciprofloxacin 500 mg twice daily for a total duration of 7 days.   - Repeat blood cultures 10/24 with no growth to date  - Scheduled for colonoscopy 10/30/2023, discussed with Dr. Wyatt about postponing surgery given recent hypoxia and prolonged recovery. Still recommend colonoscopy at some given E. Coli bacteremia and long-standing history of presumed diagnosis of IBS.      Acute hypoxic respiratory failure  Pleural effusions  Currently requiring 1-2 LPM since admission.  Previously not on any supplemental oxygen, however likely fluid shifts secondary to critical illness/ARDS. CXR with evidence of mild effusions bilaterally L > R. Patient walked in the hallway without oxygen with desaturation to 78% on RA with recovery in 2-3 mins. Patient with history of recurrent PNA and smoking hx. echocardiogram with dilated IVC however no RVSP documented so hard to determine if there is a component of pulmonary hypertension or if this is truly hypervolemia from fluid resuscitation and sepsis.  - Discharged on supplemental oxygen ranging from 1-3 L O2, discussed only using when needed and should continue to improve over time.   - Discussed smoking cessation and different options moving forward.   - Incentive spirometer provided to patient on discharge  - Continue Mucinex 1200 mg BID for cough and congestion relief.   - Continue Flonase 50 mcg daily   - Patient would benefit from an outpatient sleep study and PFT's.     Normocytic anemia  Hgb 13.5 on admission. Likely hemoconcentration from dehydration and now subsequent anemia after adequate fluid resuscitation. No acute signs of bleeding or blood loss.   - Given acute infection will delay iron supplementation.      Elevated  LFTs, improving  Moderate, acute, mixed pattern. On presentation: alk phos 220, , . Total bili (0.8) WNL. CT abdomen pelvis shows patient is post-cholecystectomy with mild bilary dilatation but no other abnormalities. Denies IVDU, excessive ETOH, or known hepatitis exposures. Differential includes acute viral or autoimmune hepatitis, or more generally secondary to sepsis from other infection. Hepatitis A, B, C tests nonreactive, hepatitis B serology shows previous immunization. SEVERINO negative. Mitochondrial M2 Ab IgG and liver kidney microsomal Ab IgG normal.     Polyarthralgia  Psoriasis  Fibromyalgia  Ongoing polyarthralgia, saw rheumatology Aug 2023 at which time lab workup showed minimally elevated rheumatoid factor (12). Other inflammatory labs were deferred due to recent treatment for pneumonia at that time. Not on any outpatient pharmacologic management for arthritis. Increases risk of other autoimmune conditions. Monitor.  Has a history of psoriasis and may have a increased risk of malignancy and/or IBD which may have contributed to her E. coli bacteremia and presentation.  - Recommend follow-up with rheumatology for further investigation and/or treatment     Mild intermittent asthma without complication  Mild expiratory wheezing bilaterally on admission. Oxygenating well on room air. Managed PTA with albuterol inhaler & neb, benzonatate, fluticasone nasal spray.   - Continue PTA albuterol neb for wheezing     Ventral hernia without obstruction or gangrene  CT abdomen pelvis 10/23 shows moderate-seize ventral abdominal wall hernia in epigastric region containing non-obstructed transverse colon. Unable to reduce on exam due to patient positioning. No acute interventions warranted at this time.      Hypokalemia, resolved  K+ 3.9 on discharge  - Recommend follow-up BMP     Migraine headache disorder  History of migraines and takes rizatriptan at home.  Patient has had daily migraines here in the  hospital requiring rizatriptan, ibuprofen, and Tylenol.  Discussed acute illnesses trigger and will add on scheduled ibuprofen for relief in addition to her home rizatriptan for termination therapy.  - Ibuprofen 600 mg 6 hours as needed & acetaminophen as needed for fever and additional pain relief  - Rizatriptan 10 mg every 2 hours as needed for headaches, maximum 3 tablets in 24  - Continue gabapentin 100 mg TID, may have component of fibromyalgia. May need titration of medication in outpatient setting for maximal efficacy.     Tobacco use disorder  Nicotine patch ordered. Encourage cessation.      Morbid obesity   Contributes to overall morbidity and mortality.     Consultations This Hospital Stay   SURGERY GENERAL IP CONSULT    Code Status   Prior    Time Spent on this Encounter   IManuel DO, personally saw the patient today and spent greater than 30 minutes discharging this patient.       Manuel Stein DO  [facility]  [address] [address]  Phone: [phone]  Fax: [phone]  ______________________________________________________________________    Physical Exam   Vital Signs:                    Weight: 254 lbs 3.05 oz    Constitutional: awake, alert, cooperative, no apparent distress, and appears stated age  Respiratory: Clear to auscultation bilaterally, no wheezing  Cardiovascular: Normal apical impulse, regular rate and rhythm, normal S1 and S2, no S3 or S4, no murmur noted, trace edema   GI: No scars, normal bowel sounds, soft, non-distended, non-tender, no masses palpated, no hepatosplenomegaly    Primary Care Physician   Di Shea    Discharge Orders      Reason for your hospital stay    Sepsis secondary to E. Coli bacteremia     Follow-up and recommended labs and tests     Follow up with primary care provider, Di Shea, within 7 days to evaluate medication change and for hospital follow- up.  The following labs/tests are  recommended: BMP, CBC.     Activity    Your activity upon discharge: activity as tolerated     Oxygen Adult/Peds    Oxygen Documentation  I certify that this patient, Elza Greer has been under my care (or a nurse practitioner or physican's assistant working with me). This is the face-to-face encounter for oxygen medical necessity.      At the time of this encounter supplemental oxygen is reasonable and necessary and is expected to improve the patient's condition in a home setting.       Patient has continued oxygen desaturation due to COPD J44.9.    If portability is ordered, is the patient mobile within the home? yes     Diet    Follow this diet upon discharge: Regular       Significant Results and Procedures   Most Recent 3 CBC's:  Recent Labs   Lab Test 10/27/23  0632 10/26/23  0614 10/25/23  0730   WBC 6.8 9.4 11.3*   HGB 10.3* 10.0* 10.5*   MCV 95 95 98   * 128* 151     Most Recent 3 BMP's:  Recent Labs   Lab Test 10/27/23  0632 10/27/23  0008 10/26/23  1951 10/26/23  1326 10/26/23  0614 10/25/23  0659     --   --   --  141 139   POTASSIUM 3.9  3.9 3.3* 3.3*   < > 3.2* 3.4   CHLORIDE 104  --   --   --  102 100   CO2 31*  --   --   --  30* 29   BUN 8.2  --   --   --  7.1 8.4   CR 0.73  --   --   --  0.71 0.65   ANIONGAP 7  --   --   --  9 10   DEBBIE 9.2  --   --   --  9.1 9.1   *  --   --   --  105* 90    < > = values in this interval not displayed.   ,   Results for orders placed or performed during the hospital encounter of 10/23/23   CT Abdomen Pelvis w Contrast    Narrative    EXAM: CT ABDOMEN PELVIS W CONTRAST  LOCATION: [facility]  DATE: 10/23/2023    INDICATION: upper abdominal pain, nausea and vomiting,  COMPARISON: Abdominal ultrasound 09/25/2023. CT abdomen and pelvis 09/09/2022.  TECHNIQUE: CT scan of the abdomen and pelvis was performed following injection of IV contrast. Multiplanar reformats were obtained. Dose reduction techniques were  used.  CONTRAST: 100 mL Isovue 370    FINDINGS:   LOWER CHEST: Normal.    HEPATOBILIARY: Postcholecystectomy. Mild biliary dilatation probably on the basis of reservoir effect after cholecystectomy.    PANCREAS: Normal.    SPLEEN: Normal.    ADRENAL GLANDS: Normal.    KIDNEYS/BLADDER: Tiny probable benign cysts of the kidneys do not warrant follow-up.    BOWEL: No bowel obstruction or inflammation. Moderate-sized ventral abdominal wall hernia in the epigastric region contains nonobstructed transverse colon. Fat deposition in the wall the colon consistent with prior inflammation. Small bowel anastomosis   in the left abdomen.    LYMPH NODES: Normal.    VASCULATURE: Unremarkable.    PELVIC ORGANS: Normal.    MUSCULOSKELETAL: Normal.      Impression    IMPRESSION:   1.  No acute process in the abdomen or pelvis.  2.  Moderate-sized ventral abdominal wall hernia in the epigastric region contains nonobstructed transverse colon.   US Abdomen Limited    Narrative    US ABDOMEN LIMITED   10/23/2023 12:56 PM     HISTORY:  LFT elevation    COMPARISON: CT abdomen and pelvis October 23, 2023    FINDINGS:    Gallbladder: Cholecystectomy.    Bile ducts:  Mild central intrahepatic biliary ductal dilatation. No  extrahepatic biliary ductal dilatation with the common bile duct  measuring 3 mm.    Liver: Smooth contour of the surface of liver appeared homogeneous  echotexture. No evidence for discrete hepatic mass.    Pancreas:  Not visualized due to adjacent bowel gas.    Right kidney:  12.9 cm in length. Normal renal cortical echogenicity.  No hydronephrosis.    Aorta and IVC:  Not specifically assessed.       Impression    IMPRESSION:    1. Mild intrahepatic biliary ductal dilatation without extrahepatic  ductal dilatation and may be due to reservoir effect from  cholecystectomy though is indeterminate.    ALISSA CHAMBERS MD         SYSTEM ID:  [id number]   CT Chest Pulmonary Embolism w Contrast    Narrative    CT CHEST PULMONARY  EMBOLISM W CONTRAST 10/23/2023 10:19 AM    CLINICAL HISTORY: sepsis; fever of unknown source. Tachycardic   tachypnic.  TECHNIQUE: CT angiogram chest during pulmonary arterial phase  injection IV contrast. 2D and 3D MIP reconstructions were performed by  the CT technologist. Dose reduction techniques were used.     CONTRAST: 92mL isovue 370    COMPARISON: None.    FINDINGS:  PULMONARY ARTERY CT ANGIOGRAM: No filling defects.    LUNGS AND PLEURA: Subsegmental atelectasis in the lung bases. No  pleural effusions or pneumothorax.    MEDIASTINUM/AXILLAE: The visualized thyroid gland is unremarkable. No  thoracic adenopathy. Mild cardiomegaly. No pericardial effusion.  Normal caliber thoracic aorta. No acute abnormality. No coronary  artery calcifications.    UPPER ABDOMEN: Spleen is at least 14.2 cm. Liver also appears  enlarged. There is an 8 mm low density possible cyst in the liver.  There is an anterior abdominal wall hernia containing nonobstructed  transverse colon and transverse mesocolon.    MUSCULOSKELETAL: Normal.      Impression    IMPRESSION:  1.  Negative for acute pulmonary embolism.    2.  No consolidative airspace disease.    3.  There is hepatosplenomegaly.    BONIFACIO MENDIOLA MD         SYSTEM ID:  [id number]   Chest 2 Views*    Narrative    XR CHEST 2 VIEWS 10/25/2023 11:45 AM    HISTORY: Hypoxia    COMPARISON: 2023    FINDINGS: New mild airspace disease in the left greater than right  lung bases with very mild effusions. No pneumothorax. Unchanged  cardiac size.    BONIFACIO MENDIOLA MD         SYSTEM ID:  [id number]   Echocardiogram Complete     Value    LVEF  60-65%    Narrative    [id number]  [id number]  [id number]  [id number]^PAMELLA^CHRISTOPHER     [facility]  Echocardiography Laboratory  [address] [place] [address]     Name: FATIMAH LEE  MRN: [id number]  : 1975  Study Date: 10/25/2023 02:19 PM  Age: 48 yrs  Gender: Female  [facility]  Reason For Study:  Dyspnea  Ordering Physician: CHRISTOPHER CAN  Referring Physician: Di Shea  Performed By: Melany De La Vega RDCS     BSA: 2.2 m2  Height: 64 in  Weight: 254 lb  HR: 138  BP: 131/84 mmHg  ______________________________________________________________________________  Procedure  Complete Portable Echo Adult. [facility] (NDC #[id number]) given intravenously.  ______________________________________________________________________________  Interpretation Summary     Left ventricular systolic function is normal.  The visual ejection fraction is 60-65%.  No significant valve dysfunction.  There is no pericardial effusion.  Dilation of the inferior vena cava is present with abnormal respiratory  variation in diameter.  Patient is tachycardic.     No prior for comparison.  ______________________________________________________________________________  Left Ventricle  The left ventricle is normal in structure, function and size. There is normal  left ventricular wall thickness. Left ventricular systolic function is normal.  The visual ejection fraction is 60-65%. Left ventricular diastolic function is  not assessable.     Atria  Normal left atrial size. Right atrial size is normal.     Mitral Valve  The mitral valve is normal in structure and function.     Tricuspid Valve  The tricuspid valve is normal in structure and function. Right ventricular  systolic pressure could not be approximated due to inadequate tricuspid  regurgitation.     Aortic Valve  The aortic valve is normal in structure and function.     Pulmonic Valve  The pulmonic valve is normal in structure and function.     Vessels  The aortic root is normal size. Normal size ascending aorta. Dilation of the  inferior vena cava is present with abnormal respiratory variation in diameter.     Pericardium  There is no pericardial effusion.     ______________________________________________________________________________  MMode/2D Measurements & Calculations  IVSd: 1.0  cm  LVIDd: 5.0 cm  LVIDs: 3.4 cm  LVPWd: 1.0 cm  FS: 31.5 %  LV mass(C)d: 185.1 grams  LV mass(C)dI: 85.5 grams/m2  Ao root diam: 2.6 cm  LA dimension: 3.2 cm  asc Aorta Diam: 3.4 cm  LA/Ao: 1.2     Ao root diam index Ht(cm/m): 1.6  Ao root diam index BSA (cm/m2): 1.2  Asc Ao diam index BSA (cm/m2): 1.6  Asc Ao diam index Ht(cm/m): 2.1  LA Volume (BP): 39.9 ml  LA Volume Index (BP): 18.4 ml/m2  RWT: 0.42     Doppler Measurements & Calculations  MV E max bharti: 113.0 cm/sec  MV A max bharti: 140.0 cm/sec  MV E/A: 0.81     E/E' avg: 10.0  Lateral E/e': 9.8  Medial E/e': 10.3     ______________________________________________________________________________  Report approved by: Georgia Campbell 10/25/2023 03:24 PM             Discharge Medications   Discharge Medication List as of 10/27/2023 12:37 PM        START taking these medications    Details   ciprofloxacin (CIPRO) 500 MG tablet Take 1 tablet (500 mg) by mouth 2 times daily for 4 days, Disp-8 tablet, R-0, E-Prescribe      gabapentin (NEURONTIN) 100 MG capsule Take 1 capsule (100 mg) by mouth 3 times daily, Disp-90 capsule, R-0, E-Prescribe      guaiFENesin (MUCINEX) 600 MG 12 hr tablet Take 2 tablets (1,200 mg) by mouth 2 times daily for 10 days, Disp-40 tablet, R-0, E-Prescribe      ibuprofen (ADVIL/MOTRIN) 600 MG tablet Take 1 tablet (600 mg) by mouth every 6 hours as needed for moderate pain, Disp-30 tablet, R-0, E-Prescribe           CONTINUE these medications which have NOT CHANGED    Details   albuterol (ACCUNEB) 1.25 MG/3ML neb solution Take 1 vial (1.25 mg) by nebulization every 6 hours as needed for shortness of breath / dyspnea or wheezing, Disp-90 mL, R-0, E-Prescribe      albuterol (PROAIR HFA/PROVENTIL HFA/VENTOLIN HFA) 108 (90 Base) MCG/ACT inhaler Inhale 1-2 puffs into the lungs every 4 hours as needed for shortness of breath, wheezing or cough, Disp-18 g, R-0, E-PrescribePharmacy may dispense brand covered by insurance (Proair, or proventil or  ventolin or generic albuterol inhaler)      clobetasol propionate (CLOBEX) 0.05 % external shampoo Leave on scalp for 15 minutes then rinse. Use 1-2 times weekly.Disp-118 mL, [id number]      Fluocinolone Acetonide Scalp 0.01 % OIL oil Daily as needed for scalp psoriasisDisp-118.28 mL, [id number]      fluticasone (FLONASE) 50 MCG/ACT nasal spray Spray 2 sprays into both nostrils daily, Disp-16 g, R-0, E-Prescribe      Multiple Vitamins-Minerals (MULTIVITAMIN WOMEN PO) Take 2 chew tab by mouth daily gummy, Historical      nicotine (NICODERM CQ) 21 MG/24HR 24 hr patch Place 1 patch onto the skin every 24 hours, Historical      nicotine polacrilex (NICORETTE) 4 MG gum Place 4 mg inside cheek every hour as needed for smoking cessation, Historical      nystatin (MYCOSTATIN) 922377 UNIT/GM external cream Apply topically 2 times dailyDisp-30 g, [id number]      rizatriptan (MAXALT) 10 MG tablet Take 1 tablet (10 mg) by mouth at onset of headache for migraine May repeat in 2 hours. Max 3 tablets/24 hours., Disp-30 tablet, R-1, E-Prescribe      vitamin D3 (CHOLECALCIFEROL) 1.25 MG (41174 UT) capsule Take 1 capsule (50,000 Units) by mouth every 7 days, Disp-12 capsule, R-1, E-Prescribe      bisacodyl (DULCOLAX) 5 MG EC tablet 2 days prior to procedure, take 2 tablets at 4 pm. 1 day prior to procedure, take 2 tablets at 4 pm. For additional instructions refer to your colonoscopy prep instructions., Disp-4 tablet, R-0, E-Prescribe      polyethylene glycol (GOLYTELY) 236 g suspension 2 days prior at 5pm, mix and drink half of a jug of Golytely. Drink an 8 oz. glass of Golytely every 15 minutes until half of the jug is gone. Place remainder of Golytely in the refrigerator. 1 day prior at 5 pm, drink the 2nd half of a jug of Golytely b owel prep. 6 hours before your check-in time, drink an 8 oz. glass of Golytely every 15 minutes until half of the 2nd jug of Golytely is gone. Discard remainder of second jug., Disp-8749  mL, R-0, E-Prescribe           Allergies   Allergies   Allergen Reactions    Azithromycin Anaphylaxis    Latex Hives    Oxycodone Nausea and Vomiting

## 2023-10-31 NOTE — TELEPHONE ENCOUNTER
Returned call to patient and assisted with getting patient in with same-day provider for hospital f/u tomorrow.  Offered appt with PCP in the morning, but patient could not get a ride until afternoon.   Patient does report improved status today and feeling a bit better overall, took her last dose of Cipro this morning and was able to keep down some breakfast food.   She reports normal O2 and BP.   RN discussed guidelines for BP/symptoms that would warrant return to ER.   Advised on bland diet, hydration/electrolytes in the meantime and call back as needed for worsening or new symptoms.  Understanding voiced.    Kalpana Bowers RN  [facility]

## 2023-11-01 ENCOUNTER — OFFICE VISIT (OUTPATIENT)
Dept: FAMILY MEDICINE | Facility: CLINIC | Age: 48
End: 2023-11-01
Payer: COMMERCIAL

## 2023-11-01 ENCOUNTER — ANCILLARY PROCEDURE (OUTPATIENT)
Dept: GENERAL RADIOLOGY | Facility: CLINIC | Age: 48
End: 2023-11-01
Attending: INTERNAL MEDICINE
Payer: COMMERCIAL

## 2023-11-01 VITALS
TEMPERATURE: 98 F | WEIGHT: 245 LBS | SYSTOLIC BLOOD PRESSURE: 132 MMHG | BODY MASS INDEX: 41.83 KG/M2 | HEART RATE: 83 BPM | DIASTOLIC BLOOD PRESSURE: 86 MMHG | HEIGHT: 64 IN | RESPIRATION RATE: 20 BRPM | OXYGEN SATURATION: 97 %

## 2023-11-01 DIAGNOSIS — J96.01 SEPSIS DUE TO ESCHERICHIA COLI WITH ACUTE HYPOXIC RESPIRATORY FAILURE AND SEPTIC SHOCK (H): ICD-10-CM

## 2023-11-01 DIAGNOSIS — R65.21 SEPSIS DUE TO ESCHERICHIA COLI WITH ACUTE HYPOXIC RESPIRATORY FAILURE AND SEPTIC SHOCK (H): Primary | ICD-10-CM

## 2023-11-01 DIAGNOSIS — J96.01 SEPSIS DUE TO ESCHERICHIA COLI WITH ACUTE HYPOXIC RESPIRATORY FAILURE AND SEPTIC SHOCK (H): Primary | ICD-10-CM

## 2023-11-01 DIAGNOSIS — A41.51 SEPSIS DUE TO ESCHERICHIA COLI WITH ACUTE HYPOXIC RESPIRATORY FAILURE AND SEPTIC SHOCK (H): Primary | ICD-10-CM

## 2023-11-01 DIAGNOSIS — G47.34 NOCTURNAL HYPOXEMIA: ICD-10-CM

## 2023-11-01 DIAGNOSIS — R65.21 SEPSIS DUE TO ESCHERICHIA COLI WITH ACUTE HYPOXIC RESPIRATORY FAILURE AND SEPTIC SHOCK (H): ICD-10-CM

## 2023-11-01 DIAGNOSIS — A41.51 SEPSIS DUE TO ESCHERICHIA COLI WITH ACUTE HYPOXIC RESPIRATORY FAILURE AND SEPTIC SHOCK (H): ICD-10-CM

## 2023-11-01 PROBLEM — A41.9 SEPSIS (H): Status: RESOLVED | Noted: 2023-10-23 | Resolved: 2023-11-01

## 2023-11-01 LAB
ALBUMIN SERPL BCG-MCNC: 4 G/DL (ref 3.5–5.2)
ALP SERPL-CCNC: 222 U/L (ref 35–104)
ALT SERPL W P-5'-P-CCNC: 34 U/L (ref 0–50)
ANION GAP SERPL CALCULATED.3IONS-SCNC: 12 MMOL/L (ref 7–15)
AST SERPL W P-5'-P-CCNC: 26 U/L (ref 0–45)
BASOPHILS # BLD AUTO: 0 10E3/UL (ref 0–0.2)
BASOPHILS NFR BLD AUTO: 0 %
BILIRUB SERPL-MCNC: 0.3 MG/DL
BUN SERPL-MCNC: 9.8 MG/DL (ref 6–20)
CALCIUM SERPL-MCNC: 9.5 MG/DL (ref 8.6–10)
CHLORIDE SERPL-SCNC: 103 MMOL/L (ref 98–107)
CREAT SERPL-MCNC: 0.7 MG/DL (ref 0.51–0.95)
CRP SERPL-MCNC: 15.64 MG/L
DEPRECATED HCO3 PLAS-SCNC: 23 MMOL/L (ref 22–29)
EGFRCR SERPLBLD CKD-EPI 2021: >90 ML/MIN/1.73M2
EOSINOPHIL # BLD AUTO: 0.2 10E3/UL (ref 0–0.7)
EOSINOPHIL NFR BLD AUTO: 2 %
ERYTHROCYTE [DISTWIDTH] IN BLOOD BY AUTOMATED COUNT: 12.6 % (ref 10–15)
GLUCOSE SERPL-MCNC: 97 MG/DL (ref 70–99)
HCT VFR BLD AUTO: 40.7 % (ref 35–47)
HGB BLD-MCNC: 12.6 G/DL (ref 11.7–15.7)
IMM GRANULOCYTES # BLD: 0.1 10E3/UL
IMM GRANULOCYTES NFR BLD: 1 %
LYMPHOCYTES # BLD AUTO: 2.9 10E3/UL (ref 0.8–5.3)
LYMPHOCYTES NFR BLD AUTO: 24 %
MCH RBC QN AUTO: 30 PG (ref 26.5–33)
MCHC RBC AUTO-ENTMCNC: 31 G/DL (ref 31.5–36.5)
MCV RBC AUTO: 97 FL (ref 78–100)
MONOCYTES # BLD AUTO: 0.7 10E3/UL (ref 0–1.3)
MONOCYTES NFR BLD AUTO: 5 %
NEUTROPHILS # BLD AUTO: 8.2 10E3/UL (ref 1.6–8.3)
NEUTROPHILS NFR BLD AUTO: 68 %
PLATELET # BLD AUTO: 258 10E3/UL (ref 150–450)
POTASSIUM SERPL-SCNC: 4.3 MMOL/L (ref 3.4–5.3)
PROT SERPL-MCNC: 7.4 G/DL (ref 6.4–8.3)
RBC # BLD AUTO: 4.2 10E6/UL (ref 3.8–5.2)
SODIUM SERPL-SCNC: 138 MMOL/L (ref 135–145)
WBC # BLD AUTO: 12.1 10E3/UL (ref 4–11)

## 2023-11-01 PROCEDURE — 86140 C-REACTIVE PROTEIN: CPT | Performed by: INTERNAL MEDICINE

## 2023-11-01 PROCEDURE — 85025 COMPLETE CBC W/AUTO DIFF WBC: CPT | Performed by: INTERNAL MEDICINE

## 2023-11-01 PROCEDURE — 36415 COLL VENOUS BLD VENIPUNCTURE: CPT | Performed by: INTERNAL MEDICINE

## 2023-11-01 PROCEDURE — 80053 COMPREHEN METABOLIC PANEL: CPT | Performed by: INTERNAL MEDICINE

## 2023-11-01 PROCEDURE — 99495 TRANSJ CARE MGMT MOD F2F 14D: CPT | Performed by: INTERNAL MEDICINE

## 2023-11-01 PROCEDURE — 71046 X-RAY EXAM CHEST 2 VIEWS: CPT | Mod: TC | Performed by: RADIOLOGY

## 2023-11-01 ASSESSMENT — PAIN SCALES - GENERAL: PAINLEVEL: MILD PAIN (2)

## 2023-11-01 NOTE — PROGRESS NOTES
Assessment & Plan     Sepsis due to Escherichia coli with acute hypoxic respiratory failure and septic shock (H)  Still having vague L shoulder pain, fatigue, shortness of breath.  Given incomplete resolution of symptoms, will give another antibiotic course.  Recheck labs and xray;  unclear if ventral hernia is true source?  She wants 2nd opinion about repairing the hernia; didn't fit well w/surgeon she saw before.  She will follow-up with Dr. Wyatt to discuss in more detail.  May need to undertake another work-up for source if symptoms persist  - Comprehensive metabolic panel (BMP + Alb, Alk Phos, ALT, AST, Total. Bili, TP); Future  - CBC with platelets and differential; Future  - XR Chest 2 Views; Future  - CRP, inflammation; Future  - amoxicillin-clavulanate (AUGMENTIN) 875-125 MG tablet; Take 1 tablet by mouth 2 times daily for 7 days  - Comprehensive metabolic panel (BMP + Alb, Alk Phos, ALT, AST, Total. Bili, TP)  - CBC with platelets and differential  - CRP, inflammation    Nocturnal hypoxemia  Improving daytime o2.  Probably has SHWETA, referral to sleep  - Adult Sleep Eval & Management  Referral; Future    Patient Instructions   Hernia  Recommend to follow-up with Dr. Wyatt for next step in the hernia repair - she can refer you to a group other than [facility] hernia    E Coli Sepsis  Blood work and chest xray today  Follow-up with Di next week if you are still not feeling good    Rayne Jackson, [facility]    Mao Bertrand is a 48 year old, presenting for the following health issues:  Hospital F/U        11/1/2023     3:17 PM   Additional Questions   Roomed by Alyse STOCKTON   Accompanied by Nancy daughter         11/1/2023     3:17 PM   Patient Reported Additional Medications   Patient reports taking the following new medications Senna     Discuss Cipro, thinking it could be a possible allergy (feeling like oxygen has been better since stopping antibiotics).   Denied  "vaccines, does have colonoscopy scheduled.   Does not a return to work note.     [facility]       Hospital Follow-up Visit:    Hospital/Nursing Home/IP Rehab Facility: [facility]  Date of Admission: 10/23/23  Date of Discharge: 10/27/23  Reason(s) for Admission: E. Coli bacteremia   Sepsis without shock  Leukocytosis, acute on chronic  Acute hypoxic respiratory failure  Pleural effusions  Normocytic anemia  Hepatitis, likely related to sepsis   Seronegative rheumatoid arthritis  Psoriasis   Fibromyalgia  Mild intermittent asthma without complication   Hypokalemia, resolved  Migraine headache disorder  Tobacco use disorder  Morbid obesity       Was your hospitalization related to COVID-19? No   Problems taking medications regularly:  None  Medication changes since discharge: started gabapentin (hard time remebering 3rd dose) completed cipro, was taking senna in hosp and started that back up again. Pt is having some dizziness, feeling out of it, very fatigued/ feeling drained, and SOB. Shoulder and chest are still hurting on the left side.   Discuss nicorette gum, has not smoked in 10 days and feels as if cravings are getting stronger. Discuss when returning to work.   Problems adhering to non-medication therapy:  None    Summary of hospitalization:  [facility] discharge summary reviewed  Diagnostic Tests/Treatments reviewed.  Follow up needed: gen surg  Other Healthcare Providers Involved in Patient s Care:         Care Coordination  Update since discharge: fluctuating course.         Plan of care communicated with patient           \"Elza says she feels better than when she first went in. Still having low grade fevers, 99.2-100.2 but mostly in the 99 range. She feels foggy and dizzy sometimes. Is drinking adequately. Has left shoulder pain similar to after having surgery from gas pains, described as aching. She is having a couple of \"mushy\" bowel movements daily. She feels more " "fatigued by 3 pm and nauseated by dinner. She is using home oxygen at 1.5 LPM at night and with exertion. She does drop into the 80s with exertion on room air otherwise. \"    E coli sepsis  -- Long search for source was unclear, thought that likely source of his GI with translocation possibly secondary to chronic hernia versus previous small intestinal anastomosis  --She was discharged with ciprofloxacin 500 twice daily for total duration of 7 days  --still having low grade fevers, feeling fatigued,dizzy, unwell  --wonders if cipro is causing all the symptoms of fatigue, low oxygen, high blood pressure; last dose of cipro was yesterday AM and blood pressure and O2 are back in the normal range  --wonders if she needs another round of antibiotics    Acute hypoxic respiratory failure  Pleural effusion  --Thought to have fluid shifts secondary to critical illness  --She was discharged on supplemental oxygen 1-3 L to use with exertion.  Recommended for smoking cessation  --Recommended outpatient sleep study and PFT  --still having ongoing shortness of breath, although slowly improving  --has quit smoking;  has conrad call Smoke Free    Elevated LFT  -- Thought due to sepsis.  Extensive work-up is negative.  Needs recheck    Blood pressure  --she reports blood pressure was running very high at home, 160s/100s;    Chest and shoulder pain  --ongoing left shoulder pain  --'feels like when I've had abdominal surgery and they pump you full of gas' - aching, constant pain    Abdominal hernia:  --she has been seen by hernia clinic at [facility] - Dr De Luna and he do not want to do surgery because her BMI is too elevated and she is a smoker; she did not feel this doc was a good fit for her; she felt he was rude  --wants to know how can fix her hernia so she doesn't get E coli sepsis again    Ultrasound 10/23  IMPRESSION:    1. Mild intrahepatic biliary ductal dilatation without extrahepatic ductal dilatation and may be due to reservoir " effect from cholecystectomy though is indeterminate.       Current Outpatient Medications   Medication Sig Dispense Refill    albuterol (ACCUNEB) 1.25 MG/3ML neb solution Take 1 vial (1.25 mg) by nebulization every 6 hours as needed for shortness of breath / dyspnea or wheezing 90 mL 0    albuterol (PROAIR HFA/PROVENTIL HFA/VENTOLIN HFA) 108 (90 Base) MCG/ACT inhaler Inhale 1-2 puffs into the lungs every 4 hours as needed for shortness of breath, wheezing or cough 18 g 0    clobetasol propionate (CLOBEX) 0.05 % external shampoo Leave on scalp for 15 minutes then rinse. Use 1-2 times weekly. 118 mL 5    Fluocinolone Acetonide Scalp 0.01 % OIL oil Daily as needed for scalp psoriasis 118.28 mL 0    gabapentin (NEURONTIN) 100 MG capsule Take 1 capsule (100 mg) by mouth 3 times daily 90 capsule 0    guaiFENesin (MUCINEX) 600 MG 12 hr tablet Take 2 tablets (1,200 mg) by mouth 2 times daily for 10 days 40 tablet 0    ibuprofen (ADVIL/MOTRIN) 600 MG tablet Take 1 tablet (600 mg) by mouth every 6 hours as needed for moderate pain 30 tablet 0    Multiple Vitamins-Minerals (MULTIVITAMIN WOMEN PO) Take 2 chew tab by mouth daily gummy      nicotine (NICODERM CQ) 21 MG/24HR 24 hr patch Place 1 patch onto the skin every 24 hours      nicotine polacrilex (NICORETTE) 4 MG gum Place 4 mg inside cheek every hour as needed for smoking cessation      nystatin (MYCOSTATIN) 372689 UNIT/GM external cream Apply topically 2 times daily 30 g 1    rizatriptan (MAXALT) 10 MG tablet Take 1 tablet (10 mg) by mouth at onset of headache for migraine May repeat in 2 hours. Max 3 tablets/24 hours. 30 tablet 1    vitamin D3 (CHOLECALCIFEROL) 1.25 MG (84650 UT) capsule Take 1 capsule (50,000 Units) by mouth every 7 days 12 capsule 1    fluticasone (FLONASE) 50 MCG/ACT nasal spray Spray 2 sprays into both nostrils daily (Patient not taking: Reported on 11/1/2023) 16 g 0         Review of Systems   Constitutional, HEENT, cardiovascular, pulmonary, GI,  ", musculoskeletal, neuro, skin, endocrine and psych systems are negative, except as otherwise noted.      Objective    /86   Pulse 83   Temp 98  F (36.7  C) (Tympanic)   Resp 20   Ht 1.626 m (5' 4\")   Wt 111.1 kg (245 lb)   LMP 08/26/2005   SpO2 97%   BMI 42.05 kg/m    Body mass index is 42.05 kg/m .  Physical Exam   GENERAL APPEARANCE: alert, no distress, fatigued, and non-toxic  RESP: slight decrease bilateral breath sounds, lungs are clear  CV: regular rates and rhythm, normal S1 S2, no S3 or S4, and no murmur, click or rub  ABDOMEN: soft, large ventral hernia, firm, reducible, mildly tender                    "

## 2023-11-01 NOTE — PATIENT INSTRUCTIONS
Hernia  Recommend to follow-up with Dr. Wyatt for next step in the hernia repair - she can refer you to a group other than [facility] hernia    E Coli Sepsis  Blood work and chest xray today  Follow-up with iD next week if you are still not feeling good

## 2023-11-01 NOTE — LETTER
November 1, 2023      Elza Greer  [address] [place] [id number]        To Whom It May Concern:    Elza Greer was seen in our clinic. She may return to work 11/6/2023 without restrictions.      Sincerely,        Rayne Jackson, DO

## 2023-11-02 NOTE — RESULT ENCOUNTER NOTE
The inflammatory marker is much improved.  Kidney function, electrolytes are also improved.  All the liver enzymes have returned to normal except the alkaline phosphatase which is slightly elevated.  The white blood cell count is mildly elevated, but similar to baseline values.  Because the blood work is looking better, perhaps lets hold off on the new antibiotic and see how things improve over the next few days.    Please call patient to communicate

## 2023-11-10 ENCOUNTER — MYC MEDICAL ADVICE (OUTPATIENT)
Dept: FAMILY MEDICINE | Facility: CLINIC | Age: 48
End: 2023-11-10

## 2023-11-10 NOTE — TELEPHONE ENCOUNTER
Reached out to patient and made appointment for November 22, 2023 at 1:30pm with Dr. Shea.    Ching VAZQUEZ RN

## 2023-11-22 ENCOUNTER — OFFICE VISIT (OUTPATIENT)
Dept: FAMILY MEDICINE | Facility: CLINIC | Age: 48
End: 2023-11-22
Payer: COMMERCIAL

## 2023-11-22 ENCOUNTER — ANCILLARY PROCEDURE (OUTPATIENT)
Dept: GENERAL RADIOLOGY | Facility: CLINIC | Age: 48
End: 2023-11-22
Attending: NURSE PRACTITIONER
Payer: COMMERCIAL

## 2023-11-22 VITALS
WEIGHT: 257.6 LBS | RESPIRATION RATE: 18 BRPM | BODY MASS INDEX: 44.22 KG/M2 | OXYGEN SATURATION: 97 % | DIASTOLIC BLOOD PRESSURE: 88 MMHG | TEMPERATURE: 97.9 F | SYSTOLIC BLOOD PRESSURE: 146 MMHG | HEART RATE: 97 BPM

## 2023-11-22 DIAGNOSIS — R03.0 ELEVATED BP WITHOUT DIAGNOSIS OF HYPERTENSION: ICD-10-CM

## 2023-11-22 DIAGNOSIS — E66.01 MORBID OBESITY (H): ICD-10-CM

## 2023-11-22 DIAGNOSIS — Z87.01 HISTORY OF PNEUMONIA: Primary | ICD-10-CM

## 2023-11-22 DIAGNOSIS — Z12.11 SCREEN FOR COLON CANCER: ICD-10-CM

## 2023-11-22 DIAGNOSIS — K43.9 VENTRAL HERNIA WITHOUT OBSTRUCTION OR GANGRENE: ICD-10-CM

## 2023-11-22 DIAGNOSIS — Z87.01 HISTORY OF PNEUMONIA: ICD-10-CM

## 2023-11-22 LAB
BASOPHILS # BLD AUTO: 0 10E3/UL (ref 0–0.2)
BASOPHILS NFR BLD AUTO: 0 %
CRP SERPL-MCNC: 20.6 MG/L
EOSINOPHIL # BLD AUTO: 0.2 10E3/UL (ref 0–0.7)
EOSINOPHIL NFR BLD AUTO: 2 %
ERYTHROCYTE [DISTWIDTH] IN BLOOD BY AUTOMATED COUNT: 13 % (ref 10–15)
ERYTHROCYTE [SEDIMENTATION RATE] IN BLOOD BY WESTERGREN METHOD: 38 MM/HR (ref 0–20)
HCT VFR BLD AUTO: 36.6 % (ref 35–47)
HGB BLD-MCNC: 11.7 G/DL (ref 11.7–15.7)
IMM GRANULOCYTES # BLD: 0 10E3/UL
IMM GRANULOCYTES NFR BLD: 0 %
LYMPHOCYTES # BLD AUTO: 2.4 10E3/UL (ref 0.8–5.3)
LYMPHOCYTES NFR BLD AUTO: 24 %
MCH RBC QN AUTO: 30.5 PG (ref 26.5–33)
MCHC RBC AUTO-ENTMCNC: 32 G/DL (ref 31.5–36.5)
MCV RBC AUTO: 96 FL (ref 78–100)
MONOCYTES # BLD AUTO: 0.6 10E3/UL (ref 0–1.3)
MONOCYTES NFR BLD AUTO: 6 %
NEUTROPHILS # BLD AUTO: 6.8 10E3/UL (ref 1.6–8.3)
NEUTROPHILS NFR BLD AUTO: 68 %
PLATELET # BLD AUTO: 186 10E3/UL (ref 150–450)
RBC # BLD AUTO: 3.83 10E6/UL (ref 3.8–5.2)
WBC # BLD AUTO: 10.1 10E3/UL (ref 4–11)

## 2023-11-22 PROCEDURE — 85652 RBC SED RATE AUTOMATED: CPT | Performed by: NURSE PRACTITIONER

## 2023-11-22 PROCEDURE — 71046 X-RAY EXAM CHEST 2 VIEWS: CPT | Mod: TC | Performed by: RADIOLOGY

## 2023-11-22 PROCEDURE — 86140 C-REACTIVE PROTEIN: CPT | Performed by: NURSE PRACTITIONER

## 2023-11-22 PROCEDURE — 99214 OFFICE O/P EST MOD 30 MIN: CPT | Performed by: NURSE PRACTITIONER

## 2023-11-22 PROCEDURE — 85025 COMPLETE CBC W/AUTO DIFF WBC: CPT | Performed by: NURSE PRACTITIONER

## 2023-11-22 PROCEDURE — 36415 COLL VENOUS BLD VENIPUNCTURE: CPT | Performed by: NURSE PRACTITIONER

## 2023-11-22 ASSESSMENT — PAIN SCALES - GENERAL: PAINLEVEL: NO PAIN (0)

## 2023-11-22 NOTE — H&P (VIEW-ONLY)
"  Assessment & Plan     History of pneumonia  -resolved, patient is feeling better, recommended to continue to use intensive spirometer or deep breathing exercises. She will also continue to avoid tobacco   -patient may stop using supplemental Oxygen   - XR Chest 2 Views; Future  - CRP, inflammation; Future  - ESR: Erythrocyte sedimentation rate; Future  - CBC with platelets and differential; Future  - CRP, inflammation  - ESR: Erythrocyte sedimentation rate  - CBC with platelets and differential    Screen for colon cancer    - Colonoscopy Screening  Referral; Future    Ventral hernia without obstruction or gangrene  -patient would like to schedule with Dr Wyatt for second opinion about possible repair   - Adult General Surg Referral; Future    Elevated BP without diagnosis of hypertension  -recommended to monitor BP at home, Elza will send me home BP readings in 1-2 weeks     Morbid obesity (H)  -advised to continue to work on weight loss  -will try Contrave, if not covered will switch to Naltrexone 25 mg plus Wellbutrin 75 mg daily and than increase to twice daily if no side effects   - naltrexone-bupropion (CONTRAVE) 8-90 MG per 12 hr tablet; One tablet daily for 7 days than increase to 1 tablet twice daily and continue         BMI:   Estimated body mass index is 44.22 kg/m  as calculated from the following:    Height as of 11/1/23: 1.626 m (5' 4\").    Weight as of this encounter: 116.8 kg (257 lb 9.6 oz).   Weight management plan: Discussed healthy diet and exercise guidelines    See Patient Instructions    NASH Huston CNP  [facility]    Mao Bertrand is a 48 year old, presenting for the following health issues:  RECHECK (Follow up from office visit from 11/1. States she is feeling better, still has SOB and fatigue. Has not had to used oxygen for two weeks. Would like xray and labs done today. )        11/22/2023     1:15 PM   Additional Questions   Roomed by " faibano   Accompanied by self         11/22/2023     1:15 PM   Patient Reported Additional Medications   Patient reports taking the following new medications none     Chief Complaint   Patient presents with    RECHECK     Follow up from office visit from 11/1. States she is feeling better, still has SOB and fatigue. Has not had to used oxygen for two weeks. Would like xray and labs done today.          Review of Systems   Constitutional, HEENT, cardiovascular, pulmonary, gi and gu systems are negative, except as otherwise noted.      Objective    BP (!) 146/88   Pulse 97   Temp 97.9  F (36.6  C) (Tympanic)   Resp 18   Wt 116.8 kg (257 lb 9.6 oz)   LMP 08/26/2005   SpO2 97%   BMI 44.22 kg/m    Body mass index is 44.22 kg/m .  Physical Exam   GENERAL: healthy, alert and no distress  EYES: Eyes grossly normal to inspection, PERRL and conjunctivae and sclerae normal  RESP: lungs clear to auscultation - no rales, rhonchi or wheezes  CV: regular rate and rhythm, normal S1 S2, no S3 or S4, no murmur, click or rub, no peripheral edema and peripheral pulses strong  ABDOMEN: soft, nontender and hernia ventral midline   SKIN: no suspicious lesions or rashes  PSYCH: mentation appears normal, affect normal/bright    Results for orders placed or performed in visit on 11/22/23   XR Chest 2 Views     Status: None    Narrative    XR CHEST 2 VIEWS 11/22/2023 2:07 PM    HISTORY: History of pneumonia    COMPARISON: 11/1/2023    FINDINGS/    Impression    IMPRESSION: No airspace consolidation, pneumothorax, or pleural fluid.  Heart size is borderline enlarged. Pulmonary vasculature is normal  appearing. Surgical clips are seen in the upper abdomen. No acute  osseous abnormality.    ALDAIR ORDONEZ MD         SYSTEM ID:  [id number]   Results for orders placed or performed in visit on 11/22/23   CRP, inflammation     Status: Abnormal   Result Value Ref Range    CRP Inflammation 20.60 (H) <5.00 mg/L   ESR: Erythrocyte sedimentation  rate     Status: Abnormal   Result Value Ref Range    Erythrocyte Sedimentation Rate 38 (H) 0 - 20 mm/hr   CBC with platelets and differential     Status: None   Result Value Ref Range    WBC Count 10.1 4.0 - 11.0 10e3/uL    RBC Count 3.83 3.80 - 5.20 10e6/uL    Hemoglobin 11.7 11.7 - 15.7 g/dL    Hematocrit 36.6 35.0 - 47.0 %    MCV 96 78 - 100 fL    MCH 30.5 26.5 - 33.0 pg    MCHC 32.0 31.5 - 36.5 g/dL    RDW 13.0 10.0 - 15.0 %    Platelet Count 186 150 - 450 10e3/uL    % Neutrophils 68 %    % Lymphocytes 24 %    % Monocytes 6 %    % Eosinophils 2 %    % Basophils 0 %    % Immature Granulocytes 0 %    Absolute Neutrophils 6.8 1.6 - 8.3 10e3/uL    Absolute Lymphocytes 2.4 0.8 - 5.3 10e3/uL    Absolute Monocytes 0.6 0.0 - 1.3 10e3/uL    Absolute Eosinophils 0.2 0.0 - 0.7 10e3/uL    Absolute Basophils 0.0 0.0 - 0.2 10e3/uL    Absolute Immature Granulocytes 0.0 <=0.4 10e3/uL   CBC with platelets and differential     Status: None    Narrative    The following orders were created for panel order CBC with platelets and differential.  Procedure                               Abnormality         Status                     ---------                               -----------         ------                     CBC with platelets and d...[[id number]]                      Final result                 Please view results for these tests on the individual orders.

## 2023-11-22 NOTE — PROGRESS NOTES
"  Assessment & Plan     History of pneumonia  -resolved, patient is feeling better, recommended to continue to use intensive spirometer or deep breathing exercises. She will also continue to avoid tobacco   -patient may stop using supplemental Oxygen   - XR Chest 2 Views; Future  - CRP, inflammation; Future  - ESR: Erythrocyte sedimentation rate; Future  - CBC with platelets and differential; Future  - CRP, inflammation  - ESR: Erythrocyte sedimentation rate  - CBC with platelets and differential    Screen for colon cancer    - Colonoscopy Screening  Referral; Future    Ventral hernia without obstruction or gangrene  -patient would like to schedule with Dr Wyatt for second opinion about possible repair   - Adult General Surg Referral; Future    Elevated BP without diagnosis of hypertension  -recommended to monitor BP at home, Elza will send me home BP readings in 1-2 weeks     Morbid obesity (H)  -advised to continue to work on weight loss  -will try Contrave, if not covered will switch to Naltrexone 25 mg plus Wellbutrin 75 mg daily and than increase to twice daily if no side effects   - naltrexone-bupropion (CONTRAVE) 8-90 MG per 12 hr tablet; One tablet daily for 7 days than increase to 1 tablet twice daily and continue         BMI:   Estimated body mass index is 44.22 kg/m  as calculated from the following:    Height as of 11/1/23: 1.626 m (5' 4\").    Weight as of this encounter: 116.8 kg (257 lb 9.6 oz).   Weight management plan: Discussed healthy diet and exercise guidelines    See Patient Instructions    NASH Huston CNP  [facility]    Mao Bertrand is a 48 year old, presenting for the following health issues:  RECHECK (Follow up from office visit from 11/1. States she is feeling better, still has SOB and fatigue. Has not had to used oxygen for two weeks. Would like xray and labs done today. )        11/22/2023     1:15 PM   Additional Questions   Roomed by " fabiano   Accompanied by self         11/22/2023     1:15 PM   Patient Reported Additional Medications   Patient reports taking the following new medications none     Chief Complaint   Patient presents with    RECHECK     Follow up from office visit from 11/1. States she is feeling better, still has SOB and fatigue. Has not had to used oxygen for two weeks. Would like xray and labs done today.          Review of Systems   Constitutional, HEENT, cardiovascular, pulmonary, gi and gu systems are negative, except as otherwise noted.      Objective    BP (!) 146/88   Pulse 97   Temp 97.9  F (36.6  C) (Tympanic)   Resp 18   Wt 116.8 kg (257 lb 9.6 oz)   LMP 08/26/2005   SpO2 97%   BMI 44.22 kg/m    Body mass index is 44.22 kg/m .  Physical Exam   GENERAL: healthy, alert and no distress  EYES: Eyes grossly normal to inspection, PERRL and conjunctivae and sclerae normal  RESP: lungs clear to auscultation - no rales, rhonchi or wheezes  CV: regular rate and rhythm, normal S1 S2, no S3 or S4, no murmur, click or rub, no peripheral edema and peripheral pulses strong  ABDOMEN: soft, nontender and hernia ventral midline   SKIN: no suspicious lesions or rashes  PSYCH: mentation appears normal, affect normal/bright    Results for orders placed or performed in visit on 11/22/23   XR Chest 2 Views     Status: None    Narrative    XR CHEST 2 VIEWS 11/22/2023 2:07 PM    HISTORY: History of pneumonia    COMPARISON: 11/1/2023    FINDINGS/    Impression    IMPRESSION: No airspace consolidation, pneumothorax, or pleural fluid.  Heart size is borderline enlarged. Pulmonary vasculature is normal  appearing. Surgical clips are seen in the upper abdomen. No acute  osseous abnormality.    ALDAIR ORDONEZ MD         SYSTEM ID:  [id number]   Results for orders placed or performed in visit on 11/22/23   CRP, inflammation     Status: Abnormal   Result Value Ref Range    CRP Inflammation 20.60 (H) <5.00 mg/L   ESR: Erythrocyte sedimentation  rate     Status: Abnormal   Result Value Ref Range    Erythrocyte Sedimentation Rate 38 (H) 0 - 20 mm/hr   CBC with platelets and differential     Status: None   Result Value Ref Range    WBC Count 10.1 4.0 - 11.0 10e3/uL    RBC Count 3.83 3.80 - 5.20 10e6/uL    Hemoglobin 11.7 11.7 - 15.7 g/dL    Hematocrit 36.6 35.0 - 47.0 %    MCV 96 78 - 100 fL    MCH 30.5 26.5 - 33.0 pg    MCHC 32.0 31.5 - 36.5 g/dL    RDW 13.0 10.0 - 15.0 %    Platelet Count 186 150 - 450 10e3/uL    % Neutrophils 68 %    % Lymphocytes 24 %    % Monocytes 6 %    % Eosinophils 2 %    % Basophils 0 %    % Immature Granulocytes 0 %    Absolute Neutrophils 6.8 1.6 - 8.3 10e3/uL    Absolute Lymphocytes 2.4 0.8 - 5.3 10e3/uL    Absolute Monocytes 0.6 0.0 - 1.3 10e3/uL    Absolute Eosinophils 0.2 0.0 - 0.7 10e3/uL    Absolute Basophils 0.0 0.0 - 0.2 10e3/uL    Absolute Immature Granulocytes 0.0 <=0.4 10e3/uL   CBC with platelets and differential     Status: None    Narrative    The following orders were created for panel order CBC with platelets and differential.  Procedure                               Abnormality         Status                     ---------                               -----------         ------                     CBC with platelets and d...[[id number]]                      Final result                 Please view results for these tests on the individual orders.

## 2023-11-22 NOTE — LETTER
November 22, 2023      Elza Greer  [address] [facility] [address]        To Whom It May Concern:    Elza Greer was seen in our clinic. She may discontinue supplemental Oxygen.   Sincerely,        NASH Huston CNP

## 2023-11-24 ENCOUNTER — TELEPHONE (OUTPATIENT)
Dept: FAMILY MEDICINE | Facility: CLINIC | Age: 48
End: 2023-11-24
Payer: COMMERCIAL

## 2023-11-24 DIAGNOSIS — E66.01 MORBID OBESITY (H): Primary | ICD-10-CM

## 2023-11-24 NOTE — TELEPHONE ENCOUNTER
Central Prior Authorization Team   Phone: [phone]        PRIOR AUTHORIZATION DENIED    Medication: NALTREXONE-BUPROPION HCL ER 8-90 MG PO TB12  Insurance Company: "[employer] [place] - Phone [phone] Fax [phone]  Denial Date: 11/23/2023  Denial Rational: DRUGS USED FOR WEIGHT LOSS ARE EXCLUDED UNDER THE PHARMACY BENEFIT.        Appeal Information:         Patient Notified: NO. Please note: Providers/Clinics are to notify the patients of the denial outcomes and steps going forward.

## 2023-11-26 NOTE — TELEPHONE ENCOUNTER
MONET Rhoades for naltrexone-bupropion hcl has been denied.    Denial Rational: DRUGS USED FOR WEIGHT LOSS ARE EXCLUDED UNDER THE PHARMACY BENEFIT.      Viji Padron

## 2023-11-27 RX ORDER — BISACODYL 5 MG/1
TABLET, DELAYED RELEASE ORAL
Qty: 4 TABLET | Refills: 0 | Status: SHIPPED | OUTPATIENT
Start: 2023-11-27 | End: 2023-12-15

## 2023-11-27 RX ORDER — BUPROPION HYDROCHLORIDE 75 MG/1
TABLET ORAL
Qty: 60 TABLET | Refills: 2 | Status: SHIPPED | OUTPATIENT
Start: 2023-11-27 | End: 2024-04-19

## 2023-11-27 RX ORDER — NALTREXONE HYDROCHLORIDE 50 MG/1
TABLET, FILM COATED ORAL
Qty: 30 TABLET | Refills: 2 | Status: SHIPPED | OUTPATIENT
Start: 2023-11-27 | End: 2024-07-10

## 2023-11-27 NOTE — TELEPHONE ENCOUNTER
Patient returned called and was informed of alternatives: Naltrexone and Bupropion have been ordered.    Patient verbalized understanding and has no other questions or concerns at this time.     Ching Hogan RN on 11/27/2023 at 10:53 AM

## 2023-11-27 NOTE — TELEPHONE ENCOUNTER
Left message for patient to return call to clinic and ask to speak with RN.     Yumiko Wilson, RN

## 2023-11-29 ENCOUNTER — MYC MEDICAL ADVICE (OUTPATIENT)
Dept: ADMISSION | Facility: CLINIC | Age: 48
End: 2023-11-29

## 2023-11-29 ENCOUNTER — ANESTHESIA EVENT (OUTPATIENT)
Dept: GASTROENTEROLOGY | Facility: CLINIC | Age: 48
End: 2023-11-29
Payer: COMMERCIAL

## 2023-11-29 ENCOUNTER — OFFICE VISIT (OUTPATIENT)
Dept: SURGERY | Facility: CLINIC | Age: 48
End: 2023-11-29
Payer: COMMERCIAL

## 2023-11-29 VITALS
DIASTOLIC BLOOD PRESSURE: 93 MMHG | HEART RATE: 91 BPM | SYSTOLIC BLOOD PRESSURE: 152 MMHG | WEIGHT: 262 LBS | BODY MASS INDEX: 44.97 KG/M2 | RESPIRATION RATE: 16 BRPM | OXYGEN SATURATION: 97 % | TEMPERATURE: 99.3 F

## 2023-11-29 DIAGNOSIS — K43.0 RECURRENT INCISIONAL HERNIA WITH INCARCERATION: Primary | ICD-10-CM

## 2023-11-29 DIAGNOSIS — K43.9 VENTRAL HERNIA WITHOUT OBSTRUCTION OR GANGRENE: ICD-10-CM

## 2023-11-29 PROCEDURE — 99215 OFFICE O/P EST HI 40 MIN: CPT | Performed by: SURGERY

## 2023-11-29 RX ORDER — ALBUTEROL SULFATE 0.83 MG/ML
2.5 SOLUTION RESPIRATORY (INHALATION) EVERY 4 HOURS PRN
Status: CANCELLED | OUTPATIENT
Start: 2023-11-29

## 2023-11-29 RX ORDER — SODIUM CHLORIDE, SODIUM LACTATE, POTASSIUM CHLORIDE, CALCIUM CHLORIDE 600; 310; 30; 20 MG/100ML; MG/100ML; MG/100ML; MG/100ML
INJECTION, SOLUTION INTRAVENOUS CONTINUOUS
Status: CANCELLED | OUTPATIENT
Start: 2023-11-29

## 2023-11-29 RX ORDER — ONDANSETRON 2 MG/ML
4 INJECTION INTRAMUSCULAR; INTRAVENOUS EVERY 30 MIN PRN
Status: CANCELLED | OUTPATIENT
Start: 2023-11-29

## 2023-11-29 RX ORDER — ONDANSETRON 4 MG/1
4 TABLET, ORALLY DISINTEGRATING ORAL EVERY 30 MIN PRN
Status: CANCELLED | OUTPATIENT
Start: 2023-11-29

## 2023-11-29 ASSESSMENT — COPD QUESTIONNAIRES: COPD: 1

## 2023-11-29 ASSESSMENT — LIFESTYLE VARIABLES: TOBACCO_USE: 1

## 2023-11-29 NOTE — ANESTHESIA PREPROCEDURE EVALUATION
Anesthesia Pre-Procedure Evaluation    Patient: Elza Greer   MRN: [id number] : 1975        Procedure : Procedure(s):  Colonoscopy          Past Medical History:   Diagnosis Date    Adjustment disorder with mixed anxiety and depressed mood     Arthritis     COPD (chronic obstructive pulmonary disease) (H)     Depressive disorder     History of blood transfusion     Had a blood transfusion after my hysterectomy in     IBS (irritable bowel syndrome)     Incisional hernia, without obstruction or gangrene     Lactose intolerance 2010    Migraine 2010    Mild intermittent asthma without complication 2010    PCOS (polycystic ovarian syndrome)     Tobacco use disorder     Vitamin D deficiency       Past Surgical History:   Procedure Laterality Date    ABDOMEN SURGERY      APPENDECTOMY OPEN N/A     CYSTECTOMY OVARIAN BENIGN      HEPATICOJEJUNOSTOMY      RnY jejunostomy from bile duct injury    HERNIORRHAPHY VENTRAL N/A 2012    open with mesh    HYSTERECTOMY TOTAL ABDOMINAL, BILATERAL SALPINGO-OOPHORECTOMY, COMBINED N/A     LAPAROSCOPIC CHOLECYSTECTOMY      complicated by bile duct injury    LAPAROSCOPIC HERNIORRHAPHY VENTRAL N/A 2021    Procedure: Open recurrent incarcerated ventral hernia repair X2;  Surgeon: Pradip Mckenzie MD;  Location: [facility]      Allergies   Allergen Reactions    Azithromycin Anaphylaxis    Latex Hives    Oxycodone Nausea and Vomiting      Social History     Tobacco Use    Smoking status: Former     Packs/day: 1.00     Years: 23.00     Additional pack years: 0.00     Total pack years: 23.00     Types: Cigarettes    Smokeless tobacco: Never    Tobacco comments:     Starting patches. Quit around 10/23/23. Is using patches and gum   Substance Use Topics    Alcohol use: Yes     Comment: social      Wt Readings from Last 1 Encounters:   23 116.8 kg (257 lb 9.6 oz)        Anesthesia Evaluation   Pt has had prior anesthetic. Type: MAC and  General.    No history of anesthetic complications       ROS/MED HX  ENT/Pulmonary:     (+)     SHWETA risk factors,   obese,        tobacco use, Past use,  23  Pack-Year Hx,  Intermittent, asthma  Treatment: Inhaler prn and Inhaler daily,   COPD,              Neurologic:     (+)      migraines,                          Cardiovascular:     (+)  - -   -  - -                                 Previous cardiac testing   Echo: Date: 12/4/23 Results:  Interpretation Summary     Left ventricular systolic function is normal.  The visual ejection fraction is 60-65%.  No significant valve dysfunction.  There is no pericardial effusion.  Dilation of the inferior vena cava is present with abnormal respiratory  variation in diameter.  Patient is tachycardic.     No prior for comparison.    Stress Test:  Date: Results:    ECG Reviewed:  Date: 10/23/23 Results:  Sinus Rhythm   WITHIN NORMAL LIMITS  Cath:  Date: Results:      METS/Exercise Tolerance: >4 METS    Hematologic:     (+)       history of blood transfusion,         Musculoskeletal: Comment: rheumatoid  (+)  arthritis,             GI/Hepatic: Comment: IBS    (+)        bowel prep,     liver disease,       Renal/Genitourinary:       Endo: Comment: Morbid obesity  PCOS    (+)               Obesity,       Psychiatric/Substance Use:     (+) psychiatric history anxiety and depression       Infectious Disease:       Malignancy:       Other:            Physical Exam    Airway  airway exam normal      Mallampati: I   TM distance: > 3 FB   Neck ROM: full   Mouth opening: > 3 cm    Respiratory Devices and Support         Dental       (+) Minor Abnormalities - some fillings, tiny chips      Cardiovascular   cardiovascular exam normal       Rhythm and rate: regular and normal     Pulmonary   pulmonary exam normal        breath sounds clear to auscultation           OUTSIDE LABS:  CBC:   Lab Results   Component Value Date    WBC 10.1 11/22/2023    WBC 12.1 (H) 11/01/2023    HGB 11.7  "11/22/2023    HGB 12.6 11/01/2023    HCT 36.6 11/22/2023    HCT 40.7 11/01/2023     11/22/2023     11/01/2023     BMP:   Lab Results   Component Value Date     11/01/2023     10/27/2023    POTASSIUM 4.3 11/01/2023    POTASSIUM 3.9 10/27/2023    POTASSIUM 3.9 10/27/2023    CHLORIDE 103 11/01/2023    CHLORIDE 104 10/27/2023    CO2 23 11/01/2023    CO2 31 (H) 10/27/2023    BUN 9.8 11/01/2023    BUN 8.2 10/27/2023    CR 0.70 11/01/2023    CR 0.73 10/27/2023    GLC 97 11/01/2023     (H) 10/27/2023     COAGS:   Lab Results   Component Value Date    PTT 28 09/01/2022    INR 1.06 09/01/2022     POC: No results found for: \"BGM\", \"HCG\", \"HCGS\"  HEPATIC:   Lab Results   Component Value Date    ALBUMIN 4.0 11/01/2023    PROTTOTAL 7.4 11/01/2023    ALT 34 11/01/2023    AST 26 11/01/2023    GGT 40 (H) 09/18/2023    ALKPHOS 222 (H) 11/01/2023    BILITOTAL 0.3 11/01/2023     OTHER:   Lab Results   Component Value Date    LACT 1.6 10/23/2023    DEBBIE 9.5 11/01/2023    MAG 1.9 10/27/2023    LIPASE 20 10/23/2023    TSH 2.62 09/05/2023    CRP 16.7 (H) 08/08/2022    SED 38 (H) 11/22/2023       Anesthesia Plan    ASA Status:  3    NPO Status:  NPO Appropriate    Anesthesia Type: General.     - Airway: Native airway   Induction: Intravenous, Propofol.   Maintenance: TIVA.        Consents    Anesthesia Plan(s) and associated risks, benefits, and realistic alternatives discussed. Questions answered and patient/representative(s) expressed understanding.     - Discussed: Risks, Benefits and Alternatives for BOTH SEDATION and the PROCEDURE were discussed     - Discussed with:  Patient      - Extended Intubation/Ventilatory Support Discussed: No.      - Patient is DNR/DNI Status: No     Use of blood products discussed: No .     Postoperative Care       PONV prophylaxis: Ondansetron (or other 5HT-3)     Comments:               NASH Uriostegui CRNA    I have reviewed the pertinent notes and labs in the chart " "from the past 30 days and (re)examined the patient.  Any updates or changes from those notes are reflected in this note.              # Severe Obesity: Estimated body mass index is 44.22 kg/m  as calculated from the following:    Height as of 11/1/23: 1.626 m (5' 4\").    Weight as of 11/22/23: 116.8 kg (257 lb 9.6 oz).      "

## 2023-11-29 NOTE — PROGRESS NOTES
Assessment & Plan   Problem List Items Addressed This Visit          Other    Ventral hernia without obstruction or gangrene     Other Visit Diagnoses       Recurrent incisional hernia with incarceration    -  Primary    Relevant Orders    Adult Comprehensive Weight Management  Referral    Adult General Surg Referral    BMI 40.0-44.9, adult (H)               49 yo F with recurrent incisional hernia with a loop of non-obstructed loop of transverse colon within  -I do not recommend any surgical intervention with this hernia until patient gets down below 40 BMI.  The closer she is with BMI of 35, her risk of recurrence reduces tremendously.  I discussed with her that this requires her to lose about 50 to 60 pounds.  She is interested in the comprehensive weight management program to discuss medical versus surgical weight management.  I will also send her to a general surgeon at [facility] who does complex hernia repair once her BMI is below 40.  She is understanding of this.    -She understands if this becomes strangulated, she will need to be evaluated in the emergency department and a hernia repair would need to be done however her risk of recurrence is quite high.  -All of her questions were answered.    Face to Face/patient Contact total time: 30 minutes  Pre Charting time: 10 minutes; Post charting time, communication and other activities: 15 minutes;   Total time:  55 minutes    No follow-ups on file.    [facility] Wyatt MD  [facility]    Mao Bertrand is a 48 year old, presenting for the following health issues:  Consult (Ref NP Shabbir for Ventral Hernia )    Does have known incisional hernia  Was in ICU for bacteremia and there was concern about this portion of transverse colon within there being the source of the bacteremia  Does have increased pain lately around the incision  But no issues with BMs or flatus  No blood thinner.    Quit smoking 5 weeks ago.    Been seeing  joey for weight loss - will get started on wellbutrin and along natrexone?    Multiple abdominal operations  -2012 Open IHR with 4.3cm Proceed mesh patch  -2020- Open primary IHR  -Now with recurrence, small, with colon as well as a smaller sub-umbilical defect     History of lap dm 2006, with subsequent laparotomy for bile duct injury requiring HJ  Open appy  Ovarian cystectomy  TAHBSO     Multiple incisions, but now 2 hernias in midline (a xiphoid to pubis scar)               Review of Systems   Constitutional, HEENT, cardiovascular, pulmonary, GI, , musculoskeletal, neuro, skin, endocrine and psych systems are negative, except as otherwise noted.      Objective    BP (!) 152/93   Pulse 91   Temp 99.3  F (37.4  C) (Tympanic)   Resp 16   Wt 118.8 kg (262 lb)   LMP 08/26/2005   SpO2 97%   BMI 44.97 kg/m    Body mass index is 44.97 kg/m .  Physical Exam  Vitals reviewed.   Eyes:      Conjunctiva/sclera: Conjunctivae normal.   Cardiovascular:      Pulses: Normal pulses.   Pulmonary:      Effort: Pulmonary effort is normal.   Abdominal:      Hernia: A hernia is present.       Neurological:      Mental Status: She is alert.        EXAM: CT ABDOMEN PELVIS W CONTRAST  LOCATION: [facility]  DATE: 10/23/2023     INDICATION: upper abdominal pain, nausea and vomiting,  COMPARISON: Abdominal ultrasound 09/25/2023. CT abdomen and pelvis 09/09/2022.  TECHNIQUE: CT scan of the abdomen and pelvis was performed following injection of IV contrast. Multiplanar reformats were obtained. Dose reduction techniques were used.  CONTRAST: 100 mL Isovue 370     FINDINGS:   LOWER CHEST: Normal.     HEPATOBILIARY: Postcholecystectomy. Mild biliary dilatation probably on the basis of reservoir effect after cholecystectomy.     PANCREAS: Normal.     SPLEEN: Normal.     ADRENAL GLANDS: Normal.     KIDNEYS/BLADDER: Tiny probable benign cysts of the kidneys do not warrant follow-up.     BOWEL: No bowel  obstruction or inflammation. Moderate-sized ventral abdominal wall hernia in the epigastric region contains nonobstructed transverse colon. Fat deposition in the wall the colon consistent with prior inflammation. Small bowel anastomosis   in the left abdomen.     LYMPH NODES: Normal.     VASCULATURE: Unremarkable.     PELVIC ORGANS: Normal.     MUSCULOSKELETAL: Normal.                                                                      IMPRESSION:   1.  No acute process in the abdomen or pelvis.  2.  Moderate-sized ventral abdominal wall hernia in the epigastric region contains nonobstructed transverse colon.

## 2023-11-29 NOTE — LETTER
11/29/2023         RE: Elza Greer  [address] [facility] [place] [address]        Dear Colleague,    Thank you for referring your patient, Elza Greer, to the [facility]. Please see a copy of my visit note below.      Assessment & Plan  Problem List Items Addressed This Visit          Other    Ventral hernia without obstruction or gangrene     Other Visit Diagnoses       Recurrent incisional hernia with incarceration    -  Primary    Relevant Orders    Adult Comprehensive Weight Management  Referral    Adult General Surg Referral    BMI 40.0-44.9, adult (H)               47 yo F with recurrent incisional hernia with a loop of non-obstructed loop of transverse colon within  -I do not recommend any surgical intervention with this hernia until patient gets down below 40 BMI.  The closer she is with BMI of 35, her risk of recurrence reduces tremendously.  I discussed with her that this requires her to lose about 50 to 60 pounds.  She is interested in the comprehensive weight management program to discuss medical versus surgical weight management.  I will also send her to a general surgeon at [facility] who does complex hernia repair once her BMI is below 40.  She is understanding of this.    -She understands if this becomes strangulated, she will need to be evaluated in the emergency department and a hernia repair would need to be done however her risk of recurrence is quite high.  -All of her questions were answered.    Face to Face/patient Contact total time: 30 minutes  Pre Charting time: 10 minutes; Post charting time, communication and other activities: 15 minutes;   Total time:  55 minutes    No follow-ups on file.    Jose Manuel-Rubia Wyatt MD  [facility]    Subjective  Elza is a 48 year old, presenting for the following health issues:  Consult (Ref NP Shabbir for Ventral Hernia )    Does have known incisional hernia  Was in ICU for bacteremia and there was  concern about this portion of transverse colon within there being the source of the bacteremia  Does have increased pain lately around the incision  But no issues with BMs or flatus  No blood thinner.    Quit smoking 5 weeks ago.    Been seeing joey for weight loss - will get started on wellbutrin and along natrexone?    Multiple abdominal operations  -2012 Open IHR with 4.3cm Proceed mesh patch  -2020- Open primary IHR  -Now with recurrence, small, with colon as well as a smaller sub-umbilical defect     History of lap dm 2006, with subsequent laparotomy for bile duct injury requiring HJ  Open appy  Ovarian cystectomy  TAHBSO     Multiple incisions, but now 2 hernias in midline (a xiphoid to pubis scar)               Review of Systems   Constitutional, HEENT, cardiovascular, pulmonary, GI, , musculoskeletal, neuro, skin, endocrine and psych systems are negative, except as otherwise noted.      Objective   BP (!) 152/93   Pulse 91   Temp 99.3  F (37.4  C) (Tympanic)   Resp 16   Wt 118.8 kg (262 lb)   LMP 08/26/2005   SpO2 97%   BMI 44.97 kg/m    Body mass index is 44.97 kg/m .  Physical Exam  Vitals reviewed.   Eyes:      Conjunctiva/sclera: Conjunctivae normal.   Cardiovascular:      Pulses: Normal pulses.   Pulmonary:      Effort: Pulmonary effort is normal.   Abdominal:      Hernia: A hernia is present.       Neurological:      Mental Status: She is alert.        EXAM: CT ABDOMEN PELVIS W CONTRAST  LOCATION: [facility]  DATE: 10/23/2023     INDICATION: upper abdominal pain, nausea and vomiting,  COMPARISON: Abdominal ultrasound 09/25/2023. CT abdomen and pelvis 09/09/2022.  TECHNIQUE: CT scan of the abdomen and pelvis was performed following injection of IV contrast. Multiplanar reformats were obtained. Dose reduction techniques were used.  CONTRAST: 100 mL Isovue 370     FINDINGS:   LOWER CHEST: Normal.     HEPATOBILIARY: Postcholecystectomy. Mild biliary dilatation  probably on the basis of reservoir effect after cholecystectomy.     PANCREAS: Normal.     SPLEEN: Normal.     ADRENAL GLANDS: Normal.     KIDNEYS/BLADDER: Tiny probable benign cysts of the kidneys do not warrant follow-up.     BOWEL: No bowel obstruction or inflammation. Moderate-sized ventral abdominal wall hernia in the epigastric region contains nonobstructed transverse colon. Fat deposition in the wall the colon consistent with prior inflammation. Small bowel anastomosis   in the left abdomen.     LYMPH NODES: Normal.     VASCULATURE: Unremarkable.     PELVIC ORGANS: Normal.     MUSCULOSKELETAL: Normal.                                                                      IMPRESSION:   1.  No acute process in the abdomen or pelvis.  2.  Moderate-sized ventral abdominal wall hernia in the epigastric region contains nonobstructed transverse colon.                Again, thank you for allowing me to participate in the care of your patient.        Sincerely,        Chris Wyatt MD

## 2023-11-29 NOTE — NURSING NOTE
Chief Complaint   Patient presents with    Consult     Ref NP Formogey for Ventral Hernia        Vitals:    11/29/23 1534   Pulse: 91   Resp: 16   SpO2: 97%   Weight: 118.8 kg (262 lb)     Wt Readings from Last 1 Encounters:   11/29/23 118.8 kg (262 lb)   Penny Finney MA       Doxepin Pregnancy And Lactation Text: This medication is Pregnancy Category C and it isn't known if it is safe during pregnancy. It is also excreted in breast milk and breast feeding isn't recommended.

## 2023-11-30 ENCOUNTER — MYC MEDICAL ADVICE (OUTPATIENT)
Dept: FAMILY MEDICINE | Facility: CLINIC | Age: 48
End: 2023-11-30
Payer: COMMERCIAL

## 2023-11-30 DIAGNOSIS — I10 BENIGN ESSENTIAL HYPERTENSION: Primary | ICD-10-CM

## 2023-11-30 NOTE — TELEPHONE ENCOUNTER
[facility] hospital claim and critical illness forms received. Sent patient a My Chart note double checking dates off work.

## 2023-12-01 RX ORDER — LOSARTAN POTASSIUM 25 MG/1
25 TABLET ORAL DAILY
Qty: 90 TABLET | Refills: 1 | Status: ON HOLD | OUTPATIENT
Start: 2023-12-01 | End: 2024-07-16

## 2023-12-04 ENCOUNTER — HOSPITAL ENCOUNTER (OUTPATIENT)
Facility: CLINIC | Age: 48
Discharge: HOME | End: 2023-12-04
Attending: SURGERY | Admitting: SURGERY
Payer: COMMERCIAL

## 2023-12-04 ENCOUNTER — ANESTHESIA (OUTPATIENT)
Dept: GASTROENTEROLOGY | Facility: CLINIC | Age: 48
End: 2023-12-04
Payer: COMMERCIAL

## 2023-12-04 VITALS
DIASTOLIC BLOOD PRESSURE: 95 MMHG | TEMPERATURE: 97.8 F | HEART RATE: 96 BPM | HEIGHT: 64 IN | RESPIRATION RATE: 16 BRPM | WEIGHT: 262.01 LBS | BODY MASS INDEX: 44.73 KG/M2 | OXYGEN SATURATION: 96 % | SYSTOLIC BLOOD PRESSURE: 133 MMHG

## 2023-12-04 DIAGNOSIS — Z12.11 SPECIAL SCREENING FOR MALIGNANT NEOPLASMS, COLON: Primary | ICD-10-CM

## 2023-12-04 PROCEDURE — 88305 TISSUE EXAM BY PATHOLOGIST: CPT | Mod: TC | Performed by: SURGERY

## 2023-12-04 PROCEDURE — 250N000009 HC RX 250: Performed by: NURSE ANESTHETIST, CERTIFIED REGISTERED

## 2023-12-04 PROCEDURE — 250N000011 HC RX IP 250 OP 636: Performed by: NURSE ANESTHETIST, CERTIFIED REGISTERED

## 2023-12-04 PROCEDURE — 88305 TISSUE EXAM BY PATHOLOGIST: CPT | Mod: 26 | Performed by: PATHOLOGY

## 2023-12-04 PROCEDURE — 45380 COLONOSCOPY AND BIOPSY: CPT | Mod: PT | Performed by: SURGERY

## 2023-12-04 PROCEDURE — 258N000003 HC RX IP 258 OP 636: Performed by: SURGERY

## 2023-12-04 PROCEDURE — 370N000017 HC ANESTHESIA TECHNICAL FEE, PER MIN: Performed by: SURGERY

## 2023-12-04 RX ORDER — SODIUM CHLORIDE, SODIUM LACTATE, POTASSIUM CHLORIDE, CALCIUM CHLORIDE 600; 310; 30; 20 MG/100ML; MG/100ML; MG/100ML; MG/100ML
INJECTION, SOLUTION INTRAVENOUS CONTINUOUS
Status: DISCONTINUED | OUTPATIENT
Start: 2023-12-04 | End: 2023-12-04 | Stop reason: HOSPADM

## 2023-12-04 RX ORDER — LIDOCAINE HYDROCHLORIDE 20 MG/ML
INJECTION, SOLUTION INFILTRATION; PERINEURAL PRN
Status: DISCONTINUED | OUTPATIENT
Start: 2023-12-04 | End: 2023-12-04

## 2023-12-04 RX ORDER — PROPOFOL 10 MG/ML
INJECTION, EMULSION INTRAVENOUS PRN
Status: DISCONTINUED | OUTPATIENT
Start: 2023-12-04 | End: 2023-12-04

## 2023-12-04 RX ORDER — LIDOCAINE 40 MG/G
CREAM TOPICAL
Status: DISCONTINUED | OUTPATIENT
Start: 2023-12-04 | End: 2023-12-04 | Stop reason: HOSPADM

## 2023-12-04 RX ORDER — PROPOFOL 10 MG/ML
INJECTION, EMULSION INTRAVENOUS CONTINUOUS PRN
Status: DISCONTINUED | OUTPATIENT
Start: 2023-12-04 | End: 2023-12-04

## 2023-12-04 RX ADMIN — LIDOCAINE HYDROCHLORIDE 100 MG: 20 INJECTION, SOLUTION INFILTRATION; PERINEURAL at 10:01

## 2023-12-04 RX ADMIN — PROPOFOL 200 MCG/KG/MIN: 10 INJECTION, EMULSION INTRAVENOUS at 10:01

## 2023-12-04 RX ADMIN — SODIUM CHLORIDE, POTASSIUM CHLORIDE, SODIUM LACTATE AND CALCIUM CHLORIDE: 600; 310; 30; 20 INJECTION, SOLUTION INTRAVENOUS at 09:36

## 2023-12-04 RX ADMIN — PROPOFOL 100 MG: 10 INJECTION, EMULSION INTRAVENOUS at 10:01

## 2023-12-04 ASSESSMENT — ACTIVITIES OF DAILY LIVING (ADL): ADLS_ACUITY_SCORE: 35

## 2023-12-04 NOTE — INTERVAL H&P NOTE
"I have reviewed the surgical (or preoperative) H&P that is linked to this encounter, and examined the patient. There are no significant changes    BMI 44; 1st colon; famhx of colon cancer in grandma and grandpa; no blood thinner; incisional hernia with loop of colon within    Clinical Conditions Present on Arrival:  Clinically Significant Risk Factors Present on Admission                  # Severe Obesity: Estimated body mass index is 44.97 kg/m  as calculated from the following:    Height as of this encounter: 1.626 m (5' 4\").    Weight as of this encounter: 118.8 kg (262 lb 0.2 oz).       "

## 2023-12-04 NOTE — ANESTHESIA CARE TRANSFER NOTE
Patient: Elza Greer    Procedure: Procedure(s):  COLONOSCOPY, WITH POLYPECTOMY AND BIOPSY       Diagnosis: Colon cancer screening [Z12.11]  Diagnosis Additional Information: No value filed.    Anesthesia Type:   General     Note:    Oropharynx: oropharynx clear of all foreign objects and spontaneously breathing  Level of Consciousness: drowsy  Oxygen Supplementation: nasal cannula  Level of Supplemental Oxygen (L/min / FiO2): 2  Independent Airway: airway patency satisfactory and stable  Dentition: dentition unchanged  Vital Signs Stable: post-procedure vital signs reviewed and stable  Report to RN Given: handoff report given  Patient transferred to: Phase II    Handoff Report: Identifed the Patient, Identified the Reponsible Provider, Reviewed the pertinent medical history, Discussed the surgical course, Reviewed Intra-OP anesthesia mangement and issues during anesthesia, Set expectations for post-procedure period and Allowed opportunity for questions and acknowledgement of understanding    Vitals:  Vitals Value Taken Time   /80 12/04/23 1023   Temp     Pulse 90 12/04/23 1023   Resp     SpO2 99 % 12/04/23 1024   Vitals shown include unfiled device data.    Electronically Signed By: Bowen Addison CRNA, APRN CRNA  December 4, 2023  10:25 AM

## 2023-12-04 NOTE — LETTER
Elza Greer  [address] [facility] [address]    December 8, 2023    Dear Elza,  This letter is written to inform you of the results of your recent colonoscopy.  Your examination showed polyp(s) in your sigmoid colon. All polyps were removed in their entirety and sent for review by a pathologist. As you will see on the pathology report below, the tissue(s) were hyperplastic polyps. Your examination was otherwise without abnormality.    Final Diagnosis   Large intestine, sigmoid, polyp, biopsy/polypectomy:  - Hyperplastic polyp negative for dysplasia and malignancy.        Hyperplastic polyps are entirely benign (non-cancerous) and rarely associated with the development of additional polyps or colorectal cancer.    Given these findings, I recommend that you undergo a repeat colonoscopy in ten years for screening. We will enter you into a recall system so you receive a reminder closer to the time that you are due for repeat examination.     Please remember that this recommendation is made with the understanding that you are not experiencing persistent changes in bowel function, bleeding per rectum, and/or significant abdominal pain. If you experience these symptoms, please contact your primary care provider for a further evaluation.     If you have any questions or concerns about the results of your colonoscopy or the appropriate follow-up, please contact my assistant at [phone].    Sincerely,        [facility] Wyatt, RAMILA GARCIA General Surgery  ___

## 2023-12-04 NOTE — ANESTHESIA POSTPROCEDURE EVALUATION
Patient: Elza Greer    Procedure: Procedure(s):  COLONOSCOPY, WITH POLYPECTOMY AND BIOPSY       Anesthesia Type:  General    Note:  Disposition: Outpatient   Postop Pain Control: Uneventful            Sign Out: Well controlled pain   PONV: No   Neuro/Psych: Uneventful            Sign Out: Acceptable/Baseline neuro status   Airway/Respiratory: Uneventful            Sign Out: Acceptable/Baseline resp. status   CV/Hemodynamics: Uneventful            Sign Out: Acceptable CV status; No obvious hypovolemia; No obvious fluid overload   Other NRE: NONE   DID A NON-ROUTINE EVENT OCCUR? No           Last vitals:  Vitals Value Taken Time   /91 12/04/23 1030   Temp 36.6  C (97.8  F) 12/04/23 1025   Pulse 93 12/04/23 1030   Resp     SpO2 93 % 12/04/23 1034   Vitals shown include unfiled device data.    Electronically Signed By: Bowen Addison CRNA, APRN CRNA  December 4, 2023  10:42 AM

## 2023-12-05 LAB
PATH REPORT.COMMENTS IMP SPEC: NORMAL
PATH REPORT.COMMENTS IMP SPEC: NORMAL
PATH REPORT.FINAL DX SPEC: NORMAL
PATH REPORT.GROSS SPEC: NORMAL
PATH REPORT.MICROSCOPIC SPEC OTHER STN: NORMAL
PATH REPORT.RELEVANT HX SPEC: NORMAL
PHOTO IMAGE: NORMAL

## 2023-12-18 ENCOUNTER — OFFICE VISIT (OUTPATIENT)
Dept: SURGERY | Facility: CLINIC | Age: 48
End: 2023-12-18
Payer: COMMERCIAL

## 2023-12-18 VITALS — WEIGHT: 254.6 LBS | HEIGHT: 64 IN | BODY MASS INDEX: 43.46 KG/M2

## 2023-12-18 DIAGNOSIS — K43.0 RECURRENT INCISIONAL HERNIA WITH INCARCERATION: ICD-10-CM

## 2023-12-18 LAB — COLONOSCOPY: NORMAL

## 2023-12-18 PROCEDURE — 99204 OFFICE O/P NEW MOD 45 MIN: CPT | Performed by: SURGERY

## 2023-12-18 NOTE — LETTER
12/18/2023         RE: Elza Greer  [address] [facility] [address]        Dear Colleague,    Thank you for referring your patient, Elza Greer, to the [facility] [place]. Please see a copy of my visit note below.    HPI:  Elza Greer is a 48 year old female who was referred to me by Di Shea for a ventral hernia.  She presents with complaints of a bulge in the mid epigastric region with increasing dicomfort.   She has noted this for the past several years and states is progressively enlarging.  She denies any nausea, vomiting, fevers, chills, bloody bowel movements or any other complaints at this time. Previous surgeries include cholecystectomy with subsequent bile duct injury and Luisito-en-Y hepaticojejunostomy, multiple attempted hernia repairs.  Her main complaint is the discomfort in the nausea secondary to the incisional hernia.  She has seen an outside surgeon and was told that they will not be able to fix the hernia defect until she loses some weight.  She does have an appointment with the bariatric team scheduled for next week and is highly motivated to lose weight and have her hernia repair.        Allergies:Azithromycin, Latex, and Oxycodone    Past Medical History:   Diagnosis Date     Adjustment disorder with mixed anxiety and depressed mood      Arthritis      COPD (chronic obstructive pulmonary disease) (H)      Depressive disorder      History of blood transfusion     Had a blood transfusion after my hysterectomy in 2005     IBS (irritable bowel syndrome)      Incisional hernia, without obstruction or gangrene 2020     Lactose intolerance 05/12/2010     Migraine 05/12/2010     Mild intermittent asthma without complication 05/12/2010     PCOS (polycystic ovarian syndrome)      Tobacco use disorder      Vitamin D deficiency        Past Surgical History:   Procedure Laterality Date     ABDOMEN SURGERY       APPENDECTOMY OPEN N/A       COLONOSCOPY N/A 12/4/2023    Procedure: COLONOSCOPY, WITH POLYPECTOMY AND BIOPSY;  Surgeon: Chris Wyatt MD;  Location: [facility]     CYSTECTOMY OVARIAN BENIGN  2001     HEPATICOJEJUNOSTOMY  2000    RnY jejunostomy from bile duct injury     HERNIORRHAPHY VENTRAL N/A 02/17/2012    open with mesh     HYSTERECTOMY TOTAL ABDOMINAL, BILATERAL SALPINGO-OOPHORECTOMY, COMBINED N/A 2005     LAPAROSCOPIC CHOLECYSTECTOMY  2006    complicated by bile duct injury     LAPAROSCOPIC HERNIORRHAPHY VENTRAL N/A 01/26/2021    Procedure: Open recurrent incarcerated ventral hernia repair X2;  Surgeon: Pradip Mckenzie MD;  Location: [facility]       CURRENT MEDS:  Current Outpatient Medications   Medication Sig Dispense Refill     albuterol (ACCUNEB) 1.25 MG/3ML neb solution Take 1 vial (1.25 mg) by nebulization every 6 hours as needed for shortness of breath / dyspnea or wheezing 90 mL 0     albuterol (PROAIR HFA/PROVENTIL HFA/VENTOLIN HFA) 108 (90 Base) MCG/ACT inhaler Inhale 1-2 puffs into the lungs every 4 hours as needed for shortness of breath, wheezing or cough 18 g 0     buPROPion (WELLBUTRIN) 75 MG tablet T5 mg daily for 1 week than increase to 75 mg twice daily (Patient not taking: Reported on 11/29/2023) 60 tablet 2     clobetasol propionate (CLOBEX) 0.05 % external shampoo Leave on scalp for 15 minutes then rinse. Use 1-2 times weekly. (Patient not taking: Reported on 11/29/2023) 118 mL 5     Fluocinolone Acetonide Scalp 0.01 % OIL oil Daily as needed for scalp psoriasis (Patient not taking: Reported on 11/29/2023) 118.28 mL 0     fluticasone (FLONASE) 50 MCG/ACT nasal spray Spray 2 sprays into both nostrils daily 16 g 0     gabapentin (NEURONTIN) 100 MG capsule Take 1 capsule (100 mg) by mouth 3 times daily 90 capsule 0     losartan (COZAAR) 25 MG tablet Take 1 tablet (25 mg) by mouth daily 90 tablet 1     Multiple Vitamins-Minerals (MULTIVITAMIN WOMEN PO) Take 2 chew tab by mouth daily gummy       naltrexone (DEPADE/REVIA) 50 MG  tablet Take 25 mg daily for 1 week than increase to 25 mg twice daily (Patient not taking: Reported on 11/29/2023) 30 tablet 2     nicotine (NICODERM CQ) 21 MG/24HR 24 hr patch Place 1 patch onto the skin every 24 hours       nicotine polacrilex (NICORETTE) 4 MG gum Place 4 mg inside cheek every hour as needed for smoking cessation       nystatin (MYCOSTATIN) 651193 UNIT/GM external cream Apply topically 2 times daily 30 g 1     rizatriptan (MAXALT) 10 MG tablet Take 1 tablet (10 mg) by mouth at onset of headache for migraine May repeat in 2 hours. Max 3 tablets/24 hours. 30 tablet 1     vitamin D3 (CHOLECALCIFEROL) 1.25 MG (08193 UT) capsule Take 1 capsule (50,000 Units) by mouth every 7 days 12 capsule 1       Family History   Problem Relation Age of Onset     Dementia Mother      Coronary Artery Disease Mother      Hypertension Mother      Hyperlipidemia Mother      Depression Mother      Mental Illness Father         Schizophrenia     Colon Cancer Maternal Grandmother      Breast Cancer Maternal Grandmother      Other Cancer Maternal Grandmother      Osteoporosis Other         reports that she has quit smoking. Her smoking use included cigarettes. She has a 23.00 pack-year smoking history. She has never used smokeless tobacco. She reports current alcohol use. She reports that she does not use drugs.    Review of Systems -   The 12 point review of systems  is within normal limits except for as mentioned above in the HPI.  General ROS: No complaints or constitutional symptoms  Ophthalmic ROS: No complaints of visual changes  Skin: No complaints or symptoms   Endocrine: No complaints or symptoms  Hematologic/Lymphatic: No symptoms or complaints  Psychiatric: No symptoms or complaints  Respiratory ROS: no cough, shortness of breath, or wheezing  Cardiovascular ROS: no chest pain or dyspnea on exertion  Gastrointestinal ROS: As per HPI  Genito-Urinary ROS: no dysuria, trouble voiding, or hematuria  Musculoskeletal  ROS: no joint or muscle pain  Neurological ROS: no TIA or stroke symptoms      LMP 08/26/2005   There is no height or weight on file to calculate BMI.    EXAM:  GENERAL: Well developed female  HEENT: Extra ocular muscles intact, pupils are round and reactive, sclera is anicteric,   NECK:  No obvious masses or deformities  ABDOMEN: Soft, palpable epigastric midline defect measuring approximately 8 to 10 cm  NEURO: No obvious defects noted.  EXT: No edema, no obvious deformities or any other abnormalities    IMAGES: CT scan from October 2023 demonstrates a large incisional hernia with intestinal components chronically incarcerated    Assessment/Plan:    Elza Greer is a 48 year old female with a moderately sized incisional hernia.  The pathophysiology of these hernias was discussed as were the surgical and non-operative management strategies.      The risks of surgery were discussed which include, but are not limited to, bleeding, infection, recurrent hernia, chronic pain, poor cosmesis, blood clots, stroke, heart attack and death.  Additionally, the risks of observation were discussed which include, but are not limited to, enlargement of the hernia, incarceration, strangulation, pain and death.  Surgical options including open, laparoscopic and robotic hernia repair were discussed in detail.      She understands everything which was discussed.  I would agree that we would like to have her BMI level lower than 40.  Ideally 35 would be great but we will see how she does with weight loss.  She has plans to meet with our bariatric team next week.  She is highly motivated and will follow-up accordingly.  Once she has a plan in place in terms of weight loss she will reach out to me via [employer] and we can discuss our plans moving forward.    Lan Hastings D.O. TRINH  [phone]  [facility] Department of Surgery      Again, thank you for allowing me to participate in the care of your patient.         Sincerely,        Tung Hastings, DO

## 2023-12-18 NOTE — PROGRESS NOTES
HPI:  Elza Greer is a 48 year old female who was referred to me by Di Shea for a ventral hernia.  She presents with complaints of a bulge in the mid epigastric region with increasing dicomfort.   She has noted this for the past several years and states is progressively enlarging.  She denies any nausea, vomiting, fevers, chills, bloody bowel movements or any other complaints at this time. Previous surgeries include cholecystectomy with subsequent bile duct injury and Luisito-en-Y hepaticojejunostomy, multiple attempted hernia repairs.  Her main complaint is the discomfort in the nausea secondary to the incisional hernia.  She has seen an outside surgeon and was told that they will not be able to fix the hernia defect until she loses some weight.  She does have an appointment with the bariatric team scheduled for next week and is highly motivated to lose weight and have her hernia repair.        Allergies:Azithromycin, Latex, and Oxycodone    Past Medical History:   Diagnosis Date    Adjustment disorder with mixed anxiety and depressed mood     Arthritis     COPD (chronic obstructive pulmonary disease) (H)     Depressive disorder     History of blood transfusion     Had a blood transfusion after my hysterectomy in 2005    IBS (irritable bowel syndrome)     Incisional hernia, without obstruction or gangrene 2020    Lactose intolerance 05/12/2010    Migraine 05/12/2010    Mild intermittent asthma without complication 05/12/2010    PCOS (polycystic ovarian syndrome)     Tobacco use disorder     Vitamin D deficiency        Past Surgical History:   Procedure Laterality Date    ABDOMEN SURGERY      APPENDECTOMY OPEN N/A     COLONOSCOPY N/A 12/4/2023    Procedure: COLONOSCOPY, WITH POLYPECTOMY AND BIOPSY;  Surgeon: Chris Wyatt MD;  Location: [facility]    CYSTECTOMY OVARIAN BENIGN  2001    HEPATICOJEJUNOSTOMY  2000    RnY jejunostomy from bile duct injury    HERNIORRHAPHY VENTRAL N/A 02/17/2012    open with  mesh    HYSTERECTOMY TOTAL ABDOMINAL, BILATERAL SALPINGO-OOPHORECTOMY, COMBINED N/A 2005    LAPAROSCOPIC CHOLECYSTECTOMY  2006    complicated by bile duct injury    LAPAROSCOPIC HERNIORRHAPHY VENTRAL N/A 01/26/2021    Procedure: Open recurrent incarcerated ventral hernia repair X2;  Surgeon: Pradip Mckenzie MD;  Location: [facility]       CURRENT MEDS:  Current Outpatient Medications   Medication Sig Dispense Refill    albuterol (ACCUNEB) 1.25 MG/3ML neb solution Take 1 vial (1.25 mg) by nebulization every 6 hours as needed for shortness of breath / dyspnea or wheezing 90 mL 0    albuterol (PROAIR HFA/PROVENTIL HFA/VENTOLIN HFA) 108 (90 Base) MCG/ACT inhaler Inhale 1-2 puffs into the lungs every 4 hours as needed for shortness of breath, wheezing or cough 18 g 0    buPROPion (WELLBUTRIN) 75 MG tablet T5 mg daily for 1 week than increase to 75 mg twice daily (Patient not taking: Reported on 11/29/2023) 60 tablet 2    clobetasol propionate (CLOBEX) 0.05 % external shampoo Leave on scalp for 15 minutes then rinse. Use 1-2 times weekly. (Patient not taking: Reported on 11/29/2023) 118 mL 5    Fluocinolone Acetonide Scalp 0.01 % OIL oil Daily as needed for scalp psoriasis (Patient not taking: Reported on 11/29/2023) 118.28 mL 0    fluticasone (FLONASE) 50 MCG/ACT nasal spray Spray 2 sprays into both nostrils daily 16 g 0    gabapentin (NEURONTIN) 100 MG capsule Take 1 capsule (100 mg) by mouth 3 times daily 90 capsule 0    losartan (COZAAR) 25 MG tablet Take 1 tablet (25 mg) by mouth daily 90 tablet 1    Multiple Vitamins-Minerals (MULTIVITAMIN WOMEN PO) Take 2 chew tab by mouth daily gummy      naltrexone (DEPADE/REVIA) 50 MG tablet Take 25 mg daily for 1 week than increase to 25 mg twice daily (Patient not taking: Reported on 11/29/2023) 30 tablet 2    nicotine (NICODERM CQ) 21 MG/24HR 24 hr patch Place 1 patch onto the skin every 24 hours      nicotine polacrilex (NICORETTE) 4 MG gum Place 4 mg inside cheek every hour as  needed for smoking cessation      nystatin (MYCOSTATIN) 036138 UNIT/GM external cream Apply topically 2 times daily 30 g 1    rizatriptan (MAXALT) 10 MG tablet Take 1 tablet (10 mg) by mouth at onset of headache for migraine May repeat in 2 hours. Max 3 tablets/24 hours. 30 tablet 1    vitamin D3 (CHOLECALCIFEROL) 1.25 MG (20553 UT) capsule Take 1 capsule (50,000 Units) by mouth every 7 days 12 capsule 1       Family History   Problem Relation Age of Onset    Dementia Mother     Coronary Artery Disease Mother     Hypertension Mother     Hyperlipidemia Mother     Depression Mother     Mental Illness Father         Schizophrenia    Colon Cancer Maternal Grandmother     Breast Cancer Maternal Grandmother     Other Cancer Maternal Grandmother     Osteoporosis Other         reports that she has quit smoking. Her smoking use included cigarettes. She has a 23.00 pack-year smoking history. She has never used smokeless tobacco. She reports current alcohol use. She reports that she does not use drugs.    Review of Systems -   The 12 point review of systems  is within normal limits except for as mentioned above in the HPI.  General ROS: No complaints or constitutional symptoms  Ophthalmic ROS: No complaints of visual changes  Skin: No complaints or symptoms   Endocrine: No complaints or symptoms  Hematologic/Lymphatic: No symptoms or complaints  Psychiatric: No symptoms or complaints  Respiratory ROS: no cough, shortness of breath, or wheezing  Cardiovascular ROS: no chest pain or dyspnea on exertion  Gastrointestinal ROS: As per HPI  Genito-Urinary ROS: no dysuria, trouble voiding, or hematuria  Musculoskeletal ROS: no joint or muscle pain  Neurological ROS: no TIA or stroke symptoms      LMP 08/26/2005   There is no height or weight on file to calculate BMI.    EXAM:  GENERAL: Well developed female  HEENT: Extra ocular muscles intact, pupils are round and reactive, sclera is anicteric,   NECK:  No obvious masses or  deformities  ABDOMEN: Soft, palpable epigastric midline defect measuring approximately 8 to 10 cm  NEURO: No obvious defects noted.  EXT: No edema, no obvious deformities or any other abnormalities    IMAGES: CT scan from October 2023 demonstrates a large incisional hernia with intestinal components chronically incarcerated    Assessment/Plan:    Elza Greer is a 48 year old female with a moderately sized incisional hernia.  The pathophysiology of these hernias was discussed as were the surgical and non-operative management strategies.      The risks of surgery were discussed which include, but are not limited to, bleeding, infection, recurrent hernia, chronic pain, poor cosmesis, blood clots, stroke, heart attack and death.  Additionally, the risks of observation were discussed which include, but are not limited to, enlargement of the hernia, incarceration, strangulation, pain and death.  Surgical options including open, laparoscopic and robotic hernia repair were discussed in detail.      She understands everything which was discussed.  I would agree that we would like to have her BMI level lower than 40.  Ideally 35 would be great but we will see how she does with weight loss.  She has plans to meet with our bariatric team next week.  She is highly motivated and will follow-up accordingly.  Once she has a plan in place in terms of weight loss she will reach out to me via [employer] and we can discuss our plans moving forward.    Lan Hastings D.O. TRINH  [phone]  [facility] Department of Surgery

## 2023-12-26 DIAGNOSIS — M79.7 FIBROMYALGIA: ICD-10-CM

## 2023-12-26 RX ORDER — GABAPENTIN 100 MG/1
100 CAPSULE ORAL 3 TIMES DAILY
Qty: 90 CAPSULE | Refills: 0 | Status: SHIPPED | OUTPATIENT
Start: 2023-12-26 | End: 2024-03-30

## 2023-12-26 ASSESSMENT — SLEEP AND FATIGUE QUESTIONNAIRES
HOW LIKELY ARE YOU TO NOD OFF OR FALL ASLEEP WHILE SITTING QUIETLY AFTER LUNCH WITHOUT ALCOHOL: WOULD NEVER DOZE
HOW LIKELY ARE YOU TO NOD OFF OR FALL ASLEEP WHEN YOU ARE A PASSENGER IN A CAR FOR AN HOUR WITHOUT A BREAK: SLIGHT CHANCE OF DOZING
HOW LIKELY ARE YOU TO NOD OFF OR FALL ASLEEP WHILE LYING DOWN TO REST IN THE AFTERNOON WHEN CIRCUMSTANCES PERMIT: MODERATE CHANCE OF DOZING
HOW LIKELY ARE YOU TO NOD OFF OR FALL ASLEEP WHILE SITTING AND READING: SLIGHT CHANCE OF DOZING
HOW LIKELY ARE YOU TO NOD OFF OR FALL ASLEEP IN A CAR, WHILE STOPPED FOR A FEW MINUTES IN TRAFFIC: WOULD NEVER DOZE
HOW LIKELY ARE YOU TO NOD OFF OR FALL ASLEEP WHILE SITTING INACTIVE IN A PUBLIC PLACE: SLIGHT CHANCE OF DOZING
HOW LIKELY ARE YOU TO NOD OFF OR FALL ASLEEP WHILE SITTING AND TALKING TO SOMEONE: WOULD NEVER DOZE
HOW LIKELY ARE YOU TO NOD OFF OR FALL ASLEEP WHILE WATCHING TV: SLIGHT CHANCE OF DOZING

## 2023-12-26 NOTE — PROGRESS NOTES
"    New Bariatric Surgery Consultation Note    2023    RE: Elza Greer  MR#: [id number]  : 1975      Referring provider:        No data to display                Chief Complaint/Reason for visit: evaluation for possible weight loss surgery    Dear Di Shea APRN CNP (General),    I had the pleasure of seeing your patient, Elza Greer, to evaluate her obesity and consider her for possible weight loss surgery. As you know, Elza Greer is 48 year old.  She has a height of 5' 4\", a weight of 257 lbs 0 oz, and calculated Body mass index is 44.11 kg/m ..  Today we discussed our Bariatric Surgery program to address their weight related health. Given her extensive previous abdominal surgery, ongoing nicotine use, I do not feel she is a candidate for bariatric surgery.  We discussed proceeding non surgically in order to get her weight down for planned hernia repair this Spring of 2024. She was told her BMI should be under 40 to allow reasonable long term repair by her surgeon and that will be our short term goal. .     We discussed a toolbox approach to weight reduction utilizing protein rich/restricted diet aiming for 600-750kcal deficit daily.   We discussed the role of mindful diet, activity/exercise and medication support to achieve a reliable, health improving weight loss today. She's limited in use of GLP1 RA therapy by lack of coverage by her insurance and her PCP has already tried her on phentermine/topamax and currently on bupropion/naltrexone. She's having some difficulty tolerating the bupropion as it feels \"more stimulating than the phentermine was\" but seems to tolerate the naltrexone well. Because of some of the stimulatory effects, she's only using both medications with breakfast but I've counseled her to see if evening naltrexone may help some of her craving control.  An ongoing theme throughout her food recall/weight experience is a reliance on chemical coping " mechanisms for stress/anxiety with higher sugar diet/sweet cravings and nicotine/alcohol/soda pop as her main coping tools currently. She has some ambivalence about embracing a healthy diet for weight reduction and motivational interviewing techniques were utilized to hopefully help her transition.     Assessment & Plan   Problem List Items Addressed This Visit    None  Visit Diagnoses       Recurrent incisional hernia with incarceration        Relevant Orders    Comprehensive metabolic panel    Hemoglobin A1c (Completed)    Magnesium    25- [place]-Vitamin D                 HISTORY OF PRESENT ILLNESS:      12/26/2023    11:13 AM   Weight Loss History Reviewed with Patient   How long have you been overweight? Since late 20's to early 40's   What is the most that you have ever weighed 268   What is the most weight you have lost? 35   I have tried the following methods to lose weight Watching portions or calories    Exercise    Slimfast    OTC Medications    Prescription Medications   I have tried the following weight loss medications? (Check all that apply) Topamax/Topiramate    Phentermine/Adipex-p/Suprenza    Naltrexone   .    CO-MORBIDITIES OF OBESITY INCLUDE:      12/26/2023    11:13 AM   --   I have the following health issues associated with obesity High Blood Pressure    Polycystic Ovarian Syndrome    Asthma    Osteoarthritis (joint disease)       PAST MEDICAL HISTORY:  Past Medical History:   Diagnosis Date    Adjustment disorder with mixed anxiety and depressed mood     Anxiety     Arthritis     COPD (chronic obstructive pulmonary disease) (H)     Depressive disorder     Gallbladder problem     Removed when i was 24    History of blood transfusion     Had a blood transfusion after my hysterectomy in 2005    History of steroid therapy     Hypertension 10/27/2023    IBS (irritable bowel syndrome)     Immunosuppression (H24)     Incisional hernia, without obstruction or gangrene 2020    Infection 10/23/2023    Had  an E-Coli blood infection, pneumonia and sepsis    Lactose intolerance 05/12/2010    Migraine 05/12/2010    Migraines     Mild intermittent asthma without complication 05/12/2010    Osteoporosis     PCOS (polycystic ovarian syndrome)     Skin disease     Psoriasis    Tobacco use disorder     Vitamin D deficiency    Previously anorexic in the first half of life.     PAST SURGICAL HISTORY:  Past Surgical History:   Procedure Laterality Date    ABDOMEN SURGERY      APPENDECTOMY OPEN N/A     COLONOSCOPY N/A 12/4/2023    Procedure: COLONOSCOPY, WITH POLYPECTOMY AND BIOPSY;  Surgeon: Chris Wyatt MD;  Location: [facility]    CYSTECTOMY OVARIAN BENIGN  2001    HEPATICOJEJUNOSTOMY  2000    RnY jejunostomy from bile duct injury    HERNIORRHAPHY VENTRAL N/A 02/17/2012    open with mesh    HYSTERECTOMY TOTAL ABDOMINAL, BILATERAL SALPINGO-OOPHORECTOMY, COMBINED N/A 2005    LAPAROSCOPIC CHOLECYSTECTOMY  2006    complicated by bile duct injury    LAPAROSCOPIC HERNIORRHAPHY VENTRAL N/A 01/26/2021    Procedure: Open recurrent incarcerated ventral hernia repair X2;  Surgeon: Pradip Mckenzie MD;  Location: [facility]   Surgical report of bile duct injury/repair not available. Reportedly was done at [facility] but not retrievable in electronic record.    FAMILY HISTORY:   Family History   Problem Relation Age of Onset    Dementia Mother     Coronary Artery Disease Mother     Hypertension Mother     Hyperlipidemia Mother     Depression Mother     Cerebrovascular Disease Mother     Asthma Mother     Obesity Mother     Mental Illness Father         Schizophrenia    Colon Cancer Maternal Grandmother     Breast Cancer Maternal Grandmother     Other Cancer Maternal Grandmother     Asthma Maternal Grandmother     Obesity Maternal Grandmother     Osteoporosis Other        SOCIAL HISTORY:       12/26/2023    11:13 AM   Social History Questions Reviewed With Patient   Which best describes your employment status (select all that apply) I work full-time    I  work days   If you work, what is your occupation?    Which best describes your marital status    Who do you have in your support network that can be available to help you for the first 2 weeks after surgery? My  and children   Who can you count on for support throughout your weight loss surgery journey? My  and children       HABITS:      12/26/2023    11:13 AM   --   How often do you drink alcohol? 2-4 times a month   If you do drink alcohol, how many drinks might you have in a day? (one drink = 5 oz. wine, 1 can/bottle of beer, 1 shot liquor) 1 or 2   Do you currently use any of the following Nicotine products? e-cigarettes   Have you ever used any of the following nicotine products? Cigarettes   If you previously used any of these products, what year did you quit? 2023   Have you or are you currently using street drugs or prescription strength medication for which you do not have a prescription for? No   Do you have a history of chemical dependency (alcohol or drug abuse)? No   Reviewed need to be nicotine free in order to be cleared for bariatric surgery, high risks of complications such as ulcers/gastropathy should she relapse. One a scale of 1-10 in confidence in lifelong avoidance, she feels she's at a 5 today.  We'll focus on non surgical methods this upcoming year.       PSYCHOLOGICAL HISTORY:       12/26/2023    11:13 AM   Psychological History Reviewed With Patient   Have you ever attempted suicide? More than 10 years ago.   Have you had thoughts of suicide in the past year? No   Have you ever been hospitalized for mental illness or a suicide attempt? More than 10 years ago.   Do you have a history of chronic pain? Yes   Have you ever been diagnosed with fibromyalgia? Yes   Are you currently being treated for any of the following? (select all that apply) Anxiety   Are you currently seeing a therapist or counselor? No   Are you currently seeing a psychiatrist? No  "      ROS:      12/26/2023    11:13 AM   --   Skin Leg swelling    Varicose veins   HEENT Headaches    Teeth, dentures, or bridges needing repairs   Musculoskeletal Joint Pain    Back pain    Swelling of legs    Arthritis   Cardiovascular Shortness of breath with activity   Pulmonary Shortness of breath with activity    Snoring    Experience morning headaches   Gastrointestinal None of the above   Genitourinary Stress incontinence (losing urine when coughing, sneezing, etc.)   Hematological History of blood transfusions   Neurological Migraine headaches   Female only Polycystic ovarian syndrome (PCOS)    Post-menopausal   Number of teeth missing? All teeth gone, full dentures. Can chew well w/ her dentures..  Blood transfusions in the past were for after hysterectomy, lost too much blood.     EATING BEHAVIORS:      12/26/2023    11:13 AM   --   Have you or anyone else thought that you had an eating disorder? Yes   If you answered yes to the previous eating disorder question, select the types that apply from this list Other   If you answered \"Other\" to the type of eating disorder question above, please describe what it is I dont know   Do you currently binge eat (eat a large amount of food in a short time)? No   Are you an emotional eater? Yes   Do you get up to eat after falling asleep? No   Can you afford 3 meals a day? Yes   Can you afford 50-60 dollars a month for vitamins? Yes       EXERCISE:      12/26/2023    11:13 AM   --   How often do you exercise? Less than 1 time per week   What is the duration of your exercise (in minutes)? 45 Minutes   What types of exercise do you do? gym membership   What keeps you from being more active? Pain    I have recently been sick    Shortness of breath    Worried people will look at me     Lower self esteem at current weight.  MEDICATIONS:  Current Outpatient Medications   Medication Sig Dispense Refill    albuterol (ACCUNEB) 1.25 MG/3ML neb solution Take 1 vial (1.25 mg) by " nebulization every 6 hours as needed for shortness of breath / dyspnea or wheezing 90 mL 0    albuterol (PROAIR HFA/PROVENTIL HFA/VENTOLIN HFA) 108 (90 Base) MCG/ACT inhaler Inhale 1-2 puffs into the lungs every 4 hours as needed for shortness of breath, wheezing or cough 18 g 0    buPROPion (WELLBUTRIN) 75 MG tablet T5 mg daily for 1 week than increase to 75 mg twice daily 60 tablet 2    fluticasone (FLONASE) 50 MCG/ACT nasal spray Spray 2 sprays into both nostrils daily 16 g 0    gabapentin (NEURONTIN) 100 MG capsule Take 1 capsule (100 mg) by mouth 3 times daily 90 capsule 0    losartan (COZAAR) 25 MG tablet Take 1 tablet (25 mg) by mouth daily 90 tablet 1    Multiple Vitamins-Minerals (MULTIVITAMIN WOMEN PO) Take 2 chew tab by mouth daily gummy      naltrexone (DEPADE/REVIA) 50 MG tablet Take 25 mg daily for 1 week than increase to 25 mg twice daily 30 tablet 2    nicotine (NICODERM CQ) 21 MG/24HR 24 hr patch Place 1 patch onto the skin every 24 hours      nicotine polacrilex (NICORETTE) 4 MG gum Place 4 mg inside cheek every hour as needed for smoking cessation      nystatin (MYCOSTATIN) 498590 UNIT/GM external cream Apply topically 2 times daily 30 g 1    vitamin D3 (CHOLECALCIFEROL) 1.25 MG (08056 UT) capsule Take 1 capsule (50,000 Units) by mouth every 7 days 12 capsule 1    clobetasol propionate (CLOBEX) 0.05 % external shampoo Leave on scalp for 15 minutes then rinse. Use 1-2 times weekly. (Patient not taking: Reported on 11/29/2023) 118 mL 5    Fluocinolone Acetonide Scalp 0.01 % OIL oil Daily as needed for scalp psoriasis (Patient not taking: Reported on 11/29/2023) 118.28 mL 0    rizatriptan (MAXALT) 10 MG tablet Take 1 tablet (10 mg) by mouth at onset of headache for migraine May repeat in 2 hours. Max 3 tablets/24 hours. (Patient not taking: Reported on 12/27/2023) 30 tablet 1       ALLERGIES:  Allergies   Allergen Reactions    Azithromycin Anaphylaxis    Latex Hives    Oxycodone Nausea and Vomiting      TSH   Date Value Ref Range Status   09/05/2023 2.62 0.30 - 4.20 uIU/mL Final     Last Comprehensive Metabolic Panel:  Sodium   Date Value Ref Range Status   11/01/2023 138 135 - 145 mmol/L Final     Comment:     Reference intervals for this test were updated on 09/26/2023 to more accurately reflect our healthy population. There may be differences in the flagging of prior results with similar values performed with this method. Interpretation of those prior results can be made in the context of the updated reference intervals.    12/08/2020 140 133 - 144 mmol/L Final     Potassium   Date Value Ref Range Status   11/01/2023 4.3 3.4 - 5.3 mmol/L Final   07/24/2022 3.9 3.4 - 5.3 mmol/L Final   12/08/2020 4.8 3.4 - 5.3 mmol/L Final     Chloride   Date Value Ref Range Status   11/01/2023 103 98 - 107 mmol/L Final   07/24/2022 106 94 - 109 mmol/L Final   12/08/2020 105 94 - 109 mmol/L Final     Carbon Dioxide   Date Value Ref Range Status   12/08/2020 30 20 - 32 mmol/L Final     Carbon Dioxide (CO2)   Date Value Ref Range Status   11/01/2023 23 22 - 29 mmol/L Final   07/24/2022 28 20 - 32 mmol/L Final     Anion Gap   Date Value Ref Range Status   11/01/2023 12 7 - 15 mmol/L Final   07/24/2022 4 3 - 14 mmol/L Final   12/08/2020 5 3 - 14 mmol/L Final     Glucose   Date Value Ref Range Status   11/01/2023 97 70 - 99 mg/dL Final   07/24/2022 103 (H) 70 - 99 mg/dL Final   12/08/2020 101 (H) 70 - 99 mg/dL Final     Urea Nitrogen   Date Value Ref Range Status   11/01/2023 9.8 6.0 - 20.0 mg/dL Final   07/24/2022 8 7 - 30 mg/dL Final   12/08/2020 11 7 - 30 mg/dL Final     Creatinine   Date Value Ref Range Status   11/01/2023 0.70 0.51 - 0.95 mg/dL Final   12/08/2020 0.61 0.52 - 1.04 mg/dL Final     GFR Estimate   Date Value Ref Range Status   11/01/2023 >90 >60 mL/min/1.73m2 Final   12/08/2020 >90 >60 mL/min/[1.73_m2] Final     Comment:     Non  GFR Calc  Starting 12/18/2018, serum creatinine based estimated GFR  "(eGFR) will be   calculated using the Chronic Kidney Disease Epidemiology Collaboration   (CKD-EPI) equation.       Calcium   Date Value Ref Range Status   11/01/2023 9.5 8.6 - 10.0 mg/dL Final   12/08/2020 8.8 8.5 - 10.1 mg/dL Final     Bilirubin Total   Date Value Ref Range Status   11/01/2023 0.3 <=1.2 mg/dL Final   12/08/2020 0.5 0.2 - 1.3 mg/dL Final     Alkaline Phosphatase   Date Value Ref Range Status   11/01/2023 222 (H) 35 - 104 U/L Final   12/08/2020 137 40 - 150 U/L Final     ALT   Date Value Ref Range Status   11/01/2023 34 0 - 50 U/L Final     Comment:     Reference intervals for this test were updated on 6/12/2023 to more accurately reflect our healthy population. There may be differences in the flagging of prior results with similar values performed with this method. Interpretation of those prior results can be made in the context of the updated reference intervals.     12/08/2020 27 0 - 50 U/L Final     AST   Date Value Ref Range Status   11/01/2023 26 0 - 45 U/L Final     Comment:     Reference intervals for this test were updated on 6/12/2023 to more accurately reflect our healthy population. There may be differences in the flagging of prior results with similar values performed with this method. Interpretation of those prior results can be made in the context of the updated reference intervals.   12/08/2020 48 (H) 0 - 45 U/L Final     Lab Results   Component Value Date    ALT 34 11/01/2023    ALT 27 12/08/2020     No results found for: \"A1C\"  Recent Labs   Lab Test 10/31/22  0747 02/03/20  0941   CHOL 150 187   HDL 30* 39*   LDL 72 111*   TRIG 241* 187*     Her weight/waist of 52.75 inches low HDL and elevated triglycerides w/ hx of HTN meets criteria for metabolic syndrome (no hx of hyperglycemia or prediabetes).                 PHYSICAL EXAM:  Objective    /82 (BP Location: Right arm, Patient Position: Sitting, Cuff Size: Adult Small)   Ht 1.626 m (5' 4\")   Wt 116.6 kg (257 lb)   LMP " "08/26/2005   BMI 44.11 kg/m    /82 (BP Location: Right arm, Patient Position: Sitting, Cuff Size: Adult Small)   Ht 1.626 m (5' 4\")   Wt 116.6 kg (257 lb)   LMP 08/26/2005   BMI 44.11 kg/m    Body mass index is 44.11 kg/m .  Physical Exam   GENERAL: mid aged female, appears older than stated age. Pleasant, intelligent, ambulates independently.    MS: no gross musculoskeletal defects noted, no edema  Normal cognition. Addictive personality traits.      Sincerely,     Josh Ling MD  12/26/2023  5:07 PM          "

## 2023-12-26 NOTE — TELEPHONE ENCOUNTER
Pending Prescriptions:                       Disp   Refills    gabapentin (NEURONTIN) 100 MG capsule     90 cap*0            Sig: Take 1 capsule (100 mg) by mouth 3 times daily    Patient had ED/HOSP follow up with Di, forgot to refill her Gabapentin        Cha Braxton on 12/26/2023 at 9:12 AM

## 2023-12-27 ENCOUNTER — OFFICE VISIT (OUTPATIENT)
Dept: SURGERY | Facility: CLINIC | Age: 48
End: 2023-12-27
Payer: COMMERCIAL

## 2023-12-27 ENCOUNTER — LAB (OUTPATIENT)
Dept: LAB | Facility: CLINIC | Age: 48
End: 2023-12-27
Payer: COMMERCIAL

## 2023-12-27 VITALS
HEIGHT: 64 IN | DIASTOLIC BLOOD PRESSURE: 82 MMHG | BODY MASS INDEX: 43.87 KG/M2 | SYSTOLIC BLOOD PRESSURE: 130 MMHG | WEIGHT: 257 LBS

## 2023-12-27 DIAGNOSIS — K43.0 RECURRENT INCISIONAL HERNIA WITH INCARCERATION: ICD-10-CM

## 2023-12-27 LAB
ALBUMIN SERPL BCG-MCNC: 4.1 G/DL (ref 3.5–5.2)
ALP SERPL-CCNC: 137 U/L (ref 40–150)
ALT SERPL W P-5'-P-CCNC: 21 U/L (ref 0–50)
ANION GAP SERPL CALCULATED.3IONS-SCNC: 8 MMOL/L (ref 7–15)
AST SERPL W P-5'-P-CCNC: 23 U/L (ref 0–45)
BILIRUB SERPL-MCNC: 0.3 MG/DL
BUN SERPL-MCNC: 10.3 MG/DL (ref 6–20)
CALCIUM SERPL-MCNC: 9.3 MG/DL (ref 8.6–10)
CHLORIDE SERPL-SCNC: 104 MMOL/L (ref 98–107)
CREAT SERPL-MCNC: 0.7 MG/DL (ref 0.51–0.95)
DEPRECATED HCO3 PLAS-SCNC: 28 MMOL/L (ref 22–29)
EGFRCR SERPLBLD CKD-EPI 2021: >90 ML/MIN/1.73M2
GLUCOSE SERPL-MCNC: 97 MG/DL (ref 70–99)
HBA1C MFR BLD: 5.3 % (ref 0–5.6)
MAGNESIUM SERPL-MCNC: 2.2 MG/DL (ref 1.7–2.3)
POTASSIUM SERPL-SCNC: 4.6 MMOL/L (ref 3.4–5.3)
PROT SERPL-MCNC: 7.3 G/DL (ref 6.4–8.3)
SODIUM SERPL-SCNC: 140 MMOL/L (ref 135–145)
VIT D+METAB SERPL-MCNC: 38 NG/ML (ref 20–50)

## 2023-12-27 PROCEDURE — 82306 VITAMIN D 25 HYDROXY: CPT

## 2023-12-27 PROCEDURE — 99204 OFFICE O/P NEW MOD 45 MIN: CPT | Performed by: EMERGENCY MEDICINE

## 2023-12-27 PROCEDURE — 83735 ASSAY OF MAGNESIUM: CPT

## 2023-12-27 PROCEDURE — 36415 COLL VENOUS BLD VENIPUNCTURE: CPT

## 2023-12-27 PROCEDURE — 80053 COMPREHEN METABOLIC PANEL: CPT

## 2023-12-27 PROCEDURE — 83036 HEMOGLOBIN GLYCOSYLATED A1C: CPT

## 2023-12-27 NOTE — PATIENT INSTRUCTIONS
"Plan:  I don't think surgery is a good option for you in light of ongoing nicotine use/ extensive abdominal surgery.  Let's focus on continued non surgical weight loss this spring to get BMI under 40 for upcoming surgery plans.   Aiming for regular meals of 400-500kcal/meal with 20-25 grams of protein per meal, every 5-6 hours should keep you just ahead of your hunger. Stop soda pop and increase water consumption to 70 oz/day.   Follow up with dietician.  Check labs today.      What makes a person succeed with dramatic and sustained weight loss?    It's being at the right point in your life where you feel the need to lose the weight, not because anyone told you so but because of a voice inside of you that says, \"I am ready for this\".  You're now at a point where you may be feeling anxious, irritable and when you look in the mirror you do not recognize the person looking back.  Your healthy self is buried somewhere in that reflexion and you want to free it again.  This is the sort of motivation that leads to success, and it comes from you.    Because the only person that can lose that extra weight is you, I like my patients to focus on the mindset of being in Weight Loss Season.  This gives you permission to make the changes necessary to be consistent with the diet/activity and behavior changes that lead to successful, healthy weight loss.  Nearly any diet plan can work for weight loss, but keeping it healthy and nutrient based to prevent deficiencies/hair loss/fatigue or irritability is vital.  If you have a plan you want to try, we'll work with you to make sure no adjustments are needed to keep you healthy through your weight loss season and working with our Bariatric Dieticians you'll be given expert guidance to customize your diet plan to suit your particular needs. If you don't have your own ideas in mind, we are always happy to suggest well researched and validated plans that provide enough food to prevent " "hunger but still tap into your excess fat reserves and lose weight in a sustainable fashion.  There is great evidence that lean protein/healthy fat intake with good fiber intake while minimizing simple starches/carbs produces reliable/sustainable weight loss in most people. But some feel more connected to an intermittent fasting/fast mimicking or ketogenic diet.  These protocols can be hard for many to stick with and that's why we prefer the protein/healthy fat focused diets but if these alternative strategies appeal to you, we can work with you to optimize your knowledge and results with these tools.    Losing weight is a temporary commitment, but you need to be \"All In\" to have a good weight loss season.  To avoid frustration, you have to be willing to be on track 19 out of 20 days or even better than that. But, weight loss season is generally only 4-8 months in length. After that length of time, it can be hard to maintain a negative calorie balance and our brain, motivations and metabolism will usually bring you to a plateau that cannot be broken in this modern world where other commitments start to take priority. That's when we look to stabilize the weight loss you've achieved.  If you've reached your goal by that time, fantastic, and job well done.  If there is more to go, then after a few months of stabilization, we can usually attack that previous plateau and break into new territory.    Because of this time limitation, we want to really get to work right away and get into a sustainable routine ASAP.  One of the best predictors of how much weight you're going to lose throughout the season is how much you lose in the first 6 weeks, so prepare well and jump in with both feet.      Occasionally, people may feel like they cannot commit fully to the changes necessary and may want to change one thing at a time and \"get used to\" the idea of losing weight.  That is OK because that is where they are in their life, and " "they cannot fully commit for any number of reasons.  It's part of that internal motivation and they just haven't reached PRIORTY NUMBER ONE status yet. It's possible that what they need is more time to reach that point and I am always willing to work with people that want to \"dabble\", but understand, the amount of success obtained with half measures, is much less than half results. Behavior Change cannot occur until we prepare our minds, bodies and environment for what is to come, action!    As you go through your plan, look for things to keep your motivation rolling.  The most successful people have a goal or target/reward that they are working towards.  Having a reward that celebrates your new fitness, mobility and energy is the best sort because it will encourage you to do well with the weight maintenance phase and long term lifestyle changes that promotes keeping the weight off for the long term.  Usually, \"getting healthier\" or improving blood tests or losing weight so your clothes fit better is not as internally motivating as having a tangible reward.  A good weight loss season reward is one that isn't food based, is affordable, but something special:  Something you won't be getting/doing unless you achieve your goal.   It s important to keep to the rule of success:  in order to get the reward, your goal MUST be achieved. Write this reward down, where you can look at it daily and keep it in the front of your mind as you go through your weight loss season and it will help keep you on track.    Tools that help change behavior are vital for success. The most studied and most supported tool for weight loss is nothing more than writing down your food and weight every day.  Every Day.  Accurately and completely.  When you commit to weight loss season, this information tells you whether you're getting ENOUGH food to fuel your weight loss properly as well as teaches you the interaction between different foods you eat " and how your body responds with weight loss.  You'll see that sometimes after a heavy workout you don't see the scale move until 2-3 days later.  How saltier meals (chili for example) may make you retain water for 4-5 days before you see the weight come off, you'll get used to the mini-plateaus that develop after a good 3-8 lb drop in weight as well as how you break through if you keep working the diet as you should.    Weight loss is not a linear process, there are mini ups and downs.  Learning how your body loses weight and getting comfortable with that is very rewarding. The act of writing words on paper also solidifies your will power and commitment to the season of weight loss and that by itself changes your brain chemistry/appetite, motivation and prepares you for maximal success.     Behavior change is all about getting into a new routine.  The old habits and routine have to change because without changing the circumstances of how you gained your weight, it's unlikely you'll enjoy satisfying results. If you have snacking habits, like every time you walk through the kitchen you grab a little something, well, that habit has to change and be replaced by a new habit.  It can be something as simple as keeping a doodle pad on the counter that you make a few scribbles and then walk through the kitchen having not opened the cupboard, or starting with a glass of water and leaving the kitchen without anything else, or checking your food journal to see how many calories you have left for the day.  Boredom is the enemy as are the old habits. Break new ground and try to push those old habits into a deep hole.  There are apps/counseling options available that can help with some of the day to day urges/behaviors if you're struggling. One commercial product that does a good job is Noom.  Unfortunately, there is a subscription fee.    Finally, exercise always helps.  While not mandatory to lose weight, every little bit helps  "and exercise has so many other benefits that to not work it into your plan is to miss out on all the mood, sleep, stress and general health benefits that come from making yourself a little short of breath and sweaty at least 3-4 days out of the week.  The metabolism and calorie burn benefits aside, almost every chronic ailment in medicine gets better with proper, aerobic exercise.  Allow yourself to start slow and let your body prepare itself to accept harder training 4-6 weeks down the road, but start now and commit to a plan.  Whether you have the means to hire a , join a gym or just walk out your front door or go down to your basement for a video workout, get into a exercise routine and  after 3 weeks of at least 3 times a week exercise you should be at a point where you can slowly start ramping up 10% each week to our goal of at least 150-300minutes weekly of aerobic exercise and at least twice weekly resistance training/strenghtening with weights/bands or body weight exercises.     I am a big fan of modifying the free training plan, \"Couch to 5k\", for almost all of my patients. Just type it into [employer] or look it up on your smart phone conrad store.  To modify the plan,  you can use the training plan for whatever aerobic activity you do (bike/treadmill/elliptical/rower/pool/etc). During the \"jog\" intervals, you just move a little faster or harder or increase the tension or incline.  You use those little intervals to switch up the workout and recruit more muscles and pump the blood a little more and then recover again in the \"walk\" intervals by slowing down, decreasing the incline or turning down the tension.  3-4 days a week is not that much to ask and the benefits are enormous.  Start slow and develop the base from which you can then build on and reduce the risk of injury.  It's much more important 2-4 months from now to be enjoying your exercise then it is to over exert yourself at the start and hurt " yourself.  Starting slowly allows your body to accept the training better down the road when the exercise becomes crucial for weight maintenance.  Without exercise down the road during your maintenance phase, all this hard work you are about to put in can be undone. It usually takes about 100-300 calories a day of exercise to maintain a weight loss and our focus during weight loss season is to generate the routine/activities and hobbies that make that enjoyable/sustainable.    Thanks for taking this first and most important step in your weight loss season.  Commit to it and we will cheerlead you all the way to success.  When things get tough or off track we'll offer guidance and analysis and when you reach your goal we'll celebrate your success.  In the end, it is all about your success, your health and what you do with it.      Josh Ling MD  [facility]  [phone]    LEAN PROTEIN SOURCES  Getting 20-30 grams of protein, 3 meals daily, is appropriate for most people, some need more but more than about 40 grams per meal is not useful.  General rule is drinking one ounce of water per gram of protein eaten over the course of the day:  70 grams of protein each day, drink 70 oz of water.  Protein Source Portion Calories Grams of Protein                           Nonfat, plain Greek yogurt    (10 grams sugar or less) 3/4 cup (6 oz)  12-17   Light Yogurt (10 grams sugar or less) 3/4 cup (6 oz)  6-8   Protein Shake 1 shake 110-180 15-30   Skim/1% Milk or lactose-free milk 1 cup ( 8 oz)  8   Plain or light, flavored soymilk 1 cup  7-8   Plain or light, hemp milk 1 cup 110 6   Fat Free or 1% Cottage Cheese 1/2 cup 90 15   Part skim ricotta cheese 1/2 cup 100 14   Part skim or reduced fat cheese slices 1 ounce 65-80 8     Mozzarella String Cheese 1 80 8   Canned tuna, chicken, crab or salmon  (canned in water)  1/2 cup 100 15-20   White fish (broiled, grilled,  baked) 3 ounces 100 21   [place]/Tuna (broiled, grilled, baked) 3 ounces 150-180 21   Shrimp, Scallops, Lobster, Crab 3 ounces 100 21   Pork loin, Pork Tenderloin 3 ounces 150 21   Boneless, skinless chicken /turkey breast                          (broiled, grilled, baked) 3 ounces 120 21   [place], [place], [place], and Venison 3 ounces 120 21   Lean cuts of red meat and pork (sirloin,   round, tenderloin, flank, ground 93%-96%) 3 ounces 170 21   Lean or Extra Lean Ground Turkey 1/2 cup 150 20   90-95% Lean [place] Burger 1 syed 140-180 21   Low-fat casserole with lean meat 3/4 cup 200 17   Luncheon Meats                                                        (turkey, lean ham, roast beef, chicken) 3 ounces 100 21   Egg (boiled, poached, scrambled) 1 Egg 60 7   Egg Substitute 1/2 cup 70 10   Nuts (limit to 1 serving per day)  3 Tbsp. 150 7   Nut [place] (peanut, almond)  Limit to 1 serving or less daily 1 Tbsp. 90 4   Soy Burger (varies) 1  15   Garbanzo, Black, [place] Beans 1/2 cup 110 7   Refried Beans 1/2 cup 100 7   Kidney and Lima beans 1/2 cup 110 7   Tempeh 3 oz 175 18   Vegan crumbles 1/2 cup 100 14   Tofu 1/2 cup 110 14   Chili (beans and extra lean beef or turkey) 1 cup 200 23   Lentil Stew/Soup 1 cup 150 12   Black Bean Soup 1 cup 175 12       Example Meal Plan for a 5846-9594 Calorie Diet:    In order to fuel your weight loss properly and avoid hunger-induced overeating later in the day, for your height and weight, you will enjoy the most success by following the diet below or similar with adjustments based on your particular tastes and preferences.  Exercise may influence speed, amount of weight loss further.     I recommend getting into a meal routine and keeping it similar day to day in the beginning so you don t have to think too hard about what you re going to make/eat.  Keep snacks healthy, ideally containing protein and some vegetables.  Non-processed food is preferable to packaged items.  Eat at  least a few crunchy green vegetables if having a snack, which should be 2-3 hours after your mealtimes(prepare these ahead of time for ease of use).  Drink 64 oz -80 oz of water daily for most, some of you will need more and we'll discuss it at your visit if that is the case.      When changing our diet,  we can often mistake thirst for hunger or just have some distracted eating habits that we need to break free from ('bored/mindless eating', screen time,work, driving,etc).  A glass of water and reconsideration of our hunger is often all that is needed.  Having the urge is not the problem, but watching it pass by without acting on it is the goal.    If you re having hunger problems, add a protein drink/snack to your morning hours or afternoon snack with at least 20grams of protein and not too much sugar (under 10g).  A carton of higher protein/low sugar yogurt can work as well.  If the urge to snack is overwhelming and not satiated, try going for a 10 minute walk/exercise, come home and drink a glass of water and if still hungry, have a  calorie snack (handful of raw/sprouted nuts, veggies and string cheese, protein bar, etc).  Savor it.    It is better to have a large breakfast, a moderate lunch and a smaller dinner to fuel your day.  People lose 10-15% more weight during their weight loss season with this strategy. Optimizing your protein intake at each meal will further keep you more satisfied while eating less food overall.  Getting exercise in early has also been shown to offer the best results (before breakfast ideally but anytime is the right time to exercise if that is not an option for you).    To make sure you re getting adequate vitamins and minerals during weight loss, I recommend one complete multivitamin a day of your choice.  Consider a probiotic and taking some vitamin D 2000 IU daily.    Let supper be your last meal of the day and ideally try to have at least 12 hours between supper and  breakfast the next day to tap into some beneficial overnight fasting dynamics.  Midnight snacks need to go away. Water in the evening is fine, unsweetened, non caffeinated herbal tea is helpful as well.  Consolidating your meals within a 8-12 hour period of your day will help tap into these additional metabolic benefits and tends to keep your appetite up for breakfast, further helping to stay on track.  For most of my patients, I don't recommend an intermittent fasting style diet (many find it hard to fit in their lifestyle) but an overnight fast is very doable for most patients and helps regulate our hunger drives a little better.  This makes it very important to nail good intake at all three meals to feel satisfied/energized and still lose weight.      If evening snacking desires are high, consider a glass of fiber supplement for some additional fullness (metamucil or similar). Most of us don't get the 25-30 grams per day of fiber that promotes good gut health/satiety.  Benefiber, metamucil, citrucel are reasonable/affordable options for most people.  Inulin, chicory, psyllium husk are reasonable options but start slow and low in the dose to avoid gas/bloating until your gut gets acclimated (ramping up to 5-10 grams per day of supplemental fiber after 3-4 weeks if needed).      Example Meal Plan:  Breakfast: 450-475 Calories  1 egg cooked on low in olive oil:   calories.  5oz Greek Yogurt (Fage plain classic: ~150 jcarlos)  Handful of Berries of your choice (about a calorie per berry or 20-40cal per handful)    cup(cooked) of  old fashioned oatmeal or 1/2 cup(cooked) steel cut oats. (150 jcarlos)  Sprinkle amount of brown sugar and a pat of butter. (40 jcarlos)  Glass of  Water  Black coffee or unsweetened Tea (0calories).      2-3 hours Later Snack: (195 calories).  Glass of water  One string Cheese (80 calories) or 4 oz creamed cottage cheese (115 calories) with  Crunchy Celery sticks (less than 10 calories per large  stalk) 2 stalks. (20 calories)    of a  Large Banana or   of a Large Apple (60 calories):  eat second half at lunch or afternoon snack.     Lunch:300 -350 calories   Chicken Breast  (baked/broiled/roasted/grilled)  4-6 oz.  (125-180 jcarlos), BBQ sauce/hot sauce/mustard/seasoning is free. Just use a reasonable amount. Or a can of tuna with 1 tablespoon mayonnaise.  Salad: lettuce, any other veggies (cucumbers, green peppers/celery you like and a small drizzle of dressing to just flavor.  Go as big on the veggies as you like,  as they are practically calorie free.   A whole, 8 inch cucumber is 45 calories, a whole green pepper is 23 calories, a stalk of celery is 9 calories.  [place] Dressing is 60 calories per tablespoon..so moderate your desired dressing or do a drizzle of olive oil and splash of balsamic vinegar on top,  Total calories unlikely to be over 150 even with dressing.  Glass of Water.    Option for lunch is meal replacement protein drink/smoothie.  Need at least 20 grams of protein and eat the rest of your apple/banana from the morning snack.      Afternoon Snack: 150-200 calories   Cheese Stick or cottage cheese again  and a fresh fruit OR  Granola Bar (protein Bar acceptable if under 200 calories OR  Homemade smoothies:  8oz skim milk,  a handful of berries (fresh or frozen and a serving of protein powder such as BiPro or Dee Dee sWhey for example.  If you don't like dairy, make with 8oz water, one small banana, handful of berries and the protein powder, add any veggies you want as well:  roughly 200 calories.   Glass of Water    Dinner: 325 calories  4oz of fresh, [place] salmon.  Broiled (salt/pepper/dill) for about 8-8.5 minutes (200calories) or  4oz filet mignon steak or sirloin steak  Salad or vegetable sautéed lightly in olive oil or   Broccoli 1.5  cups chopped and steamed  or micro-waved in a little water (75 calories)  Glass of Water,    Cup of herbal tea (unsweetened, caffeine  "free)      Herbs and seasonings are encouraged to flavor your foods/vegetables.  Make your food delicious.      Tips for Success:  1.  Prepare proteins ahead of time (broil chicken breasts in bulk so you can grab and go), steel cut oats/lentils can be stored in casserole dish/bowl in the fridge for quick scoop in the morning and rewarm in microwave, make use of crock pot recipes (watch salt content).  Making meals that cover 3-4 future meals is an easy way to stay on track.  2.  Drink a 8-12 oz glass of water every 2-3 hours when awake.  We often mistake hunger for thirst, especially when losing weight.  3. Remember your Reward and Motivation when things get hard.  4.  Weigh yourself every morning and record, you'll stay on track better and learn how our biorhythms, diet and elimination patterns show up on the scale. Don't worry about 1 or 2 day patterns, but when on track you'll notice good trend downward of weight over 3-4 day segments.  Plateaus tend to resolve after 4-8 days in most cases if you stay consistent with your plan.  These are natural and part of weight loss, even if you're perfect with your plan execution.  5. Call if problems/concerns.  One Season is a great tool to stay in touch and provide weekly outside accountability. Check in with questions or if you want to brag.  6.  Find a handful of meals/foods that keep you on track and feeling good and get into a routine that is sustainable for you.  It's OK to have a routine that works for you.  7.  Consider taking a complete multivitamin just to make sure all micronutrients are adequate during weight loss.  8. If losing hair/brittle nails it usually means you are not taking enough protein.  Minimum goal is 60 grams daily of protein for smaller women, 80 grams a day for men. Consider taking Biotin as supplement or a \"Hair and Nail\" multivitamin.  On-the-Go Breakfast Ideas  As of 2015, the latest research shows what a huge impact eating breakfast has on " losing weight and feeling your best. People lose more weight when they make breakfast their biggest meal of the day compared to Dinner, but even if you cannot go to that degree, getting a breakfast that has at least 20 grams of protein and even a moderate amount of fat is ideal for maintaining good energy through the day and limits overeating in the evening hours.  The following are some quick and easy suggestions for at least getting something of substance into your body in the morning.  Enjoy!    Eating breakfast within 90 minutes of waking up is an important part of taking care of your body on a restricted calorie diet plan.  After sleeping for hours, your body is in need of fuel.  An ideal breakfast is a combination of protein, whole-grain carbohydrates, or fruit.  Here s why:    -Protein digests very slowly in the body, helping you feel more satisfied.  -Whole grains provide dietary fiber, which also digests slowly and helps keep your gut clean.  -Fruit is a great source of vitamins, minerals, and fiber.     Each one of these breakfast combinations has between 200-300 calories and 15-20 grams of protein.  Feel free to mix and match!    Bone Broth (chicken bone broth or beef bone broth) is a great way to boost protein content. 8oz of bone broth will typically have 9-12grams of protein for 40kcal of energy.    Protein: Choose  -1/2 cup low-fat cottage cheese  -2 hard boiled eggs , or one cooked in olive oil (low/slow heat).  -1 low fat string cheese stick  -1 Tablespoon natural peanut butter  -mygola vegetarian sausage syed (found in freezer section)  -1 slice lowfat cheese  -6 oz 2% or lowfat Greek yogurt, such as Fage or Oikos.    PLUS    Whole Grains:  Choose   -1 whole wheat English muffin  -1 whole wheat homero, half  -1/2 Fiber One frozen muffin, thawed  -1/2 Fiber One toaster pastry  -1 whole wheat bagel thin  -1/2 cup Kashi cereal  -1 Kashi waffle (or other whole grain high-fiber waffle)  Aim  for whole grain/sprouted breads with at least 3g of fiber/slice if having bread. [place] is one such brand.    OR    Fruit: Choose  -1/3 cup blueberries  -1/2 banana (or a plantain- similar to a banana, yet smaller)  -1/2 cup cantaloupe cubes  -1 small apple  -1 small orange  -1/2 cup strawberries  -handful raspberries/blackberries (each berry is about 1 calorie).    *Adapted from Diabetes Living, Fall 20    Ten Breakfasts Under 250 calories    Ideally, getting between 350-600 calories  (depending on starting height and weight)for breakfast is ideal for avoiding hunger later in the day, adjust/add to the following accordingly:    One- 250 calories, 8.5 g protein  1 slice whole-grain toast   1 Tbsp peanut butter    banana    Two- 250 calories, 8 g protein    cup nonfat/lowfat yogurt  1/3rd cup diced no-sugar peaches  1/3rd cup cereal (like Special K, Cheerios, or bran flakes)    Three- 250 calories, 25 g protein  1 egg scrambled with 1 oz skim milk    cup shredded cheddar    whole grain English muffin  1 oz [place] monsalve  1 tsp margarine spread    Four- 225 calories, 25 g protein  1/2 cup Kashi Go-Lean cereal    cup skim milk mixed with 1 scoop Bariatric Advantage protein powder    cup no-sugar diced pears    Five- 250 calories, 20 g protein    cup oatmeal prepared with skim milk, 1 scoop protein powder, and sugar-free maple syrup    Six- 200 calories, 5 g protein  1 whole grain waffle, toasted  1 tablespoon creamy peanut or almond butter    Seven-  250 calories, 19 g protein  Breakfast sandwich: 1 slice whole grain toast, cut in half.  Add 1 scrambled egg and one slice cheddar  cheese.    Eight-  250 calories, 15 g protein  2 eggs scrambled with 1/3 cup frozen spinach (heat before adding to eggs) and 2 tablespoons low fat cream cheese.    Nine-  150 calories, 15 g protein  2/3rd cup cottage cheese    cup cantaloupe    Ten- 200 calories, 20 g protein  Fruit smoothie made with 4 oz. nonfat Greek yogurt,   cup  berries, 1 scoop protein powder, and 4 oz skim milk.    Ten Lunches Under 250 Calories    Aim for lunch to be around 300-400 calories a day when trying to lose weight and get that protein in!    One- 200 calories, 11 g protein  1/3 cup tuna salad made with light barrow on 1 slice whole grain bread  1 small peeled apple    Two- 250 calories, 16 g protein  1/3 cup lowfat cottage cheese    cup cooked green beans    small fruit cocktail (in natural juice)    Three- 200 calories, 11 g protein    grilled cheese sandwich on whole grain bread with lowfat cheese  2/3rd cup of tomato soup    Four- 250 calories, 22 g protein  Deli wrap: 1 oz sliced turkey, 1 oz sliced ham, 1 oz sliced chicken rolled up with 1 slice low-fat cheese  1 small orange    Five- 250 calories, 28 g protein  2/3rd cup chili with 1 oz shredded cheese  4 saltine crackers    Six- 250 calories, 22 g protein  1 cup fresh spinach with 2 oz chicken, 1/3rd cup mandarin oranges, and 2 tablespoons sliced almonds with 1 tablespoon  vinaigrette dressing    Seven- 200 calories, 11 g protein  1 Tbsp sugar-free preserves and 1 Tbsp peanut butter on 1 slice whole grain toast    cup nonfat/lowfat Greek yogurt    Eight- 250 calories, 18 g protein  1 small soft-shell chicken taco with 1 oz shredded cheese, lettuce, tomato, salsa, and 1 Tbsp light sour cream    cup black beans    Nine- 225 calories, 13 g protein  2 ounces baked chicken  1/4 cup mashed potatoes    cup green beans    Ten- 200 calories, 21 g protein  Deli homero: 2 oz roast beef or other deli meat with 1 tsp Alo mayonnaise and sliced tomato, onion, and lettuce  1/3rd cup cottage cheese      Ten Dinners Under 300 calories    If you're eating a large breakfast and medium lunch, keep dinner small.  300-400 calories is ideal for most people depending on their caloric needs.    One- 300 calories, 12 g protein  1-inch thick slice of turkey meatloaf    cup baked butternut squash    Two- 200 calories, 9 g  protein  Bread-less BLT: 3 slices turkey monsalve, sliced tomato, wrapped in a large lettuce leaf    cup peeled fruit    Three- 275 calories, 36 g protein  3 oz roasted chicken    cup cooked broccoli    cup shredded cheddar cheese    cup unsweetened applesauce    Four- 200 calories, 25 g protein  3 oz baked tilapia  1/3rd cup cooked carrots    cup yogurt    Five- 250 calories, 20 g protein  Grilled ham  n  Swiss: spread 2 tsp ghee or butter on 1 slice of whole grain bread.  Cut bread in half, layer 2 oz deli ham with 1 piece of Swiss cheese and grill until cheese is melted.    cup cooked vegetables    Six- 250 calories, 18 g protein  Vegetarian cheeseburger: 1 [place] cheeseburger topped with lettuce, onion, tomato, and ketchup/mustard    cup sweet potato fries    Seven- 250 calories, 18 g protein  Pork pot roast: 2 oz roasted pork loin, 1/3rd cup roasted carrots,   medium potato, cooked with   cup gravy    Eight- 330 calories, 25 g protein  2 oz meatballs (about 2 small meatballs)    cup spaghetti sauce  1/2 piece toast topped with 1 tsp ghee or butterand topped with garlic powder, toasted in oven    Nine- 250 calories, 16 g protein  Mexican pizza: one 8  corn tortilla topped with 2 oz chicken,   cup salsa, 2 tablespoons black beans, 2 tablespoons shredded cheese.  Bake until cheese is melted.    Ten- 250 calories, 22 g protein  Shrimp stir-michele: 3 oz cooked shrimp, 1/6th onion,   pepper,   cup chopped carrots sautéed in 1 tablespoon olive oil, topped with 2 tablespoons stir michele sauce and a pinch of sesame seeds        150 Calories or Less Snack Ideas   1 hardboiled egg with   cup berries  1 small apple with 1 hardboiled egg  10 almonds with   cup berries  2 clementines with 1 light string cheese  1 light string cheese with   sliced apple  1 light string cheese wrapped in 2 slices of turkey  4 100% whole wheat crackers (e.g. Triscuit) with 1 light string cheese    c. cottage cheese with   cup fruit and 1 Tbsp sunflower  seeds     cup cottage cheese with   of an avocado     can tuna fish with 1 cup sliced cucumbers     cup roasted garbanzo beans with paprika and cayenne pepper    baked sweet potato with   cup chili beans or   cup cottage cheese  2 oz. nitrate free turkey slices with 1 cup carrots  1 container (6 oz) of low sugar (less than 10 grams of sugar) greek yogurt   3 Tablespoons of hummus with 1 cup sliced bell peppers   2 Tablespoons of hummus with 15 baby carrots  4 Tablespoons ranch dip made with plain Greek Yogurt and 3 mini cucumbers  1/4 cup nuts (any kind)  1 Tablespoon peanut butter with 1 stalk celery   1 dill pickle wrapped in 1-2 slices of deli ham with 1 tsp of mayonnaise/mustard..    Bupropion/Naltrexone Patient Information (trade name, CONTRAVE)    This medication is used for weight loss.  In studies people lost an averaged 5-8% of their starting weight compared to placebo (0-1%).    Often, this medication will be written as 2 separate prescriptions to improve cost to the patient. The trade name drug CONTRAVE comes as a combination of 8mg naltrexone/90mg bupropion and a 3 week escalating dose regimen going from one tablet daily up to a max of 2 tabs twice daily:  Week 1: one tablet in the daytime.  Week 2: one tablet A.M.  And One tablet in the PM.  Week 3: 2 tabs AM  And One tablet in the PM.  Week 4: 2 tabs AM and 2 tabs PM.     The generic Rx I write for is as follows:  I recommend starting One 75 mg bupropion and 1/2 a tablet of naltrexone (25mg) daily for 10 days and then moving up to One 75 mg bupropion tablet twice daily and half a naltrexone tablet twice daily.  Depending on your results/tolerance, we may increase the Bupropion up to a maximum of 150 mg twice daily of the immediate release medication or one Bupropion  mg-300 mg daily. Sometimes we'll have to go slower with the dose escalation.    Like any weight loss medication, showing results and having tolerable or no side effects is vital to  continuing therapy and if either no significant weight loss after 8-12 weeks or too many side effects then we would discontinue therapy and look for alternative options/assistance.    AVOID taking with high fat meals as this increases bupropion levels, but some food in the stomach can help decrease nausea side effects from the Naltrexone.    People who take Metoprolol may need to decrease their dose of metoprolol as the bupropion increases the effect of metoprolol 2-4 fold in some cases and low blood pressure/low heart rate could occur.    Patients should STOP CONTRAVE if they have a severe allergic reaction to CONTRAVE.  Patients should be told that CONTRAVE should be discontinued and not restarted if they experience a seizure while on treatment.    Patients should be advised that the excessive use or abrupt discontinuation of alcohol,benzodiazepines, antiepileptic drugs, or sedatives/hypnotics can increase the risk of seizure.    Patients should be advised to minimize or avoid use of alcohol.    Patients should be advised that if they previously used opioids, they may be more sensitive to lower doses of opioids and at risk of accidental overdose should they use opioids after CONTRAVE treatment is discontinued or temporarily interrupted.  Patients should be advised that because naltrexone, a component of CONTRAVE, can block the effects of opioids, they will not perceive any effect if they attempt to self-administer anyopioid drug in small doses while on CONTRAVE. Further advise patients that the attempt to administer large doses of any opioid or to bypass the blockade while on CONTRAVE may lead to serious injury, coma, or death.    Patients should be off all opioids for a minimum of 7 to 10 days before starting CONTRAVE in order to avoid precipitation of withdrawal. Advise patients they should not take CONTRAVE if they have any symptoms of opioid withdrawal.   Patients should be advised to call their healthcare  provider if they experience increased blood pressure or heart rate.  Patients should be advised to notify their healthcare provider if they are taking, or plan to take, any prescription or over-the-counter drugs. Concern is warranted because CONTRAVE and other drugs may affect each other s metabolism.  Patients should be advised to notify their healthcare provider if they become pregnant, intend to become pregnant, or are breastfeeding during therapy.  CONTRAVE should NOT be taken if pregnant or nursing.    Patients with type 2 diabetes mellitus on antidiabetic therapy should be advised to monitor their blood glucose levels and report symptoms of hypoglycemia to their healthcare provider(s).    Patients should be advised to swallow CONTRAVE tablets whole so that the release rate is not altered. Do not chew, divide, or crush tablets if using the Trade drug Contrave, dividing the generic naltrexone is fine.    As always, if any questions/concerns, don't hesitate to call us at the [facility] clinic.    Josh Ling MD  [phone].  12/27/2023

## 2023-12-27 NOTE — LETTER
"    2023         RE: Elza Greer  [address] [facility] [address]        Dear Colleague,    Thank you for referring your patient, Elza Greer, to the [facility] [place]. Please see a copy of my visit note below.        New Bariatric Surgery Consultation Note    2023    RE: Elza Greer  MR#: [id number]  : 1975      Referring provider:        No data to display                Chief Complaint/Reason for visit: evaluation for possible weight loss surgery    Dear Di Shea, NASH CNP (General),    I had the pleasure of seeing your patient, Elza Greer, to evaluate her obesity and consider her for possible weight loss surgery. As you know, Elza Greer is 48 year old.  She has a height of 5' 4\", a weight of 257 lbs 0 oz, and calculated Body mass index is 44.11 kg/m ..  Today we discussed our Bariatric Surgery program to address their weight related health. Given her extensive previous abdominal surgery, ongoing nicotine use, I do not feel she is a candidate for bariatric surgery.  We discussed proceeding non surgically in order to get her weight down for planned hernia repair this Spring of 2024. She was told her BMI should be under 40 to allow reasonable long term repair by her surgeon and that will be our short term goal. .     We discussed a toolbox approach to weight reduction utilizing protein rich/restricted diet aiming for 600-750kcal deficit daily.   We discussed the role of mindful diet, activity/exercise and medication support to achieve a reliable, health improving weight loss today. She's limited in use of GLP1 RA therapy by lack of coverage by her insurance and her PCP has already tried her on phentermine/topamax and currently on bupropion/naltrexone. She's having some difficulty tolerating the bupropion as it feels \"more stimulating than the phentermine was\" but seems to tolerate the naltrexone well. Because " of some of the stimulatory effects, she's only using both medications with breakfast but I've counseled her to see if evening naltrexone may help some of her craving control.  An ongoing theme throughout her food recall/weight experience is a reliance on chemical coping mechanisms for stress/anxiety with higher sugar diet/sweet cravings and nicotine/alcohol/soda pop as her main coping tools currently. She has some ambivalence about embracing a healthy diet for weight reduction and motivational interviewing techniques were utilized to hopefully help her transition.     Assessment & Plan  Problem List Items Addressed This Visit    None  Visit Diagnoses       Recurrent incisional hernia with incarceration        Relevant Orders    Comprehensive metabolic panel    Hemoglobin A1c (Completed)    Magnesium    25- OH-Vitamin D                 HISTORY OF PRESENT ILLNESS:      12/26/2023    11:13 AM   Weight Loss History Reviewed with Patient   How long have you been overweight? Since late 20's to early 40's   What is the most that you have ever weighed 268   What is the most weight you have lost? 35   I have tried the following methods to lose weight Watching portions or calories    Exercise    Slimfast    OTC Medications    Prescription Medications   I have tried the following weight loss medications? (Check all that apply) Topamax/Topiramate    Phentermine/Adipex-p/Suprenza    Naltrexone   .    CO-MORBIDITIES OF OBESITY INCLUDE:      12/26/2023    11:13 AM   --   I have the following health issues associated with obesity High Blood Pressure    Polycystic Ovarian Syndrome    Asthma    Osteoarthritis (joint disease)       PAST MEDICAL HISTORY:  Past Medical History:   Diagnosis Date     Adjustment disorder with mixed anxiety and depressed mood      Anxiety      Arthritis      COPD (chronic obstructive pulmonary disease) (H)      Depressive disorder      Gallbladder problem     Removed when i was 24     History of blood  transfusion     Had a blood transfusion after my hysterectomy in 2005     History of steroid therapy      Hypertension 10/27/2023     IBS (irritable bowel syndrome)      Immunosuppression (H24)      Incisional hernia, without obstruction or gangrene 2020     Infection 10/23/2023    Had an E-Coli blood infection, pneumonia and sepsis     Lactose intolerance 05/12/2010     Migraine 05/12/2010     Migraines      Mild intermittent asthma without complication 05/12/2010     Osteoporosis      PCOS (polycystic ovarian syndrome)      Skin disease     Psoriasis     Tobacco use disorder      Vitamin D deficiency    Previously anorexic in the first half of life.     PAST SURGICAL HISTORY:  Past Surgical History:   Procedure Laterality Date     ABDOMEN SURGERY       APPENDECTOMY OPEN N/A      COLONOSCOPY N/A 12/4/2023    Procedure: COLONOSCOPY, WITH POLYPECTOMY AND BIOPSY;  Surgeon: Chris Wyatt MD;  Location: [facility]     CYSTECTOMY OVARIAN BENIGN  2001     HEPATICOJEJUNOSTOMY  2000    RnY jejunostomy from bile duct injury     HERNIORRHAPHY VENTRAL N/A 02/17/2012    open with mesh     HYSTERECTOMY TOTAL ABDOMINAL, BILATERAL SALPINGO-OOPHORECTOMY, COMBINED N/A 2005     LAPAROSCOPIC CHOLECYSTECTOMY  2006    complicated by bile duct injury     LAPAROSCOPIC HERNIORRHAPHY VENTRAL N/A 01/26/2021    Procedure: Open recurrent incarcerated ventral hernia repair X2;  Surgeon: Pradip Mckenzie MD;  Location: [facility]   Surgical report of bile duct injury/repair not available. Reportedly was done at [facility] but not retrievable in electronic record.    FAMILY HISTORY:   Family History   Problem Relation Age of Onset     Dementia Mother      Coronary Artery Disease Mother      Hypertension Mother      Hyperlipidemia Mother      Depression Mother      Cerebrovascular Disease Mother      Asthma Mother      Obesity Mother      Mental Illness Father         Schizophrenia     Colon Cancer Maternal Grandmother      Breast Cancer Maternal Grandmother       Other Cancer Maternal Grandmother      Asthma Maternal Grandmother      Obesity Maternal Grandmother      Osteoporosis Other        SOCIAL HISTORY:       12/26/2023    11:13 AM   Social History Questions Reviewed With Patient   Which best describes your employment status (select all that apply) I work full-time    I work days   If you work, what is your occupation?    Which best describes your marital status    Who do you have in your support network that can be available to help you for the first 2 weeks after surgery? My  and children   Who can you count on for support throughout your weight loss surgery journey? My  and children       HABITS:      12/26/2023    11:13 AM   --   How often do you drink alcohol? 2-4 times a month   If you do drink alcohol, how many drinks might you have in a day? (one drink = 5 oz. wine, 1 can/bottle of beer, 1 shot liquor) 1 or 2   Do you currently use any of the following Nicotine products? e-cigarettes   Have you ever used any of the following nicotine products? Cigarettes   If you previously used any of these products, what year did you quit? 2023   Have you or are you currently using street drugs or prescription strength medication for which you do not have a prescription for? No   Do you have a history of chemical dependency (alcohol or drug abuse)? No   Reviewed need to be nicotine free in order to be cleared for bariatric surgery, high risks of complications such as ulcers/gastropathy should she relapse. One a scale of 1-10 in confidence in lifelong avoidance, she feels she's at a 5 today.  We'll focus on non surgical methods this upcoming year.       PSYCHOLOGICAL HISTORY:       12/26/2023    11:13 AM   Psychological History Reviewed With Patient   Have you ever attempted suicide? More than 10 years ago.   Have you had thoughts of suicide in the past year? No   Have you ever been hospitalized for mental illness or a suicide attempt?  "More than 10 years ago.   Do you have a history of chronic pain? Yes   Have you ever been diagnosed with fibromyalgia? Yes   Are you currently being treated for any of the following? (select all that apply) Anxiety   Are you currently seeing a therapist or counselor? No   Are you currently seeing a psychiatrist? No       ROS:      12/26/2023    11:13 AM   --   Skin Leg swelling    Varicose veins   HEENT Headaches    Teeth, dentures, or bridges needing repairs   Musculoskeletal Joint Pain    Back pain    Swelling of legs    Arthritis   Cardiovascular Shortness of breath with activity   Pulmonary Shortness of breath with activity    Snoring    Experience morning headaches   Gastrointestinal None of the above   Genitourinary Stress incontinence (losing urine when coughing, sneezing, etc.)   Hematological History of blood transfusions   Neurological Migraine headaches   Female only Polycystic ovarian syndrome (PCOS)    Post-menopausal   Number of teeth missing? All teeth gone, full dentures. Can chew well w/ her dentures..  Blood transfusions in the past were for after hysterectomy, lost too much blood.     EATING BEHAVIORS:      12/26/2023    11:13 AM   --   Have you or anyone else thought that you had an eating disorder? Yes   If you answered yes to the previous eating disorder question, select the types that apply from this list Other   If you answered \"Other\" to the type of eating disorder question above, please describe what it is I dont know   Do you currently binge eat (eat a large amount of food in a short time)? No   Are you an emotional eater? Yes   Do you get up to eat after falling asleep? No   Can you afford 3 meals a day? Yes   Can you afford 50-60 dollars a month for vitamins? Yes       EXERCISE:      12/26/2023    11:13 AM   --   How often do you exercise? Less than 1 time per week   What is the duration of your exercise (in minutes)? 45 Minutes   What types of exercise do you do? gym membership   What " keeps you from being more active? Pain    I have recently been sick    Shortness of breath    Worried people will look at me     Lower self esteem at current weight.  MEDICATIONS:  Current Outpatient Medications   Medication Sig Dispense Refill     albuterol (ACCUNEB) 1.25 MG/3ML neb solution Take 1 vial (1.25 mg) by nebulization every 6 hours as needed for shortness of breath / dyspnea or wheezing 90 mL 0     albuterol (PROAIR HFA/PROVENTIL HFA/VENTOLIN HFA) 108 (90 Base) MCG/ACT inhaler Inhale 1-2 puffs into the lungs every 4 hours as needed for shortness of breath, wheezing or cough 18 g 0     buPROPion (WELLBUTRIN) 75 MG tablet T5 mg daily for 1 week than increase to 75 mg twice daily 60 tablet 2     fluticasone (FLONASE) 50 MCG/ACT nasal spray Spray 2 sprays into both nostrils daily 16 g 0     gabapentin (NEURONTIN) 100 MG capsule Take 1 capsule (100 mg) by mouth 3 times daily 90 capsule 0     losartan (COZAAR) 25 MG tablet Take 1 tablet (25 mg) by mouth daily 90 tablet 1     Multiple Vitamins-Minerals (MULTIVITAMIN WOMEN PO) Take 2 chew tab by mouth daily gummy       naltrexone (DEPADE/REVIA) 50 MG tablet Take 25 mg daily for 1 week than increase to 25 mg twice daily 30 tablet 2     nicotine (NICODERM CQ) 21 MG/24HR 24 hr patch Place 1 patch onto the skin every 24 hours       nicotine polacrilex (NICORETTE) 4 MG gum Place 4 mg inside cheek every hour as needed for smoking cessation       nystatin (MYCOSTATIN) 714574 UNIT/GM external cream Apply topically 2 times daily 30 g 1     vitamin D3 (CHOLECALCIFEROL) 1.25 MG (87117 UT) capsule Take 1 capsule (50,000 Units) by mouth every 7 days 12 capsule 1     clobetasol propionate (CLOBEX) 0.05 % external shampoo Leave on scalp for 15 minutes then rinse. Use 1-2 times weekly. (Patient not taking: Reported on 11/29/2023) 118 mL 5     Fluocinolone Acetonide Scalp 0.01 % OIL oil Daily as needed for scalp psoriasis (Patient not taking: Reported on 11/29/2023) 118.28 mL 0      rizatriptan (MAXALT) 10 MG tablet Take 1 tablet (10 mg) by mouth at onset of headache for migraine May repeat in 2 hours. Max 3 tablets/24 hours. (Patient not taking: Reported on 12/27/2023) 30 tablet 1       ALLERGIES:  Allergies   Allergen Reactions     Azithromycin Anaphylaxis     Latex Hives     Oxycodone Nausea and Vomiting     TSH   Date Value Ref Range Status   09/05/2023 2.62 0.30 - 4.20 uIU/mL Final     Last Comprehensive Metabolic Panel:  Sodium   Date Value Ref Range Status   11/01/2023 138 135 - 145 mmol/L Final     Comment:     Reference intervals for this test were updated on 09/26/2023 to more accurately reflect our healthy population. There may be differences in the flagging of prior results with similar values performed with this method. Interpretation of those prior results can be made in the context of the updated reference intervals.    12/08/2020 140 133 - 144 mmol/L Final     Potassium   Date Value Ref Range Status   11/01/2023 4.3 3.4 - 5.3 mmol/L Final   07/24/2022 3.9 3.4 - 5.3 mmol/L Final   12/08/2020 4.8 3.4 - 5.3 mmol/L Final     Chloride   Date Value Ref Range Status   11/01/2023 103 98 - 107 mmol/L Final   07/24/2022 106 94 - 109 mmol/L Final   12/08/2020 105 94 - 109 mmol/L Final     Carbon Dioxide   Date Value Ref Range Status   12/08/2020 30 20 - 32 mmol/L Final     Carbon Dioxide (CO2)   Date Value Ref Range Status   11/01/2023 23 22 - 29 mmol/L Final   07/24/2022 28 20 - 32 mmol/L Final     Anion Gap   Date Value Ref Range Status   11/01/2023 12 7 - 15 mmol/L Final   07/24/2022 4 3 - 14 mmol/L Final   12/08/2020 5 3 - 14 mmol/L Final     Glucose   Date Value Ref Range Status   11/01/2023 97 70 - 99 mg/dL Final   07/24/2022 103 (H) 70 - 99 mg/dL Final   12/08/2020 101 (H) 70 - 99 mg/dL Final     Urea Nitrogen   Date Value Ref Range Status   11/01/2023 9.8 6.0 - 20.0 mg/dL Final   07/24/2022 8 7 - 30 mg/dL Final   12/08/2020 11 7 - 30 mg/dL Final     Creatinine   Date Value Ref  "Range Status   11/01/2023 0.70 0.51 - 0.95 mg/dL Final   12/08/2020 0.61 0.52 - 1.04 mg/dL Final     GFR Estimate   Date Value Ref Range Status   11/01/2023 >90 >60 mL/min/1.73m2 Final   12/08/2020 >90 >60 mL/min/[1.73_m2] Final     Comment:     Non  GFR Calc  Starting 12/18/2018, serum creatinine based estimated GFR (eGFR) will be   calculated using the Chronic Kidney Disease Epidemiology Collaboration   (CKD-EPI) equation.       Calcium   Date Value Ref Range Status   11/01/2023 9.5 8.6 - 10.0 mg/dL Final   12/08/2020 8.8 8.5 - 10.1 mg/dL Final     Bilirubin Total   Date Value Ref Range Status   11/01/2023 0.3 <=1.2 mg/dL Final   12/08/2020 0.5 0.2 - 1.3 mg/dL Final     Alkaline Phosphatase   Date Value Ref Range Status   11/01/2023 222 (H) 35 - 104 U/L Final   12/08/2020 137 40 - 150 U/L Final     ALT   Date Value Ref Range Status   11/01/2023 34 0 - 50 U/L Final     Comment:     Reference intervals for this test were updated on 6/12/2023 to more accurately reflect our healthy population. There may be differences in the flagging of prior results with similar values performed with this method. Interpretation of those prior results can be made in the context of the updated reference intervals.     12/08/2020 27 0 - 50 U/L Final     AST   Date Value Ref Range Status   11/01/2023 26 0 - 45 U/L Final     Comment:     Reference intervals for this test were updated on 6/12/2023 to more accurately reflect our healthy population. There may be differences in the flagging of prior results with similar values performed with this method. Interpretation of those prior results can be made in the context of the updated reference intervals.   12/08/2020 48 (H) 0 - 45 U/L Final     Lab Results   Component Value Date    ALT 34 11/01/2023    ALT 27 12/08/2020     No results found for: \"A1C\"  Recent Labs   Lab Test 10/31/22  0747 02/03/20  0941   CHOL 150 187   HDL 30* 39*   LDL 72 111*   TRIG 241* 187*     Her " "weight/waist of 52.75 inches low HDL and elevated triglycerides w/ hx of HTN meets criteria for metabolic syndrome (no hx of hyperglycemia or prediabetes).                 PHYSICAL EXAM:  Objective   /82 (BP Location: Right arm, Patient Position: Sitting, Cuff Size: Adult Small)   Ht 1.626 m (5' 4\")   Wt 116.6 kg (257 lb)   LMP 08/26/2005   BMI 44.11 kg/m    /82 (BP Location: Right arm, Patient Position: Sitting, Cuff Size: Adult Small)   Ht 1.626 m (5' 4\")   Wt 116.6 kg (257 lb)   LMP 08/26/2005   BMI 44.11 kg/m    Body mass index is 44.11 kg/m .  Physical Exam   GENERAL: mid aged female, appears older than stated age. Pleasant, intelligent, ambulates independently.    MS: no gross musculoskeletal defects noted, no edema  Normal cognition. Addictive personality traits.      Sincerely,     Josh Ling MD  12/26/2023  5:07 PM            Again, thank you for allowing me to participate in the care of your patient.        Sincerely,        Josh Ling MD  "

## 2024-01-07 ENCOUNTER — HEALTH MAINTENANCE LETTER (OUTPATIENT)
Age: 49
End: 2024-01-07

## 2024-01-26 ENCOUNTER — VIRTUAL VISIT (OUTPATIENT)
Dept: SURGERY | Facility: CLINIC | Age: 49
End: 2024-01-26
Payer: COMMERCIAL

## 2024-01-26 DIAGNOSIS — Z71.3 NUTRITIONAL COUNSELING: ICD-10-CM

## 2024-01-26 DIAGNOSIS — E66.813 CLASS 3 SEVERE OBESITY DUE TO EXCESS CALORIES WITHOUT SERIOUS COMORBIDITY WITH BODY MASS INDEX (BMI) OF 40.0 TO 44.9 IN ADULT (H): Primary | ICD-10-CM

## 2024-01-26 DIAGNOSIS — E66.01 CLASS 3 SEVERE OBESITY DUE TO EXCESS CALORIES WITHOUT SERIOUS COMORBIDITY WITH BODY MASS INDEX (BMI) OF 40.0 TO 44.9 IN ADULT (H): Primary | ICD-10-CM

## 2024-01-26 PROCEDURE — 97802 MEDICAL NUTRITION INDIV IN: CPT | Mod: 95 | Performed by: DIETITIAN, REGISTERED

## 2024-01-26 NOTE — LETTER
"    1/26/2024         RE: Elza Greer  [address] [facility] [address]        Dear Colleague,    Thank you for referring your patient, Elza Greer, to the [facility] SURGERY CL[facility] BARIATRICS [facility]ARE [place]. Please see a copy of my visit note below.    Elza Greer is a 48 year old who is being evaluated via a billable video visit.      How would you like to obtain your AVS? MyChart  If the video visit is dropped, the invitation should be resent by: Send to e-mail at: [email or web address]  Will anyone else be joining your video visit? No          Medical Weight Loss Initial Diet Evaluation  Assessment:  This patient was referred by Dr. Ling for MNT as treatment for Obesity.       Elza is presenting today for a new weight management nutrition consultation. Pt has had an initial appointment with Dr. Ling.    Weight loss medication: Naltrexone.       Anthropometrics:    Pt's weight is 260.2 lbs   Initial weight: 257 lbs   Weight change: 3.2 lbs (up)  BMI: There is no height or weight on file to calculate BMI.   Ideal body weight: 54.7 kg (120 lb 9.5 oz)  Adjusted ideal body weight: 79.5 kg (175 lb 2.5 oz)    Estimated RMR ([place]-St Jeor equation):  1809 kcals x 1.3 (light active) = 2352 kcals (for weight maintenance)    Recommended Protein Intake: 60-80 grams of protein/day    Medical History:  Patient Active Problem List   Diagnosis     Tobacco use disorder     Anxiety     Mild intermittent asthma without complication     Ventral hernia without obstruction or gangrene     Morbid obesity (H)     Rheumatoid arthritis (H)     Elevated LFTs     Upper abdominal pain      Diabetes: No  HbA1c:  No results found for: \"HGBA1C\"    Nutrition History:   Food allergies/intolerances/cultural or religous food customs: Yes Lactose Intolerance (some dairy is OK)    Vitamins: MVI, Vitamin D     Dietary Recall:  Breakfast: greek yogurt, Protein Shake or 2 waffles and 3 sausages with syrup  Lunch: Boost " Protein Drink, Salad or Protection or Door Dash   Dinner: chicken wings with a light sauce or chicken breast or pork loin with a vegetable and/or salad, occasional roll or nuris rice or potatoes or occasional beef/hamburger or pizza   Typical Snacks: SF candy-working on reduced consumption and replaced with nicotine gum or chips (smaller bag/snack size)    Eating out: Door Dash more recently due to not feeling well, needs to get back down again    Beverages: pop-working on reduced consumption, coffee, water, sugar free flavorings, sparkling water    Exercise: does have a gym membership, no set  regimen    Nutrition Diagnosis (PES statement):     Obesity related to excessive energy intake as evidence by subjective statements and BMI of 44.7 kg/m2.       Nutrition Intervention  Food and/or Nutrient Delivery   Placed emphasis on importance of developing a healthy meal routine, aiming for 3 meals a day and no snacks.  Discussed using a protein supplement as a meal replacement.    Nutrition Education   Discussed with patient how to build a meal: the importance of including a lean/low fat protein at each meal, include a source of vegetables at a minimum of lunch and dinner and limiting carbohydrate intake to <25% per meal.  Educated on sources of lean protein, portion sizes, the amount of grams found in each source. Recommend patient to aim for 20-30g protein at each meal.  Educated on how to read a food label: keeping total fat <10g and sugar <10g per serving.  Discussed the importance of adequate hydration, with emphasis on drinking 64oz of water or zero calorie beverages per day.    Nutrition Counseling   Encouraged importance of developing routine exercise for health benefits and weight loss.    Goals established by patient:   Attend the gym 2 times/week for 30 minutes at a time.   Start tracking intake via food journal conrad to help with awareness and accountability.   Handouts  provided:  None-provided    Assessment/Plan:    Pt will follow up in 3 month(s) with bariatrician and 1 month(s) with dietitian.     Video-Visit Details    Type of service:  Video Visit    Video Start Time (time video started): 1:56 PM     Video End Time (time video stopped): 2:29 PM     Originating Location (pt. Location): Home      Distant Location (provider location):  Off-site    Mode of Communication:  Video Conference via [facility]    Physician has received verbal consent for a Video Visit from the patient? Yes      Ashley Iniguez RD            Again, thank you for allowing me to participate in the care of your patient.        Sincerely,        Ashley Iniguez RD

## 2024-01-26 NOTE — PROGRESS NOTES
"Elza Greer is a 48 year old who is being evaluated via a billable video visit.      How would you like to obtain your AVS? MyChart  If the video visit is dropped, the invitation should be resent by: Send to e-mail at: [email or web address]  Will anyone else be joining your video visit? No          Medical Weight Loss Initial Diet Evaluation  Assessment:  This patient was referred by Dr. Ling for MNT as treatment for Obesity.       Elza is presenting today for a new weight management nutrition consultation. Pt has had an initial appointment with Dr. Ling.    Weight loss medication: Naltrexone.       Anthropometrics:    Pt's weight is 260.2 lbs   Initial weight: 257 lbs   Weight change: 3.2 lbs (up)  BMI: There is no height or weight on file to calculate BMI.   Ideal body weight: 54.7 kg (120 lb 9.5 oz)  Adjusted ideal body weight: 79.5 kg (175 lb 2.5 oz)    Estimated RMR ([place]-St Jeor equation):  1809 kcals x 1.3 (light active) = 2352 kcals (for weight maintenance)    Recommended Protein Intake: 60-80 grams of protein/day    Medical History:  Patient Active Problem List   Diagnosis    Tobacco use disorder    Anxiety    Mild intermittent asthma without complication    Ventral hernia without obstruction or gangrene    Morbid obesity (H)    Rheumatoid arthritis (H)    Elevated LFTs    Upper abdominal pain      Diabetes: No  HbA1c:  No results found for: \"HGBA1C\"    Nutrition History:   Food allergies/intolerances/cultural or religous food customs: Yes Lactose Intolerance (some dairy is OK)    Vitamins: MVI, Vitamin D     Dietary Recall:  Breakfast: greek yogurt, Protein Shake or 2 waffles and 3 sausages with syrup  Lunch: Boost Protein Drink, Salad or [place] or Door Dash   Dinner: chicken wings with a light sauce or chicken breast or pork loin with a vegetable and/or salad, occasional roll or nuris rice or potatoes or occasional beef/hamburger or pizza   Typical Snacks: SF candy-working on reduced " consumption and replaced with nicotine gum or chips (smaller bag/snack size)    Eating out: Door Dash more recently due to not feeling well, needs to get back down again    Beverages: pop-working on reduced consumption, coffee, water, sugar free flavorings, sparkling water    Exercise: does have a gym membership, no set  regimen    Nutrition Diagnosis (PES statement):     Obesity related to excessive energy intake as evidence by subjective statements and BMI of 44.7 kg/m2.       Nutrition Intervention  Food and/or Nutrient Delivery   Placed emphasis on importance of developing a healthy meal routine, aiming for 3 meals a day and no snacks.  Discussed using a protein supplement as a meal replacement.    Nutrition Education   Discussed with patient how to build a meal: the importance of including a lean/low fat protein at each meal, include a source of vegetables at a minimum of lunch and dinner and limiting carbohydrate intake to <25% per meal.  Educated on sources of lean protein, portion sizes, the amount of grams found in each source. Recommend patient to aim for 20-30g protein at each meal.  Educated on how to read a food label: keeping total fat <10g and sugar <10g per serving.  Discussed the importance of adequate hydration, with emphasis on drinking 64oz of water or zero calorie beverages per day.    Nutrition Counseling   Encouraged importance of developing routine exercise for health benefits and weight loss.    Goals established by patient:   Attend the gym 2 times/week for 30 minutes at a time.   Start tracking intake via food journal conrad to help with awareness and accountability.   Handouts provided:  None-provided    Assessment/Plan:    Pt will follow up in 3 month(s) with bariatrician and 1 month(s) with dietitian.     Video-Visit Details    Type of service:  Video Visit    Video Start Time (time video started): 1:56 PM     Video End Time (time video stopped): 2:29 PM     Originating Location (pt.  Location): Home      Distant Location (provider location):  Off-site    Mode of Communication:  Video Conference via [facility]    Physician has received verbal consent for a Video Visit from the patient? Yes      Ashley Iniguez, RD

## 2024-02-19 ENCOUNTER — VIRTUAL VISIT (OUTPATIENT)
Dept: SURGERY | Facility: CLINIC | Age: 49
End: 2024-02-19
Payer: COMMERCIAL

## 2024-02-19 DIAGNOSIS — E66.01 CLASS 3 SEVERE OBESITY DUE TO EXCESS CALORIES WITHOUT SERIOUS COMORBIDITY WITH BODY MASS INDEX (BMI) OF 40.0 TO 44.9 IN ADULT (H): Primary | ICD-10-CM

## 2024-02-19 DIAGNOSIS — E66.813 CLASS 3 SEVERE OBESITY DUE TO EXCESS CALORIES WITHOUT SERIOUS COMORBIDITY WITH BODY MASS INDEX (BMI) OF 40.0 TO 44.9 IN ADULT (H): Primary | ICD-10-CM

## 2024-02-19 DIAGNOSIS — Z71.3 NUTRITIONAL COUNSELING: ICD-10-CM

## 2024-02-19 PROCEDURE — 97803 MED NUTRITION INDIV SUBSEQ: CPT | Mod: 95 | Performed by: DIETITIAN, REGISTERED

## 2024-02-19 NOTE — PROGRESS NOTES
Elza Greer is a 48 year old who is being evaluated via a billable video visit.      How would you like to obtain your AVS? MyChart  If the video visit is dropped, the invitation should be resent by: Send to e-mail at: [email or web address]  Will anyone else be joining your video visit? No        Medical  Weight Loss Follow-Up Diet Evaluation  Assessment:  Elza is presenting today for a follow up weight management nutrition consultation.  This patient has had an initial appointment and was referred by Dr. Ling for MNT as treatment for Obesity.     Weight loss medication: Naltrexone.-feels like she is developing a better tolerance for and is ready for her second dose  Pt's weight is 261.6 lbs   Initial weight: 257 lbs   Weight change: up 1.6 lbs in the past month        2/11/2022     2:34 PM   Changes and Difficulties   I have made the following changes to my diet since my last visit: Less pop more water eating out less and more healthy foods with each meal   With regards to my diet, I am still struggling with: Snacking   I have made the following changes to my activity/exercise since my last visit: Go to the gym more   With regards to my activity/exercise, I am still struggling with: Length of time at the gym     BMI: There is no height or weight on file to calculate BMI.  Ideal body weight: 54.7 kg (120 lb 9.5 oz)  Adjusted ideal body weight: 79.5 kg (175 lb 2.5 oz)    Estimated RMR ([place]-St Jeor equation):   1815 kcals x 1.3 (light active) = 2360 kcals (for weight maintenance)     Recommended Protein Intake: 60-80 grams of protein/day  Patient Active Problem List:  Patient Active Problem List   Diagnosis    Tobacco use disorder    Anxiety    Mild intermittent asthma without complication    Ventral hernia without obstruction or gangrene    Morbid obesity (H)    Rheumatoid arthritis (H)    Elevated LFTs    Upper abdominal pain     Diabetes: No     Progress on goals from last visit: Has experienced some  weight fluctuation more recently due to increased eating out with family this past week.  Had downloaded an conrad to help with meal tracking, noting that she joined [employer].  Noting that she was tracking her intake via this route to help with accountability and awareness, noting that she needs to work on reduced fat consumption.   Patient reports that she is working on eating 3 meals/day and 1 snack/day.  Patient reports that she is trying to focus on protein first, consuming food such as Protein Shake, meat, greek yogurt, etc...  Patient reports that she is working on increasing her vegetable consumption in addition.     Beverages: water, sugar free flavorings to add to the water to help with increased intake, V8 juice  Exercise: has not made it to the gym, however is doing full body workouts at home for 15 minutes 2 times/week.     Nutrition Diagnosis:    Obesity (NC 3.3) related to overeating and poor lifestyle habits as evidenced by patient's subjective statements and BMI 44.9 kg/m2.       Intervention:  Food and/or nutrient delivery: balanced meals, adequate protein   Nutrition education: none   Nutrition counseling: provided support and encouragement     Monitoring/Evaluation:    Goals:  Continue to exercise at least 2 times/week for 30 minutes at a time.   Continue to track intake via food journal to help with awareness and accountability.     Patient to follow up in 2 month(s) with bariatrician and 3 month(s) with RD        Video-Visit Details    Type of service:  Video Visit    Video Start Time (time video started): 2:46 PM    Video End Time (time video stopped): 3:03 PM    Originating Location (pt. Location): Home      Distant Location (provider location):  Off-site    Mode of Communication:  Video Conference via [facility]    Physician has received verbal consent for a Video Visit from the patient? Yes      Ashley Iniguez RD

## 2024-02-19 NOTE — LETTER
2/19/2024         RE: Elza Greer  [address] [facility] [address]        Dear Colleague,    Thank you for referring your patient, Elza Greer, to the [facility] [place]. Please see a copy of my visit note below.    Elza Greer is a 48 year old who is being evaluated via a billable video visit.      How would you like to obtain your AVS? MyChart  If the video visit is dropped, the invitation should be resent by: Send to e-mail at: [email or web address]  Will anyone else be joining your video visit? No        Medical  Weight Loss Follow-Up Diet Evaluation  Assessment:  Elza is presenting today for a follow up weight management nutrition consultation.  This patient has had an initial appointment and was referred by Dr. Ling for MNT as treatment for Obesity.     Weight loss medication: Naltrexone.-feels like she is developing a better tolerance for and is ready for her second dose  Pt's weight is 261.6 lbs   Initial weight: 257 lbs   Weight change: up 1.6 lbs in the past month        2/11/2022     2:34 PM   Changes and Difficulties   I have made the following changes to my diet since my last visit: Less pop more water eating out less and more healthy foods with each meal   With regards to my diet, I am still struggling with: Snacking   I have made the following changes to my activity/exercise since my last visit: Go to the gym more   With regards to my activity/exercise, I am still struggling with: Length of time at the gym     BMI: There is no height or weight on file to calculate BMI.  Ideal body weight: 54.7 kg (120 lb 9.5 oz)  Adjusted ideal body weight: 79.5 kg (175 lb 2.5 oz)    Estimated RMR ([place]-St Jeor equation):   1815 kcals x 1.3 (light active) = 2360 kcals (for weight maintenance)     Recommended Protein Intake: 60-80 grams of protein/day  Patient Active Problem List:  Patient Active Problem List   Diagnosis     Tobacco use disorder      Anxiety     Mild intermittent asthma without complication     Ventral hernia without obstruction or gangrene     Morbid obesity (H)     Rheumatoid arthritis (H)     Elevated LFTs     Upper abdominal pain     Diabetes: No     Progress on goals from last visit: Has experienced some weight fluctuation more recently due to increased eating out with family this past week.  Had downloaded an conrad to help with meal tracking, noting that she joined [employer].  Noting that she was tracking her intake via this route to help with accountability and awareness, noting that she needs to work on reduced fat consumption.   Patient reports that she is working on eating 3 meals/day and 1 snack/day.  Patient reports that she is trying to focus on protein first, consuming food such as Protein Shake, meat, greek yogurt, etc...  Patient reports that she is working on increasing her vegetable consumption in addition.     Beverages: water, sugar free flavorings to add to the water to help with increased intake, V8 juice  Exercise: has not made it to the gym, however is doing full body workouts at home for 15 minutes 2 times/week.     Nutrition Diagnosis:    Obesity (NC 3.3) related to overeating and poor lifestyle habits as evidenced by patient's subjective statements and BMI 44.9 kg/m2.       Intervention:  Food and/or nutrient delivery: balanced meals, adequate protein   Nutrition education: none   Nutrition counseling: provided support and encouragement     Monitoring/Evaluation:    Goals:  Continue to exercise at least 2 times/week for 30 minutes at a time.   Continue to track intake via food journal to help with awareness and accountability.     Patient to follow up in 2 month(s) with bariatrician and 3 month(s) with RD        Video-Visit Details    Type of service:  Video Visit    Video Start Time (time video started): 2:46 PM    Video End Time (time video stopped): 3:03 PM    Originating Location (pt. Location): Home      Distant  Location (provider location):  Off-site    Mode of Communication:  Video Conference via [facility]    Physician has received verbal consent for a Video Visit from the patient? Yes      Ashley Iniguez RD          Again, thank you for allowing me to participate in the care of your patient.        Sincerely,        Ashley Iniguez RD

## 2024-03-10 ASSESSMENT — SLEEP AND FATIGUE QUESTIONNAIRES
HOW LIKELY ARE YOU TO NOD OFF OR FALL ASLEEP WHEN YOU ARE A PASSENGER IN A CAR FOR AN HOUR WITHOUT A BREAK: SLIGHT CHANCE OF DOZING
HOW LIKELY ARE YOU TO NOD OFF OR FALL ASLEEP WHILE SITTING QUIETLY AFTER LUNCH WITHOUT ALCOHOL: WOULD NEVER DOZE
HOW LIKELY ARE YOU TO NOD OFF OR FALL ASLEEP WHILE SITTING AND TALKING TO SOMEONE: WOULD NEVER DOZE
HOW LIKELY ARE YOU TO NOD OFF OR FALL ASLEEP WHILE WATCHING TV: MODERATE CHANCE OF DOZING
HOW LIKELY ARE YOU TO NOD OFF OR FALL ASLEEP WHILE SITTING INACTIVE IN A PUBLIC PLACE: MODERATE CHANCE OF DOZING
HOW LIKELY ARE YOU TO NOD OFF OR FALL ASLEEP WHILE SITTING AND READING: HIGH CHANCE OF DOZING
HOW LIKELY ARE YOU TO NOD OFF OR FALL ASLEEP WHILE LYING DOWN TO REST IN THE AFTERNOON WHEN CIRCUMSTANCES PERMIT: SLIGHT CHANCE OF DOZING
HOW LIKELY ARE YOU TO NOD OFF OR FALL ASLEEP IN A CAR, WHILE STOPPED FOR A FEW MINUTES IN TRAFFIC: WOULD NEVER DOZE

## 2024-03-30 DIAGNOSIS — R05.1 ACUTE COUGH: ICD-10-CM

## 2024-03-30 DIAGNOSIS — M79.7 FIBROMYALGIA: ICD-10-CM

## 2024-03-30 RX ORDER — GABAPENTIN 100 MG/1
100 CAPSULE ORAL 3 TIMES DAILY
Qty: 90 CAPSULE | Refills: 0 | Status: SHIPPED | OUTPATIENT
Start: 2024-03-30 | End: 2024-05-27

## 2024-04-01 RX ORDER — ALBUTEROL SULFATE 90 UG/1
AEROSOL, METERED RESPIRATORY (INHALATION)
Qty: 18 G | Refills: 0 | Status: SHIPPED | OUTPATIENT
Start: 2024-04-01

## 2024-04-03 ENCOUNTER — MYC MEDICAL ADVICE (OUTPATIENT)
Dept: FAMILY MEDICINE | Facility: CLINIC | Age: 49
End: 2024-04-03

## 2024-04-03 ENCOUNTER — OFFICE VISIT (OUTPATIENT)
Dept: FAMILY MEDICINE | Facility: CLINIC | Age: 49
End: 2024-04-03
Payer: COMMERCIAL

## 2024-04-03 VITALS
TEMPERATURE: 97.9 F | HEIGHT: 64 IN | OXYGEN SATURATION: 96 % | DIASTOLIC BLOOD PRESSURE: 86 MMHG | HEART RATE: 102 BPM | RESPIRATION RATE: 14 BRPM | WEIGHT: 257 LBS | BODY MASS INDEX: 43.87 KG/M2 | SYSTOLIC BLOOD PRESSURE: 136 MMHG

## 2024-04-03 DIAGNOSIS — Z90.49 S/P CHOLECYSTECTOMY: ICD-10-CM

## 2024-04-03 DIAGNOSIS — R11.0 NAUSEA: ICD-10-CM

## 2024-04-03 DIAGNOSIS — A41.51 SEPSIS DUE TO ESCHERICHIA COLI WITH ACUTE HYPOXIC RESPIRATORY FAILURE AND SEPTIC SHOCK (H): Primary | ICD-10-CM

## 2024-04-03 DIAGNOSIS — R10.11 RUQ ABDOMINAL PAIN: ICD-10-CM

## 2024-04-03 DIAGNOSIS — E66.01 MORBID OBESITY (H): ICD-10-CM

## 2024-04-03 DIAGNOSIS — J96.01 SEPSIS DUE TO ESCHERICHIA COLI WITH ACUTE HYPOXIC RESPIRATORY FAILURE AND SEPTIC SHOCK (H): Primary | ICD-10-CM

## 2024-04-03 DIAGNOSIS — R65.21 SEPSIS DUE TO ESCHERICHIA COLI WITH ACUTE HYPOXIC RESPIRATORY FAILURE AND SEPTIC SHOCK (H): Primary | ICD-10-CM

## 2024-04-03 LAB
ALBUMIN SERPL BCG-MCNC: 3.9 G/DL (ref 3.5–5.2)
ALP SERPL-CCNC: 190 U/L (ref 40–150)
ALT SERPL W P-5'-P-CCNC: 32 U/L (ref 0–50)
ANION GAP SERPL CALCULATED.3IONS-SCNC: 11 MMOL/L (ref 7–15)
AST SERPL W P-5'-P-CCNC: 26 U/L (ref 0–45)
BASOPHILS # BLD AUTO: 0 10E3/UL (ref 0–0.2)
BASOPHILS NFR BLD AUTO: 0 %
BILIRUB SERPL-MCNC: 0.2 MG/DL
BUN SERPL-MCNC: 11.9 MG/DL (ref 6–20)
CALCIUM SERPL-MCNC: 9.5 MG/DL (ref 8.6–10)
CHLORIDE SERPL-SCNC: 104 MMOL/L (ref 98–107)
CREAT SERPL-MCNC: 0.65 MG/DL (ref 0.51–0.95)
DEPRECATED HCO3 PLAS-SCNC: 28 MMOL/L (ref 22–29)
EGFRCR SERPLBLD CKD-EPI 2021: >90 ML/MIN/1.73M2
EOSINOPHIL # BLD AUTO: 0.2 10E3/UL (ref 0–0.7)
EOSINOPHIL NFR BLD AUTO: 1 %
ERYTHROCYTE [DISTWIDTH] IN BLOOD BY AUTOMATED COUNT: 13 % (ref 10–15)
GLUCOSE SERPL-MCNC: 97 MG/DL (ref 70–99)
HCT VFR BLD AUTO: 37.6 % (ref 35–47)
HGB BLD-MCNC: 11.6 G/DL (ref 11.7–15.7)
IMM GRANULOCYTES # BLD: 0 10E3/UL
IMM GRANULOCYTES NFR BLD: 0 %
LIPASE SERPL-CCNC: 36 U/L (ref 13–60)
LYMPHOCYTES # BLD AUTO: 2.6 10E3/UL (ref 0.8–5.3)
LYMPHOCYTES NFR BLD AUTO: 18 %
MCH RBC QN AUTO: 29.4 PG (ref 26.5–33)
MCHC RBC AUTO-ENTMCNC: 30.9 G/DL (ref 31.5–36.5)
MCV RBC AUTO: 95 FL (ref 78–100)
MONOCYTES # BLD AUTO: 0.8 10E3/UL (ref 0–1.3)
MONOCYTES NFR BLD AUTO: 6 %
NEUTROPHILS # BLD AUTO: 10.7 10E3/UL (ref 1.6–8.3)
NEUTROPHILS NFR BLD AUTO: 74 %
PLATELET # BLD AUTO: 305 10E3/UL (ref 150–450)
POTASSIUM SERPL-SCNC: 4.2 MMOL/L (ref 3.4–5.3)
PROT SERPL-MCNC: 7.8 G/DL (ref 6.4–8.3)
RBC # BLD AUTO: 3.94 10E6/UL (ref 3.8–5.2)
SODIUM SERPL-SCNC: 143 MMOL/L (ref 135–145)
WBC # BLD AUTO: 14.4 10E3/UL (ref 4–11)

## 2024-04-03 PROCEDURE — 83690 ASSAY OF LIPASE: CPT | Performed by: FAMILY MEDICINE

## 2024-04-03 PROCEDURE — 85025 COMPLETE CBC W/AUTO DIFF WBC: CPT | Performed by: FAMILY MEDICINE

## 2024-04-03 PROCEDURE — 36415 COLL VENOUS BLD VENIPUNCTURE: CPT | Performed by: FAMILY MEDICINE

## 2024-04-03 PROCEDURE — 80053 COMPREHEN METABOLIC PANEL: CPT | Performed by: FAMILY MEDICINE

## 2024-04-03 PROCEDURE — 99214 OFFICE O/P EST MOD 30 MIN: CPT | Performed by: FAMILY MEDICINE

## 2024-04-03 RX ORDER — ONDANSETRON 4 MG/1
4 TABLET, FILM COATED ORAL EVERY 8 HOURS PRN
COMMUNITY
Start: 2024-03-22

## 2024-04-03 RX ORDER — BUTALBITAL, ACETAMINOPHEN AND CAFFEINE 50; 325; 40 MG/1; MG/1; MG/1
TABLET ORAL
COMMUNITY
Start: 2024-03-26

## 2024-04-03 RX ORDER — OXYCODONE HYDROCHLORIDE 5 MG/1
2.5 TABLET ORAL EVERY 12 HOURS PRN
Qty: 10 TABLET | Refills: 0 | Status: ON HOLD | OUTPATIENT
Start: 2024-04-03 | End: 2024-07-12

## 2024-04-03 RX ORDER — IBUPROFEN 600 MG/1
400 TABLET, FILM COATED ORAL
Status: ON HOLD | COMMUNITY
Start: 2024-03-22 | End: 2024-07-12 | Stop reason: DRUGHIGH

## 2024-04-03 ASSESSMENT — ASTHMA QUESTIONNAIRES
ACT_TOTALSCORE: 16
QUESTION_4 LAST FOUR WEEKS HOW OFTEN HAVE YOU USED YOUR RESCUE INHALER OR NEBULIZER MEDICATION (SUCH AS ALBUTEROL): ONE OR TWO TIMES PER DAY
ACT_TOTALSCORE: 16
QUESTION_3 LAST FOUR WEEKS HOW OFTEN DID YOUR ASTHMA SYMPTOMS (WHEEZING, COUGHING, SHORTNESS OF BREATH, CHEST TIGHTNESS OR PAIN) WAKE YOU UP AT NIGHT OR EARLIER THAN USUAL IN THE MORNING: ONCE OR TWICE
QUESTION_5 LAST FOUR WEEKS HOW WOULD YOU RATE YOUR ASTHMA CONTROL: SOMEWHAT CONTROLLED
QUESTION_1 LAST FOUR WEEKS HOW MUCH OF THE TIME DID YOUR ASTHMA KEEP YOU FROM GETTING AS MUCH DONE AT WORK, SCHOOL OR AT HOME: A LITTLE OF THE TIME
QUESTION_2 LAST FOUR WEEKS HOW OFTEN HAVE YOU HAD SHORTNESS OF BREATH: THREE TO SIX TIMES A WEEK

## 2024-04-03 ASSESSMENT — PAIN SCALES - GENERAL: PAINLEVEL: SEVERE PAIN (7)

## 2024-04-03 NOTE — PROGRESS NOTES
Assessment & Plan     Sepsis due to Escherichia coli with acute hypoxic respiratory failure and septic shock (H)  Resolved.  Still unclear re: reason for having recurrent E coli sepsis.  Patient said provider in [place] hypothesized that it may be coming from dysfunction in her biliary tract.   No records to review today except for limited info on discharge instructions.  Based on her accounts, will order HIDA scan, and refer her to GI clinic.  - Adult GI  Referral - Consult Only  - NM Hepatobiliary Scan with GB EF and/or Pharm    S/P cholecystectomy  See above.  - Adult GI  Referral - Consult Only  - NM Hepatobiliary Scan with GB EF and/or Pharm    Nausea  Patient said she has ondansetron.  Recheck labs to determine if any persistent abnormalities.  - Adult GI  Referral - Consult Only  - Comprehensive metabolic panel  - Lipase  - Comprehensive metabolic panel  - Lipase    Morbid obesity (H)  May complicate management of the above, as well as her other chronic conditions.    RUQ abdominal pain  Labs ordered to determine if with persistent abnormality.  Has not gotten relief from otc APAP and Ibuprofen.  Did have n/v with oxycodone before but ot would like to try lower dose. Advised safe use and expected only short term use.  Referred to GI for further evaluation as well.  - CBC with Platelets & Differential  - Comprehensive metabolic panel  - Lipase  - oxyCODONE (ROXICODONE) 5 MG tablet  Dispense: 10 tablet; Refill: 0  - CBC with Platelets & Differential  - Comprehensive metabolic panel  - Lipase        MED REC REQUIRED  Post Medication Reconciliation Status: discharge medications reconciled, continue medications without change    Patient Instructions   To schedule the HIDA Scan, call [phone].     Take oxycodone as prescribed and as sparingly as possible  Watch for vomiting as brent experienced this before with this medication.  Do not drink alcohol or drive if this medication is  taken    Acetaminophen 500 mg orally 1-2 tabs every 4-6 hrs as needed for pain.    You will be contacted in 1-2 days for results of your lab tests.     Referrals to gastroenterology has been signed. Schedulers will call you in the next 3-5 business days.     Mao Bertrand is a 48 year old, presenting for the following health issues:  Hospital F/U (ER to hospital admin 3/22/24 - 3/26/24/[facility]/E Coli )        4/3/2024     1:51 PM   Additional Questions   Roomed by Radha SEBASTIAN CMA   Accompanied by Daughter         4/3/2024     1:51 PM   Patient Reported Additional Medications   Patient reports taking the following new medications None     HPI         Hospital Follow-up Visit:    Hospital/Nursing Home/IP Rehab Facility:  [facility] [place]  Date of Admission: 3/22/24  Date of Discharge: 3/6/24  Reason(s) for Admission: E Coli, bacteria in blood    Was your hospitalization related to COVID-19? No   Problems taking medications regularly:  None  Medication changes since discharge: Augmentin   Problems adhering to non-medication therapy:  None    Summary of hospitalization:  Discharge summary unavailable  Diagnostic Tests/Treatments reviewed.  Follow up needed: none identified based on discharge instruction papers  Other Healthcare Providers Involved in Patient s Care:         None  Update since discharge: improved.  Additional issues: still reports right sided upper quadrant abd pain radiating to right mid back and right shoulder - waxing and waning.  Denies new cough, hemopptysis, dyspnea, chest pain, emesis or jaundice.  Does report intermittent nausea controlle dby ondansetron.  Patient is unsure what imating ha sbeen done at the hospital in [place] but she is quite sure no HIDA scan has been done.         Review of patient's chart showed she had mild dilatation of intrahepatic bile ducts on US in Oct 2023. No other imaging then.  Patient is not seeing GI provider for hepatobiliary  "condition.    Patient is s/p cholecystectomy from years ago, complicated by severing \"a bile duct\".    Plan of care communicated with patient and daughter.               Review of Systems  Constitutional, HEENT, cardiovascular, pulmonary, GI, , musculoskeletal, neuro, skin, endocrine and psych systems are negative, except as otherwise noted.      Objective    /86 (BP Location: Right arm, Patient Position: Sitting, Cuff Size: Adult Large)   Pulse 102   Temp 97.9  F (36.6  C) (Tympanic)   Resp 14   Ht 1.613 m (5' 3.5\")   Wt 116.6 kg (257 lb)   LMP 08/26/2005   SpO2 96%   BMI 44.81 kg/m    Body mass index is 44.81 kg/m .  Physical Exam   GENERAL: morbidly obese , alert and no distress  EYES: no icterus  RESP: lungs clear to auscultation - no rales, no rhonchi, no wheezes  CV: regular rates and rhythm, normal S1 S2, no S3 or S4 and no murmur, no click or rub  ABD: protuberant abdomen, no skin changes, no guarding, moderate RUQ TTP with no rebound, no palpable mass, no guarding, no Rose's sign, no palpable organomegaly  MS: extremities- no gross deformities noted, no edema  SKIN: good turgor, no jaundice or rash     Results for orders placed or performed in visit on 04/03/24   CBC with platelets and differential     Status: Abnormal   Result Value Ref Range    WBC Count 14.4 (H) 4.0 - 11.0 10e3/uL    RBC Count 3.94 3.80 - 5.20 10e6/uL    Hemoglobin 11.6 (L) 11.7 - 15.7 g/dL    Hematocrit 37.6 35.0 - 47.0 %    MCV 95 78 - 100 fL    MCH 29.4 26.5 - 33.0 pg    MCHC 30.9 (L) 31.5 - 36.5 g/dL    RDW 13.0 10.0 - 15.0 %    Platelet Count 305 150 - 450 10e3/uL    % Neutrophils 74 %    % Lymphocytes 18 %    % Monocytes 6 %    % Eosinophils 1 %    % Basophils 0 %    % Immature Granulocytes 0 %    Absolute Neutrophils 10.7 (H) 1.6 - 8.3 10e3/uL    Absolute Lymphocytes 2.6 0.8 - 5.3 10e3/uL    Absolute Monocytes 0.8 0.0 - 1.3 10e3/uL    Absolute Eosinophils 0.2 0.0 - 0.7 10e3/uL    Absolute Basophils 0.0 0.0 - 0.2 " 10e3/uL    Absolute Immature Granulocytes 0.0 <=0.4 10e3/uL   CBC with Platelets & Differential     Status: Abnormal    Narrative    The following orders were created for panel order CBC with Platelets & Differential.  Procedure                               Abnormality         Status                     ---------                               -----------         ------                     CBC with platelets and d...[[id number]]  Abnormal            Final result                 Please view results for these tests on the individual orders.           Signed Electronically by: Steve Presley MD

## 2024-04-03 NOTE — PATIENT INSTRUCTIONS
To schedule the HIDA Scan, call [phone].     Take oxycodone as prescribed and as sparingly as possible  Watch for vomiting as brent experienced this before with this medication.  Do not drink alcohol or drive if this medication is taken    Acetaminophen 500 mg orally 1-2 tabs every 4-6 hrs as needed for pain.    You will be contacted in 1-2 days for results of your lab tests.     Referrals to gastroenterology has been signed. Schedulers will call you in the next 3-5 business days.

## 2024-04-04 NOTE — TELEPHONE ENCOUNTER
Before deciding on additional antibiotics, patient needs further assessment. Specifically, if she still has significant abdominal pain and feeling feverish as she completes her antibiotics course, she may need repeat CT scan or other imaging, as well as additional blood draw for septic work up. ADS may be asked about this, otherwise, patient will be best assessed in the ER where  a more comprehensive work up can be completed and appropriate consults obtained more expediently.

## 2024-04-04 NOTE — TELEPHONE ENCOUNTER
Routed to provider.    Please see MyChart message from pt with follow up questions regarding yesterday's lab work.    I called pt to clarify too.  Pt is currently recovering from septic infection while in [place].    Pt is on Augmentin with last dose of this course tomorrow morning.  Pt asks if she should be on abx for longer course or if another abx should be prescribed.    Pt updated that she does not have fever today but she still feels flushed and feverish today.  She continues to have chills.  Chills were much worse yesterday but persist today.  Pain is still present and last night is the first night that she got good sleep.  Today is the first day that ibuprofen is managing her pain.    Pt has nausea but is eating small  amounts of food.  Pt is drinking fluids and urinating regularly.    Pharmacy is [facility] in [place].    Carla Contreras RN

## 2024-04-05 NOTE — TELEPHONE ENCOUNTER
ALONZO Sevilla    I  called the patient and gave her all Dr Presley's instructions from yesterday. (See patient's My Chart message)  She said all in all she is feeling better.  She still has right upper quadrant pain which rated a 7 when she got up this morning.  She finished her Augmentin this morning.  No fever.  Pt was advised to call the hospital in [place] and have them fax her hospitalization records here today as I don't see them on her chart yet.  I offered pt our ADS center to be assessed or suggested she go to ER if she is having abdominal pain. She refused saying she will get her HIDDA scan and see if that shows anything.

## 2024-04-09 ENCOUNTER — HOSPITAL ENCOUNTER (OUTPATIENT)
Dept: NUCLEAR MEDICINE | Facility: CLINIC | Age: 49
Setting detail: NUCLEAR MEDICINE
Discharge: HOME | End: 2024-04-09
Attending: FAMILY MEDICINE | Admitting: FAMILY MEDICINE
Payer: COMMERCIAL

## 2024-04-09 DIAGNOSIS — R65.21 SEPSIS DUE TO ESCHERICHIA COLI WITH ACUTE HYPOXIC RESPIRATORY FAILURE AND SEPTIC SHOCK (H): ICD-10-CM

## 2024-04-09 DIAGNOSIS — A41.51 SEPSIS DUE TO ESCHERICHIA COLI WITH ACUTE HYPOXIC RESPIRATORY FAILURE AND SEPTIC SHOCK (H): ICD-10-CM

## 2024-04-09 DIAGNOSIS — J96.01 SEPSIS DUE TO ESCHERICHIA COLI WITH ACUTE HYPOXIC RESPIRATORY FAILURE AND SEPTIC SHOCK (H): ICD-10-CM

## 2024-04-09 DIAGNOSIS — Z90.49 S/P CHOLECYSTECTOMY: ICD-10-CM

## 2024-04-09 PROCEDURE — 78226 HEPATOBILIARY SYSTEM IMAGING: CPT

## 2024-04-09 PROCEDURE — A9537 TC99M MEBROFENIN: HCPCS | Performed by: FAMILY MEDICINE

## 2024-04-09 PROCEDURE — 343N000001 HC RX 343: Performed by: FAMILY MEDICINE

## 2024-04-09 RX ORDER — KIT FOR THE PREPARATION OF TECHNETIUM TC 99M MEBROFENIN 45 MG/10ML
5 INJECTION, POWDER, LYOPHILIZED, FOR SOLUTION INTRAVENOUS ONCE
Status: COMPLETED | OUTPATIENT
Start: 2024-04-09 | End: 2024-04-09

## 2024-04-09 RX ADMIN — MEBROFENIN 8 MILLICURIE: 45 INJECTION, POWDER, LYOPHILIZED, FOR SOLUTION INTRAVENOUS at 11:43

## 2024-04-11 ENCOUNTER — TELEPHONE (OUTPATIENT)
Dept: GASTROENTEROLOGY | Facility: CLINIC | Age: 49
End: 2024-04-11
Payer: COMMERCIAL

## 2024-04-11 NOTE — TELEPHONE ENCOUNTER
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  Still unclear re: reason for having recurrent E coli sepsis.  Patient said provider in [place] hypothesized that it may be coming from dysfunction in her biliary tract.   No records to review today except for limited info on discharge instructions.  Based on her accounts, will order HIDA scan, and refer her to GI clinic.  - Adult GI  Referral - Consult Only  - NM Hepatobiliary Scan with GB EF and/or Pharm     S/P cholecystectomy  See above.  - Adult GI  Referral - Consult Only  - NM Hepatobiliary Scan with GB EF and/or Pharm    Narrative & Impression   EXAM: NM HEPATOBILIARY SCAN  LOCATION: [facility]  DATE: 4/9/2024     INDICATION: Abdominal pain.  COMPARISON: CT abdomen pelvis 10/23/2023  TECHNIQUE: 8.0 mCi of Tc-99m mebrofenin, IV. Anterior planar imaging of the abdomen.      FINDINGS: Gallbladder is surgically absent. Normal radionuclide activity in liver, bile ducts, and small bowel. No evidence of common duct obstruction or intrinsic liver disease.                                                                      IMPRESSION:      Negative for biliary obstruction.         MD review date:   MD Decision for clinic consultation/Orders:            Referral updates/Patient contacted:   Pt c/o RUQ pain and vomiting. Explained that no intervention from panc bili, placed on list for potential future FMT For recurrent cholangitis in post surgical patients.  Will see if Gen Gi will see pt for functional GI complaints.

## 2024-04-16 ENCOUNTER — TELEPHONE (OUTPATIENT)
Dept: SLEEP MEDICINE | Facility: CLINIC | Age: 49
End: 2024-04-16

## 2024-04-16 NOTE — TELEPHONE ENCOUNTER
Voicemail was left regarding sleep appointment location change.  Call back number for central scheduling left.

## 2024-04-19 ENCOUNTER — VIRTUAL VISIT (OUTPATIENT)
Dept: SURGERY | Facility: CLINIC | Age: 49
End: 2024-04-19
Payer: COMMERCIAL

## 2024-04-19 VITALS — WEIGHT: 258.6 LBS | HEIGHT: 64 IN | BODY MASS INDEX: 44.15 KG/M2

## 2024-04-19 DIAGNOSIS — E66.813 CLASS 3 SEVERE OBESITY DUE TO EXCESS CALORIES WITHOUT SERIOUS COMORBIDITY WITH BODY MASS INDEX (BMI) OF 40.0 TO 44.9 IN ADULT (H): Primary | ICD-10-CM

## 2024-04-19 DIAGNOSIS — E66.01 CLASS 3 SEVERE OBESITY DUE TO EXCESS CALORIES WITHOUT SERIOUS COMORBIDITY WITH BODY MASS INDEX (BMI) OF 40.0 TO 44.9 IN ADULT (H): Primary | ICD-10-CM

## 2024-04-19 PROCEDURE — 99214 OFFICE O/P EST MOD 30 MIN: CPT | Mod: 95 | Performed by: EMERGENCY MEDICINE

## 2024-04-19 ASSESSMENT — PAIN SCALES - GENERAL: PAINLEVEL: MODERATE PAIN (4)

## 2024-04-19 NOTE — PROGRESS NOTES
"Bariatric Clinic Follow-Up Visit:    Elza Greer is a 48 year old  female with Body mass index is 45.08 kg/m .  presenting here today for follow-up on non-surgical efforts for weight loss. She's limited in use of GLP1 RA therapy by lack of coverage by her insurance and her PCP has already tried her on phentermine/topamax and currently on bupropion/naltrexone. She's having some difficulty tolerating the bupropion as it feels \"more stimulating than the phentermine was\" but seems to tolerate the naltrexone well. Because of some of the stimulatory effects, she's only using both medications with breakfast but I've counseled her to see if evening naltrexone may help some of her craving control and she's had some success in the past, prior to illness in winter of '24 with nicotine cessation while using naltrexone. Given recent e. Coli sepsis, will refrain for GI altering medications like GLP1 RA therapies.     RMR: 1815kcal/day  Protein 60-80g/day as of her 2/19/24 visit with our dietician.    Weight:   Wt Readings from Last 5 Encounters:   04/19/24 117.3 kg (258 lb 9.6 oz)   04/03/24 116.6 kg (257 lb)   12/27/23 116.6 kg (257 lb)   12/18/23 115.5 kg (254 lb 9.6 oz)   12/04/23 118.8 kg (262 lb 0.2 oz)    pounds      Comorbidities:  Patient Active Problem List   Diagnosis    Tobacco use disorder    Anxiety    Mild intermittent asthma without complication    Ventral hernia without obstruction or gangrene    Morbid obesity (H)    Rheumatoid arthritis (H)    Elevated LFTs    Upper abdominal pain    Sepsis due to Escherichia coli with acute hypoxic respiratory failure and septic shock (H)       Current Outpatient Medications:     albuterol (ACCUNEB) 1.25 MG/3ML neb solution, Take 1 vial (1.25 mg) by nebulization every 6 hours as needed for shortness of breath / dyspnea or wheezing, Disp: 90 mL, Rfl: 0    butalbital-acetaminophen-caffeine (ESGIC) -40 MG tablet, TAKE 1 TABLET EVERY 6 HOURS AS NEEDED FOR HEADACHE, Disp: , " Rfl:     fluticasone (FLONASE) 50 MCG/ACT nasal spray, Spray 2 sprays into both nostrils daily, Disp: 16 g, Rfl: 0    gabapentin (NEURONTIN) 100 MG capsule, TAKE 1 CAPSULE BY MOUTH THREE TIMES DAILY (Patient taking differently: Take 100 mg by mouth 3 times daily Patient is taking BID), Disp: 90 capsule, Rfl: 0    ibuprofen (ADVIL/MOTRIN) 600 MG tablet, Take 1 tablet by mouth 3 times daily, Disp: , Rfl:     losartan (COZAAR) 25 MG tablet, Take 1 tablet (25 mg) by mouth daily, Disp: 90 tablet, Rfl: 1    Multiple Vitamins-Minerals (MULTIVITAMIN WOMEN PO), Take 2 chew tab by mouth daily gummy, Disp: , Rfl:     nicotine (NICODERM CQ) 21 MG/24HR 24 hr patch, Place 1 patch onto the skin every 24 hours, Disp: , Rfl:     nicotine polacrilex (NICORETTE) 4 MG gum, Place 4 mg inside cheek every hour as needed for smoking cessation, Disp: , Rfl:     nystatin (MYCOSTATIN) 681627 UNIT/GM external cream, Apply topically 2 times daily, Disp: 30 g, Rfl: 1    vitamin D3 (CHOLECALCIFEROL) 1.25 MG (11414 UT) capsule, Take 1 capsule (50,000 Units) by mouth every 7 days, Disp: 12 capsule, Rfl: 1    amoxicillin-clavulanate (AUGMENTIN) 875-125 MG tablet, , Disp: , Rfl:     buPROPion (WELLBUTRIN) 75 MG tablet, T5 mg daily for 1 week than increase to 75 mg twice daily, Disp: 60 tablet, Rfl: 2    clobetasol propionate (CLOBEX) 0.05 % external shampoo, Leave on scalp for 15 minutes then rinse. Use 1-2 times weekly., Disp: 118 mL, Rfl: 5    Fluocinolone Acetonide Scalp 0.01 % OIL oil, Daily as needed for scalp psoriasis, Disp: 118.28 mL, Rfl: 0    naltrexone (DEPADE/REVIA) 50 MG tablet, Take 25 mg daily for 1 week than increase to 25 mg twice daily, Disp: 30 tablet, Rfl: 2    ondansetron (ZOFRAN) 4 MG tablet, TAKE 1 TABLET BY MOUTH EVERY 8 HOURS FOR 3 DAYS AS NEEDED FOR NAUSEA OR VOMITING, Disp: , Rfl:     oxyCODONE (ROXICODONE) 5 MG tablet, Take 0.5 tablets (2.5 mg) by mouth every 12 hours as needed for pain or severe pain, Disp: 10 tablet,  "Rfl: 0    rizatriptan (MAXALT) 10 MG tablet, Take 1 tablet (10 mg) by mouth at onset of headache for migraine May repeat in 2 hours. Max 3 tablets/24 hours. (Patient not taking: Reported on 12/27/2023), Disp: 30 tablet, Rfl: 1    VENTOLIN  (90 Base) MCG/ACT inhaler, INHALE 1 TO 2 PUFFS BY MOUTH EVERY 4 HOURS AS NEEDED FOR SHORTNESS OF BREATH, WHEEZING OR COUGH, Disp: 18 g, Rfl: 0      Interim: Since our last visit, she has been out of hospital due to e.coli blood infections. Had reached tisha of 239-244 lbs but went to [place] and was hospitalized with sepsis was up to 272 lbs, down to mid 250s now.   Diet changes. Down to 2 cans daily of sodapop still, candy habit is much less. Occasional sucker. 2 yesterday. Doesn't like veggies but now using salad at dinner with Spring Mix or spinach mix w/ some dressing, \"couple croutons\"/seeds/craisins on top.   Has been using water flavorizers to help her get more water in. Skittles water flavoring.  80 oz of water daily.   Smoking again after illness.     Plan:   1.  Diet: aiming for 1550-1650kcal/day and 70 grams of lean protein with 80 oz of water daily to continue good nourishment during weight reduction focus.     2. Exercise: gentle walking or chair yoga encouraged to move your body each day for 10-15 minutes while recuperating from recent illness.   3. Medication: naltrexone on hold while using Opiates. Given benefits previous on smoking cessation, when no longer rquiring your oxycodone, you can restart naltrexone at half a tablet with supper for 10 days then increase to half tablet twice daily. Set a quit date on the calendar and prepare yourself for success.       4. Track intake with food tracking conrad like My Net Diary or Lose It, do lock.  5. Goals: 1-2 lbs of weight reduction weekly.       We discussed [facility] Basics including:  -eating 3 meals daily  -reviewed metabolic needs for weight loss based on Resting Metabolic Rate  -protein " "goals supportive of healthy weight loss  -avoiding/limiting calorie containing beverages  -We discussed the importance of restorative sleep and stress management in maintaining a healthy weight.  -We discussed the National Weight Control Registry healthy weight maintenance strategies and ways to optimize metabolism.  -We discussed the importance of physical activity including cardiovascular and strength training in maintaining a healthier weight and explored viable options.      Most recent labs:  Lab Results   Component Value Date    WBC 14.4 (H) 04/03/2024    HGB 11.6 (L) 04/03/2024    HCT 37.6 04/03/2024    MCV 95 04/03/2024     04/03/2024     Lab Results   Component Value Date    CHOL 150 10/31/2022     Lab Results   Component Value Date    HDL 30 (L) 10/31/2022     No components found for: \"LDLCALC\"  Lab Results   Component Value Date    TRIG 241 (H) 10/31/2022     No results found for: \"CHOLHDL\"  Lab Results   Component Value Date    ALT 32 04/03/2024    AST 26 04/03/2024    GGT 40 (H) 09/18/2023    ALKPHOS 190 (H) 04/03/2024     No results found for: \"HGBA1C\"  Lab Results   Component Value Date    B12 1,080 10/27/2023     No components found for: \"VITDT1\"  Lab Results   Component Value Date    EVAN 397 (H) 10/27/2023     No results found for: \"PTHI\"  No results found for: \"ZN\"  No results found for: \"VIB1WB\"  Lab Results   Component Value Date    TSH 2.62 09/05/2023     No results found for: \"TEST\"    DIETARY HISTORY  Reviewed goals today.  Sleep schedule: n/a.      PHYSICAL ACTIVITY PATTERNS:  Cardiovascular: limted walking, recovering strength after sepsis bout.  Strength Training: limited    REVIEW OF SYSTEMS  On the mend. .  PHYSICAL EXAM:  Vitals: Ht 1.613 m (5' 3.5\")   Wt 117.3 kg (258 lb 9.6 oz)   LMP 08/26/2005   BMI 45.08 kg/m    Weight:   Wt Readings from Last 3 Encounters:   04/19/24 117.3 kg (258 lb 9.6 oz)   04/03/24 116.6 kg (257 lb)   12/27/23 116.6 kg (257 lb)         GEN: Pleasant, " well groomed, in no acute distress  HEENT:  normal facies .  NECK: No swelling.  HEART: .  LUNGS: No respiratory difficulty noted. No cough. .  ABDOMEN: .  EXTREMITIES: No tremor. Ambulation is independent as she walks around her home..  NEURO: Alert and Oriented X3, fluent speech. .  SKIN: No visible rashes. .    Interim study results: Last Comprehensive Metabolic Panel:  Sodium   Date Value Ref Range Status   04/03/2024 143 135 - 145 mmol/L Final     Comment:     Reference intervals for this test were updated on 09/26/2023 to more accurately reflect our healthy population. There may be differences in the flagging of prior results with similar values performed with this method. Interpretation of those prior results can be made in the context of the updated reference intervals.    12/08/2020 140 133 - 144 mmol/L Final     Potassium   Date Value Ref Range Status   04/03/2024 4.2 3.4 - 5.3 mmol/L Final   07/24/2022 3.9 3.4 - 5.3 mmol/L Final   12/08/2020 4.8 3.4 - 5.3 mmol/L Final     Chloride   Date Value Ref Range Status   04/03/2024 104 98 - 107 mmol/L Final   07/24/2022 106 94 - 109 mmol/L Final   12/08/2020 105 94 - 109 mmol/L Final     Carbon Dioxide   Date Value Ref Range Status   12/08/2020 30 20 - 32 mmol/L Final     Carbon Dioxide (CO2)   Date Value Ref Range Status   04/03/2024 28 22 - 29 mmol/L Final   07/24/2022 28 20 - 32 mmol/L Final     Anion Gap   Date Value Ref Range Status   04/03/2024 11 7 - 15 mmol/L Final   07/24/2022 4 3 - 14 mmol/L Final   12/08/2020 5 3 - 14 mmol/L Final     Glucose   Date Value Ref Range Status   04/03/2024 97 70 - 99 mg/dL Final   07/24/2022 103 (H) 70 - 99 mg/dL Final   12/08/2020 101 (H) 70 - 99 mg/dL Final     Urea Nitrogen   Date Value Ref Range Status   04/03/2024 11.9 6.0 - 20.0 mg/dL Final   07/24/2022 8 7 - 30 mg/dL Final   12/08/2020 11 7 - 30 mg/dL Final     Creatinine   Date Value Ref Range Status   04/03/2024 0.65 0.51 - 0.95 mg/dL Final   12/08/2020 0.61 0.52 -  1.04 mg/dL Final     GFR Estimate   Date Value Ref Range Status   04/03/2024 >90 >60 mL/min/1.73m2 Final   12/08/2020 >90 >60 mL/min/[1.73_m2] Final     Comment:     Non  GFR Calc  Starting 12/18/2018, serum creatinine based estimated GFR (eGFR) will be   calculated using the Chronic Kidney Disease Epidemiology Collaboration   (CKD-EPI) equation.       Calcium   Date Value Ref Range Status   04/03/2024 9.5 8.6 - 10.0 mg/dL Final   12/08/2020 8.8 8.5 - 10.1 mg/dL Final     Bilirubin Total   Date Value Ref Range Status   04/03/2024 0.2 <=1.2 mg/dL Final   12/08/2020 0.5 0.2 - 1.3 mg/dL Final     Alkaline Phosphatase   Date Value Ref Range Status   04/03/2024 190 (H) 40 - 150 U/L Final     Comment:     Reference intervals for this test were updated on 11/14/2023 to more accurately reflect our healthy population. There may be differences in the flagging of prior results with similar values performed with this method. Interpretation of those prior results can be made in the context of the updated reference intervals.   12/08/2020 137 40 - 150 U/L Final     ALT   Date Value Ref Range Status   04/03/2024 32 0 - 50 U/L Final     Comment:     Reference intervals for this test were updated on 6/12/2023 to more accurately reflect our healthy population. There may be differences in the flagging of prior results with similar values performed with this method. Interpretation of those prior results can be made in the context of the updated reference intervals.     12/08/2020 27 0 - 50 U/L Final     AST   Date Value Ref Range Status   04/03/2024 26 0 - 45 U/L Final     Comment:     Reference intervals for this test were updated on 6/12/2023 to more accurately reflect our healthy population. There may be differences in the flagging of prior results with similar values performed with this method. Interpretation of those prior results can be made in the context of the updated reference intervals.   12/08/2020 48 (H) 0  - 45 U/L Final               Lab Results   Component Value Date    WBC 14.4 04/03/2024    WBC 12.1 12/08/2020     Lab Results   Component Value Date    RBC 3.94 04/03/2024    RBC 4.06 12/08/2020     Lab Results   Component Value Date    HGB 11.6 04/03/2024    HGB 12.7 12/08/2020     Lab Results   Component Value Date    HCT 37.6 04/03/2024    HCT 38.8 12/08/2020     Lab Results   Component Value Date    MCV 95 04/03/2024    MCV 96 12/08/2020     Lab Results   Component Value Date    MCH 29.4 04/03/2024    MCH 31.3 12/08/2020     Lab Results   Component Value Date    MCHC 30.9 04/03/2024    MCHC 32.7 12/08/2020     Lab Results   Component Value Date    RDW 13.0 04/03/2024    RDW 13.0 12/08/2020     Lab Results   Component Value Date     04/03/2024     12/08/2020     Lab Results   Component Value Date    A1C 5.3 12/27/2023     TSH   Date Value Ref Range Status   09/05/2023 2.62 0.30 - 4.20 uIU/mL Final     .      30 minutes spent by me on the date of the encounter doing chart review, history and exam, documentation and further activities per the note   Josh Ling MD  [facility]  11:16 AM  4/19/2024

## 2024-04-19 NOTE — NURSING NOTE
Is the patient currently in the state of [place]? YES    Visit mode:VIDEO    If the visit is dropped, the patient can be reconnected by: VIDEO VISIT: Text to cell phone:   Telephone Information:   Mobile [phone]       Will anyone else be joining the visit? NO  (If patient encounters technical issues they should call [phone]    How would you like to obtain your AVS? MyChart    Are changes needed to the allergy or medication list? Nicotine 14    Nicotine polacrilex 2mg gum  Probiotics gummy 2 gummy per day    Reason for visit: Follow Up    Chelsey GARCIAF

## 2024-04-19 NOTE — LETTER
"    4/19/2024         RE: Elza Greer  [address] [facility] [address]        Dear Colleague,    Thank you for referring your patient, Elza Greer, to the [facility]. Please see a copy of my visit note below.    Virtual Visit Details    Type of service:  Video Visit     Originating Location (pt. Location): Home    Distant Location (provider location):  On-site  Platform used for Video Visit: [facility]      Bariatric Clinic Follow-Up Visit:    Elza Greer is a 48 year old  female with Body mass index is 45.08 kg/m .  presenting here today for follow-up on non-surgical efforts for weight loss. She's limited in use of GLP1 RA therapy by lack of coverage by her insurance and her PCP has already tried her on phentermine/topamax and currently on bupropion/naltrexone. She's having some difficulty tolerating the bupropion as it feels \"more stimulating than the phentermine was\" but seems to tolerate the naltrexone well. Because of some of the stimulatory effects, she's only using both medications with breakfast but I've counseled her to see if evening naltrexone may help some of her craving control and she's had some success in the past, prior to illness in winter of '24 with nicotine cessation while using naltrexone. Given recent e. Coli sepsis, will refrain for GI altering medications like GLP1 RA therapies.     RMR: 1815kcal/day  Protein 60-80g/day as of her 2/19/24 visit with our dietician.    Weight:   Wt Readings from Last 5 Encounters:   04/19/24 117.3 kg (258 lb 9.6 oz)   04/03/24 116.6 kg (257 lb)   12/27/23 116.6 kg (257 lb)   12/18/23 115.5 kg (254 lb 9.6 oz)   12/04/23 118.8 kg (262 lb 0.2 oz)    pounds      Comorbidities:  Patient Active Problem List   Diagnosis     Tobacco use disorder     Anxiety     Mild intermittent asthma without complication     Ventral hernia without obstruction or gangrene     Morbid obesity (H)     Rheumatoid arthritis (H) "     Elevated LFTs     Upper abdominal pain     Sepsis due to Escherichia coli with acute hypoxic respiratory failure and septic shock (H)       Current Outpatient Medications:      albuterol (ACCUNEB) 1.25 MG/3ML neb solution, Take 1 vial (1.25 mg) by nebulization every 6 hours as needed for shortness of breath / dyspnea or wheezing, Disp: 90 mL, Rfl: 0     butalbital-acetaminophen-caffeine (ESGIC) -40 MG tablet, TAKE 1 TABLET EVERY 6 HOURS AS NEEDED FOR HEADACHE, Disp: , Rfl:      fluticasone (FLONASE) 50 MCG/ACT nasal spray, Spray 2 sprays into both nostrils daily, Disp: 16 g, Rfl: 0     gabapentin (NEURONTIN) 100 MG capsule, TAKE 1 CAPSULE BY MOUTH THREE TIMES DAILY (Patient taking differently: Take 100 mg by mouth 3 times daily Patient is taking BID), Disp: 90 capsule, Rfl: 0     ibuprofen (ADVIL/MOTRIN) 600 MG tablet, Take 1 tablet by mouth 3 times daily, Disp: , Rfl:      losartan (COZAAR) 25 MG tablet, Take 1 tablet (25 mg) by mouth daily, Disp: 90 tablet, Rfl: 1     Multiple Vitamins-Minerals (MULTIVITAMIN WOMEN PO), Take 2 chew tab by mouth daily gummy, Disp: , Rfl:      nicotine (NICODERM CQ) 21 MG/24HR 24 hr patch, Place 1 patch onto the skin every 24 hours, Disp: , Rfl:      nicotine polacrilex (NICORETTE) 4 MG gum, Place 4 mg inside cheek every hour as needed for smoking cessation, Disp: , Rfl:      nystatin (MYCOSTATIN) 802086 UNIT/GM external cream, Apply topically 2 times daily, Disp: 30 g, Rfl: 1     vitamin D3 (CHOLECALCIFEROL) 1.25 MG (78697 UT) capsule, Take 1 capsule (50,000 Units) by mouth every 7 days, Disp: 12 capsule, Rfl: 1     amoxicillin-clavulanate (AUGMENTIN) 875-125 MG tablet, , Disp: , Rfl:      buPROPion (WELLBUTRIN) 75 MG tablet, T5 mg daily for 1 week than increase to 75 mg twice daily, Disp: 60 tablet, Rfl: 2     clobetasol propionate (CLOBEX) 0.05 % external shampoo, Leave on scalp for 15 minutes then rinse. Use 1-2 times weekly., Disp: 118 mL, Rfl: 5     Fluocinolone  "Acetonide Scalp 0.01 % OIL oil, Daily as needed for scalp psoriasis, Disp: 118.28 mL, Rfl: 0     naltrexone (DEPADE/REVIA) 50 MG tablet, Take 25 mg daily for 1 week than increase to 25 mg twice daily, Disp: 30 tablet, Rfl: 2     ondansetron (ZOFRAN) 4 MG tablet, TAKE 1 TABLET BY MOUTH EVERY 8 HOURS FOR 3 DAYS AS NEEDED FOR NAUSEA OR VOMITING, Disp: , Rfl:      oxyCODONE (ROXICODONE) 5 MG tablet, Take 0.5 tablets (2.5 mg) by mouth every 12 hours as needed for pain or severe pain, Disp: 10 tablet, Rfl: 0     rizatriptan (MAXALT) 10 MG tablet, Take 1 tablet (10 mg) by mouth at onset of headache for migraine May repeat in 2 hours. Max 3 tablets/24 hours. (Patient not taking: Reported on 12/27/2023), Disp: 30 tablet, Rfl: 1     VENTOLIN  (90 Base) MCG/ACT inhaler, INHALE 1 TO 2 PUFFS BY MOUTH EVERY 4 HOURS AS NEEDED FOR SHORTNESS OF BREATH, WHEEZING OR COUGH, Disp: 18 g, Rfl: 0      Interim: Since our last visit, she has been out of hospital due to e.coli blood infections. Had reached tisha of 239-244 lbs but went to [place] and was hospitalized with sepsis was up to 272 lbs, down to mid 250s now.   Diet changes. Down to 2 cans daily of sodapop still, candy habit is much less. Occasional sucker. 2 yesterday. Doesn't like veggies but now using salad at dinner with Spring Mix or spinach mix w/ some dressing, \"couple croutons\"/seeds/craisins on top.   Has been using water flavorizers to help her get more water in. Skittles water flavoring.  80 oz of water daily.   Smoking again after illness.     Plan:   1.  Diet: aiming for 1550-1650kcal/day and 70 grams of lean protein with 80 oz of water daily to continue good nourishment during weight reduction focus.     2. Exercise: gentle walking or chair yoga encouraged to move your body each day for 10-15 minutes while recuperating from recent illness.   3. Medication: naltrexone on hold while using Opiates. Given benefits previous on smoking cessation, when no longer " "rquiring your oxycodone, you can restart naltrexone at half a tablet with supper for 10 days then increase to half tablet twice daily. Set a quit date on the calendar and prepare yourself for success.       4. Track intake with food tracking conrad like My Net Diary or Lose It, [employer]vin, [employer].  5. Goals: 1-2 lbs of weight reduction weekly.       We discussed [facility] Basics including:  -eating 3 meals daily  -reviewed metabolic needs for weight loss based on Resting Metabolic Rate  -protein goals supportive of healthy weight loss  -avoiding/limiting calorie containing beverages  -We discussed the importance of restorative sleep and stress management in maintaining a healthy weight.  -We discussed the National Weight Control Registry healthy weight maintenance strategies and ways to optimize metabolism.  -We discussed the importance of physical activity including cardiovascular and strength training in maintaining a healthier weight and explored viable options.      Most recent labs:  Lab Results   Component Value Date    WBC 14.4 (H) 04/03/2024    HGB 11.6 (L) 04/03/2024    HCT 37.6 04/03/2024    MCV 95 04/03/2024     04/03/2024     Lab Results   Component Value Date    CHOL 150 10/31/2022     Lab Results   Component Value Date    HDL 30 (L) 10/31/2022     No components found for: \"LDLCALC\"  Lab Results   Component Value Date    TRIG 241 (H) 10/31/2022     No results found for: \"CHOLHDL\"  Lab Results   Component Value Date    ALT 32 04/03/2024    AST 26 04/03/2024    GGT 40 (H) 09/18/2023    ALKPHOS 190 (H) 04/03/2024     No results found for: \"HGBA1C\"  Lab Results   Component Value Date    B12 1,080 10/27/2023     No components found for: \"VITDT1\"  Lab Results   Component Value Date    EVAN 397 (H) 10/27/2023     No results found for: \"PTHI\"  No results found for: \"ZN\"  No results found for: \"VIB1WB\"  Lab Results   Component Value Date    TSH 2.62 09/05/2023     No results found for: " "\"TEST\"    DIETARY HISTORY  Reviewed goals today.  Sleep schedule: n/a.      PHYSICAL ACTIVITY PATTERNS:  Cardiovascular: limted walking, recovering strength after sepsis bout.  Strength Training: limited    REVIEW OF SYSTEMS  On the mend. .  PHYSICAL EXAM:  Vitals: Ht 1.613 m (5' 3.5\")   Wt 117.3 kg (258 lb 9.6 oz)   LMP 08/26/2005   BMI 45.08 kg/m    Weight:   Wt Readings from Last 3 Encounters:   04/19/24 117.3 kg (258 lb 9.6 oz)   04/03/24 116.6 kg (257 lb)   12/27/23 116.6 kg (257 lb)         GEN: Pleasant, well groomed, in no acute distress  HEENT:  normal facies .  NECK: No swelling.  HEART: .  LUNGS: No respiratory difficulty noted. No cough. .  ABDOMEN: .  EXTREMITIES: No tremor. Ambulation is independent as she walks around her home..  NEURO: Alert and Oriented X3, fluent speech. .  SKIN: No visible rashes. .    Interim study results: Last Comprehensive Metabolic Panel:  Sodium   Date Value Ref Range Status   04/03/2024 143 135 - 145 mmol/L Final     Comment:     Reference intervals for this test were updated on 09/26/2023 to more accurately reflect our healthy population. There may be differences in the flagging of prior results with similar values performed with this method. Interpretation of those prior results can be made in the context of the updated reference intervals.    12/08/2020 140 133 - 144 mmol/L Final     Potassium   Date Value Ref Range Status   04/03/2024 4.2 3.4 - 5.3 mmol/L Final   07/24/2022 3.9 3.4 - 5.3 mmol/L Final   12/08/2020 4.8 3.4 - 5.3 mmol/L Final     Chloride   Date Value Ref Range Status   04/03/2024 104 98 - 107 mmol/L Final   07/24/2022 106 94 - 109 mmol/L Final   12/08/2020 105 94 - 109 mmol/L Final     Carbon Dioxide   Date Value Ref Range Status   12/08/2020 30 20 - 32 mmol/L Final     Carbon Dioxide (CO2)   Date Value Ref Range Status   04/03/2024 28 22 - 29 mmol/L Final   07/24/2022 28 20 - 32 mmol/L Final     Anion Gap   Date Value Ref Range Status   04/03/2024 11 " 7 - 15 mmol/L Final   07/24/2022 4 3 - 14 mmol/L Final   12/08/2020 5 3 - 14 mmol/L Final     Glucose   Date Value Ref Range Status   04/03/2024 97 70 - 99 mg/dL Final   07/24/2022 103 (H) 70 - 99 mg/dL Final   12/08/2020 101 (H) 70 - 99 mg/dL Final     Urea Nitrogen   Date Value Ref Range Status   04/03/2024 11.9 6.0 - 20.0 mg/dL Final   07/24/2022 8 7 - 30 mg/dL Final   12/08/2020 11 7 - 30 mg/dL Final     Creatinine   Date Value Ref Range Status   04/03/2024 0.65 0.51 - 0.95 mg/dL Final   12/08/2020 0.61 0.52 - 1.04 mg/dL Final     GFR Estimate   Date Value Ref Range Status   04/03/2024 >90 >60 mL/min/1.73m2 Final   12/08/2020 >90 >60 mL/min/[1.73_m2] Final     Comment:     Non  GFR Calc  Starting 12/18/2018, serum creatinine based estimated GFR (eGFR) will be   calculated using the Chronic Kidney Disease Epidemiology Collaboration   (CKD-EPI) equation.       Calcium   Date Value Ref Range Status   04/03/2024 9.5 8.6 - 10.0 mg/dL Final   12/08/2020 8.8 8.5 - 10.1 mg/dL Final     Bilirubin Total   Date Value Ref Range Status   04/03/2024 0.2 <=1.2 mg/dL Final   12/08/2020 0.5 0.2 - 1.3 mg/dL Final     Alkaline Phosphatase   Date Value Ref Range Status   04/03/2024 190 (H) 40 - 150 U/L Final     Comment:     Reference intervals for this test were updated on 11/14/2023 to more accurately reflect our healthy population. There may be differences in the flagging of prior results with similar values performed with this method. Interpretation of those prior results can be made in the context of the updated reference intervals.   12/08/2020 137 40 - 150 U/L Final     ALT   Date Value Ref Range Status   04/03/2024 32 0 - 50 U/L Final     Comment:     Reference intervals for this test were updated on 6/12/2023 to more accurately reflect our healthy population. There may be differences in the flagging of prior results with similar values performed with this method. Interpretation of those prior results can be  made in the context of the updated reference intervals.     12/08/2020 27 0 - 50 U/L Final     AST   Date Value Ref Range Status   04/03/2024 26 0 - 45 U/L Final     Comment:     Reference intervals for this test were updated on 6/12/2023 to more accurately reflect our healthy population. There may be differences in the flagging of prior results with similar values performed with this method. Interpretation of those prior results can be made in the context of the updated reference intervals.   12/08/2020 48 (H) 0 - 45 U/L Final               Lab Results   Component Value Date    WBC 14.4 04/03/2024    WBC 12.1 12/08/2020     Lab Results   Component Value Date    RBC 3.94 04/03/2024    RBC 4.06 12/08/2020     Lab Results   Component Value Date    HGB 11.6 04/03/2024    HGB 12.7 12/08/2020     Lab Results   Component Value Date    HCT 37.6 04/03/2024    HCT 38.8 12/08/2020     Lab Results   Component Value Date    MCV 95 04/03/2024    MCV 96 12/08/2020     Lab Results   Component Value Date    MCH 29.4 04/03/2024    MCH 31.3 12/08/2020     Lab Results   Component Value Date    MCHC 30.9 04/03/2024    MCHC 32.7 12/08/2020     Lab Results   Component Value Date    RDW 13.0 04/03/2024    RDW 13.0 12/08/2020     Lab Results   Component Value Date     04/03/2024     12/08/2020     Lab Results   Component Value Date    A1C 5.3 12/27/2023     TSH   Date Value Ref Range Status   09/05/2023 2.62 0.30 - 4.20 uIU/mL Final     .      30 minutes spent by me on the date of the encounter doing chart review, history and exam, documentation and further activities per the note   Josh Ling MD  [facility]  11:16 AM  4/19/2024      Again, thank you for allowing me to participate in the care of your patient.        Sincerely,        Josh Lnig MD

## 2024-04-19 NOTE — PATIENT INSTRUCTIONS
Plan:   1.  Diet: aiming for 1550-1650kcal/day and 70 grams of lean protein with 80 oz of water daily to continue good nourishment during weight reduction focus.     2. Exercise: gentle walking or chair yoga encouraged to move your body each day for 10-15 minutes while recuperating from recent illness.   3. Medication: naltrexone on hold while using Opiates. Given benefits previous on smoking cessation, when no longer rquiring your oxycodone, you can restart naltrexone at half a tablet with supper for 10 days then increase to half tablet twice daily. Set a quit date on the calendar and prepare yourself for success.       4. Track intake with food tracking conrad like My Net Diary or Lose It, ashvin, eV[employer]uespal.  5. Goals: 1-2 lbs of weight reduction weekly.       Example Meal Plan for a 8339-9393 Calorie Diet:    In order to fuel your weight loss properly and avoid hunger-induced overeating later in the day, for your height and weight, you will enjoy the most success by following the diet below or similar with adjustments based on your particular tastes and preferences.  Exercise may influence speed, amount of weight loss further.     I recommend getting into a meal routine and keeping it similar day to day in the beginning so you don t have to think too hard about what you re going to make/eat.  Keep snacks healthy, ideally containing protein and some vegetables.  Non-processed food is preferable to packaged items.  Eat at least a few crunchy green vegetables if having a snack, which should be 2-3 hours after your mealtimes(prepare these ahead of time for ease of use).  Drink 64 oz -80 oz of water daily for most, some of you will need more and we'll discuss it at your visit if that is the case.      When changing our diet,  we can often mistake thirst for hunger or just have some distracted eating habits that we need to break free from ('bored/mindless eating', screen time,work, driving,etc).  A glass of water and  reconsideration of our hunger is often all that is needed.  Having the urge is not the problem, but watching it pass by without acting on it is the goal.    If you re having hunger problems, add a protein drink/snack to your morning hours or afternoon snack with at least 20grams of protein and not too much sugar (under 10g).  A carton of higher protein/low sugar yogurt can work as well.  If the urge to snack is overwhelming and not satiated, try going for a 10 minute walk/exercise, come home and drink a glass of water and if still hungry, have a  calorie snack (handful of raw/sprouted nuts, veggies and string cheese, protein bar, etc).  Savor it.    It is better to have a large breakfast, a moderate lunch and a smaller dinner to fuel your day.  People lose 10-15% more weight during their weight loss season with this strategy. Optimizing your protein intake at each meal will further keep you more satisfied while eating less food overall.  Getting exercise in early has also been shown to offer the best results (before breakfast ideally but anytime is the right time to exercise if that is not an option for you).    To make sure you re getting adequate vitamins and minerals during weight loss, I recommend one complete multivitamin a day of your choice.  Consider a probiotic and taking some vitamin D 2000 IU daily.    Let supper be your last meal of the day and ideally try to have at least 12 hours between supper and breakfast the next day to tap into some beneficial overnight fasting dynamics.  Midnight snacks need to go away. Water in the evening is fine, unsweetened, non caffeinated herbal tea is helpful as well.  Consolidating your meals within a 8-12 hour period of your day will help tap into these additional metabolic benefits and tends to keep your appetite up for breakfast, further helping to stay on track.  For most of my patients, I don't recommend an intermittent fasting style diet (many find it hard to  fit in their lifestyle) but an overnight fast is very doable for most patients and helps regulate our hunger drives a little better.  This makes it very important to nail good intake at all three meals to feel satisfied/energized and still lose weight.      If evening snacking desires are high, consider a glass of fiber supplement for some additional fullness (metamucil or similar). Most of us don't get the 25-30 grams per day of fiber that promotes good gut health/satiety.  Benefiber, metamucil, citrucel are reasonable/affordable options for most people.  Inulin, chicory, psyllium husk are reasonable options but start slow and low in the dose to avoid gas/bloating until your gut gets acclimated (ramping up to 5-10 grams per day of supplemental fiber after 3-4 weeks if needed).      Example Meal Plan:  Breakfast: 450-475 Calories  1 egg cooked on low in olive oil:   calories.  5oz Greek Yogurt (Fage plain classic: ~150 jcarlos)  Handful of Berries of your choice (about a calorie per berry or 20-40cal per handful)    cup(cooked) of  old fashioned oatmeal or 1/2 cup(cooked) steel cut oats. (150 jcarlos)  Sprinkle amount of brown sugar and a pat of butter. (40 jcarlos)  Glass of  Water  Black coffee or unsweetened Tea (0calories).      2-3 hours Later Snack: (195 calories).  Glass of water  One string Cheese (80 calories) or 4 oz creamed cottage cheese (115 calories) with  Crunchy Celery sticks (less than 10 calories per large stalk) 2 stalks. (20 calories)    of a  Large Banana or   of a Large Apple (60 calories):  eat second half at lunch or afternoon snack.     Lunch:300 -350 calories   Chicken Breast  (baked/broiled/roasted/grilled)  4-6 oz.  (125-180 jcarlos), BBQ sauce/hot sauce/mustard/seasoning is free. Just use a reasonable amount. Or a can of tuna with 1 tablespoon mayonnaise.  Salad: lettuce, any other veggies (cucumbers, green peppers/celery you like and a small drizzle of dressing to just flavor.  Go as big on the  veggies as you like,  as they are practically calorie free.   A whole, 8 inch cucumber is 45 calories, a whole green pepper is 23 calories, a stalk of celery is 9 calories.  [place] Dressing is 60 calories per tablespoon..so moderate your desired dressing or do a drizzle of olive oil and splash of balsamic vinegar on top,  Total calories unlikely to be over 150 even with dressing.  Glass of Water.    Option for lunch is meal replacement protein drink/smoothie.  Need at least 20 grams of protein and eat the rest of your apple/banana from the morning snack.      Afternoon Snack: 150-200 calories   Cheese Stick or cottage cheese again  and a fresh fruit OR  Granola Bar (protein Bar acceptable if under 200 calories OR  Homemade smoothies:  8oz skim milk,  a handful of berries (fresh or frozen and a serving of protein powder such as BiPro or Dee Dee sWhey for example.  If you don't like dairy, make with 8oz water, one small banana, handful of berries and the protein powder, add any veggies you want as well:  roughly 200 calories.   Glass of Water    Dinner: 325 calories  4oz of fresh, [place] salmon.  Broiled (salt/pepper/dill) for about 8-8.5 minutes (200calories) or  4oz filet mignon steak or sirloin steak  Salad or vegetable sautéed lightly in olive oil or   Broccoli 1.5  cups chopped and steamed  or micro-waved in a little water (75 calories)  Glass of Water,    Cup of herbal tea (unsweetened, caffeine free)      Herbs and seasonings are encouraged to flavor your foods/vegetables.  Make your food delicious.      Tips for Success:  1.  Prepare proteins ahead of time (broil chicken breasts in bulk so you can grab and go), steel cut oats/lentils can be stored in casserole dish/bowl in the fridge for quick scoop in the morning and rewarm in microwave, make use of crock pot recipes (watch salt content).  Making meals that cover 3-4 future meals is an easy way to stay on track.  2.  Drink a 8-12 oz glass of water  "every 2-3 hours when awake.  We often mistake hunger for thirst, especially when losing weight.  3. Remember your Reward and Motivation when things get hard.  4.  Weigh yourself every morning and record, you'll stay on track better and learn how our biorhythms, diet and elimination patterns show up on the scale. Don't worry about 1 or 2 day patterns, but when on track you'll notice good trend downward of weight over 3-4 day segments.  Plateaus tend to resolve after 4-8 days in most cases if you stay consistent with your plan.  These are natural and part of weight loss, even if you're perfect with your plan execution.  5. Call if problems/concerns.  [employer] is a great tool to stay in touch and provide weekly outside accountability. Check in with questions or if you want to brag.  6.  Find a handful of meals/foods that keep you on track and feeling good and get into a routine that is sustainable for you.  It's OK to have a routine that works for you.  7.  Consider taking a complete multivitamin just to make sure all micronutrients are adequate during weight loss.  8. If losing hair/brittle nails it usually means you are not taking enough protein.  Minimum goal is 60 grams daily of protein for smaller women, 80 grams a day for men. Consider taking Biotin as supplement or a \"Hair and Nail\" multivitamin.    LEAN PROTEIN SOURCES  Getting 20-30 grams of protein, 3 meals daily, is appropriate for most people, some need more but more than about 40 grams per meal is not useful.  General rule is drinking one ounce of water per gram of protein eaten over the course of the day:  70 grams of protein each day, drink 70 oz of water.  Protein Source Portion Calories Grams of Protein                           Nonfat, plain Greek yogurt    (10 grams sugar or less) 3/4 cup (6 oz)  12-17   Light Yogurt (10 grams sugar or less) 3/4 cup (6 oz)  6-8   Protein Shake 1 shake 110-180 15-30   Skim/1% Milk or lactose-free milk 1 cup " ( 8 oz)  8   Plain or light, flavored soymilk 1 cup  7-8   Plain or light, hemp milk 1 cup 110 6   Fat Free or 1% Cottage Cheese 1/2 cup 90 15   Part skim ricotta cheese 1/2 cup 100 14   Part skim or reduced fat cheese slices 1 ounce 65-80 8     Mozzarella String Cheese 1 80 8   Canned tuna, chicken, crab or salmon  (canned in water)  1/2 cup 100 15-20   White fish (broiled, grilled, baked) 3 ounces 100 21   [place]/Tuna (broiled, grilled, baked) 3 ounces 150-180 21   Shrimp, Scallops, Lobster, Crab 3 ounces 100 21   Pork loin, Pork Tenderloin 3 ounces 150 21   Boneless, skinless chicken /turkey breast                          (broiled, grilled, baked) 3 ounces 120 21   [place], [place], [place], and Venison 3 ounces 120 21   Lean cuts of red meat and pork (sirloin,   round, tenderloin, flank, ground 93%-96%) 3 ounces 170 21   Lean or Extra Lean Ground Turkey 1/2 cup 150 20   90-95% Lean [place] Burger 1 syed 140-180 21   Low-fat casserole with lean meat 3/4 cup 200 17   Luncheon Meats                                                        (turkey, lean ham, roast beef, chicken) 3 ounces 100 21   Egg (boiled, poached, scrambled) 1 Egg 60 7   Egg Substitute 1/2 cup 70 10   Nuts (limit to 1 serving per day)  3 Tbsp. 150 7   Nut [place] (peanut, almond)  Limit to 1 serving or less daily 1 Tbsp. 90 4   Soy Burger (varies) 1  15   Garbanzo, Black, [place] Beans 1/2 cup 110 7   Refried Beans 1/2 cup 100 7   Kidney and [place]ima beans 1/2 cup 110 7   Tempeh 3 oz 175 18   Vegan crumbles 1/2 cup 100 14   Tofu 1/2 cup 110 14   Chili (beans and extra lean beef or turkey) 1 cup 200 23   Lentil Stew/Soup 1 cup 150 12   Black Bean Soup 1 cup 175 12       On-the-Go Breakfast Ideas  As of 2015, the latest research shows what a huge impact eating breakfast has on losing weight and feeling your best. People lose more weight when they make breakfast their biggest meal of the day compared to Dinner, but even if you cannot go to  that degree, getting a breakfast that has at least 20 grams of protein and even a moderate amount of fat is ideal for maintaining good energy through the day and limits overeating in the evening hours.  The following are some quick and easy suggestions for at least getting something of substance into your body in the morning.  Enjoy!    Eating breakfast within 90 minutes of waking up is an important part of taking care of your body on a restricted calorie diet plan.  After sleeping for hours, your body is in need of fuel.  An ideal breakfast is a combination of protein, whole-grain carbohydrates, or fruit.  Here s why:    -Protein digests very slowly in the body, helping you feel more satisfied.  -Whole grains provide dietary fiber, which also digests slowly and helps keep your gut clean.  -Fruit is a great source of vitamins, minerals, and fiber.     Each one of these breakfast combinations has between 200-300 calories and 15-20 grams of protein.  Feel free to mix and match!    Bone Broth (chicken bone broth or beef bone broth) is a great way to boost protein content. 8oz of bone broth will typically have 9-12grams of protein for 40kcal of energy.    Protein: Choose  -1/2 cup low-fat cottage cheese  -2 hard boiled eggs , or one cooked in olive oil (low/slow heat).  -1 low fat string cheese stick  -1 Tablespoon natural peanut butter  -[employer] vegetarian sausage syed (found in freezer section)  -1 slice lowfat cheese  -6 oz 2% or lowfat Greek yogurt, such as Fage or Oikos.    PLUS    Whole Grains:  Choose   -1 whole wheat English muffin  -1 whole wheat homero, half  -1/2 Fiber One frozen muffin, thawed  -1/2 Fiber One toaster pastry  -1 whole wheat bagel thin  -1/2 cup Kashi cereal  -1 Kashi waffle (or other whole grain high-fiber waffle)  Aim for whole grain/sprouted breads with at least 3g of fiber/slice if having bread. [place] is one such brand.    OR    Fruit: Choose  -1/3 cup blueberries  -1/2  banana (or a plantain- similar to a banana, yet smaller)  -1/2 cup cantaloupe cubes  -1 small apple  -1 small orange  -1/2 cup strawberries  -handful raspberries/blackberries (each berry is about 1 calorie).    *Adapted from [employer], Fall 20    Ten Breakfasts Under 250 calories    Ideally, getting between 350-600 calories  (depending on starting height and weight)for breakfast is ideal for avoiding hunger later in the day, adjust/add to the following accordingly:    One- 250 calories, 8.5 g protein  1 slice whole-grain toast   1 Tbsp peanut butter    banana    Two- 250 calories, 8 g protein    cup nonfat/lowfat yogurt  1/3rd cup diced no-sugar peaches  1/3rd cup cereal (like Special K, Cheerios, or bran flakes)    Three- 250 calories, 25 g protein  1 egg scrambled with 1 oz skim milk    cup shredded cheddar    whole grain English muffin  1 oz [place] monsalve  1 tsp margarine spread    Four- 225 calories, 25 g protein  1/2 cup Kashi Go-Lean cereal    cup skim milk mixed with 1 scoop Bariatric Advantage protein powder    cup no-sugar diced pears    Five- 250 calories, 20 g protein    cup oatmeal prepared with skim milk, 1 scoop protein powder, and sugar-free maple syrup    Six- 200 calories, 5 g protein  1 whole grain waffle, toasted  1 tablespoon creamy peanut or almond butter    Seven-  250 calories, 19 g protein  Breakfast sandwich: 1 slice whole grain toast, cut in half.  Add 1 scrambled egg and one slice cheddar  cheese.    Eight-  250 calories, 15 g protein  2 eggs scrambled with 1/3 cup frozen spinach (heat before adding to eggs) and 2 tablespoons low fat cream cheese.    Nine-  150 calories, 15 g protein  2/3rd cup cottage cheese    cup cantaloupe    Ten- 200 calories, 20 g protein  Fruit smoothie made with 4 oz. nonfat Greek yogurt,   cup berries, 1 scoop protein powder, and 4 oz skim milk.    Ten Lunches Under 250 Calories    Aim for lunch to be around 300-400 calories a day when trying to lose weight  and get that protein in!    One- 200 calories, 11 g protein  1/3 cup tuna salad made with light barrow on 1 slice whole grain bread  1 small peeled apple    Two- 250 calories, 16 g protein  1/3 cup lowfat cottage cheese    cup cooked green beans    small fruit cocktail (in natural juice)    Three- 200 calories, 11 g protein    grilled cheese sandwich on whole grain bread with lowfat cheese  2/3rd cup of tomato soup    Four- 250 calories, 22 g protein  Deli wrap: 1 oz sliced turkey, 1 oz sliced ham, 1 oz sliced chicken rolled up with 1 slice low-fat cheese  1 small orange    Five- 250 calories, 28 g protein  2/3rd cup chili with 1 oz shredded cheese  4 saltine crackers    Six- 250 calories, 22 g protein  1 cup fresh spinach with 2 oz chicken, 1/3rd cup mandarin oranges, and 2 tablespoons sliced almonds with 1 tablespoon  vinaigrette dressing    Seven- 200 calories, 11 g protein  1 Tbsp sugar-free preserves and 1 Tbsp peanut butter on 1 slice whole grain toast    cup nonfat/lowfat Greek yogurt    Eight- 250 calories, 18 g protein  1 small soft-shell chicken taco with 1 oz shredded cheese, lettuce, tomato, salsa, and 1 Tbsp light sour cream    cup black beans    Nine- 225 calories, 13 g protein  2 ounces baked chicken  1/4 cup mashed potatoes    cup green beans    Ten- 200 calories, 21 g protein  Deli homero: 2 oz roast beef or other deli meat with 1 tsp [place] mayonnaise and sliced tomato, onion, and lettuce  1/3rd cup cottage cheese      Ten Dinners Under 300 calories    If you're eating a large breakfast and medium lunch, keep dinner small.  300-400 calories is ideal for most people depending on their caloric needs.    One- 300 calories, 12 g protein  1-inch thick slice of turkey meatloaf    cup baked butternut squash    Two- 200 calories, 9 g protein  Bread-less BLT: 3 slices turkey monsalve, sliced tomato, wrapped in a large lettuce leaf    cup peeled fruit    Three- 275 calories, 36 g protein  3 oz roasted chicken    cup  cooked broccoli    cup shredded cheddar cheese    cup unsweetened applesauce    Four- 200 calories, 25 g protein  3 oz baked tilapia  1/3rd cup cooked carrots    cup yogurt    Five- 250 calories, 20 g protein  Grilled ham  n  Swiss: spread 2 tsp ghee or butter on 1 slice of whole grain bread.  Cut bread in half, layer 2 oz deli ham with 1 piece of Swiss cheese and grill until cheese is melted.    cup cooked vegetables    Six- 250 calories, 18 g protein  Vegetarian cheeseburger: 1 [place] cheeseburger topped with lettuce, onion, tomato, and ketchup/mustard    cup sweet potato fries    Seven- 250 calories, 18 g protein  Pork pot roast: 2 oz roasted pork loin, 1/3rd cup roasted carrots,   medium potato, cooked with   cup gravy    Eight- 330 calories, 25 g protein  2 oz meatballs (about 2 small meatballs)    cup spaghetti sauce  1/2 piece toast topped with 1 tsp ghee or butterand topped with garlic powder, toasted in oven    Nine- 250 calories, 16 g protein  Mexican pizza: one 8  corn tortilla topped with 2 oz chicken,   cup salsa, 2 tablespoons black beans, 2 tablespoons shredded cheese.  Bake until cheese is melted.    Ten- 250 calories, 22 g protein  Shrimp stir-michele: 3 oz cooked shrimp, 1/6th onion,   pepper,   cup chopped carrots sautéed in 1 tablespoon olive oil, topped with 2 tablespoons stir michele sauce and a pinch of sesame seeds        150 Calories or Less Snack Ideas   1 hardboiled egg with   cup berries  1 small apple with 1 hardboiled egg  10 almonds with   cup berries  2 clementines with 1 light string cheese  1 light string cheese with   sliced apple  1 light string cheese wrapped in 2 slices of turkey  4 100% whole wheat crackers (e.g. Triscuit) with 1 light string cheese    c. cottage cheese with   cup fruit and 1 Tbsp sunflower seeds     cup cottage cheese with   of an avocado     can tuna fish with 1 cup sliced cucumbers     cup roasted garbanzo beans with paprika and cayenne pepper    baked sweet potato  with   cup chili beans or   cup cottage cheese  2 oz. nitrate free turkey slices with 1 cup carrots  1 container (6 oz) of low sugar (less than 10 grams of sugar) greek yogurt   3 Tablespoons of hummus with 1 cup sliced bell peppers   2 Tablespoons of hummus with 15 baby carrots  4 Tablespoons ranch dip made with plain Greek Yogurt and 3 mini cucumbers  1/4 cup nuts (any kind)  1 Tablespoon peanut butter with 1 stalk celery   1 dill pickle wrapped in 1-2 slices of deli ham with 1 tsp of mayonnaise/mustard.           26-May-2021 15:38

## 2024-04-19 NOTE — PROGRESS NOTES
Virtual Visit Details    Type of service:  Video Visit     Originating Location (pt. Location): Home    Distant Location (provider location):  On-site  Platform used for Video Visit: Pippa

## 2024-04-30 ENCOUNTER — TELEPHONE (OUTPATIENT)
Dept: GASTROENTEROLOGY | Facility: CLINIC | Age: 49
End: 2024-04-30
Payer: COMMERCIAL

## 2024-04-30 NOTE — TELEPHONE ENCOUNTER
[facility] Call Center    Phone Message    May a detailed message be left on voicemail: Yes    Reason for Call: Other: Patient is currently scheduled on 06/13, as visit type New GI Urgent. This is outside the expected timeline for this referral. Patient has been added to the waitlist.      Action Taken: Message routed to:  Other: GI REFERRAL TRIAGE POOL     Travel Screening: Not Applicable

## 2024-05-13 ENCOUNTER — VIRTUAL VISIT (OUTPATIENT)
Dept: SURGERY | Facility: CLINIC | Age: 49
End: 2024-05-13
Payer: COMMERCIAL

## 2024-05-13 DIAGNOSIS — Z71.3 NUTRITIONAL COUNSELING: ICD-10-CM

## 2024-05-13 DIAGNOSIS — E66.01 MORBID OBESITY (H): Primary | ICD-10-CM

## 2024-05-13 PROCEDURE — 97803 MED NUTRITION INDIV SUBSEQ: CPT | Mod: 95 | Performed by: DIETITIAN, REGISTERED

## 2024-05-13 NOTE — PROGRESS NOTES
Elza Greer is a 48 year old who is being evaluated via a billable video visit.        How would you like to obtain your AVS? MyChart  If the video visit is dropped, the invitation should be resent by: Text to cell phone: [phone]  Will anyone else be joining your video visit? No        Medical  Weight Loss Follow-Up Diet Evaluation  Assessment:  Elza is presenting today for a follow up weight management nutrition consultation.  This patient has had an initial appointment and was referred by Dr. Ling for MNT as treatment for Morbid obesity  Weight loss medication: Naltrexone. - has not been taking this medication  Pt's weight is 254.6 lbs  Initial weight: 257 lbs  Weight change: 2.4 lbs down        4/18/2024    12:09 PM   Changes and Difficulties   I have made the following changes to my diet since my last visit: Less pop, less sugar, more veggies, smaller portions, less snacking   With regards to my diet, I am still struggling with: Meal planning   I have made the following changes to my activity/exercise since my last visit: Teying to walk more times per week   With regards to my activity/exercise, I am still struggling with: Getting motivated daily     BMI: 44.39  Ideal body weight: 53.6 kg (118 lb 1.2 oz)  Adjusted ideal body weight: 79.1 kg (174 lb 4.6 oz)    Estimated RMR ([place]-St Jeor equation):   1,785 kcals x 1.2 (sedentary) = 2,145 kcals (for weight maintenance)  1,785 kcals x 1.3 (light active) = 2,455 kcals (for weight maintenance)     Recommended Protein Intake: 60-80 grams of protein/day  Patient Active Problem List:  Patient Active Problem List   Diagnosis    Tobacco use disorder    Anxiety    Mild intermittent asthma without complication    Ventral hernia without obstruction or gangrene    Morbid obesity (H)    Rheumatoid arthritis (H)    Elevated LFTs    Upper abdominal pain    Sepsis due to Escherichia coli with acute hypoxic respiratory failure and septic shock (H)     Progress on  goals from last visit: got an infection when in [place] - septic from the infection (e coli). Noticing increased nausea since hospital stay in March.   +Back on track: cut out pop (only occasionally), has been tracking calories - Lose It conrad - 1,500 kcal goal but commonly does not hit this range; will commonly get 1,300 - 1,400 kcal  -Has been struggling with getting enough protein in each day   + has been trying to snack on fruit rather than candy  +tracking water daily  -started taking a probiotic and estroven weight management (black cohosh and Syntrim CQ) - encouraged patient to message MD regarding this supplement    -personal goal weight is 160 lbs - needs to be in the 220 lbs range or lower to have abdominal surgery    Goals (2/19/2024):  Continue to exercise at least 2 times/week for 30 minutes at a time. - in progress  Continue to track intake via food journal to help with awareness and accountability. - met       Dietary Recall:  Breakfast (8 AM): protein shake OR yogurt with fruit OR oatmeal  Lunch (11:30 AM-1:30 PM): salad (breaded chicken or monsalve with mixed nuts with crasians and salad dressing) OR soup (chicken and dumpling soup)  Snack (4-5 PM): granola bars  Dinner (7 PM): taco salad OR meat, vegetable and a starch  Typical snacks: granola bars, Nesquick breakfast essential, fruit, yogurt covered raisins  Eating out: not discussed  Beverages:   Water (flavor packets) 2-3 32 ounce water bottles per day  V8 Energy Fruit Drink - 1x/day  Exercise:   11 minute ROSENDO workout - 2x/week - goal 4x/week  At home - walks around the house once per hour  At work - walks the stairs once per hours    Nutrition Diagnosis:    Obesity (NC 3.3) related to overeating and poor lifestyle habits as evidenced by patient's subjective statements and BMI 44.39 kg/m2.       Intervention:  Food and/or nutrient delivery: prioritize protein intake with meals and snacks. Stay consistent with calorie intake range 1,300-1,500  kcals/day. Stay consistent with activity during the week.   Nutrition education: 10/10 Rule with packaged items      Monitoring/Evaluation:    Goals:  Prioritizing protein with every meal and snack - aiming 20 grams/meal  Stay consistent with tracking calories, protein and fluid intake  Continue with exercise 2x/week    Patient to follow up in 2 month(s) with bariatrician and 2 month(s) with RD      Video-Visit Details    Type of service:  Video Visit    Video Start Time (time video started): 12:58 PM    Video End Time (time video stopped): 1:35 PM    Originating Location (pt. Location): Home      Distant Location (provider location):  Off-site    Mode of Communication:  Video Conference via [facility]    Physician has received verbal consent for a Video Visit from the patient? Yes      Alicia Tierney RD

## 2024-05-13 NOTE — LETTER
5/13/2024         RE: Elza Gerer  [address] [facility] [address]        Dear Colleague,    Thank you for referring your patient, Elza Greer, to the [facility] [place]. Please see a copy of my visit note below.    Elza Greer is a 48 year old who is being evaluated via a billable video visit.        How would you like to obtain your AVS? MyChart  If the video visit is dropped, the invitation should be resent by: Text to cell phone: [phone]  Will anyone else be joining your video visit? No        Medical  Weight Loss Follow-Up Diet Evaluation  Assessment:  Elza is presenting today for a follow up weight management nutrition consultation.  This patient has had an initial appointment and was referred by Dr. Ling for MNT as treatment for Morbid obesity  Weight loss medication: Naltrexone. - has not been taking this medication  Pt's weight is 254.6 lbs  Initial weight: 257 lbs  Weight change: 2.4 lbs down        4/18/2024    12:09 PM   Changes and Difficulties   I have made the following changes to my diet since my last visit: Less pop, less sugar, more veggies, smaller portions, less snacking   With regards to my diet, I am still struggling with: Meal planning   I have made the following changes to my activity/exercise since my last visit: Teying to walk more times per week   With regards to my activity/exercise, I am still struggling with: Getting motivated daily     BMI: 44.39  Ideal body weight: 53.6 kg (118 lb 1.2 oz)  Adjusted ideal body weight: 79.1 kg (174 lb 4.6 oz)    Estimated RMR ([place]-St Jeor equation):   1,785 kcals x 1.2 (sedentary) = 2,145 kcals (for weight maintenance)  1,785 kcals x 1.3 (light active) = 2,455 kcals (for weight maintenance)     Recommended Protein Intake: 60-80 grams of protein/day  Patient Active Problem List:  Patient Active Problem List   Diagnosis     Tobacco use disorder     Anxiety     Mild intermittent  asthma without complication     Ventral hernia without obstruction or gangrene     Morbid obesity (H)     Rheumatoid arthritis (H)     Elevated LFTs     Upper abdominal pain     Sepsis due to Escherichia coli with acute hypoxic respiratory failure and septic shock (H)     Progress on goals from last visit: got an infection when in [place] - septic from the infection (e coli). Noticing increased nausea since hospital stay in March.   +Back on track: cut out pop (only occasionally), has been tracking calories - Lose It conrad - 1,500 kcal goal but commonly does not hit this range; will commonly get 1,300 - 1,400 kcal  -Has been struggling with getting enough protein in each day   + has been trying to snack on fruit rather than candy  +tracking water daily  -started taking a probiotic and estroven weight management (black cohosh and Syntrim CQ) - encouraged patient to message MD regarding this supplement    -personal goal weight is 160 lbs - needs to be in the 220 lbs range or lower to have abdominal surgery    Goals (2/19/2024):  Continue to exercise at least 2 times/week for 30 minutes at a time. - in progress  Continue to track intake via food journal to help with awareness and accountability. - met       Dietary Recall:  Breakfast (8 AM): protein shake OR yogurt with fruit OR oatmeal  Lunch (11:30 AM-1:30 PM): salad (breaded chicken or monsalve with mixed nuts with crasians and salad dressing) OR soup (chicken and dumpling soup)  Snack (4-5 PM): granola bars  Dinner (7 PM): taco salad OR meat, vegetable and a starch  Typical snacks: granola bars, Nesquick breakfast essential, fruit, yogurt covered raisins  Eating out: not discussed  Beverages:   Water (flavor packets) 2-3 32 ounce water bottles per day  V8 Energy Fruit Drink - 1x/day  Exercise:   11 minute ROSENDO workout - 2x/week - goal 4x/week  At home - walks around the house once per hour  At work - walks the stairs once per hours    Nutrition Diagnosis:    Obesity (NC  3.3) related to overeating and poor lifestyle habits as evidenced by patient's subjective statements and BMI 44.39 kg/m2.       Intervention:  Food and/or nutrient delivery: prioritize protein intake with meals and snacks. Stay consistent with calorie intake range 1,300-1,500 kcals/day. Stay consistent with activity during the week.   Nutrition education: 10/10 Rule with packaged items      Monitoring/Evaluation:    Goals:  Prioritizing protein with every meal and snack - aiming 20 grams/meal  Stay consistent with tracking calories, protein and fluid intake  Continue with exercise 2x/week    Patient to follow up in 2 month(s) with bariatrician and 2 month(s) with RD      Video-Visit Details    Type of service:  Video Visit    Video Start Time (time video started): 12:58 PM    Video End Time (time video stopped): 1:35 PM    Originating Location (pt. Location): Home      Distant Location (provider location):  Off-site    Mode of Communication:  Video Conference via [facility]    Physician has received verbal consent for a Video Visit from the patient? Yes      Alicia Tierney RD          Again, thank you for allowing me to participate in the care of your patient.        Sincerely,        Alicia Tierney RD

## 2024-05-13 NOTE — PATIENT INSTRUCTIONS
PROTEIN    Protein Sources for Weight Loss     Learning About Getting More Protein (Healthwise)     Learning About High Protein Foods (Healthwise)       FOOD LABELS    Food Label: The 10 - 10 Rule

## 2024-05-16 ENCOUNTER — HOSPITAL ENCOUNTER (EMERGENCY)
Facility: CLINIC | Age: 49
Discharge: HOME | End: 2024-05-16
Attending: FAMILY MEDICINE | Admitting: FAMILY MEDICINE
Payer: COMMERCIAL

## 2024-05-16 ENCOUNTER — APPOINTMENT (OUTPATIENT)
Dept: GENERAL RADIOLOGY | Facility: CLINIC | Age: 49
End: 2024-05-16
Payer: COMMERCIAL

## 2024-05-16 VITALS
HEART RATE: 89 BPM | SYSTOLIC BLOOD PRESSURE: 167 MMHG | OXYGEN SATURATION: 97 % | RESPIRATION RATE: 28 BRPM | DIASTOLIC BLOOD PRESSURE: 92 MMHG | TEMPERATURE: 98.9 F

## 2024-05-16 DIAGNOSIS — J18.9 PNEUMONIA DUE TO INFECTIOUS ORGANISM, UNSPECIFIED LATERALITY, UNSPECIFIED PART OF LUNG: ICD-10-CM

## 2024-05-16 PROCEDURE — 99284 EMERGENCY DEPT VISIT MOD MDM: CPT | Mod: 25

## 2024-05-16 PROCEDURE — 87637 SARSCOV2&INF A&B&RSV AMP PRB: CPT | Performed by: FAMILY MEDICINE

## 2024-05-16 PROCEDURE — 71046 X-RAY EXAM CHEST 2 VIEWS: CPT

## 2024-05-16 PROCEDURE — 99284 EMERGENCY DEPT VISIT MOD MDM: CPT | Performed by: FAMILY MEDICINE

## 2024-05-16 RX ORDER — ALBUTEROL SULFATE 0.83 MG/ML
2.5 SOLUTION RESPIRATORY (INHALATION) EVERY 6 HOURS PRN
Qty: 90 ML | Refills: 0 | Status: SHIPPED | OUTPATIENT
Start: 2024-05-16

## 2024-05-16 ASSESSMENT — ACTIVITIES OF DAILY LIVING (ADL)
ADLS_ACUITY_SCORE: 35
ADLS_ACUITY_SCORE: 35

## 2024-05-16 ASSESSMENT — COLUMBIA-SUICIDE SEVERITY RATING SCALE - C-SSRS
1. IN THE PAST MONTH, HAVE YOU WISHED YOU WERE DEAD OR WISHED YOU COULD GO TO SLEEP AND NOT WAKE UP?: NO
2. HAVE YOU ACTUALLY HAD ANY THOUGHTS OF KILLING YOURSELF IN THE PAST MONTH?: NO
6. HAVE YOU EVER DONE ANYTHING, STARTED TO DO ANYTHING, OR PREPARED TO DO ANYTHING TO END YOUR LIFE?: NO

## 2024-05-17 NOTE — ED PROVIDER NOTES
History     Chief Complaint   Patient presents with    Cough     Cough , wheezing x's 9 days  Began running a low grade fever yesterday   Shortness of breath      HPI  Elza Greer is a 48 year old female, past medical history significant for E. coli sepsis with acute hypoxic respiratory failure multiple occasions in the past most recently this past October with her presentation now in May, rheumatoid arthritis morbid obesity, anxiety, tobacco use disorder, asthma, COPD, presents to the emergency department by way of the urgent care with concerns of cough x 1 week, mild increase in wheezing, low-grade temperature meeting 99.6 per patient yesterday and increasing shortness of breath.  The patient is almost out of her albuterol nebs at home but finds them beneficial when she uses them.  The urgent care provider was concerned that the patient's initial respiratory rate was 36 with a normal O2 sat, her respiratory rate decreased down to 28 but it was felt that she had increased work of breathing, negative chest x-ray in urgent care and so she was transferred to the ED.  No complaints of chest pain or tightness.    Allergies:  Allergies   Allergen Reactions    Azithromycin Anaphylaxis    Latex Hives    Oxycodone Nausea and Vomiting       Problem List:    Patient Active Problem List    Diagnosis Date Noted    Sepsis due to Escherichia coli with acute hypoxic respiratory failure and septic shock (H) 04/03/2024     Priority: Medium    Elevated LFTs 10/23/2023     Priority: Medium    Upper abdominal pain 10/23/2023     Priority: Medium    Rheumatoid arthritis (H) 09/18/2023     Priority: Medium    Morbid obesity (H) 10/04/2021     Priority: Medium    Ventral hernia without obstruction or gangrene 01/13/2021     Priority: Medium     Added automatically from request for surgery [id number]      Anxiety 02/18/2020     Priority: Medium    Tobacco use disorder 11/14/2016     Priority: Medium    Mild intermittent asthma without  complication 05/12/2010     Priority: Medium        Past Medical History:    Past Medical History:   Diagnosis Date    Adjustment disorder with mixed anxiety and depressed mood     Anxiety     Arthritis     COPD (chronic obstructive pulmonary disease) (H)     Depressive disorder     Gallbladder problem     History of blood transfusion     History of steroid therapy     Hypertension 10/27/2023    IBS (irritable bowel syndrome)     Immunosuppression (H24)     Incisional hernia, without obstruction or gangrene 2020    Infection 10/23/2023    Lactose intolerance 05/12/2010    Migraine 05/12/2010    Migraines     Mild intermittent asthma without complication 05/12/2010    Osteoporosis     PCOS (polycystic ovarian syndrome)     Skin disease     Tobacco use disorder     Vitamin D deficiency        Past Surgical History:    Past Surgical History:   Procedure Laterality Date    ABDOMEN SURGERY      APPENDECTOMY OPEN N/A     COLONOSCOPY N/A 12/4/2023    Procedure: COLONOSCOPY, WITH POLYPECTOMY AND BIOPSY;  Surgeon: Chris Wyatt MD;  Location: [facility]    CYSTECTOMY OVARIAN BENIGN  2001    HEPATICOJEJUNOSTOMY  2000    RnY jejunostomy from bile duct injury    HERNIORRHAPHY VENTRAL N/A 02/17/2012    open with mesh    HYSTERECTOMY TOTAL ABDOMINAL, BILATERAL SALPINGO-OOPHORECTOMY, COMBINED N/A 2005    LAPAROSCOPIC CHOLECYSTECTOMY  2006    complicated by bile duct injury    LAPAROSCOPIC HERNIORRHAPHY VENTRAL N/A 01/26/2021    Procedure: Open recurrent incarcerated ventral hernia repair X2;  Surgeon: Pradip Mckenzie MD;  Location: [facility]       Family History:    Family History   Problem Relation Age of Onset    Dementia Mother     Coronary Artery Disease Mother     Hypertension Mother     Hyperlipidemia Mother     Depression Mother     Cerebrovascular Disease Mother     Asthma Mother     Obesity Mother     Mental Illness Father         Schizophrenia    Colon Cancer Maternal Grandmother     Breast Cancer Maternal Grandmother     Other  Cancer Maternal Grandmother     Asthma Maternal Grandmother     Obesity Maternal Grandmother     Osteoporosis Other        Social History:  Marital Status:   [2]  Social History     Tobacco Use    Smoking status: Some Days     Current packs/day: 0.00     Average packs/day: 1 pack/day for 23.0 years (23.0 ttl pk-yrs)     Types: Cigarettes     Start date: 10/23/2000     Last attempt to quit: 10/23/2023     Years since quittin.5    Smokeless tobacco: Never    Tobacco comments:     Starting patches. Quit around 10/23/23. Is using patches and gum   Vaping Use    Vaping status: Former    Substances: Nicotine   Substance Use Topics    Alcohol use: Yes     Comment: social    Drug use: No        Medications:    albuterol (PROVENTIL) (2.5 MG/3ML) 0.083% neb solution  amoxicillin-clavulanate (AUGMENTIN) 875-125 MG tablet  albuterol (ACCUNEB) 1.25 MG/3ML neb solution  butalbital-acetaminophen-caffeine (ESGIC) -40 MG tablet  clobetasol propionate (CLOBEX) 0.05 % external shampoo  Fluocinolone Acetonide Scalp 0.01 % OIL oil  fluticasone (FLONASE) 50 MCG/ACT nasal spray  gabapentin (NEURONTIN) 100 MG capsule  ibuprofen (ADVIL/MOTRIN) 600 MG tablet  losartan (COZAAR) 25 MG tablet  Multiple Vitamins-Minerals (MULTIVITAMIN WOMEN PO)  naltrexone (DEPADE/REVIA) 50 MG tablet  nicotine (NICODERM CQ) 21 MG/24HR 24 hr patch  nicotine polacrilex (NICORETTE) 4 MG gum  nystatin (MYCOSTATIN) 352484 UNIT/GM external cream  ondansetron (ZOFRAN) 4 MG tablet  oxyCODONE (ROXICODONE) 5 MG tablet  rizatriptan (MAXALT) 10 MG tablet  VENTOLIN  (90 Base) MCG/ACT inhaler  vitamin D3 (CHOLECALCIFEROL) 1.25 MG (50674 UT) capsule          Review of Systems   All other systems reviewed and are negative.      Physical Exam   BP: (!) 167/92  Pulse: 89  Temp: 98.9  F (37.2  C)  Resp: (!) 36  SpO2: 97 %      Physical Exam  Vitals and nursing note reviewed.   Constitutional:       General: She is not in acute distress.     Appearance:  Normal appearance. She is normal weight. She is not ill-appearing.   HENT:      Head: Normocephalic and atraumatic.      Right Ear: Tympanic membrane, ear canal and external ear normal.      Left Ear: Tympanic membrane, ear canal and external ear normal.      Nose: Nose normal.      Mouth/Throat:      Mouth: Mucous membranes are moist.      Pharynx: Oropharynx is clear.   Eyes:      Extraocular Movements: Extraocular movements intact.      Conjunctiva/sclera: Conjunctivae normal.      Pupils: Pupils are equal, round, and reactive to light.   Cardiovascular:      Rate and Rhythm: Normal rate and regular rhythm.      Pulses: Normal pulses.      Heart sounds: Normal heart sounds.   Pulmonary:      Comments: Inspiratory crackles left base that do not clear with deep breathing or coughing.  There are coarse upper airway noises auscultated at the right lung base.  Abdominal:      General: Bowel sounds are normal.      Palpations: Abdomen is soft.   Musculoskeletal:         General: Normal range of motion.      Cervical back: Normal range of motion and neck supple.   Skin:     General: Skin is warm and dry.      Capillary Refill: Capillary refill takes less than 2 seconds.   Neurological:      General: No focal deficit present.      Mental Status: She is alert.   Psychiatric:         Mood and Affect: Mood normal.         Behavior: Behavior normal.       9:06 PM  At the time of my exam the patient's respiratory rate is 25, hypertension (the patient states that her blood pressure usually does go up when she gets sick, she is compliant with her antihypertensive medication and also reports that her pressure is usually lower at home that it is in her doctor's office) afebrile, heart rate 85.  The vital signs populated at the start of my note from the urgent care approximately 2 hours ago.      ED Course        Procedures              Results for orders placed or performed during the hospital encounter of 05/16/24 (from the past  24 hour(s))   XR Chest 2 Views    Narrative    EXAM: XR CHEST 2 VIEWS  LOCATION: [facility]  DATE: 5/16/2024    INDICATION: Cough, congestion, and wheezing for 10 days.  COMPARISON: 11/20/2023      Impression    IMPRESSION:     No focal airspace disease. No pleural effusion or pneumothorax.    The cardiomediastinal silhouette is unremarkable.    Right upper quadrant surgical clips.   9:07 PM  Reviewed negative chest x-ray in the room with the patient.  Her exam is consistent with pneumonia.  She has not really been eating and drinking well and I suspect that given her immunocompromise status and poor oral intake that her radiographic findings are lagging behind clinical exam which is supportive of pneumonia.  We discussed this.  I think the patient would benefit from antibiotic therapy.  She has leftover Augmentin 875 mg p.o. twice daily that she used back in May of last year.  13 tabs.  I like her to have a full 10-day course and she requested 4 days worth of Augmentin be sent to [facility] for tomorrow.  She will start her Augmentin here tonight with her existing prescription.  She requested nebs for her nebulizer in the form of albuterol which I also sent along to [facility].  Given her previous history on numerous occasions for rapidly progressing illness I have asked her to play very close attention to her symptoms and if not improving over the next day or so to return to the emergency department.  The patient was in full agreement with disposition to home.    Medications - No data to display    Assessments & Plan (with Medical Decision Making)   Assessment and plan with medical decision making at the time stamp above.      Disclaimer: This note consists of symbols derived from keyboarding, dictation and/or voice recognition software. As a result, there may be errors in the script that have gone undetected. Please consider this when interpreting information found in this chart.      I have  reviewed the nursing notes.    I have reviewed the findings, diagnosis, plan and need for follow up with the patient.        New Prescriptions    ALBUTEROL (PROVENTIL) (2.5 MG/3ML) 0.083% NEB SOLUTION    Take 1 vial (2.5 mg) by nebulization every 6 hours as needed for shortness of breath, wheezing or cough    AMOXICILLIN-CLAVULANATE (AUGMENTIN) 875-125 MG TABLET    Take 1 tablet by mouth 2 times daily for 4 days       Final diagnoses:   Pneumonia due to infectious organism, unspecified laterality, unspecified part of lung       5/16/2024   [facility] EMERGENCY DEPT       Sukhdev Lopes MD  05/16/24 [id number]

## 2024-05-17 NOTE — ED PROVIDER NOTES
History     Chief Complaint   Patient presents with    Cough     Cough , wheezing x's 9 days  Began running a low grade fever yesterday   Shortness of breath      HPI  Elza Greer is a 48 year old female who presents for evaluation of cough, wheezing, and difficulty with breathing for the last 10 days.  Reports her daughter was sick with a viral upper respiratory illness which she thinks she caught, initially began with cough and sore throat.  Over the last couple of days she has developed increased respirations and low-grade fevers at home.  She does have an albuterol inhaler and nebulizer which she has been taking and does not feel like it is helping with breathing.  She is concerned that she is developing pneumonia again based on her symptoms.  Denies any chest pain or tightness, hemoptysis, leg pain or swelling, abdominal pain, nausea, or vomiting.    She does have a history of sepsis due to E. coli with acute hypoxic respiratory failure and shock.  She was admitted in October 2023. discharged on 2 L of oxygen, she has weaned off since then and is no longer oxygen dependent.    Allergies:  Allergies   Allergen Reactions    Azithromycin Anaphylaxis    Latex Hives    Oxycodone Nausea and Vomiting       Problem List:    Patient Active Problem List    Diagnosis Date Noted    Sepsis due to Escherichia coli with acute hypoxic respiratory failure and septic shock (H) 04/03/2024     Priority: Medium    Elevated LFTs 10/23/2023     Priority: Medium    Upper abdominal pain 10/23/2023     Priority: Medium    Rheumatoid arthritis (H) 09/18/2023     Priority: Medium    Morbid obesity (H) 10/04/2021     Priority: Medium    Ventral hernia without obstruction or gangrene 01/13/2021     Priority: Medium     Added automatically from request for surgery [id number]      Anxiety 02/18/2020     Priority: Medium    Tobacco use disorder 11/14/2016     Priority: Medium    Mild intermittent asthma without complication 05/12/2010      Priority: Medium        Past Medical History:    Past Medical History:   Diagnosis Date    Adjustment disorder with mixed anxiety and depressed mood     Anxiety     Arthritis     COPD (chronic obstructive pulmonary disease) (H)     Depressive disorder     Gallbladder problem     History of blood transfusion     History of steroid therapy     Hypertension 10/27/2023    IBS (irritable bowel syndrome)     Immunosuppression (H24)     Incisional hernia, without obstruction or gangrene 2020    Infection 10/23/2023    Lactose intolerance 05/12/2010    Migraine 05/12/2010    Migraines     Mild intermittent asthma without complication 05/12/2010    Osteoporosis     PCOS (polycystic ovarian syndrome)     Skin disease     Tobacco use disorder     Vitamin D deficiency        Past Surgical History:    Past Surgical History:   Procedure Laterality Date    ABDOMEN SURGERY      APPENDECTOMY OPEN N/A     COLONOSCOPY N/A 12/4/2023    Procedure: COLONOSCOPY, WITH POLYPECTOMY AND BIOPSY;  Surgeon: Chris Wyatt MD;  Location: [facility]    CYSTECTOMY OVARIAN BENIGN  2001    HEPATICOJEJUNOSTOMY  2000    RnY jejunostomy from bile duct injury    HERNIORRHAPHY VENTRAL N/A 02/17/2012    open with mesh    HYSTERECTOMY TOTAL ABDOMINAL, BILATERAL SALPINGO-OOPHORECTOMY, COMBINED N/A 2005    LAPAROSCOPIC CHOLECYSTECTOMY  2006    complicated by bile duct injury    LAPAROSCOPIC HERNIORRHAPHY VENTRAL N/A 01/26/2021    Procedure: Open recurrent incarcerated ventral hernia repair X2;  Surgeon: Pradip Mckenzie MD;  Location: [facility]       Family History:    Family History   Problem Relation Age of Onset    Dementia Mother     Coronary Artery Disease Mother     Hypertension Mother     Hyperlipidemia Mother     Depression Mother     Cerebrovascular Disease Mother     Asthma Mother     Obesity Mother     Mental Illness Father         Schizophrenia    Colon Cancer Maternal Grandmother     Breast Cancer Maternal Grandmother     Other Cancer Maternal Grandmother      Asthma Maternal Grandmother     Obesity Maternal Grandmother     Osteoporosis Other        Social History:  Marital Status:   [2]  Social History     Tobacco Use    Smoking status: Some Days     Current packs/day: 0.00     Average packs/day: 1 pack/day for 23.0 years (23.0 ttl pk-yrs)     Types: Cigarettes     Start date: 10/23/2000     Last attempt to quit: 10/23/2023     Years since quittin.5    Smokeless tobacco: Never    Tobacco comments:     Starting patches. Quit around 10/23/23. Is using patches and gum   Vaping Use    Vaping status: Former    Substances: Nicotine   Substance Use Topics    Alcohol use: Yes     Comment: social    Drug use: No        Medications:    albuterol (ACCUNEB) 1.25 MG/3ML neb solution  butalbital-acetaminophen-caffeine (ESGIC) -40 MG tablet  clobetasol propionate (CLOBEX) 0.05 % external shampoo  Fluocinolone Acetonide Scalp 0.01 % OIL oil  fluticasone (FLONASE) 50 MCG/ACT nasal spray  gabapentin (NEURONTIN) 100 MG capsule  ibuprofen (ADVIL/MOTRIN) 600 MG tablet  losartan (COZAAR) 25 MG tablet  Multiple Vitamins-Minerals (MULTIVITAMIN WOMEN PO)  naltrexone (DEPADE/REVIA) 50 MG tablet  nicotine (NICODERM CQ) 21 MG/24HR 24 hr patch  nicotine polacrilex (NICORETTE) 4 MG gum  nystatin (MYCOSTATIN) 156925 UNIT/GM external cream  ondansetron (ZOFRAN) 4 MG tablet  oxyCODONE (ROXICODONE) 5 MG tablet  rizatriptan (MAXALT) 10 MG tablet  VENTOLIN  (90 Base) MCG/ACT inhaler  vitamin D3 (CHOLECALCIFEROL) 1.25 MG (83749 UT) capsule        Review of Systems  Pertinent review of systems as documented per HPI above.    Physical Exam   BP: (!) 167/92  Pulse: 89  Temp: 98.9  F (37.2  C)  Resp: (!) 36  SpO2: 97 %    Physical Exam  Vitals and nursing note reviewed.   Constitutional:       General: She is not in acute distress.     Appearance: She is ill-appearing. She is not toxic-appearing or diaphoretic.   HENT:      Head: Normocephalic and atraumatic.      Right Ear: Tympanic  membrane, ear canal and external ear normal.      Left Ear: Tympanic membrane, ear canal and external ear normal.      Nose: Congestion present.      Mouth/Throat:      Mouth: Mucous membranes are moist.      Pharynx: Oropharynx is clear. No oropharyngeal exudate or posterior oropharyngeal erythema.   Eyes:      Extraocular Movements: Extraocular movements intact.      Conjunctiva/sclera: Conjunctivae normal.   Cardiovascular:      Rate and Rhythm: Normal rate.   Pulmonary:      Effort: Tachypnea present.      Breath sounds: Rhonchi present.   Skin:     General: Skin is warm and dry.   Neurological:      General: No focal deficit present.      Mental Status: She is alert and oriented to person, place, and time.   Psychiatric:         Mood and Affect: Mood normal.         Behavior: Behavior normal.         ED Course       No results found for this or any previous visit (from the past 24 hour(s)).    Medications - No data to display    Assessments & Plan (with Medical Decision Making)     48-year-old female who presents for evaluation of ongoing cough and congestion with recent difficulty with breathing/shortness of breath.  See HPI above.    On exam, she is afebrile, respirations on initial presentation 36 with normal O2 sat of 97%.  Respirations decreased to 32 on recheck and then 28, she was having increased work of breathing while I was speaking with her.  Scattered rhonchi on auscultation.  Chest x-ray independently reviewed and did not show any signs of a focal consolidation or pleural effusion.  I recommend that patient be further evaluated in the emergency department given that she is having this increased work of breathing/tachypnea, patient agrees and was transferred to ED in stable condition.    I have reviewed the nursing notes.    I have reviewed the findings, diagnosis, plan and need for follow up with the patient.      New Prescriptions    No medications on file       Final diagnoses:   None        5/16/2024   [facility] EMERGENCY DEPT       Elisha Love PA-C  05/16/24 2006

## 2024-05-17 NOTE — DISCHARGE INSTRUCTIONS
Push fluids.  Augmentin 875 mg twice daily by mouth for 10 days.  Continue all current meds.  Return if not improving/worse.

## 2024-05-25 DIAGNOSIS — M79.7 FIBROMYALGIA: ICD-10-CM

## 2024-05-27 RX ORDER — GABAPENTIN 100 MG/1
100 CAPSULE ORAL 3 TIMES DAILY
Qty: 90 CAPSULE | Refills: 0 | Status: ON HOLD | OUTPATIENT
Start: 2024-05-27 | End: 2024-07-12 | Stop reason: DRUGHIGH

## 2024-06-12 ASSESSMENT — SLEEP AND FATIGUE QUESTIONNAIRES
HOW LIKELY ARE YOU TO NOD OFF OR FALL ASLEEP WHILE SITTING INACTIVE IN A PUBLIC PLACE: MODERATE CHANCE OF DOZING
HOW LIKELY ARE YOU TO NOD OFF OR FALL ASLEEP WHILE SITTING AND READING: HIGH CHANCE OF DOZING
HOW LIKELY ARE YOU TO NOD OFF OR FALL ASLEEP WHILE SITTING QUIETLY AFTER LUNCH WITHOUT ALCOHOL: WOULD NEVER DOZE
HOW LIKELY ARE YOU TO NOD OFF OR FALL ASLEEP WHILE SITTING AND TALKING TO SOMEONE: WOULD NEVER DOZE
HOW LIKELY ARE YOU TO NOD OFF OR FALL ASLEEP WHEN YOU ARE A PASSENGER IN A CAR FOR AN HOUR WITHOUT A BREAK: SLIGHT CHANCE OF DOZING
HOW LIKELY ARE YOU TO NOD OFF OR FALL ASLEEP WHILE WATCHING TV: MODERATE CHANCE OF DOZING
HOW LIKELY ARE YOU TO NOD OFF OR FALL ASLEEP WHILE LYING DOWN TO REST IN THE AFTERNOON WHEN CIRCUMSTANCES PERMIT: SLIGHT CHANCE OF DOZING
HOW LIKELY ARE YOU TO NOD OFF OR FALL ASLEEP IN A CAR, WHILE STOPPED FOR A FEW MINUTES IN TRAFFIC: WOULD NEVER DOZE

## 2024-06-13 ENCOUNTER — VIRTUAL VISIT (OUTPATIENT)
Dept: SLEEP MEDICINE | Facility: CLINIC | Age: 49
End: 2024-06-13
Attending: INTERNAL MEDICINE
Payer: COMMERCIAL

## 2024-06-13 ENCOUNTER — OFFICE VISIT (OUTPATIENT)
Dept: GASTROENTEROLOGY | Facility: CLINIC | Age: 49
End: 2024-06-13
Attending: FAMILY MEDICINE
Payer: COMMERCIAL

## 2024-06-13 VITALS
DIASTOLIC BLOOD PRESSURE: 87 MMHG | OXYGEN SATURATION: 95 % | WEIGHT: 258.3 LBS | BODY MASS INDEX: 44.1 KG/M2 | HEART RATE: 97 BPM | SYSTOLIC BLOOD PRESSURE: 130 MMHG | HEIGHT: 64 IN

## 2024-06-13 VITALS — BODY MASS INDEX: 44.1 KG/M2 | HEIGHT: 64 IN | WEIGHT: 258.3 LBS

## 2024-06-13 DIAGNOSIS — F41.9 ANXIETY: ICD-10-CM

## 2024-06-13 DIAGNOSIS — J45.20 MILD INTERMITTENT ASTHMA WITHOUT COMPLICATION: ICD-10-CM

## 2024-06-13 DIAGNOSIS — I10 PRIMARY HYPERTENSION: ICD-10-CM

## 2024-06-13 DIAGNOSIS — S36.13XS BILE DUCT INJURY, SEQUELA: ICD-10-CM

## 2024-06-13 DIAGNOSIS — A41.51 SEPSIS DUE TO ESCHERICHIA COLI WITH ACUTE HYPOXIC RESPIRATORY FAILURE AND SEPTIC SHOCK (H): ICD-10-CM

## 2024-06-13 DIAGNOSIS — K43.9 VENTRAL HERNIA WITHOUT OBSTRUCTION OR GANGRENE: Primary | ICD-10-CM

## 2024-06-13 DIAGNOSIS — R29.818 SUSPECTED SLEEP APNEA: Primary | ICD-10-CM

## 2024-06-13 DIAGNOSIS — R11.0 NAUSEA: ICD-10-CM

## 2024-06-13 DIAGNOSIS — G47.34 NOCTURNAL HYPOXEMIA: ICD-10-CM

## 2024-06-13 DIAGNOSIS — Z90.49 S/P CHOLECYSTECTOMY: ICD-10-CM

## 2024-06-13 DIAGNOSIS — J96.01 SEPSIS DUE TO ESCHERICHIA COLI WITH ACUTE HYPOXIC RESPIRATORY FAILURE AND SEPTIC SHOCK (H): ICD-10-CM

## 2024-06-13 DIAGNOSIS — R65.21 SEPSIS DUE TO ESCHERICHIA COLI WITH ACUTE HYPOXIC RESPIRATORY FAILURE AND SEPTIC SHOCK (H): ICD-10-CM

## 2024-06-13 DIAGNOSIS — F17.200 TOBACCO USE DISORDER: ICD-10-CM

## 2024-06-13 DIAGNOSIS — E66.01 MORBID OBESITY (H): ICD-10-CM

## 2024-06-13 PROCEDURE — 99417 PROLNG OP E/M EACH 15 MIN: CPT | Mod: 95 | Performed by: NURSE PRACTITIONER

## 2024-06-13 PROCEDURE — 99204 OFFICE O/P NEW MOD 45 MIN: CPT | Performed by: PHYSICIAN ASSISTANT

## 2024-06-13 PROCEDURE — 99215 OFFICE O/P EST HI 40 MIN: CPT | Mod: 95 | Performed by: NURSE PRACTITIONER

## 2024-06-13 ASSESSMENT — PAIN SCALES - GENERAL
PAINLEVEL: NO PAIN (0)
PAINLEVEL: MILD PAIN (3)

## 2024-06-13 NOTE — PATIENT INSTRUCTIONS
"          MY TREATMENT INFORMATION FOR SLEEP APNEA-  Elza Greer    DOCTOR : NASH Antonio CNP    Am I having a sleep study at a sleep center?  --->Due to normal delays, you will be contacted within 2-4 weeks to schedule    Am I having a home sleep study?  --->Watch the video for the device you are using:    -/drop off device-   https://www.[employer].com/watch?v=yGGFBdELGhk    -Disposable device sent out require phone/computer application-   https://www.[employer].com/watch?v=BCce_vbiwxE      Frequently asked questions:  1. What is Obstructive Sleep Apnea (SHWETA)? SHWETA is the most common type of sleep apnea. Apnea means, \"without breath.\"  Apnea is most often caused by narrowing or collapse of the upper airway as muscles relax during sleep.   Almost everyone has occasional apneas. Most people with sleep apnea have had brief interruptions at night frequently for many years.  The severity of sleep apnea is related to how frequent and severe the events are.   2. What are the consequences of SHWETA? Symptoms include: feeling sleepy during the day, snoring loudly, gasping or stopping of breathing, trouble sleeping, and occasionally morning headaches or heartburn at night.  Sleepiness can be serious and even increase the risk of falling asleep while driving. Other health consequences may include development of high blood pressure and other cardiovascular disease in persons who are susceptible. Untreated SHWETA  can contribute to heart disease, stroke and diabetes.   3. What are the treatment options? In most situations, sleep apnea is a lifelong disease that must be managed with daily therapy. Medications are not effective for sleep apnea and surgery is generally not considered until other therapies have been tried. Your treatment is your choice . Continuous Positive Airway (CPAP) works right away and is the therapy that is effective in nearly everyone. An oral device to hold your jaw forward is usually the next " most reliable option. Other options include postioning devices (to keep you off your back), weight loss, and surgery including a tongue pacing device. There is more detail about some of these options below.  4. Are my sleep studies covered by insurance? Although we will request verification of coverage, we advise you also check in advance of the study to ensure there is coverage.    Important tips for those choosing CPAP and similar devices  REMEMBER-IF YOU RECEIVE A CALL FROM  [phone]-->IT IS TO SETUP A DEVICE  For new devices, sign up for device CONRAD to monitor your device for your followup visits  We encourage you to utilize the [employer] conrad or website ( [email or web address] ) to monitor your therapy progress and share the data with your healthcare team when you discuss your sleep apnea.                                                    Know your equipment:  CPAP is continuous positive airway pressure that prevents obstructive sleep apnea by keeping the throat from collapsing while you are sleeping. In most cases, the device is  smart  and can slowly self-adjusts if your throat collapses and keeps a record every day of how well you are treated-this information is available to you and your care team.  BPAP is bilevel positive airway pressure that keeps your throat open and also assists each breath with a pressure boost to maintain adequate breathing.  Special kinds of BPAP are used in patients who have inadequate breathing from lung or heart disease. In most cases, the device is  smart  and can slowly self-adjusts to assist breathing. Like CPAP, the device keeps a record of how well you are treated.  Your mask is your connection to the device. You get to choose what feels most comfortable and the staff will help to make sure if fits. Here: are some examples of the different masks that are available: Magnetic mask aids may assist with use but there are safety issues that should be addressed when  considering with magnets* ( see end of discussion).       Key points to remember on your journey with sleep apnea:  Sleep study.  PAP devices often need to be adjusted during a sleep study to show that they are effective and adjusted right.  Good tips to remember: Try wearing just the mask during a quiet time during the day so your body adapts to wearing it. A humidifier is recommended for comfort in most cases to prevent drying of your nose and throat. Allergy medication from your provider may help you if you are having nasal congestion.  Getting settled-in. It takes more than one night for most of us to get used to wearing a mask. Try wearing just the mask during a quiet time during the day so your body adapts to wearing it. A humidifier is recommended for comfort in most cases. Our team will work with you carefully on the first day and will be in contact within 4 days and again at 2 and 4 weeks for advice and remote device adjustments. Your therapy is evaluated by the device each day.   Use it every night. The more you are able to sleep naturally for 7-8 hours, the more likely you will have good sleep and to prevent health risks or symptoms from sleep apnea. Even if you use it 4 hours it helps. Occasionally all of us are unable to use a medical therapy, in sleep apnea, it is not dangerous to miss one night.   Communicate. Call our skilled team on the number provided on the first day if your visit for problems that make it difficult to wear the device. Over 2 out of 3 patients can learn to wear the device long-term with help from our team. Remember to call our team or your sleep providers if you are unable to wear the device as we may have other solutions for those who cannot adapt to mask CPAP therapy. It is recommended that you sleep your sleep provider within the first 3 months and yearly after that if you are not having problems.   Use it for your health. We encourage use of CPAP masks during daytime quiet  periods to allow your face and brain to adapt to the sensation of CPAP so that it will be a more natural sensation to awaken to at night or during naps. This can be very useful during the first few weeks or months of adapting to CPAP though it does not help medically to wear CPAP during wakefulness and  should not be used as a strategy just to meet guidelines.  Take care of your equipment. Make sure you clean your mask and tubing using directions every day and that your filter and mask are replaced as recommended or if they are not working.     *Masks with magnets:  Updated Contraindications  Masks with magnetic components are contraindicated for use by patients where they, or anyone in close physical contact while using the mask, have the following:   Active medical implants that interact with magnets (i.e., pacemakers, implantable cardioverter defibrillators (ICD), neurostimulators, cerebrospinal fluid (CSF) shunts, insulin/infusion pumps)   Metallic implants/objects containing ferromagnetic material (i.e., aneurysm clips/flow disruption devices, embolic coils, stents, valves, electrodes, implants to restore hearing or balance with implanted magnets, ocular implants, metallic splinters in the eye)  Updated Warning  Keep the mask magnets at a safe distance of at least 6 inches (150 mm) away from implants or medical devices that may be adversely affected by magnetic interference. This warning applies to you or anyone in close physical contact with your mask. The magnets are in the frame and lower headgear clips, with a magnetic field strength of up to 400mT. When worn, they connect to secure the mask but may inadvertently detach while asleep.  Implants/medical devices, including those listed within contraindications, may be adversely affected if they change function under external magnetic fields or contain ferromagnetic materials that attract/repel to magnetic fields (some metallic implants, e.g., contact lenses  with metal, dental implants, metallic cranial plates, screws, stephen hole covers, and bone substitute devices). Consult your physician and  of your implant / other medical device for information on the potential adverse effects of magnetic fields.    BESIDES CPAP, WHAT OTHER THERAPIES ARE THERE?    Positioning Device  Positioning devices are generally used when sleep apnea is mild and only occurs on your back.This example shows a pillow that straps around the waist. It may be appropriate for those whose sleep study shows milder sleep apnea that occurs primarily when lying flat on one's back. Preliminary studies have shown benefit but effectiveness at home may need to be verified by a home sleep test. These devices are generally not covered by medical insurance.  Examples of devices that maintain sleeping on the back to prevent snoring and mild sleep apnea.    Belt type body positioner  http://[employer]/    Electronic reminder  http://nightshifttherapy.[employer]/            Oral Appliance  What is oral appliance therapy?  An oral appliance device fits on your teeth at night like a retainer used after having braces. The device is made by a specialized dentist and requires several visits over 1-2 months before a manufactured device is made to fit your teeth and is adjusted to prevent your sleep apnea. Once an oral device is working properly, snoring should be improved. A home sleep test may be recommended at that time if to determine whether the sleep apnea is adequately treated.       Some things to remember:  -Oral devices are often, but not always, covered by your medical insurance. Be sure to check with your insurance provider.   -If you are referred for oral therapy, you will be given a list of specialized dentists to consider or you may choose to visit the Web site of the American Academy of Dental Sleep Medicine  -Oral devices are less likely to work if you have severe sleep apnea or are extremely  overweight.     More detailed information  An oral appliance is a small acrylic device that fits over the upper and lower teeth  (similar to a retainer or a mouth guard). This device slightly moves jaw forward, which moves the base of the tongue forward, opens the airway, improves breathing for effective treat snoring and obstructive sleep apnea in perhaps 7 out of 10 people .  The best working devices are custom-made by a dental device  after a mold is made of the teeth 1, 2, 3.  When is an oral appliance indicated?  Oral appliance therapy is recommended as a first-line treatment for patients with primary snoring, mild sleep apnea, and for patients with moderate sleep apnea who prefer appliance therapy to use of CPAP4, 5. Severity of sleep apnea is determined by sleep testing and is based on the number of respiratory events per hour of sleep.   How successful is oral appliance therapy?  The success rate of oral appliance therapy in patients with mild sleep apnea is 75-80% while in patients with moderate sleep apnea it is 50-70%. The chance of success in patients with severe sleep apnea is 40-50%. The research also shows that oral appliances have a beneficial effect on the cardiovascular health of SHWETA patients at the same magnitude as CPAP therapy7.  Oral appliances should be a second-line treatment in cases of severe sleep apnea, but if not completely successful then a combination therapy utilizing CPAP plus oral appliance therapy may be effective. Oral appliances tend to be effective in a broad range of patients although studies show that the patients who have the highest success are females, younger patients, those with milder disease, and less severe obesity. 3, 6.   Finding a dentist that practices dental sleep medicine  Specific training is available through the American Academy of Dental Sleep Medicine for dentists interested in working in the field of sleep. To find a dentist who is educated in  the field of sleep and the use of oral appliances, near you, visit the Web site of the American Academy of Dental Sleep Medicine.    References  1. Janessa, et al. Objectively measured vs self-reported compliance during oral appliance therapy for sleep-disordered breathing. Chest 2013; 144(5): 3565-6885.  2. Letty et al. Objective measurement of compliance during oral appliance therapy for sleep-disordered breathing. Thorax 2013; 68(1): 91-96.  3. Ryder et al. Mandibular advancement devices in 620 men and women with SHWETA and snoring: tolerability and predictors of treatment success. Chest 2004; 125: 6142-6045.  4. Josselyn, et al. Oral appliances for snoring and SHWETA: a review. Sleep 2006; 29: 244-262.  5. Filipe et al. Oral appliance treatment for SHWETA: an update. J Clin Sleep Med 2014; 10(2): 215-227.  6. Edi et al. Predictors of OSAH treatment outcome. J Dent Res 2007; 86: 7090-4439.      Weight Loss:   Your Body mass index is 43.99 kg/m .    Being overweight does not necessarily mean you will have health consequences.  Those who have BMI over 35 or over 27 with existing medical conditions carries greater risk.   Weight loss decreases severity of sleep apnea in most people with obesity. For those with mild obesity who have developed snoring with weight gain, even 15-30 pound weight loss can improve and occasionally milder eliminate sleep apnea.  Structured and life-long dietary and health habits are necessary to lose weight and keep healthier weight levels.     The Comprehensive Weight loss program offers all aspects of weight loss strategies including two Non-Surgical Weight Loss Programs: Medical Weight Management and our 24 Week Healthy Lifestyle Program:    Medical Weight Management: You will meet with a Medical Weight Management Provider, as well as a Registered Dietician. The program may include medication therapy, dietary education, recommended exercise and physical therapy programs,  monthly support group meetings, and possible psychological counseling. Follow up visits with the provider or dietician are scheduled based on your progress and needs.    24 Week Healthy Lifestyle Program: This unique program is designed to give you the support of weekly appointments and activities thru a 24-week period. It may include all of the components of the basic program (above), with the addition of 11 individual Health  Visits, 24-week access to the [employer] website for over 700 online classes, and monthly support group meetings. This program has an out-of-pocket expense of $499 to cover the items that can not be billed to insurance (health coaches and [employer] access), and is non-refundable/non-transferable (you may be able to use a Health Savings Account; ask your HSA provider). There may be an optional meal replacement plan prescribed as well.   Surgical management achieves meaningful long-term weight loss and improvement in health risks in most patients with more severe obesity.      Sleep Apnea Surgery:    Surgery for obstructive sleep apnea is considered generally only when other therapies fail to work. Surgery may be discussed with you if you are having a difficult time tolerating CPAP and or when there is an abnormal structure that requires surgical correction.  Nose and throat surgeries often enlarge the airway to prevent collapse.  Most of these surgeries create pain for 1-2 weeks and up to half of the most common surgeries are not effective throughout life.  You should carefully discuss the benefits and drawbacks to surgery with your sleep provider and surgeon to determine if it is the best solution for you.   More information  Surgery for SHWETA is directed at areas that are responsible for narrowing or complete obstruction of the airway during sleep.  There are a wide range of procedures available to enlarge and/or stabilize the airway to prevent blockage of breathing in the three major  areas where it can occur: the palate, tongue, and nasal regions.  Successful surgical treatment depends on the accurate identification of the factors responsible for obstructive sleep apnea in each person.  A personalized approach is required because there is no single treatment that works well for everyone.  Because of anatomic variation, consultation with an examination by a sleep surgeon is a critical first step in determining what surgical options are best for each patient.  In some cases, examination during sedation may be recommended in order to guide the selection of procedures.  Patients will be counseled about risks and benefits as well as the typical recovery course after surgery. Surgery is typically not a cure for a person s SHWETA.  However, surgery will often significantly improve one s SHWETA severity (termed  success rate ).  Even in the absence of a cure, surgery will decrease the cardiovascular risk associated with OSA7; improve overall quality of life8 (sleepiness, functionality, sleep quality, etc).      Palate Procedures:  Patients with SHWETA often have narrowing of their airway in the region of their tonsils and uvula.  The goals of palate procedures are to widen the airway in this region as well as to help the tissues resist collapse.  Modern palate procedure techniques focus on tissue conservation and soft tissue rearrangement, rather than tissue removal.  Often the uvula is preserved in this procedure. Residual sleep apnea is common in patient after pharyngoplasty with an average reduction in sleep apnea events of 33%2.      Tongue Procedures:  ExamWhile patients are awake, the muscles that surround the throat are active and keep this region open for breathing. These muscles relax during sleep, allowing the tongue and other structures to collapse and block breathing.  There are several different tongue procedures available.  Selection of a tongue base procedure depends on characteristics seen on  physical exam.  Generally, procedures are aimed at removing bulky tissues in this area or preventing the back of the tongue from falling back during sleep.  Success rates for tongue surgery range from 50-62%3.    Hypoglossal Nerve Stimulation:  Hypoglossal nerve stimulation has recently received approval from the [place] Food and Drug Administration for the treatment of obstructive sleep apnea.  This is based on research showing that the system was safe and effective in treating sleep apnea6.  Results showed that the median AHI score decreased 68%, from 29.3 to 9.0. This therapy uses an implant system that senses breathing patterns and delivers mild stimulation to airway muscles, which keeps the airway open during sleep.  The system consists of three fully implanted components: a small generator (similar in size to a pacemaker), a breathing sensor, and a stimulation lead.  Using a small handheld remote, a patient turns the therapy on before bed and off upon awakening.    Candidates for this device must be greater than 18 years of age, have moderate to severe obstructive sleep apnea with less than 25% central events  (AHI between 15-65), BMI less than 35, have tried CPAP/oral appliance for at least 8 weeks without success, and have appropriate upper airway anatomy (determined by a sleep endoscopy performed by Dr. Gabino Rehman or Dr. Carlos Joyce).    Nasal Procedures:  Nasal obstruction can interfere with nasal breathing during the day and night.  Studies have shown that relief of nasal obstruction can improve the ability of some patients to tolerate positive airway pressure therapy for obstructive sleep apnea1.  Treatment options include medications such as nasal saline, topical corticosteroid and antihistamine sprays, and oral medications such as antihistamines or decongestants. Non-surgical treatments can include external nasal dilators for selected patients. If these are not successful by themselves,  surgery can improve the nasal airway either alone or in combination with these other options.        Combination Procedures:  Combination of surgical procedures and other treatments may be recommended, particularly if patients have more than one area of narrowing or persistent positional disease.  The success rate of combination surgery ranges from 66-80%2,3.    References  Reed CHACKO. The Role of the Nose in Snoring and Obstructive Sleep Apnoea: An Update.  Eur Arch Otorhinolaryngol. 2011; 268: 1365-73.   Elmer SM; Keena JA; Anjana JR; Pallanch JF; Telly MB; Sarah Beth SG; Johnny MARINELLI. Surgical modifications of the upper airway for obstructive sleep apnea in adults: a systematic review and meta-analysis. SLEEP 2010;33(10):6579-2000. Janet ANDREA. Hypopharyngeal surgery in obstructive sleep apnea: an evidence-based medicine review.  Arch Otolaryngol Head Neck Surg. 2006 Feb;132(2):206-13.  Quoc YH1, Odilon Y, Demetrius JASON. The efficacy of anatomically based multilevel surgery for obstructive sleep apnea. Otolaryngol Head Neck Surg. 2003 Oct;129(4):327-35.  Janet ANDREA, Goldberg A. Hypopharyngeal Surgery in Obstructive Sleep Apnea: An Evidence-Based Medicine Review. Arch Otolaryngol Head Neck Surg. 2006 Feb;132(2):206-13.  Clementina VIDALES et al. Upper-Airway Stimulation for Obstructive Sleep Apnea.  N Engl J Med. 2014 Jan 9;370(2):139-49.  Jairo Y et al. Increased Incidence of Cardiovascular Disease in Middle-aged Men with Obstructive Sleep Apnea. Am J Respir Crit Care Med; 2002 166: 159-165  Morse EM et al. Studying Life Effects and Effectiveness of Palatopharyngoplasty (SLEEP) study: Subjective Outcomes of Isolated Uvulopalatopharyngoplasty. Otolaryngol Head Neck Surg. 2011; 144: 623-631.        WHAT IF I ONLY HAVE SNORING?    Mandibular advancement devices, lateral sleep positioning, long-term weight loss and treatment of nasal allergies have been shown to improve snoring.  Exercising tongue muscles with a game  (https://[employer]/us/conrad/soundly-reduce-snoring/tr1875362212) or stimulating the tongue during the day with a device (https://doi.org/10.3390/amp90156095) have improved snoring in some individuals.  https://www.[employer]/  https://www.sleepfoundation.org/best-anti-snoring-mouthpieces-and-mouthguards    Remember to Drive Safe... Drive Alive     Sleep health profoundly affects your health, mood, and your safety.  Thirty three percent of the population (one in three of us) is not getting enough sleep and many have a sleep disorder. Not getting enough sleep or having an untreated / undertreated sleep condition may make us sleepy without even knowing it. In fact, our driving could be dramatically impaired due to our sleep health. As your provider, here are some things I would like you to know about driving:     Here are some warning signs for impairment and dangerous drowsy driving:              -Having been awake more than 16 hours               -Looking tired               -Eyelid drooping              -Head nodding (it could be too late at this point)              -Driving for more than 30 minutes     Some things you could do to make the driving safer if you are experiencing some drowsiness:              -Stop driving and rest              -Call for transportation              -Make sure your sleep disorder is adequately treated     Some things that have been shown NOT to work when experiencing drowsiness while driving:              -Turning on the radio              -Opening windows              -Eating any  distracting  /  entertaining  foods (e.g., sunflower seeds, candy, or any other)              -Talking on the phone      Your decision may not only impact your life, but also the life of others. Please, remember to drive safe for yourself and all of us.

## 2024-06-13 NOTE — NURSING NOTE
"Chief Complaint   Patient presents with    New Patient     New consult for nausea, Sepsis due to Escherichia coli with acute hypoxic respiratory failure and septic.     She requests these members of her care team be copied on today's visit information:  PCP: Di Shea    Referring Provider:  Steve Presley MD  [address] [place] [address]    Vitals:    06/13/24 1030   BP: 130/87   BP Location: Left arm   Patient Position: Sitting   Cuff Size: Adult Regular   Pulse: 97   SpO2: 95%   Weight: 117.2 kg (258 lb 4.8 oz)   Height: 1.613 m (5' 3.5\")     Body mass index is 45.04 kg/m .    Medications were reconciled.        Miranda Viera CMA    "

## 2024-06-13 NOTE — PROGRESS NOTES
Virtual Visit Details    Type of service:  Video Visit 3:00 PM-4:30 PM    Originating Location (pt. Location): Home    Distant Location (provider location):  Off-site  Platform used for Video Visit: [facility]            Outpatient Sleep Medicine Consultation:      Name: Elza Greer MRN# [id number]   Age: 48 year old YOB: 1975     Date of Consultation: June 12, 2024  Consultation is requested by: Rayne Jackson, DO  [address] [place] [address] Rayne Jackson  Primary care provider: Di Shea       Reason for Sleep Consult:     Elza Greer is sent by Rayne Jackson for a sleep consultation regarding nocturnal hypoxemia.    Patient s Reason for visit  Elza Greer main reason for visit:    Patient states problem(s) started:    Elza Greer's goals for this visit:             Assessment and Plan:     Summary Sleep Diagnoses and Recommendations:    ICD-10-CM    1. Suspected sleep apnea  R29.818 Comprehensive Sleep Study     ABG-Blood Gas Arterial ([facility])      2. Nocturnal hypoxemia  G47.34 Adult Sleep Eval & Management [facility] Referral     Comprehensive Sleep Study     ABG-Blood Gas Arterial ([facility])      3. Primary hypertension  I10 Comprehensive Sleep Study      4. Morbid obesity (H)  E66.01 Comprehensive Sleep Study      5. Tobacco use disorder  F17.200 Comprehensive Sleep Study     ABG-Blood Gas Arterial ([facility])      6. Mild intermittent asthma without complication  J45.20 Comprehensive Sleep Study      7. Anxiety  F41.9 Comprehensive Sleep Study            Summary Counseling:    Sleep Testing Reviewed  Obstructive Sleep Apnea Reviewed  Complications of Untreated Sleep Apnea Reviewed      Patient will follow up with a [place] message after completing sleep study.  Patient will be contacted and therapy will be initiated if indicated  Renea Renae, NASH, CNP    Total  time spent reviewing medical records, history and physical examination, review of previous testing and interpretation as well as documentation on this date:78 minutes    CC: Rayne Jackson          History of Present Illness:     Elza Greer presents to the sleep medicine clinic with concerns of possible sleep apnea.    Elza has a medical history pertinent for tobacco use disorder, anxiety, sepsis due to E. coli with acute hypoxic respiratory failure and sepsis, hypertension mild intermittent asthma, migraines, adjustment disorder with mixed anxiety and depression, chronic obstructive pulmonary disease, irritable bowel syndrome, migraines, osteoporosis, polycystic ovarian syndrome, psoriasis, tobacco use disorder, vitamin D deficiency, and morbid obesity with serious comorbidity.  Last recorded BMI was 43.99.  Last recorded blood pressure noted to be 167/92.    Patient hospitalized at [facility] in October 2023 with acute respiratory distress syndrome, requiring oxygen supplementation upon discharge.  Notation in medical record that echocardiogram with dilated IVC however no right ventricular systolic pressure documented so was difficult to determine if there is a component of pulmonary hypertension, or if truly hypovolemia from fluid resuscitation and sepsis.    Multiple nocturnal awakenings for elimination purposes    Increasing forgetfulness    Finds she attempts to sleep on her side rather than on her back    Inadequate sleep hygiene practices    Falls asleep easily when not engaged in activities, sleeps 1-2 hours, sometimes feels better sometimes not    Socially disruptive snoring, snort arousals, witnessed episodes of apnea, morning headaches, nasal congestion upon awakening, frequent nocturnal leg movements although describes them more as leg cramping, weight gain, difficulty breathing through her nose, shortness of breath when lying flat as well as with activity and upon awakening, headaches  upon awakening, depressed mood and anxiety.    Frequent cough during appointment.    Interestingly, she did try her 's CPAP and she found immediate relief.  She is excited to begin this leg of her health journey.    BMI: 44.39  Ideal body weight: 53.6 kg (118 lb 1.2 oz)  Adjusted ideal body weight: 79.1 kg (174 lb 4.6 oz)    Smoking again  Began smoking at age 16- through age 48 max 2 ppd - now 3/4 ppd.  Trying to quit using patches    [place] Sleepiness Scale  Total score - Chassell:9   (Less than 10 normal)     Insomnia Severity Scale  TEETEE Total Score: 11  (normal 0-7, mild 8-14, moderate 15-21, severe 22-28)    STOP-BANG score 7/8 patient is at extremely high risk for obesity related hypoventilation syndrome.      Discussed basic pathophysiology for central sleep apnea as well as that for obstructive sleep apnea.  Likely patient has a component of both.  Plans for her include ABGs to be drawn within 24 hours prior to her sleep study, split-night sleep study, after which she will notify me with a [place] message upon completion.  She will have a 2-week follow-up to discuss her sleep study results.  This may be in the format of her choosing. In turn, we will collaborate determining the next steps in her plan of care.    Social History:  Billing for group homes    Past Sleep Evaluations:      SLEEP-WAKE SCHEDULE:     Work/School Days: Patient goes to school/work: Yes   Usually gets into bed at 10pm  Takes patient about Couple of minutes to a half hour to fall asleep  Has trouble falling asleep Not often nights per week  Wakes up in the middle of the night 1-2 times a night to use the bathroom times.  Wakes up due to Use the bathroom  She has trouble falling back asleep Not often times a week.   It usually takes Coyple of minutes to get back to sleep  Patient is usually up at 6am  Uses alarm: Yes    Weekends/Non-work Days/All Other Days:  Usually gets into bed at 11pm-3am   Takes patient about A couple of  minutes to a half hour to fall asleep  Patient is usually up at 8am-11am  Uses alarm: No    Sleep Need  Patient gets  6-8 hours sleep on average   Patient thinks she needs about 8 hours sleep    Elza Greer prefers to sleep in this position(s): Side;Head Elevated   Patient states they do the following activities in bed: Read;Watch TV;Use phone, computer, or tablet    Naps  Patient takes a purposeful nap 2 times a week and naps are usually When i can fall asleep about 1-2 hours in duration  She feels better after a nap: Yes  She dozes off unintentionally Sometimes days per week    Patient has had a driving accident or near-miss due to sleepiness/drowsiness: No      SLEEP DISRUPTIONS:    Breathing/Snoring  Patient snores:Yes  Other people complain about her snoring: Yes  Patient has been told she stops breathing in her sleep:Yes  She has issues with the following: Morning headaches;Stuffy nose when you wake up;Getting up to urinate more than once.     Movement:  Patient gets pain, discomfort, with an urge to move:  Yes restless legs symptoms  It happens when she is resting:  Yes  It happens more at night:  Yes  Patient has been told she kicks her legs at night:  No     Behaviors in Sleep:  Elza Greer has experienced the following behaviors while sleeping:    She has experienced sudden muscle weakness during the day: No  Pt denies bruxism, sleep talking, sleep walking, and dream enactment behavior. Pt denies sleep paralysis, hypnagogue and cataplexy.       Is there anything else you would like your sleep provider to know:        CAFFEINE AND OTHER SUBSTANCES:    Patient consumes caffeinated beverages per day:  4  Last caffeine use is usually: 6pm  List of any prescribed or over the counter stimulants that patient takes:    List of any prescribed or over the counter sleep medication patient takes:    List of previous sleep medications that patient has tried:    Patient drinks alcohol to help them sleep:  No  Patient drinks alcohol near bedtime: No    Family History:  Patient has a family member been diagnosed with a sleep disorder: No            SCALES:    EPWORTH SLEEPINESS SCALE         6/12/2024     1:13 PM    [place] Sleepiness Scale ( EVELIO Hilliard  [id number]<br>ESS - [place]/English - Final version - 21 Nov 07 - [facility] [employer].)   Sitting and reading High chance of dozing   Watching TV Moderate chance of dozing   Sitting, inactive in a public place (e.g. a theatre or a meeting) Moderate chance of dozing   As a passenger in a car for an hour without a break Slight chance of dozing   Lying down to rest in the afternoon when circumstances permit Slight chance of dozing   Sitting and talking to someone Would never doze   Sitting quietly after a lunch without alcohol Would never doze   In a car, while stopped for a few minutes in traffic Would never doze   Dur[place] Score (MC) 9   Dur[place] Score (Sleep) 9         INSOMNIA SEVERITY INDEX (TEETEE)          6/12/2024     1:13 PM   Insomnia Severity Index (TEETEE)   Difficulty falling asleep 1   Difficulty staying asleep 2   Problems waking up too early 1   How SATISFIED/DISSATISFIED are you with your CURRENT sleep pattern? 2   How NOTICEABLE to others do you think your sleep problem is in terms of impairing the quality of your life? 2   How WORRIED/DISTRESSED are you about your current sleep problem? 1   To what extent do you consider your sleep problem to INTERFERE with your daily functioning (e.g. daytime fatigue, mood, ability to function at work/daily chores, concentration, memory, mood, etc.) CURRENTLY? 2   TEETEE Total Score 11       Guidelines for Scoring/Interpretation:  Total score categories:  0-7 = No clinically significant insomnia   8-14 = Subthreshold insomnia   15-21 = Clinical insomnia (moderate severity)  22-28 = Clinical insomnia (severe)  Used via courtesy of www.[employer] Technology[employer].va.gov with permission from Nathan Ray PhD., [facility]      TIMOTHY XIE  "7/8 6/13/2024     2:45 PM   STOP BANG Questionnaire (  2008, the American Society of Anesthesiologists, Inc. [employer]re Klever [employer].)   BMI Clinic: 43.99         GAD7        8/24/2020     2:32 PM   RAE-7    1. Feeling nervous, anxious, or on edge 1   2. Not being able to stop or control worrying 1   3. Worrying too much about different things 1   4. Trouble relaxing 2   5. Being so restless that it is hard to sit still 2   6. Becoming easily annoyed or irritable 1   7. Feeling afraid, as if something awful might happen 1   RAE-7 Total Score 9         CAGE-AID         No data to display                CAGE-AID reprinted with permission from the [place] [employer], JINA Phan. and ASAEL Pardo, \"Conjoint screening questionnaires for alcohol and drug abuse\" [place] Medical Journal 94: 135-140, 1995.      PATIENT HEALTH QUESTIONNAIRE-9 (PHQ - 9)        8/24/2020     2:32 PM   PHQ-9 ([employer])   1.  Little interest or pleasure in doing things 0   2.  Feeling down, depressed, or hopeless 0   3.  Trouble falling or staying asleep, or sleeping too much 2   4.  Feeling tired or having little energy 1   5.  Poor appetite or overeating 1   6.  Feeling bad about yourself - or that you are a failure or have let yourself or your family down 0   7.  Trouble concentrating on things, such as reading the newspaper or watching television 2   8.  Moving or speaking so slowly that other people could have noticed. Or the opposite - being so fidgety or restless that you have been moving around a lot more than usual 0   9.  Thoughts that you would be better off dead, or of hurting yourself in some way 0   PHQ-9 Total Score 6   6.  Feeling bad about yourself 0   7.  Trouble concentrating 2   8.  Moving slowly or restless 0   9.  Suicidal or self-harm thoughts 0       Developed by Stacy Delgado, Lady Ernst, Raul Cho and colleagues, with an educational kaia from [employer]. No permission required " to reproduce, translate, display or distribute.        Allergies:    Allergies   Allergen Reactions    Azithromycin Anaphylaxis    Latex Hives    Oxycodone Nausea and Vomiting       Medications:    Current Outpatient Medications   Medication Sig Dispense Refill    albuterol (ACCUNEB) 1.25 MG/3ML neb solution Take 1 vial (1.25 mg) by nebulization every 6 hours as needed for shortness of breath / dyspnea or wheezing 90 mL 0    albuterol (PROVENTIL) (2.5 MG/3ML) 0.083% neb solution Take 1 vial (2.5 mg) by nebulization every 6 hours as needed for shortness of breath, wheezing or cough 90 mL 0    butalbital-acetaminophen-caffeine (ESGIC) -40 MG tablet TAKE 1 TABLET EVERY 6 HOURS AS NEEDED FOR HEADACHE      clobetasol propionate (CLOBEX) 0.05 % external shampoo Leave on scalp for 15 minutes then rinse. Use 1-2 times weekly. 118 mL 5    Fluocinolone Acetonide Scalp 0.01 % OIL oil Daily as needed for scalp psoriasis 118.28 mL 0    fluticasone (FLONASE) 50 MCG/ACT nasal spray Spray 2 sprays into both nostrils daily 16 g 0    gabapentin (NEURONTIN) 100 MG capsule TAKE 1 CAPSULE BY MOUTH THREE TIMES DAILY 90 capsule 0    ibuprofen (ADVIL/MOTRIN) 600 MG tablet Take 1 tablet by mouth 3 times daily      losartan (COZAAR) 25 MG tablet Take 1 tablet (25 mg) by mouth daily 90 tablet 1    Multiple Vitamins-Minerals (MULTIVITAMIN WOMEN PO) Take 2 chew tab by mouth daily gummy      naltrexone (DEPADE/REVIA) 50 MG tablet Take 25 mg daily for 1 week than increase to 25 mg twice daily (Patient not taking: Reported on 4/19/2024) 30 tablet 2    nicotine (NICODERM CQ) 21 MG/24HR 24 hr patch Place 1 patch onto the skin every 24 hours      nicotine polacrilex (NICORETTE) 4 MG gum Place 4 mg inside cheek every hour as needed for smoking cessation      nystatin (MYCOSTATIN) 434692 UNIT/GM external cream Apply topically 2 times daily 30 g 1    ondansetron (ZOFRAN) 4 MG tablet TAKE 1 TABLET BY MOUTH EVERY 8 HOURS FOR 3 DAYS AS NEEDED FOR  NAUSEA OR VOMITING      oxyCODONE (ROXICODONE) 5 MG tablet Take 0.5 tablets (2.5 mg) by mouth every 12 hours as needed for pain or severe pain 10 tablet 0    rizatriptan (MAXALT) 10 MG tablet Take 1 tablet (10 mg) by mouth at onset of headache for migraine May repeat in 2 hours. Max 3 tablets/24 hours. (Patient not taking: Reported on 12/27/2023) 30 tablet 1    VENTOLIN  (90 Base) MCG/ACT inhaler INHALE 1 TO 2 PUFFS BY MOUTH EVERY 4 HOURS AS NEEDED FOR SHORTNESS OF BREATH, WHEEZING OR COUGH 18 g 0    vitamin D3 (CHOLECALCIFEROL) 1.25 MG (68458 UT) capsule Take 1 capsule (50,000 Units) by mouth every 7 days 12 capsule 1       Problem List:  Patient Active Problem List    Diagnosis Date Noted    Sepsis due to Escherichia coli with acute hypoxic respiratory failure and septic shock (H) 04/03/2024     Priority: Medium    Elevated LFTs 10/23/2023     Priority: Medium    Upper abdominal pain 10/23/2023     Priority: Medium    Rheumatoid arthritis (H) 09/18/2023     Priority: Medium    Morbid obesity (H) 10/04/2021     Priority: Medium    Ventral hernia without obstruction or gangrene 01/13/2021     Priority: Medium     Added automatically from request for surgery [id number]      Anxiety 02/18/2020     Priority: Medium    Tobacco use disorder 11/14/2016     Priority: Medium    Mild intermittent asthma without complication 05/12/2010     Priority: Medium        Past Medical/Surgical History:  Past Medical History:   Diagnosis Date    Adjustment disorder with mixed anxiety and depressed mood     Anxiety     Arthritis     COPD (chronic obstructive pulmonary disease) (H)     Depressive disorder     Gallbladder problem     Removed when i was 24    History of blood transfusion     Had a blood transfusion after my hysterectomy in 2005    History of steroid therapy     Hypertension 10/27/2023    IBS (irritable bowel syndrome)     Immunosuppression (H24)     Incisional hernia, without obstruction or gangrene 2020    Infection  10/23/2023    Had an E-Coli blood infection, pneumonia and sepsis    Lactose intolerance 2010    Migraine 2010    Migraines     Mild intermittent asthma without complication 2010    Osteoporosis     PCOS (polycystic ovarian syndrome)     Skin disease     Psoriasis    Tobacco use disorder     Vitamin D deficiency      Past Surgical History:   Procedure Laterality Date    ABDOMEN SURGERY      APPENDECTOMY OPEN N/A     COLONOSCOPY N/A 2023    Procedure: COLONOSCOPY, WITH POLYPECTOMY AND BIOPSY;  Surgeon: Chris Wyatt MD;  Location: [facility]    CYSTECTOMY OVARIAN BENIGN      HEPATICOJEJUNOSTOMY      RnY jejunostomy from bile duct injury    HERNIORRHAPHY VENTRAL N/A 2012    open with mesh    HYSTERECTOMY TOTAL ABDOMINAL, BILATERAL SALPINGO-OOPHORECTOMY, COMBINED N/A     LAPAROSCOPIC CHOLECYSTECTOMY      complicated by bile duct injury    LAPAROSCOPIC HERNIORRHAPHY VENTRAL N/A 2021    Procedure: Open recurrent incarcerated ventral hernia repair X2;  Surgeon: Pradip Mckenzie MD;  Location: [facility]       Social History:  Social History     Socioeconomic History    Marital status:      Spouse name: Not on file    Number of children: Not on file    Years of education: Not on file    Highest education level: Not on file   Occupational History    Not on file   Tobacco Use    Smoking status: Some Days     Current packs/day: 0.00     Average packs/day: 1 pack/day for 23.0 years (23.0 ttl pk-yrs)     Types: Cigarettes     Start date: 10/23/2000     Last attempt to quit: 10/23/2023     Years since quittin.6    Smokeless tobacco: Never    Tobacco comments:      Is using patches and gum   Vaping Use    Vaping status: Former    Substances: Nicotine   Substance and Sexual Activity    Alcohol use: Yes     Comment: social    Drug use: No    Sexual activity: Yes     Partners: Male     Birth control/protection: Post-menopausal   Other Topics Concern    Parent/sibling w/ CABG, MI or  angioplasty before 65F 55M? 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     Social Determinants of Health     Financial Resource Strain: Not on file   Food Insecurity: Not on file   Transportation Needs: Not on file   Physical Activity: Not on file   Stress: Not on file   Social Connections: Not on file   Interpersonal Safety: Low Risk  (11/22/2023)    Interpersonal Safety     Do you feel physically and emotionally safe where you currently live?: Yes     Within the past 12 months, have you been hit, slapped, kicked or otherwise physically hurt by someone?: No     Within the past 12 months, have you been humiliated or emotionally abused in other ways by your partner or ex-partner?: No   Housing Stability: Not on file       Family History:  Family History   Problem Relation Age of Onset    Dementia Mother     Coronary Artery Disease Mother     Hypertension Mother     Hyperlipidemia Mother     Depression Mother     Cerebrovascular Disease Mother     Asthma Mother     Obesity Mother     Mental Illness Father         Schizophrenia    Colon Cancer Maternal Grandmother     Breast Cancer Maternal Grandmother     Other Cancer Maternal Grandmother     Asthma Maternal Grandmother     Obesity Maternal Grandmother     Osteoporosis Other        Review of Systems:  A complete review of systems reviewed by me is negative with the exeption of what has been mentioned in the history of present illness.  In the last TWO WEEKS have you experienced any of the following symptoms?  Fevers: No  Night Sweats: No  Weight Gain: Yes  Pain at Night: Yes  Double Vision: No  Changes in Vision: No  Difficulty Breathing through Nose: Yes  Sore Throat in Morning: No  Dry Mouth in the Morning: Yes  Shortness of Breath Lying Flat: Yes  Shortness of Breath With Activity: Yes  Awakening with Shortness of Breath: No  Increased Cough: Yes  Heart Racing at Night: No  Swelling in Feet or Legs: No  Diarrhea at Night: No  Heartburn at Night:  "No  Urinating More than Once at Night: Yes  Losing Control of Urine at Night: No  Joint Pains at Night: Yes  Headaches in Morning: Yes  Weakness in Arms or Legs: No  Depressed Mood: No  Anxiety: Yes     Physical Examination:  Vitals: Ht 1.632 m (5' 4.25\")   Wt 117.2 kg (258 lb 4.8 oz)   LMP 08/26/2005   BMI 43.99 kg/m    BMI= Body mass index is 43.99 kg/m .         Physical Exam  Vitals and nursing note reviewed.   Constitutional:       General: She is awake. She is not in acute distress.     Appearance: Normal appearance. She is well-developed and well-groomed. She is morbidly obese. She is not ill-appearing, toxic-appearing or diaphoretic.   HENT:      Head: Normocephalic and atraumatic.      Right Ear: External ear normal.      Left Ear: External ear normal.      Nose: Nose normal.      Mouth/Throat:      Mouth: Mucous membranes are moist.      Pharynx: Oropharynx is clear.   Eyes:      Conjunctiva/sclera: Conjunctivae normal.   Pulmonary:      Comments: Coughs easily with conversation  Musculoskeletal:         General: Normal range of motion.      Cervical back: Normal range of motion and neck supple.   Neurological:      General: No focal deficit present.      Mental Status: She is alert and oriented to person, place, and time.   Psychiatric:         Mood and Affect: Mood normal.         Behavior: Behavior is cooperative.         Thought Content: Thought content normal.         Judgment: Judgment normal.            Physical examination is limited by the nature of a virtual visit      All Labs Personally Reviewed               Data: All pertinent previous laboratory data reviewed     Recent Labs   Lab Test 04/03/24  1456 12/27/23  1149    140   POTASSIUM 4.2 4.6   CHLORIDE 104 104   CO2 28 28   ANIONGAP 11 8   GLC 97 97   BUN 11.9 10.3   CR 0.65 0.70   DEBBIE 9.5 9.3       Recent Labs   Lab Test 04/03/24  1456   WBC 14.4*   RBC 3.94   HGB 11.6*   HCT 37.6   MCV 95   MCH 29.4   MCHC 30.9*   RDW 13.0   PLT " "305       Recent Labs   Lab Test 24  1456   PROTTOTAL 7.8   ALBUMIN 3.9   BILITOTAL 0.2   ALKPHOS 190*   AST 26   ALT 32       TSH (uIU/mL)   Date Value   2023 2.62   10/31/2022 1.60       No results found for: \"UAMP\", \"UBARB\", \"BENZODIAZEUR\", \"UCANN\", \"UCOC\", \"OPIT\", \"UPCP\"    Iron Sat Index   Date/Time Value Ref Range Status   10/27/2023 12:08 AM 8 (L) 15 - 46 % Final     Ferritin   Date/Time Value Ref Range Status   10/27/2023 12:08  (H) 6 - 175 ng/mL Final       No results found for: \"PH\", \"PHARTERIAL\", \"PO2\", \"[id number]\", \"SAT\", \"PCO2\", \"HCO3\", \"BASEEXCESS\", \"KAYCEE\", \"BEB\"    @LABRCNTIPR(phv:4,pco2v:4,po2v:4,hco3v:4,jose:4,o2per:4)@    Echocardiology:  [facility]  Echocardiography Laboratory  [address] [place] [address]     Name: FATIMAH LEE  MRN: [id number]  : 1975  Study Date: 10/25/2023 02:19 PM  Age: 48 yrs  Gender: Female  Patient Location: [facility]  Reason For Study: Dyspnea  Ordering Physician: CHRISTOPHER CAN  Referring Physician: Di Shea  Performed By: Melany De La Vega RDCS     BSA: 2.2 m2  Height: 64 in  Weight: 254 lb  HR: 138  BP: 131/84 mmHg  ______________________________________________________________________________  Procedure  Complete Portable Echo Adult. Optison (NDC #[id number]) given intravenously.  ______________________________________________________________________________  Interpretation Summary     Left ventricular systolic function is normal.  The visual ejection fraction is 60-65%.  No significant valve dysfunction.  There is no pericardial effusion.  Dilation of the inferior vena cava is present with abnormal respiratory  variation in diameter.  Patient is tachycardic.     No prior for comparison.  ______________________________________________________________________________  Left Ventricle  The left ventricle is normal in structure, function and size. There is normal  left ventricular wall thickness. Left ventricular " systolic function is normal.  The visual ejection fraction is 60-65%. Left ventricular diastolic function is  not assessable.     Atria  Normal left atrial size. Right atrial size is normal.     Mitral Valve  The mitral valve is normal in structure and function.     Tricuspid Valve  The tricuspid valve is normal in structure and function. Right ventricular  systolic pressure could not be approximated due to inadequate tricuspid  regurgitation.     Aortic Valve  The aortic valve is normal in structure and function.     Pulmonic Valve  The pulmonic valve is normal in structure and function.     Vessels  The aortic root is normal size. Normal size ascending aorta. Dilation of the  inferior vena cava is present with abnormal respiratory variation in diameter.     Pericardium  There is no pericardial effusion.         Chest x-ray:   XR Chest 2 Views 05/16/2024    Narrative  EXAM: XR CHEST 2 VIEWS  LOCATION: [facility]  DATE: 5/16/2024    INDICATION: Cough, congestion, and wheezing for 10 days.  COMPARISON: 11/20/2023    Impression  IMPRESSION:    No focal airspace disease. No pleural effusion or pneumothorax.    The cardiomediastinal silhouette is unremarkable.    Right upper quadrant surgical clips.      Chest CT:   CT Chest Pulmonary Embolism w Contrast 10/23/2023    Narrative  CT CHEST PULMONARY EMBOLISM W CONTRAST 10/23/2023 10:19 AM    CLINICAL HISTORY: sepsis; fever of unknown source. Tachycardic  tachypnic.  TECHNIQUE: CT angiogram chest during pulmonary arterial phase  injection IV contrast. 2D and 3D MIP reconstructions were performed by  the CT technologist. Dose reduction techniques were used.    CONTRAST: 92mL isovue 370    COMPARISON: None.    FINDINGS:  PULMONARY ARTERY CT ANGIOGRAM: No filling defects.    LUNGS AND PLEURA: Subsegmental atelectasis in the lung bases. No  pleural effusions or pneumothorax.    MEDIASTINUM/AXILLAE: The visualized thyroid gland is unremarkable.  "No  thoracic adenopathy. Mild cardiomegaly. No pericardial effusion.  Normal caliber thoracic aorta. No acute abnormality. No coronary  artery calcifications.    UPPER ABDOMEN: Spleen is at least 14.2 cm. Liver also appears  enlarged. There is an 8 mm low density possible cyst in the liver.  There is an anterior abdominal wall hernia containing nonobstructed  transverse colon and transverse mesocolon.    MUSCULOSKELETAL: Normal.    Impression  IMPRESSION:  1.  Negative for acute pulmonary embolism.    2.  No consolidative airspace disease.    3.  There is hepatosplenomegaly.    BONIFACIO MENDIOLA MD      SYSTEM ID:  [id number]      PFT: Most Recent [facility] Pulmonary Function Testing    No results found for: \"20001\"  No results found for: \"20002\"  No results found for: \"20003\"  No results found for: \"20015\"  No results found for: \"20016\"  No results found for: \"20027\"  No results found for: \"20028\"  No results found for: \"20029\"  No results found for: \"20079\"  No results found for: \"20080\"  No results found for: \"20081\"  No results found for: \"20335\"  No results found for: \"20105\"  No results found for: \"20053\"  No results found for: \"20054\"  No results found for: \"20055\"      Renea Renae, NASH CNP 6/12/2024   Sleep Medicine    This note was written with the assistance of the Dragon voice-dictation technology software. The final document, although reviewed, may contain errors. For corrections, please contact the office.          "

## 2024-06-13 NOTE — NURSING NOTE
Patient confirms medications and allergies are accurate via patients echeck in completion, and or denies any changes since last reviewed/verified.     Is the patient currently in the state of [place]? YES    Visit mode:VIDEO    If the visit is dropped, the patient can be reconnected by: VIDEO VISIT: Text to cell phone:   Telephone Information:   Mobile [phone]       Will anyone else be joining the visit? NO  (If patient encounters technical issues they should call [phone]    How would you like to obtain your AVS? MyChart    Are changes needed to the allergy or medication list? No    Are refills needed on medications prescribed by this physician? NO    Reason for visit: Consult    Vanesa MONTERO

## 2024-06-13 NOTE — PROGRESS NOTES
NEW PATIENT GI CLINIC VISIT    CC/REFERRING MD:  Steve Presley  REASON FOR CONSULTATION:   Steve Presley for   Chief Complaint   Patient presents with    New Patient     New consult for nausea, Sepsis due to Escherichia coli with acute hypoxic respiratory failure and septic.       ASSESSMENT/PLAN: Patient is here for evaluation for recurrent E. coli sepsis with unclear source, with episodes in October 2023 and again in March 2024.  Each presentation did not involve acute diarrhea or evidence of enteritis.  Biliary source has been theorized, given her history of hepaticojejunal Luisito-en-Y and longstanding ventral hernia of the transverse colon.  Recent HIDA scan without obvious bile leak.  I recommended proceeding with MRCP to further evaluate hepatobiliary anatomy.  I recommended she continue following with bariatric surgery and pursuit of weight loss.  Currently, GI symptoms are fairly well-controlled with symptomatic cares, she can continue with Zofran as needed.  I will follow-up with patient after MRCP to discuss next steps.  Would like needing to have conference with advanced endoscopy and possibly ID as well.    1. Sepsis due to Escherichia coli with acute hypoxic respiratory failure and septic shock (H)  - Adult GI  Referral - Consult Only  - MR Abdomen MRCP w/o & w Contrast; Future    2. S/P cholecystectomy  - Adult GI  Referral - Consult Only  - MR Abdomen MRCP w/o & w Contrast; Future    3. Nausea  - Adult GI  Referral - Consult Only    4. Ventral hernia without obstruction or gangrene    5. Bile duct injury, sequela  - MR Abdomen MRCP w/o & w Contrast; Future      Thank you for this consultation.  It was a pleasure to participate in the care of this patient; please contact us with any further questions.      30 minutes spent on the date of the encounter doing chart review, patient visit, and documentation    This note was created with voice recognition  software, and while reviewed for accuracy, typos may remain.     Sukhdev Pink PA-C  Division of Gastroenterology, Hepatology and Nutrition  [facility]  Elza Greer is a 48 year old female with a past medical history significant for RA, history of tobacco use now with COPD/asthma, anxiety that is seen as a new patient in the GI clinic today for opinion regarding recurrent episodes of E. coli sepsis.  She is accompanied by her daughter today.    To review, this patient was hospitalized in October 2023 for several days with sepsis -presented with abdominal pain and nausea.  Workup for source was notable for CT without obvious acute enteritis, normal UA.  GI source suspected with blood cultures growing E. Coli -history of chronic ventral hernia and previous small bowel surgery suspected as possible causes.  She improved with antibiotic therapy.  Subsequent colonoscopy in December revealed diverticulosis in the ascending colon and cecum, 3 mm hyperplastic polyp, no other abnormalities.  She subsequently establish care with general surgery to discuss ventral hernia and weight loss surgery.  She was advised to pursue further weight loss prior to hernia repair.  She has been pursuing medical therapies and dietary change for weight loss since then.  She unfortunately had another episode of E. coli sepsis while in [place] in March.  I was able to read the discharge summary on the patient's phone, basically her presentation was similar -abdominal pain with some nausea, but no diarrhea.  She was again treated with antibiotics and was able to be discharged after several days.  At her follow-up with primary care provider, HIDA scan was pursued to evaluate for possible bile leak.  This came back normal.  As of now, the patient is experiencing some nausea on a somewhat regular basis, but no vomiting.  Has had a little bit of upper abdominal pain intermittently, but not  persistently.  Stool output has tended to be on the looser side, ever since having cholecystectomy, but has had some constipation at times lately.  Overall, her main concern is the recurrent episodes of sepsis.  He is hopeful to prevent these from occurring in the future.  She also is hoping to continue on her weight loss journey in advance of additional surgical intervention to fix her ventral hernia.  With her otherwise extensive small bowel surgical history, revision of bariatric surgery has not been recommended at this time.    ROS:    10 point ROS neg other than the symptoms noted above in the HPI.    PREVIOUS ENDOSCOPY:  Findings:       The perianal and digital rectal examinations were normal. Pertinent        negatives include normal sphincter tone.        A few small-mouthed diverticula were found in the ascending colon and        cecum.        A 3 mm polyp was found in the sigmoid colon. The polyp was sessile.        Resection and retrieval were complete. Verification of patient        identification for the specimen was done by the physician, nurse and        technician using the patient's name, birth date and medical record        number. Estimated blood loss was minimal.        No additional abnormalities were found on retroflexion.                                                                                    Impression:            - Diverticulosis in the ascending colon and in the                          cecum.                          - One 3 mm polyp in the sigmoid colon. Resected and                          retrieved.   Final Diagnosis   Large intestine, sigmoid, polyp, biopsy/polypectomy:  - Hyperplastic polyp negative for dysplasia and malignancy.      PERTINENT RELEVANT IMAGING OR LABS:  EXAM: NM HEPATOBILIARY SCAN  LOCATION: [facility]  DATE: 4/9/2024     INDICATION: Abdominal pain.  COMPARISON: CT abdomen pelvis 10/23/2023  TECHNIQUE: 8.0 mCi of Tc-99m  mebrofenin, IV. Anterior planar imaging of the abdomen.      FINDINGS: Gallbladder is surgically absent. Normal radionuclide activity in liver, bile ducts, and small bowel. No evidence of common duct obstruction or intrinsic liver disease.                                                                      IMPRESSION:      Negative for biliary obstruction.      ALLERGIES:     Allergies   Allergen Reactions    Azithromycin Anaphylaxis    Latex Hives    Oxycodone Nausea and Vomiting       PERTINENT MEDICATIONS:    Current Outpatient Medications:     albuterol (ACCUNEB) 1.25 MG/3ML neb solution, Take 1 vial (1.25 mg) by nebulization every 6 hours as needed for shortness of breath / dyspnea or wheezing, Disp: 90 mL, Rfl: 0    albuterol (PROVENTIL) (2.5 MG/3ML) 0.083% neb solution, Take 1 vial (2.5 mg) by nebulization every 6 hours as needed for shortness of breath, wheezing or cough, Disp: 90 mL, Rfl: 0    butalbital-acetaminophen-caffeine (ESGIC) -40 MG tablet, TAKE 1 TABLET EVERY 6 HOURS AS NEEDED FOR HEADACHE, Disp: , Rfl:     clobetasol propionate (CLOBEX) 0.05 % external shampoo, Leave on scalp for 15 minutes then rinse. Use 1-2 times weekly., Disp: 118 mL, Rfl: 5    Fluocinolone Acetonide Scalp 0.01 % OIL oil, Daily as needed for scalp psoriasis, Disp: 118.28 mL, Rfl: 0    fluticasone (FLONASE) 50 MCG/ACT nasal spray, Spray 2 sprays into both nostrils daily, Disp: 16 g, Rfl: 0    gabapentin (NEURONTIN) 100 MG capsule, TAKE 1 CAPSULE BY MOUTH THREE TIMES DAILY, Disp: 90 capsule, Rfl: 0    ibuprofen (ADVIL/MOTRIN) 600 MG tablet, Take 1 tablet by mouth 3 times daily, Disp: , Rfl:     losartan (COZAAR) 25 MG tablet, Take 1 tablet (25 mg) by mouth daily, Disp: 90 tablet, Rfl: 1    Multiple Vitamins-Minerals (MULTIVITAMIN WOMEN PO), Take 2 chew tab by mouth daily gummy, Disp: , Rfl:     nicotine (NICODERM CQ) 21 MG/24HR 24 hr patch, Place 1 patch onto the skin every 24 hours, Disp: , Rfl:     nicotine polacrilex  (NICORETTE) 4 MG gum, Place 4 mg inside cheek every hour as needed for smoking cessation, Disp: , Rfl:     nystatin (MYCOSTATIN) 571136 UNIT/GM external cream, Apply topically 2 times daily, Disp: 30 g, Rfl: 1    ondansetron (ZOFRAN) 4 MG tablet, TAKE 1 TABLET BY MOUTH EVERY 8 HOURS FOR 3 DAYS AS NEEDED FOR NAUSEA OR VOMITING, Disp: , Rfl:     oxyCODONE (ROXICODONE) 5 MG tablet, Take 0.5 tablets (2.5 mg) by mouth every 12 hours as needed for pain or severe pain, Disp: 10 tablet, Rfl: 0    VENTOLIN  (90 Base) MCG/ACT inhaler, INHALE 1 TO 2 PUFFS BY MOUTH EVERY 4 HOURS AS NEEDED FOR SHORTNESS OF BREATH, WHEEZING OR COUGH, Disp: 18 g, Rfl: 0    vitamin D3 (CHOLECALCIFEROL) 1.25 MG (03841 UT) capsule, Take 1 capsule (50,000 Units) by mouth every 7 days, Disp: 12 capsule, Rfl: 1    naltrexone (DEPADE/REVIA) 50 MG tablet, Take 25 mg daily for 1 week than increase to 25 mg twice daily (Patient not taking: Reported on 4/19/2024), Disp: 30 tablet, Rfl: 2    rizatriptan (MAXALT) 10 MG tablet, Take 1 tablet (10 mg) by mouth at onset of headache for migraine May repeat in 2 hours. Max 3 tablets/24 hours. (Patient not taking: Reported on 12/27/2023), Disp: 30 tablet, Rfl: 1    PROBLEM LIST  Patient Active Problem List    Diagnosis Date Noted    Sepsis due to Escherichia coli with acute hypoxic respiratory failure and septic shock (H) 04/03/2024     Priority: Medium    Elevated LFTs 10/23/2023     Priority: Medium    Upper abdominal pain 10/23/2023     Priority: Medium    Rheumatoid arthritis (H) 09/18/2023     Priority: Medium    Morbid obesity (H) 10/04/2021     Priority: Medium    Ventral hernia without obstruction or gangrene 01/13/2021     Priority: Medium     Added automatically from request for surgery [id number]      Anxiety 02/18/2020     Priority: Medium    Tobacco use disorder 11/14/2016     Priority: Medium    Mild intermittent asthma without complication 05/12/2010     Priority: Medium       PERTINENT PAST  MEDICAL HISTORY:  Past Medical History:   Diagnosis Date    Adjustment disorder with mixed anxiety and depressed mood     Anxiety     Arthritis     COPD (chronic obstructive pulmonary disease) (H)     Depressive disorder     Gallbladder problem     Removed when i was 24    History of blood transfusion     Had a blood transfusion after my hysterectomy in 2005    History of steroid therapy     Hypertension 10/27/2023    IBS (irritable bowel syndrome)     Immunosuppression (H24)     Incisional hernia, without obstruction or gangrene 2020    Infection 10/23/2023    Had an E-Coli blood infection, pneumonia and sepsis    Lactose intolerance 05/12/2010    Migraine 05/12/2010    Migraines     Mild intermittent asthma without complication 05/12/2010    Osteoporosis     PCOS (polycystic ovarian syndrome)     Skin disease     Psoriasis    Tobacco use disorder     Vitamin D deficiency        PREVIOUS SURGERIES:  Past Surgical History:   Procedure Laterality Date    ABDOMEN SURGERY      APPENDECTOMY OPEN N/A     COLONOSCOPY N/A 12/4/2023    Procedure: COLONOSCOPY, WITH POLYPECTOMY AND BIOPSY;  Surgeon: Chris Wyatt MD;  Location: [facility]    CYSTECTOMY OVARIAN BENIGN  2001    HEPATICOJEJUNOSTOMY  2000    RnY jejunostomy from bile duct injury    HERNIORRHAPHY VENTRAL N/A 02/17/2012    open with mesh    HYSTERECTOMY TOTAL ABDOMINAL, BILATERAL SALPINGO-OOPHORECTOMY, COMBINED N/A 2005    LAPAROSCOPIC CHOLECYSTECTOMY  2006    complicated by bile duct injury    LAPAROSCOPIC HERNIORRHAPHY VENTRAL N/A 01/26/2021    Procedure: Open recurrent incarcerated ventral hernia repair X2;  Surgeon: Pradip Mckenzie MD;  Location: [facility]       SOCIAL HISTORY:  Social History     Socioeconomic History    Marital status:      Spouse name: Not on file    Number of children: Not on file    Years of education: Not on file    Highest education level: Not on file   Occupational History    Not on file   Tobacco Use    Smoking status: Some Days      Current packs/day: 0.00     Average packs/day: 1 pack/day for 23.0 years (23.0 ttl pk-yrs)     Types: Cigarettes     Start date: 10/23/2000     Last attempt to quit: 10/23/2023     Years since quittin.6    Smokeless tobacco: Never    Tobacco comments:      Is using patches and gum   Vaping Use    Vaping status: Former    Substances: Nicotine   Substance and Sexual Activity    Alcohol use: Yes     Comment: social    Drug use: No    Sexual activity: Yes     Partners: Male     Birth control/protection: Post-menopausal   Other Topics Concern    Parent/sibling w/ CABG, MI or angioplasty before 65F 55M? 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     Social Determinants of Health     Financial Resource Strain: Not on file   Food Insecurity: Not on file   Transportation Needs: Not on file   Physical Activity: Not on file   Stress: Not on file   Social Connections: Not on file   Interpersonal Safety: Low Risk  (2023)    Interpersonal Safety     Do you feel physically and emotionally safe where you currently live?: Yes     Within the past 12 months, have you been hit, slapped, kicked or otherwise physically hurt by someone?: No     Within the past 12 months, have you been humiliated or emotionally abused in other ways by your partner or ex-partner?: No   Housing Stability: Not on file       FAMILY HISTORY:  Family History   Problem Relation Age of Onset    Dementia Mother     Coronary Artery Disease Mother     Hypertension Mother     Hyperlipidemia Mother     Depression Mother     Cerebrovascular Disease Mother     Asthma Mother     Obesity Mother     Mental Illness Father         Schizophrenia    Colon Cancer Maternal Grandmother     Breast Cancer Maternal Grandmother     Other Cancer Maternal Grandmother     Asthma Maternal Grandmother     Obesity Maternal Grandmother     Osteoporosis Other        Past/family/social history reviewed and no changes    PHYSICAL EXAMINATION:  Constitutional: aaox3,  "cooperative, pleasant, not dyspneic/diaphoretic, no acute distress  Vitals reviewed: /87 (BP Location: Left arm, Patient Position: Sitting, Cuff Size: Adult Regular)   Pulse 97   Ht 1.613 m (5' 3.5\")   Wt 117.2 kg (258 lb 4.8 oz)   LMP 08/26/2005   SpO2 95%   BMI 45.04 kg/m    Wt:   Wt Readings from Last 2 Encounters:   06/13/24 117.2 kg (258 lb 4.8 oz)   04/19/24 117.3 kg (258 lb 9.6 oz)      Eyes: Sclera anicteric/injected  CV: No edema  Respiratory: Unlabored breathing  Skin: warm, perfused, no jaundice  Psych: Normal affect  MSK: Normal gait                    "

## 2024-06-13 NOTE — LETTER
6/13/2024      Elza Greer  [address] [facility] [address]      Dear Colleague,    Thank you for referring your patient, Elza Greer, to the [facility]. Please see a copy of my visit note below.    NEW PATIENT GI CLINIC VISIT    CC/REFERRING MD:  Steve Presley  REASON FOR CONSULTATION:   Steve Presley for   Chief Complaint   Patient presents with     New Patient     New consult for nausea, Sepsis due to Escherichia coli with acute hypoxic respiratory failure and septic.       ASSESSMENT/PLAN: Patient is here for evaluation for recurrent E. coli sepsis with unclear source, with episodes in October 2023 and again in March 2024.  Each presentation did not involve acute diarrhea or evidence of enteritis.  Biliary source has been theorized, given her history of hepaticojejunal Luisito-en-Y and longstanding ventral hernia of the transverse colon.  Recent HIDA scan without obvious bile leak.  I recommended proceeding with MRCP to further evaluate hepatobiliary anatomy.  I recommended she continue following with bariatric surgery and pursuit of weight loss.  Currently, GI symptoms are fairly well-controlled with symptomatic cares, she can continue with Zofran as needed.  I will follow-up with patient after MRCP to discuss next steps.  Would like needing to have conference with advanced endoscopy and possibly ID as well.    1. Sepsis due to Escherichia coli with acute hypoxic respiratory failure and septic shock (H)  - Adult GI  Referral - Consult Only  - MR Abdomen MRCP w/o & w Contrast; Future    2. S/P cholecystectomy  - Adult GI  Referral - Consult Only  - MR Abdomen MRCP w/o & w Contrast; Future    3. Nausea  - Adult GI  Referral - Consult Only    4. Ventral hernia without obstruction or gangrene    5. Bile duct injury, sequela  - MR Abdomen MRCP w/o & w Contrast; Future      Thank you for this consultation.  It was a pleasure to  participate in the care of this patient; please contact us with any further questions.      30 minutes spent on the date of the encounter doing chart review, patient visit, and documentation    This note was created with voice recognition software, and while reviewed for accuracy, typos may remain.     Sukhdev Pink PA-C  Division of Gastroenterology, Hepatology and Nutrition  [facility]  Elza Greer is a 48 year old female with a past medical history significant for RA, history of tobacco use now with COPD/asthma, anxiety that is seen as a new patient in the GI clinic today for opinion regarding recurrent episodes of E. coli sepsis.  She is accompanied by her daughter today.    To review, this patient was hospitalized in October 2023 for several days with sepsis -presented with abdominal pain and nausea.  Workup for source was notable for CT without obvious acute enteritis, normal UA.  GI source suspected with blood cultures growing E. Coli -history of chronic ventral hernia and previous small bowel surgery suspected as possible causes.  She improved with antibiotic therapy.  Subsequent colonoscopy in December revealed diverticulosis in the ascending colon and cecum, 3 mm hyperplastic polyp, no other abnormalities.  She subsequently establish care with general surgery to discuss ventral hernia and weight loss surgery.  She was advised to pursue further weight loss prior to hernia repair.  She has been pursuing medical therapies and dietary change for weight loss since then.  She unfortunately had another episode of E. coli sepsis while in [place] in March.  I was able to read the discharge summary on the patient's phone, basically her presentation was similar -abdominal pain with some nausea, but no diarrhea.  She was again treated with antibiotics and was able to be discharged after several days.  At her follow-up with primary care provider, HIDA scan was  pursued to evaluate for possible bile leak.  This came back normal.  As of now, the patient is experiencing some nausea on a somewhat regular basis, but no vomiting.  Has had a little bit of upper abdominal pain intermittently, but not persistently.  Stool output has tended to be on the looser side, ever since having cholecystectomy, but has had some constipation at times lately.  Overall, her main concern is the recurrent episodes of sepsis.  He is hopeful to prevent these from occurring in the future.  She also is hoping to continue on her weight loss journey in advance of additional surgical intervention to fix her ventral hernia.  With her otherwise extensive small bowel surgical history, revision of bariatric surgery has not been recommended at this time.    ROS:    10 point ROS neg other than the symptoms noted above in the HPI.    PREVIOUS ENDOSCOPY:  Findings:       The perianal and digital rectal examinations were normal. Pertinent        negatives include normal sphincter tone.        A few small-mouthed diverticula were found in the ascending colon and        cecum.        A 3 mm polyp was found in the sigmoid colon. The polyp was sessile.        Resection and retrieval were complete. Verification of patient        identification for the specimen was done by the physician, nurse and        technician using the patient's name, birth date and medical record        number. Estimated blood loss was minimal.        No additional abnormalities were found on retroflexion.                                                                                    Impression:            - Diverticulosis in the ascending colon and in the                          cecum.                          - One 3 mm polyp in the sigmoid colon. Resected and                          retrieved.   Final Diagnosis   Large intestine, sigmoid, polyp, biopsy/polypectomy:  - Hyperplastic polyp negative for dysplasia and malignancy.       PERTINENT RELEVANT IMAGING OR LABS:  EXAM: NM HEPATOBILIARY SCAN  LOCATION: [facility]  DATE: 4/9/2024     INDICATION: Abdominal pain.  COMPARISON: CT abdomen pelvis 10/23/2023  TECHNIQUE: 8.0 mCi of Tc-99m mebrofenin, IV. Anterior planar imaging of the abdomen.      FINDINGS: Gallbladder is surgically absent. Normal radionuclide activity in liver, bile ducts, and small bowel. No evidence of common duct obstruction or intrinsic liver disease.                                                                      IMPRESSION:      Negative for biliary obstruction.      ALLERGIES:     Allergies   Allergen Reactions     Azithromycin Anaphylaxis     Latex Hives     Oxycodone Nausea and Vomiting       PERTINENT MEDICATIONS:    Current Outpatient Medications:      albuterol (ACCUNEB) 1.25 MG/3ML neb solution, Take 1 vial (1.25 mg) by nebulization every 6 hours as needed for shortness of breath / dyspnea or wheezing, Disp: 90 mL, Rfl: 0     albuterol (PROVENTIL) (2.5 MG/3ML) 0.083% neb solution, Take 1 vial (2.5 mg) by nebulization every 6 hours as needed for shortness of breath, wheezing or cough, Disp: 90 mL, Rfl: 0     butalbital-acetaminophen-caffeine (ESGIC) -40 MG tablet, TAKE 1 TABLET EVERY 6 HOURS AS NEEDED FOR HEADACHE, Disp: , Rfl:      clobetasol propionate (CLOBEX) 0.05 % external shampoo, Leave on scalp for 15 minutes then rinse. Use 1-2 times weekly., Disp: 118 mL, Rfl: 5     Fluocinolone Acetonide Scalp 0.01 % OIL oil, Daily as needed for scalp psoriasis, Disp: 118.28 mL, Rfl: 0     fluticasone (FLONASE) 50 MCG/ACT nasal spray, Spray 2 sprays into both nostrils daily, Disp: 16 g, Rfl: 0     gabapentin (NEURONTIN) 100 MG capsule, TAKE 1 CAPSULE BY MOUTH THREE TIMES DAILY, Disp: 90 capsule, Rfl: 0     ibuprofen (ADVIL/MOTRIN) 600 MG tablet, Take 1 tablet by mouth 3 times daily, Disp: , Rfl:      losartan (COZAAR) 25 MG tablet, Take 1 tablet (25 mg) by mouth daily, Disp: 90  tablet, Rfl: 1     Multiple Vitamins-Minerals (MULTIVITAMIN WOMEN PO), Take 2 chew tab by mouth daily gummy, Disp: , Rfl:      nicotine (NICODERM CQ) 21 MG/24HR 24 hr patch, Place 1 patch onto the skin every 24 hours, Disp: , Rfl:      nicotine polacrilex (NICORETTE) 4 MG gum, Place 4 mg inside cheek every hour as needed for smoking cessation, Disp: , Rfl:      nystatin (MYCOSTATIN) 206028 UNIT/GM external cream, Apply topically 2 times daily, Disp: 30 g, Rfl: 1     ondansetron (ZOFRAN) 4 MG tablet, TAKE 1 TABLET BY MOUTH EVERY 8 HOURS FOR 3 DAYS AS NEEDED FOR NAUSEA OR VOMITING, Disp: , Rfl:      oxyCODONE (ROXICODONE) 5 MG tablet, Take 0.5 tablets (2.5 mg) by mouth every 12 hours as needed for pain or severe pain, Disp: 10 tablet, Rfl: 0     VENTOLIN  (90 Base) MCG/ACT inhaler, INHALE 1 TO 2 PUFFS BY MOUTH EVERY 4 HOURS AS NEEDED FOR SHORTNESS OF BREATH, WHEEZING OR COUGH, Disp: 18 g, Rfl: 0     vitamin D3 (CHOLECALCIFEROL) 1.25 MG (67481 UT) capsule, Take 1 capsule (50,000 Units) by mouth every 7 days, Disp: 12 capsule, Rfl: 1     naltrexone (DEPADE/REVIA) 50 MG tablet, Take 25 mg daily for 1 week than increase to 25 mg twice daily (Patient not taking: Reported on 4/19/2024), Disp: 30 tablet, Rfl: 2     rizatriptan (MAXALT) 10 MG tablet, Take 1 tablet (10 mg) by mouth at onset of headache for migraine May repeat in 2 hours. Max 3 tablets/24 hours. (Patient not taking: Reported on 12/27/2023), Disp: 30 tablet, Rfl: 1    PROBLEM LIST  Patient Active Problem List    Diagnosis Date Noted     Sepsis due to Escherichia coli with acute hypoxic respiratory failure and septic shock (H) 04/03/2024     Priority: Medium     Elevated LFTs 10/23/2023     Priority: Medium     Upper abdominal pain 10/23/2023     Priority: Medium     Rheumatoid arthritis (H) 09/18/2023     Priority: Medium     Morbid obesity (H) 10/04/2021     Priority: Medium     Ventral hernia without obstruction or gangrene 01/13/2021     Priority:  Medium     Added automatically from request for surgery [id number]       Anxiety 02/18/2020     Priority: Medium     Tobacco use disorder 11/14/2016     Priority: Medium     Mild intermittent asthma without complication 05/12/2010     Priority: Medium       PERTINENT PAST MEDICAL HISTORY:  Past Medical History:   Diagnosis Date     Adjustment disorder with mixed anxiety and depressed mood      Anxiety      Arthritis      COPD (chronic obstructive pulmonary disease) (H)      Depressive disorder      Gallbladder problem     Removed when i was 24     History of blood transfusion     Had a blood transfusion after my hysterectomy in 2005     History of steroid therapy      Hypertension 10/27/2023     IBS (irritable bowel syndrome)      Immunosuppression (H24)      Incisional hernia, without obstruction or gangrene 2020     Infection 10/23/2023    Had an E-Coli blood infection, pneumonia and sepsis     Lactose intolerance 05/12/2010     Migraine 05/12/2010     Migraines      Mild intermittent asthma without complication 05/12/2010     Osteoporosis      PCOS (polycystic ovarian syndrome)      Skin disease     Psoriasis     Tobacco use disorder      Vitamin D deficiency        PREVIOUS SURGERIES:  Past Surgical History:   Procedure Laterality Date     ABDOMEN SURGERY       APPENDECTOMY OPEN N/A      COLONOSCOPY N/A 12/4/2023    Procedure: COLONOSCOPY, WITH POLYPECTOMY AND BIOPSY;  Surgeon: Chris Wyatt MD;  Location: [facility]     CYSTECTOMY OVARIAN BENIGN  2001     HEPATICOJEJUNOSTOMY  2000    RnY jejunostomy from bile duct injury     HERNIORRHAPHY VENTRAL N/A 02/17/2012    open with mesh     HYSTERECTOMY TOTAL ABDOMINAL, BILATERAL SALPINGO-OOPHORECTOMY, COMBINED N/A 2005     LAPAROSCOPIC CHOLECYSTECTOMY  2006    complicated by bile duct injury     LAPAROSCOPIC HERNIORRHAPHY VENTRAL N/A 01/26/2021    Procedure: Open recurrent incarcerated ventral hernia repair X2;  Surgeon: Pradip Mckenzie MD;  Location: [facility]       [facility]  HISTORY:  Social History     Socioeconomic History     Marital status:      Spouse name: Not on file     Number of children: Not on file     Years of education: Not on file     Highest education level: Not on file   Occupational History     Not on file   Tobacco Use     Smoking status: Some Days     Current packs/day: 0.00     Average packs/day: 1 pack/day for 23.0 years (23.0 ttl pk-yrs)     Types: Cigarettes     Start date: 10/23/2000     Last attempt to quit: 10/23/2023     Years since quittin.6     Smokeless tobacco: Never     Tobacco comments:      Is using patches and gum   Vaping Use     Vaping status: Former     Substances: Nicotine   Substance and Sexual Activity     Alcohol use: Yes     Comment: social     Drug use: No     Sexual activity: Yes     Partners: Male     Birth control/protection: Post-menopausal   Other Topics Concern     Parent/sibling w/ CABG, MI or angioplasty before 65F 55M? 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     Social Determinants of Health     Financial Resource Strain: Not on file   Food Insecurity: Not on file   Transportation Needs: Not on file   Physical Activity: Not on file   Stress: Not on file   Social Connections: Not on file   Interpersonal Safety: Low Risk  (2023)    Interpersonal Safety      Do you feel physically and emotionally safe where you currently live?: Yes      Within the past 12 months, have you been hit, slapped, kicked or otherwise physically hurt by someone?: No      Within the past 12 months, have you been humiliated or emotionally abused in other ways by your partner or ex-partner?: No   Housing Stability: Not on file       FAMILY HISTORY:  Family History   Problem Relation Age of Onset     Dementia Mother      Coronary Artery Disease Mother      Hypertension Mother      Hyperlipidemia Mother      Depression Mother      Cerebrovascular Disease Mother      Asthma Mother      Obesity Mother      Mental Illness Father      "    Schizophrenia     Colon Cancer Maternal Grandmother      Breast Cancer Maternal Grandmother      Other Cancer Maternal Grandmother      Asthma Maternal Grandmother      Obesity Maternal Grandmother      Osteoporosis Other        Past/family/social history reviewed and no changes    PHYSICAL EXAMINATION:  Constitutional: aaox3, cooperative, pleasant, not dyspneic/diaphoretic, no acute distress  Vitals reviewed: /87 (BP Location: Left arm, Patient Position: Sitting, Cuff Size: Adult Regular)   Pulse 97   Ht 1.613 m (5' 3.5\")   Wt 117.2 kg (258 lb 4.8 oz)   LMP 08/26/2005   SpO2 95%   BMI 45.04 kg/m    Wt:   Wt Readings from Last 2 Encounters:   06/13/24 117.2 kg (258 lb 4.8 oz)   04/19/24 117.3 kg (258 lb 9.6 oz)      Eyes: Sclera anicteric/injected  CV: No edema  Respiratory: Unlabored breathing  Skin: warm, perfused, no jaundice  Psych: Normal affect  MSK: Normal gait                      Again, thank you for allowing me to participate in the care of your patient.        Sincerely,        Sukhdev Pink PA-C  "

## 2024-06-18 ENCOUNTER — TELEPHONE (OUTPATIENT)
Dept: FAMILY MEDICINE | Facility: CLINIC | Age: 49
End: 2024-06-18
Payer: COMMERCIAL

## 2024-06-18 NOTE — TELEPHONE ENCOUNTER
Reason for Call:  Appointment Request    Patient requesting this type of appt:  Sleep Study    Requested provider:  MERY    Reason patient unable to be scheduled: Not within requested timeframe    When does patient want to be seen/preferred time:  Sooner than scheduled    Comments:     [facility] we [facility] to you [facility] or would you prefer to receive a phone call?:   No preference   Okay to leave a detailed message?: Yes at Cell number on file:    Telephone Information:   Mobile [phone]       Call taken on 6/18/2024 at 1:03 PM by Christi Magdaleno

## 2024-06-18 NOTE — TELEPHONE ENCOUNTER
Chart reviewed. Routine referral. Patient scheduled for first available and on wait list. Clinic will reach out if sooner appointment available.     Marion GARCIA RN  [facility]

## 2024-07-01 ENCOUNTER — HOSPITAL ENCOUNTER (OUTPATIENT)
Dept: MRI IMAGING | Facility: CLINIC | Age: 49
Discharge: HOME | End: 2024-07-01
Attending: PHYSICIAN ASSISTANT | Admitting: PHYSICIAN ASSISTANT
Payer: COMMERCIAL

## 2024-07-01 DIAGNOSIS — R65.21 SEPSIS DUE TO ESCHERICHIA COLI WITH ACUTE HYPOXIC RESPIRATORY FAILURE AND SEPTIC SHOCK (H): ICD-10-CM

## 2024-07-01 DIAGNOSIS — Z90.49 S/P CHOLECYSTECTOMY: ICD-10-CM

## 2024-07-01 DIAGNOSIS — A41.51 SEPSIS DUE TO ESCHERICHIA COLI WITH ACUTE HYPOXIC RESPIRATORY FAILURE AND SEPTIC SHOCK (H): ICD-10-CM

## 2024-07-01 DIAGNOSIS — S36.13XS BILE DUCT INJURY, SEQUELA: ICD-10-CM

## 2024-07-01 DIAGNOSIS — J96.01 SEPSIS DUE TO ESCHERICHIA COLI WITH ACUTE HYPOXIC RESPIRATORY FAILURE AND SEPTIC SHOCK (H): ICD-10-CM

## 2024-07-01 PROCEDURE — 74183 MRI ABD W/O CNTR FLWD CNTR: CPT

## 2024-07-01 PROCEDURE — A9585 GADOBUTROL INJECTION: HCPCS | Performed by: PHYSICIAN ASSISTANT

## 2024-07-01 PROCEDURE — 258N000003 HC RX IP 258 OP 636: Performed by: PHYSICIAN ASSISTANT

## 2024-07-01 PROCEDURE — 255N000002 HC RX 255 OP 636: Performed by: PHYSICIAN ASSISTANT

## 2024-07-01 RX ORDER — GADOBUTROL 604.72 MG/ML
11.5 INJECTION INTRAVENOUS ONCE
Status: COMPLETED | OUTPATIENT
Start: 2024-07-01 | End: 2024-07-01

## 2024-07-01 RX ADMIN — SODIUM CHLORIDE 50 ML: 9 INJECTION, SOLUTION INTRAVENOUS at 19:48

## 2024-07-01 RX ADMIN — GADOBUTROL 11.5 ML: 604.72 INJECTION INTRAVENOUS at 19:48

## 2024-07-02 ENCOUNTER — MYC MEDICAL ADVICE (OUTPATIENT)
Dept: GASTROENTEROLOGY | Facility: CLINIC | Age: 49
End: 2024-07-02
Payer: COMMERCIAL

## 2024-07-02 ENCOUNTER — TELEPHONE (OUTPATIENT)
Dept: GASTROENTEROLOGY | Facility: CLINIC | Age: 49
End: 2024-07-02
Payer: COMMERCIAL

## 2024-07-02 DIAGNOSIS — A41.51 SEPSIS DUE TO ESCHERICHIA COLI WITH ACUTE HYPOXIC RESPIRATORY FAILURE AND SEPTIC SHOCK (H): Primary | ICD-10-CM

## 2024-07-02 DIAGNOSIS — S36.13XS BILE DUCT INJURY, SEQUELA: ICD-10-CM

## 2024-07-02 DIAGNOSIS — K83.8 INTRAHEPATIC BILE DUCT DILATION: ICD-10-CM

## 2024-07-02 DIAGNOSIS — Z90.49 S/P CHOLECYSTECTOMY: ICD-10-CM

## 2024-07-02 DIAGNOSIS — S36.13XS BILE DUCT INJURY, SEQUELA: Primary | ICD-10-CM

## 2024-07-02 DIAGNOSIS — J96.01 SEPSIS DUE TO ESCHERICHIA COLI WITH ACUTE HYPOXIC RESPIRATORY FAILURE AND SEPTIC SHOCK (H): Primary | ICD-10-CM

## 2024-07-02 DIAGNOSIS — R65.21 SEPSIS DUE TO ESCHERICHIA COLI WITH ACUTE HYPOXIC RESPIRATORY FAILURE AND SEPTIC SHOCK (H): Primary | ICD-10-CM

## 2024-07-02 NOTE — LETTER
July 30, 2024      Elza Greer  [address] [facility] [address]        To Whom It May Concern:    Elza Greer is a patient at [facility] and is currently under my care.  I am extending her leave of absence another week due to illness.  Please excuse her from 7/27/24 - 8/4/2024.  She may return to work on 8/5/2024.  I will provide further update as needed.      Sincerely,    Sukhdev Pink PA-C

## 2024-07-02 NOTE — RESULT ENCOUNTER NOTE
Mahamed Bertrand,    The report from your recent MRCP is available for you to review.  The main finding here is that the bile duct that passes through the liver appears to have some narrowing or a stricture on the left side.  I am not sure what is causing this.  I am also not sure if this relates to your episodes of sepsis.  I have a message out to my partners and hepatology for further guidance on what they think of this and if any additional intervention is needed.  I will be in touch when I get the response.    Please let me know if you have any questions.    Sincerely,  Sukhdev SMITH [facility] GI, Hepatology, and Nutrition

## 2024-07-02 NOTE — TELEPHONE ENCOUNTER
Advanced Endoscopy     Referring provider: Sukhdev Apryl    Referred to: [facility]     Provider Requested: na     Referral Received: 07/02/24       Records received: EPic     Images received: PACS    Insurance Coverage: [facility]    Evaluation for: Patient with recurrent E. coli sepsis x 2, suspicious for GI/biliary source, recent MRCP with progressing intrahepatic ductal dilation specifically suspicious for stricture of left intrahepatic duct, role for ERCP or hepatology consult?      Clinical History (per RN review):     RNCC note-  2000  Hx Hepaticojejunostomy RnY jejunostomy from bile duct injury 2000. Hx patient referred April 2024 to GI, recurrent sepsis and  c/o RUQ pain and vomiting. Explained that no intervention from panc bili, placed on list for potential future FMT For recurrent cholangitis in post surgical patients. Will see if Gen Gi will see pt for functional GI complaints.     Sukhdev Pink saw for function GI complaints:  ASSESSMENT/PLAN: Patient is here for evaluation for recurrent E. coli sepsis with unclear source, with episodes in October 2023 and again in March 2024.  Each presentation did not involve acute diarrhea or evidence of enteritis.  Biliary source has been theorized, given her history of hepaticojejunal Luisito-en-Y and longstanding ventral hernia of the transverse colon.  Recent HIDA scan without obvious bile leak.  I recommended proceeding with MRCP to further evaluate hepatobiliary anatomy.  I recommended she continue following with bariatric surgery and pursuit of weight loss.  Currently, GI symptoms are fairly well-controlled with symptomatic cares, she can continue with Zofran as needed.  I will follow-up with patient after MRCP to discuss next steps.  Would like needing to have conference with advanced endoscopy and possibly ID as well.       MRCP 7/1/24  Narrative & Impression   EXAM: MR ABDOMEN MRCP W/O and W CONTRAST  LOCATION: [facility]  CENTER  DATE: 7/1/2024     INDICATION:  Sepsis due to Escherichia coli with acute hypoxic respiratory failure and septic shock (H), Sepsis due to Escherichia coli with acute hypoxic respiratory failure and septic shock (H), Sepsis due to Escherichia coli with acute hypoxic   respiratory failure and septic shock (H), S/P cholecystectomy, B  COMPARISON: CT abdomen and pelvis 10/23/2023  TECHNIQUE: Routine MR liver/pancreas protocol including axial and coronal MRCP sequences. 2D and 3D reconstruction performed by MR technologist including MIP reconstruction and slab cholangiograms. If performed with contrast, additional dynamic T1 post   IV contrast images.  CONTRAST: gadavist 13.5 ml      FINDINGS:      MRCP: Status post cholecystectomy and Luisito-en-Y hepaticojejunostomy. There is increased intrahepatic biliary ductal dilatation, predominantly in the left hepatic lobe. There is abrupt caliber change of the central left hepatic duct suspicious for   stricture. No extrahepatic biliary ductal dilatation. Normal pancreatic duct.     LIVER: There is a small cystic lesion in the left hepatic lobe. No suspicious liver mass.     PANCREAS: No evidence of mass or pancreatitis.     ADDITIONAL FINDINGS: Unremarkable spleen, adrenal glands, and kidneys. There is a ventral abdominal wall hernia containing a loop of transverse colon. No bowel obstruction. No ascites. No lymphadenopathy. Unremarkable vasculature.                                                                      IMPRESSION:  1.  Since October 2023, increased intrahepatic biliary ductal dilatation, predominantly in the left hepatic lobe. There is abrupt caliber change of the central left hepatic duct suspicious for stricture.       MD review date: 07/02/24    MD Decision for clinic consultation/Orders:            Referral updates/Patient contacted:

## 2024-07-03 NOTE — CONSULTS
Outpatient IR Referral  07/03/24    Referring Provider: Sukhdev Pink PA-C  IR Referral Request: Biliary tube placement  Recommendations/Plan:  IR approves external/internal biliary drainage with target of the left hepatic duct. We will plan to pursue left sided access which we will inform Dr. Nicole of. Once the track is matured at around 6 weeks, GI will assist in coordination of advanced endoscopy/IR procedure.     Surgical note is not available from 2000.    Case and imaging was reviewed with Dr. Rolan Ziegler.  IR recommendations also relayed Epic messaging (Sukhdev Pink, Estella Curtis, and Dr. Nicole)    IR referral converted to procedure order.      Brief History:    Elza Greer is a 49 year old female with history of RA, history of tobacco use,  COPD/asthma, anxiety, and hx of recurrent episodes of E. Coli sepsis.     Pt was hospitalized in 10/2023 and in 3/2024 (at OSH) for E.Coli sepsis. She was assessed by her PCP and a HIDA scan was pursued to evaluate for a possible bile leak that was normal. Patient was sent to GI for assessment as she had continued RUQ pain and vomiting. 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 Patient continues to follow with bariatric surgery for consideration of weight loss surgery. GI obtained a MRCP on 7/1/24 which showed since October 2023, increased intrahepatic biliary ductal dilatation, predominantly in the left hepatic lobe. There is abrupt caliber change of the central left hepatic duct suspicious for stricture.     Due to the anatomy and location of the stenosis, Dr. Nicole recommended IR evaluation for perc drain. Then the plan was once the tract matures, a combined advanced GI/IR procedure could occur where they pass a cholangioscopy across a sheath to sample and evaluate the stenosis. If the stenosis is not malignant, the drain will resolved the stenosis. Request is for IR to  target a dilated duct upstream of the stenosis.      Pertinent Medications:    Current Outpatient Medications   Medication Sig Dispense Refill    albuterol (ACCUNEB) 1.25 MG/3ML neb solution Take 1 vial (1.25 mg) by nebulization every 6 hours as needed for shortness of breath / dyspnea or wheezing 90 mL 0    albuterol (PROVENTIL) (2.5 MG/3ML) 0.083% neb solution Take 1 vial (2.5 mg) by nebulization every 6 hours as needed for shortness of breath, wheezing or cough 90 mL 0    butalbital-acetaminophen-caffeine (ESGIC) -40 MG tablet TAKE 1 TABLET EVERY 6 HOURS AS NEEDED FOR HEADACHE      clobetasol propionate (CLOBEX) 0.05 % external shampoo Leave on scalp for 15 minutes then rinse. Use 1-2 times weekly. 118 mL 5    Fluocinolone Acetonide Scalp 0.01 % OIL oil Daily as needed for scalp psoriasis 118.28 mL 0    fluticasone (FLONASE) 50 MCG/ACT nasal spray Spray 2 sprays into both nostrils daily 16 g 0    gabapentin (NEURONTIN) 100 MG capsule TAKE 1 CAPSULE BY MOUTH THREE TIMES DAILY 90 capsule 0    ibuprofen (ADVIL/MOTRIN) 600 MG tablet Take 1 tablet by mouth 3 times daily      losartan (COZAAR) 25 MG tablet Take 1 tablet (25 mg) by mouth daily 90 tablet 1    Multiple Vitamins-Minerals (MULTIVITAMIN WOMEN PO) Take 2 chew tab by mouth daily gummy      naltrexone (DEPADE/REVIA) 50 MG tablet Take 25 mg daily for 1 week than increase to 25 mg twice daily (Patient not taking: Reported on 4/19/2024) 30 tablet 2    nicotine (NICODERM CQ) 21 MG/24HR 24 hr patch Place 1 patch onto the skin every 24 hours      nicotine polacrilex (NICORETTE) 4 MG gum Place 4 mg inside cheek every hour as needed for smoking cessation      nystatin (MYCOSTATIN) 614709 UNIT/GM external cream Apply topically 2 times daily 30 g 1    ondansetron (ZOFRAN) 4 MG tablet TAKE 1 TABLET BY MOUTH EVERY 8 HOURS FOR 3 DAYS AS NEEDED FOR NAUSEA OR VOMITING      oxyCODONE (ROXICODONE) 5 MG tablet Take 0.5 tablets (2.5 mg) by mouth every 12 hours as needed for  pain or severe pain 10 tablet 0    rizatriptan (MAXALT) 10 MG tablet Take 1 tablet (10 mg) by mouth at onset of headache for migraine May repeat in 2 hours. Max 3 tablets/24 hours. (Patient not taking: Reported on 12/27/2023) 30 tablet 1    VENTOLIN  (90 Base) MCG/ACT inhaler INHALE 1 TO 2 PUFFS BY MOUTH EVERY 4 HOURS AS NEEDED FOR SHORTNESS OF BREATH, WHEEZING OR COUGH 18 g 0    vitamin D3 (CHOLECALCIFEROL) 1.25 MG (20277 UT) capsule Take 1 capsule (50,000 Units) by mouth every 7 days 12 capsule 1     No current facility-administered medications for this visit.       Pertinent Imaging Reviewed:    MR Abdomen MRCP w/o and w/ contrast 7/1/24  Most Recent Labs:  Lab Results   Component Value Date    WBC 14.4 04/03/2024    WBC 12.1 12/08/2020     Lab Results   Component Value Date    RBC 3.94 04/03/2024    RBC 4.06 12/08/2020     Lab Results   Component Value Date    HGB 11.6 04/03/2024    HGB 12.7 12/08/2020     Lab Results   Component Value Date    HCT 37.6 04/03/2024    HCT 38.8 12/08/2020     Lab Results   Component Value Date     04/03/2024     12/08/2020    Last Comprehensive Metabolic Panel:  Lab Results   Component Value Date     04/03/2024    POTASSIUM 4.2 04/03/2024    CHLORIDE 104 04/03/2024    CO2 28 04/03/2024    ANIONGAP 11 04/03/2024    GLC 97 04/03/2024    BUN 11.9 04/03/2024    CR 0.65 04/03/2024    GFRESTIMATED >90 04/03/2024    DEBBIE 9.5 04/03/2024      INR   Date Value Ref Range Status   09/01/2022 1.06 0.85 - 1.15 Final          I reviewed case with advanced endoscopy and they suggested IR consult for placement of percutaneous drain, followed later by combined Adv GI/IR procedure where we pass a cholangioscopy across a sheath to sample and look at the stenosis once the tract matures. IR should target a dilated duct upstream of the stenosis per advanced endoscopy.     Questions    What is the reason for the IR order request? Drains/Tubes   Will this be a management or  placement of a drain/tube? Placement   Placement of Drains/Tubes? Biliary   Patients clinical information/history? Hx Hepaticojejunostomy RnY jejunostomy from bile duct injury 2000. recurrent E. coli sepsis x 2, suspicious for GI/biliary source, recent MRCP with progressing intrahepatic ductal dilation specifically suspicious for stricture of left intrahepatic duct.   Is there a specific location the exam needs to be scheduled at? No specific location required   Call Back # [phone]   Is the patient pregnant? No   Is the patient on aspirin, Plavix or blood thinners? No       Cecilia Holley PA-C  Interventional Radiology   [phone] (IR RN triage)

## 2024-07-08 ENCOUNTER — HOSPITAL ENCOUNTER (OUTPATIENT)
Facility: CLINIC | Age: 49
End: 2024-07-08
Payer: COMMERCIAL

## 2024-07-10 ENCOUNTER — HOSPITAL ENCOUNTER (OUTPATIENT)
Age: 49
End: 2024-07-10
Attending: STUDENT IN AN ORGANIZED HEALTH CARE EDUCATION/TRAINING PROGRAM
Payer: COMMERCIAL

## 2024-07-10 ENCOUNTER — HOSPITAL ENCOUNTER (EMERGENCY)
Facility: CLINIC | Age: 49
Discharge: ANOTHER HOSPITAL | End: 2024-07-12
Attending: FAMILY MEDICINE | Admitting: FAMILY MEDICINE
Payer: COMMERCIAL

## 2024-07-10 ENCOUNTER — APPOINTMENT (OUTPATIENT)
Dept: CT IMAGING | Facility: CLINIC | Age: 49
End: 2024-07-10
Attending: FAMILY MEDICINE
Payer: COMMERCIAL

## 2024-07-10 DIAGNOSIS — R10.11 RUQ ABDOMINAL PAIN: ICD-10-CM

## 2024-07-10 DIAGNOSIS — K83.1 BILIARY STRICTURE (H): ICD-10-CM

## 2024-07-10 LAB
ALBUMIN SERPL BCG-MCNC: 4.2 G/DL (ref 3.5–5.2)
ALBUMIN UR-MCNC: NEGATIVE MG/DL
ALP SERPL-CCNC: 212 U/L (ref 40–150)
ALT SERPL W P-5'-P-CCNC: 36 U/L (ref 0–50)
ANION GAP SERPL CALCULATED.3IONS-SCNC: 11 MMOL/L (ref 7–15)
APPEARANCE UR: CLEAR
AST SERPL W P-5'-P-CCNC: 31 U/L (ref 0–45)
BACTERIA #/AREA URNS HPF: ABNORMAL /HPF
BASE EXCESS BLDV CALC-SCNC: 4.9 MMOL/L (ref -3–3)
BASOPHILS # BLD AUTO: 0 10E3/UL (ref 0–0.2)
BASOPHILS NFR BLD AUTO: 0 %
BILIRUB SERPL-MCNC: 0.4 MG/DL
BILIRUB UR QL STRIP: NEGATIVE
BUN SERPL-MCNC: 8.4 MG/DL (ref 6–20)
CALCIUM SERPL-MCNC: 9.5 MG/DL (ref 8.6–10)
CHLORIDE SERPL-SCNC: 100 MMOL/L (ref 98–107)
COLOR UR AUTO: YELLOW
CREAT SERPL-MCNC: 0.77 MG/DL (ref 0.51–0.95)
DEPRECATED HCO3 PLAS-SCNC: 28 MMOL/L (ref 22–29)
EGFRCR SERPLBLD CKD-EPI 2021: >90 ML/MIN/1.73M2
EOSINOPHIL # BLD AUTO: 0.1 10E3/UL (ref 0–0.7)
EOSINOPHIL NFR BLD AUTO: 1 %
ERYTHROCYTE [DISTWIDTH] IN BLOOD BY AUTOMATED COUNT: 13.2 % (ref 10–15)
GLUCOSE SERPL-MCNC: 123 MG/DL (ref 70–99)
GLUCOSE UR STRIP-MCNC: NEGATIVE MG/DL
HCO3 BLDV-SCNC: 32 MMOL/L (ref 21–28)
HCT VFR BLD AUTO: 39.4 % (ref 35–47)
HGB BLD-MCNC: 13.1 G/DL (ref 11.7–15.7)
HGB UR QL STRIP: ABNORMAL
HOLD SPECIMEN: NORMAL
IMM GRANULOCYTES # BLD: 0.1 10E3/UL
IMM GRANULOCYTES NFR BLD: 1 %
KETONES UR STRIP-MCNC: NEGATIVE MG/DL
LACTATE SERPL-SCNC: 1.4 MMOL/L (ref 0.7–2)
LEUKOCYTE ESTERASE UR QL STRIP: NEGATIVE
LYMPHOCYTES # BLD AUTO: 2 10E3/UL (ref 0.8–5.3)
LYMPHOCYTES NFR BLD AUTO: 11 %
MCH RBC QN AUTO: 31.6 PG (ref 26.5–33)
MCHC RBC AUTO-ENTMCNC: 33.2 G/DL (ref 31.5–36.5)
MCV RBC AUTO: 95 FL (ref 78–100)
MONOCYTES # BLD AUTO: 1 10E3/UL (ref 0–1.3)
MONOCYTES NFR BLD AUTO: 5 %
NEUTROPHILS # BLD AUTO: 14.5 10E3/UL (ref 1.6–8.3)
NEUTROPHILS NFR BLD AUTO: 82 %
NITRATE UR QL: NEGATIVE
NRBC # BLD AUTO: 0 10E3/UL
NRBC BLD AUTO-RTO: 0 /100
NT-PROBNP SERPL-MCNC: 41 PG/ML (ref 0–450)
O2/TOTAL GAS SETTING VFR VENT: 21 %
OXYHGB MFR BLDV: 31 % (ref 70–75)
PCO2 BLDV: 55 MM HG (ref 40–50)
PH BLDV: 7.37 [PH] (ref 7.32–7.43)
PH UR STRIP: 7 [PH] (ref 5–7)
PLATELET # BLD AUTO: 217 10E3/UL (ref 150–450)
PO2 BLDV: 20 MM HG (ref 25–47)
POTASSIUM SERPL-SCNC: 4 MMOL/L (ref 3.4–5.3)
PROT SERPL-MCNC: 7.9 G/DL (ref 6.4–8.3)
RBC # BLD AUTO: 4.15 10E6/UL (ref 3.8–5.2)
RBC URINE: 1 /HPF
SAO2 % BLDV: 32.7 % (ref 70–75)
SODIUM SERPL-SCNC: 139 MMOL/L (ref 135–145)
SP GR UR STRIP: 1.01 (ref 1–1.03)
SQUAMOUS EPITHELIAL: 1 /HPF
TROPONIN T SERPL HS-MCNC: <6 NG/L
UROBILINOGEN UR STRIP-MCNC: NORMAL MG/DL
WBC # BLD AUTO: 17.7 10E3/UL (ref 4–11)
WBC URINE: 1 /HPF

## 2024-07-10 PROCEDURE — 96375 TX/PRO/DX INJ NEW DRUG ADDON: CPT | Mod: 59 | Performed by: FAMILY MEDICINE

## 2024-07-10 PROCEDURE — 250N000011 HC RX IP 250 OP 636: Performed by: FAMILY MEDICINE

## 2024-07-10 PROCEDURE — 250N000013 HC RX MED GY IP 250 OP 250 PS 637: Performed by: EMERGENCY MEDICINE

## 2024-07-10 PROCEDURE — 99285 EMERGENCY DEPT VISIT HI MDM: CPT | Performed by: FAMILY MEDICINE

## 2024-07-10 PROCEDURE — 96361 HYDRATE IV INFUSION ADD-ON: CPT | Performed by: FAMILY MEDICINE

## 2024-07-10 PROCEDURE — 96376 TX/PRO/DX INJ SAME DRUG ADON: CPT | Performed by: FAMILY MEDICINE

## 2024-07-10 PROCEDURE — 81001 URINALYSIS AUTO W/SCOPE: CPT | Performed by: FAMILY MEDICINE

## 2024-07-10 PROCEDURE — 96365 THER/PROPH/DIAG IV INF INIT: CPT | Mod: 59 | Performed by: FAMILY MEDICINE

## 2024-07-10 PROCEDURE — 258N000003 HC RX IP 258 OP 636: Performed by: FAMILY MEDICINE

## 2024-07-10 PROCEDURE — 250N000009 HC RX 250: Performed by: FAMILY MEDICINE

## 2024-07-10 PROCEDURE — 36415 COLL VENOUS BLD VENIPUNCTURE: CPT | Performed by: FAMILY MEDICINE

## 2024-07-10 PROCEDURE — 84484 ASSAY OF TROPONIN QUANT: CPT | Performed by: FAMILY MEDICINE

## 2024-07-10 PROCEDURE — 99285 EMERGENCY DEPT VISIT HI MDM: CPT | Mod: 25 | Performed by: FAMILY MEDICINE

## 2024-07-10 PROCEDURE — 83605 ASSAY OF LACTIC ACID: CPT | Performed by: FAMILY MEDICINE

## 2024-07-10 PROCEDURE — 82805 BLOOD GASES W/O2 SATURATION: CPT | Performed by: FAMILY MEDICINE

## 2024-07-10 PROCEDURE — 85025 COMPLETE CBC W/AUTO DIFF WBC: CPT | Performed by: FAMILY MEDICINE

## 2024-07-10 PROCEDURE — 96366 THER/PROPH/DIAG IV INF ADDON: CPT | Performed by: FAMILY MEDICINE

## 2024-07-10 PROCEDURE — 82040 ASSAY OF SERUM ALBUMIN: CPT | Performed by: FAMILY MEDICINE

## 2024-07-10 PROCEDURE — 83880 ASSAY OF NATRIURETIC PEPTIDE: CPT | Performed by: FAMILY MEDICINE

## 2024-07-10 PROCEDURE — 71275 CT ANGIOGRAPHY CHEST: CPT

## 2024-07-10 RX ORDER — OXYCODONE HYDROCHLORIDE 5 MG/1
10 TABLET ORAL EVERY 4 HOURS PRN
Status: DISCONTINUED | OUTPATIENT
Start: 2024-07-10 | End: 2024-07-12 | Stop reason: HOSPADM

## 2024-07-10 RX ORDER — HYDROMORPHONE HYDROCHLORIDE 1 MG/ML
0.5 INJECTION, SOLUTION INTRAMUSCULAR; INTRAVENOUS; SUBCUTANEOUS
Status: DISCONTINUED | OUTPATIENT
Start: 2024-07-10 | End: 2024-07-12 | Stop reason: HOSPADM

## 2024-07-10 RX ORDER — GABAPENTIN 100 MG/1
100 CAPSULE ORAL 2 TIMES DAILY
Status: DISCONTINUED | OUTPATIENT
Start: 2024-07-10 | End: 2024-07-12 | Stop reason: HOSPADM

## 2024-07-10 RX ORDER — PIPERACILLIN SODIUM, TAZOBACTAM SODIUM 3; .375 G/15ML; G/15ML
3.38 INJECTION, POWDER, LYOPHILIZED, FOR SOLUTION INTRAVENOUS EVERY 6 HOURS
Status: DISCONTINUED | OUTPATIENT
Start: 2024-07-11 | End: 2024-07-12 | Stop reason: HOSPADM

## 2024-07-10 RX ORDER — PIPERACILLIN SODIUM, TAZOBACTAM SODIUM 3; .375 G/15ML; G/15ML
3.38 INJECTION, POWDER, LYOPHILIZED, FOR SOLUTION INTRAVENOUS ONCE
Status: COMPLETED | OUTPATIENT
Start: 2024-07-10 | End: 2024-07-10

## 2024-07-10 RX ORDER — LOSARTAN POTASSIUM 25 MG/1
25 TABLET ORAL AT BEDTIME
Status: DISCONTINUED | OUTPATIENT
Start: 2024-07-10 | End: 2024-07-12 | Stop reason: HOSPADM

## 2024-07-10 RX ORDER — KETOROLAC TROMETHAMINE 15 MG/ML
15 INJECTION, SOLUTION INTRAMUSCULAR; INTRAVENOUS EVERY 6 HOURS PRN
Status: DISCONTINUED | OUTPATIENT
Start: 2024-07-10 | End: 2024-07-12 | Stop reason: HOSPADM

## 2024-07-10 RX ORDER — KETOROLAC TROMETHAMINE 15 MG/ML
15 INJECTION, SOLUTION INTRAMUSCULAR; INTRAVENOUS ONCE
Status: COMPLETED | OUTPATIENT
Start: 2024-07-10 | End: 2024-07-10

## 2024-07-10 RX ORDER — SODIUM CHLORIDE 9 MG/ML
1000 INJECTION, SOLUTION INTRAVENOUS CONTINUOUS
Status: DISCONTINUED | OUTPATIENT
Start: 2024-07-10 | End: 2024-07-12 | Stop reason: HOSPADM

## 2024-07-10 RX ORDER — IOPAMIDOL 755 MG/ML
124 INJECTION, SOLUTION INTRAVASCULAR ONCE
Status: COMPLETED | OUTPATIENT
Start: 2024-07-10 | End: 2024-07-10

## 2024-07-10 RX ADMIN — IOPAMIDOL 124 ML: 755 INJECTION, SOLUTION INTRAVENOUS at 17:53

## 2024-07-10 RX ADMIN — SODIUM CHLORIDE 1000 ML: 9 INJECTION, SOLUTION INTRAVENOUS at 21:15

## 2024-07-10 RX ADMIN — KETOROLAC TROMETHAMINE 15 MG: 15 INJECTION, SOLUTION INTRAMUSCULAR; INTRAVENOUS at 21:15

## 2024-07-10 RX ADMIN — SODIUM CHLORIDE 100 ML: 9 INJECTION, SOLUTION INTRAVENOUS at 17:54

## 2024-07-10 RX ADMIN — SODIUM CHLORIDE 1000 ML: 9 INJECTION, SOLUTION INTRAVENOUS at 19:07

## 2024-07-10 RX ADMIN — GABAPENTIN 100 MG: 100 CAPSULE ORAL at 22:27

## 2024-07-10 RX ADMIN — SODIUM CHLORIDE 1000 ML: 9 INJECTION, SOLUTION INTRAVENOUS at 22:02

## 2024-07-10 RX ADMIN — LOSARTAN POTASSIUM 25 MG: 25 TABLET, FILM COATED ORAL at 22:27

## 2024-07-10 RX ADMIN — OXYCODONE HYDROCHLORIDE 10 MG: 5 TABLET ORAL at 22:27

## 2024-07-10 RX ADMIN — PIPERACILLIN AND TAZOBACTAM 3.38 G: 3; .375 INJECTION, POWDER, FOR SOLUTION INTRAVENOUS at 19:08

## 2024-07-10 RX ADMIN — KETOROLAC TROMETHAMINE 15 MG: 15 INJECTION, SOLUTION INTRAMUSCULAR; INTRAVENOUS at 19:15

## 2024-07-10 ASSESSMENT — COLUMBIA-SUICIDE SEVERITY RATING SCALE - C-SSRS
6. HAVE YOU EVER DONE ANYTHING, STARTED TO DO ANYTHING, OR PREPARED TO DO ANYTHING TO END YOUR LIFE?: NO
1. IN THE PAST MONTH, HAVE YOU WISHED YOU WERE DEAD OR WISHED YOU COULD GO TO SLEEP AND NOT WAKE UP?: NO
2. HAVE YOU ACTUALLY HAD ANY THOUGHTS OF KILLING YOURSELF IN THE PAST MONTH?: NO

## 2024-07-10 ASSESSMENT — ACTIVITIES OF DAILY LIVING (ADL)
ADLS_ACUITY_SCORE: 35

## 2024-07-10 NOTE — ED NOTES
"Pt is scheduled for biliary duct drain, but was advised by surgeon to come to ED.  Pt feels like her symptoms are her \"lungs\".   "

## 2024-07-10 NOTE — ED PROVIDER NOTES
"  History     Chief Complaint   Patient presents with    Fever    Shortness of Breath    Flank Pain     HPI  Elza Greer is a 49 year old female, past medical history is significant for hypertension, E. coli sepsis with acute hypoxic respiratory failure 4/3/2024, rheumatoid arthritis, morbid obesity, ventral hernia, anxiety, tobacco use disorder, asthma, COPD, presents to the emergency department with concerns of fever, right upper quadrant and right flank pain with radiation to the right chest.  History is obtained from the patient who states that she has had right flank pain and right upper quadrant pain intermittently \"for months\" it was definitely worse yesterday evening and continues into today and now radiates up into her lungs.  She feels short of breath.  She has noted a fever at home but \"nothing higher than 100.7\".  She has been taking Advil for it.  The patient informs me that she is supposed to have a biliary drain (IR biliary tube insertion) placed in the near future and I see in the EHR that she has preadmit and procedure for 8/12/2024.  She apparently contacted her surgeon although I find no documentation of this in the EHR and was directed to the emergency department.  She identifies nausea but no vomiting, anorexia.  She notes no urinary tract symptoms such as frequency, urgency or dysuria.  She notes no change in bowel habit recently.      Allergies:  Allergies   Allergen Reactions    Azithromycin Anaphylaxis    Latex Hives    Oxycodone Nausea and Vomiting       Problem List:    Patient Active Problem List    Diagnosis Date Noted    Primary hypertension 06/13/2024     Priority: Medium    Sepsis due to Escherichia coli with acute hypoxic respiratory failure and septic shock (H) 04/03/2024     Priority: Medium    Elevated LFTs 10/23/2023     Priority: Medium    Upper abdominal pain 10/23/2023     Priority: Medium    Rheumatoid arthritis (H) 09/18/2023     Priority: Medium    Morbid obesity (H) " 10/04/2021     Priority: Medium    Ventral hernia without obstruction or gangrene 01/13/2021     Priority: Medium     Added automatically from request for surgery [id number]      Anxiety 02/18/2020     Priority: Medium    Tobacco use disorder 11/14/2016     Priority: Medium    Mild intermittent asthma without complication 05/12/2010     Priority: Medium        Past Medical History:    Past Medical History:   Diagnosis Date    Adjustment disorder with mixed anxiety and depressed mood     Anxiety     Arthritis     COPD (chronic obstructive pulmonary disease) (H)     Depressive disorder     Gallbladder problem     History of blood transfusion     History of steroid therapy     Hypertension 10/27/2023    IBS (irritable bowel syndrome)     Immunosuppression (H24)     Incisional hernia, without obstruction or gangrene 2020    Infection 10/23/2023    Lactose intolerance 05/12/2010    Migraine 05/12/2010    Migraines     Mild intermittent asthma without complication 05/12/2010    Osteoporosis     PCOS (polycystic ovarian syndrome)     Skin disease     Tobacco use disorder     Vitamin D deficiency        Past Surgical History:    Past Surgical History:   Procedure Laterality Date    ABDOMEN SURGERY      APPENDECTOMY OPEN N/A     COLONOSCOPY N/A 12/4/2023    Procedure: COLONOSCOPY, WITH POLYPECTOMY AND BIOPSY;  Surgeon: Chris Wyatt MD;  Location: [facility]    CYSTECTOMY OVARIAN BENIGN  2001    HEPATICOJEJUNOSTOMY  2000    RnY jejunostomy from bile duct injury    HERNIORRHAPHY VENTRAL N/A 02/17/2012    open with mesh    HYSTERECTOMY TOTAL ABDOMINAL, BILATERAL SALPINGO-OOPHORECTOMY, COMBINED N/A 2005    LAPAROSCOPIC CHOLECYSTECTOMY  2006    complicated by bile duct injury    LAPAROSCOPIC HERNIORRHAPHY VENTRAL N/A 01/26/2021    Procedure: Open recurrent incarcerated ventral hernia repair X2;  Surgeon: Pradip Mckenzie MD;  Location: [facility]       Family History:    Family History   Problem Relation Age of Onset    Dementia Mother      Coronary Artery Disease Mother     Hypertension Mother     Hyperlipidemia Mother     Depression Mother     Cerebrovascular Disease Mother     Asthma Mother     Obesity Mother     Mental Illness Father         Schizophrenia    Colon Cancer Maternal Grandmother     Breast Cancer Maternal Grandmother     Other Cancer Maternal Grandmother     Asthma Maternal Grandmother     Obesity Maternal Grandmother     Osteoporosis Other        Social History:  Marital Status:   [2]  Social History     Tobacco Use    Smoking status: Some Days     Current packs/day: 0.00     Average packs/day: 1 pack/day for 23.0 years (23.0 ttl pk-yrs)     Types: Cigarettes     Start date: 10/23/2000     Last attempt to quit: 10/23/2023     Years since quittin.7    Smokeless tobacco: Never    Tobacco comments:      Is using patches and gum   Vaping Use    Vaping status: Former    Substances: Nicotine   Substance Use Topics    Alcohol use: Yes     Comment: social    Drug use: No        Medications:    albuterol (ACCUNEB) 1.25 MG/3ML neb solution  albuterol (PROVENTIL) (2.5 MG/3ML) 0.083% neb solution  butalbital-acetaminophen-caffeine (ESGIC) -40 MG tablet  clobetasol propionate (CLOBEX) 0.05 % external shampoo  Fluocinolone Acetonide Scalp 0.01 % OIL oil  fluticasone (FLONASE) 50 MCG/ACT nasal spray  gabapentin (NEURONTIN) 100 MG capsule  ibuprofen (ADVIL/MOTRIN) 600 MG tablet  losartan (COZAAR) 25 MG tablet  Multiple Vitamins-Minerals (MULTIVITAMIN WOMEN PO)  naltrexone (DEPADE/REVIA) 50 MG tablet  nicotine (NICODERM CQ) 21 MG/24HR 24 hr patch  nicotine polacrilex (NICORETTE) 4 MG gum  nystatin (MYCOSTATIN) 454464 UNIT/GM external cream  ondansetron (ZOFRAN) 4 MG tablet  oxyCODONE (ROXICODONE) 5 MG tablet  rizatriptan (MAXALT) 10 MG tablet  VENTOLIN  (90 Base) MCG/ACT inhaler  vitamin D3 (CHOLECALCIFEROL) 1.25 MG (63242 UT) capsule          Review of Systems   All other systems reviewed and are negative.      Physical Exam  "  BP: (!) 162/99  Pulse: 112  Temp: 99.5  F (37.5  C)  Resp: 18  Height: 162.6 cm (5' 4\")  Weight: 115.2 kg (254 lb)  SpO2: 98 %      Physical Exam  Vitals and nursing note reviewed.   Constitutional:       General: She is not in acute distress.     Appearance: She is well-developed. She is not ill-appearing.   HENT:      Head: Normocephalic and atraumatic.      Mouth/Throat:      Mouth: Mucous membranes are moist.      Pharynx: Oropharynx is clear.   Eyes:      Extraocular Movements: Extraocular movements intact.      Pupils: Pupils are equal, round, and reactive to light.   Cardiovascular:      Rate and Rhythm: Normal rate and regular rhythm.   Pulmonary:      Effort: Pulmonary effort is normal.      Breath sounds: Normal breath sounds.   Abdominal:      Comments: Obese abdomen difficult to examine, tender right upper quadrant and right CVA area.   Musculoskeletal:         General: Normal range of motion.      Cervical back: Normal range of motion and neck supple.   Skin:     General: Skin is warm and dry.      Capillary Refill: Capillary refill takes less than 2 seconds.   Neurological:      General: No focal deficit present.      Mental Status: She is alert.   Psychiatric:         Mood and Affect: Mood normal.         Behavior: Behavior normal.         ED Course        Procedures              Results for orders placed or performed during the hospital encounter of 07/10/24 (from the past 24 hour(s))   [place] Draw *Canceled*    Narrative    The following orders were created for panel order [place] Draw.  Procedure                               Abnormality         Status                     ---------                               -----------         ------                       Please view results for these tests on the individual orders.   [place] Draw    Narrative    The following orders were created for panel order [place] Draw.  Procedure                               Abnormality         Status                 "     ---------                               -----------         ------                     Extra Blood Culture Bottle[[id number]]                       In process                 Extra Blue Top Tube[[id number]]                              Final result               Extra Green Top (Lithium...[[id number]]                      Final result               Extra Purple Top Tube[[id number]]                            Final result                 Please view results for these tests on the individual orders.   Extra Blue Top Tube   Result Value Ref Range    Hold Specimen JIC    Extra Green Top (Lithium Heparin) Tube   Result Value Ref Range    Hold Specimen JIC    Extra Purple Top Tube   Result Value Ref Range    Hold Specimen     [place] Draw *Canceled*    Narrative    The following orders were created for panel order [place] Draw.  Procedure                               Abnormality         Status                     ---------                               -----------         ------                     Extra Green Top (Lithium...[[id number]]                                                   Please view results for these tests on the individual orders.   CBC with platelets, differential    Narrative    The following orders were created for panel order CBC with platelets, differential.  Procedure                               Abnormality         Status                     ---------                               -----------         ------                     CBC with platelets and d...[[id number]]  Abnormal            Final result                 Please view results for these tests on the individual orders.   Comprehensive metabolic panel   Result Value Ref Range    Sodium 139 135 - 145 mmol/L    Potassium 4.0 3.4 - 5.3 mmol/L    Carbon Dioxide (CO2) 28 22 - 29 mmol/L    Anion Gap 11 7 - 15 mmol/L    Urea Nitrogen 8.4 6.0 - 20.0 mg/dL    Creatinine 0.77 0.51 - 0.95 mg/dL    GFR Estimate >90 >60 mL/min/1.73m2    Calcium 9.5 8.6 -  10.0 mg/dL    Chloride 100 98 - 107 mmol/L    Glucose 123 (H) 70 - 99 mg/dL    Alkaline Phosphatase 212 (H) 40 - 150 U/L    AST 31 0 - 45 U/L    ALT 36 0 - 50 U/L    Protein Total 7.9 6.4 - 8.3 g/dL    Albumin 4.2 3.5 - 5.2 g/dL    Bilirubin Total 0.4 <=1.2 mg/dL   Troponin T, High Sensitivity   Result Value Ref Range    Troponin T, High Sensitivity <6 <=14 ng/L   NT pro BNP   Result Value Ref Range    N terminal Pro BNP Inpatient 41 0 - 450 pg/mL   CBC with platelets and differential   Result Value Ref Range    WBC Count 17.7 (H) 4.0 - 11.0 10e3/uL    RBC Count 4.15 3.80 - 5.20 10e6/uL    Hemoglobin 13.1 11.7 - 15.7 g/dL    Hematocrit 39.4 35.0 - 47.0 %    MCV 95 78 - 100 fL    MCH 31.6 26.5 - 33.0 pg    MCHC 33.2 31.5 - 36.5 g/dL    RDW 13.2 10.0 - 15.0 %    Platelet Count 217 150 - 450 10e3/uL    % Neutrophils 82 %    % Lymphocytes 11 %    % Monocytes 5 %    % Eosinophils 1 %    % Basophils 0 %    % Immature Granulocytes 1 %    NRBCs per 100 WBC 0 <1 /100    Absolute Neutrophils 14.5 (H) 1.6 - 8.3 10e3/uL    Absolute Lymphocytes 2.0 0.8 - 5.3 10e3/uL    Absolute Monocytes 1.0 0.0 - 1.3 10e3/uL    Absolute Eosinophils 0.1 0.0 - 0.7 10e3/uL    Absolute Basophils 0.0 0.0 - 0.2 10e3/uL    Absolute Immature Granulocytes 0.1 <=0.4 10e3/uL    Absolute NRBCs 0.0 10e3/uL   [place] Draw    Narrative    The following orders were created for panel order [place] Draw.  Procedure                               Abnormality         Status                     ---------                               -----------         ------                     Extra Heparinized Syringe[[id number]]                        Final result                 Please view results for these tests on the individual orders.   Extra Heparinized Syringe   Result Value Ref Range    Hold Specimen     Blood gas venous   Result Value Ref Range    pH Venous 7.37 7.32 - 7.43    pCO2 Venous 55 (H) 40 - 50 mm Hg    pO2 Venous 20 (L) 25 - 47 mm Hg    Bicarbonate Venous 32  (H) 21 - 28 mmol/L    Base Excess/Deficit Venous 4.9 (H) -3.0 - 3.0 mmol/L    FIO2 21     Oxyhemoglobin Venous 31 (L) 70 - 75 %    O2 Sat, Venous 32.7 (L) 70.0 - 75.0 %    Narrative    In healthy individuals, oxyhemoglobin (O2Hb) and oxygen saturation (SO2) are approximately equal. In the presence of dyshemoglobins, oxyhemoglobin can be considerably lower than oxygen saturation.   Lactic acid whole blood   Result Value Ref Range    Lactic Acid 1.4 0.7 - 2.0 mmol/L   UA Macroscopic with reflex to Microscopic and Culture    Specimen: Urine, Clean Catch   Result Value Ref Range    Color Urine Yellow Colorless, Straw, Light Yellow, Yellow    Appearance Urine Clear Clear    Glucose Urine Negative Negative mg/dL    Bilirubin Urine Negative Negative    Ketones Urine Negative Negative mg/dL    Specific Gravity Urine 1.011 1.003 - 1.035    Blood Urine Small (A) Negative    pH Urine 7.0 5.0 - 7.0    Protein Albumin Urine Negative Negative mg/dL    Urobilinogen Urine Normal Normal, 2.0 mg/dL    Nitrite Urine Negative Negative    Leukocyte Esterase Urine Negative Negative    Bacteria Urine Few (A) None Seen /HPF    RBC Urine 1 <=2 /HPF    WBC Urine 1 <=5 /HPF    Squamous Epithelials Urine 1 <=1 /HPF    Narrative    Urine Culture not indicated   CT Chest (PE) Abdomen Pelvis w Contrast    Narrative    EXAM: CT CHEST PE ABDOMEN PELVIS W CONTRAST  LOCATION: [facility]  DATE: 7/10/2024    INDICATION: Shortness of breath, right flank and right upper quadrant abdominal pain, right chest pain  COMPARISON: CTA chest and CT abdomen/pelvis 10/23/2023 and MRI/MRCP 7/1/2024.  TECHNIQUE: CT chest pulmonary angiogram and routine CT abdomen pelvis with IV contrast. Arterial phase through the chest and venous phase through the abdomen and pelvis. Multiplanar reformats and MIP reconstructions were performed. Dose reduction   techniques were used.   CONTRAST: 124 ml Isovue 370    FINDINGS:  ANGIOGRAM CHEST: Normal  caliber central pulmonary arteries. No pulmonary arterial filling defect is seen within mild respiratory motion limitations. Thoracic aorta is negative for dissection. No CT evidence of right heart strain.     LUNGS AND PLEURA: Patent central airways. Minimal bibasilar atelectasis and/or scarring. No focal consolidation, pleural effusion, or pneumothorax.    MEDIASTINUM/AXILLAE: Heart size is normal. No thoracic lymphadenopathy.    CORONARY ARTERY CALCIFICATION: None.    HEPATOBILIARY: Hepatomegaly. Left hepatic cyst. Decrease in now mild asymmetric left intrahepatic biliary ductal dilatation. No extrahepatic biliary ductal dilatation. Prior cholecystectomy.    PANCREAS: Normal.    SPLEEN: Enlarged, measuring 14.1 cm craniocaudal.    ADRENAL GLANDS: Normal.    KIDNEYS/BLADDER: No hydronephrosis. Left renal cyst and smaller renal hypodensities which are too small to characterize, for which no dedicated imaging follow-up is required. Partially distended urinary bladder is grossly unremarkable.    BOWEL: No bowel obstruction. Areas of submucosal fat deposition within the colon and small bowel which may represent sequela of prior inflammation. Cecal diverticulum without evidence of acute diverticulitis. Left upper quadrant small bowel anastomosis.   Mild right upper quadrant/perihepatic stranding and/or edema (series 14, image 87). No drainable intra-abdominal fluid collection. No ascites or pneumoperitoneum.    LYMPH NODES: Normal.    VASCULATURE: Normal caliber abdominal aorta with mild calcified atherosclerotic plaque.    PELVIC ORGANS: Prior hysterectomy.    MUSCULOSKELETAL: Thoracolumbar spondylosis. No destructive osseous lesion. Right upper quadrant ventral abdominal wall hernia containing fat and nonobstructed colon. Additional small fat-containing periumbilical right ventral abdominal wall hernia.      Impression    IMPRESSION:  1.  No evidence of acute pulmonary embolism. No focal consolidation or pleural  effusion.  2.  Mild right upper quadrant/perihepatic stranding/edema without a drainable fluid collection.  3.  Decreased, now mild asymmetric left intrahepatic biliary ductal dilatation compared to recent MRI/MRCP.  4.  Mild splenomegaly.   7:17 PM  I reviewed this patient with on-call general surgery Dr. Talon Rivers.  He recommended that I speak with gastroenterology with this finding on CT with the mild right upper quadrant/perihepatic stranding/edema without a drainable fluid collection in the context of the patient's left intrahepatic biliary ductal dilatation and concern for possible stricture in that area.  He recommended antibiotics appropriate for the situation such as Zosyn.  I spoke with Dr. Moreira on-call for GI medicine at [facility] who agreed that the patient should be transferred to a placed with advanced endoscopy services i.e. ERCP available.  Requested bed at [facility] or [facility] with ERCP capability.      8:14 PM  I spoke with Dr. Chaparro hospitalist at [facility] and he confirmed that they would be able to accept this patient however they have no beds.  She is on the wait list.  She could go to the [facility] or [facility] as both facilities would have the complex endoscopy or IR that she would require.  I shared the above information with the patient.      9:07 PM  At shift change this patient was discussed with my colleague on overnight Dr. Jarret Kearney.  I anticipate that the patient will be here in the emergency department until an appropriate bed becomes available.  As per the previous timestamp the patient can go to the [facility] or [facility] as both facilities will have complex endoscopy or IR available.      Medications   pharmacy alert - intermittent dosing (has no administration in time range)   sodium chloride 0.9% BOLUS 1,000 mL (1,000 mLs Intravenous $New Bag 7/10/24 6350)   iopamidol (ISOVUE-370) solution  124 mL (124 mLs Intravenous $Given 7/10/24 175)   sodium chloride 0.9 % bag 500mL for CT scan flush use (100 mLs As instructed $Given 7/10/24 0586)   piperacillin-tazobactam (ZOSYN) 3.375 g vial to attach to  mL bag (3.375 g Intravenous $New Bag 7/10/24 1901)   ketorolac (TORADOL) injection 15 mg (15 mg Intravenous $Given 7/10/24 1915)       Assessments & Plan (with Medical Decision Making)   Assessment and plan with medical decision making at the time stamp above.      Disclaimer: This note consists of symbols derived from keyboarding, dictation and/or voice recognition software. As a result, there may be errors in the script that have gone undetected. Please consider this when interpreting information found in this chart.      I have reviewed the nursing notes.    I have reviewed the findings, diagnosis, plan and need for follow up with the patient.      New Prescriptions    No medications on file       Final diagnoses:   RUQ abdominal pain - Perihepatic stranding/edema on CT concerning for infection   Biliary stricture (H28)       7/10/2024   [facility] EMERGENCY DEPT       Sukhdev Lopes MD  07/13/24 9877

## 2024-07-10 NOTE — TELEPHONE ENCOUNTER
I called patient to discuss her current symptoms, as she had sent us a message this morning saying she had severe right upper quadrant pain as well as low-grade fever and was thinking about going to the ER.  I wanted to talk to her about her symptoms but I got her voicemail.  I left a detailed message stating that with the symptoms, I do think evaluation in the ER is reasonable.  She can certainly start at [place] to get labs and imaging and if necessary, we could consider transfer to [facility] if any biliary intervention is indicated.    I will also be messaging this to her over [employer] as well.    Sukhdev Pink PA-C

## 2024-07-10 NOTE — ED TRIAGE NOTES
Pt c/o fevers, right sided abd/flank pain, and SOA.  States that this started yesterday.  Pt has hx of sepsis. States urgency with urination.      Triage Assessment (Adult)       Row Name 07/10/24 9860          Triage Assessment    Airway WDL WDL        Respiratory WDL    Respiratory WDL rhythm/pattern     Rhythm/Pattern, Respiratory shortness of breath;tachypneic        Skin Circulation/Temperature WDL    Skin Circulation/Temperature WDL WDL        Cardiac WDL    Cardiac WDL X     Cardiac Rhythm ST        Peripheral/Neurovascular WDL    Peripheral Neurovascular WDL WDL        Cognitive/Neuro/Behavioral WDL    Cognitive/Neuro/Behavioral WDL WDL

## 2024-07-11 PROCEDURE — 96376 TX/PRO/DX INJ SAME DRUG ADON: CPT | Performed by: FAMILY MEDICINE

## 2024-07-11 PROCEDURE — 250N000011 HC RX IP 250 OP 636: Performed by: FAMILY MEDICINE

## 2024-07-11 PROCEDURE — 96366 THER/PROPH/DIAG IV INF ADDON: CPT | Performed by: FAMILY MEDICINE

## 2024-07-11 PROCEDURE — 250N000011 HC RX IP 250 OP 636: Performed by: EMERGENCY MEDICINE

## 2024-07-11 PROCEDURE — 250N000013 HC RX MED GY IP 250 OP 250 PS 637: Performed by: EMERGENCY MEDICINE

## 2024-07-11 PROCEDURE — 96365 THER/PROPH/DIAG IV INF INIT: CPT | Performed by: FAMILY MEDICINE

## 2024-07-11 PROCEDURE — 96375 TX/PRO/DX INJ NEW DRUG ADDON: CPT | Performed by: FAMILY MEDICINE

## 2024-07-11 PROCEDURE — 258N000003 HC RX IP 258 OP 636: Performed by: FAMILY MEDICINE

## 2024-07-11 PROCEDURE — 250N000013 HC RX MED GY IP 250 OP 250 PS 637: Performed by: FAMILY MEDICINE

## 2024-07-11 RX ORDER — ONDANSETRON 2 MG/ML
4 INJECTION INTRAMUSCULAR; INTRAVENOUS ONCE
Status: COMPLETED | OUTPATIENT
Start: 2024-07-11 | End: 2024-07-11

## 2024-07-11 RX ORDER — NICOTINE 21 MG/24HR
1 PATCH, TRANSDERMAL 24 HOURS TRANSDERMAL DAILY
Status: DISCONTINUED | OUTPATIENT
Start: 2024-07-11 | End: 2024-07-12 | Stop reason: HOSPADM

## 2024-07-11 RX ORDER — NICOTINE 21 MG/24HR
1 PATCH, TRANSDERMAL 24 HOURS TRANSDERMAL DAILY
Status: DISCONTINUED | OUTPATIENT
Start: 2024-07-11 | End: 2024-07-11

## 2024-07-11 RX ORDER — DEXTROSE MONOHYDRATE AND SODIUM CHLORIDE 5; .9 G/100ML; G/100ML
INJECTION, SOLUTION INTRAVENOUS CONTINUOUS
Status: DISCONTINUED | OUTPATIENT
Start: 2024-07-11 | End: 2024-07-12 | Stop reason: HOSPADM

## 2024-07-11 RX ADMIN — KETOROLAC TROMETHAMINE 15 MG: 15 INJECTION, SOLUTION INTRAMUSCULAR; INTRAVENOUS at 11:49

## 2024-07-11 RX ADMIN — ONDANSETRON 4 MG: 2 INJECTION INTRAMUSCULAR; INTRAVENOUS at 07:50

## 2024-07-11 RX ADMIN — ONDANSETRON 4 MG: 2 INJECTION INTRAMUSCULAR; INTRAVENOUS at 20:16

## 2024-07-11 RX ADMIN — HYDROMORPHONE HYDROCHLORIDE 0.5 MG: 1 INJECTION, SOLUTION INTRAMUSCULAR; INTRAVENOUS; SUBCUTANEOUS at 03:03

## 2024-07-11 RX ADMIN — KETOROLAC TROMETHAMINE 15 MG: 15 INJECTION, SOLUTION INTRAMUSCULAR; INTRAVENOUS at 18:40

## 2024-07-11 RX ADMIN — SODIUM CHLORIDE 1000 ML: 9 INJECTION, SOLUTION INTRAVENOUS at 06:39

## 2024-07-11 RX ADMIN — NICOTINE 1 PATCH: 21 PATCH, EXTENDED RELEASE TRANSDERMAL at 06:44

## 2024-07-11 RX ADMIN — OXYCODONE HYDROCHLORIDE 10 MG: 5 TABLET ORAL at 11:50

## 2024-07-11 RX ADMIN — HYDROMORPHONE HYDROCHLORIDE 0.5 MG: 1 INJECTION, SOLUTION INTRAMUSCULAR; INTRAVENOUS; SUBCUTANEOUS at 06:40

## 2024-07-11 RX ADMIN — GABAPENTIN 100 MG: 100 CAPSULE ORAL at 17:02

## 2024-07-11 RX ADMIN — PIPERACILLIN AND TAZOBACTAM 3.38 G: 3; .375 INJECTION, POWDER, FOR SOLUTION INTRAVENOUS at 01:33

## 2024-07-11 RX ADMIN — PIPERACILLIN AND TAZOBACTAM 3.38 G: 3; .375 INJECTION, POWDER, FOR SOLUTION INTRAVENOUS at 12:59

## 2024-07-11 RX ADMIN — GABAPENTIN 100 MG: 100 CAPSULE ORAL at 07:51

## 2024-07-11 RX ADMIN — OXYCODONE HYDROCHLORIDE 10 MG: 5 TABLET ORAL at 16:17

## 2024-07-11 RX ADMIN — PIPERACILLIN AND TAZOBACTAM 3.38 G: 3; .375 INJECTION, POWDER, FOR SOLUTION INTRAVENOUS at 06:40

## 2024-07-11 RX ADMIN — OXYCODONE HYDROCHLORIDE 10 MG: 5 TABLET ORAL at 06:39

## 2024-07-11 RX ADMIN — DEXTROSE AND SODIUM CHLORIDE: 5; 900 INJECTION, SOLUTION INTRAVENOUS at 11:44

## 2024-07-11 RX ADMIN — DEXTROSE AND SODIUM CHLORIDE: 5; 900 INJECTION, SOLUTION INTRAVENOUS at 21:04

## 2024-07-11 RX ADMIN — PIPERACILLIN AND TAZOBACTAM 3.38 G: 3; .375 INJECTION, POWDER, FOR SOLUTION INTRAVENOUS at 18:40

## 2024-07-11 RX ADMIN — NICOTINE POLACRILEX 2 MG: 2 GUM, CHEWING BUCCAL at 06:45

## 2024-07-11 RX ADMIN — OXYCODONE HYDROCHLORIDE 10 MG: 5 TABLET ORAL at 20:15

## 2024-07-11 RX ADMIN — LOSARTAN POTASSIUM 25 MG: 25 TABLET, FILM COATED ORAL at 21:12

## 2024-07-11 NOTE — ED PROVIDER NOTES
Signout received at change of shift.  In brief, 42 cell female with concern for possible ascending cholangitis and known biliary stricture with 2 days of worsening right upper quadrant abdominal pain.  MRCP shows biliary stricture (intrahepatic) leukocytosis but no fever.  Initial plan for transfer to facility with capability of both complex endoscopy as well as interventional radiology.  Case was reviewed by GI at [place] and they felt that she would not be a candidate for an ERCP or stenting due to the anatomy.  Would need IR placed drain.  This allowed for looking at [facility].  I spoke with Dr. Nation with interventional radiology who felt that would be reasonable and drain can be placed there.  [facility].    Sunny Zheng MD    7/11/2024 6:57 PM       Sunny Zheng MD  07/11/24 0086

## 2024-07-11 NOTE — ED PROVIDER NOTES
"     Emergency Department Patient Sign-out       Brief HPI:  This is a 49 year old female signed out to me by Dr. Kearney .  See initial ED Provider note for details of the presentation.     IR and GI needed for ERCP.  Biliary ductal stricture - ascending cholangitis      Significant Events prior to my assuming care:       Exam:   Patient Vitals for the past 24 hrs:   BP Temp Temp src Pulse Resp SpO2 Height Weight   07/10/24 1927 (!) 154/78 -- -- (!) 123 -- -- -- --   07/10/24 1651 (!) 172/93 -- -- -- -- 97 % -- --   07/10/24 1636 (!) 162/99 -- -- 110 -- 99 % -- --   07/10/24 1634 (!) 162/99 99.5  F (37.5  C) Oral 112 18 98 % 1.626 m (5' 4\") 115.2 kg (254 lb)           ED RESULTS:   Results for orders placed or performed during the hospital encounter of 07/10/24 (from the past 24 hour(s))   [place] Draw *Canceled*     Status: None ()    Collection Time: 07/10/24  4:39 PM    Narrative    The following orders were created for panel order [place] Draw.  Procedure                               Abnormality         Status                     ---------                               -----------         ------                       Please view results for these tests on the individual orders.   [place] Draw     Status: None    Collection Time: 07/10/24  4:43 PM    Narrative    The following orders were created for panel order [place] Draw.  Procedure                               Abnormality         Status                     ---------                               -----------         ------                     Extra Blood Culture Bottle[[id number]]                       Final result               Extra Blue Top Tube[[id number]]                              Final result               Extra Green Top (Lithium...[[id number]]                      Final result               Extra Purple Top Tube[[id number]]                            Final result                 Please view results for these tests on the individual orders.   Extra " Blood Culture Bottle     Status: None    Collection Time: 07/10/24  4:43 PM   Result Value Ref Range    Hold Specimen JIC    Extra Blue Top Tube     Status: None    Collection Time: 07/10/24  4:43 PM   Result Value Ref Range    Hold Specimen JIC    Extra Green Top (Lithium Heparin) Tube     Status: None    Collection Time: 07/10/24  4:43 PM   Result Value Ref Range    Hold Specimen JIC    Extra Purple Top Tube     Status: None    Collection Time: 07/10/24  4:43 PM   Result Value Ref Range    Hold Specimen     [place] Draw *Canceled*     Status: None ()    Collection Time: 07/10/24  4:43 PM    Narrative    The following orders were created for panel order [place] Draw.  Procedure                               Abnormality         Status                     ---------                               -----------         ------                     Extra Green Top (Lithium...[[id number]]                                                   Please view results for these tests on the individual orders.   CBC with platelets, differential     Status: Abnormal    Collection Time: 07/10/24  4:43 PM    Narrative    The following orders were created for panel order CBC with platelets, differential.  Procedure                               Abnormality         Status                     ---------                               -----------         ------                     CBC with platelets and d...[[id number]]  Abnormal            Final result                 Please view results for these tests on the individual orders.   Comprehensive metabolic panel     Status: Abnormal    Collection Time: 07/10/24  4:43 PM   Result Value Ref Range    Sodium 139 135 - 145 mmol/L    Potassium 4.0 3.4 - 5.3 mmol/L    Carbon Dioxide (CO2) 28 22 - 29 mmol/L    Anion Gap 11 7 - 15 mmol/L    Urea Nitrogen 8.4 6.0 - 20.0 mg/dL    Creatinine 0.77 0.51 - 0.95 mg/dL    GFR Estimate >90 >60 mL/min/1.73m2    Calcium 9.5 8.6 - 10.0 mg/dL    Chloride 100 98 - 107  mmol/L    Glucose 123 (H) 70 - 99 mg/dL    Alkaline Phosphatase 212 (H) 40 - 150 U/L    AST 31 0 - 45 U/L    ALT 36 0 - 50 U/L    Protein Total 7.9 6.4 - 8.3 g/dL    Albumin 4.2 3.5 - 5.2 g/dL    Bilirubin Total 0.4 <=1.2 mg/dL   Troponin T, High Sensitivity     Status: Normal    Collection Time: 07/10/24  4:43 PM   Result Value Ref Range    Troponin T, High Sensitivity <6 <=14 ng/L   NT pro BNP     Status: Normal    Collection Time: 07/10/24  4:43 PM   Result Value Ref Range    N terminal Pro BNP Inpatient 41 0 - 450 pg/mL   CBC with platelets and differential     Status: Abnormal    Collection Time: 07/10/24  4:43 PM   Result Value Ref Range    WBC Count 17.7 (H) 4.0 - 11.0 10e3/uL    RBC Count 4.15 3.80 - 5.20 10e6/uL    Hemoglobin 13.1 11.7 - 15.7 g/dL    Hematocrit 39.4 35.0 - 47.0 %    MCV 95 78 - 100 fL    MCH 31.6 26.5 - 33.0 pg    MCHC 33.2 31.5 - 36.5 g/dL    RDW 13.2 10.0 - 15.0 %    Platelet Count 217 150 - 450 10e3/uL    % Neutrophils 82 %    % Lymphocytes 11 %    % Monocytes 5 %    % Eosinophils 1 %    % Basophils 0 %    % Immature Granulocytes 1 %    NRBCs per 100 WBC 0 <1 /100    Absolute Neutrophils 14.5 (H) 1.6 - 8.3 10e3/uL    Absolute Lymphocytes 2.0 0.8 - 5.3 10e3/uL    Absolute Monocytes 1.0 0.0 - 1.3 10e3/uL    Absolute Eosinophils 0.1 0.0 - 0.7 10e3/uL    Absolute Basophils 0.0 0.0 - 0.2 10e3/uL    Absolute Immature Granulocytes 0.1 <=0.4 10e3/uL    Absolute NRBCs 0.0 10e3/uL   [place] Draw     Status: None    Collection Time: 07/10/24  4:46 PM    Narrative    The following orders were created for panel order [place] Draw.  Procedure                               Abnormality         Status                     ---------                               -----------         ------                     Extra Heparinized Syringe[[id number]]                        Final result                 Please view results for these tests on the individual orders.   Extra Heparinized Syringe     Status: None     Collection Time: 07/10/24  4:46 PM   Result Value Ref Range    Hold Specimen     Blood gas venous     Status: Abnormal    Collection Time: 07/10/24  4:46 PM   Result Value Ref Range    pH Venous 7.37 7.32 - 7.43    pCO2 Venous 55 (H) 40 - 50 mm Hg    pO2 Venous 20 (L) 25 - 47 mm Hg    Bicarbonate Venous 32 (H) 21 - 28 mmol/L    Base Excess/Deficit Venous 4.9 (H) -3.0 - 3.0 mmol/L    FIO2 21     Oxyhemoglobin Venous 31 (L) 70 - 75 %    O2 Sat, Venous 32.7 (L) 70.0 - 75.0 %    Narrative    In healthy individuals, oxyhemoglobin (O2Hb) and oxygen saturation (SO2) are approximately equal. In the presence of dyshemoglobins, oxyhemoglobin can be considerably lower than oxygen saturation.   Lactic acid whole blood     Status: Normal    Collection Time: 07/10/24  4:46 PM   Result Value Ref Range    Lactic Acid 1.4 0.7 - 2.0 mmol/L   UA Macroscopic with reflex to Microscopic and Culture     Status: Abnormal    Collection Time: 07/10/24  5:10 PM    Specimen: Urine, Clean Catch   Result Value Ref Range    Color Urine Yellow Colorless, Straw, Light Yellow, Yellow    Appearance Urine Clear Clear    Glucose Urine Negative Negative mg/dL    Bilirubin Urine Negative Negative    Ketones Urine Negative Negative mg/dL    Specific Gravity Urine 1.011 1.003 - 1.035    Blood Urine Small (A) Negative    pH Urine 7.0 5.0 - 7.0    Protein Albumin Urine Negative Negative mg/dL    Urobilinogen Urine Normal Normal, 2.0 mg/dL    Nitrite Urine Negative Negative    Leukocyte Esterase Urine Negative Negative    Bacteria Urine Few (A) None Seen /HPF    RBC Urine 1 <=2 /HPF    WBC Urine 1 <=5 /HPF    Squamous Epithelials Urine 1 <=1 /HPF    Narrative    Urine Culture not indicated   CT Chest (PE) Abdomen Pelvis w Contrast     Status: None    Collection Time: 07/10/24  6:06 PM    Narrative    EXAM: CT CHEST PE ABDOMEN PELVIS W CONTRAST  LOCATION: [facility]  DATE: 7/10/2024    INDICATION: Shortness of breath, right flank  and right upper quadrant abdominal pain, right chest pain  COMPARISON: CTA chest and CT abdomen/pelvis 10/23/2023 and MRI/MRCP 7/1/2024.  TECHNIQUE: CT chest pulmonary angiogram and routine CT abdomen pelvis with IV contrast. Arterial phase through the chest and venous phase through the abdomen and pelvis. Multiplanar reformats and MIP reconstructions were performed. Dose reduction   techniques were used.   CONTRAST: 124 ml Isovue 370    FINDINGS:  ANGIOGRAM CHEST: Normal caliber central pulmonary arteries. No pulmonary arterial filling defect is seen within mild respiratory motion limitations. Thoracic aorta is negative for dissection. No CT evidence of right heart strain.     LUNGS AND PLEURA: Patent central airways. Minimal bibasilar atelectasis and/or scarring. No focal consolidation, pleural effusion, or pneumothorax.    MEDIASTINUM/AXILLAE: Heart size is normal. No thoracic lymphadenopathy.    CORONARY ARTERY CALCIFICATION: None.    HEPATOBILIARY: Hepatomegaly. Left hepatic cyst. Decrease in now mild asymmetric left intrahepatic biliary ductal dilatation. No extrahepatic biliary ductal dilatation. Prior cholecystectomy.    PANCREAS: Normal.    SPLEEN: Enlarged, measuring 14.1 cm craniocaudal.    ADRENAL GLANDS: Normal.    KIDNEYS/BLADDER: No hydronephrosis. Left renal cyst and smaller renal hypodensities which are too small to characterize, for which no dedicated imaging follow-up is required. Partially distended urinary bladder is grossly unremarkable.    BOWEL: No bowel obstruction. Areas of submucosal fat deposition within the colon and small bowel which may represent sequela of prior inflammation. Cecal diverticulum without evidence of acute diverticulitis. Left upper quadrant small bowel anastomosis.   Mild right upper quadrant/perihepatic stranding and/or edema (series 14, image 87). No drainable intra-abdominal fluid collection. No ascites or pneumoperitoneum.    LYMPH NODES: Normal.    VASCULATURE:  Normal caliber abdominal aorta with mild calcified atherosclerotic plaque.    PELVIC ORGANS: Prior hysterectomy.    MUSCULOSKELETAL: Thoracolumbar spondylosis. No destructive osseous lesion. Right upper quadrant ventral abdominal wall hernia containing fat and nonobstructed colon. Additional small fat-containing periumbilical right ventral abdominal wall hernia.      Impression    IMPRESSION:  1.  No evidence of acute pulmonary embolism. No focal consolidation or pleural effusion.  2.  Mild right upper quadrant/perihepatic stranding/edema without a drainable fluid collection.  3.  Decreased, now mild asymmetric left intrahepatic biliary ductal dilatation compared to recent MRI/MRCP.  4.  Mild splenomegaly.       ED MEDICATIONS:   Medications   piperacillin-tazobactam (ZOSYN) 3.375 g vial to attach to  mL bag (0 g Intravenous Stopped 7/11/24 0229)   sodium chloride 0.9 % infusion ( Intravenous Rate/Dose Verify 7/11/24 0110)   ketorolac (TORADOL) injection 15 mg (15 mg Intravenous $Given 7/10/24 2115)   HYDROmorphone (PF) (DILAUDID) injection 0.5 mg (0.5 mg Intravenous $Given 7/11/24 0303)   gabapentin (NEURONTIN) capsule 100 mg (100 mg Oral $Given 7/10/24 2227)   losartan (COZAAR) tablet 25 mg (25 mg Oral $Given 7/10/24 2227)   oxyCODONE (ROXICODONE) tablet 10 mg (10 mg Oral $Given 7/10/24 2227)   sodium chloride 0.9% BOLUS 1,000 mL (0 mLs Intravenous Stopped 7/10/24 2053)   iopamidol (ISOVUE-370) solution 124 mL (124 mLs Intravenous $Given 7/10/24 1753)   sodium chloride 0.9 % bag 500mL for CT scan flush use (100 mLs As instructed $Given 7/10/24 1754)   piperacillin-tazobactam (ZOSYN) 3.375 g vial to attach to  mL bag (0 g Intravenous Stopped 7/10/24 2053)   ketorolac (TORADOL) injection 15 mg (15 mg Intravenous $Given 7/10/24 1915)   sodium chloride 0.9% BOLUS 1,000 mL ( Intravenous Canceled Entry 7/11/24 0100)         Impression:    ICD-10-CM    1. RUQ abdominal pain  R10.11     Perihepatic  stranding/edema on CT concerning for infection      2. Biliary stricture (H28)  K83.1           Plan:    Pending studies include none - needs transfer for ERCP, GI    Patient was signed out to Dr. Aguirre who would continue to manage her transfer      MD Luis Narayanan Scott J, MD  07/11/24 9985

## 2024-07-11 NOTE — ED PROVIDER NOTES
Emergency Department Patient Sign-out     ED Summary and Plan at Sign Out:  Patient is a 49-year-old female presenting with concerns about fever and right upper quadrant abdominal pain.  It sounds like the pain has been longstanding, intermittent for months.  Pain worsens severely 2 days ago.  She was apparently scheduled to have an IR biliary tube insertion placed in the near future, procedure scheduled for 8/12/2024.  Patient is obese, difficult to examine but right upper quadrant tenderness present, right CVA tenderness present.  Alkaline phosphatase high at 212.  The rest of her hepatic panel is normal.  Troponin negative.  BNP negative.  White blood cell count 17.7 with left shift present.  Patient not acidotic.  Lactic acid negative.  Urine analysis clean/no evidence of infection.  CT of the chest to look for PE as well as CT abdomen and pelvis with IV contrast was performed.  This shows no evidence of PE or pneumonia.  There is mild right upper quadrant perihepatic stranding/edema without a drainable fluid collection.  There is now mildly asymmetric left intrahepatic biliary ductal dilatation compared to recent MRI/MRCP.  There is mild splenomegaly.  Antibiotics were recommended, Zosyn provided.  Gastroenterology was consulted, Dr. Moreira.  Recommendation was for transfer to a facility with ERCP capability.  Concern is for biliary ductal stricture and ascending cholangitis.    ED MDM:  1015.  Patient is in my pod and so care trans`ferred to me.  Concern for biliary ductal stricture and ascending cholangitis.  Patient getting Zosyn.  Patient stable clinically.  Searching for beds capable of providing ERCP.    1042.  Spoke with Dr. London, GI service at the [facility].  He was able to see the MRCP performed in July and he tells me that the anatomy is altered such that an ERCP is not an option.  He tells me that interventional radiology must see the patient and provide relief.  We are now  searching outside of our system (no beds within our system) for a bed with IR capability.    IMPRESSION:    ICD-10-CM    1. RUQ abdominal pain  R10.11     Perihepatic stranding/edema on CT concerning for infection      2. Biliary stricture (H28)  K83.1                Calvin Aguirre MD  07/11/24 1758

## 2024-07-11 NOTE — PROGRESS NOTES
"Transfer Type: [facility]  Transfer Triage Note    Date of call: 07/10/24  Time of call: 7:54 PM    Current Patient Location:  [facility]  Current Level of Care: ED    Vitals:   BP: (!) 162/99, Pulse: 112, Temp: 99.5  F (37.5  C), Resp: 18, Height: 162.6 cm (5' 4\"), Weight: 115.2 kg (254 lb), SpO2: 98 %  Diagnosis: Biliary stenosis, sepsis  Reason for requested transfer: Further diagnostic work up, management, and consultation for specialized care   Isolation Needs: None    Care everywhere has been updated and reviewed: Yes  Necessary images have been sent through PACS: Yes    If patient is transferring for specialty care or specific procedure, the specialist required has participated in the transfer call and agreed with need for transfer and anticipated timeline: Yes, Provider name: Dr. Canales specialty with: GI    Transfer accepted: Yes  Stability of Patient: Patient is vitally stable, with no critical labs, and will likely remain stable throughout the transfer process  Is the patient appropriate for [facility]? No, What specific [place] needs are anticipated? ERCP, advanced endoscopy  Level of Care Needed: Med Surg  Telemetry Needed:  None  Expected Time of Arrival for Transfer: 8-24 hours  Arrival Location:  [facility]     Recommendations for Management and Stabilization: Not needed    Additional Comments: Patient planned for biliary drain placement in August with IR with possible subsequent combined advanced endoscopy/IR procedure. Presenting with RUQ abdominal pain, N/V, with SIRS. ED provider spoke with Dr. Canales who recommended transfer to facility with advanced endoscopy and IR capabilities. [facility] or [facility]. [facility] is currently full and does not have an after hours wait list. Accepted to [facility], if no beds available by tomorrow could reconsider transfer to [facility].     Serjio" Raf Chaparro MD

## 2024-07-12 ENCOUNTER — HOSPITAL ENCOUNTER (INPATIENT)
Facility: HOSPITAL | Age: 49
LOS: 4 days | Discharge: HOME | DRG: 871 | End: 2024-07-16
Attending: INTERNAL MEDICINE | Admitting: HOSPITALIST
Payer: COMMERCIAL

## 2024-07-12 ENCOUNTER — APPOINTMENT (OUTPATIENT)
Dept: INTERVENTIONAL RADIOLOGY/VASCULAR | Facility: HOSPITAL | Age: 49
DRG: 871 | End: 2024-07-12
Payer: COMMERCIAL

## 2024-07-12 VITALS
BODY MASS INDEX: 43.36 KG/M2 | OXYGEN SATURATION: 97 % | HEART RATE: 81 BPM | HEIGHT: 64 IN | DIASTOLIC BLOOD PRESSURE: 64 MMHG | RESPIRATION RATE: 18 BRPM | SYSTOLIC BLOOD PRESSURE: 116 MMHG | TEMPERATURE: 98.7 F | WEIGHT: 254 LBS

## 2024-07-12 DIAGNOSIS — I10 BENIGN ESSENTIAL HYPERTENSION: ICD-10-CM

## 2024-07-12 DIAGNOSIS — K83.09 ASCENDING CHOLANGITIS (H): Primary | ICD-10-CM

## 2024-07-12 PROBLEM — K83.1 BILIARY STRICTURE (H): Status: ACTIVE | Noted: 2024-07-12

## 2024-07-12 LAB
ALBUMIN SERPL BCG-MCNC: 3.4 G/DL (ref 3.5–5.2)
ALP SERPL-CCNC: 160 U/L (ref 40–150)
ALT SERPL W P-5'-P-CCNC: 26 U/L (ref 0–50)
ANION GAP SERPL CALCULATED.3IONS-SCNC: 10 MMOL/L (ref 7–15)
AST SERPL W P-5'-P-CCNC: 18 U/L (ref 0–45)
BASOPHILS # BLD AUTO: 0 10E3/UL (ref 0–0.2)
BASOPHILS NFR BLD AUTO: 0 %
BILIRUB SERPL-MCNC: 0.6 MG/DL
BUN SERPL-MCNC: 5.6 MG/DL (ref 6–20)
CALCIUM SERPL-MCNC: 8.6 MG/DL (ref 8.6–10)
CHLORIDE SERPL-SCNC: 103 MMOL/L (ref 98–107)
CREAT SERPL-MCNC: 0.76 MG/DL (ref 0.51–0.95)
DEPRECATED HCO3 PLAS-SCNC: 25 MMOL/L (ref 22–29)
EGFRCR SERPLBLD CKD-EPI 2021: >90 ML/MIN/1.73M2
EOSINOPHIL # BLD AUTO: 0.1 10E3/UL (ref 0–0.7)
EOSINOPHIL NFR BLD AUTO: 1 %
ERYTHROCYTE [DISTWIDTH] IN BLOOD BY AUTOMATED COUNT: 13.8 % (ref 10–15)
GLUCOSE SERPL-MCNC: 102 MG/DL (ref 70–99)
HCT VFR BLD AUTO: 33.3 % (ref 35–47)
HGB BLD-MCNC: 10.5 G/DL (ref 11.7–15.7)
HOLD SPECIMEN: NORMAL
IMM GRANULOCYTES # BLD: 0.1 10E3/UL
IMM GRANULOCYTES NFR BLD: 0 %
INR PPP: 1.27 (ref 0.85–1.15)
LACTATE SERPL-SCNC: 0.4 MMOL/L (ref 0.7–2)
LACTATE SERPL-SCNC: 0.5 MMOL/L (ref 0.7–2)
LYMPHOCYTES # BLD AUTO: 1.9 10E3/UL (ref 0.8–5.3)
LYMPHOCYTES NFR BLD AUTO: 13 %
MCH RBC QN AUTO: 30.3 PG (ref 26.5–33)
MCHC RBC AUTO-ENTMCNC: 31.5 G/DL (ref 31.5–36.5)
MCV RBC AUTO: 96 FL (ref 78–100)
MONOCYTES # BLD AUTO: 1.2 10E3/UL (ref 0–1.3)
MONOCYTES NFR BLD AUTO: 8 %
NEUTROPHILS # BLD AUTO: 11.3 10E3/UL (ref 1.6–8.3)
NEUTROPHILS NFR BLD AUTO: 78 %
NRBC # BLD AUTO: 0 10E3/UL
NRBC BLD AUTO-RTO: 0 /100
PLATELET # BLD AUTO: 196 10E3/UL (ref 150–450)
POTASSIUM SERPL-SCNC: 3.8 MMOL/L (ref 3.4–5.3)
PROT SERPL-MCNC: 6.6 G/DL (ref 6.4–8.3)
RBC # BLD AUTO: 3.47 10E6/UL (ref 3.8–5.2)
SODIUM SERPL-SCNC: 138 MMOL/L (ref 135–145)
WBC # BLD AUTO: 14.5 10E3/UL (ref 4–11)

## 2024-07-12 PROCEDURE — 36415 COLL VENOUS BLD VENIPUNCTURE: CPT | Performed by: HOSPITALIST

## 2024-07-12 PROCEDURE — 99207 PR APP CREDIT; MD BILLING SHARED VISIT: CPT | Performed by: INTERNAL MEDICINE

## 2024-07-12 PROCEDURE — C1769 GUIDE WIRE: HCPCS

## 2024-07-12 PROCEDURE — 83605 ASSAY OF LACTIC ACID: CPT | Performed by: INTERNAL MEDICINE

## 2024-07-12 PROCEDURE — 250N000011 HC RX IP 250 OP 636

## 2024-07-12 PROCEDURE — 272N000566 HC SHEATH CR3

## 2024-07-12 PROCEDURE — 36415 COLL VENOUS BLD VENIPUNCTURE: CPT

## 2024-07-12 PROCEDURE — 120N000001 HC R&B MED SURG/OB

## 2024-07-12 PROCEDURE — 87040 BLOOD CULTURE FOR BACTERIA: CPT | Performed by: HOSPITALIST

## 2024-07-12 PROCEDURE — 258N000003 HC RX IP 258 OP 636: Performed by: HOSPITALIST

## 2024-07-12 PROCEDURE — 250N000013 HC RX MED GY IP 250 OP 250 PS 637: Performed by: HOSPITALIST

## 2024-07-12 PROCEDURE — 36415 COLL VENOUS BLD VENIPUNCTURE: CPT | Performed by: INTERNAL MEDICINE

## 2024-07-12 PROCEDURE — 272N000508 HC NEEDLE CR8

## 2024-07-12 PROCEDURE — 85025 COMPLETE CBC W/AUTO DIFF WBC: CPT | Performed by: HOSPITALIST

## 2024-07-12 PROCEDURE — 250N000013 HC RX MED GY IP 250 OP 250 PS 637: Performed by: EMERGENCY MEDICINE

## 2024-07-12 PROCEDURE — 272N000117 HC CATH CR2

## 2024-07-12 PROCEDURE — 0F9630Z DRAINAGE OF LEFT HEPATIC DUCT WITH DRAINAGE DEVICE, PERCUTANEOUS APPROACH: ICD-10-PCS | Performed by: RADIOLOGY

## 2024-07-12 PROCEDURE — 85610 PROTHROMBIN TIME: CPT

## 2024-07-12 PROCEDURE — 250N000011 HC RX IP 250 OP 636: Performed by: EMERGENCY MEDICINE

## 2024-07-12 PROCEDURE — C1729 CATH, DRAINAGE: HCPCS

## 2024-07-12 PROCEDURE — 250N000011 HC RX IP 250 OP 636: Performed by: HOSPITALIST

## 2024-07-12 PROCEDURE — 250N000011 HC RX IP 250 OP 636: Performed by: FAMILY MEDICINE

## 2024-07-12 PROCEDURE — 83605 ASSAY OF LACTIC ACID: CPT | Performed by: HOSPITALIST

## 2024-07-12 PROCEDURE — 82040 ASSAY OF SERUM ALBUMIN: CPT | Performed by: HOSPITALIST

## 2024-07-12 PROCEDURE — 99152 MOD SED SAME PHYS/QHP 5/>YRS: CPT

## 2024-07-12 RX ORDER — ONDANSETRON 2 MG/ML
4 INJECTION INTRAMUSCULAR; INTRAVENOUS
Status: DISCONTINUED | OUTPATIENT
Start: 2024-07-12 | End: 2024-07-12 | Stop reason: HOSPADM

## 2024-07-12 RX ORDER — OXYCODONE HYDROCHLORIDE 5 MG/1
5 TABLET ORAL EVERY 4 HOURS PRN
Status: DISCONTINUED | OUTPATIENT
Start: 2024-07-12 | End: 2024-07-13

## 2024-07-12 RX ORDER — BISACODYL 10 MG
10 SUPPOSITORY, RECTAL RECTAL DAILY PRN
Status: DISCONTINUED | OUTPATIENT
Start: 2024-07-12 | End: 2024-07-16 | Stop reason: HOSPADM

## 2024-07-12 RX ORDER — PROCHLORPERAZINE 25 MG
25 SUPPOSITORY, RECTAL RECTAL EVERY 12 HOURS PRN
Status: DISCONTINUED | OUTPATIENT
Start: 2024-07-12 | End: 2024-07-16 | Stop reason: HOSPADM

## 2024-07-12 RX ORDER — FLUMAZENIL 0.1 MG/ML
0.2 INJECTION, SOLUTION INTRAVENOUS
Status: DISCONTINUED | OUTPATIENT
Start: 2024-07-12 | End: 2024-07-12 | Stop reason: HOSPADM

## 2024-07-12 RX ORDER — POLYETHYLENE GLYCOL 3350 17 G/17G
17 POWDER, FOR SOLUTION ORAL 2 TIMES DAILY PRN
Status: DISCONTINUED | OUTPATIENT
Start: 2024-07-12 | End: 2024-07-16 | Stop reason: HOSPADM

## 2024-07-12 RX ORDER — POLYETHYLENE GLYCOL 3350 17 G/17G
17 POWDER, FOR SOLUTION ORAL DAILY
Status: DISCONTINUED | OUTPATIENT
Start: 2024-07-12 | End: 2024-07-16 | Stop reason: HOSPADM

## 2024-07-12 RX ORDER — NALOXONE HYDROCHLORIDE 0.4 MG/ML
0.2 INJECTION, SOLUTION INTRAMUSCULAR; INTRAVENOUS; SUBCUTANEOUS
Status: DISCONTINUED | OUTPATIENT
Start: 2024-07-12 | End: 2024-07-12 | Stop reason: HOSPADM

## 2024-07-12 RX ORDER — FENTANYL CITRATE 50 UG/ML
25-50 INJECTION, SOLUTION INTRAMUSCULAR; INTRAVENOUS EVERY 5 MIN PRN
Status: DISCONTINUED | OUTPATIENT
Start: 2024-07-12 | End: 2024-07-12 | Stop reason: HOSPADM

## 2024-07-12 RX ORDER — NALOXONE HYDROCHLORIDE 0.4 MG/ML
0.4 INJECTION, SOLUTION INTRAMUSCULAR; INTRAVENOUS; SUBCUTANEOUS
Status: DISCONTINUED | OUTPATIENT
Start: 2024-07-12 | End: 2024-07-16 | Stop reason: HOSPADM

## 2024-07-12 RX ORDER — NALOXONE HYDROCHLORIDE 0.4 MG/ML
0.4 INJECTION, SOLUTION INTRAMUSCULAR; INTRAVENOUS; SUBCUTANEOUS
Status: DISCONTINUED | OUTPATIENT
Start: 2024-07-12 | End: 2024-07-12 | Stop reason: HOSPADM

## 2024-07-12 RX ORDER — HYDROMORPHONE HCL IN WATER/PF 6 MG/30 ML
0.4 PATIENT CONTROLLED ANALGESIA SYRINGE INTRAVENOUS
Status: DISCONTINUED | OUTPATIENT
Start: 2024-07-12 | End: 2024-07-13

## 2024-07-12 RX ORDER — NALOXONE HYDROCHLORIDE 0.4 MG/ML
0.2 INJECTION, SOLUTION INTRAMUSCULAR; INTRAVENOUS; SUBCUTANEOUS
Status: DISCONTINUED | OUTPATIENT
Start: 2024-07-12 | End: 2024-07-16 | Stop reason: HOSPADM

## 2024-07-12 RX ORDER — ONDANSETRON 4 MG/1
4 TABLET, ORALLY DISINTEGRATING ORAL EVERY 6 HOURS PRN
Status: DISCONTINUED | OUTPATIENT
Start: 2024-07-12 | End: 2024-07-16 | Stop reason: HOSPADM

## 2024-07-12 RX ORDER — PIPERACILLIN SODIUM, TAZOBACTAM SODIUM 3; .375 G/15ML; G/15ML
3.38 INJECTION, POWDER, LYOPHILIZED, FOR SOLUTION INTRAVENOUS EVERY 8 HOURS
Status: DISCONTINUED | OUTPATIENT
Start: 2024-07-12 | End: 2024-07-16 | Stop reason: HOSPADM

## 2024-07-12 RX ORDER — AMOXICILLIN 250 MG
1 CAPSULE ORAL 2 TIMES DAILY PRN
Status: DISCONTINUED | OUTPATIENT
Start: 2024-07-12 | End: 2024-07-16 | Stop reason: HOSPADM

## 2024-07-12 RX ORDER — PROCHLORPERAZINE MALEATE 10 MG
10 TABLET ORAL EVERY 6 HOURS PRN
Status: DISCONTINUED | OUTPATIENT
Start: 2024-07-12 | End: 2024-07-16 | Stop reason: HOSPADM

## 2024-07-12 RX ORDER — LIDOCAINE 40 MG/G
CREAM TOPICAL
Status: DISCONTINUED | OUTPATIENT
Start: 2024-07-12 | End: 2024-07-16 | Stop reason: HOSPADM

## 2024-07-12 RX ORDER — GABAPENTIN 100 MG/1
100 CAPSULE ORAL 2 TIMES DAILY
COMMUNITY
End: 2024-07-29

## 2024-07-12 RX ORDER — ONDANSETRON 2 MG/ML
4 INJECTION INTRAMUSCULAR; INTRAVENOUS ONCE
Status: COMPLETED | OUTPATIENT
Start: 2024-07-12 | End: 2024-07-12

## 2024-07-12 RX ORDER — NYSTATIN 100000 U/G
CREAM TOPICAL 2 TIMES DAILY PRN
COMMUNITY

## 2024-07-12 RX ORDER — ACETAMINOPHEN 325 MG/1
650 TABLET ORAL EVERY 4 HOURS PRN
Status: DISCONTINUED | OUTPATIENT
Start: 2024-07-12 | End: 2024-07-16 | Stop reason: HOSPADM

## 2024-07-12 RX ORDER — NICOTINE 21 MG/24HR
1 PATCH, TRANSDERMAL 24 HOURS TRANSDERMAL DAILY
Status: DISCONTINUED | OUTPATIENT
Start: 2024-07-12 | End: 2024-07-12

## 2024-07-12 RX ORDER — GABAPENTIN 100 MG/1
100 CAPSULE ORAL 2 TIMES DAILY
Status: DISCONTINUED | OUTPATIENT
Start: 2024-07-12 | End: 2024-07-13

## 2024-07-12 RX ORDER — ONDANSETRON 2 MG/ML
4 INJECTION INTRAMUSCULAR; INTRAVENOUS EVERY 6 HOURS PRN
Status: DISCONTINUED | OUTPATIENT
Start: 2024-07-12 | End: 2024-07-16 | Stop reason: HOSPADM

## 2024-07-12 RX ORDER — ACETAMINOPHEN 650 MG/1
650 SUPPOSITORY RECTAL EVERY 4 HOURS PRN
Status: DISCONTINUED | OUTPATIENT
Start: 2024-07-12 | End: 2024-07-16 | Stop reason: HOSPADM

## 2024-07-12 RX ORDER — NICOTINE 21 MG/24HR
1 PATCH, TRANSDERMAL 24 HOURS TRANSDERMAL DAILY
Status: DISCONTINUED | OUTPATIENT
Start: 2024-07-12 | End: 2024-07-13

## 2024-07-12 RX ORDER — AMOXICILLIN 250 MG
2 CAPSULE ORAL 2 TIMES DAILY PRN
Status: DISCONTINUED | OUTPATIENT
Start: 2024-07-12 | End: 2024-07-16 | Stop reason: HOSPADM

## 2024-07-12 RX ORDER — DEXTROSE MONOHYDRATE AND SODIUM CHLORIDE 5; .9 G/100ML; G/100ML
INJECTION, SOLUTION INTRAVENOUS CONTINUOUS
Status: DISCONTINUED | OUTPATIENT
Start: 2024-07-12 | End: 2024-07-13

## 2024-07-12 RX ADMIN — FENTANYL CITRATE 50 MCG: 50 INJECTION, SOLUTION INTRAMUSCULAR; INTRAVENOUS at 12:45

## 2024-07-12 RX ADMIN — MIDAZOLAM HYDROCHLORIDE 1 MG: 1 INJECTION, SOLUTION INTRAMUSCULAR; INTRAVENOUS at 12:37

## 2024-07-12 RX ADMIN — PIPERACILLIN AND TAZOBACTAM 3.38 G: 3; .375 INJECTION, POWDER, FOR SOLUTION INTRAVENOUS at 00:35

## 2024-07-12 RX ADMIN — OXYCODONE HYDROCHLORIDE 5 MG: 5 TABLET ORAL at 21:30

## 2024-07-12 RX ADMIN — FENTANYL CITRATE 50 MCG: 50 INJECTION, SOLUTION INTRAMUSCULAR; INTRAVENOUS at 12:40

## 2024-07-12 RX ADMIN — PIPERACILLIN AND TAZOBACTAM 3.38 G: 3; .375 INJECTION, POWDER, FOR SOLUTION INTRAVENOUS at 21:28

## 2024-07-12 RX ADMIN — HYDROMORPHONE HYDROCHLORIDE 0.2 MG: 0.2 INJECTION, SOLUTION INTRAMUSCULAR; INTRAVENOUS; SUBCUTANEOUS at 10:02

## 2024-07-12 RX ADMIN — OXYCODONE HYDROCHLORIDE 10 MG: 5 TABLET ORAL at 00:35

## 2024-07-12 RX ADMIN — ONDANSETRON 4 MG: 2 INJECTION INTRAMUSCULAR; INTRAVENOUS at 00:51

## 2024-07-12 RX ADMIN — HYDROMORPHONE HYDROCHLORIDE 0.4 MG: 0.2 INJECTION, SOLUTION INTRAMUSCULAR; INTRAVENOUS; SUBCUTANEOUS at 06:49

## 2024-07-12 RX ADMIN — HYDROMORPHONE HYDROCHLORIDE 0.4 MG: 0.2 INJECTION, SOLUTION INTRAMUSCULAR; INTRAVENOUS; SUBCUTANEOUS at 14:07

## 2024-07-12 RX ADMIN — SENNOSIDES AND DOCUSATE SODIUM 1 TABLET: 50; 8.6 TABLET ORAL at 22:25

## 2024-07-12 RX ADMIN — ONDANSETRON 4 MG: 2 INJECTION INTRAMUSCULAR; INTRAVENOUS at 14:17

## 2024-07-12 RX ADMIN — OXYCODONE HYDROCHLORIDE 5 MG: 5 TABLET ORAL at 04:27

## 2024-07-12 RX ADMIN — MIDAZOLAM HYDROCHLORIDE 1 MG: 1 INJECTION, SOLUTION INTRAMUSCULAR; INTRAVENOUS at 12:53

## 2024-07-12 RX ADMIN — DEXTROSE AND SODIUM CHLORIDE: 5; 900 INJECTION, SOLUTION INTRAVENOUS at 03:16

## 2024-07-12 RX ADMIN — PIPERACILLIN AND TAZOBACTAM 3.38 G: 3; .375 INJECTION, POWDER, FOR SOLUTION INTRAVENOUS at 14:22

## 2024-07-12 RX ADMIN — HYDROMORPHONE HYDROCHLORIDE 0.2 MG: 0.2 INJECTION, SOLUTION INTRAMUSCULAR; INTRAVENOUS; SUBCUTANEOUS at 10:37

## 2024-07-12 RX ADMIN — MIDAZOLAM HYDROCHLORIDE 1 MG: 1 INJECTION, SOLUTION INTRAMUSCULAR; INTRAVENOUS at 12:43

## 2024-07-12 RX ADMIN — DEXTROSE AND SODIUM CHLORIDE: 5; 900 INJECTION, SOLUTION INTRAVENOUS at 17:17

## 2024-07-12 RX ADMIN — PIPERACILLIN AND TAZOBACTAM 3.38 G: 3; .375 INJECTION, POWDER, FOR SOLUTION INTRAVENOUS at 05:42

## 2024-07-12 RX ADMIN — PROCHLORPERAZINE EDISYLATE 10 MG: 5 INJECTION INTRAMUSCULAR; INTRAVENOUS at 18:17

## 2024-07-12 RX ADMIN — NICOTINE 1 PATCH: 21 PATCH, EXTENDED RELEASE TRANSDERMAL at 05:43

## 2024-07-12 RX ADMIN — GABAPENTIN 100 MG: 100 CAPSULE ORAL at 21:30

## 2024-07-12 RX ADMIN — HYDROMORPHONE HYDROCHLORIDE 0.4 MG: 0.2 INJECTION, SOLUTION INTRAMUSCULAR; INTRAVENOUS; SUBCUTANEOUS at 23:04

## 2024-07-12 RX ADMIN — HYDROMORPHONE HYDROCHLORIDE 0.4 MG: 0.2 INJECTION, SOLUTION INTRAMUSCULAR; INTRAVENOUS; SUBCUTANEOUS at 16:18

## 2024-07-12 RX ADMIN — HYDROMORPHONE HYDROCHLORIDE 0.4 MG: 0.2 INJECTION, SOLUTION INTRAMUSCULAR; INTRAVENOUS; SUBCUTANEOUS at 18:20

## 2024-07-12 RX ADMIN — HYDROMORPHONE HYDROCHLORIDE 0.4 MG: 0.2 INJECTION, SOLUTION INTRAMUSCULAR; INTRAVENOUS; SUBCUTANEOUS at 20:21

## 2024-07-12 RX ADMIN — FENTANYL CITRATE 50 MCG: 50 INJECTION, SOLUTION INTRAMUSCULAR; INTRAVENOUS at 12:33

## 2024-07-12 RX ADMIN — MIDAZOLAM HYDROCHLORIDE 1 MG: 1 INJECTION, SOLUTION INTRAMUSCULAR; INTRAVENOUS at 12:30

## 2024-07-12 RX ADMIN — FENTANYL CITRATE 50 MCG: 50 INJECTION, SOLUTION INTRAMUSCULAR; INTRAVENOUS at 12:58

## 2024-07-12 ASSESSMENT — ACTIVITIES OF DAILY LIVING (ADL)
ADLS_ACUITY_SCORE: 20
ADLS_ACUITY_SCORE: 23
ADLS_ACUITY_SCORE: 20

## 2024-07-12 NOTE — PROGRESS NOTES
[facility]    Medicine Progress Note - Hospitalist Service    Date of Admission:  7/12/2024    Assessment & Plan      Elza Greer is a 49 year old female admitted on 7/12/2024. She has history of HTN, IBS, cholecystectomy in the remote past complicated by bile leak requiring surgical repair with hepaticojejunostomy, multiple prior episodes of E coli bacteremia attributed to biliary source, known biliary stricture with plan for outpatient complex IR/GI intervention, here for recurrent RUQ pain and fevers, found to have leukocytosis and tachycardia concerning for sepsis.     She underwent left external biliary drain placement on 7/12/2024 for cholangitis in the setting of left hepatic biliary dilatation.  She was placed on IV Zosyn on admission.  Blood culture is pending.      #Suspected ascending cholangitis  #Biliary stricture  # Recurrent bacteremia  -2 prior episodes of bacteremia with pan-sensitive E coli attributed to biliary source, Oct 2023 ([place]) and March 2024 ([place]), records reviewed  -here for worsening RUQ pain  -MRCP showed worse biliary dilatation compared to previous, per GI her anatomy not amenable to ERCP, would need to be done by IR. Patient transferred to [facility] to have this done.  -Does meet criteria for sepsis with leukocytosis, tachycardia, reported fevers (none in ED)  -She underwent left external biliary drain placement on 7/12/2024 for cholangitis in the setting of left hepatic biliary dilatation.     -Continue Zosyn  -Follow-up blood culture  -Drain management per IR-appreciate assistance  -Analgesics as needed  -Monitor LFT    # Hypertension  -Blood pressure is controlled; continue to hold home Losartan for now    #Nicotine dependence  -patch and also uses gum for cravings, she is trying to quit smoking    #Asthma  -Albuterol PRN    #Migraines  -ok for home Esgic PRN    #Obesity  -BMI 43 noted    # Exertional hypoxia  -Patient states after receiving opioids in the  "ED, when she would get up and exert herself her oxygen would drop and therefore she is currently on 1 L nasal cannula.  Wean as tolerated          Diet: Regular Diet Adult    DVT Prophylaxis: Pneumatic Compression Devices  [place] Catheter: Not present  Lines: None     Cardiac Monitoring: None  Code Status: Full Code      Clinically Significant Risk Factors              # Hypoalbuminemia: Lowest albumin = 3.4 g/dL at 7/12/2024  2:27 AM, will monitor as appropriate    # Coagulation Defect: INR = 1.27 (Ref range: 0.85 - 1.15) and/or PTT = N/A, will monitor for bleeding    # Hypertension: Noted on problem list                # Severe Obesity: Estimated body mass index is 43.6 kg/m  as calculated from the following:    Height as of 7/10/24: 1.626 m (5' 4\").    Weight as of 7/10/24: 115.2 kg (254 lb)., PRESENT ON ADMISSION     # Asthma: noted on problem list        Disposition Plan     Medically Ready for Discharge: Anticipated in 2-4 Days             Conor Muñoz MD  Hospitalist Service  [facility]  Securely message with [employer] (more info)  Text page via [facility] Paging/Directory   ______________________________________________________________________    Interval History   She endorsed right upper quadrant abdominal pain.  She states she was supposed to undergo drain placement in August due to recurrent bacteremia from possible cholangitis and biliary obstruction.  IR is evaluating for possible drain placement.  Spouse at bedside-updated about current status and plans    Physical Exam   Vital Signs: Temp: 98.7  F (37.1  C) Temp src: Oral BP: 111/68 Pulse: 87   Resp: 18 SpO2: 95 % O2 Device: Nasal cannula Oxygen Delivery: 1 LPM  Weight: 0 lbs 0 oz    General appearance: Obese, awake, Alert, Cooperative, not in any obvious distress and appears stated age   HEENT: Normocephalic, atraumatic, conjunctiva clear without icterus and ears without discharge  Lungs: Clear to auscultation bilaterally, no " wheezing, good air exchange, normal work of breathing  Cardiovascular: Regular Rate and Rythm, normal apical impulse, normal S1 and S2, no lower extremity edema bilaterally  Abdomen: Soft, with left upper quadrant tenderness and non-distended, active bowel sounds  Skin: Skin color, texture normal and bruising or bleeding. No rashes or lesions over face, neck, arms and legs, turgor normal.  Musculoskeletal: No bony deformities or joint tenderness. Normal ROM upon flexion & extension.   Neurologic: Alert & Oriented X 3, Facial symmetry preserved and upper & lower extremities moving well with symmetry  Psychiatric: Calm, normal eye contact and normal affect      Medical Decision Making       50 MINUTES SPENT BY ME on the date of service doing chart review, history, exam, documentation & further activities per the note.      Data     I have personally reviewed the following data over the past 24 hrs:    N/A  \   N/A   / N/A     N/A N/A N/A /  N/A   N/A N/A N/A \       Imaging results reviewed over the past 24 hrs:   No results found for this or any previous visit (from the past 24 hour(s)).

## 2024-07-12 NOTE — PLAN OF CARE
Problem: Adult Inpatient Plan of Care  Goal: Optimal Comfort and Wellbeing  Outcome: Progressing     Problem: Surgery Nonspecified  Goal: Fluid and Electrolyte Balance  Outcome: Progressing  Goal: Anesthesia/Sedation Recovery  Outcome: Progressing  Goal: Optimal Pain Control and Function  Outcome: Progressing  Goal: Nausea and Vomiting Relief  Outcome: Progressing  Goal: Effective Oxygenation and Ventilation  Outcome: Progressing  Intervention: Optimize Oxygenation and Ventilation  Recent Flowsheet Documentation  Taken 7/12/2024 1100 by Gonzalo Ernst, RN  Head of Bed (HOB) Positioning: HOB at 20-30 degrees     Problem: Adult Inpatient Plan of Care  Goal: Absence of Hospital-Acquired Illness or Injury  Intervention: Prevent Skin Injury  Recent Flowsheet Documentation  Taken 7/12/2024 1100 by Gonzalo Ernst RN  Body Position: position changed independently   Goal Outcome Evaluation:       Patient is A/O x4. VSS. Pain rated between 3 and 8 out of 10 with administration of 0.4 mg dilaudid. She reported nausea earlier this afternoon and was given IV zofran which was effective.

## 2024-07-12 NOTE — PROGRESS NOTES
INTERVENTIONAL RADIOLOGY\    Patient with history of cholecystectomy and biliary leak, ultimately requiring surgical repair and choledochojejunostomy.    [facility].  Patient has history of recurrent cholangitis and the past few days the pain has worsened.  MRCP demonstrates slight interval worsening of chronic dilation of a few central left bile ducts.  Apparently there is plan for a combined GI/IR procedure in the near future.    I spoke with Dr. Anaya (ER) around 18:35.  Initially the plan was to transfer to the [facility].  Unfortunately no bed is available.  The revised plan was to transfer to [facility] with a temporizing biliary drain.    I spoke with Dr. Le (Internal Medicine)  around 0155.  The patient has arrived at [facility].      The plan will be to keep the patient NPO overnight with biliary drainage on Friday 7/12/2024.  We will have our nurse practitioner see the patient in the am to discuss the procedure.    Rajeev Guerra MD  Vascular and Interventional Radiology

## 2024-07-12 NOTE — IR NOTE
Patient Name: Elza Greer  Medical Record Number: [id number]  Today's Date: 7/12/2024    Procedure: Biliary drain  Proceduralist: Dr. Guallpa    Sedation medications administered: 4 mg midazolam and 200 mcg fentanyl   Sedation time: 40 minutes    Report given to: P2 RN

## 2024-07-12 NOTE — CONSULTS
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 She has a history of recurrent cholangitis. Recent MRCP demonstrated \"increased intrahepatic biliary ductal dilatation, predominantly in the left hepatic lobe\", per below. She had previous plans for biliary drain placement at [facility] on 08/12, followed by a GI/IR combination procedure at a later date. Unfortunately, there were no beds available at [facility] for transfer this admission. IR now consulted for biliary drain placement.     IMAGING:  CT Chest (PE) Abdomen Pelvis w Contrast 07/10/2024  EXAM: CT CHEST PE ABDOMEN PELVIS W CONTRAST  LOCATION: [facility]  DATE: 7/10/2024     INDICATION: Shortness of breath, right flank and right upper quadrant abdominal pain, right chest pain  COMPARISON: CTA chest and CT abdomen/pelvis 10/23/2023 and MRI/MRCP 7/1/2024.  TECHNIQUE: CT chest pulmonary angiogram and routine CT abdomen pelvis with IV contrast. Arterial phase through the chest and venous phase through the abdomen and pelvis. Multiplanar reformats and MIP reconstructions were performed. Dose reduction   techniques were used.   CONTRAST: 124 ml Isovue 370     FINDINGS:  ANGIOGRAM CHEST: Normal caliber central pulmonary arteries. No pulmonary arterial filling defect is seen within mild respiratory motion limitations. Thoracic aorta is negative for dissection. No CT evidence of right heart strain.      LUNGS AND PLEURA: Patent central airways. " Minimal bibasilar atelectasis and/or scarring. No focal consolidation, pleural effusion, or pneumothorax.     MEDIASTINUM/AXILLAE: Heart size is normal. No thoracic lymphadenopathy.     CORONARY ARTERY CALCIFICATION: None.     HEPATOBILIARY: Hepatomegaly. Left hepatic cyst. Decrease in now mild asymmetric left intrahepatic biliary ductal dilatation. No extrahepatic biliary ductal dilatation. Prior cholecystectomy.     PANCREAS: Normal.     SPLEEN: Enlarged, measuring 14.1 cm craniocaudal.     ADRENAL GLANDS: Normal.     KIDNEYS/BLADDER: No hydronephrosis. Left renal cyst and smaller renal hypodensities which are too small to characterize, for which no dedicated imaging follow-up is required. Partially distended urinary bladder is grossly unremarkable.     BOWEL: No bowel obstruction. Areas of submucosal fat deposition within the colon and small bowel which may represent sequela of prior inflammation. Cecal diverticulum without evidence of acute diverticulitis. Left upper quadrant small bowel anastomosis.   Mild right upper quadrant/perihepatic stranding and/or edema (series 14, image 87). No drainable intra-abdominal fluid collection. No ascites or pneumoperitoneum.     LYMPH NODES: Normal.     VASCULATURE: Normal caliber abdominal aorta with mild calcified atherosclerotic plaque.     PELVIC ORGANS: Prior hysterectomy.     MUSCULOSKELETAL: Thoracolumbar spondylosis. No destructive osseous lesion. Right upper quadrant ventral abdominal wall hernia containing fat and nonobstructed colon. Additional small fat-containing periumbilical right ventral abdominal wall hernia.                                                                      IMPRESSION:  1.  No evidence of acute pulmonary embolism. No focal consolidation or pleural effusion.  2.  Mild right upper quadrant/perihepatic stranding/edema without a drainable fluid collection.  3.  Decreased, now mild asymmetric left intrahepatic biliary ductal dilatation  compared to recent MRI/MRCP.  4.  Mild splenomegaly.    MR Abdomen MRCP w/o & w Contrast 07/01/2024  EXAM: MR ABDOMEN MRCP W/O and W CONTRAST  LOCATION: [facility]  DATE: 7/1/2024     INDICATION:  Sepsis due to Escherichia coli with acute hypoxic respiratory failure and septic shock (H), Sepsis due to Escherichia coli with acute hypoxic respiratory failure and septic shock (H), Sepsis due to Escherichia coli with acute hypoxic   respiratory failure and septic shock (H), S/P cholecystectomy, B  COMPARISON: CT abdomen and pelvis 10/23/2023  TECHNIQUE: Routine MR liver/pancreas protocol including axial and coronal MRCP sequences. 2D and 3D reconstruction performed by MR technologist including MIP reconstruction and slab cholangiograms. If performed with contrast, additional dynamic T1 post   IV contrast images.  CONTRAST: gadavist 13.5 ml      FINDINGS:      MRCP: Status post cholecystectomy and Luisito-en-Y hepaticojejunostomy. There is increased intrahepatic biliary ductal dilatation, predominantly in the left hepatic lobe. There is abrupt caliber change of the central left hepatic duct suspicious for   stricture. No extrahepatic biliary ductal dilatation. Normal pancreatic duct.     LIVER: There is a small cystic lesion in the left hepatic lobe. No suspicious liver mass.     PANCREAS: No evidence of mass or pancreatitis.     ADDITIONAL FINDINGS: Unremarkable spleen, adrenal glands, and kidneys. There is a ventral abdominal wall hernia containing a loop of transverse colon. No bowel obstruction. No ascites. No lymphadenopathy. Unremarkable vasculature.                                                                      IMPRESSION:  1.  Since October 2023, increased intrahepatic biliary ductal dilatation, predominantly in the left hepatic lobe. There is abrupt caliber change of the central left hepatic duct suspicious for stricture.    NPO: Midnight  ANTICOAGULANTS: None  ANTIBIOTICS: On  zosyn q8h, no further coverage required    ALLERGIES  Allergies   Allergen Reactions    Azithromycin Anaphylaxis    Latex Hives    Oxycodone Nausea and Vomiting         LABS:   INR   Date Value Ref Range Status   07/12/2024 1.27 (H) 0.85 - 1.15 Final      Hemoglobin   Date Value Ref Range Status   07/12/2024 10.5 (L) 11.7 - 15.7 g/dL Final   12/08/2020 12.7 11.7 - 15.7 g/dL Final     Platelet Count   Date Value Ref Range Status   07/12/2024 196 150 - 450 10e3/uL Final   12/08/2020 149 (L) 150 - 450 10e9/L Final     Creatinine   Date Value Ref Range Status   07/12/2024 0.76 0.51 - 0.95 mg/dL Final   12/08/2020 0.61 0.52 - 1.04 mg/dL Final     Potassium   Date Value Ref Range Status   07/12/2024 3.8 3.4 - 5.3 mmol/L Final   07/24/2022 3.9 3.4 - 5.3 mmol/L Final   12/08/2020 4.8 3.4 - 5.3 mmol/L Final         EXAM:  /68 (BP Location: Right arm)   Pulse 101   Temp 100  F (37.8  C) (Oral)   Resp 18   LMP 08/26/2005   SpO2 92%   General: Stable. In no acute distress.    Neuro: Alert and oriented x 3. No focal deficits.  Psych: Appropriate mood and affect. Linear/coherent thought process.   Resp: Normal respirations. Lungs clear to auscultation bilaterally.  Cardio: S1S2, regular rate and rhythm, without murmur, clicks or rubs   Abdomen: Ventral abdominal wall hernia  Skin: Warm and dry. Without excoriations, ecchymosis, erythema, lesions or open sores.      Pre-Sedation Assessment:  Mallampati Airway Classification:  II - Faucial pillars and soft palate may be seen, but uvula is masked by the base of the tongue  Previous reaction to anesthesia/sedation:  No  Sedation plan based on assessment: Moderate (conscious) sedation  ASA Classification: Class 3 - SEVERE SYSTEMIC DISEASE, DEFINITE FUNCTIONAL LIMITATIONS.   Code Status: FULL CODE      ASSESSMENT/PLAN:   Biliary drain placement with sedation.    Procedural education reviewed with patient/family in detail including, but not limited to risks, benefits and  alternatives with understanding verbalized by patient/family.    Total time spent on the date of the encounter: 40 minutes.      NASH RIOS CNP  Interventional Radiology

## 2024-07-12 NOTE — H&P
[facility]    History and Physical - Hospitalist Service       Date of Admission:  2024  Elza Greer,  1975, MRN [id number]  PCP: Di Shea    Assessment & Plan      Elza Greer is a 49 year old female admitted on 2024. She has history of HTN, IBS, cholecystectomy in the remote past complicated by bile leak requiring surgical repair with hepaticojejunostomy, multiple prior episodes of E coli bacteremia attributed to biliary source, known biliary stricture with plan for outpatient complex IR/GI intervention, here for recurrent RUQ pain and fevers, found to have leukocytosis and tachycardia concerning for sepsis. Planning IR intervention    #Suspected ascending cholangitis  #Biliary stricture  -known biliary stricture  -2 prior episodes of bacteremia with pan-sensitive E coli attributed to biliary source, Oct 2023 ([place]) and 20[place] ([place]), records reviewed  -here for worsening RUQ pain  -MRCP showed worse biliary dilatation compared to previous, per GI her anatomy not amenable to ERCP, would need to be done by IR. Patient transferred to [facility] to have this done.  -Does meet criteria for sepsis with leukocytosis, tachycardia, reported fevers (none in ED)  -On Zosyn, continue  -NPO for IR procedure. Discussed with IR- not doing urgently tonight but will be done during the day  -Check CBC, blood cultures (none obtained in ED), lactic acid    #HTN  -hold home Losartan for now    #Nicotine dependence  -patch and also uses gum for cravings, she is trying to quit smoking    #Asthma  -Albuterol PRN    #Migraines  -ok for home Esgic PRN    #Obesity  -BMI 43 noted    # Exertional hypoxia  -Patient states after receiving opioids in the ED, when she would get up and exert herself her oxygen would drop and therefore she is currently on 1 L nasal cannula.  Wean as tolerated       DVT Prophylaxis: Low risk. ambulation  Diet: NPO per Anesthesia Guidelines for Procedure/Surgery  "Except for: Meds    Portillo Catheter: Not present  Lines: None     Cardiac Monitoring: None  Code Status: Full Code    Clinically Significant Risk Factors Present on Admission                  # Hypertension: Noted on problem list    # Anemia: based on hgb <11           # Severe Obesity: Estimated body mass index is 43.6 kg/m  as calculated from the following:    Height as of 7/10/24: 1.626 m (5' 4\").    Weight as of 7/10/24: 115.2 kg (254 lb).       # Asthma: noted on problem list        Disposition Plan      Expected Discharge Date: 07/14/2024                The patient's care was discussed with the Patient.    Blanca Le MD  Hospitalist Service  [facility]  Securely message with [employer] (more info)  Text page via [employer] Paging/Directory     ______________________________________________________________________    Chief Complaint   RUQ pain    History is obtained from the patient    History of Present Illness   Elza Greer is a 49 year old female who is here for aforementioned symptoms.   Patient reports over the last few days she developed a new pain in her right upper quadrant similar to when she has had infection in her biliary tree in the past.  She does have some chronic abdominal pain but it is due to her ventral hernia, she does not have a chronic right upper quadrant pain.  She noted temperatures at home as high as 100.7 F.  She did vomit in the ED, states this was when she had received both oral oxycodone and IV Dilaudid simultaneously.  Reports frequent urination, denies dysuria.  Reports she has been a little bit dizzy and reports a little bit of a headache.  She does not use oxygen at home.  She notes in the ED she was desatting when she would exert herself only after receiving opioid pain medicines.  Patient reports no bowel movement for 2 days and had been a little bit constipated before, felt like she was not fully emptying her bowels.      Past Medical History    Past " Medical History:   Diagnosis Date    Adjustment disorder with mixed anxiety and depressed mood     Anxiety     Arthritis     COPD (chronic obstructive pulmonary disease) (H)     Depressive disorder     Gallbladder problem     Removed when i was 24    History of blood transfusion     Had a blood transfusion after my hysterectomy in 2005    History of steroid therapy     Hypertension 10/27/2023    IBS (irritable bowel syndrome)     Immunosuppression (H24)     Incisional hernia, without obstruction or gangrene 2020    Infection 10/23/2023    Had an E-Coli blood infection, pneumonia and sepsis    Lactose intolerance 05/12/2010    Migraine 05/12/2010    Migraines     Mild intermittent asthma without complication 05/12/2010    Osteoporosis     PCOS (polycystic ovarian syndrome)     Skin disease     Psoriasis    Tobacco use disorder     Vitamin D deficiency        Past Surgical History   Past Surgical History:   Procedure Laterality Date    ABDOMEN SURGERY      APPENDECTOMY OPEN N/A     COLONOSCOPY N/A 12/4/2023    Procedure: COLONOSCOPY, WITH POLYPECTOMY AND BIOPSY;  Surgeon: Chris Wyatt MD;  Location: [facility]    CYSTECTOMY OVARIAN BENIGN  2001    HEPATICOJEJUNOSTOMY  2000    RnY jejunostomy from bile duct injury    HERNIORRHAPHY VENTRAL N/A 02/17/2012    open with mesh    HYSTERECTOMY TOTAL ABDOMINAL, BILATERAL SALPINGO-OOPHORECTOMY, COMBINED N/A 2005    LAPAROSCOPIC CHOLECYSTECTOMY  2006    complicated by bile duct injury    LAPAROSCOPIC HERNIORRHAPHY VENTRAL N/A 01/26/2021    Procedure: Open recurrent incarcerated ventral hernia repair X2;  Surgeon: Pradip Mckenzie MD;  Location: [facility]       Prior to Admission Medications   Prior to Admission Medications   Prescriptions Last Dose Informant Patient Reported? Taking?   Fluocinolone Acetonide Scalp 0.01 % OIL oil  Self No No   Sig: Daily as needed for scalp psoriasis   Multiple Vitamins-Minerals (MULTIVITAMIN WOMEN PO)  Self Yes No   Sig: Take 2 chew tab by mouth daily  gummy   VENTOLIN  (90 Base) MCG/ACT inhaler   No No   Sig: INHALE 1 TO 2 PUFFS BY MOUTH EVERY 4 HOURS AS NEEDED FOR SHORTNESS OF BREATH, WHEEZING OR COUGH   albuterol (ACCUNEB) 1.25 MG/3ML neb solution  Self No No   Sig: Take 1 vial (1.25 mg) by nebulization every 6 hours as needed for shortness of breath / dyspnea or wheezing   albuterol (PROVENTIL) (2.5 MG/3ML) 0.083% neb solution   No No   Sig: Take 1 vial (2.5 mg) by nebulization every 6 hours as needed for shortness of breath, wheezing or cough   butalbital-acetaminophen-caffeine (ESGIC) -40 MG tablet   Yes No   Sig: TAKE 1 TABLET EVERY 6 HOURS AS NEEDED FOR HEADACHE   clobetasol propionate (CLOBEX) 0.05 % external shampoo  Self No No   Sig: Leave on scalp for 15 minutes then rinse. Use 1-2 times weekly.   fluticasone (FLONASE) 50 MCG/ACT nasal spray  Self No No   Sig: Spray 2 sprays into both nostrils daily   gabapentin (NEURONTIN) 100 MG capsule   No No   Sig: TAKE 1 CAPSULE BY MOUTH THREE TIMES DAILY   ibuprofen (ADVIL/MOTRIN) 600 MG tablet   Yes No   Sig: Take 400 mg by mouth 3 times daily   losartan (COZAAR) 25 MG tablet   No No   Sig: Take 1 tablet (25 mg) by mouth daily   nicotine (NICODERM CQ) 21 MG/24HR 24 hr patch  Self Yes No   Sig: Place 1 patch onto the skin every 24 hours   nicotine polacrilex (NICORETTE) 4 MG gum  Self Yes No   Sig: Place 4 mg inside cheek every hour as needed for smoking cessation   nystatin (MYCOSTATIN) 126330 UNIT/GM external cream  Self No No   Sig: Apply topically 2 times daily   ondansetron (ZOFRAN) 4 MG tablet   Yes No   Sig: TAKE 1 TABLET BY MOUTH EVERY 8 HOURS FOR 3 DAYS AS NEEDED FOR NAUSEA OR VOMITING   oxyCODONE (ROXICODONE) 5 MG tablet   No No   Sig: Take 0.5 tablets (2.5 mg) by mouth every 12 hours as needed for pain or severe pain   vitamin D3 (CHOLECALCIFEROL) 1.25 MG (06325 UT) capsule  Self No No   Sig: Take 1 capsule (50,000 Units) by mouth every 7 days      Facility-Administered Medications: None         Social History   I have reviewed this patient's social history and updated it with pertinent information if needed.  Social History     Tobacco Use    Smoking status: Some Days     Current packs/day: 0.00     Average packs/day: 1 pack/day for 23.0 years (23.0 ttl pk-yrs)     Types: Cigarettes, Vaping Device     Start date: 10/23/2000     Last attempt to quit: 10/23/2023     Years since quittin.7    Smokeless tobacco: Never    Tobacco comments:      Is using patches and gum. Occasional cannabis vape pen, none since before 2024   Vaping Use    Vaping status: Former    Substances: Nicotine   Substance Use Topics    Alcohol use: Yes     Comment: social    Drug use: No        Physical Exam   Vital Signs: Temp: 99.8  F (37.7  C) Temp src: Oral BP: 128/71 Pulse: 101   Resp: 18 SpO2: 97 % O2 Device: Nasal cannula Oxygen Delivery: 1 LPM  Weight: 0 lbs 0 oz  General: in no apparent distress, non-toxic, and alert female lying in hospital bed oriented x3  HEENT: Head normocephalic atraumatic, oral mucosa moist. Sclerae anicteric  CV: Regular rhythm, normal rate, no murmurs  Resp: No wheezes, no rales or rhonchi, no focal consolidations  GI: Belly soft, nondistended, nontender, bowel sounds present. Ventral hernia noted  Skin: No rashes or lesions  Extremities: No peripheral edema  Psych: Normal affect, mood euthymic  Neuro: Grossly normal    Medical Decision Making                 Data   Imaging results reviewed over the past 24 hrs:   No results found for this or any previous visit (from the past 24 hour(s)).  Recent Results (from the past 12 hour(s))   CBC with platelets and differential    Collection Time: 24  2:27 AM   Result Value Ref Range    WBC Count 14.5 (H) 4.0 - 11.0 10e3/uL    RBC Count 3.47 (L) 3.80 - 5.20 10e6/uL    Hemoglobin 10.5 (L) 11.7 - 15.7 g/dL    Hematocrit 33.3 (L) 35.0 - 47.0 %    MCV 96 78 - 100 fL    MCH 30.3 26.5 - 33.0 pg    MCHC 31.5 31.5 - 36.5 g/dL    RDW 13.8 10.0 - 15.0 %     Platelet Count 196 150 - 450 10e3/uL    % Neutrophils 78 %    % Lymphocytes 13 %    % Monocytes 8 %    % Eosinophils 1 %    % Basophils 0 %    % Immature Granulocytes 0 %    NRBCs per 100 WBC 0 <1 /100    Absolute Neutrophils 11.3 (H) 1.6 - 8.3 10e3/uL    Absolute Lymphocytes 1.9 0.8 - 5.3 10e3/uL    Absolute Monocytes 1.2 0.0 - 1.3 10e3/uL    Absolute Eosinophils 0.1 0.0 - 0.7 10e3/uL    Absolute Basophils 0.0 0.0 - 0.2 10e3/uL    Absolute Immature Granulocytes 0.1 <=0.4 10e3/uL    Absolute NRBCs 0.0 10e3/uL   Lactic acid whole blood    Collection Time: 07/12/24  2:34 AM   Result Value Ref Range    Lactic Acid 0.5 (L) 0.7 - 2.0 mmol/L

## 2024-07-12 NOTE — PHARMACY-ADMISSION MEDICATION HISTORY
Pharmacist Admission Medication History    Admission medication history is complete. The information provided in this note is only as accurate as the sources available at the time of the update.    Information Source(s): Patient and CareEverywhere/SureScripts via in-person    Pertinent Information: n/a    Changes made to PTA medication list:  Added: None  Deleted: None  Changed: gabapentin, nicotine gum    Allergies reviewed with patient and updates made in EHR: yes    Medication History Completed By: Nasra Way [facility] 7/12/2024 9:39 AM    PTA Med List   Medication Sig Last Dose    albuterol (PROVENTIL) (2.5 MG/3ML) 0.083% neb solution Take 1 vial (2.5 mg) by nebulization every 6 hours as needed for shortness of breath, wheezing or cough Unknown at unknown    butalbital-acetaminophen-caffeine (ESGIC) -40 MG tablet TAKE 1 TABLET EVERY 6 HOURS AS NEEDED FOR HEADACHE Past Week at unknown    clobetasol propionate (CLOBEX) 0.05 % external shampoo Leave on scalp for 15 minutes then rinse. Use 1-2 times weekly. Past Month at unknown    Fluocinolone Acetonide Scalp 0.01 % OIL oil Daily as needed for scalp psoriasis Past Month at unknown    fluticasone (FLONASE) 50 MCG/ACT nasal spray Spray 2 sprays into both nostrils daily Past Week at unknown    gabapentin (NEURONTIN) 100 MG capsule Take 100 mg by mouth 2 times daily 7/11/2024 at pm    losartan (COZAAR) 25 MG tablet Take 1 tablet (25 mg) by mouth daily 7/11/2024 at pm    Multiple Vitamins-Minerals (MULTIVITAMIN WOMEN PO) Take 2 chew tab by mouth daily gummy 7/11/2024 at am    nicotine (NICODERM CQ) 21 MG/24HR 24 hr patch Place 1 patch onto the skin every 24 hours 7/11/2024 at am    nicotine (NICORETTE) 2 MG gum Place 2 mg inside cheek every hour as needed for nicotine withdrawal symptoms Unknown at unknown    nystatin (MYCOSTATIN) 655386 UNIT/GM external cream Apply topically 2 times daily as needed for dry skin Unknown at unknown    ondansetron (ZOFRAN) 4 MG  tablet Take 4 mg by mouth every 8 hours as needed for vomiting or nausea Unknown at unknown    VENTOLIN  (90 Base) MCG/ACT inhaler INHALE 1 TO 2 PUFFS BY MOUTH EVERY 4 HOURS AS NEEDED FOR SHORTNESS OF BREATH, WHEEZING OR COUGH Unknown at unknown    vitamin D3 (CHOLECALCIFEROL) 1.25 MG (42283 UT) capsule Take 1 capsule (50,000 Units) by mouth every 7 days 7/8/2024 at am

## 2024-07-12 NOTE — PRE-PROCEDURE
GENERAL PRE-PROCEDURE:   Procedure:  Biliary drain placement  Date/Time:  7/12/2024 10:25 AM    Written consent obtained?: Yes    Risks and benefits: Risks, benefits and alternatives were discussed    Consent given by:  Patient  Patient states understanding of procedure being performed: Yes    Patient's understanding of procedure matches consent: Yes    Procedure consent matches procedure scheduled: Yes    Expected level of sedation:  Moderate  Appropriately NPO:  Yes  ASA Class:  3  Mallampati  :  Grade 2- soft palate, base of uvula, tonsillar pillars, and portion of posterior pharyngeal wall visible  Lungs:  Lungs clear with good breath sounds bilaterally  Heart:  Normal heart sounds and rate  History & Physical reviewed:  History and physical reviewed and no updates needed  Statement of review:  I have reviewed the lab findings, diagnostic data, medications, and the plan for sedation

## 2024-07-12 NOTE — PLAN OF CARE
Problem: Adult Inpatient Plan of Care  Goal: Plan of Care Review  Description: The Plan of Care Review/Shift note should be completed every shift.  The Outcome Evaluation is a brief statement about your assessment that the patient is improving, declining, or no change.  This information will be displayed automatically on your shift  note.  Outcome: Progressing   Goal Outcome Evaluation:       Pt arrived  on P2 around 0135, Alert and oriented x4. Independent in her room. PIV D 5, and 0.9 100 ml/hr infusing. NPO since mid-night. PRN oxycodone and dilaudid given for abdominal pain rated 10 out 10. Nicotine patch placed on right hip. VSS.

## 2024-07-12 NOTE — PROCEDURES
Interventional Radiology Post-Procedure Note   ?   Brief Procedure Note:   Patient name: Elza Greer Pt MRN:[id number]   Date of procedure: 7/12/2024     Procedure(s): Left External Biliary Drain Placement  Sedation method: Moderate sedation was employed. The patient was monitored by an interventional radiology nurse at all times throughout the procedure under my direct guidance.  Pre Procedure Diagnosis: Cholangitis, left hepatic biliary dilation  Post Procedure Diagnosis: Same  Indications: Cholangitis, left hepatic biliary dilation   ?   Attending: Rei Guallpa M.D.  Specimen(s) removed: None   Additional studies ordered: None  Drains: 8 Fr Left external biliary drain  Estimated Blood Loss: Minimal  Complications: None  Vascular closure method: N/A    Findings/Notes/Comments: Unable to gain central access due to severe stenosis/obstruction at the left main hepatic duct with adjacent biliary dilation. Successfully placed a 8 Fr external hepatic drain for decompression.  Once dilation resolves, a repeat attempt can be made.  ?   Please see dictation in PACS or under the Imaging tab in [employer] for detailed procedure note.     Rei Guallpa M.D.   Vascular and Interventional Radiology   Pager: [phone]   After Hours / Scheduling: [phone]     7/12/2024  1:54 PM

## 2024-07-13 LAB
ANION GAP SERPL CALCULATED.3IONS-SCNC: 10 MMOL/L (ref 7–15)
BUN SERPL-MCNC: 4.5 MG/DL (ref 6–20)
CALCIUM SERPL-MCNC: 8.5 MG/DL (ref 8.6–10)
CHLORIDE SERPL-SCNC: 104 MMOL/L (ref 98–107)
CREAT SERPL-MCNC: 0.66 MG/DL (ref 0.51–0.95)
DEPRECATED HCO3 PLAS-SCNC: 27 MMOL/L (ref 22–29)
EGFRCR SERPLBLD CKD-EPI 2021: >90 ML/MIN/1.73M2
ERYTHROCYTE [DISTWIDTH] IN BLOOD BY AUTOMATED COUNT: 13.5 % (ref 10–15)
GLUCOSE SERPL-MCNC: 107 MG/DL (ref 70–99)
HCT VFR BLD AUTO: 29.6 % (ref 35–47)
HGB BLD-MCNC: 9.5 G/DL (ref 11.7–15.7)
MCH RBC QN AUTO: 30.9 PG (ref 26.5–33)
MCHC RBC AUTO-ENTMCNC: 32.1 G/DL (ref 31.5–36.5)
MCV RBC AUTO: 96 FL (ref 78–100)
PLATELET # BLD AUTO: 174 10E3/UL (ref 150–450)
POTASSIUM SERPL-SCNC: 3.9 MMOL/L (ref 3.4–5.3)
RBC # BLD AUTO: 3.07 10E6/UL (ref 3.8–5.2)
SODIUM SERPL-SCNC: 141 MMOL/L (ref 135–145)
WBC # BLD AUTO: 10.6 10E3/UL (ref 4–11)

## 2024-07-13 PROCEDURE — 258N000003 HC RX IP 258 OP 636: Performed by: HOSPITALIST

## 2024-07-13 PROCEDURE — 120N000001 HC R&B MED SURG/OB

## 2024-07-13 PROCEDURE — 250N000013 HC RX MED GY IP 250 OP 250 PS 637: Performed by: HOSPITALIST

## 2024-07-13 PROCEDURE — 85027 COMPLETE CBC AUTOMATED: CPT | Performed by: INTERNAL MEDICINE

## 2024-07-13 PROCEDURE — 99232 SBSQ HOSP IP/OBS MODERATE 35: CPT | Performed by: INTERNAL MEDICINE

## 2024-07-13 PROCEDURE — 250N000013 HC RX MED GY IP 250 OP 250 PS 637: Performed by: INTERNAL MEDICINE

## 2024-07-13 PROCEDURE — 80048 BASIC METABOLIC PNL TOTAL CA: CPT | Performed by: INTERNAL MEDICINE

## 2024-07-13 PROCEDURE — 250N000011 HC RX IP 250 OP 636: Performed by: INTERNAL MEDICINE

## 2024-07-13 PROCEDURE — 36415 COLL VENOUS BLD VENIPUNCTURE: CPT | Performed by: INTERNAL MEDICINE

## 2024-07-13 PROCEDURE — 250N000011 HC RX IP 250 OP 636: Performed by: HOSPITALIST

## 2024-07-13 RX ORDER — IBUPROFEN 400 MG/1
400 TABLET, FILM COATED ORAL EVERY 6 HOURS PRN
Status: COMPLETED | OUTPATIENT
Start: 2024-07-13 | End: 2024-07-16

## 2024-07-13 RX ORDER — ALBUTEROL SULFATE 90 UG/1
2 AEROSOL, METERED RESPIRATORY (INHALATION) EVERY 6 HOURS PRN
Status: DISCONTINUED | OUTPATIENT
Start: 2024-07-13 | End: 2024-07-16 | Stop reason: HOSPADM

## 2024-07-13 RX ORDER — PANTOPRAZOLE SODIUM 40 MG/1
40 TABLET, DELAYED RELEASE ORAL
Status: DISCONTINUED | OUTPATIENT
Start: 2024-07-13 | End: 2024-07-16 | Stop reason: HOSPADM

## 2024-07-13 RX ORDER — GABAPENTIN 100 MG/1
100 CAPSULE ORAL 2 TIMES DAILY
Status: DISCONTINUED | OUTPATIENT
Start: 2024-07-14 | End: 2024-07-16 | Stop reason: HOSPADM

## 2024-07-13 RX ORDER — HYDROMORPHONE HCL IN WATER/PF 6 MG/30 ML
0.4 PATIENT CONTROLLED ANALGESIA SYRINGE INTRAVENOUS EVERY 8 HOURS PRN
Status: DISCONTINUED | OUTPATIENT
Start: 2024-07-13 | End: 2024-07-16 | Stop reason: HOSPADM

## 2024-07-13 RX ORDER — FLUTICASONE PROPIONATE 50 MCG
2 SPRAY, SUSPENSION (ML) NASAL DAILY
Status: DISCONTINUED | OUTPATIENT
Start: 2024-07-14 | End: 2024-07-16 | Stop reason: HOSPADM

## 2024-07-13 RX ORDER — MULTIPLE VITAMINS W/ MINERALS TAB 9MG-400MCG
1 TAB ORAL DAILY
Status: DISCONTINUED | OUTPATIENT
Start: 2024-07-14 | End: 2024-07-16 | Stop reason: HOSPADM

## 2024-07-13 RX ORDER — NICOTINE 21 MG/24HR
1 PATCH, TRANSDERMAL 24 HOURS TRANSDERMAL EVERY 24 HOURS
Status: DISCONTINUED | OUTPATIENT
Start: 2024-07-13 | End: 2024-07-16 | Stop reason: HOSPADM

## 2024-07-13 RX ORDER — OXYCODONE HYDROCHLORIDE 5 MG/1
10 TABLET ORAL EVERY 4 HOURS PRN
Status: DISCONTINUED | OUTPATIENT
Start: 2024-07-13 | End: 2024-07-16 | Stop reason: HOSPADM

## 2024-07-13 RX ORDER — ENOXAPARIN SODIUM 100 MG/ML
40 INJECTION SUBCUTANEOUS EVERY 12 HOURS
Status: DISCONTINUED | OUTPATIENT
Start: 2024-07-13 | End: 2024-07-16 | Stop reason: HOSPADM

## 2024-07-13 RX ORDER — ALBUTEROL SULFATE 0.83 MG/ML
2.5 SOLUTION RESPIRATORY (INHALATION) EVERY 6 HOURS PRN
Status: DISCONTINUED | OUTPATIENT
Start: 2024-07-13 | End: 2024-07-16 | Stop reason: HOSPADM

## 2024-07-13 RX ORDER — NYSTATIN 100000 U/G
CREAM TOPICAL 2 TIMES DAILY PRN
Status: DISCONTINUED | OUTPATIENT
Start: 2024-07-13 | End: 2024-07-16 | Stop reason: HOSPADM

## 2024-07-13 RX ADMIN — HYDROMORPHONE HYDROCHLORIDE 0.4 MG: 0.2 INJECTION, SOLUTION INTRAMUSCULAR; INTRAVENOUS; SUBCUTANEOUS at 01:40

## 2024-07-13 RX ADMIN — ONDANSETRON 4 MG: 2 INJECTION INTRAMUSCULAR; INTRAVENOUS at 18:01

## 2024-07-13 RX ADMIN — DEXTROSE AND SODIUM CHLORIDE: 5; 900 INJECTION, SOLUTION INTRAVENOUS at 02:29

## 2024-07-13 RX ADMIN — PIPERACILLIN AND TAZOBACTAM 3.38 G: 3; .375 INJECTION, POWDER, FOR SOLUTION INTRAVENOUS at 05:48

## 2024-07-13 RX ADMIN — GABAPENTIN 100 MG: 100 CAPSULE ORAL at 21:33

## 2024-07-13 RX ADMIN — OXYCODONE HYDROCHLORIDE 10 MG: 5 TABLET ORAL at 17:33

## 2024-07-13 RX ADMIN — PIPERACILLIN AND TAZOBACTAM 3.38 G: 3; .375 INJECTION, POWDER, FOR SOLUTION INTRAVENOUS at 13:10

## 2024-07-13 RX ADMIN — POLYETHYLENE GLYCOL 3350 17 G: 17 POWDER, FOR SOLUTION ORAL at 08:01

## 2024-07-13 RX ADMIN — HYDROMORPHONE HYDROCHLORIDE 0.4 MG: 0.2 INJECTION, SOLUTION INTRAMUSCULAR; INTRAVENOUS; SUBCUTANEOUS at 22:33

## 2024-07-13 RX ADMIN — OXYCODONE HYDROCHLORIDE 5 MG: 5 TABLET ORAL at 02:29

## 2024-07-13 RX ADMIN — NICOTINE 1 PATCH: 21 PATCH, EXTENDED RELEASE TRANSDERMAL at 08:01

## 2024-07-13 RX ADMIN — ACETAMINOPHEN 650 MG: 325 TABLET ORAL at 13:09

## 2024-07-13 RX ADMIN — ACETAMINOPHEN 650 MG: 325 TABLET ORAL at 07:50

## 2024-07-13 RX ADMIN — GABAPENTIN 100 MG: 100 CAPSULE ORAL at 08:00

## 2024-07-13 RX ADMIN — OXYCODONE HYDROCHLORIDE 5 MG: 5 TABLET ORAL at 07:50

## 2024-07-13 RX ADMIN — IBUPROFEN 400 MG: 400 TABLET, FILM COATED ORAL at 16:43

## 2024-07-13 RX ADMIN — HYDROMORPHONE HYDROCHLORIDE 0.4 MG: 0.2 INJECTION, SOLUTION INTRAMUSCULAR; INTRAVENOUS; SUBCUTANEOUS at 10:12

## 2024-07-13 RX ADMIN — HYDROMORPHONE HYDROCHLORIDE 0.4 MG: 0.2 INJECTION, SOLUTION INTRAMUSCULAR; INTRAVENOUS; SUBCUTANEOUS at 05:46

## 2024-07-13 RX ADMIN — PIPERACILLIN AND TAZOBACTAM 3.38 G: 3; .375 INJECTION, POWDER, FOR SOLUTION INTRAVENOUS at 21:33

## 2024-07-13 RX ADMIN — PANTOPRAZOLE SODIUM 40 MG: 40 TABLET, DELAYED RELEASE ORAL at 17:21

## 2024-07-13 RX ADMIN — OXYCODONE HYDROCHLORIDE 10 MG: 5 TABLET ORAL at 13:09

## 2024-07-13 ASSESSMENT — ACTIVITIES OF DAILY LIVING (ADL)
ADLS_ACUITY_SCORE: 29
ADLS_ACUITY_SCORE: 23
ADLS_ACUITY_SCORE: 29
ADLS_ACUITY_SCORE: 23
ADLS_ACUITY_SCORE: 29
ADLS_ACUITY_SCORE: 23
ADLS_ACUITY_SCORE: 29
ADLS_ACUITY_SCORE: 23
ADLS_ACUITY_SCORE: 23
ADLS_ACUITY_SCORE: 29
ADLS_ACUITY_SCORE: 29
ADLS_ACUITY_SCORE: 23
ADLS_ACUITY_SCORE: 29
ADLS_ACUITY_SCORE: 29
ADLS_ACUITY_SCORE: 23
ADLS_ACUITY_SCORE: 23
ADLS_ACUITY_SCORE: 29
ADLS_ACUITY_SCORE: 23
ADLS_ACUITY_SCORE: 29

## 2024-07-13 NOTE — DISCHARGE INSTRUCTIONS
Biliary Drain Discharge Instructions:   You had a biliary drain placed. This drain is typically placed when bile ducts are blocked and the bile is not able to drain its normal internal pathway. This type of drain is inserted through the skin into the bile duct in order to drain bile from your liver and biliary ducts. The drainage then typically empties into an external drainage bag for collection (your tube may be connected to a drainage bag or it may have a cap on it). Please follow the below instructions after your biliary tube placement:    Care instructions after biliary drain procedure:  - If you received sedation for your procedure, do not drive or operate heavy machinery for the rest of the day.  - If you have a gauze dressing, change the dressing around your drainage tube site daily or as needed to keep site clean and dry.   - Empty your bag as needed (daily or when it is approximately half way full). Follow below instructions for emptying bag:  -Place a container (empty jar or plastic bowl) near the outlet valve of the drainage bag OR position yourself so the drainage from the bag can go directly into the toilet.   -To empty your bag twist the blue valve at the bottom of the bag while holding the valve over the open container.   -Re-twist blue valve at the bottom of the drainage bag to close.  -After draining fluid, recording your drainage output.   -Discard drainage into toilet once fluid drained.  - You may shower, however, you should avoid stagnant water such as tub baths, Jacuzzis and pools. Cover your drain exit site with plastic wrap while showering.  - Inspect the tube often for kinks.  - Keep the bag below the drain exit site to allow for free flow of drainage by gravity.  - Record your daily drain outputs and amount flushed on your drainage record and bring records to your next radiology appointment.     Follow-Up: Please contact [facility] Outpatient Scheduling at [phone]    Please seek  medical evaluation for:  - Nausea and/or vomiting.  - Diffuse abdominal pain.  - Fevers (greater than 101 F (38.3C).  - Drainage tube falls out, is pulled back or felt to be out of position.     Call [facility] [phone] with drain related questions or concerns:  - Leakage around drainage tube site.   - Extreme pain at drainage tube site.   - Outputs suddenly stop or significantly reduces.   - Warmth, redness, swelling or tenderness around the drainage tube.

## 2024-07-13 NOTE — PROGRESS NOTES
[facility]    Medicine Progress Note - Hospitalist Service    Date of Admission:  7/12/2024    Assessment & Plan      Elza Greer is a 49 year old female admitted on 7/12/2024. She has history of HTN, IBS, cholecystectomy in the remote past complicated by bile leak requiring surgical repair with hepaticojejunostomy, multiple prior episodes of E coli bacteremia attributed to biliary source, known biliary stricture with plan for outpatient complex IR/GI intervention, here for recurrent RUQ pain and fevers, found to have leukocytosis and tachycardia concerning for sepsis.     She underwent left external biliary drain placement on 7/12/2024 for cholangitis in the setting of left hepatic biliary dilatation.  She was placed on IV Zosyn on admission.  Blood culture is pending.      #Suspected ascending cholangitis  #Biliary stricture  # Recurrent bacteremia  -2 prior episodes of bacteremia with pan-sensitive E coli attributed to biliary source, Oct 2023 ([place]) and March 2024 ([place]), records reviewed  -here for worsening RUQ pain  -MRCP showed worse biliary dilatation compared to previous, per GI her anatomy not amenable to ERCP, would need to be done by IR. Patient transferred to [facility] to have this done.  -Does meet criteria for sepsis with leukocytosis, tachycardia, reported fevers (none in ED)  -She underwent left external biliary drain placement on 7/12/2024 for cholangitis in the setting of left hepatic biliary dilatation.     -Continue Zosyn  -Follow-up blood culture  -Drain management per IR-appreciate assistance  -Analgesics as needed  -Monitor LFT    # Hypertension  -Blood pressure is controlled; continue to hold home Losartan for now    #Nicotine dependence  -patch and also uses gum for cravings, she is trying to quit smoking    #Asthma  -Albuterol PRN    #Migraines  -ok for home Esgic PRN    #Obesity  -BMI 43 noted    # Exertional hypoxia  -Patient states after receiving opioids in the  "ED, when she would get up and exert herself her oxygen would drop and therefore she is currently on 1 L nasal cannula.  Wean as tolerated          Diet: Regular Diet Adult    DVT Prophylaxis: Pneumatic Compression Devices  Portillo Catheter: Not present  Lines: None     Cardiac Monitoring: None  Code Status: Full Code      Clinically Significant Risk Factors              # Hypoalbuminemia: Lowest albumin = 3.4 g/dL at 7/12/2024  2:27 AM, will monitor as appropriate    # Coagulation Defect: INR = 1.27 (Ref range: 0.85 - 1.15) and/or PTT = N/A, will monitor for bleeding    # Hypertension: Noted on problem list                # Severe Obesity: Estimated body mass index is 43.6 kg/m  as calculated from the following:    Height as of 7/10/24: 1.626 m (5' 4\").    Weight as of 7/10/24: 115.2 kg (254 lb)., PRESENT ON ADMISSION     # Asthma: noted on problem list        Disposition Plan     Medically Ready for Discharge: Anticipated in 2-4 Days             Conor Muñoz MD  Hospitalist Service  [facility]  Securely message with [employer] (more info)  Text page via [facility] Paging/Directory   ______________________________________________________________________    Interval History   She endorsed right upper quadrant abdominal pain for which she received oxycodone about 30 minutes prior to evaluation.  She states she tolerated scanty liquid diet yesterday.   updated at bedside over the patient's phone on speaker.    Physical Exam   Vital Signs: Temp: 98.2  F (36.8  C) Temp src: Axillary BP: 111/68 Pulse: 87   Resp: 18 SpO2: 98 % O2 Device: Nasal cannula Oxygen Delivery: 2 LPM  Weight: 0 lbs 0 oz    General appearance: Obese, deconditioned, awake, Alert, Cooperative, not in any obvious distress and appears stated age   HEENT: Normocephalic, atraumatic, conjunctiva clear without icterus and ears without discharge  Lungs: Clear to auscultation bilaterally, no wheezing, good air exchange, normal work " of breathing  Cardiovascular: Regular Rate and Rythm, normal apical impulse, normal S1 and S2, no lower extremity edema bilaterally  Abdomen: Soft, with right upper quadrant tenderness and non-distended, KLARISSA drain in the right upper quadrant draining serosanguineous fluid.  Skin: Skin color, texture normal and bruising or bleeding. No rashes or lesions over face, neck, arms and legs, turgor normal.  Musculoskeletal: No bony deformities or joint tenderness. Normal ROM upon flexion & extension.   Neurologic: Alert & Oriented X 3, Facial symmetry preserved and upper & lower extremities moving well with symmetry  Psychiatric: Calm, normal eye contact and normal affect      Medical Decision Making       50 MINUTES SPENT BY ME on the date of service doing chart review, history, exam, documentation & further activities per the note.      Data     I have personally reviewed the following data over the past 24 hrs:    10.6  \   9.5 (L)   / 174     141 104 4.5 (L) /  107 (H)   3.9 27 0.66 \     Procal: N/A CRP: N/A Lactic Acid: 0.4 (L)         Imaging results reviewed over the past 24 hrs:   Recent Results (from the past 24 hour(s))   IR Biliary Drain Placement    Narrative    [place] [facility]  LOCATION: [facility]  DATE: 7/12/2024    PROCEDURE:  1. IMAGE GUIDED PLACEMENT OF EXTERNAL LEFT BILIARY DRAINAGE CATHETER  2. ANTEGRADE CHOLANGIOGRAM  3. MODERATE SEDATION    INTERVENTIONAL RADIOLOGIST: Rei Guallpa MD    INDICATION: Patient is a 49-year-old female with a complex past medical history of cholecystectomy leading to a biliary leak requiring choledochojejunostomy. The patient has a history of left hepatic biliary dilation which has slightly worsened over time   and presents with signs/symptoms of cholangitis. The patient presents for left hepatic biliary drain placement.    CONSENT: The risks, benefits and alternatives of percutaneous left hepatic Biliary Drain Placement were discussed with the  patient  in detail. All questions were answered. Informed consent was given to proceed with the procedure.    MODERATE SEDATION: Versed 4 mg IV; Fentanyl 200 mcg IV. During the time out, immediately prior to the administration of medications, the patient was reassessed for adequacy to receive conscious sedation.  Under physician supervision, Versed and fentanyl   were administered for moderate sedation. Pulse oximetry, heart rate and blood pressure were continuously monitored by an independent trained observer. The physician spent 40 minutes of face-to-face sedation time with the patient.    CONTRAST: 20 cc of Omnipaque 350  ANTIBIOTICS: None.  ADDITIONAL MEDICATIONS: None.    FLUOROSCOPIC TIME: 24.2 minutes.  RADIATION DOSE: Air Kerma: 3353 mGy.    COMPLICATIONS: No immediate complications.    STERILE BARRIER TECHNIQUE: Maximum sterile barrier technique was used. Cutaneous antisepsis was performed at the operative site with application of 2% chlorhexidine and large sterile drape. Prior to the procedure, the  and assistant performed   hand hygiene and wore hat, mask, sterile gown, and sterile gloves during the entire procedure.    PROCEDURE:   Informed consent was obtained prior to the procedure. A timeout was performed. After giving local anesthesia, a 22 gauge Chiba needle was advanced from a subcostal  site under ultrasound guidance oriented towards a upper left hepatic bile duct. Contrast   was injected as it was retracted. And upper left bile duct was opacified. A 0.018 inch wire was positioned within the bile duct. Over this wire, a 3 Mauritian dilator was positioned within the bile duct. An antegrade cholangiogram was then performed which   is 3 Mauritian dilator.    A peripheral, inferior  left bile duct was identified. Additional local anesthesia was administered and a second 22 gauge Chiba needle advanced from an adjacent site along the right mid axillary line in the subcostal space.This entered the  peripheral   right duct and a 0.018 inch wire was advanced into the biliary system. The needle was then exchanged for a AccuStick dilator system.     The dilator was readvanced into the biliary system. Additional contrast injection confirmed the moderate left intrahepatic biliary dilatation. No flow into the common bile duct or right hepatic system was visualized.    The dilator was then exchanged over a  0.035inch Amplatz  wire for a 6 Romansh vascular sheath. Through this sheath, a 5 Romansh Kumpe catheter with a 0.035 inch stiff Glidewire was utilized to make several attempts to gain central biliary access.This was   not successful    The sheath was then removed over a 0.035 inch M+ wire. Over this wire, an 8 Romansh external hepatobiliary drain was then placed.    The catheter injected and aspirated easily. It was secured to the skin with a 2-0 nylon stitch and a sterile dressing applied. The patient appeared to tolerate the procedure well.    FINDINGS:      Impression    IMPRESSION:    1. Moderate left biliary dilatation with no visualized flow into the common bile duct or right biliary system.  2. Unable to successfully obtain central biliary access. An 8 Romansh external biliary drainage catheter was ultimately placed which controls the left biliary system well.  3. Once the left biliary system has decompressed appropriately, repeat attempt at central access can be performed.

## 2024-07-13 NOTE — PLAN OF CARE
Goal Outcome Evaluation:    Problem: Adult Inpatient Plan of Care  Goal: Optimal Comfort and Wellbeing  Outcome: Progressing     Problem: Surgery Nonspecified  Goal: Absence of Bleeding  Outcome: Progressing     Problem: Surgery Nonspecified  Goal: Optimal Pain Control and Function  Outcome: Progressing        Pt alert and oriented. VSS on 2L NC. Pt reports moderate to severe pain, moderately controlled with PRN medications per pt. IV abx and IVF running. Pt reports abdominal discomfort and no BM for a few days, prn senna given.

## 2024-07-13 NOTE — PLAN OF CARE
Goal Outcome Evaluation:    Problem: Adult Inpatient Plan of Care  Goal: Optimal Comfort and Wellbeing    Problem: Surgery Nonspecified  Goal: Absence of Bleeding       Problem: Surgery Nonspecified  Goal: Absence of Infection Signs and Symptoms     Problem: Surgery Nonspecified  Goal: Nausea and Vomiting Relief       Pt alert and oriented. VSS on 2L NC. Reports moderate to severe pain that is controlled with PRN medications. Pt ambulating with SBA to bathroom. Drain CDI.

## 2024-07-13 NOTE — PLAN OF CARE
"Goal Outcome Evaluation:         Problem: Pain Acute  Goal: Optimal Pain Control and Function  Outcome: Progressing  Intervention: Prevent or Manage Pain  Recent Flowsheet Documentation  Taken 7/13/2024 3621 by Tori Yoon RN  Medication Review/Management: medications reviewed           Patient complained of 9/10 surgical abdominal pain and requested oxycodone 5 MG, although patient stated that \" 5 MG of Oxycodone does nothing for me\" writer suggested adding Tylenol as well to the oxycodone. Patient accepted and Pt. Stated that the combination was helpful\". Later on patient requested Dilaudid IV 0.4 MG and pain was decreased from 9/10 to 4/10. MD increased oxycodone to 10 MG. Patient requested that dose after walking on the unit. And pain decreased form a 8/10 to 2/10. Continue to monitor. Encourage activity and the IS use.     Tori Brown, RN            "

## 2024-07-13 NOTE — PLAN OF CARE
Problem: Adult Inpatient Plan of Care  Goal: Optimal Comfort and Wellbeing  7/12/2024 2008 by Gonzalo Ernst RN  Outcome: Progressing  7/12/2024 1651 by Gonzalo Ernst RN  Outcome: Progressing     Problem: Surgery Nonspecified  Goal: Fluid and Electrolyte Balance  Outcome: Progressing  Goal: Anesthesia/Sedation Recovery  Outcome: Progressing  Goal: Optimal Pain Control and Function  7/12/2024 2008 by Gonzalo Ernst RN  Outcome: Progressing  7/12/2024 1651 by Gonzalo Ernst RN  Outcome: Progressing  Goal: Nausea and Vomiting Relief  7/12/2024 2008 by Gonzalo Ernst RN  Outcome: Progressing  7/12/2024 1651 by Gonzalo Ernst RN  Outcome: Progressing  Goal: Effective Oxygenation and Ventilation  Outcome: Progressing  Intervention: Optimize Oxygenation and Ventilation  Recent Flowsheet Documentation  Taken 7/12/2024 1645 by Gonzalo Ernst RN  Head of Bed (HOB) Positioning: HOB at 20-30 degrees  Taken 7/12/2024 1100 by Gonzalo Ernst RN  Head of Bed (HOB) Positioning: HOB at 20-30 degrees     Problem: Adult Inpatient Plan of Care  Goal: Absence of Hospital-Acquired Illness or Injury  Intervention: Prevent Skin Injury  Recent Flowsheet Documentation  Taken 7/12/2024 1645 by Gonzalo rEnst RN  Body Position: position changed independently  Taken 7/12/2024 1100 by Gonzalo Ernst RN  Body Position: position changed independently   Goal Outcome Evaluation:       Patient is A/O x4. Mildly febrile and tachycardic this afternoon. Pain reported between 3 and 8 of 10 this afternoon, treated with IV dilaudid. Regular diet initiated, but going slow with liquids and one solid item. Some nausea, resolved with IV zofran and compazine.  at bedside.

## 2024-07-14 ENCOUNTER — APPOINTMENT (OUTPATIENT)
Dept: PHYSICAL THERAPY | Facility: HOSPITAL | Age: 49
DRG: 871 | End: 2024-07-14
Attending: INTERNAL MEDICINE
Payer: COMMERCIAL

## 2024-07-14 PROCEDURE — 250N000011 HC RX IP 250 OP 636: Performed by: HOSPITALIST

## 2024-07-14 PROCEDURE — 120N000001 HC R&B MED SURG/OB

## 2024-07-14 PROCEDURE — 97162 PT EVAL MOD COMPLEX 30 MIN: CPT | Mod: GP

## 2024-07-14 PROCEDURE — 999N000111 HC STATISTIC OT IP EVAL DEFER

## 2024-07-14 PROCEDURE — 97116 GAIT TRAINING THERAPY: CPT | Mod: GP

## 2024-07-14 PROCEDURE — 99232 SBSQ HOSP IP/OBS MODERATE 35: CPT | Performed by: INTERNAL MEDICINE

## 2024-07-14 PROCEDURE — 250N000013 HC RX MED GY IP 250 OP 250 PS 637: Performed by: HOSPITALIST

## 2024-07-14 PROCEDURE — 250N000013 HC RX MED GY IP 250 OP 250 PS 637: Performed by: INTERNAL MEDICINE

## 2024-07-14 RX ORDER — SENNOSIDES 8.6 MG
8.6 TABLET ORAL 2 TIMES DAILY PRN
Status: DISCONTINUED | OUTPATIENT
Start: 2024-07-14 | End: 2024-07-16 | Stop reason: HOSPADM

## 2024-07-14 RX ADMIN — OXYCODONE HYDROCHLORIDE 10 MG: 5 TABLET ORAL at 14:46

## 2024-07-14 RX ADMIN — PANTOPRAZOLE SODIUM 40 MG: 40 TABLET, DELAYED RELEASE ORAL at 06:50

## 2024-07-14 RX ADMIN — PIPERACILLIN AND TAZOBACTAM 3.38 G: 3; .375 INJECTION, POWDER, FOR SOLUTION INTRAVENOUS at 22:48

## 2024-07-14 RX ADMIN — ACETAMINOPHEN 650 MG: 325 TABLET ORAL at 10:01

## 2024-07-14 RX ADMIN — PIPERACILLIN AND TAZOBACTAM 3.38 G: 3; .375 INJECTION, POWDER, FOR SOLUTION INTRAVENOUS at 13:07

## 2024-07-14 RX ADMIN — SENNOSIDES AND DOCUSATE SODIUM 1 TABLET: 50; 8.6 TABLET ORAL at 23:17

## 2024-07-14 RX ADMIN — GABAPENTIN 100 MG: 100 CAPSULE ORAL at 09:01

## 2024-07-14 RX ADMIN — ONDANSETRON 4 MG: 2 INJECTION INTRAMUSCULAR; INTRAVENOUS at 15:39

## 2024-07-14 RX ADMIN — ACETAMINOPHEN 650 MG: 325 TABLET ORAL at 14:46

## 2024-07-14 RX ADMIN — ONDANSETRON 4 MG: 2 INJECTION INTRAMUSCULAR; INTRAVENOUS at 06:50

## 2024-07-14 RX ADMIN — OXYCODONE HYDROCHLORIDE 10 MG: 5 TABLET ORAL at 05:54

## 2024-07-14 RX ADMIN — GABAPENTIN 100 MG: 100 CAPSULE ORAL at 22:49

## 2024-07-14 RX ADMIN — OXYCODONE HYDROCHLORIDE 10 MG: 5 TABLET ORAL at 10:01

## 2024-07-14 RX ADMIN — Medication 1 TABLET: at 09:01

## 2024-07-14 RX ADMIN — POLYETHYLENE GLYCOL 3350 17 G: 17 POWDER, FOR SOLUTION ORAL at 23:17

## 2024-07-14 RX ADMIN — NICOTINE 1 PATCH: 21 PATCH, EXTENDED RELEASE TRANSDERMAL at 09:02

## 2024-07-14 RX ADMIN — OXYCODONE HYDROCHLORIDE 10 MG: 5 TABLET ORAL at 23:03

## 2024-07-14 RX ADMIN — FLUTICASONE PROPIONATE 2 SPRAY: 50 SPRAY, METERED NASAL at 09:04

## 2024-07-14 RX ADMIN — POLYETHYLENE GLYCOL 3350 17 G: 17 POWDER, FOR SOLUTION ORAL at 09:00

## 2024-07-14 RX ADMIN — ONDANSETRON 4 MG: 2 INJECTION INTRAMUSCULAR; INTRAVENOUS at 00:10

## 2024-07-14 RX ADMIN — PIPERACILLIN AND TAZOBACTAM 3.38 G: 3; .375 INJECTION, POWDER, FOR SOLUTION INTRAVENOUS at 05:46

## 2024-07-14 RX ADMIN — PROCHLORPERAZINE EDISYLATE 10 MG: 5 INJECTION INTRAMUSCULAR; INTRAVENOUS at 17:50

## 2024-07-14 RX ADMIN — ACETAMINOPHEN 650 MG: 325 TABLET ORAL at 23:03

## 2024-07-14 RX ADMIN — IBUPROFEN 400 MG: 400 TABLET, FILM COATED ORAL at 07:02

## 2024-07-14 ASSESSMENT — ACTIVITIES OF DAILY LIVING (ADL)
ADLS_ACUITY_SCORE: 29
ADLS_ACUITY_SCORE: 25
ADLS_ACUITY_SCORE: 23
ADLS_ACUITY_SCORE: 23
ADLS_ACUITY_SCORE: 25
ADLS_ACUITY_SCORE: 23
ADLS_ACUITY_SCORE: 25
ADLS_ACUITY_SCORE: 25
ADLS_ACUITY_SCORE: 23
ADLS_ACUITY_SCORE: 30
ADLS_ACUITY_SCORE: 30
ADLS_ACUITY_SCORE: 25
ADLS_ACUITY_SCORE: 23
ADLS_ACUITY_SCORE: 25
ADLS_ACUITY_SCORE: 23
ADLS_ACUITY_SCORE: 25
ADLS_ACUITY_SCORE: 25
ADLS_ACUITY_SCORE: 29
ADLS_ACUITY_SCORE: 25
ADLS_ACUITY_SCORE: 30
ADLS_ACUITY_SCORE: 25

## 2024-07-14 NOTE — PROGRESS NOTES
07/14/24 0840   Appointment Info   Signing Clinician's Name / Credentials (PT) Jewell Burnette DPT   Living Environment   People in Home spouse;child(elda), dependent   Current Living Arrangements house   Home Accessibility stairs to enter home;stairs within home   Number of Stairs, Main Entrance 5   Stair Railings, Main Entrance railings safe and in good condition   Number of Stairs, Within Home, Primary greater than 10 stairs  (3 story home- bed/bathroom on upper level)   Stair Railings, Within Home, Primary railings safe and in good condition   Transportation Anticipated family or friend will provide   Self-Care   Usual Activity Tolerance moderate   Current Activity Tolerance fair   Equipment Currently Used at Home none   Fall history within last six months no   General Information   Onset of Illness/Injury or Date of Surgery 07/12/24   Referring Physician Conor Muñoz MD   Patient/Family Therapy Goals Statement (PT) return home   Pertinent History of Current Problem (include personal factors and/or comorbidities that impact the POC) 49 year old female admitted on 7/12/2024. She has history of HTN, IBS, cholecystectomy in the remote past complicated by bile leak requiring surgical repair with hepaticojejunostomy, multiple prior episodes of E coli bacteremia attributed to biliary source, known biliary stricture with plan for outpatient complex IR/GI intervention, here for recurrent RUQ pain and fevers, found to have leukocytosis and tachycardia concerning for sepsis   Existing Precautions/Restrictions other (see comments)  (biliary drain)   Strength (Manual Muscle Testing)   Strength (Manual Muscle Testing) Deficits observed during functional mobility   Bed Mobility   Bed Mobility supine-sit   Supine-Sit [place] (Bed Mobility) modified independence   Assistive Device (Bed Mobility) bed rails   Transfers   Transfers sit-stand transfer   Sit-Stand Transfer   Sit-Stand [place] (Transfers)  supervision;verbal cues   Assistive Device (Sit-Stand Transfers)   (IV pole)   Gait/Stairs (Locomotion)   [place] (Gait) supervision;verbal cues   Assistive Device (Gait)   (pushing IV pole)   Distance in Feet (Gait) 10'   Pattern (Gait) step-through   Deviations/Abnormal Patterns (Gait) gait speed decreased   Clinical Impression   Criteria for Skilled Therapeutic Intervention Yes, treatment indicated   PT Diagnosis (PT) Impaired functional mobility   Influenced by the following impairments Fatigue, SOB   Functional limitations due to impairments Impaired endurance   Clinical Presentation (PT Evaluation Complexity) stable   Clinical Presentation Rationale Presents as diagnosed   Clinical Decision Making (Complexity) moderate complexity   Planned Therapy Interventions (PT) gait training;home exercise program;strengthening;stair training;transfer training   Risk & Benefits of therapy have been explained patient   PT Total Evaluation Time   PT Brigid, Moderate Complexity Minutes (19314) 10   Physical Therapy Goals   PT Frequency Daily   PT Predicted Duration/Target Date for Goal Attainment 07/21/24   PT Goals Transfers;Gait;Stairs   PT: Transfers Independent;Sit to/from stand;Bed to/from chair   PT: Gait Independent;Greater than 200 feet   PT: Stairs Modified independent;Greater than 10 stairs   PT Discharge Planning   PT Plan progress activity tolerance as able, 5 FIDE, monitor sats   PT Discharge Recommendation (DC Rec) home with assist   PT Rationale for DC Rec SBA with mobility.   PT Brief overview of current status Amb 175' with SBA, pushing IV pole. Multiple rest breaks due to fatigue/SOB.   Total Session Time   Total Session Time (sum of timed and untimed services) 10       Jewell Burnette, PT, DPT  7/14/2024

## 2024-07-14 NOTE — PLAN OF CARE
Goal Outcome Evaluation:  Pt is alert and oriented, able to make needs known. Getting iv zosyn. PRN zofran given for nausea x2. Pt is up independently in her room. PRN oxycodone given for pain. No output from drain this shift. Pt has been resting when rounded upon.  Vanesa Schaefer RN

## 2024-07-14 NOTE — PLAN OF CARE
Occupational Therapy: Orders received. Chart reviewed and discussed with care team.? Occupational Therapy not indicated due to met with pt and explained role of OT.  Pt denies OT needs.  Per RN, pt is independent in room.? Defer discharge recommendations to MD.? Will complete orders.

## 2024-07-14 NOTE — PROGRESS NOTES
Interventional Radiology - Progress Note  Inpatient - [facility]: Interventional Radiology   [phone]  7/14/2024    S:  Elza is doing ok this morning, ambulating around room but easily winded. Back on 1L NC supplemental O2. Also endorses intermittent nausea and pain at biliary drain site not well relieved with oral pain medication. No fever, chills. She has been constipated and feels full/distended; working on this with bowel regimen. No leaking or bleeding around drain site.    O:  /68 (BP Location: Right arm)   Pulse 87   Temp 98.7  F (37.1  C) (Oral)   Resp 18   LMP 08/26/2005   SpO2 95%   General: Pleasant woman seen up in room. Appears stable, in no acute distress.   Neuro: Alert, oriented, speech clear. Appropriately interactive.   Resp: Appears winded with ambulation, breathing improves at rest. On 1 LPM NC.  Cardio: No LE edema.  Abdomen: Soft, distended, moderately tender.   Drain(s): RUQ ext biliary drain in place to gravity bag, scant sanguinous output. Drain flushed at bedside with ease and a couple small clots flushed from tubing.   Skin: Warm and dry. Not diaphoretic.   MSK: Grossly normal strength and ROM. Moves all extremities equally and independently.    IMAGING:  Results for orders placed or performed during the hospital encounter of 07/12/24   IR Biliary Drain Placement    Narrative    [facility]  LOCATION: [facility]  DATE: 7/12/2024    PROCEDURE:  1. IMAGE GUIDED PLACEMENT OF EXTERNAL LEFT BILIARY DRAINAGE CATHETER  2. ANTEGRADE CHOLANGIOGRAM  3. MODERATE SEDATION    INTERVENTIONAL RADIOLOGIST: Rei Guallpa MD    INDICATION: Patient is a 49-year-old female with a complex past medical history of cholecystectomy leading to a biliary leak requiring choledochojejunostomy. The patient has a history of left hepatic biliary dilation which has slightly worsened over time   and presents with signs/symptoms of cholangitis. The patient  presents for left hepatic biliary drain placement.    CONSENT: The risks, benefits and alternatives of percutaneous left hepatic Biliary Drain Placement were discussed with the patient  in detail. All questions were answered. Informed consent was given to proceed with the procedure.    MODERATE SEDATION: Versed 4 mg IV; Fentanyl 200 mcg IV. During the time out, immediately prior to the administration of medications, the patient was reassessed for adequacy to receive conscious sedation.  Under physician supervision, Versed and fentanyl   were administered for moderate sedation. Pulse oximetry, heart rate and blood pressure were continuously monitored by an independent trained observer. The physician spent 40 minutes of face-to-face sedation time with the patient.    CONTRAST: 20 cc of Omnipaque 350  ANTIBIOTICS: None.  ADDITIONAL MEDICATIONS: None.    FLUOROSCOPIC TIME: 24.2 minutes.  RADIATION DOSE: Air Kerma: 3353 mGy.    COMPLICATIONS: No immediate complications.    STERILE BARRIER TECHNIQUE: Maximum sterile barrier technique was used. Cutaneous antisepsis was performed at the operative site with application of 2% chlorhexidine and large sterile drape. Prior to the procedure, the  and assistant performed   hand hygiene and wore hat, mask, sterile gown, and sterile gloves during the entire procedure.    PROCEDURE:   Informed consent was obtained prior to the procedure. A timeout was performed. After giving local anesthesia, a 22 gauge Chiba needle was advanced from a subcostal  site under ultrasound guidance oriented towards a upper left hepatic bile duct. Contrast   was injected as it was retracted. And upper left bile duct was opacified. A 0.018 inch wire was positioned within the bile duct. Over this wire, a 3 Uzbek dilator was positioned within the bile duct. An antegrade cholangiogram was then performed which   is 3 Uzbek dilator.    A peripheral, inferior  left bile duct was identified. Additional  local anesthesia was administered and a second 22 gauge Chiba needle advanced from an adjacent site along the right mid axillary line in the subcostal space.This entered the peripheral   right duct and a 0.018 inch wire was advanced into the biliary system. The needle was then exchanged for a AccuStick dilator system.     The dilator was readvanced into the biliary system. Additional contrast injection confirmed the moderate left intrahepatic biliary dilatation. No flow into the common bile duct or right hepatic system was visualized.    The dilator was then exchanged over a  0.035inch Amplatz  wire for a 6 Citizen of [place] vascular sheath. Through this sheath, a 5 Citizen of [place] Kumpe catheter with a 0.035 inch stiff Glidewire was utilized to make several attempts to gain central biliary access.This was   not successful    The sheath was then removed over a 0.035 inch M+ wire. Over this wire, an 8 Citizen of [place] external hepatobiliary drain was then placed.    The catheter injected and aspirated easily. It was secured to the skin with a 2-0 nylon stitch and a sterile dressing applied. The patient appeared to tolerate the procedure well.    FINDINGS:      Impression    IMPRESSION:    1. Moderate left biliary dilatation with no visualized flow into the common bile duct or right biliary system.  2. Unable to successfully obtain central biliary access. An 8 Citizen of [place] external biliary drainage catheter was ultimately placed which controls the left biliary system well.  3. Once the left biliary system has decompressed appropriately, repeat attempt at central access can be performed.       LABS:  CBC  Recent Labs   Lab 07/13/24  0756 07/12/24  0227 07/10/24  1643   WBC 10.6 14.5* 17.7*   RBC 3.07* 3.47* 4.15   HGB 9.5* 10.5* 13.1   HCT 29.6* 33.3* 39.4   MCV 96 96 95   MCH 30.9 30.3 31.6   MCHC 32.1 31.5 33.2   RDW 13.5 13.8 13.2    196 217     CMP  Recent Labs   Lab 07/13/24  0756 07/12/24  0227 07/10/24  1643    138 139   POTASSIUM 3.9  "3.8 4.0   CHLORIDE 104 103 100   CO2 27 25 28   ANIONGAP 10 10 11   * 102* 123*   BUN 4.5* 5.6* 8.4   CR 0.66 0.76 0.77   GFRESTIMATED >90 >90 >90   DEBBIE 8.5* 8.6 9.5   PROTTOTAL  --  6.6 7.9   ALBUMIN  --  3.4* 4.2   BILITOTAL  --  0.6 0.4   ALKPHOS  --  160* 212*   AST  --  18 31   ALT  --  26 36     INR  Recent Labs   Lab 07/12/24  0800   INR 1.27*     DRAIN(S):  Output by Drain (mL) 07/12/24 0700 - 07/12/24 1459 07/12/24 1500 - 07/12/24 2259 07/12/24 2300 - 07/13/24 0659 07/13/24 0700 - 07/13/24 1459 07/13/24 1500 - 07/13/24 2259 07/13/24 2300 - 07/14/24 0659 07/14/24 0700 - 07/14/24 1015   Closed/Suction Drain 1 Midline RUQ Other (Comment) 8 [place]    20 10 0      Cultures:  BCx NGTD  Antibiotics: Zosyn  Flushing: None      A:  Elza Greer is a 49 year old woman with a PMH of RA, COPD, HTN, PCOS, tobacco use, laparoscopic cholecystectomy (2000) c/b bile duct injury s/p hepaticojejunal eleazar-en-y, known biliary stricture, and recurrent E. Coli sepsis d/t biliary source who was admitted to [facility] after transfer from [facility] on 07/11/2024 with recurrent RUQ pain and fevers. She has a history of recurrent cholangitis. Recent MRCP demonstrated \"increased intrahepatic biliary ductal dilatation, predominantly in the left hepatic lobe\", per below. She had previous plans for biliary drain placement at [facility] on 08/12, followed by a GI/IR combination procedure at a later date. Unfortunately, there were no beds available at [facility] for transfer this admission. IR consulted for biliary drain placement at [facility].     S/p external biliary drain placement on 7/12/24. Per IR note: \"Unable to gain central access due to severe stenosis/obstruction at the left main hepatic duct with adjacent biliary dilation. Successfully placed a 8 Fr external hepatic drain for decompression. Once dilation resolves, a repeat attempt can be made.\"    7/14 - Note minimal drian OP since placement and patient still having a lot of pain at drain " site. Drain flushed at bedside with ease and a couple small clots flushed from tubing.     P:    -Patient has an external biliary tube, meaning that bile will only drain externally into the gravity bag. No routine flushing required. Monitor outputs in I&O.   -Tentatively plan for IR biliary drain check / attempt at conversion to int/ext on Monday 7/15. NPO at [place]. Hold AM dose ppx lovenox. Orders placed and patient aware.   -Continue to follow WBC count and cultures.   -Antibiotics and pain/symptom management per primary team.   -Outpatient biliary drain cares and IR follow-up pending attempted conversion to int/ext drain.   -IR will continue to follow.     Total time spent on the date of the encounter is 45 minutes, including time spent counseling the patient, performing a medically appropriate evaluation, reviewing prior medical history, ordering medications and tests, documenting clinical information in the medical record, and communication of results.    Nanette Alba DNP, APRN, NP-C  Interventional Radiology  [phone] (Sat/Sun 8am-12pm)      E/M codes for reference only:  02529

## 2024-07-14 NOTE — PLAN OF CARE
Problem: Adult Inpatient Plan of Care  Goal: Plan of Care Review  Outcome: Progressing  Problem: Surgery Nonspecified  Goal: Optimal Pain Control and Function  Intervention: Prevent or Manage Pain     Goal Outcome Evaluation:     [id number]... Pt is a/o x4, standby to independent with activities, c/o right upper abdominal pain 8/10 and was medicated with PRN Oxycodone 10 mg + Tylenol 650 mg at 1000 and 1446, please follow up. Otherwise, pt had a shower, biliary drain is intact, minimal/scanty bloody drainage noted. For procedure in IR tomorrow, NPO after midnight. VSS

## 2024-07-14 NOTE — PLAN OF CARE
Problem: Adult Inpatient Plan of Care  Goal: Optimal Comfort and Wellbeing  Intervention: Monitor Pain and Promote Comfort  Recent Flowsheet Documentation  Taken 7/13/2024 1733 by Bobo Man, RN  Pain Management Interventions: medication (see MAR)  Taken 7/13/2024 1719 by Bobo Man, RN  Pain Management Interventions: medication (see MAR)  Taken 7/13/2024 1623 by Bobo Man, RN  Pain Management Interventions: medication (see MAR)     Problem: Surgery Nonspecified  Goal: Nausea and Vomiting Relief  Outcome: Progressing     Problem: Infection  Goal: Absence of Infection Signs and Symptoms  Outcome: Progressing   Goal Outcome Evaluation:    Pt alert and oriented. Walked in hallway with her . Pt stated pain was increased after walking and pt requested pain medication. Pt received pain meds for abdominal pain and headache as well as nausea medication prior to dinner due to nausea. Pt appetite is poor.  Pt also c/o mild swelling and discomfort in left upper arm from prior IV infiltration. IV site was changed on prior shift. This writer elevated arm on pillow and gave pt a warm pack for left upper arm. Pt has been requesting Ice packs for insertion site of biliary drainage tube. Dressing was changed. Prior dressing had minimal dried blood at site. Pt ambulating to bathroom independently. Pt resting in bed at this time.

## 2024-07-14 NOTE — PROGRESS NOTES
[facility]    Medicine Progress Note - Hospitalist Service    Date of Admission:  7/12/2024    Assessment & Plan      Elza Greer is a 49 year old female admitted on 7/12/2024. She has history of HTN, IBS, cholecystectomy in the remote past complicated by bile leak requiring surgical repair with hepaticojejunostomy, multiple prior episodes of E coli bacteremia attributed to biliary source, known biliary stricture with plan for outpatient complex IR/GI intervention, here for recurrent RUQ pain and fevers, found to have leukocytosis and tachycardia concerning for sepsis.     She underwent left external biliary drain placement on 7/12/2024 for cholangitis in the setting of left hepatic biliary dilatation.  IR plans to perform biliary drain check/possible conversion to internal biliary drain on Monday, 7/15/2024 given minimal output from current external biliary drain.    She was placed on IV Zosyn on admission.  Blood culture is pending.      #Suspected ascending cholangitis  #Biliary stricture  # Recurrent bacteremia  -2 prior episodes of bacteremia with pan-sensitive E coli attributed to biliary source, Oct 2023 ([place]) and [place] 2024 ([place]), records reviewed  -here for worsening RUQ pain  -MRCP showed worse biliary dilatation compared to previous, per GI her anatomy not amenable to ERCP, would need to be done by IR. Patient transferred to [facility] to have this done.  -Does meet criteria for sepsis with leukocytosis, tachycardia, reported fevers (none in ED)  -She underwent left external biliary drain placement on 7/12/2024 for cholangitis in the setting of left hepatic biliary dilatation.   IR plans to perform biliary drain check/possible conversion to internal biliary drain on Monday, 7/15/2024 given minimal output from current external biliary drain.      -Continue Zosyn  -Follow-up blood culture  -Drain management per IR-appreciate assistance  -Analgesics as needed  -N.p.o. from  "midnight  -  -Monitor LFT    # Hypertension  -Blood pressure is controlled; continue to hold home Losartan for now    #Nicotine dependence  -patch and also uses gum for cravings, she is trying to quit smoking    #Asthma  -Albuterol PRN    #Migraines  -ok for home Esgic PRN    #Obesity  -BMI 43 noted      Diet: Regular Diet Adult  NPO per Anesthesia Guidelines for Procedure/Surgery Except for: Meds    DVT Prophylaxis: Pneumatic Compression Devices  Portillo Catheter: Not present  Lines: None     Cardiac Monitoring: None  Code Status: Full Code      Clinically Significant Risk Factors              # Hypoalbuminemia: Lowest albumin = 3.4 g/dL at 7/12/2024  2:27 AM, will monitor as appropriate    # Coagulation Defect: INR = 1.27 (Ref range: 0.85 - 1.15) and/or PTT = N/A, will monitor for bleeding    # Hypertension: Noted on problem list                # Severe Obesity: Estimated body mass index is 43.6 kg/m  as calculated from the following:    Height as of 7/10/24: 1.626 m (5' 4\").    Weight as of 7/10/24: 115.2 kg (254 lb)., PRESENT ON ADMISSION     # Asthma: noted on problem list        Disposition Plan     Medically Ready for Discharge: Anticipated in 2-4 Days             Conor Muñoz MD  Hospitalist Service  [facility]  Securely message with [employer] (more info)  Text page via [employer] Paging/Directory   ______________________________________________________________________    Interval History   No new complaints today and no acute events overnight.  She states pain is relieved with oxycodone and ibuprofen however, oxycodone wears off early. She states she has been constipated for the past 4 days. She she has been ambulating on the floor and has been using the SCD.  She states she declined enoxaparin because she is allergic to pork     Physical Exam   Vital Signs: Temp: 97.7  F (36.5  C) Temp src: Oral BP: 129/76 Pulse: 81   Resp: 18 SpO2: 98 % O2 Device: Nasal cannula Oxygen Delivery: 1 " LPM  Weight: 0 lbs 0 oz    General appearance: Obese, deconditioned, awake, Alert, Cooperative, not in any obvious distress and appears stated age   HEENT: Normocephalic, atraumatic, conjunctiva clear without icterus and ears without discharge  Lungs: Clear to auscultation bilaterally, no wheezing, good air exchange, normal work of breathing  Cardiovascular: Regular Rate and Rythm, normal apical impulse, normal S1 and S2, no lower extremity edema bilaterally  Abdomen: Soft, with right upper quadrant tenderness and non-distended, KLARISSA drain in the right upper quadrant draining serosanguineous fluid.  Skin: Skin color, texture normal and bruising or bleeding. No rashes or lesions over face, neck, arms and legs, turgor normal.  Musculoskeletal: No bony deformities or joint tenderness. Normal ROM upon flexion & extension.   Neurologic: Alert & Oriented X 3, Facial symmetry preserved and upper & lower extremities moving well with symmetry  Psychiatric: Calm, normal eye contact and normal affect      Medical Decision Making       50 MINUTES SPENT BY ME on the date of service doing chart review, history, exam, documentation & further activities per the note.      Data         Imaging results reviewed over the past 24 hrs:   No results found for this or any previous visit (from the past 24 hour(s)).

## 2024-07-15 ENCOUNTER — APPOINTMENT (OUTPATIENT)
Dept: PHYSICAL THERAPY | Facility: HOSPITAL | Age: 49
DRG: 871 | End: 2024-07-15
Attending: INTERNAL MEDICINE
Payer: COMMERCIAL

## 2024-07-15 ENCOUNTER — APPOINTMENT (OUTPATIENT)
Dept: INTERVENTIONAL RADIOLOGY/VASCULAR | Facility: HOSPITAL | Age: 49
DRG: 871 | End: 2024-07-15
Attending: NURSE PRACTITIONER
Payer: COMMERCIAL

## 2024-07-15 PROCEDURE — 250N000011 HC RX IP 250 OP 636: Performed by: PHYSICIAN ASSISTANT

## 2024-07-15 PROCEDURE — 250N000013 HC RX MED GY IP 250 OP 250 PS 637: Performed by: HOSPITALIST

## 2024-07-15 PROCEDURE — C1729 CATH, DRAINAGE: HCPCS

## 2024-07-15 PROCEDURE — C1769 GUIDE WIRE: HCPCS

## 2024-07-15 PROCEDURE — 272N000566 HC SHEATH CR3

## 2024-07-15 PROCEDURE — 272N000116 HC CATH CR1

## 2024-07-15 PROCEDURE — 97530 THERAPEUTIC ACTIVITIES: CPT | Mod: GP

## 2024-07-15 PROCEDURE — 272N000121 HC CATH CR6

## 2024-07-15 PROCEDURE — 250N000009 HC RX 250: Performed by: HOSPITALIST

## 2024-07-15 PROCEDURE — 250N000011 HC RX IP 250 OP 636: Performed by: HOSPITALIST

## 2024-07-15 PROCEDURE — 255N000002 HC RX 255 OP 636: Performed by: RADIOLOGY

## 2024-07-15 PROCEDURE — 250N000013 HC RX MED GY IP 250 OP 250 PS 637: Performed by: INTERNAL MEDICINE

## 2024-07-15 PROCEDURE — 99152 MOD SED SAME PHYS/QHP 5/>YRS: CPT

## 2024-07-15 PROCEDURE — 99233 SBSQ HOSP IP/OBS HIGH 50: CPT | Performed by: INTERNAL MEDICINE

## 2024-07-15 PROCEDURE — 0F9630Z DRAINAGE OF LEFT HEPATIC DUCT WITH DRAINAGE DEVICE, PERCUTANEOUS APPROACH: ICD-10-PCS | Performed by: RADIOLOGY

## 2024-07-15 PROCEDURE — 120N000001 HC R&B MED SURG/OB

## 2024-07-15 PROCEDURE — 97116 GAIT TRAINING THERAPY: CPT | Mod: GP

## 2024-07-15 RX ORDER — FLUMAZENIL 0.1 MG/ML
0.2 INJECTION, SOLUTION INTRAVENOUS
Status: DISCONTINUED | OUTPATIENT
Start: 2024-07-15 | End: 2024-07-15

## 2024-07-15 RX ORDER — NALOXONE HYDROCHLORIDE 0.4 MG/ML
0.4 INJECTION, SOLUTION INTRAMUSCULAR; INTRAVENOUS; SUBCUTANEOUS
Status: DISCONTINUED | OUTPATIENT
Start: 2024-07-15 | End: 2024-07-15

## 2024-07-15 RX ORDER — AMOXICILLIN 250 MG
1 CAPSULE ORAL AT BEDTIME
Status: DISCONTINUED | OUTPATIENT
Start: 2024-07-15 | End: 2024-07-16 | Stop reason: HOSPADM

## 2024-07-15 RX ORDER — NALOXONE HYDROCHLORIDE 0.4 MG/ML
0.2 INJECTION, SOLUTION INTRAMUSCULAR; INTRAVENOUS; SUBCUTANEOUS
Status: DISCONTINUED | OUTPATIENT
Start: 2024-07-15 | End: 2024-07-15

## 2024-07-15 RX ORDER — FENTANYL CITRATE 50 UG/ML
25-50 INJECTION, SOLUTION INTRAMUSCULAR; INTRAVENOUS EVERY 5 MIN PRN
Status: DISCONTINUED | OUTPATIENT
Start: 2024-07-15 | End: 2024-07-15

## 2024-07-15 RX ORDER — ONDANSETRON 2 MG/ML
4 INJECTION INTRAMUSCULAR; INTRAVENOUS
Status: COMPLETED | OUTPATIENT
Start: 2024-07-15 | End: 2024-07-15

## 2024-07-15 RX ADMIN — FENTANYL CITRATE 50 MCG: 50 INJECTION, SOLUTION INTRAMUSCULAR; INTRAVENOUS at 14:20

## 2024-07-15 RX ADMIN — ACETAMINOPHEN 650 MG: 325 TABLET ORAL at 21:22

## 2024-07-15 RX ADMIN — IBUPROFEN 400 MG: 400 TABLET, FILM COATED ORAL at 08:42

## 2024-07-15 RX ADMIN — LIDOCAINE HYDROCHLORIDE 15 ML: 10 INJECTION, SOLUTION EPIDURAL; INFILTRATION; INTRACAUDAL; PERINEURAL at 13:59

## 2024-07-15 RX ADMIN — MIDAZOLAM HYDROCHLORIDE 1 MG: 1 INJECTION, SOLUTION INTRAMUSCULAR; INTRAVENOUS at 14:18

## 2024-07-15 RX ADMIN — FENTANYL CITRATE 50 MCG: 50 INJECTION, SOLUTION INTRAMUSCULAR; INTRAVENOUS at 13:52

## 2024-07-15 RX ADMIN — GABAPENTIN 100 MG: 100 CAPSULE ORAL at 20:10

## 2024-07-15 RX ADMIN — OXYCODONE HYDROCHLORIDE 10 MG: 5 TABLET ORAL at 15:44

## 2024-07-15 RX ADMIN — MIDAZOLAM HYDROCHLORIDE 2 MG: 1 INJECTION, SOLUTION INTRAMUSCULAR; INTRAVENOUS at 14:02

## 2024-07-15 RX ADMIN — Medication 1 TABLET: at 08:20

## 2024-07-15 RX ADMIN — FENTANYL CITRATE 50 MCG: 50 INJECTION, SOLUTION INTRAMUSCULAR; INTRAVENOUS at 14:13

## 2024-07-15 RX ADMIN — OXYCODONE HYDROCHLORIDE 10 MG: 5 TABLET ORAL at 05:27

## 2024-07-15 RX ADMIN — GABAPENTIN 100 MG: 100 CAPSULE ORAL at 08:20

## 2024-07-15 RX ADMIN — ONDANSETRON 4 MG: 2 INJECTION INTRAMUSCULAR; INTRAVENOUS at 13:48

## 2024-07-15 RX ADMIN — PROCHLORPERAZINE EDISYLATE 10 MG: 5 INJECTION INTRAMUSCULAR; INTRAVENOUS at 17:54

## 2024-07-15 RX ADMIN — OXYCODONE HYDROCHLORIDE 10 MG: 5 TABLET ORAL at 21:22

## 2024-07-15 RX ADMIN — OXYCODONE HYDROCHLORIDE 10 MG: 5 TABLET ORAL at 09:54

## 2024-07-15 RX ADMIN — POLYETHYLENE GLYCOL 3350 17 G: 17 POWDER, FOR SOLUTION ORAL at 17:43

## 2024-07-15 RX ADMIN — FENTANYL CITRATE 50 MCG: 50 INJECTION, SOLUTION INTRAMUSCULAR; INTRAVENOUS at 13:57

## 2024-07-15 RX ADMIN — FENTANYL CITRATE 50 MCG: 50 INJECTION, SOLUTION INTRAMUSCULAR; INTRAVENOUS at 14:04

## 2024-07-15 RX ADMIN — FENTANYL CITRATE 50 MCG: 50 INJECTION, SOLUTION INTRAMUSCULAR; INTRAVENOUS at 14:32

## 2024-07-15 RX ADMIN — MIDAZOLAM HYDROCHLORIDE 1 MG: 1 INJECTION, SOLUTION INTRAMUSCULAR; INTRAVENOUS at 14:30

## 2024-07-15 RX ADMIN — PANTOPRAZOLE SODIUM 40 MG: 40 TABLET, DELAYED RELEASE ORAL at 08:12

## 2024-07-15 RX ADMIN — ONDANSETRON 4 MG: 2 INJECTION INTRAMUSCULAR; INTRAVENOUS at 20:36

## 2024-07-15 RX ADMIN — PIPERACILLIN AND TAZOBACTAM 3.38 G: 3; .375 INJECTION, POWDER, FOR SOLUTION INTRAVENOUS at 15:45

## 2024-07-15 RX ADMIN — IOHEXOL 20 ML: 350 INJECTION, SOLUTION INTRAVENOUS at 14:44

## 2024-07-15 RX ADMIN — MIDAZOLAM HYDROCHLORIDE 1 MG: 1 INJECTION, SOLUTION INTRAMUSCULAR; INTRAVENOUS at 13:50

## 2024-07-15 RX ADMIN — PIPERACILLIN AND TAZOBACTAM 3.38 G: 3; .375 INJECTION, POWDER, FOR SOLUTION INTRAVENOUS at 05:25

## 2024-07-15 RX ADMIN — NICOTINE 1 PATCH: 21 PATCH, EXTENDED RELEASE TRANSDERMAL at 08:21

## 2024-07-15 RX ADMIN — SENNOSIDES AND DOCUSATE SODIUM 1 TABLET: 50; 8.6 TABLET ORAL at 20:10

## 2024-07-15 RX ADMIN — MIDAZOLAM HYDROCHLORIDE 1 MG: 1 INJECTION, SOLUTION INTRAMUSCULAR; INTRAVENOUS at 13:55

## 2024-07-15 RX ADMIN — FLUTICASONE PROPIONATE 2 SPRAY: 50 SPRAY, METERED NASAL at 08:42

## 2024-07-15 ASSESSMENT — ACTIVITIES OF DAILY LIVING (ADL)
ADLS_ACUITY_SCORE: 22
ADLS_ACUITY_SCORE: 24
ADLS_ACUITY_SCORE: 22
ADLS_ACUITY_SCORE: 22
ADLS_ACUITY_SCORE: 25
ADLS_ACUITY_SCORE: 25
ADLS_ACUITY_SCORE: 22
ADLS_ACUITY_SCORE: 24
ADLS_ACUITY_SCORE: 24
ADLS_ACUITY_SCORE: 22
ADLS_ACUITY_SCORE: 22
ADLS_ACUITY_SCORE: 25
ADLS_ACUITY_SCORE: 22
ADLS_ACUITY_SCORE: 24
ADLS_ACUITY_SCORE: 24
ADLS_ACUITY_SCORE: 25
ADLS_ACUITY_SCORE: 22
ADLS_ACUITY_SCORE: 24
ADLS_ACUITY_SCORE: 25
ADLS_ACUITY_SCORE: 22
ADLS_ACUITY_SCORE: 24
ADLS_ACUITY_SCORE: 22
ADLS_ACUITY_SCORE: 24

## 2024-07-15 NOTE — PROCEDURES
[facility]    Procedure: IR Procedure Note    Date/Time: 7/15/2024 2:44 PM    Performed by: Homer Melton MD  Authorized by: Homer Melton MD      UNIVERSAL PROTOCOL   Site Marked: NA  Prior Images Obtained and Reviewed:  Yes  Required items: Required blood products, implants, devices and special equipment available    Patient identity confirmed:  Verbally with patient, arm band, provided demographic data and hospital-assigned identification number  Patient was reevaluated immediately before administering moderate or deep sedation or anesthesia  Confirmation Checklist:  Patient's identity using two indicators, relevant allergies, procedure was appropriate and matched the consent or emergent situation and correct equipment/implants were available  Time out: Immediately prior to the procedure a time out was called    Universal Protocol: the Joint Commission Universal Protocol was followed    Preparation: Patient was prepped and draped in usual sterile fashion       ANESTHESIA    Anesthesia:  Local infiltration  Local Anesthetic:  Lidocaine 1% without epinephrine      SEDATION  Patient Sedated: Yes    Sedation Type:  Moderate (conscious) sedation  Vital signs: Vital signs monitored during sedation    See dictated procedure note for full details.  Findings: Tolerated well    Specimens: none    Complications: None    Condition: Stable    Plan: Biliary tube to gravity drainage.    Patient can return to IR later this week pending clinical course to evaluate the drain and determine feasibility of capping for internal drainage.  Further f/up w/ [facility]/GI      PROCEDURE  Describe Procedure: Existing drain injected with contrast, showing dislodgement from liver and NO ductal opacification.    US was then used to eval to liver, and new US guided access of a left lateral segmental duct was established.  Cholangiogram confirms stricture and the HJ.  No right ductal opacification  appreciated.  ? Double-barrel HJ??    Stricture was sucessfully crossed, and an 8Fr internal external drain was placed.    Drain secured and connected to gravity bag. No immediate complications encountered.  Patient Tolerance:  Patient tolerated the procedure well with no immediate complications  Length of time physician/provider present for 1:1 monitoring during sedation: 45

## 2024-07-15 NOTE — PLAN OF CARE
Problem: Adult Inpatient Plan of Care  Goal: Optimal Comfort and Wellbeing  Outcome: Progressing  Intervention: Monitor Pain and Promote Comfort  Recent Flowsheet Documentation  Taken 7/15/2024 0954 by Roxanne Mckeon RN  Pain Management Interventions: medication (see MAR)  Taken 7/15/2024 0942 by Roxanne Mckeon RN  Pain Management Interventions: emotional support  Taken 7/15/2024 0921 by Roxanne Mckeon RN  Pain Management Interventions: emotional support  Taken 7/15/2024 0842 by Roxanne Mckeon RN  Pain Management Interventions: medication (see MAR)     Problem: Surgery Nonspecified  Goal: Effective Oxygenation and Ventilation  Outcome: Progressing  Intervention: Optimize Oxygenation and Ventilation  Recent Flowsheet Documentation  Taken 7/15/2024 0830 by Roxanne Mckeon RN  Head of Bed (HOB) Positioning: HOB at 20-30 degrees   Goal Outcome Evaluation:         A/Ox4. VSS on 1/2L O2 NC with O2 sats in mid to high 90s prior to going to IR procedure. C/o 7/10 pain to R abdomen, PRN oxy given x1 and PRN ibuprofen given x1. Up ind. NPO ex meds. R PIV SL.     Patient left unit to IR procedure at 1330 for biliary drain placement and came back at 1500. New biliary drain placed in mid abdomen area and gauze and transparent dressing place over old biliary site on R abdomen. Bedside report/handoff with Caden in IR. Now placed on 2L O2 NC. VSS ex HTN.     Report given to oncoming RN.

## 2024-07-15 NOTE — PROGRESS NOTES
[facility]    PROGRESS NOTE - Hospitalist Service    ASSESSMENT AND PLAN     Active Problems:    Tobacco use disorder    Mild intermittent asthma without complication    Primary hypertension    Ascending cholangitis (H28)    Biliary stricture (H28)    Elza Greer is a 49 year old female admitted on 7/12/2024. She has history of HTN, IBS, cholecystectomy in the remote past complicated by bile leak requiring surgical repair with hepaticojejunostomy, multiple prior episodes of E coli bacteremia attributed to biliary source, known biliary stricture with plan for outpatient complex IR/GI intervention, here for recurrent RUQ pain and fevers, found to have ascending cholangitis.       #Ascending cholangitis  #Biliary stricture  # Recurrent bacteremia  Sepsis   -2 prior episodes of bacteremia with pan-sensitive E coli attributed to biliary source, Oct 2023 ([place]) and March 2024 ([place]), records reviewed  -MRCP showed worse biliary dilatation compared to previous, per GI her anatomy not amenable to ERCP, would need to be done by IR. Patient transferred to [facility] to have this done.  -She underwent left external biliary drain placement on 7/12/2024 for cholangitis in the setting of left hepatic biliary dilatation.   IR planning to attempt external to internal/external biliary tube conversion 7/15   Continue IV Zosyn which was initiated on admission.  Blood culture with NGTD     Constipation  Scheduled Senokot and MiraLAX.  As needed Dulcolax and milk of magnesia      # Hypertension  -Blood pressure is controlled; continue to hold home Losartan for now- resume as appropriate      #Nicotine dependence  -patch and also uses gum for cravings, she is trying to quit smoking     #Asthma  -Albuterol PRN     #Migraines  -ok for home Esgic PRN     #Obesity  -BMI 43 noted    Barriers to discharge: IR intervention, IV antibiotics, improvement in infection.    Anticipated length of stay: 2-3 days     Medically  "Ready for Discharge: Anticipated in 2-4 Days    Clinically Significant Risk Factors              # Hypoalbuminemia: Lowest albumin = 3.4 g/dL at 7/12/2024  2:27 AM, will monitor as appropriate     # Hypertension: Noted on problem list              # Severe Obesity: Estimated body mass index is 43.6 kg/m  as calculated from the following:    Height as of 7/10/24: 1.626 m (5' 4\").    Weight as of 7/10/24: 115.2 kg (254 lb)., PRESENT ON ADMISSION     # Asthma: noted on problem list            Subjective:  Patient resting comfortably in bed.  Notes pain is present but is manageable after receiving pain medications.  Minimal nausea today.  No other complaints.    PHYSICAL EXAM  Temp:  [97.7  F (36.5  C)-98.5  F (36.9  C)] 98.2  F (36.8  C)  Pulse:  [81-87] 81  Resp:  [18] 18  BP: (117-129)/(68-76) 122/68  SpO2:  [90 %-98 %] 93 %  Wt Readings from Last 1 Encounters:   07/10/24 115.2 kg (254 lb)       Intake/Output Summary (Last 24 hours) at 7/15/2024 1302  Last data filed at 7/15/2024 1238  Gross per 24 hour   Intake 483 ml   Output 10 ml   Net 473 ml      There is no height or weight on file to calculate BMI.    GENRL: Alert and answering questions appropriately. Not in acute distress. Lying in bed   CHEST: Breathing easily   ABDMN: Drain noted   EXTRM: No pedal edema  NEURO: Moving extremities   PSYCH: Normal affect and mood.   INTGM: No skin rash    Medical Decision Making       55 MINUTES SPENT BY ME on the date of service doing chart review, history, exam, documentation & further activities per the note.      PERTINENT LABS/IMAGING:  Results for orders placed or performed during the hospital encounter of 07/12/24   IR Biliary Drain Placement    Impression    IMPRESSION:    1. Moderate left biliary dilatation with no visualized flow into the common bile duct or right biliary system.  2. Unable to successfully obtain central biliary access. An 8 Israeli external biliary drainage catheter was ultimately placed which " "controls the left biliary system well.  3. Once the left biliary system has decompressed appropriately, repeat attempt at central access can be performed.     Most Recent 3 CBC's:  Recent Labs   Lab Test 07/13/24  0756 07/12/24  0227 07/10/24  1643   WBC 10.6 14.5* 17.7*   HGB 9.5* 10.5* 13.1   MCV 96 96 95    196 217     Most Recent 3 BMP's:  Recent Labs   Lab Test 07/13/24  0756 07/12/24  0227 07/10/24  1643    138 139   POTASSIUM 3.9 3.8 4.0   CHLORIDE 104 103 100   CO2 27 25 28   BUN 4.5* 5.6* 8.4   CR 0.66 0.76 0.77   ANIONGAP 10 10 11   DEBBIE 8.5* 8.6 9.5   * 102* 123*     Most Recent 2 LFT's:  Recent Labs   Lab Test 07/12/24  0227 07/10/24  1643   AST 18 31   ALT 26 36   ALKPHOS 160* 212*   BILITOTAL 0.6 0.4       Recent Labs   Lab Test 10/31/22  0747   CHOL 150   HDL 30*   LDL 72   TRIG 241*     Recent Labs   Lab Test 10/31/22  0747 02/03/20  0941   LDL 72 111*     Recent Labs   Lab Test 07/13/24  0756      POTASSIUM 3.9   CHLORIDE 104   CO2 27   *   BUN 4.5*   CR 0.66   GFRESTIMATED >90   DEBBIE 8.5*     Recent Labs   Lab Test 12/27/23  1149   A1C 5.3     Recent Labs   Lab Test 07/13/24  0756 07/12/24  0227 07/10/24  1643   HGB 9.5* 10.5* 13.1     No results for input(s): \"TROPONINI\" in the last 15077 hours.  Recent Labs   Lab Test 07/10/24  1643 09/18/23  1502   NTBNPI 41  --    NTBNP  --  56     Recent Labs   Lab Test 09/05/23  2347   TSH 2.62     Recent Labs   Lab Test 07/12/24  0800 09/01/22  1618   INR 1.27* 1.06       Linette Shaffer, DO  Hospitalist Service  [facility]      "

## 2024-07-15 NOTE — PRE-PROCEDURE
GENERAL PRE-PROCEDURE:   Procedure:  Biliary tube check/conversion.  Date/Time:  7/15/2024 10:47 AM    Written consent obtained?: Yes    Risks and benefits: Risks, benefits and alternatives were discussed    Consent given by:  Patient  Patient states understanding of procedure being performed: Yes    Patient's understanding of procedure matches consent: Yes    Procedure consent matches procedure scheduled: Yes    Expected level of sedation:  Moderate  Appropriately NPO:  Yes  ASA Class:  3  Mallampati  :  Grade 2- soft palate, base of uvula, tonsillar pillars, and portion of posterior pharyngeal wall visible  Lungs:  Lungs clear with good breath sounds bilaterally  Heart:  Normal heart sounds and rate  History & Physical reviewed:  History and physical reviewed and no updates needed  Statement of review:  I have reviewed the lab findings, diagnostic data, medications, and the plan for sedation

## 2024-07-15 NOTE — PLAN OF CARE
Problem: Adult Inpatient Plan of Care  Goal: Optimal Comfort and Wellbeing  Intervention: Monitor Pain and Promote Comfort  Recent Flowsheet Documentation  Taken 7/15/2024 0033 by Vanesa Schaefer, RN  Pain Management Interventions:   distraction   emotional support   Goal Outcome Evaluation:  Pt is alert and oriented and able to make needs known.  PRN oxy given for abdominal pain 7/10. Biliary drain intact, no output noted. Getting zosyn. Pt short of breath when up to the bathroom. Tried to wean o2  but pt does not want oxygen to go below 94% or she gets a headache. Pt continues to be on 1L she was 89-90% on room air.  Pt has been resting inbetween cares, uneventful shift. NPO for procedure today.  Vanesa Schaefer, RN

## 2024-07-15 NOTE — PROGRESS NOTES
Interventional Radiology - Progress Note  Inpatient - [facility]: Interventional Radiology   [phone]  7/15/2024    S:  Feeling ok. Reporting some pain at her drain site - this has improved from Friday, but is persistent and worsens with certain movements. Denies fevers and chills. Denies leaking from drain exit site.     O:  /68 (BP Location: Left arm)   Pulse 81   Temp 98.2  F (36.8  C) (Oral)   Resp 18   LMP 08/26/2005   SpO2 93%   General: Pleasant woman seen up in room. Appears stable, in no acute distress.   Neuro: Alert, oriented, speech clear. Appropriately interactive.   Respiratory: Normal respirations on O2 at 1L via NC..  Breathing comfortably. Unlabored. Lungs clear to auscultation bilaterally.  Cardiac: S1S2, regular rate and rhythm., Without murmur, clicks or rubs., and Warm and well perfused.  Abdomen: Soft. Distended, mildly. RUQ tender with palpation.  Skin: Warm and dry.  Drains(s)/Tube(s):   - Biliary Drain (External): RUQ drain to gravity drainage with scant bloody output. Dressing clean, dry, and intact. Stitch present. No leaking appreciated from drain exit site. Drain site tender mild with palpation.     IMAGING:  Results for orders placed or performed during the hospital encounter of 07/12/24   IR Biliary Drain Placement    Narrative    [facility]  LOCATION: [facility]  DATE: 7/12/2024    PROCEDURE:  1. IMAGE GUIDED PLACEMENT OF EXTERNAL LEFT BILIARY DRAINAGE CATHETER  2. ANTEGRADE CHOLANGIOGRAM  3. MODERATE SEDATION    INTERVENTIONAL RADIOLOGIST: Rei Guallpa MD    INDICATION: Patient is a 49-year-old female with a complex past medical history of cholecystectomy leading to a biliary leak requiring choledochojejunostomy. The patient has a history of left hepatic biliary dilation which has slightly worsened over time   and presents with signs/symptoms of cholangitis. The patient presents for left hepatic biliary drain  placement.    CONSENT: The risks, benefits and alternatives of percutaneous left hepatic Biliary Drain Placement were discussed with the patient  in detail. All questions were answered. Informed consent was given to proceed with the procedure.    MODERATE SEDATION: Versed 4 mg IV; Fentanyl 200 mcg IV. During the time out, immediately prior to the administration of medications, the patient was reassessed for adequacy to receive conscious sedation.  Under physician supervision, Versed and fentanyl   were administered for moderate sedation. Pulse oximetry, heart rate and blood pressure were continuously monitored by an independent trained observer. The physician spent 40 minutes of face-to-face sedation time with the patient.    CONTRAST: 20 cc of Omnipaque 350  ANTIBIOTICS: None.  ADDITIONAL MEDICATIONS: None.    FLUOROSCOPIC TIME: 24.2 minutes.  RADIATION DOSE: Air Kerma: 3353 mGy.    COMPLICATIONS: No immediate complications.    STERILE BARRIER TECHNIQUE: Maximum sterile barrier technique was used. Cutaneous antisepsis was performed at the operative site with application of 2% chlorhexidine and large sterile drape. Prior to the procedure, the  and assistant performed   hand hygiene and wore hat, mask, sterile gown, and sterile gloves during the entire procedure.    PROCEDURE:   Informed consent was obtained prior to the procedure. A timeout was performed. After giving local anesthesia, a 22 gauge Chiba needle was advanced from a subcostal  site under ultrasound guidance oriented towards a upper left hepatic bile duct. Contrast   was injected as it was retracted. And upper left bile duct was opacified. A 0.018 inch wire was positioned within the bile duct. Over this wire, a 3 Grenadian dilator was positioned within the bile duct. An antegrade cholangiogram was then performed which   is 3 Grenadian dilator.    A peripheral, inferior  left bile duct was identified. Additional local anesthesia was administered and a  second 22 gauge Chiba needle advanced from an adjacent site along the right mid axillary line in the subcostal space.This entered the peripheral   right duct and a 0.018 inch wire was advanced into the biliary system. The needle was then exchanged for a AccuStick dilator system.     The dilator was readvanced into the biliary system. Additional contrast injection confirmed the moderate left intrahepatic biliary dilatation. No flow into the common bile duct or right hepatic system was visualized.    The dilator was then exchanged over a  0.035inch Amplatz  wire for a 6 St Helenian vascular sheath. Through this sheath, a 5 [place] Helenian Kumpe catheter with a 0.035 inch stiff Glidewire was utilized to make several attempts to gain central biliary access.This was   not successful    The sheath was then removed over a 0.035 inch M+ wire. Over this wire, an 8 [place] Helenian external hepatobiliary drain was then placed.    The catheter injected and aspirated easily. It was secured to the skin with a 2-0 nylon stitch and a sterile dressing applied. The patient appeared to tolerate the procedure well.    FINDINGS:      Impression    IMPRESSION:    1. Moderate left biliary dilatation with no visualized flow into the common bile duct or right biliary system.  2. Unable to successfully obtain central biliary access. An 8 [place] Helenian external biliary drainage catheter was ultimately placed which controls the left biliary system well.  3. Once the left biliary system has decompressed appropriately, repeat attempt at central access can be performed.       LABS:  CBC  Recent Labs   Lab 07/13/24  0756 07/12/24  0227 07/10/24  1643   WBC 10.6 14.5* 17.7*   RBC 3.07* 3.47* 4.15   HGB 9.5* 10.5* 13.1   HCT 29.6* 33.3* 39.4   MCV 96 96 95   MCH 30.9 30.3 31.6   MCHC 32.1 31.5 33.2   RDW 13.5 13.8 13.2    196 217     CMP  Recent Labs   Lab 07/13/24  0756 07/12/24  0227 07/10/24  1643    138 139   POTASSIUM 3.9 3.8 4.0   CHLORIDE 104 103 100   CO2 27  "25 28   ANIONGAP 10 10 11   * 102* 123*   BUN 4.5* 5.6* 8.4   CR 0.66 0.76 0.77   GFRESTIMATED >90 >90 >90   DEBBIE 8.5* 8.6 9.5   PROTTOTAL  --  6.6 7.9   ALBUMIN  --  3.4* 4.2   BILITOTAL  --  0.6 0.4   ALKPHOS  --  160* 212*   AST  --  18 31   ALT  --  26 36     INR  Recent Labs   Lab 07/12/24  0800   INR 1.27*     DRAIN(S):  Output by Drain (mL) 07/13/24 0700 - 07/13/24 1459 07/13/24 1500 - 07/13/24 2259 07/13/24 2300 - 07/14/24 0659 07/14/24 0700 - 07/14/24 1459 07/14/24 1500 - 07/14/24 2259 07/14/24 2300 - 07/15/24 0659 07/15/24 0700 - 07/15/24 1032   Closed/Suction Drain 1 Midline RUQ Other (Comment) 8 Turkmen 20 10 0   10      Cultures:  BCx NGTD  Antibiotics: Zosyn  Flushing: 10mL NS daily recommended.      Pre-Sedation Assessment:  Mallampati Airway Classification:  II - Faucial pillars and soft palate may be seen, but uvula is masked by the base of the tongue  Previous reaction to anesthesia/sedation:  No  Sedation plan based on assessment: Moderate (conscious) sedation  ASA Classification: Class 2 - MILD SYSTEMIC DISEASE, NO ACUTE PROBLEMS, NO FUNCTIONAL LIMITATIONS.   Code Status: Full Code       A:  Elza Greer is a 49 year old woman with a PMH of RA, COPD, HTN, PCOS, tobacco use, laparoscopic cholecystectomy (2000) c/b bile duct injury s/p hepaticojejunal eleazar-en-y, known biliary stricture, and recurrent E. Coli sepsis d/t biliary source who was admitted to [facility] after transfer from [facility] on 07/11/2024 with recurrent RUQ pain and fevers. She has a history of recurrent cholangitis. Recent MRCP demonstrated \"increased intrahepatic biliary ductal dilatation, predominantly in the left hepatic lobe\", per below. She had previous plans for biliary drain placement at [facility] on 08/12, followed by a GI/IR combination procedure at a later date. Unfortunately, there were no beds available at [facility] for transfer this admission. IR consulted for biliary drain placement at [facility].     S/p external biliary drain " "placement on 7/12/24. Per IR note: \"Unable to gain central access due to severe stenosis/obstruction at the left main hepatic duct with adjacent biliary dilation. Successfully placed a 8 Fr external hepatic drain for decompression. Once dilation resolves, a repeat attempt can be made.\"    Continued minimal OP from drain.    P:    - Recommend attempting external to internal/external biliary tube conversion today.  - Procedural education reviewed with patient/family in detail including, but not limited to risks, benefits and alternatives with understanding verbalized by patient/family.  - Discussed with Dr. Melton.  -Continue to follow WBC count and cultures.   -Antibiotics and pain/symptom management per primary team.   -Outpatient biliary drain cares and IR follow-up pending attempted conversion to int/ext drain.   -IR will continue to follow.     Total time spent on the date of the encounter: 45 minutes.    Brittany Newell PA-C  Interventional Radiology      "

## 2024-07-15 NOTE — PLAN OF CARE
Problem: Surgery Nonspecified  Goal: Effective Bowel Elimination  Intervention: Enhance Bowel Motility and Elimination  Recent Flowsheet Documentation  Taken 7/14/2024 2300 by Bobo Man, RN  Bowel Motility Enhancement:   fluid intake encouraged   ambulation promoted  Bowel Elimination Management: (miralax and senna) other (see comments)     Problem: Surgery Nonspecified  Goal: Optimal Pain Control and Function  Intervention: Prevent or Manage Pain  Recent Flowsheet Documentation  Taken 7/14/2024 2303 by Bobo Man, RN  Pain Management Interventions: medication (see MAR)  Taken 7/14/2024 1546 by Bobo Man, RN  Pain Management Interventions:   distraction   emotional support   Goal Outcome Evaluation:       Pt c/o nausea this shift x 2 requesting antiemetics. Encourage walking and fluids to promote BM. Pt requested miralax and senna at bed time and walked x1 this shift with . Pt did not request any pain meds until bedtime this shift. Pt then rated pain at a 7-8 and took 10mg of oxycodone and tylenol for abdominal pain at insertion site.

## 2024-07-15 NOTE — IR NOTE
Patient Name: Elza Greer  Medical Record Number: [id number]  Today's Date: 7/15/2024    Procedure: biliary drain conversion  Proceduralist: Dr Melton    Procedure Start: 1400  Procedure end: 1438  Sedation medications administered: 6 mg midazolam and 300 mcg fentanyl   Sedation time: 38 minutes    Report given to: floor RN

## 2024-07-16 ENCOUNTER — APPOINTMENT (OUTPATIENT)
Dept: PHYSICAL THERAPY | Facility: HOSPITAL | Age: 49
DRG: 871 | End: 2024-07-16
Attending: INTERNAL MEDICINE
Payer: COMMERCIAL

## 2024-07-16 VITALS
TEMPERATURE: 97.6 F | SYSTOLIC BLOOD PRESSURE: 139 MMHG | OXYGEN SATURATION: 95 % | HEART RATE: 84 BPM | DIASTOLIC BLOOD PRESSURE: 93 MMHG | RESPIRATION RATE: 18 BRPM

## 2024-07-16 LAB
ALBUMIN SERPL BCG-MCNC: 3 G/DL (ref 3.5–5.2)
ALP SERPL-CCNC: 358 U/L (ref 40–150)
ALT SERPL W P-5'-P-CCNC: 112 U/L (ref 0–50)
AST SERPL W P-5'-P-CCNC: 137 U/L (ref 0–45)
BILIRUB DIRECT SERPL-MCNC: 1.13 MG/DL (ref 0–0.3)
BILIRUB SERPL-MCNC: 1.5 MG/DL
CREAT SERPL-MCNC: 0.64 MG/DL (ref 0.51–0.95)
EGFRCR SERPLBLD CKD-EPI 2021: >90 ML/MIN/1.73M2
ERYTHROCYTE [DISTWIDTH] IN BLOOD BY AUTOMATED COUNT: 12.8 % (ref 10–15)
HCT VFR BLD AUTO: 30.9 % (ref 35–47)
HGB BLD-MCNC: 10.1 G/DL (ref 11.7–15.7)
MCH RBC QN AUTO: 31 PG (ref 26.5–33)
MCHC RBC AUTO-ENTMCNC: 32.7 G/DL (ref 31.5–36.5)
MCV RBC AUTO: 95 FL (ref 78–100)
PLATELET # BLD AUTO: 193 10E3/UL (ref 150–450)
PLATELET # BLD AUTO: 193 10E3/UL (ref 150–450)
PROT SERPL-MCNC: 6.2 G/DL (ref 6.4–8.3)
RBC # BLD AUTO: 3.26 10E6/UL (ref 3.8–5.2)
WBC # BLD AUTO: 6.4 10E3/UL (ref 4–11)

## 2024-07-16 PROCEDURE — 85027 COMPLETE CBC AUTOMATED: CPT

## 2024-07-16 PROCEDURE — 97116 GAIT TRAINING THERAPY: CPT | Mod: GP

## 2024-07-16 PROCEDURE — 85049 AUTOMATED PLATELET COUNT: CPT | Performed by: INTERNAL MEDICINE

## 2024-07-16 PROCEDURE — 80076 HEPATIC FUNCTION PANEL: CPT

## 2024-07-16 PROCEDURE — 250N000013 HC RX MED GY IP 250 OP 250 PS 637: Performed by: HOSPITALIST

## 2024-07-16 PROCEDURE — 99239 HOSP IP/OBS DSCHRG MGMT >30: CPT | Performed by: INTERNAL MEDICINE

## 2024-07-16 PROCEDURE — 82565 ASSAY OF CREATININE: CPT | Performed by: INTERNAL MEDICINE

## 2024-07-16 PROCEDURE — 36415 COLL VENOUS BLD VENIPUNCTURE: CPT | Performed by: INTERNAL MEDICINE

## 2024-07-16 PROCEDURE — 250N000013 HC RX MED GY IP 250 OP 250 PS 637: Performed by: INTERNAL MEDICINE

## 2024-07-16 PROCEDURE — 250N000011 HC RX IP 250 OP 636: Performed by: HOSPITALIST

## 2024-07-16 RX ORDER — LOSARTAN POTASSIUM 25 MG/1
25 TABLET ORAL DAILY
Status: SHIPPED
Start: 2024-07-16 | End: 2024-08-21

## 2024-07-16 RX ORDER — OXYCODONE HYDROCHLORIDE 10 MG/1
10 TABLET ORAL EVERY 4 HOURS PRN
Qty: 13 TABLET | Refills: 0 | Status: SHIPPED | OUTPATIENT
Start: 2024-07-16 | End: 2024-09-20

## 2024-07-16 RX ORDER — BACLOFEN 5 MG/1
5 TABLET ORAL 3 TIMES DAILY
Status: DISCONTINUED | OUTPATIENT
Start: 2024-07-16 | End: 2024-07-16 | Stop reason: HOSPADM

## 2024-07-16 RX ORDER — BACLOFEN 5 MG/1
5 TABLET ORAL 3 TIMES DAILY
Qty: 13 TABLET | Refills: 0 | Status: SHIPPED | OUTPATIENT
Start: 2024-07-16 | End: 2024-08-12

## 2024-07-16 RX ADMIN — PANTOPRAZOLE SODIUM 40 MG: 40 TABLET, DELAYED RELEASE ORAL at 08:23

## 2024-07-16 RX ADMIN — BACLOFEN 5 MG: 5 TABLET ORAL at 14:00

## 2024-07-16 RX ADMIN — OXYCODONE HYDROCHLORIDE 10 MG: 5 TABLET ORAL at 04:55

## 2024-07-16 RX ADMIN — BACLOFEN 5 MG: 5 TABLET ORAL at 11:09

## 2024-07-16 RX ADMIN — ONDANSETRON 4 MG: 2 INJECTION INTRAMUSCULAR; INTRAVENOUS at 08:42

## 2024-07-16 RX ADMIN — NICOTINE 1 PATCH: 21 PATCH, EXTENDED RELEASE TRANSDERMAL at 08:24

## 2024-07-16 RX ADMIN — FLUTICASONE PROPIONATE 2 SPRAY: 50 SPRAY, METERED NASAL at 08:23

## 2024-07-16 RX ADMIN — OXYCODONE HYDROCHLORIDE 10 MG: 5 TABLET ORAL at 14:00

## 2024-07-16 RX ADMIN — PIPERACILLIN AND TAZOBACTAM 3.38 G: 3; .375 INJECTION, POWDER, FOR SOLUTION INTRAVENOUS at 00:15

## 2024-07-16 RX ADMIN — IBUPROFEN 400 MG: 400 TABLET, FILM COATED ORAL at 00:13

## 2024-07-16 RX ADMIN — ACETAMINOPHEN 650 MG: 325 TABLET ORAL at 04:56

## 2024-07-16 RX ADMIN — PIPERACILLIN AND TAZOBACTAM 3.38 G: 3; .375 INJECTION, POWDER, FOR SOLUTION INTRAVENOUS at 08:22

## 2024-07-16 RX ADMIN — NICOTINE POLACRILEX 2 MG: 2 GUM, CHEWING BUCCAL at 08:43

## 2024-07-16 RX ADMIN — ACETAMINOPHEN 650 MG: 325 TABLET ORAL at 14:00

## 2024-07-16 RX ADMIN — Medication 1 TABLET: at 08:23

## 2024-07-16 RX ADMIN — GABAPENTIN 100 MG: 100 CAPSULE ORAL at 08:23

## 2024-07-16 ASSESSMENT — ACTIVITIES OF DAILY LIVING (ADL)
ADLS_ACUITY_SCORE: 25

## 2024-07-16 NOTE — DISCHARGE SUMMARY
"[facility]  Hospitalist Discharge Summary      Date of Admission:  7/12/2024  Date of Discharge:  7/16/2024  Discharging Provider: Linette Shaffer DO  Discharge Service: Hospitalist Service    Discharge Diagnoses   Ascending cholangitis  Biliary stricture  Recurrent E. coli bacteremia  Sepsis  Constipation    Clinically Significant Risk Factors     # Severe Obesity: Estimated body mass index is 43.6 kg/m  as calculated from the following:    Height as of 7/10/24: 1.626 m (5' 4\").    Weight as of 7/10/24: 115.2 kg (254 lb).       Follow-ups Needed After Discharge   Follow-up Appointments     Follow-up and recommended labs and tests       Follow up with primary care provider, Di Shea, within 3-4 days .      Follow-up with IR [facility] for further management regarding   biliary drain            Unresulted Labs Ordered in the Past 30 Days of this Admission       Date and Time Order Name Status Description    7/12/2024  2:00 AM Blood Culture Peripheral Blood Preliminary     7/12/2024  2:00 AM Blood Culture Arm, Left Preliminary         These results will be followed up by   Discharge Disposition   Discharged to home  Condition at discharge: Stable    Hospital Course   Patient is a 49-year-old female who follows at the [facility] for recurrent ascending cholangitis and E. coli bacteremia caused by biliary source.  Admitted to the hospital with ascending cholangitis and E. coli bacteremia.  Attempt was made to transfer patient to the [place] but was unsuccessful.  IR at [facility] placed a biliary drain.  Biliary drain was replaced on 7/15 with good output.  She received 7 days of Zosyn while in the hospital with resolution of her sepsis.  She was noted to have elevated LFTs and mildly elevated bilirubin at time of discharge.  Patient will follow-up closely with her interventional radiology team at [facility].  She mentioned she would " call them to set up follow-up appointment.  Patient discharged with drain in place and set to draining to gravity.  Stable at time of discharge.    Consultations This Hospital Stay   INTERVENTIONAL RADIOLOGY ADULT/PEDS IP CONSULT  PHYSICAL THERAPY ADULT IP CONSULT  OCCUPATIONAL THERAPY ADULT IP CONSULT  CARE MANAGEMENT / SOCIAL WORK IP CONSULT    Code Status   Full Code    Time Spent on this Encounter   I, Linette Shaffer DO, personally saw the patient today and spent greater than 30 minutes discharging this patient.       Linette Shaffer DO  [address] [address]  Phone: [phone]  Fax: [phone]  ______________________________________________________________________    Physical Exam   Vital Signs: Temp: 97.9  F (36.6  C) Temp src: Oral BP: 113/70 Pulse: 75   Resp: 17 SpO2: 93 % O2 Device: Nasal cannula Oxygen Delivery: 1/2 LPM  Weight: 0 lbs 0 oz         Primary Care Physician   Di Shea    Discharge Orders      Reason for your hospital stay    E Coli bacteremia related to ascending cholangitis     Follow-up and recommended labs and tests     Follow up with primary care provider, Di Shea, within 3-4 days .    Follow-up with [facility] for further management regarding biliary drain     Activity    Your activity upon discharge: activity as tolerated     Home Oxygen Order for DME - ONLY FOR DME     Diet    Follow this diet upon discharge: Orders Placed This Encounter      Regular Diet Adult       Significant Results and Procedures   Most Recent 3 CBC's:  Recent Labs   Lab Test 07/16/24  0558 07/13/24  0756 07/12/24  0227   WBC 6.4 10.6 14.5*   HGB 10.1* 9.5* 10.5*   MCV 95 96 96     193 174 196     Most Recent 3 BMP's:  Recent Labs   Lab Test 07/16/24  0558 07/13/24  0756 07/12/24  0227 07/10/24  1643   NA  --  141 138 139   POTASSIUM  --  3.9 3.8 4.0   CHLORIDE  --  104 103 100   CO2  --  27 25  28   BUN  --  4.5* 5.6* 8.4   CR 0.64 0.66 0.76 0.77   ANIONGAP  --  10 10 11   DEBBIE  --  8.5* 8.6 9.5   GLC  --  107* 102* 123*     Most Recent 2 LFT's:  Recent Labs   Lab Test 07/16/24  0558 07/12/24  0227   * 18   * 26   ALKPHOS 358* 160*   BILITOTAL 1.5* 0.6   ,   Results for orders placed or performed during the hospital encounter of 07/12/24   IR Biliary Drain Placement    Narrative    [facility]  LOCATION: [facility]  DATE: 7/12/2024    PROCEDURE:  1. IMAGE GUIDED PLACEMENT OF EXTERNAL LEFT BILIARY DRAINAGE CATHETER  2. ANTEGRADE CHOLANGIOGRAM  3. MODERATE SEDATION    INTERVENTIONAL RADIOLOGIST: Rei Guallpa MD    INDICATION: Patient is a 49-year-old female with a complex past medical history of cholecystectomy leading to a biliary leak requiring choledochojejunostomy. The patient has a history of left hepatic biliary dilation which has slightly worsened over time   and presents with signs/symptoms of cholangitis. The patient presents for left hepatic biliary drain placement.    CONSENT: The risks, benefits and alternatives of percutaneous left hepatic Biliary Drain Placement were discussed with the patient  in detail. All questions were answered. Informed consent was given to proceed with the procedure.    MODERATE SEDATION: Versed 4 mg IV; Fentanyl 200 mcg IV. During the time out, immediately prior to the administration of medications, the patient was reassessed for adequacy to receive conscious sedation.  Under physician supervision, Versed and fentanyl   were administered for moderate sedation. Pulse oximetry, heart rate and blood pressure were continuously monitored by an independent trained observer. The physician spent 40 minutes of face-to-face sedation time with the patient.    CONTRAST: 20 cc of Omnipaque 350  ANTIBIOTICS: None.  ADDITIONAL MEDICATIONS: None.    FLUOROSCOPIC TIME: 24.2 minutes.  RADIATION DOSE: Air Kerma: 3353 mGy.    COMPLICATIONS: No  immediate complications.    STERILE BARRIER TECHNIQUE: Maximum sterile barrier technique was used. Cutaneous antisepsis was performed at the operative site with application of 2% chlorhexidine and large sterile drape. Prior to the procedure, the  and assistant performed   hand hygiene and wore hat, mask, sterile gown, and sterile gloves during the entire procedure.    PROCEDURE:   Informed consent was obtained prior to the procedure. A timeout was performed. After giving local anesthesia, a 22 gauge Chiba needle was advanced from a subcostal  site under ultrasound guidance oriented towards a upper left hepatic bile duct. Contrast   was injected as it was retracted. And upper left bile duct was opacified. A 0.018 inch wire was positioned within the bile duct. Over this wire, a 3 Trinidadian dilator was positioned within the bile duct. An antegrade cholangiogram was then performed which   is 3 Trinidadian dilator.    A peripheral, inferior  left bile duct was identified. Additional local anesthesia was administered and a second 22 gauge Chiba needle advanced from an adjacent site along the right mid axillary line in the subcostal space.This entered the peripheral   right duct and a 0.018 inch wire was advanced into the biliary system. The needle was then exchanged for a AccuStick dilator system.     The dilator was readvanced into the biliary system. Additional contrast injection confirmed the moderate left intrahepatic biliary dilatation. No flow into the common bile duct or right hepatic system was visualized.    The dilator was then exchanged over a  0.035inch Amplatz  wire for a 6 Trinidadian vascular sheath. Through this sheath, a 5 Trinidadian Kumpe catheter with a 0.035 inch stiff Glidewire was utilized to make several attempts to gain central biliary access.This was   not successful    The sheath was then removed over a 0.035 inch M+ wire. Over this wire, an 8 Trinidadian external hepatobiliary drain was then placed.    The  catheter injected and aspirated easily. It was secured to the skin with a 2-0 nylon stitch and a sterile dressing applied. The patient appeared to tolerate the procedure well.    FINDINGS:      Impression    IMPRESSION:    1. Moderate left biliary dilatation with no visualized flow into the common bile duct or right biliary system.  2. Unable to successfully obtain central biliary access. An 8 Latvian external biliary drainage catheter was ultimately placed which controls the left biliary system well.  3. Once the left biliary system has decompressed appropriately, repeat attempt at central access can be performed.   IR Biliary Drain Conversion to Int/Ext    Narrative    [facility]  LOCATION: [facility]  DATE: July 15, 2024    PROCEDURE:    1.  Cholangiogram through existing biliary drainage catheter.  2.  New ultrasound-guided percutaneous access of a left lateral segmental peripheral duct with percutaneous transhepatic cholangiogram.  3.  Wire and catheter manipulation across a distal left biliary enteric anastomotic stricture.  4.  Left internal-external biliary drainage catheter placement.  5.  Completion cholangiogram.    INTERVENTIONAL RADIOLOGIST: Homer Melton MD    INDICATION: Patient is a 49-year-old female with a complex past medical history of cholecystectomy leading to a biliary leak requiring choledochojejunostomy. The patient has a history of left hepatic biliary dilation which has slightly worsened over time   and she presented recently with signs/symptoms of cholangitis. On July 12, 2024, the patient underwent placement of a left external biliary drain. Access to the jejunum was not successful. She returns today for planned internalization.    CONSENT: The risks, benefits and alternatives of left biliary drainage catheter manipulation and exchange/repositioning were discussed with the patient  in detail. All questions were answered. Informed consent was given to  proceed with the procedure.    MODERATE SEDATION: Divided doses intravenous Versed and fentanyl were administered for moderate sedation. During the time out, immediately prior to the administration of medications, the patient was reassessed for adequacy to receive conscious sedation.    Under physician supervision, Versed and fentanyl were administered for moderate sedation. Pulse oximetry, heart rate and blood pressure were continuously monitored by an independent trained observer. The physician spent 38 minutes of face-to-face   sedation time with the patient.    CONTRAST: See EMR  ANTIBIOTICS: See EMR  ADDITIONAL MEDICATIONS: None.    FLUOROSCOPIC TIME: 8.4 minutes.  RADIATION DOSE: Air Kerma: 532 mGy.    COMPLICATIONS: No immediate complications.    STERILE BARRIER TECHNIQUE: Maximum sterile barrier technique was used. Cutaneous antisepsis was performed at the operative site with application of 2% chlorhexidine and large sterile drape. Prior to the procedure, the  and assistant performed   hand hygiene and wore hat, mask, sterile gown, and sterile gloves during the entire procedure.    PROCEDURE:     Patient was brought to interventional radiology, and placed in the supine position on the imaging table. The upper abdomen and indwelling biliary drainage catheter were prepped and draped in the usual sterile fashion. A  image was obtained. Then,   contrast was instilled through the existing drain. There is no ductal opacification appreciated. The catheter appears to have retracted entirely from the liver parenchyma. Therefore, it was removed. Ultrasound was then used to evaluate the left hepatic   lobe. Ductal dilation is appreciated. An appropriate peripheral anteriorly directed duct in the left lateral segments was identified. 1% lidocaine was infiltrated for local anesthesia. Then, under real-time ultrasound guidance, a 21-gauge Chiba needle   was advanced into the target duct. Percutaneous  transhepatic cholangiogram was performed, confirming left hepatic ductal dilation and stricture at the biliary enteric anastomosis. A Nitrex wire was advanced through the needle. A 4 Canadian coaxial   AccuStick sheath was placed. Through the sheath, a 4 Canadian glide catheter and Glidewire were used to negotiate the stricture. Access was obtained into the small bowel, as confirmed with contrast injection. The Glidewire was exchanged for an Amplatz   wire. Over the Amplatz wire, an 8 Canadian internal-external biliary drain was successfully placed. The distal aspect of the catheter extends into the small bowel. Proximal sideholes were positioned so as to drain the accessed peripheral left hepatic duct.   Completion cholangiogram was performed.    The drain was then secured with a Prolene suture and connected to gravity drainage.    No immediate complications were encountered.    FINDINGS:    The existing biliary drain is entirely dislodged from the liver/biliary tree, and consequently, it was removed.    Ultrasound images during new biliary access demonstrate the needle appropriately positioned within a inferiorly/anteriorly directed peripheral duct in the left lateral segments. Ultrasound images have been stored for permanent documentation.    Percutaneous transhepatic cholangiogram confirms needle position within an appropriate peripheral duct in the left lateral segments. There is left hepatic ductal dilation, with stricture at the level of the biliary enteric anastomosis. No opacification   of the right hepatic ducts is appreciated.    The stricture was successfully negotiated, with access achieved into the small bowel. An 8 Canadian internal-external biliary drain was placed, as above. Final images show the drain in good position.      Impression    IMPRESSION:    Dislodgment of the existing biliary drain.    New left hepatic access was successfully established, as above, with successful placement of an 8 Canadian  internal-external drain.    Cholangiogram images confirm a stricture at the biliary enteric anastomotic level. Of note, there was no opacification of the right hepatic ducts appreciated, raising consideration of double barrel hepaticojejunostomy. Correlation with surgical history   is advised.    PLAN: Biliary drain to gravity drainage bag. The patient can return to interventional radiology later this week pending clinical course for cholangiogram and consideration of drain capping for internal drainage. Definitive follow-up with [facility] GI as   planned.         Discharge Medications   Current Discharge Medication List        START taking these medications    Details   amoxicillin-clavulanate (AUGMENTIN) 875-125 MG tablet Take 1 tablet by mouth 2 times daily for 1 dose  Qty: 1 tablet, Refills: 0    Associated Diagnoses: Ascending cholangitis (H28)      Baclofen (LIORESAL) 5 MG tablet Take 1 tablet (5 mg) by mouth 3 times daily  Qty: 13 tablet, Refills: 0    Associated Diagnoses: Ascending cholangitis (H28)      oxyCODONE (ROXICODONE) 10 MG tablet Take 1 tablet (10 mg) by mouth every 4 hours as needed for severe pain (IF pain not managed with non-pharmacological and non-opioid interventions)  Qty: 13 tablet, Refills: 0    Associated Diagnoses: Ascending cholangitis (H28)           CONTINUE these medications which have CHANGED    Details   losartan (COZAAR) 25 MG tablet Take 1 tablet (25 mg) by mouth daily    Comments: Please hold on discharge and follow up with primary physician to determine if this med needs to be continued  Associated Diagnoses: Benign essential hypertension           CONTINUE these medications which have NOT CHANGED    Details   albuterol (PROVENTIL) (2.5 MG/3ML) 0.083% neb solution Take 1 vial (2.5 mg) by nebulization every 6 hours as needed for shortness of breath, wheezing or cough  Qty: 90 mL, Refills: 0      butalbital-acetaminophen-caffeine (ESGIC) -40 MG tablet TAKE 1 TABLET EVERY 6 HOURS  AS NEEDED FOR HEADACHE      clobetasol propionate (CLOBEX) 0.05 % external shampoo Leave on scalp for 15 minutes then rinse. Use 1-2 times weekly.  Qty: 118 mL, Refills: 5    Associated Diagnoses: Psoriasis      Fluocinolone Acetonide Scalp 0.01 % OIL oil Daily as needed for scalp psoriasis  Qty: 118.28 mL, Refills: 0    Associated Diagnoses: Scalp psoriasis      fluticasone (FLONASE) 50 MCG/ACT nasal spray Spray 2 sprays into both nostrils daily  Qty: 16 g, Refills: 0    Associated Diagnoses: Nasal congestion      gabapentin (NEURONTIN) 100 MG capsule Take 100 mg by mouth 2 times daily      Multiple Vitamins-Minerals (MULTIVITAMIN WOMEN PO) Take 2 chew tab by mouth daily gummy      nicotine (NICODERM CQ) 21 MG/24HR 24 hr patch Place 1 patch onto the skin every 24 hours      nicotine (NICORETTE) 2 MG gum Place 2 mg inside cheek every hour as needed for nicotine withdrawal symptoms      nystatin (MYCOSTATIN) 190449 UNIT/GM external cream Apply topically 2 times daily as needed for dry skin      ondansetron (ZOFRAN) 4 MG tablet Take 4 mg by mouth every 8 hours as needed for vomiting or nausea      VENTOLIN  (90 Base) MCG/ACT inhaler INHALE 1 TO 2 PUFFS BY MOUTH EVERY 4 HOURS AS NEEDED FOR SHORTNESS OF BREATH, WHEEZING OR COUGH  Qty: 18 g, Refills: 0    Comments: Pharmacy may dispense brand covered by insurance (Proair, or proventil or ventolin or generic albuterol inhaler)  Associated Diagnoses: Acute cough      vitamin D3 (CHOLECALCIFEROL) 1.25 MG (30416 UT) capsule Take 1 capsule (50,000 Units) by mouth every 7 days  Qty: 12 capsule, Refills: 1    Associated Diagnoses: Vitamin D deficiency           Allergies   Allergies   Allergen Reactions    Azithromycin Anaphylaxis    Latex Hives

## 2024-07-16 NOTE — PROGRESS NOTES
Oxygen Documentation  I certify that this patient, Elza Greer has been under my care (or a nurse practitioner or physican's assistant working with me). This is the face-to-face encounter for oxygen medical necessity.      At the time of this encounter, I have reviewed the qualifying testing and have determined that supplemental oxygen is reasonable and necessary and is expected to improve the patient's condition in a home setting.         Patient has continued oxygen desaturation due to COPD J44.9.    If portability is ordered, is the patient mobile within the home? yes    Was this visit performed as a telehealth visit: No

## 2024-07-16 NOTE — PLAN OF CARE
Problem: Surgery Nonspecified  Goal: Optimal Pain Control and Function  Outcome: Progressing  Intervention: Prevent or Manage Pain  Recent Flowsheet Documentation  Taken 7/16/2024 1400 by Zollinger, Nancy K, RN  Pain Management Interventions: medication (see MAR)  Taken 7/16/2024 0823 by Zollinger, Nancy K, RN  Pain Management Interventions: medication (see MAR)   Goal Outcome Evaluation:  Patient complaining of abdominal pain.  Prn tylenol and oxycodone helpful for pain relief.  Biliary drain intact.  Home oxygen assessment done.  Oxygen will be delivered between 3617-6278.   Plan to discharge to home this evening.

## 2024-07-16 NOTE — PLAN OF CARE
Problem: Surgery Nonspecified  Goal: Absence of Bleeding  Outcome: Progressing     Problem: Pain Acute  Goal: Optimal Pain Control and Function  Outcome: Progressing  Intervention: Prevent or Manage Pain  Recent Flowsheet Documentation  Taken 7/16/2024 0015 by Shilpa Maciel RN  Medication Review/Management: medications reviewed   Goal Outcome Evaluation:       Given ibubrofen, oxycodone+tylenol for moderate to severe abdominal pain with relief. Pt had a medium BM in this shift. Biliary drain intact with scant output.

## 2024-07-16 NOTE — PROGRESS NOTES
"  Interventional Radiology - Brief Note  7/16/2024    IR following for biliary drain. Please see note from NASH RIOS CNP on 07/12/2024 and Brittany Newell PA-C, on 07/15/2024 for details regarding patient history.    EXTERNAL biliary drain originally placed 07/12 for ascending cholangitis, biliary stricture, and recurrent bacteremia. Drain then became dislodged, and patient underwent INTERNAL/EXTERNAL biliary drain placement with IR on 07/15. Per dictation: \"Biliary drain to gravity drainage bag. The patient can return to interventional radiology later this week pending clinical course for cholangiogram and consideration of drain capping for internal drainage. Definitive follow-up with [facility] GI as planned.\"    Discussed next steps and follow up plan with Dr. Shaffer with Medicine team and Dr. Friedman. CBC and LFTs checked today - WBC normalized but bilirubin increased to 1.5, up from 0.6 on 07/12. Patient is established with [facility] and has a planned GI/IR procedure in the near future.    Recommend continuing gravity drainage WITHOUT capping trial at this time, as bilirubin remains elevated. Patient to follow up with [facility] for definitive management.    Total time: 20 minutes    NASH RIOS CNP  Interventional Radiology  "

## 2024-07-16 NOTE — PLAN OF CARE
Patient has been assessed for Home Oxygen needs.     Pulse oximetry (SpO2) and Oxygen flow readings:    SpO2 = 92% on room air at rest while awake.    SpO2 improved to  % on   liters/minute at rest.    SpO2 = 86% on room air during activity/with exercise.    *SpO2 improved to 92% on 1 liters/minute during activity/with exercise.

## 2024-07-16 NOTE — PLAN OF CARE
Goal Outcome Evaluation:      Plan of Care Reviewed With: patient, spouse    Overall Patient Progress: improving     Patient alert and oriented x4, up with assist of one since coming back from IR procedure due to feeling drowsy. New biliary drain is draining well, flushed per orders-having tea colored/bloody output. Gave 10 mg of oxycodone x2 and prn tylenol during shift for pain around drain site that was effective in making pain more tolerable. Some nausea so prn compazine was given that was effective but then ended up needing zofran a couple hours later. No bowel movement since 7/10-scheduled miralax and senna-s given per orders. Currently weaned down to 1 L oxygen from 2. Will continue to monitor.     Tesha Shirley RN 7/15/2024 9:47 PM

## 2024-07-16 NOTE — PLAN OF CARE
"Goal Outcome Evaluation:      Problem: Adult Inpatient Plan of Care  Goal: Plan of Care Review  Description: The Plan of Care Review/Shift note should be completed every shift.  The Outcome Evaluation is a brief statement about your assessment that the patient is improving, declining, or no change.  This information will be displayed automatically on your shift  note.  Outcome: Met  Goal: Patient-Specific Goal (Individualized)  Description: You can add care plan individualizations to a care plan. Examples of Individualization might be:  \"Parent requests to be called daily at 9am for status\", \"I have a hard time hearing out of my right ear\", or \"Do not touch me to wake me up as it startles  me\".  Outcome: Met  Goal: Absence of Hospital-Acquired Illness or Injury  Outcome: Met  Goal: Optimal Comfort and Wellbeing  Outcome: Met  Goal: Readiness for Transition of Care  Outcome: Met     Problem: Surgery Nonspecified  Goal: Absence of Bleeding  Outcome: Met  Goal: Effective Bowel Elimination  Outcome: Met  Goal: Fluid and Electrolyte Balance  Outcome: Met  Goal: Absence of Infection Signs and Symptoms  Outcome: Met  Goal: Anesthesia/Sedation Recovery  Outcome: Met  Goal: Optimal Pain Control and Function  Outcome: Met  Goal: Nausea and Vomiting Relief  Outcome: Met  Goal: Effective Oxygenation and Ventilation  Outcome: Met     Problem: Pain Acute  Goal: Optimal Pain Control and Function  Outcome: Met     Problem: Infection  Goal: Absence of Infection Signs and Symptoms  Outcome: Met     Patient deemed ready for discharge per MD. Discharge instructions gone over with patient and daughter. Questions answered. Flushing, emptying, and dressing change demonstrated for patient with no further questions from family. Patient and daughter expressed that they feel comfortable with all that they need to do. PIV removed. O2 delivered and with patient. Medications given to patient with the exception of saline flushes which patient " will  there when leaving as it would be another hour until that delivery. Patient belongings with patient.     Esthela Ernst RN

## 2024-07-17 ENCOUNTER — PATIENT OUTREACH (OUTPATIENT)
Dept: CARE COORDINATION | Facility: CLINIC | Age: 49
End: 2024-07-17
Payer: COMMERCIAL

## 2024-07-17 ENCOUNTER — TELEPHONE (OUTPATIENT)
Dept: SURGERY | Facility: CLINIC | Age: 49
End: 2024-07-17
Payer: COMMERCIAL

## 2024-07-17 LAB
BACTERIA BLD CULT: NO GROWTH
BACTERIA BLD CULT: NO GROWTH

## 2024-07-17 NOTE — TELEPHONE ENCOUNTER
Spoke to Amadou RANKIN and notified Gerry GONCALVES the patient would like to cancel surgery for 8/12 as had to have it done emergently at [facility].     Spoke with Elza who states is going to set up follow up with [facility], has had 2 procedures and is recommended to have a 3rd.     Routed update to Sukhdev Pink for any further recommendations.

## 2024-07-17 NOTE — TELEPHONE ENCOUNTER
General Call      Reason for Call:  appt on 7/19 mattie/Anuradha    What are your questions or concerns:  Patient had an emergency surgery & is unable to drive. Would Dr Ling be able see her by video    Date of last appointment with provider: 4/19/24    Could we [facility] this information to you [facility] or would you prefer to receive a phone call?:   Patient would prefer a phone call   Okay to leave a detailed message?: Yes at Cell number on file:    Telephone Information:   Mobile [phone]

## 2024-07-17 NOTE — PROGRESS NOTES
"    Connected Care Resource [place]: Transitions of Care Outreach  Chief Complaint   Patient presents with    Clinic Care Coordination - Post Hospital       Most Recent Admission Date: 7/12/2024   Most Recent Admission Diagnosis:      Most Recent Discharge Date: 7/16/2024   Most Recent Discharge Diagnosis: Benign essential hypertension - I10  Ascending cholangitis (H28) - K83.09     Transitions of Care Assessment    Discharge Assessment  How are you doing now that you are home?: \" I am doing ok \"  How are your symptoms? (Red Flag symptoms escalate to triage hotline per guidelines): Improved  Do you know how to contact your clinic care team if you have future questions or changes to your health status? : Yes  Does the patient have their discharge instructions? : Yes  Does the patient have questions regarding their discharge instructions? : No  Were you started on any new medications or were there changes to any of your previous medications? : Yes  Does the patient have all of their medications?: Yes  Do you have questions regarding any of your medications? : No  Do you have all of your needed medical supplies or equipment (DME)?  (i.e. oxygen tank, CPAP, cane, etc.): Yes    Post-op ([facility] CTA Only)  If the patient had a surgery or procedure, do they have any questions for a nurse?: No         CCRC Explained and offered Care Coordination support to eligible patients: Yes    Patient accepted? No    Follow up Plan     Discharge Follow-Up  Discharge follow up appointment scheduled in alignment with recommended follow up timeframe or Transitions of Risk Category? (Low = within 30 days; Moderate= within 14 days; High= within 7 days): No    Future Appointments   Date Time Provider Department Center   7/19/2024 10:00 AM [facility]   7/22/2024  1:30 PM Alicia Tierney RD MD[facility]   10/9/2024  4:30 PM [facility]   10/10/2024  8:00 PM BED 2 SH SLEEP [facility] [place] [facility]   11/14/2024  2:00 PM " Renea Renae APRN CNP [facility]       Outpatient Plan as outlined on AVS reviewed with patient.    For any urgent concerns, please contact our 24 hour nurse triage line: [phone] ([phone])       JANET Frazier

## 2024-07-18 ENCOUNTER — MYC MEDICAL ADVICE (OUTPATIENT)
Dept: INTERVENTIONAL RADIOLOGY/VASCULAR | Facility: CLINIC | Age: 49
End: 2024-07-18
Payer: COMMERCIAL

## 2024-07-18 ENCOUNTER — PATIENT OUTREACH (OUTPATIENT)
Dept: GASTROENTEROLOGY | Facility: CLINIC | Age: 49
End: 2024-07-18
Payer: COMMERCIAL

## 2024-07-18 ENCOUNTER — TELEPHONE (OUTPATIENT)
Dept: INTERVENTIONAL RADIOLOGY/VASCULAR | Facility: HOSPITAL | Age: 49
End: 2024-07-18
Payer: COMMERCIAL

## 2024-07-18 ENCOUNTER — PREP FOR PROCEDURE (OUTPATIENT)
Dept: GASTROENTEROLOGY | Facility: CLINIC | Age: 49
End: 2024-07-18
Payer: COMMERCIAL

## 2024-07-18 DIAGNOSIS — K83.1 BILIARY STRICTURE (H): ICD-10-CM

## 2024-07-18 DIAGNOSIS — R11.0 NAUSEA: Primary | ICD-10-CM

## 2024-07-18 DIAGNOSIS — K83.1 BILIARY STRICTURE (H): Primary | ICD-10-CM

## 2024-07-18 DIAGNOSIS — S36.13XS BILE DUCT INJURY, SEQUELA: ICD-10-CM

## 2024-07-18 RX ORDER — ONDANSETRON 4 MG/1
4 TABLET, ORALLY DISINTEGRATING ORAL EVERY 8 HOURS PRN
Qty: 30 TABLET | Refills: 0 | Status: SHIPPED | OUTPATIENT
Start: 2024-07-18

## 2024-07-18 NOTE — TELEPHONE ENCOUNTER
Called Belen, confirmed plan for ERCP with IR 6 weeks. IR will see her tomorrow to review concerns with external biliary drain.   Drain placed 7/12/24    Please assist in scheduling:     Procedure/Imaging/Clinic: ERCP with IR  Physician: Bonnie  Timing: tbd  Scope time needed:provider average  Anesthesia:General  Dx: biliary stricture  Tier:Tier 3 -   Surgeries/procedures that can be delayed  between 30 to 90 days with no significant  morbidity/mortality to patient or impact on  patient/disease outcome.   Location: [facility]  Header of letter for pt communication: Endoscopic Retrograde Cholangiopancreatography (ERCP) with Interventional radiology     Called to discuss plan with patient. She's reporting nausea, will be out of zofran soon, Sukhdev Pink of out office. Per protocol ordered small amount of zofran to get pt thru her provider's return, she will follow up with additional medication needs.    Orders placed for IR

## 2024-07-18 NOTE — TELEPHONE ENCOUNTER
Detailed Voicemail message left with request for patient to contact Interventional Radiology Department and acknowledge understanding of MYCHART instructions that were sent in anticipation of procedure scheduled 7/19/2024.      IR contact number provided in [facility].    Darlin STOCKTON  Interventional Radiology RN   [phone]

## 2024-07-18 NOTE — TELEPHONE ENCOUNTER
INTERVENTIONAL RADIOLOGY    49-year-old female with a complex past medical history pertinent for cholecystectomy c/b biliary leak s/p choledochojejunostomy as well as left hepatic biliary dilation who recently presented with signs/symptoms of cholangitis s/p 8 Tajik external biliary drainage catheter placement on 7/12/2024 s/p 8 Tajik internal-external biliary drainage catheter placement on 7/15.    Today I received a message stating Elza needed to schedule a follow-up with our team post emergent surgery.    After a brief chart review as well as speaking with Elza and her Gastroenterology team, Elza did not undergo emergent surgical intervention. Procedures on 7/12 and 7/15 mentioned above.    Elza reports leaking around her biliary drainage catheter and has numerous questions regarding management.    Reviewed case with Dr. Daniel.    Plan:  1. Recommend maintaining internal-external biliary drainage catheter to gravity drainage without capping trial.  2. Follow-up with IR tomorrow (7/19) at [facility] for site check and post-biliary drainage catheter education.   - Order entered and appointment scheduled. Patient agreeable and aware.  3. Patient to follow-up with her Gastroenterology team, Sukhdev Pink PA-C, regarding ongoing nausea.  4. ERCP with GI team in 6 weeks for possible stent placement.   - To clarify, IR does not perform ERCPs.      NASH Vasques, CNP

## 2024-07-18 NOTE — PROGRESS NOTES
INTERVENTIONAL RADIOLOGY     49-year-old female with a complex past medical history pertinent for cholecystectomy c/b biliary leak s/p choledochojejunostomy as well as left hepatic biliary dilation who recently presented with signs/symptoms of cholangitis s/p 8 Occitan external biliary drainage catheter placement on 7/12/2024 s/p 8 Occitan internal-external biliary drainage catheter placement on 7/15.     Today I received a message stating Elza needed to schedule a follow-up with our team post emergent surgery.     After a brief chart review as well as speaking with Elza and her Gastroenterology team, Elza did not undergo emergent surgical intervention. Procedures on 7/12 and 7/15 mentioned above.     Elza reports leaking around her biliary drainage catheter and has numerous questions regarding management.     Reviewed case with Dr. Daniel.     Plan:  1. Recommend maintaining internal-external biliary drainage catheter to gravity drainage without capping trial.  2. Follow-up with IR tomorrow (7/19) at [facility] for site check and post-biliary drainage catheter education.              - Order entered and appointment scheduled. Patient agreeable and aware.  3. Patient to follow-up with her Gastroenterology team, Sukhdev Pink PA-C, regarding ongoing nausea.  4. ERCP with GI team in 6 weeks for possible stent placement.              - To clarify, IR does not perform ERCPs.        NASH Vasques, CNP

## 2024-07-18 NOTE — TELEPHONE ENCOUNTER
Spoke to Esthela  - KARI from [facility] who states Elza had a internal external biliary drain. Recommended no capping trial -follow up with GI for surgical intervention.   Esthela  states typically this is a 4 week trial.   Esthela states GI team can call with any questions    Writer updated Estella LOVE who will follow up with patient today.

## 2024-07-19 ENCOUNTER — HOSPITAL ENCOUNTER (OUTPATIENT)
Dept: INTERVENTIONAL RADIOLOGY/VASCULAR | Facility: HOSPITAL | Age: 49
Discharge: HOME | End: 2024-07-19
Attending: NURSE PRACTITIONER | Admitting: RADIOLOGY
Payer: COMMERCIAL

## 2024-07-19 ENCOUNTER — TELEPHONE (OUTPATIENT)
Dept: GASTROENTEROLOGY | Facility: CLINIC | Age: 49
End: 2024-07-19

## 2024-07-19 VITALS
RESPIRATION RATE: 18 BRPM | DIASTOLIC BLOOD PRESSURE: 100 MMHG | SYSTOLIC BLOOD PRESSURE: 169 MMHG | HEART RATE: 85 BPM | OXYGEN SATURATION: 95 % | TEMPERATURE: 98.3 F

## 2024-07-19 DIAGNOSIS — K83.09 CHOLANGITIS (H): ICD-10-CM

## 2024-07-19 DIAGNOSIS — K83.1 BILIARY STRICTURE (H): ICD-10-CM

## 2024-07-19 DIAGNOSIS — S36.13XS BILE DUCT INJURY, SEQUELA: ICD-10-CM

## 2024-07-19 PROCEDURE — C1769 GUIDE WIRE: HCPCS

## 2024-07-19 PROCEDURE — 250N000009 HC RX 250: Performed by: PHYSICIAN ASSISTANT

## 2024-07-19 PROCEDURE — 99152 MOD SED SAME PHYS/QHP 5/>YRS: CPT

## 2024-07-19 PROCEDURE — 250N000011 HC RX IP 250 OP 636: Performed by: PHYSICIAN ASSISTANT

## 2024-07-19 PROCEDURE — 272N000116 HC CATH CR1

## 2024-07-19 PROCEDURE — 255N000002 HC RX 255 OP 636: Performed by: RADIOLOGY

## 2024-07-19 PROCEDURE — C1729 CATH, DRAINAGE: HCPCS

## 2024-07-19 RX ORDER — NALOXONE HYDROCHLORIDE 0.4 MG/ML
0.2 INJECTION, SOLUTION INTRAMUSCULAR; INTRAVENOUS; SUBCUTANEOUS
Status: DISCONTINUED | OUTPATIENT
Start: 2024-07-19 | End: 2024-07-20 | Stop reason: HOSPADM

## 2024-07-19 RX ORDER — NALOXONE HYDROCHLORIDE 0.4 MG/ML
0.4 INJECTION, SOLUTION INTRAMUSCULAR; INTRAVENOUS; SUBCUTANEOUS
Status: DISCONTINUED | OUTPATIENT
Start: 2024-07-19 | End: 2024-07-20 | Stop reason: HOSPADM

## 2024-07-19 RX ORDER — LIDOCAINE 40 MG/G
CREAM TOPICAL
Status: DISCONTINUED | OUTPATIENT
Start: 2024-07-19 | End: 2024-07-20 | Stop reason: HOSPADM

## 2024-07-19 RX ORDER — ONDANSETRON 2 MG/ML
4 INJECTION INTRAMUSCULAR; INTRAVENOUS
Status: COMPLETED | OUTPATIENT
Start: 2024-07-19 | End: 2024-07-19

## 2024-07-19 RX ORDER — FLUMAZENIL 0.1 MG/ML
0.2 INJECTION, SOLUTION INTRAVENOUS
Status: DISCONTINUED | OUTPATIENT
Start: 2024-07-19 | End: 2024-07-20 | Stop reason: HOSPADM

## 2024-07-19 RX ORDER — CEFTRIAXONE 1 G/1
1 INJECTION, POWDER, FOR SOLUTION INTRAMUSCULAR; INTRAVENOUS
Status: COMPLETED | OUTPATIENT
Start: 2024-07-19 | End: 2024-07-19

## 2024-07-19 RX ORDER — FENTANYL CITRATE 50 UG/ML
25-50 INJECTION, SOLUTION INTRAMUSCULAR; INTRAVENOUS EVERY 5 MIN PRN
Status: DISCONTINUED | OUTPATIENT
Start: 2024-07-19 | End: 2024-07-20 | Stop reason: HOSPADM

## 2024-07-19 RX ORDER — ONDANSETRON 2 MG/ML
4 INJECTION INTRAMUSCULAR; INTRAVENOUS EVERY 6 HOURS PRN
Status: DISCONTINUED | OUTPATIENT
Start: 2024-07-19 | End: 2024-07-20 | Stop reason: HOSPADM

## 2024-07-19 RX ADMIN — MIDAZOLAM HYDROCHLORIDE 1 MG: 1 INJECTION, SOLUTION INTRAMUSCULAR; INTRAVENOUS at 10:33

## 2024-07-19 RX ADMIN — MIDAZOLAM HYDROCHLORIDE 1 MG: 1 INJECTION, SOLUTION INTRAMUSCULAR; INTRAVENOUS at 10:19

## 2024-07-19 RX ADMIN — LIDOCAINE HYDROCHLORIDE 1 ML: 10 INJECTION, SOLUTION INFILTRATION; PERINEURAL at 10:23

## 2024-07-19 RX ADMIN — IOHEXOL 10 ML: 350 INJECTION, SOLUTION INTRAVENOUS at 10:36

## 2024-07-19 RX ADMIN — FENTANYL CITRATE 50 MCG: 50 INJECTION, SOLUTION INTRAMUSCULAR; INTRAVENOUS at 10:16

## 2024-07-19 RX ADMIN — MIDAZOLAM HYDROCHLORIDE 1 MG: 1 INJECTION, SOLUTION INTRAMUSCULAR; INTRAVENOUS at 10:29

## 2024-07-19 RX ADMIN — ONDANSETRON 4 MG: 2 INJECTION INTRAMUSCULAR; INTRAVENOUS at 10:09

## 2024-07-19 RX ADMIN — FENTANYL CITRATE 50 MCG: 50 INJECTION, SOLUTION INTRAMUSCULAR; INTRAVENOUS at 10:21

## 2024-07-19 RX ADMIN — MIDAZOLAM HYDROCHLORIDE 1 MG: 1 INJECTION, SOLUTION INTRAMUSCULAR; INTRAVENOUS at 10:25

## 2024-07-19 RX ADMIN — FENTANYL CITRATE 50 MCG: 50 INJECTION, SOLUTION INTRAMUSCULAR; INTRAVENOUS at 10:27

## 2024-07-19 RX ADMIN — CEFTRIAXONE SODIUM 1 G: 1 INJECTION, POWDER, FOR SOLUTION INTRAMUSCULAR; INTRAVENOUS at 09:57

## 2024-07-19 RX ADMIN — FENTANYL CITRATE 50 MCG: 50 INJECTION, SOLUTION INTRAMUSCULAR; INTRAVENOUS at 10:31

## 2024-07-19 NOTE — CONSULTS
"    Outpatient IR Referral  07/19/24    Referring Provider: Dr. Nicole  IR Referral Request: \"join Bonnie for ERCP w/ IR 6 weeks from biliary drain placement at [facility] on 6/15/24\"  Recommendations/Plan:  Ok to proceed with combined IR/GI procedure as requested by Dr. Nicole. Ideally this would be coordinated after 8/23. IR scheduling can coordinate with GI/OR schedulers to select appropriate timing of procedure. IR MD should be available to go to the OR to remove IR drain and leave wire/sheath access for GI.     Case and imaging was reviewed with Dr. Calvin Bautista MD.  IR recommendations also relayed Epic messaging.    IR referral converted to procedure order.      Brief History:    Elza Greer is a 49-year-old female with a complex past medical history including anxiety, depression, COPD, HTN, IBS, Migraine, PCOS, and cholecystectomy c/b biliary leak in the remote past requiring choledochojejunostomy as well as left hepatic biliary dilation who recently presented with signs/symptoms of cholangitis to OSH now s/p 8 French external biliary drainage catheter placement on 7/12/2024 and conversion to 8 French internal-external biliary drainage catheter placement on 7/15.     Pertinent Medications:    Current Outpatient Medications   Medication Sig Dispense Refill    albuterol (PROVENTIL) (2.5 MG/3ML) 0.083% neb solution Take 1 vial (2.5 mg) by nebulization every 6 hours as needed for shortness of breath, wheezing or cough 90 mL 0    Baclofen (LIORESAL) 5 MG tablet Take 1 tablet (5 mg) by mouth 3 times daily 13 tablet 0    butalbital-acetaminophen-caffeine (ESGIC) -40 MG tablet TAKE 1 TABLET EVERY 6 HOURS AS NEEDED FOR HEADACHE      clobetasol propionate (CLOBEX) 0.05 % external shampoo Leave on scalp for 15 minutes then rinse. Use 1-2 times weekly. 118 mL 5    Fluocinolone Acetonide Scalp 0.01 % OIL oil Daily as needed for scalp psoriasis 118.28 mL 0    fluticasone (FLONASE) 50 MCG/ACT nasal spray Spray 2 " sprays into both nostrils daily 16 g 0    gabapentin (NEURONTIN) 100 MG capsule Take 100 mg by mouth 2 times daily      losartan (COZAAR) 25 MG tablet Take 1 tablet (25 mg) by mouth daily      Multiple Vitamins-Minerals (MULTIVITAMIN WOMEN PO) Take 2 chew tab by mouth daily gummy      nicotine (NICODERM CQ) 21 MG/24HR 24 hr patch Place 1 patch onto the skin every 24 hours      nicotine (NICORETTE) 2 MG gum Place 2 mg inside cheek every hour as needed for nicotine withdrawal symptoms      nystatin (MYCOSTATIN) 848476 UNIT/GM external cream Apply topically 2 times daily as needed for dry skin      ondansetron (ZOFRAN ODT) 4 MG ODT tab Take 1 tablet (4 mg) by mouth every 8 hours as needed for nausea 30 tablet 0    ondansetron (ZOFRAN) 4 MG tablet Take 4 mg by mouth every 8 hours as needed for vomiting or nausea      oxyCODONE (ROXICODONE) 10 MG tablet Take 1 tablet (10 mg) by mouth every 4 hours as needed for severe pain (IF pain not managed with non-pharmacological and non-opioid interventions) 13 tablet 0    sodium chloride, PF, (NORMAL SALINE FLUSH) 0.9% PF flush Irrigate with 10 mLs as directed every 24 hours for 90 days Flush both biliary drains with 10 mL NS qdaily. 300 mL 2    VENTOLIN  (90 Base) MCG/ACT inhaler INHALE 1 TO 2 PUFFS BY MOUTH EVERY 4 HOURS AS NEEDED FOR SHORTNESS OF BREATH, WHEEZING OR COUGH 18 g 0    vitamin D3 (CHOLECALCIFEROL) 1.25 MG (52867 UT) capsule Take 1 capsule (50,000 Units) by mouth every 7 days 12 capsule 1     No current facility-administered medications for this visit.       Pertinent Imaging Reviewed:      7/12        7/15          Most Recent Labs:  Lab Results   Component Value Date    WBC 6.4 07/16/2024    WBC 12.1 12/08/2020     Lab Results   Component Value Date    RBC 3.26 07/16/2024    RBC 4.06 12/08/2020     Lab Results   Component Value Date    HGB 10.1 07/16/2024    HGB 12.7 12/08/2020     Lab Results   Component Value Date    HCT 30.9 07/16/2024    HCT 38.8  12/08/2020     Lab Results   Component Value Date     07/16/2024     07/16/2024     12/08/2020    Last Comprehensive Metabolic Panel:  Lab Results   Component Value Date     07/13/2024    POTASSIUM 3.9 07/13/2024    CHLORIDE 104 07/13/2024    CO2 27 07/13/2024    ANIONGAP 10 07/13/2024     (H) 07/13/2024    BUN 4.5 (L) 07/13/2024    CR 0.64 07/16/2024    GFRESTIMATED >90 07/16/2024    DEBBIE 8.5 (L) 07/13/2024      INR   Date Value Ref Range Status   07/12/2024 1.27 (H) 0.85 - 1.15 Final          NASH Montero CNP  Interventional Radiology   [phone] (IR RN triage)

## 2024-07-19 NOTE — PROGRESS NOTES
"  Interventional Radiology - Pre-Procedure Note:  [facility]  07/19/2024     Procedure Requested: Biliary tube check  Requested by: SINA Feng CNP    History and Physical Reviewed: H&P documented within 30 days (by Linette Shaffer DO on 7/16/24).  I have personally reviewed the patient's medical history and have updated the medical record as necessary.    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    Recent MRCP 7/1/24 demonstrated \"increased intrahepatic biliary ductal dilatation, predominantly in the left hepatic lobe\". She had previous plans for biliary drain placement at [facility] on 08/12, followed by a GI/IR combination procedure at a later date.    Admitted to [facility] 7/12/24 - 7/16/24 after transfer from [facility] on 7/11/24 with signs/symptoms of cholangitis. S/p external biliary drain placement 7/12/24 with new LEFT internal/external biliary tube placement 7/15/24 after previous tube dislodgement.    Patient presented today for biliary tube site check secondary to concerns of ongoing pain and intermittent leaking at LEFT int/ext biliary tube exit site in addition to concerns for site management of previous right ext biliary tube exit site.     Reporting pain at drain exit site. Reporting intermittent leaking at drain exit site (only needing to change dressing daily). Reporting nausea.       NPO: Midnight.  ANTICOAGULANTS: None.  ANTIBIOTICS: Rocephin ordered for procedure..    ALLERGIES  Allergies   Allergen Reactions    Azithromycin Anaphylaxis    Latex Hives         LABS:  INR   Date Value Ref Range Status   07/12/2024 1.27 (H) 0.85 - 1.15 Final      Hemoglobin   Date Value Ref Range Status   07/16/2024 10.1 (L) 11.7 " - 15.7 g/dL Final   12/08/2020 12.7 11.7 - 15.7 g/dL Final     Platelet Count   Date Value Ref Range Status   07/16/2024 193 150 - 450 10e3/uL Final   07/16/2024 193 150 - 450 10e3/uL Final   12/08/2020 149 (L) 150 - 450 10e9/L Final     Creatinine   Date Value Ref Range Status   07/16/2024 0.64 0.51 - 0.95 mg/dL Final   12/08/2020 0.61 0.52 - 1.04 mg/dL Final     Potassium   Date Value Ref Range Status   07/13/2024 3.9 3.4 - 5.3 mmol/L Final   07/24/2022 3.9 3.4 - 5.3 mmol/L Final   12/08/2020 4.8 3.4 - 5.3 mmol/L Final         EXAM:  BP (!) 171/97   Temp 98.3  F (36.8  C)   Resp 16   LMP 08/26/2005   SpO2 96%   General: Stable. In no acute distress.    Neurologic: Alert and oriented x 3. No focal deficits.  Respiratory: Normal respirations on room air. Lungs clear to auscultation bilaterally.  Cardiac: S1S2, regular rate and rhythm, without murmur, clicks or rubs. Warm and well perfused.  Abdomen: Soft, non-distended, tender at left int/ext biliary tube exit site. Previous right biliary tube exit site - well healed, no leaking, without erythema.  Skin: Warm and dry.  Drains(s)/Tube(s):   - Biliary Drain (Internal/External):  LEFT sided int/ext biliary drain to gravity drainage with bilious output. Dressing small amount of bilious OP on dressing. Stitch present. Drain site severly tender with palpation. Tubing appears intact and patent. Draining well. Flushing port 3 way stop cock in place and appropriately positioned. Drain flushed smoothly with 5mL NS - leaking noted with flushing and significant pain with flushing.      Pre-Sedation Assessment:  Mallampati Airway Classification:  II - Faucial pillars and soft palate may be seen, but uvula is masked by the base of the tongue  Previous reaction to anesthesia/sedation:  No  Sedation plan based on assessment: Moderate (conscious) sedation  ASA Classification: Class 2 - MILD SYSTEMIC DISEASE, NO ACUTE PROBLEMS, NO FUNCTIONAL LIMITATIONS.   Code Status: FULL  CODE        ASSESSMENT/PLAN:   #laparoscopic cholecystectomy (2000) c/b bile duct injury s/p hepaticojejunal eleazar-en-y,   #Known biliary stricture   #Recurrent cholangitis  #LEFT int/ext biliary tube placement 7/15/24; with associated pain and leaking    Biliary Drain (Internal/External) exchange with sedation.     Procedural education reviewed with patient/family in detail including, but not limited to risks, benefits and alternatives with understanding verbalized by patient/family.    Previous biliary tube exit site cares discussed. Questions answered. Band-Aid applied to site. OK for patient to remove later today.    Continue to follow with [facility] GI (Dr. Nicole) and IR for combined IR/GI procedure. Per chart review - goal is to schedule in ~6 weeks (after 8/23/24).    Total time spent on the date of the encounter: 50 minutes.      HELIO FernandezC  Interventional Radiology

## 2024-07-19 NOTE — PROCEDURES
[facility]    Procedure: IR Procedure Note    Date/Time: 7/19/2024 10:36 AM    Performed by: Sukhdev Ruggiero MD  Authorized by: Sukhdev Ruggiero MD      UNIVERSAL PROTOCOL   Site Marked: NA  Prior Images Obtained and Reviewed:  Yes  Required items: Required blood products, implants, devices and special equipment available    Patient identity confirmed:  Verbally with patient, arm band, provided demographic data and hospital-assigned identification number  Patient was reevaluated immediately before administering moderate or deep sedation or anesthesia  Confirmation Checklist:  Patient's identity using two indicators, relevant allergies, procedure was appropriate and matched the consent or emergent situation and correct equipment/implants were available  Time out: Immediately prior to the procedure a time out was called    Universal Protocol: the Joint Commission Universal Protocol was followed    Preparation: Patient was prepped and draped in usual sterile fashion    ESBL (mL):  0     ANESTHESIA    Anesthesia:  Local infiltration  Local Anesthetic:  Lidocaine 1% without epinephrine  Anesthetic Total (mL):  10      SEDATION  Patient Sedated: Yes    Sedation Type:  Moderate (conscious) sedation  Vital signs: Vital signs monitored during sedation    Fluoroscopy Time: 3 minute(s)  See dictated procedure note for full details.  Findings: Successful biliary drain exchange.  OK to discharge in 1 hour.    Specimens: none    Complications: None    Condition: Stable    Plan: Successful biliary drain exchange.  OK to discharge in 1 hour.      PROCEDURE  Describe Procedure: Successful biliary drain exchange.  OK to discharge in 1 hour.  Patient Tolerance:  Patient tolerated the procedure well with no immediate complications  Length of time physician/provider present for 1:1 monitoring during sedation: 15

## 2024-07-19 NOTE — IR NOTE
Patient Name: Elza Greer  Medical Record Number: [id number]  Today's Date: 7/19/2024    Procedure: Bili tube exchange  Proceduralist: Monik    Sedation medications administered: 4 mg midazolam and 200 mcg fentanyl   Sedation time: 15 minutes    .

## 2024-07-19 NOTE — DISCHARGE INSTRUCTIONS
"Biliary Drain Check/Exchange Discharge Instructions:   Please follow the below instructions following your biliary tube check/exchange procedure:    Care instructions after biliary drain procedure:  - If you received sedation for your procedure, do not drive or operate heavy machinery for the rest of the day.  - If you have a gauze dressing, change the dressing around your drainage tube site daily or as needed to keep site clean and dry.   - Empty your bag as needed (daily or when bag is approximately half way full). Follow below instructions for emptying bag:  -Place a container (empty jar or plastic bowl) near the outlet valve of the drainage bag OR position yourself so the drainage from the bag can go directly into the toilet.   -To empty your bag twist the blue valve at the bottom of the bag while holding the valve over the open container.   -Re-twist blue valve at the bottom of the drainage bag to close.  -After draining fluid, recording your drainage output.   -Discard drainage into toilet once fluid drained.  - You may shower, however, you should avoid stagnant water such as tub baths, Jacuzzis and pools. Cover your drain exit site with plastic wrap while showering.  - Inspect the tube often for kinks.  - Keep the bag below the drain exit site to allow for free flow of drainage by gravity.  - Record your daily drain outputs and amount flushed on your drainage record and bring records to your next radiology appointment.   - Flush your drainage tube with 10***mL sterile Normal Saline daily***.   ?     Flushing Your Drainage Tube:   If you have a 3 way stop cock:     1. Collect flushing supplies: 10***mL of sterile normal saline in syringe, alcohol pad.  2. Clean the flushing (center) port with alcohol and attach the flushing syringe by twisting into place (clockwise).  3. Turn the 3 way stopcock valve \"off\" to the drainage bag/bulb. (Valve should be pointed toward the bag/bulb)  4. Gently inject/flush the drain " so fluid is moving towards your body through the drainage catheter.   5. Turn the stopcock valve back to its center position to allow for drainage to resume.   6. Remove flushing syringe from flushing port by untwisting the syringe (counter clockwise).  7. Squeeze bulb or accordion to reapply suction if you have one.  8. Record the output from your drain and flush amount on your drainage record.     If you have a Y flushing port / UreSil Flush Adapter:    1. Collect flushing supplies: 10***mL of sterile normal saline in syringe, alcohol pad.  2. Clamp off the tubing to the drain by pinching the white clamp closed. Clean the drain port with an alcohol wipe.   3. Attach the saline syringe to the drain port.   4. Twist the syringe (clockwise) to tighten it to the port   5. Flush sterile normal saline from the syringe into the drain. The saline should flow toward your body not toward the drainage bag.   6. Untwist the syringe (counter clockwise) and remove it.   7. Unclamp the white clamp making sure the drainage is able to flow freely into the bag.   8. Squeeze bulb or accordion to reapply suction if you have one.  9. Record the output from your drain and flush amount on your drainage record.    ?   Follow-Up:   - Please contact [facility] Outpatient Scheduling at [phone]    Please seek medical evaluation for:  -Nausea and/or vomiting.  -Diffuse abdominal pain.  -Fevers (greater than 101 F (38.3C)).  -Drainage tube falls out, is pulled back or felt to be out of position.     Call [facility] [phone] with drain related questions or concerns:  -Unable to flush drainage tube.   -Leakage around drainage tube site.   -Extreme pain at drainage tube site.   -Outputs suddenly stop or significantly reduces.   -Warmth, redness, swelling or tenderness around the drainage tube.

## 2024-07-19 NOTE — PROVIDER NOTIFICATION
Discharged via wheelchair with daughter, flush tubing reviewed with daughter and patient, verbalized understanding of instructions. Belongings returned.

## 2024-07-19 NOTE — PRE-PROCEDURE
GENERAL PRE-PROCEDURE:   Procedure:  Biliary tube check/exchange  Date/Time:  7/19/2024 9:58 AM    Written consent obtained?: Yes    Risks and benefits: Risks, benefits and alternatives were discussed    Consent given by:  Patient  Patient states understanding of procedure being performed: Yes    Patient's understanding of procedure matches consent: Yes    Procedure consent matches procedure scheduled: Yes    Expected level of sedation:  Moderate  Appropriately NPO:  Yes  ASA Class:  2  Mallampati  :  Grade 2- soft palate, base of uvula, tonsillar pillars, and portion of posterior pharyngeal wall visible  Lungs:  Lungs clear with good breath sounds bilaterally  Heart:  Normal heart sounds and rate  History & Physical reviewed:  History and physical reviewed and no updates needed  Statement of review:  I have reviewed the lab findings, diagnostic data, medications, and the plan for sedation

## 2024-07-22 ENCOUNTER — TELEPHONE (OUTPATIENT)
Dept: INTERVENTIONAL RADIOLOGY/VASCULAR | Facility: HOSPITAL | Age: 49
End: 2024-07-22

## 2024-07-22 DIAGNOSIS — S36.13XS BILE DUCT INJURY, SEQUELA: Primary | ICD-10-CM

## 2024-07-22 RX ORDER — OXYCODONE HYDROCHLORIDE 5 MG/1
5 TABLET ORAL EVERY 6 HOURS PRN
Qty: 12 TABLET | Refills: 0 | Status: SHIPPED | OUTPATIENT
Start: 2024-07-22 | End: 2024-07-25

## 2024-07-22 NOTE — TELEPHONE ENCOUNTER
Writer contacted patient to schedule     Patient scheduled on 8/30.     Following details were discussed:  Will need a , someone to stay with them for 24 hours and should stay in town for 24 hours (within 45 min of Hospital) post procedure  Needs to get pre-op physical completed. If outside  health system will need physical faxed to number [phone]   Will be receiving phone call from [facility] nurses to discuss arrival times etc...       Was patient in agreement: Yes  Any questions/concerns?:  Pre-op scheduled via video visit on 8/12.

## 2024-07-22 NOTE — TELEPHONE ENCOUNTER
Patient calling with status report on GB tube over weekend.    7/12-  GB tube placed.  7/15-  GB tube replaced.  7/19-  Patient feeling poorly, tube Exchanged secondary to clogging.    On 7/21 patient sent [employer] message to IR RN with photos of leaking noted from tube.  Photo and message reviewed.     She reports feeling much better and is recording 200-400 cc total out in 24 hour period.    Elza states that when tube is flushed a scant amount of leakage is noted at site.    Elza wonders if she should try flushing twice a day rather than once.      Overall, patient feeling improved and we discussed the importance of monitoring output from tube into bag and symptom management.      At this time as long as patient feeling good, no fevers, chills, malaise than she should continue current plan of care.    Text page sent to IR \team at [facility] with status update and leakage concerns.      Team aware and agree with above triage recommendations. Patient to continue to monitor output and symptoms at this time.      Darlin STOCKTON  Interventional Radiology RN   [phone]

## 2024-07-24 ENCOUNTER — PATIENT OUTREACH (OUTPATIENT)
Dept: GASTROENTEROLOGY | Facility: CLINIC | Age: 49
End: 2024-07-24
Payer: COMMERCIAL

## 2024-07-24 ENCOUNTER — TELEPHONE (OUTPATIENT)
Dept: INTERVENTIONAL RADIOLOGY/VASCULAR | Facility: CLINIC | Age: 49
End: 2024-07-24
Payer: COMMERCIAL

## 2024-07-24 DIAGNOSIS — K83.1 BILIARY STRICTURE (H): Primary | ICD-10-CM

## 2024-07-24 RX ORDER — BACLOFEN 5 MG/1
5 TABLET ORAL 3 TIMES DAILY
Qty: 30 TABLET | Refills: 0 | Status: SHIPPED | OUTPATIENT
Start: 2024-07-24 | End: 2024-08-16

## 2024-07-24 NOTE — TELEPHONE ENCOUNTER
"Pt. Reports biliary drain has nearly stopped draining. (Maybe 10 ml output) Whereas the day before output was 500 ml's. States this started yesterday around 1400 after she had temporarily clamped the drain to shower. Since then minimal output and when she attempts to flush the flush just immediately leaks out of the skin insertion site. She reports seeing \"chunks\" in the drain. LACHELLE updated. Per charge able to add pt tomorrow with 9 am arrival to [place]. Pt. Updated and given prep instructions. She is aware to notify IR triage if any worse or new concerns.   "

## 2024-07-24 NOTE — TELEPHONE ENCOUNTER
FMLA forms completed by provider and faxed to her HR, Attaustin Hernandez.  Fax number: [phone]  Fax transmission confirmed successful via right fax.  Copy of forms mailed to patients address on file.    Lisa Hill LPN

## 2024-07-24 NOTE — PROGRESS NOTES
"  Interventional Radiology - Pre-Procedure Note:  Outpatient - [facility]  07/25/2024     Procedure Requested: Biliary drain check/exchange  Requested by: Carissa Devine NP    History and Physical Reviewed:   I have personally reviewed the patient's medical history and have updated the medical record as necessary.    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     Recent MRCP 7/1/24 demonstrated \"increased intrahepatic biliary ductal dilatation, predominantly in the left hepatic lobe\". She had previous plans for biliary drain placement at [facility] on 08/12, followed by a GI/IR combination procedure at a later date.     Admitted to [facility] 7/12/24 - 7/16/24 after transfer from [facility] on 7/11/24 with signs/symptoms of cholangitis. S/p external biliary drain placement 7/12/24 with new LEFT internal/external biliary tube placement 7/15/24 after previous tube dislodgement. Recent exchange 7/19/24.     Patient presented today for biliary tube site check secondary to concerns of ongoing pain and intermittent leaking at LEFT int/ext biliary tube exit site, unable to flush at times.       Reporting pain at drain exit site. Reporting intermittent leaking at drain extension.      IMAGING:  [facility]  LOCATION: [facility]  DATE: 7/19/2024     PROCEDURE: BILIARY DRAIN EXCHANGE     INTERVENTIONAL RADIOLOGIST: Sukhdev Ruggiero MD.     INDICATION: Pain at the biliary drain site and leaking around the biliary drain.     CONSENT: The risks, benefits and alternatives of biliary drain exchange were discussed with the patient  in detail. All questions were answered. Informed consent was given to proceed with the procedure.     MODERATE " SEDATION: Versed 4 mg IV; Fentanyl 200 mcg IV.  Under physician supervision, Versed and fentanyl were administered for moderate sedation. Pulse oximetry, heart rate and blood pressure were continuously monitored by an independent trained   observer. The physician spent 15 minutes of face-to-face sedation time with the patient. During the timeout, immediately prior to administration of medications, the patient was reassessed for adequacy to receive conscious sedation.      CONTRAST: 15 cc of Omni  ANTIBIOTICS: 1 g of Rocephin IV  ADDITIONAL MEDICATIONS: None.     FLUOROSCOPIC TIME: 3.5 minutes.  RADIATION DOSE: Air Kerma: 149 mGy.     COMPLICATIONS: No immediate complications.     STERILE BARRIER TECHNIQUE: Maximum sterile barrier technique was used. Cutaneous antisepsis was performed at the operative site with application of 2% chlorhexidine and large sterile drape. Prior to the procedure, the  and assistant performed   hand hygiene and wore hat, mask, sterile gown, and sterile gloves during the entire procedure.     PROCEDURE:    Contrast was injected through the indwelling biliary drain showing that the drain is partially clogged. The proximal marker of the sideholes may be outside of the biliary duct. Over a stiff Glidewire the existing biliary drain was removed. The wire was   manipulated further into the small bowel. Over the wire a new 8 Greek locking internal/external biliary drain was placed with the pigtail well formed within the duodenum and the proximal marker of the sideholes is well within the bile duct.                                                                      IMPRESSION:    Successful exchange of existing biliary drain.  ________________________________________    NPO: since MN  ANTICOAGULANTS: none  ANTIBIOTICS: Rocephin 1gm IV pre procedure    ALLERGIES  Allergies   Allergen Reactions    Azithromycin Anaphylaxis    Latex Hives         LABS:  INR   Date Value Ref Range Status    07/12/2024 1.27 (H) 0.85 - 1.15 Final      Hemoglobin   Date Value Ref Range Status   07/16/2024 10.1 (L) 11.7 - 15.7 g/dL Final   12/08/2020 12.7 11.7 - 15.7 g/dL Final     Platelet Count   Date Value Ref Range Status   07/16/2024 193 150 - 450 10e3/uL Final   07/16/2024 193 150 - 450 10e3/uL Final   12/08/2020 149 (L) 150 - 450 10e9/L Final     Creatinine   Date Value Ref Range Status   07/16/2024 0.64 0.51 - 0.95 mg/dL Final   12/08/2020 0.61 0.52 - 1.04 mg/dL Final     Potassium   Date Value Ref Range Status   07/13/2024 3.9 3.4 - 5.3 mmol/L Final   07/24/2022 3.9 3.4 - 5.3 mmol/L Final   12/08/2020 4.8 3.4 - 5.3 mmol/L Final         EXAM:  BP (!) 157/98   Pulse 77   Temp 98.3  F (36.8  C)   Resp 18   LMP 08/26/2005   SpO2 96%   General: Stable. In no acute distress.    Neuro: Alert and oriented x 3. No focal deficits.  Psych: Appropriate mood and affect. Linear/coherent thought process.   Resp: Normal respirations. Lungs clear to auscultation bilaterally.  Cardio: S1S2, regular rate and rhythm, without murmur, clicks or rubs  Abdomen: Soft, non-distended, non-tender.    Biliary drain site:  Clean dry and intact. Draining bile  Skin: Warm and dry. Without excoriations, ecchymosis, erythema, lesions or open sores around site.      Pre-Sedation Assessment:  Mallampati Airway Classification:  II - Faucial pillars and soft palate may be seen, but uvula is masked by the base of the tongue  Previous reaction to anesthesia/sedation:  No  Sedation plan based on assessment: Moderate (conscious) sedation  ASA Classification: Class 2 - MILD SYSTEMIC DISEASE, NO ACUTE PROBLEMS, NO FUNCTIONAL LIMITATIONS.   Code Status: Full Code intra procedure, per discussion with patient.         ASSESSMENT/PLAN:   #laparoscopic cholecystectomy (2000) c/b bile duct injury s/p hepaticojejunal eleazar-en-y,   #Known biliary stricture   #Recurrent cholangitis  #LEFT int/ext biliary tube placement 7/15/24; with associated pain and  leaking, exchanged 7/19/24.     Biliary Drain (Internal/External) check and upsize drain with sedation.        Procedural education reviewed with patient/family in detail including, but not limited to risks, benefits and alternatives with understanding verbalized by patient/family.    Total time spent on the date of the encounter: 45 minutes.      NASH CHRISTIE CNP  Interventional Radiology

## 2024-07-24 NOTE — TELEPHONE ENCOUNTER
"Per patient the \"drain keeps failing, not draining well\" Patient frustrated   Is flushing drain, starting getting pain in her stomach, disconnected bag and took a shower. Started noticing that tube would not flush , only 10cc of fluid since last night.    Encouraged patient to continue flushing and to follow up with IR at [facility]. Advised there's no special diet she has to adhere and can do activity as tolerated, advised we cannot order Baclofen for muscle spasms around tube. Patient will follow up with IR and should present to [facility] ER with uncontrolled pain or fever.    ML   "

## 2024-07-25 ENCOUNTER — HOSPITAL ENCOUNTER (OUTPATIENT)
Dept: INTERVENTIONAL RADIOLOGY/VASCULAR | Facility: HOSPITAL | Age: 49
Discharge: HOME | End: 2024-07-25
Attending: NURSE PRACTITIONER | Admitting: RADIOLOGY
Payer: COMMERCIAL

## 2024-07-25 VITALS
TEMPERATURE: 97.9 F | DIASTOLIC BLOOD PRESSURE: 91 MMHG | SYSTOLIC BLOOD PRESSURE: 146 MMHG | HEART RATE: 74 BPM | RESPIRATION RATE: 20 BRPM | OXYGEN SATURATION: 96 %

## 2024-07-25 DIAGNOSIS — K83.1 BILIARY STRICTURE (H): ICD-10-CM

## 2024-07-25 PROCEDURE — 99152 MOD SED SAME PHYS/QHP 5/>YRS: CPT

## 2024-07-25 PROCEDURE — 999N000248 HC STATISTIC IV INSERT WITH US BY RN

## 2024-07-25 PROCEDURE — 250N000009 HC RX 250: Performed by: NURSE PRACTITIONER

## 2024-07-25 PROCEDURE — C1729 CATH, DRAINAGE: HCPCS

## 2024-07-25 PROCEDURE — C1769 GUIDE WIRE: HCPCS

## 2024-07-25 PROCEDURE — 250N000011 HC RX IP 250 OP 636: Performed by: NURSE PRACTITIONER

## 2024-07-25 PROCEDURE — 255N000002 HC RX 255 OP 636: Performed by: RADIOLOGY

## 2024-07-25 RX ORDER — FLUMAZENIL 0.1 MG/ML
0.2 INJECTION, SOLUTION INTRAVENOUS
Status: DISCONTINUED | OUTPATIENT
Start: 2024-07-25 | End: 2024-07-26 | Stop reason: HOSPADM

## 2024-07-25 RX ORDER — FENTANYL CITRATE 50 UG/ML
25-50 INJECTION, SOLUTION INTRAMUSCULAR; INTRAVENOUS EVERY 5 MIN PRN
Status: DISCONTINUED | OUTPATIENT
Start: 2024-07-25 | End: 2024-07-26 | Stop reason: HOSPADM

## 2024-07-25 RX ORDER — LIDOCAINE 40 MG/G
CREAM TOPICAL
Status: DISCONTINUED | OUTPATIENT
Start: 2024-07-25 | End: 2024-07-26 | Stop reason: HOSPADM

## 2024-07-25 RX ORDER — NALOXONE HYDROCHLORIDE 0.4 MG/ML
0.2 INJECTION, SOLUTION INTRAMUSCULAR; INTRAVENOUS; SUBCUTANEOUS
Status: DISCONTINUED | OUTPATIENT
Start: 2024-07-25 | End: 2024-07-26 | Stop reason: HOSPADM

## 2024-07-25 RX ORDER — NALOXONE HYDROCHLORIDE 0.4 MG/ML
0.4 INJECTION, SOLUTION INTRAMUSCULAR; INTRAVENOUS; SUBCUTANEOUS
Status: DISCONTINUED | OUTPATIENT
Start: 2024-07-25 | End: 2024-07-26 | Stop reason: HOSPADM

## 2024-07-25 RX ORDER — CEFTRIAXONE 1 G/1
1 INJECTION, POWDER, FOR SOLUTION INTRAMUSCULAR; INTRAVENOUS
Status: COMPLETED | OUTPATIENT
Start: 2024-07-25 | End: 2024-07-25

## 2024-07-25 RX ORDER — ONDANSETRON 2 MG/ML
4 INJECTION INTRAMUSCULAR; INTRAVENOUS
Status: DISCONTINUED | OUTPATIENT
Start: 2024-07-25 | End: 2024-07-26 | Stop reason: HOSPADM

## 2024-07-25 RX ADMIN — FENTANYL CITRATE 50 MCG: 50 INJECTION, SOLUTION INTRAMUSCULAR; INTRAVENOUS at 10:15

## 2024-07-25 RX ADMIN — LIDOCAINE HYDROCHLORIDE 1 ML: 10 INJECTION, SOLUTION INFILTRATION; PERINEURAL at 10:16

## 2024-07-25 RX ADMIN — FENTANYL CITRATE 50 MCG: 50 INJECTION, SOLUTION INTRAMUSCULAR; INTRAVENOUS at 10:11

## 2024-07-25 RX ADMIN — IOHEXOL 10 ML: 350 INJECTION, SOLUTION INTRAVENOUS at 10:17

## 2024-07-25 RX ADMIN — CEFTRIAXONE SODIUM 1 G: 1 INJECTION, POWDER, FOR SOLUTION INTRAMUSCULAR; INTRAVENOUS at 09:50

## 2024-07-25 RX ADMIN — MIDAZOLAM HYDROCHLORIDE 1 MG: 1 INJECTION, SOLUTION INTRAMUSCULAR; INTRAVENOUS at 10:09

## 2024-07-25 RX ADMIN — MIDAZOLAM HYDROCHLORIDE 1 MG: 1 INJECTION, SOLUTION INTRAMUSCULAR; INTRAVENOUS at 10:13

## 2024-07-25 NOTE — PRE-PROCEDURE
GENERAL PRE-PROCEDURE:   Procedure:  Biliary drain check and upsize with sedation  Date/Time:  7/25/2024 9:40 AM    Written consent obtained?: Yes    Risks and benefits: Risks, benefits and alternatives were discussed    Consent given by:  Patient  Patient states understanding of procedure being performed: Yes    Patient's understanding of procedure matches consent: Yes    Procedure consent matches procedure scheduled: Yes    Expected level of sedation:  Moderate  Appropriately NPO:  Yes  ASA Class:  2  Mallampati  :  Grade 2- soft palate, base of uvula, tonsillar pillars, and portion of posterior pharyngeal wall visible  Lungs:  Lungs clear with good breath sounds bilaterally  Heart:  Normal heart sounds and rate  History & Physical reviewed:  History and physical reviewed and no updates needed  Statement of review:  I have reviewed the lab findings, diagnostic data, medications, and the plan for sedation

## 2024-07-25 NOTE — DISCHARGE INSTRUCTIONS
Biliary Drain Discharge Instructions:   You had a biliary drain placed. This drain is typically placed when bile ducts are blocked and the bile is not able to drain its normal internal pathway. This type of drain is inserted through the skin into the bile duct in order to drain bile from your liver and biliary ducts. The drainage then typically empties into an external drainage bag for collection (your tube may be connected to a drainage bag or it may have a cap on it). Please follow the below instructions after your biliary tube placement:    Care instructions after biliary drain procedure:  - If you received sedation for your procedure, do not drive or operate heavy machinery for the rest of the day.  - If you have a gauze dressing, change the dressing around your drainage tube site daily or as needed to keep site clean and dry.   - Empty your bag as needed (daily or when it is approximately half way full). Follow below instructions for emptying bag:  -Place a container (empty jar or plastic bowl) near the outlet valve of the drainage bag OR position yourself so the drainage from the bag can go directly into the toilet.   -To empty your bag twist the blue valve at the bottom of the bag while holding the valve over the open container.   -Re-twist blue valve at the bottom of the drainage bag to close.  -After draining fluid, recording your drainage output.   -Discard drainage into toilet once fluid drained.  - You may shower, however, you should avoid stagnant water such as tub baths, Jacuzzis and pools. Cover your drain exit site with plastic wrap while showering.  - Inspect the tube often for kinks.  - Keep the bag below the drain exit site to allow for free flow of drainage by gravity.  - Record your daily drain outputs and amount flushed on your drainage record and bring records to your next radiology appointment.   - Flush your drainage tube with 10***mL sterile Normal Saline daily***.       Flushing Your  "Drainage Tube:   If you have a 3 way stop cock:     1. Collect flushing supplies: 10***mL of sterile normal saline in syringe, alcohol pad.  2. Clean the flushing (center) port with alcohol and attach the flushing syringe by twisting into place (clockwise).  3. Turn the 3 way stopcock valve \"off\" to the drainage bag/bulb. (Valve should be pointed toward the bag/bulb)  4. Gently inject/flush the drain so fluid is moving towards your body through the drainage catheter.   5. Turn the stopcock valve back to its center position to allow for drainage to resume.   6. Remove flushing syringe from flushing port by untwisting the syringe (counter clockwise).  7. Squeeze bulb or accordion to reapply suction if you have one.  8. Record the output from your drain and flush amount on your drainage record.     If you have a Y flushing port / UreSil Flush Adapter:    1. Collect flushing supplies: 10***mL of sterile normal saline in syringe, alcohol pad.  2. Clamp off the tubing to the drain by pinching the white clamp closed. Clean the drain port with an alcohol wipe.   3. Attach the saline syringe to the drain port.   4. Twist the syringe (clockwise) to tighten it to the port   5. Flush sterile normal saline from the syringe into the drain. The saline should flow toward your body not toward the drainage bag.   6. Untwist the syringe (counter clockwise) and remove it.   7. Unclamp the white clamp making sure the drainage is able to flow freely into the bag.   8. Squeeze bulb or accordion to reapply suction if you have one.  9. Record the output from your drain and flush amount on your drainage record.      Follow-Up: Please contact [facility] Outpatient Scheduling at [phone]    Please seek medical evaluation for:  - Nausea and/or vomiting.  - Diffuse abdominal pain.  - Fevers (greater than 101 F (38.3C).  - Drainage tube falls out, is pulled back or felt to be out of position.     Call [facility] [phone] with " drain related questions or concerns:  - Unable to flush drainage tube.   - Leakage around drainage tube site.   - Extreme pain at drainage tube site.   - Outputs suddenly stop or significantly reduces.   - Warmth, redness, swelling or tenderness around the drainage tube.

## 2024-07-25 NOTE — IR NOTE
Pt presents to IR with daughter as  NPO since midnight. Pt flushing drain every day with 10cc ns. Pt reports normal output on Tuesday am, approx 525 ml measured with graduate.  States she usually drains 150-200 ml TID. Remaining output Tuesday and Wednesday approx 75 ml every day with increased output on dressing at drain entrance site.  No increased pain, no nausea, no fevers. Last flush yesterday. Pt woke this am with 100 ml thick brown yellow output in the bag. Dressing this am is c/d/I with scant drainage.

## 2024-07-25 NOTE — IR NOTE
Patient Name: Elza Greer  Medical Record Number: [id number]  Today's Date: 7/25/2024    Procedure: Bili tube exchange  Proceduralist: Monik      Sedation medications administered: 2 mg midazolam and 100 mcg fentanyl   Sedation time: 5 minutes

## 2024-07-25 NOTE — PROCEDURES
[facility]    Procedure: IR Procedure Note    Date/Time: 7/25/2024 10:17 AM    Performed by: Sukhdev Ruggiero MD  Authorized by: Sukhdev Ruggiero MD      UNIVERSAL PROTOCOL   Site Marked: NA  Prior Images Obtained and Reviewed:  Yes  Required items: Required blood products, implants, devices and special equipment available    Patient identity confirmed:  Verbally with patient, arm band, provided demographic data and hospital-assigned identification number  Patient was reevaluated immediately before administering moderate or deep sedation or anesthesia  Confirmation Checklist:  Patient's identity using two indicators, relevant allergies, procedure was appropriate and matched the consent or emergent situation and correct equipment/implants were available  Time out: Immediately prior to the procedure a time out was called    Universal Protocol: the Joint Commission Universal Protocol was followed    Preparation: Patient was prepped and draped in usual sterile fashion    ESBL (mL):  0     ANESTHESIA    Anesthesia:  Local infiltration  Local Anesthetic:  Lidocaine 1% without epinephrine  Anesthetic Total (mL):  10      SEDATION  Patient Sedated: Yes    Sedation Type:  Moderate (conscious) sedation  Vital signs: Vital signs monitored during sedation    Fluoroscopy Time: 1 minute(s)  See dictated procedure note for full details.  Findings: Successful exchange/upsize of biliary drain.  OK to discharge in 1 hour.    Specimens: none    Complications: None    Condition: Stable    Plan: Successful exchange/upsize of biliary drain.  OK to discharge in 1 hour.        PROCEDURE  Describe Procedure: Successful exchange/upsize of biliary drain.  OK to discharge in 1 hour.    Patient Tolerance:  Patient tolerated the procedure well with no immediate complications  Length of time physician/provider present for 1:1 monitoring during sedation: 15

## 2024-07-29 ENCOUNTER — TELEPHONE (OUTPATIENT)
Dept: FAMILY MEDICINE | Facility: CLINIC | Age: 49
End: 2024-07-29
Payer: COMMERCIAL

## 2024-07-29 DIAGNOSIS — M79.7 FIBROMYALGIA: Primary | ICD-10-CM

## 2024-07-29 RX ORDER — GABAPENTIN 100 MG/1
100 CAPSULE ORAL 2 TIMES DAILY
Qty: 180 CAPSULE | Refills: 1 | Status: SHIPPED | OUTPATIENT
Start: 2024-07-29

## 2024-07-29 NOTE — TELEPHONE ENCOUNTER
Elza is wondering if Di can fill her gabapentin for her as this was prescribed for her fibromyalgia that she was diagnosed with on her ICU stay back in 10/23/23? She states this really helps her with the pain. I did verify how she was taking this and she is taking 100mg twice daily. Thank you!    Leana Santana RN

## 2024-08-09 ENCOUNTER — PATIENT OUTREACH (OUTPATIENT)
Dept: GASTROENTEROLOGY | Facility: CLINIC | Age: 49
End: 2024-08-09
Payer: COMMERCIAL

## 2024-08-09 DIAGNOSIS — K83.09 ASCENDING CHOLANGITIS (H): ICD-10-CM

## 2024-08-09 NOTE — TELEPHONE ENCOUNTER
Reached out to patient about request for more flushes for her drain.  More drain flushes sent to [place] [facility] per patient request.    ML

## 2024-08-12 ENCOUNTER — VIRTUAL VISIT (OUTPATIENT)
Dept: SURGERY | Facility: CLINIC | Age: 49
End: 2024-08-12
Payer: COMMERCIAL

## 2024-08-12 ENCOUNTER — PRE VISIT (OUTPATIENT)
Dept: SURGERY | Facility: CLINIC | Age: 49
End: 2024-08-12

## 2024-08-12 ENCOUNTER — ANESTHESIA EVENT (OUTPATIENT)
Dept: SURGERY | Facility: CLINIC | Age: 49
End: 2024-08-12
Payer: COMMERCIAL

## 2024-08-12 VITALS — HEIGHT: 64 IN | BODY MASS INDEX: 40.94 KG/M2 | WEIGHT: 239.8 LBS

## 2024-08-12 DIAGNOSIS — Z01.818 PRE-OP EVALUATION: Primary | ICD-10-CM

## 2024-08-12 PROCEDURE — 99203 OFFICE O/P NEW LOW 30 MIN: CPT | Mod: 95 | Performed by: PHYSICIAN ASSISTANT

## 2024-08-12 ASSESSMENT — LIFESTYLE VARIABLES: TOBACCO_USE: 1

## 2024-08-12 ASSESSMENT — ENCOUNTER SYMPTOMS: SEIZURES: 1

## 2024-08-12 ASSESSMENT — COPD QUESTIONNAIRES: COPD: 1

## 2024-08-12 ASSESSMENT — PAIN SCALES - GENERAL: PAINLEVEL: MILD PAIN (2)

## 2024-08-12 NOTE — PATIENT INSTRUCTIONS
Preparing for Your Surgery      Name:  Elza Greer   MRN:  [id number]   :  1975   Today's Date:  2024       Arriving for surgery:  Surgery date:  24  Arrival time:  5:30 am  Surgery time: 7:30 am    Please come to:     Please come to:       [facility] [employer] [facility]    [address] [place]  [address]     The [facility] ([facility]) [place] Patient/Visitor Ramp is at [address]. Patients and visitors who self-park will receive the reduced hospital parking rate. If the Patient /Visitor Ramp is full, please follow the signs to the [employer] car park located at the main hospital entrance.       parking is available (24 hours/ 7 days a week)      Discounted parking pass options are available for patients and visitors. They can be purchased at the [employer] desk at the main hospital entrance.     -    Stop at the security desk and they will direct surgery patients to the Surgery Check in and [facility] [phone]        - If you need directions, a wheelchair or an escort please stop at the Information/security desk in the lobby.     What can I eat or drink?  -  You may eat and drink normally up to 8 hours prior to arrival time. (Until 9:30 pm on 24)  -  You may have clear liquids until 2 hours prior to arrival time. (Until 3:30 am on 24)    Examples of clear liquids:  Water  Clear broth  Juices (apple, white grape, white cranberry  and cider) without pulp  Noncarbonated, powder based beverages  (lemonade and Dimitri-Aid)  Sodas (Sprite, 7-Up, ginger ale and seltzer)  Coffee or tea (without milk or cream)  Gatorade    -  No Alcohol or cannabis products for at least 24 hours before surgery.     Which medicines can I take?    Hold Aspirin for 7 days before surgery.   Hold Multivitamins for 7 days before surgery.  Hold Supplements for 7 days before surgery.  Hold Ibuprofen (Advil, Motrin) for  1 day(s) before surgery--unless otherwise directed by surgeon.  Hold Naproxen (Aleve) for 4 days before surgery.    -  DO NOT take these medications the day of surgery:  Nicorette gum  Topical medications      -  PLEASE TAKE these medications per your usual routine:  Albuterol nebulizer if needed  Baclofen  Flonase nasal spray  Gabapentin (Neurontin)  Losartan (Cozaar) to be taken the evening before surgery per your normal schedule, but must be taken at least 12 hours before surgery. Take this no later than 7:30 pm on 8/29/24.  Nicoderm patch  Ondansetron (Zofran) if needed  Oxycodone if needed  Ventolin inhaler if needed and bring this with you day of surgery  Butalbital-Acetaminophen-Caffeine (ESGIC) if needed    How do I prepare myself?  - Please take 2 showers (one the night prior to surgery and one the morning of surgery) using Scrubcare or Hibiclens soap.    Use this soap only from the neck to your toes. Do not use in genital area.     Leave the soap on your skin for one minute--then rinse thoroughly.      You may use your own shampoo and conditioner. No other hair products.      Sleep in clean sheets and wear clean clothes.  - Please remove all jewelry and body piercings.  - No lotions, deodorants or fragrance.  - No makeup or fingernail polish.   - Bring your ID and insurance card.    -If you use a CPAP machine, please bring the CPAP machine, tubing, and mask to hospital.    -If you have a Deep Brain Stimulator, Spinal Cord Stimulator, or any Neuro Stimulator device---you must bring the remote control to the hospital.      ALL PATIENTS GOING HOME THE SAME DAY OF SURGERY ARE REQUIRED TO HAVE A RESPONSIBLE ADULT TO DRIVE AND BE IN ATTENDANCE WITH THEM FOR 24 HOURS FOLLOWING SURGERY.    Covid testing policy as of 12/06/2022  Your surgeon will notify and schedule you for a COVID test if one is needed before surgery--please direct any questions or COVID symptoms to your surgeon      Questions or Concerns:    -  For any questions regarding the day of surgery or your hospital stay, please contact the Pre Admission Nursing Office at [phone].       - If you have health changes between today and your surgery, please call your surgeon.       - For questions after surgery, please call your surgeons office.           Current Visitor Guidelines    You may have 2 visitors in the pre op area.    Visiting hours: 8 a.m. to 8:30 p.m.    Patients confirmed or suspected to have symptoms of COVID 19 or flu:     No visitors allowed for adult patients.   Children (under age 18) can have 1 named visitor.     People who are sick or showing symptoms of COVID 19 or flu:    Are not allowed to visit patients--we can only make exceptions in special situations.       Please follow these guidelines for your visit:          Please maintain social distance          Masking is optional--however at times you may be asked to wear a mask for the safety of yourself and others     Clean your hands with alcohol hand . Do this when you arrive at and leave the building and patient room,    And again after you touch your mask or anything in the room.     Go directly to and from the room you are visiting.     Stay in the patient s room during your visit. Limit going to other places in the hospital as much as possible     Leave bags and jackets at home or in the car.     For everyone s health, please don t come and go during your visit. That includes for smoking   during your visit.

## 2024-08-12 NOTE — H&P
Pre-Operative H & P     CC:  Preoperative exam to assess for increased cardiopulmonary risk while undergoing surgery and anesthesia.    Date of Encounter: 8/12/2024  Primary Care Physician:  Di Shea     Reason for visit:   Encounter Diagnosis   Name Primary?    Pre-op evaluation Yes       HPI  Elza Greer is a 49 year old female who presents for pre-operative H & P in preparation for  Procedure Information       Case: [id number] Date/Time: 08/30/24 0730    Procedure: ENDOSCOPIC RETROGRADE CHOLANGIOPANCREATOGRAPHY (Mouth)    Anesthesia type: General    Diagnosis: Biliary stricture (H28) [K83.1]    Pre-op diagnosis: Biliary stricture (H28) [K83.1]    Location:  OR  / [facility]    Providers: Freedom Nicole MD            Patient is being evaluated for comorbid conditions of hypertension, migraines, COPD, PCOS, asthma, depression, anxiety, arthritis, tobacco use, obesity    Ms. Greer has a history of recurrent ascending cholangitis and E. coli bacteremia caused by a biliary source.  She was admitted to the hospital 7/12 to 7/16.  IR placed a biliary drain.  She is now scheduled for ERCP as above.    History is obtained from the patient and chart review    Hx of abnormal bleeding or anti-platelet use: denies    Menstrual history: Patient's last menstrual period was 08/26/2005.     Past Medical History  Past Medical History:   Diagnosis Date    Adjustment disorder with mixed anxiety and depressed mood     Anxiety     Arthritis     COPD (chronic obstructive pulmonary disease) (H)     Depressive disorder     Gallbladder problem     Removed when i was 24    History of blood transfusion     Had a blood transfusion after my hysterectomy in 2005    History of steroid therapy     Hypertension 10/27/2023    IBS (irritable bowel syndrome)     Immunosuppression (H24)     Incisional hernia, without obstruction or gangrene 2020    Infection 10/23/2023    Had an E-Coli blood infection, pneumonia and sepsis     Lactose intolerance 05/12/2010    Migraine 05/12/2010    Migraines     Mild intermittent asthma without complication 05/12/2010    Osteoporosis     PCOS (polycystic ovarian syndrome)     Skin disease     Psoriasis    Tobacco use disorder     Vitamin D deficiency        Past Surgical History  Past Surgical History:   Procedure Laterality Date    ABDOMEN SURGERY      APPENDECTOMY OPEN N/A     BLADDER SURGERY      COLONOSCOPY N/A 12/04/2023    Procedure: COLONOSCOPY, WITH POLYPECTOMY AND BIOPSY;  Surgeon: Chris Wyatt MD;  Location: [facility]    CYSTECTOMY OVARIAN BENIGN  2001    HEPATICOJEJUNOSTOMY  2000    RnY jejunostomy from bile duct injury    HERNIORRHAPHY VENTRAL N/A 02/17/2012    open with mesh    HYSTERECTOMY TOTAL ABDOMINAL, BILATERAL SALPINGO-OOPHORECTOMY, COMBINED N/A 2005    IR BILIARY DRAIN CONVERSION TO INT/EXT  07/15/2024    IR BILIARY DRAIN PLACEMENT  07/12/2024    IR BILIARY TUBE CHANGE  07/25/2024    LAPAROSCOPIC CHOLECYSTECTOMY  2006    complicated by bile duct injury    LAPAROSCOPIC HERNIORRHAPHY VENTRAL N/A 01/26/2021    Procedure: Open recurrent incarcerated ventral hernia repair X2;  Surgeon: Pradip Mckenzie MD;  Location: [facility]    [facility]         Prior to Admission Medications  Current Outpatient Medications   Medication Sig Dispense Refill    albuterol (PROVENTIL) (2.5 MG/3ML) 0.083% neb solution Take 1 vial (2.5 mg) by nebulization every 6 hours as needed for shortness of breath, wheezing or cough 90 mL 0    Baclofen (LIORESAL) 5 MG tablet Take 1 tablet (5 mg) by mouth 3 times daily (Patient taking differently: Take 5 mg by mouth 3 times daily At bedtime) 30 tablet 0    butalbital-acetaminophen-caffeine (ESGIC) -40 MG tablet TAKE 1 TABLET EVERY 6 HOURS AS NEEDED FOR HEADACHE      gabapentin (NEURONTIN) 100 MG capsule Take 1 capsule (100 mg) by mouth 2 times daily 180 capsule 1    losartan (COZAAR) 25 MG tablet Take 1 tablet (25 mg) by mouth daily (Patient taking differently: Take  25 mg by mouth at bedtime)      Multiple Vitamins-Minerals (MULTIVITAMIN WOMEN PO) Take 2 chew tab by mouth every morning gummy      nicotine (NICORETTE) 2 MG gum Place 2 mg inside cheek every hour as needed for nicotine withdrawal symptoms      nystatin (MYCOSTATIN) 532005 UNIT/GM external cream Apply topically 2 times daily as needed for dry skin      ondansetron (ZOFRAN ODT) 4 MG ODT tab Take 1 tablet (4 mg) by mouth every 8 hours as needed for nausea 30 tablet 0    oxyCODONE (ROXICODONE) 10 MG tablet Take 1 tablet (10 mg) by mouth every 4 hours as needed for severe pain (IF pain not managed with non-pharmacological and non-opioid interventions) 13 tablet 0    sodium chloride, PF, (NORMAL SALINE FLUSH) 0.9% PF flush Irrigate with 10 mLs as directed every 24 hours Flush both biliary drains with 10 mL NS qdaily. 300 mL 2    VENTOLIN  (90 Base) MCG/ACT inhaler INHALE 1 TO 2 PUFFS BY MOUTH EVERY 4 HOURS AS NEEDED FOR SHORTNESS OF BREATH, WHEEZING OR COUGH 18 g 0    vitamin D3 (CHOLECALCIFEROL) 1.25 MG (57045 UT) capsule Take 1 capsule (50,000 Units) by mouth every 7 days 12 capsule 1    clobetasol propionate (CLOBEX) 0.05 % external shampoo Leave on scalp for 15 minutes then rinse. Use 1-2 times weekly. (Patient not taking: Reported on 8/12/2024) 118 mL 5    Fluocinolone Acetonide Scalp 0.01 % OIL oil Daily as needed for scalp psoriasis (Patient not taking: Reported on 8/12/2024) 118.28 mL 0    fluticasone (FLONASE) 50 MCG/ACT nasal spray Spray 2 sprays into both nostrils daily 16 g 0    nicotine (NICODERM CQ) 21 MG/24HR 24 hr patch Place 1 patch onto the skin every 24 hours (Patient not taking: Reported on 8/12/2024)      ondansetron (ZOFRAN) 4 MG tablet Take 4 mg by mouth every 8 hours as needed for vomiting or nausea         Allergies  Allergies   Allergen Reactions    Azithromycin Anaphylaxis    Latex Hives       Social History  Social History     Socioeconomic History    Marital status:      Spouse  name: Not on file    Number of children: Not on file    Years of education: Not on file    Highest education level: Not on file   Occupational History    Not on file   Tobacco Use    Smoking status: Some Days     Current packs/day: 0.00     Average packs/day: 1 pack/day for 23.0 years (23.0 ttl pk-yrs)     Types: Cigarettes, Vaping Device     Start date: 10/23/2000     Last attempt to quit: 10/23/2023     Years since quittin.8    Smokeless tobacco: Never    Tobacco comments:      Is using patches and gum.     15 cigarettes daily    Vaping Use    Vaping status: Former    Substances: Nicotine   Substance and Sexual Activity    Alcohol use: Not Currently    Drug use: Not Currently     Types: Marijuana     Comment: last use 2024    Sexual activity: Yes     Partners: Male     Birth control/protection: Post-menopausal   Other Topics Concern    Parent/sibling w/ CABG, MI or angioplasty before 65F 55M? 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     Social Determinants of Health     Financial Resource Strain: Not on file   Food Insecurity: Not on file   Transportation Needs: Not on file   Physical Activity: Not on file   Stress: Not on file   Social Connections: Not on file   Interpersonal Safety: Low Risk  (2023)    Interpersonal Safety     Do you feel physically and emotionally safe where you currently live?: Yes     Within the past 12 months, have you been hit, slapped, kicked or otherwise physically hurt by someone?: No     Within the past 12 months, have you been humiliated or emotionally abused in other ways by your partner or ex-partner?: No   Housing Stability: Not on file       Family History  Family History   Problem Relation Age of Onset    Dementia Mother     Coronary Artery Disease Mother     Hypertension Mother     Hyperlipidemia Mother     Depression Mother     Cerebrovascular Disease Mother     Asthma Mother     Obesity Mother     Mental Illness Father         Schizophrenia     Colon Cancer Maternal Grandmother     Breast Cancer Maternal Grandmother     Other Cancer Maternal Grandmother     Asthma Maternal Grandmother     Obesity Maternal Grandmother     Osteoporosis Other     Deep Vein Thrombosis (DVT) No family hx of        Review of Systems  The complete review of systems is negative other than noted in the HPI or here.   Anesthesia Evaluation   Pt has had prior anesthetic.     History of anesthetic complications   reports she woke angry in the past.    ROS/MED HX  ENT/Pulmonary:     (+)     SHWETA risk factors, snores loudly, hypertension, obese,        tobacco use, Current use,     asthma  Treatment: Inhaler prn and Inhaler daily,   COPD,              Neurologic:     (+)      migraines, seizures,  features: reports seizure as a young child due to fever,                    (-) no CVA   Cardiovascular:     (+)  hypertension- -   -  - -                                 Previous cardiac testing   Echo: Date: 10/2023 Results:  Interpretation Summary     Left ventricular systolic function is normal.  The visual ejection fraction is 60-65%.  No significant valve dysfunction.  There is no pericardial effusion.  Dilation of the inferior vena cava is present with abnormal respiratory  variation in diameter.  Patient is tachycardic.     No prior for comparison.    Stress Test:  Date: Results:    ECG Reviewed:  Date: 10/2023 Results:  NSR  Cath:  Date: Results:   (-) taking anticoagulants/antiplatelets   METS/Exercise Tolerance: >4 METS Comment: Walking around house. Can walk a block. Limitation due to current tubes, etc. Prior to recent illness, was walking about a mile on a trail with the dogs without CP, house work, etc. Some longstanding GARCIA   Hematologic:     (+)       history of blood transfusion, no previous transfusion reaction,     (-) history of blood clots   Musculoskeletal:   (+)  arthritis,             GI/Hepatic: Comment: Biliary stricture    (-) GERD   Renal/Genitourinary: Comment:  PCOS      Endo:     (+)               Obesity (BMI 43),    (-) chronic steroid usage   Psychiatric/Substance Use:     (+) psychiatric history anxiety and depression       Infectious Disease:  - neg infectious disease ROS     Malignancy:  - neg malignancy ROS     Other:            Virtual visit -  No vitals were obtained    Physical Exam  Constitutional: Awake, alert, cooperative, no apparent distress, and appears stated age.  HENT: Normocephalic  Respiratory: non labored breathing   Neurologic: Awake, alert, oriented to name, place and time.   Neuropsychiatric: Calm, cooperative. Normal affect.      Prior Labs/Diagnostic Studies   All labs and imaging personally reviewed     EKG/ stress test - if available please see in ROS above   Echo result w/o MOPS: Interpretation Summary Left ventricular systolic function is normal.The visual ejection fraction is 60-65%.No significant valve dysfunction.There is no pericardial effusion.Dilation of the inferior vena cava is present with abnormal respiratoryvariation in diameter.Patient is tachycardic. No prior for comparison.           No data to display                  The patient's records and results personally reviewed by this provider.     Outside records reviewed from: Care Everywhere      Assessment    Elza Greer is a 49 year old female seen as a PAC referral for risk assessment and optimization for anesthesia.    Plan/Recommendations  Pt will be optimized for the proposed procedure.  See below for details on the assessment, risk, and preoperative recommendations    NEUROLOGY  - No history of TIA, CVA or seizure  -migraines   -Post Op delirium risk factors:  No risk identified    ENT  - No current airway concerns.  Will need to be reassessed day of surgery.  Mallampati: Unable to assess  TM: Unable to assess    CARDIAC  - No history of CAD and Afib  -hypertension using losartan  -previous cardiac testing above. H/o GARCIA that has not worsening since prior to her echo  "10/2023. Denies other cardiac symptoms   - METS (Metabolic Equivalents)  Patient performs 4 or more METS exercise without symptoms             Total Score: 0      RCRI-Very low risk: Class 1 0.4% complication rate             Total Score: 0        PULMONARY  -reports sleep study scheduled for this fall  - Asthma, COPD. Reports she was on O2 after hospital discharge, but now is back at baseline not requiring supplemental O2. O2 98% on RA today per patient report. Continues inhalers as prescribed.   - Tobacco History    History   Smoking Status    Some Days    Types: Cigarettes, Vaping Device   Smokeless Tobacco    Never   -offered smoking cessation counseling, patient declined    GI  -biliary stricture, above procedure planned   PONV Medium Risk  Total Score: 2           1 AN PONV: Pt is Female    1 AN PONV: Intended Post Op Opioids        /RENAL  - Baseline Creatinine WNL    ENDOCRINE    - BMI: Estimated body mass index is 41.16 kg/m  as calculated from the following:    Height as of this encounter: 1.626 m (5' 4\").    Weight as of this encounter: 108.8 kg (239 lb 12.8 oz).  Class 3 Obesity (BMI > 40)  - No history of Diabetes Mellitus    HEME  VTE Low Risk 0.26%             Total Score: 1    VTE: BMI greater than 39      - No history of abnormal bleeding or antiplatelet use.    MSK  Patient IS Frail             Total Score: 4    Frailty: Weight loss 10 lbs or greater    Frailty: Decrease in strength    Frailty: Increased exhaustion    Frailty: Overall lack of energy      -offered PM&R referral. Patient is interested. I will place referral today.     PSYCH  - anxiety, depression    Different anesthesia methods/types have been discussed with the patient, but they are aware that the final plan will be decided by the assigned anesthesia provider on the date of service.    The patient is optimized for their procedure. AVS with information on surgery time/arrival time, meds and NPO status given by nursing staff. No " further diagnostic testing indicated.    Please refer to the physical examination documented by the anesthesiologist in the anesthesia record on the day of surgery.    Video-Visit Details    Type of service:  Video Visit    Provider received verbal consent for a Video Visit from the patient? Yes     Originating Location (pt. Location): Home    Distant Location (provider location):  Off-site  Mode of Communication:  Video Conference via [employer]  On the day of service:     Prep time: 9 minutes  Visit time: 16 minutes  Documentation time: 5 minutes  ------------------------------------------  Total time: 30 minutes      Kenya Guerrier PA-C  Preoperative Assessment Center  [facility]  Cl[facility] and [facility] Center  Phone: [phone]  Fax: [phone]

## 2024-08-12 NOTE — PROGRESS NOTES
Elza is a 49 year old who is being evaluated via a billable video visit.    How would you like to obtain your AVS? MyChart  If the video visit is dropped, the invitation should be resent by: Text to cell phone: [phone]    Subjective   Elza is a 49 year old, presenting for the following health issues:  Pre-Op Exam    HPI           Physical Exam

## 2024-08-14 ENCOUNTER — TELEPHONE (OUTPATIENT)
Dept: FAMILY MEDICINE | Facility: CLINIC | Age: 49
End: 2024-08-14
Payer: COMMERCIAL

## 2024-08-14 NOTE — TELEPHONE ENCOUNTER
Patient would need to call [facility] for this at [phone] to ask if renting is an option.    Patient called and notified, she is told to call [facility] for this and let us know if an order is needed.      IVANNA Floyd

## 2024-08-14 NOTE — TELEPHONE ENCOUNTER
General Call      Reason for Call:  Patient looking for a way to rent a wheelchair for a couple of months and have insurance cover it.    What are your questions or concerns:  Patient is fine walking around the house however she says they have some events coming up at the end of the summer that will be difficult to navigate and her next surgery is 8/30. She is wondering if she needs an order for the wheelchair.    Date of last appointment with provider: 04/03/2024 with Dr. Presley, 11/22/2023 with Di Shea.    Could we send this information to you in [employer] or would you prefer to receive a phone call?:   Patient would prefer a phone call   Okay to leave a detailed message?: Yes at Cell number on file:    Telephone Information:   Mobile [phone]

## 2024-08-21 DIAGNOSIS — I10 BENIGN ESSENTIAL HYPERTENSION: ICD-10-CM

## 2024-08-21 NOTE — TELEPHONE ENCOUNTER
Medication Question or Refill        What medication are you calling about (include dose and sig)?: Pending Prescriptions:                       Disp   Refills    losartan (COZAAR) 25 MG tablet                                Sig: Take 1 tablet (25 mg) by mouth daily.      Preferred Pharmacy:       [facility] [place], [place] - [address]   [address] [facility] [address]  Phone: [phone] Fax: [phone]        Controlled Substance Agreement on file:   CSA -- Patient Level:    CSA: None found at the patient level.         Do you need a refill? Yes    When did you use the medication last? Yesterday      Do you have any questions or concerns?  Yes: Patient has one left for today and states she needs this by tomorrow.       Could we send this information to you in [employer] or would you prefer to receive a phone call?:   Patient would prefer a phone call   Okay to leave a detailed message?: Yes at Cell number on file:    Telephone Information:   Mobile [phone]

## 2024-08-22 ENCOUNTER — TELEPHONE (OUTPATIENT)
Dept: INTERVENTIONAL RADIOLOGY/VASCULAR | Facility: CLINIC | Age: 49
End: 2024-08-22
Payer: COMMERCIAL

## 2024-08-22 ENCOUNTER — HOSPITAL ENCOUNTER (OUTPATIENT)
Dept: INTERVENTIONAL RADIOLOGY/VASCULAR | Facility: HOSPITAL | Age: 49
Discharge: HOME | End: 2024-08-22
Attending: NURSE PRACTITIONER | Admitting: NURSE PRACTITIONER
Payer: COMMERCIAL

## 2024-08-22 VITALS
SYSTOLIC BLOOD PRESSURE: 102 MMHG | DIASTOLIC BLOOD PRESSURE: 61 MMHG | HEART RATE: 86 BPM | RESPIRATION RATE: 16 BRPM | TEMPERATURE: 98.2 F | OXYGEN SATURATION: 98 %

## 2024-08-22 DIAGNOSIS — K83.1 BILIARY STRICTURE (H): ICD-10-CM

## 2024-08-22 DIAGNOSIS — K83.1 BILIARY STRICTURE (H): Primary | ICD-10-CM

## 2024-08-22 PROCEDURE — C1729 CATH, DRAINAGE: HCPCS

## 2024-08-22 PROCEDURE — C1769 GUIDE WIRE: HCPCS

## 2024-08-22 PROCEDURE — 99152 MOD SED SAME PHYS/QHP 5/>YRS: CPT

## 2024-08-22 PROCEDURE — 250N000011 HC RX IP 250 OP 636: Performed by: NURSE PRACTITIONER

## 2024-08-22 RX ORDER — CEFTRIAXONE 1 G/1
1 INJECTION, POWDER, FOR SOLUTION INTRAMUSCULAR; INTRAVENOUS
Status: COMPLETED | OUTPATIENT
Start: 2024-08-22 | End: 2024-08-22

## 2024-08-22 RX ORDER — NALOXONE HYDROCHLORIDE 0.4 MG/ML
0.4 INJECTION, SOLUTION INTRAMUSCULAR; INTRAVENOUS; SUBCUTANEOUS
Status: DISCONTINUED | OUTPATIENT
Start: 2024-08-22 | End: 2024-08-23 | Stop reason: HOSPADM

## 2024-08-22 RX ORDER — NALOXONE HYDROCHLORIDE 0.4 MG/ML
0.2 INJECTION, SOLUTION INTRAMUSCULAR; INTRAVENOUS; SUBCUTANEOUS
Status: DISCONTINUED | OUTPATIENT
Start: 2024-08-22 | End: 2024-08-23 | Stop reason: HOSPADM

## 2024-08-22 RX ORDER — LOSARTAN POTASSIUM 25 MG/1
25 TABLET ORAL DAILY
Qty: 90 TABLET | Refills: 0 | Status: SHIPPED | OUTPATIENT
Start: 2024-08-22

## 2024-08-22 RX ORDER — ONDANSETRON 2 MG/ML
4 INJECTION INTRAMUSCULAR; INTRAVENOUS
Status: COMPLETED | OUTPATIENT
Start: 2024-08-22 | End: 2024-08-22

## 2024-08-22 RX ORDER — FLUMAZENIL 0.1 MG/ML
0.2 INJECTION, SOLUTION INTRAVENOUS
Status: DISCONTINUED | OUTPATIENT
Start: 2024-08-22 | End: 2024-08-23 | Stop reason: HOSPADM

## 2024-08-22 RX ORDER — FENTANYL CITRATE 50 UG/ML
25-50 INJECTION, SOLUTION INTRAMUSCULAR; INTRAVENOUS EVERY 5 MIN PRN
Status: DISCONTINUED | OUTPATIENT
Start: 2024-08-22 | End: 2024-08-23 | Stop reason: HOSPADM

## 2024-08-22 RX ADMIN — ONDANSETRON 4 MG: 2 INJECTION INTRAMUSCULAR; INTRAVENOUS at 13:00

## 2024-08-22 RX ADMIN — FENTANYL CITRATE 50 MCG: 50 INJECTION, SOLUTION INTRAMUSCULAR; INTRAVENOUS at 13:38

## 2024-08-22 RX ADMIN — CEFTRIAXONE SODIUM 1 G: 1 INJECTION, POWDER, FOR SOLUTION INTRAMUSCULAR; INTRAVENOUS at 13:00

## 2024-08-22 RX ADMIN — MIDAZOLAM HYDROCHLORIDE 1 MG: 1 INJECTION, SOLUTION INTRAMUSCULAR; INTRAVENOUS at 13:46

## 2024-08-22 RX ADMIN — MIDAZOLAM HYDROCHLORIDE 1 MG: 1 INJECTION, SOLUTION INTRAMUSCULAR; INTRAVENOUS at 13:41

## 2024-08-22 RX ADMIN — FENTANYL CITRATE 50 MCG: 50 INJECTION, SOLUTION INTRAMUSCULAR; INTRAVENOUS at 13:43

## 2024-08-22 RX ADMIN — MIDAZOLAM HYDROCHLORIDE 1 MG: 1 INJECTION, SOLUTION INTRAMUSCULAR; INTRAVENOUS at 13:35

## 2024-08-22 NOTE — TELEPHONE ENCOUNTER
Elza called IR triage this morning complaining of pain, nausea, reduced drainage into her biliary drain bag, and leaking around her biliary tube.    Biliary change order entered by BUSTER Bertrand will call IR schedulers.

## 2024-08-22 NOTE — DISCHARGE INSTRUCTIONS
Cholecystostomy Tube Check/Exchange Discharge Instructions:  Please follow the below instructions after your cholecytostomy tube check/exchange:    Care instructions after cholecystostomy tube evaluation procedure:  - If you received sedation for your procedure, do not drive or operate heavy machinery for the rest of the day.  - If you have a gauze dressing, change the dressing around your drainage tube site as needed to keep site clean and dry.   - Empty your bag as needed (daily or when bag is approximately half way full). Follow below instructions for emptying bag:  -Place a container (empty jar or plastic bowl) near the outlet valve of the drainage bag OR position yourself so the drainage from the bag can go directly into the toilet.   -To empty your bag twist the blue valve at the bottom of the bag while holding the valve over and open container.   -Re-twist blue valve at the bottom of the drainage bag closed.    -After draining fluid record your drainage output. Discard drainage into toilet once fluid drained.  - You may shower, however, you should avoid stagnant water such as tub baths, Jacuzzis and pools. Cover drain exit site with plastic wrap while showering.  - Record your daily drain outputs and amount flushed on your drainage record and bring records to your next radiology appointment.   - Do not let the tubing on the drain kink.   -If you were instructed to flush your cholecystostomy tube previously, please continue to do so as you were instructed.  ?   Follow-Up:   - Please contact [facility] Outpatient Scheduling at [phone]    Please seek medical evaluation for:  - Nausea and/or vomiting.  - Diffuse abdominal pain.  - Fevers (greater than 101 F (38.3C)).    Call [facility] IR RN Line at [phone] with tube related questions or concerns:  - Drainage tube falls out, is pulled back or felt to be out of position.   - If you were previously instructed to flush your cholecystostomy tube and now you  "are unable to flush drainage tube.   - Leakage around drainage tube site.   - Extreme pain at drainage tube site.   - Outputs suddenly stop or significantly reduces.   - Warmth, redness, swelling or tenderness around the drainage tube.         Flushing Your Drainage Tube:   If you have a 3 way stop cock:     1. Collect flushing supplies: 10 mL of sterile normal saline in syringe, alcohol pad.  2. Clean the flushing (center) port with alcohol and attach the flushing syringe by twisting into place (clockwise).  3. Turn the 3 way stopcock valve \"off\" to the drainage bag/bulb. (Valve should be pointed toward the bag/bulb)  4. Gently inject/flush the drain so fluid is moving towards your body through the drainage catheter.   5. Turn the stopcock valve back to its center position to allow for drainage to resume.   6. Remove flushing syringe from flushing port by untwisting the syringe (counter clockwise).  7. Squeeze bulb or accordion to reapply suction if you have one.  8. Record the output from your drain and flush amount on your drainage record.       "

## 2024-08-22 NOTE — PROGRESS NOTES
"  Interventional Radiology - Pre-Procedure Note:  Outpatient - [facility]  08/22/2024     Procedure Requested: biliary tube exchange  Requested by: Carissa Devine NP    History and Physical Reviewed: H&P documented within 30 days (by Kenya Guerrier PA-C  on 8/12/24).  I have personally reviewed the patient's medical history and have updated the medical record as necessary.    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     Recent MRCP 7/1/24 demonstrated \"increased intrahepatic biliary ductal dilatation, predominantly in the left hepatic lobe\". She had previous plans for biliary drain placement at [facility] on 08/12, followed by a GI/IR combination procedure at a later date.     Admitted to [facility] 7/12/24 - 7/16/24 after transfer from [facility] on 7/11/24 with signs/symptoms of cholangitis. S/p external biliary drain placement 7/12/24 with new LEFT internal/external biliary tube placement 7/15/24 after previous tube dislodgement. Recent exchange 7/25/24.     Patient presented today for biliary tube check secondary to concerns of ongoing pain, nausea  unable to flush at times and leaking x 48 hours.     Plan for ERCP on 8/30 at [facility].    [facility]  LOCATION[facility]  DATE: 7/25/2024     PROCEDURE: BILIARY DRAIN EXCHANGE     INTERVENTIONAL RADIOLOGIST: Sukhdev Ruggiero MD.     INDICATION: Patient is complaining of persistent pain at the biliary drain site as well as leakage at the skin site.     CONSENT: The risks, benefits and alternatives of biliary drain exchange were discussed with the patient  in detail. All questions were answered. Informed consent was given to proceed with the procedure.     MODERATE " SEDATION: Versed 2 mg IV; Fentanyl 100 mcg IV.  Under physician supervision, Versed and fentanyl were administered for moderate sedation. Pulse oximetry, heart rate and blood pressure were continuously monitored by an independent trained   observer. The physician spent 15 minutes of face-to-face sedation time with the patient. During the timeout, immediately prior to administration of medications, the patient was reassessed for adequacy to receive conscious sedation.      CONTRAST: 15 cc of Omni  ANTIBIOTICS: 1 g of Rocephin IV  ADDITIONAL MEDICATIONS: None.     FLUOROSCOPIC TIME: 1.6 minutes.  RADIATION DOSE: Air Kerma: 71 mGy.     COMPLICATIONS: No immediate complications.     STERILE BARRIER TECHNIQUE: Maximum sterile barrier technique was used. Cutaneous antisepsis was performed at the operative site with application of 2% chlorhexidine and large sterile drape. Prior to the procedure, the  and assistant performed   hand hygiene and wore hat, mask, sterile gown, and sterile gloves during the entire procedure.     PROCEDURE:    Contrast was injected through the indwelling 8 Eritrean biliary drain demonstrating the drain to be patent and in good position. The surrounding skin site was anesthetized with 1% lidocaine. Using a stiff Glidewire the wire was manipulated through the   drain into the small bowel. Over the wire the existing 8 Eritrean drain was removed. Over the wire a larger 10 Eritrean internal/external biliary drain was placed. Final cholangiogram through the newly replaced biliary drain demonstrates the drain to be well   in good position.                                                                         IMPRESSION:    Successful upsize from an 8 Eritrean biliary drain to a 10 Eritrean biliary drain.    NPO: since MN  ANTICOAGULANTS: none  ANTIBIOTICS: Rocephin 1 Gm IV pre procedure    ALLERGIES  Allergies   Allergen Reactions    Azithromycin Anaphylaxis    Latex Hives         LABS:  INR   Date  Value Ref Range Status   07/12/2024 1.27 (H) 0.85 - 1.15 Final      Hemoglobin   Date Value Ref Range Status   07/16/2024 10.1 (L) 11.7 - 15.7 g/dL Final   12/08/2020 12.7 11.7 - 15.7 g/dL Final     Platelet Count   Date Value Ref Range Status   07/16/2024 193 150 - 450 10e3/uL Final   07/16/2024 193 150 - 450 10e3/uL Final   12/08/2020 149 (L) 150 - 450 10e9/L Final     Creatinine   Date Value Ref Range Status   07/16/2024 0.64 0.51 - 0.95 mg/dL Final   12/08/2020 0.61 0.52 - 1.04 mg/dL Final     Potassium   Date Value Ref Range Status   07/13/2024 3.9 3.4 - 5.3 mmol/L Final   07/24/2022 3.9 3.4 - 5.3 mmol/L Final   12/08/2020 4.8 3.4 - 5.3 mmol/L Final         EXAM:  /76 (BP Location: Left arm)   Pulse 104   Temp 98.1  F (36.7  C) (Oral)   LMP 08/26/2005   SpO2 94%   General: Stable. In no acute distress.    Neuro: Alert and oriented x 3. No focal deficits.  Psych: Appropriate mood and affect. Linear/coherent thought process.   Resp: Normal respirations. Lungs clear to auscultation bilaterally.  Cardio: S1S2, regular rate and rhythm, without murmur, clicks or rubs  Abdomen: Soft, non-distended,  Skin: Warm and dry. Without excoriations, ecchymosis, erythema, lesions or open sores on abdomen.  Biliary drain in place. Bile noted in drainage bag      Pre-Sedation Assessment:  Mallampati Airway Classification:  II - Faucial pillars and soft palate may be seen, but uvula is masked by the base of the tongue  Previous reaction to anesthesia/sedation:  No  Sedation plan based on assessment: Moderate (conscious) sedation  ASA Classification: Class 2 - MILD SYSTEMIC DISEASE, NO ACUTE PROBLEMS, NO FUNCTIONAL LIMITATIONS.         ASSESSMENT/PLAN:   #laparoscopic cholecystectomy (2000) c/b bile duct injury s/p hepaticojejunal eleazar-en-y,   #Known biliary stricture   #Recurrent cholangitis  #LEFT int/ext biliary tube placement 7/15/24; with associated pain and leaking, exchanged and upsized 7/25/24.     Biliary Drain  (Internal/External) check and possible exchange with sedation as needed       Procedural education reviewed with patient/family in detail including, but not limited to risks, benefits and alternatives with understanding verbalized by patient/family.    Total time spent on the date of the encounter: 45 minutes.      NASH CHRISTIE CNP  Interventional Radiology

## 2024-08-22 NOTE — PROCEDURES
[facility]    Procedure: IR Procedure Note    Date/Time: 8/22/2024 1:47 PM    Performed by: Rajan Robertson MD  Authorized by: Rajan Robertson MD  IR Fellow Physician:    Pre Procedure Diagnosis: Percutaneous biliary drain in place; Pain and nausea  Post Procedure Diagnosis: Same    UNIVERSAL PROTOCOL   Site Marked: NA  Prior Images Obtained and Reviewed:  Yes  Required items: Required blood products, implants, devices and special equipment available    Patient identity confirmed:  Arm band  Patient was reevaluated immediately before administering moderate or deep sedation or anesthesia  Confirmation Checklist:  Patient's identity using two indicators, relevant allergies, procedure was appropriate and matched the consent or emergent situation and correct equipment/implants were available  Time out: Immediately prior to the procedure a time out was called    Universal Protocol: the Joint Commission Universal Protocol was followed    Preparation: Patient was prepped and draped in usual sterile fashion       ANESTHESIA    Anesthesia:  Local infiltration      SEDATION  Patient Sedated: Yes    Vital signs: Vital signs monitored during sedation    Findings: Existing PBD appears appropriately positioned  Successful 10 Guamanian left internal/external percutaneously biliary drain exchange    Specimens: none    Procedural Complications: None    Condition: Stable    Plan: 1. To Recovery, then discharge home  2. PBD to external drainage.  Routine 10 ml NS flushes bid.      PROCEDURE    Length of time physician/provider present for 1:1 monitoring during sedation:  0-22 min

## 2024-08-22 NOTE — PROGRESS NOTES
Patient Name: Elza Greer  Medical Record Number: [id number]  Today's Date: 8/22/2024    Procedure: Biliary tube exchange  Proceduralist: Dr. Robertson      Sedation medications administered: 3 mg midazolam and 100 mcg fentanyl   Sedation time: 15 minutes      Other Notes: Pt discharged to home post procedure recovery.  Indicates pain is 0/10 with no nausea.  Instructions reviewed and acknowledged with patient and spouse.  Escorted to vehicle via wheelchair.

## 2024-08-22 NOTE — PRE-PROCEDURE
GENERAL PRE-PROCEDURE:   Procedure:  Bilary drain check and exchange with sedation  Date/Time:  8/22/2024 12:36 PM    Written consent obtained?: Yes    Risks and benefits: Risks, benefits and alternatives were discussed    Consent given by:  Patient  Patient states understanding of procedure being performed: Yes    Patient's understanding of procedure matches consent: Yes    Procedure consent matches procedure scheduled: Yes    Expected level of sedation:  Moderate  Appropriately NPO:  Yes  ASA Class:  2  Mallampati  :  Grade 2- soft palate, base of uvula, tonsillar pillars, and portion of posterior pharyngeal wall visible  Lungs:  Lungs clear with good breath sounds bilaterally  Heart:  Normal heart sounds and rate  History & Physical reviewed:  History and physical reviewed and no updates needed  Statement of review:  I have reviewed the lab findings, diagnostic data, medications, and the plan for sedation

## 2024-08-26 ENCOUNTER — HOSPITAL ENCOUNTER (OUTPATIENT)
Dept: INTERVENTIONAL RADIOLOGY/VASCULAR | Facility: HOSPITAL | Age: 49
Discharge: HOME | End: 2024-08-26
Payer: COMMERCIAL

## 2024-08-26 ENCOUNTER — MYC MEDICAL ADVICE (OUTPATIENT)
Dept: GASTROENTEROLOGY | Facility: CLINIC | Age: 49
End: 2024-08-26
Payer: COMMERCIAL

## 2024-08-26 ENCOUNTER — TELEPHONE (OUTPATIENT)
Dept: INTERVENTIONAL RADIOLOGY/VASCULAR | Facility: HOSPITAL | Age: 49
End: 2024-08-26
Payer: COMMERCIAL

## 2024-08-26 VITALS
TEMPERATURE: 98.2 F | HEART RATE: 68 BPM | SYSTOLIC BLOOD PRESSURE: 118 MMHG | DIASTOLIC BLOOD PRESSURE: 68 MMHG | OXYGEN SATURATION: 99 % | RESPIRATION RATE: 16 BRPM

## 2024-08-26 DIAGNOSIS — K83.1 BILIARY STRICTURE (H): ICD-10-CM

## 2024-08-26 DIAGNOSIS — K83.1 BILIARY STRICTURE (H): Primary | ICD-10-CM

## 2024-08-26 DIAGNOSIS — K83.09 ASCENDING CHOLANGITIS (H): ICD-10-CM

## 2024-08-26 RX ORDER — OXYCODONE HYDROCHLORIDE 5 MG/1
5 TABLET ORAL EVERY 6 HOURS PRN
Qty: 12 TABLET | Refills: 0 | Status: SHIPPED | OUTPATIENT
Start: 2024-08-26 | End: 2024-08-30

## 2024-08-26 NOTE — TELEPHONE ENCOUNTER
Elza reports that her stitches were ripped out this weekend from her biliary tube. Pain from tube is still bothering her, but has not increased since losing stitches.Drainage unchanged, patient denies other symptoms    Order entered by [facility] for stitch replacement and scheduled by IR.

## 2024-08-26 NOTE — PROCEDURES
[facility]    Procedure: Biliary drain stitch replacement    Date/Time: 8/26/2024 2:03 PM    Performed by: Pradip Friedman MD  Authorized by: Pradip Friedman MD  IR Fellow Physician:    Pre Procedure Diagnosis: Bile leak requiring biliary drain placement  Post Procedure Diagnosis: Same    UNIVERSAL PROTOCOL   Site Marked: NA  Prior Images Obtained and Reviewed:  NA  Required items: Required blood products, implants, devices and special equipment available    Patient identity confirmed:  Verbally with patient  NA - No sedation, light sedation, or local anesthesia  Confirmation Checklist:  Patient's identity using two indicators  Universal Protocol: the Joint Commission Universal Protocol was followed       ANESTHESIA    Local Anesthetic:  Lidocaine 1% without epinephrine      SEDATION    Patient Sedated: No    Findings: Successful replacement of the biliary drain stitch.      PROCEDURE    Length of time physician/provider present for 1:1 monitoring during sedation:  0 min    Anthony Friedman MD  Interventional Radiology

## 2024-08-27 ENCOUNTER — TELEPHONE (OUTPATIENT)
Dept: INTERVENTIONAL RADIOLOGY/VASCULAR | Facility: HOSPITAL | Age: 49
End: 2024-08-27
Payer: COMMERCIAL

## 2024-08-27 ENCOUNTER — HOSPITAL ENCOUNTER (OUTPATIENT)
Dept: INTERVENTIONAL RADIOLOGY/VASCULAR | Facility: HOSPITAL | Age: 49
Discharge: HOME | End: 2024-08-27
Admitting: RADIOLOGY
Payer: COMMERCIAL

## 2024-08-27 VITALS
TEMPERATURE: 97.7 F | DIASTOLIC BLOOD PRESSURE: 73 MMHG | OXYGEN SATURATION: 97 % | RESPIRATION RATE: 18 BRPM | SYSTOLIC BLOOD PRESSURE: 111 MMHG | HEART RATE: 92 BPM

## 2024-08-27 DIAGNOSIS — K83.9 BILE LEAK: Primary | ICD-10-CM

## 2024-08-27 DIAGNOSIS — K83.9 BILE LEAK: ICD-10-CM

## 2024-08-27 PROCEDURE — C1729 CATH, DRAINAGE: HCPCS

## 2024-08-27 PROCEDURE — 272N000121 HC CATH CR6

## 2024-08-27 PROCEDURE — 250N000011 HC RX IP 250 OP 636

## 2024-08-27 PROCEDURE — 255N000002 HC RX 255 OP 636: Performed by: RADIOLOGY

## 2024-08-27 PROCEDURE — 99152 MOD SED SAME PHYS/QHP 5/>YRS: CPT

## 2024-08-27 PROCEDURE — 250N000009 HC RX 250

## 2024-08-27 PROCEDURE — C1769 GUIDE WIRE: HCPCS

## 2024-08-27 RX ORDER — AMPICILLIN AND SULBACTAM 2; 1 G/1; G/1
3 INJECTION, POWDER, FOR SOLUTION INTRAMUSCULAR; INTRAVENOUS
Status: CANCELLED | OUTPATIENT
Start: 2024-08-27

## 2024-08-27 RX ORDER — LIDOCAINE 40 MG/G
CREAM TOPICAL
Status: DISCONTINUED | OUTPATIENT
Start: 2024-08-27 | End: 2024-08-28 | Stop reason: HOSPADM

## 2024-08-27 RX ORDER — FLUMAZENIL 0.1 MG/ML
0.2 INJECTION, SOLUTION INTRAVENOUS
Status: DISCONTINUED | OUTPATIENT
Start: 2024-08-27 | End: 2024-08-28 | Stop reason: HOSPADM

## 2024-08-27 RX ORDER — FENTANYL CITRATE 50 UG/ML
25-50 INJECTION, SOLUTION INTRAMUSCULAR; INTRAVENOUS EVERY 5 MIN PRN
Status: DISCONTINUED | OUTPATIENT
Start: 2024-08-27 | End: 2024-08-28 | Stop reason: HOSPADM

## 2024-08-27 RX ORDER — CEFTRIAXONE 1 G/1
1 INJECTION, POWDER, FOR SOLUTION INTRAMUSCULAR; INTRAVENOUS
Status: COMPLETED | OUTPATIENT
Start: 2024-08-27 | End: 2024-08-27

## 2024-08-27 RX ORDER — LIDOCAINE 40 MG/G
CREAM TOPICAL
Status: CANCELLED | OUTPATIENT
Start: 2024-08-27

## 2024-08-27 RX ORDER — ONDANSETRON 2 MG/ML
4 INJECTION INTRAMUSCULAR; INTRAVENOUS
Status: COMPLETED | OUTPATIENT
Start: 2024-08-27 | End: 2024-08-27

## 2024-08-27 RX ORDER — NALOXONE HYDROCHLORIDE 0.4 MG/ML
0.2 INJECTION, SOLUTION INTRAMUSCULAR; INTRAVENOUS; SUBCUTANEOUS
Status: DISCONTINUED | OUTPATIENT
Start: 2024-08-27 | End: 2024-08-28 | Stop reason: HOSPADM

## 2024-08-27 RX ORDER — SODIUM CHLORIDE 9 MG/ML
INJECTION, SOLUTION INTRAVENOUS CONTINUOUS
Status: CANCELLED | OUTPATIENT
Start: 2024-08-27

## 2024-08-27 RX ORDER — NALOXONE HYDROCHLORIDE 0.4 MG/ML
0.4 INJECTION, SOLUTION INTRAMUSCULAR; INTRAVENOUS; SUBCUTANEOUS
Status: DISCONTINUED | OUTPATIENT
Start: 2024-08-27 | End: 2024-08-28 | Stop reason: HOSPADM

## 2024-08-27 RX ADMIN — CEFTRIAXONE SODIUM 1 G: 1 INJECTION, POWDER, FOR SOLUTION INTRAMUSCULAR; INTRAVENOUS at 12:54

## 2024-08-27 RX ADMIN — MIDAZOLAM HYDROCHLORIDE 2 MG: 1 INJECTION, SOLUTION INTRAMUSCULAR; INTRAVENOUS at 13:00

## 2024-08-27 RX ADMIN — LIDOCAINE HYDROCHLORIDE 1 ML: 10 INJECTION, SOLUTION INFILTRATION; PERINEURAL at 13:08

## 2024-08-27 RX ADMIN — IOHEXOL 15 ML: 350 INJECTION, SOLUTION INTRAVENOUS at 13:24

## 2024-08-27 RX ADMIN — FENTANYL CITRATE 50 MCG: 50 INJECTION, SOLUTION INTRAMUSCULAR; INTRAVENOUS at 13:07

## 2024-08-27 RX ADMIN — ONDANSETRON 4 MG: 2 INJECTION INTRAMUSCULAR; INTRAVENOUS at 12:53

## 2024-08-27 NOTE — DISCHARGE INSTRUCTIONS
Biliary Drain Discharge Instructions:   You had a biliary drain placed. This drain is typically placed when bile ducts are blocked and the bile is not able to drain its normal internal pathway. This type of drain is inserted through the skin into the bile duct in order to drain bile from your liver and biliary ducts. The drainage then typically empties into an external drainage bag for collection (your tube may be connected to a drainage bag or it may have a cap on it). Please follow the below instructions after your biliary tube placement:    Care instructions after biliary drain procedure:  - If you received sedation for your procedure, do not drive or operate heavy machinery for the rest of the day.  - If you have a gauze dressing, change the dressing around your drainage tube site daily or as needed to keep site clean and dry.   - Empty your bag as needed (daily or when it is approximately half way full). Follow below instructions for emptying bag:  -Place a container (empty jar or plastic bowl) near the outlet valve of the drainage bag OR position yourself so the drainage from the bag can go directly into the toilet.   -To empty your bag twist the blue valve at the bottom of the bag while holding the valve over the open container.   -Re-twist blue valve at the bottom of the drainage bag to close.  -After draining fluid, recording your drainage output.   -Discard drainage into toilet once fluid drained.  - You may shower, however, you should avoid stagnant water such as tub baths, Jacuzzis and pools. Cover your drain exit site with plastic wrap while showering.  - Inspect the tube often for kinks.  - Keep the bag below the drain exit site to allow for free flow of drainage by gravity.  - Record your daily drain outputs and amount flushed on your drainage record and bring records to your next radiology appointment.   - Flush your drainage tube with 10***mL sterile Normal Saline daily***.       Flushing Your  "Drainage Tube:   If you have a 3 way stop cock:     1. Collect flushing supplies: 10***mL of sterile normal saline in syringe, alcohol pad.  2. Clean the flushing (center) port with alcohol and attach the flushing syringe by twisting into place (clockwise).  3. Turn the 3 way stopcock valve \"off\" to the drainage bag/bulb. (Valve should be pointed toward the bag/bulb)  4. Gently inject/flush the drain so fluid is moving towards your body through the drainage catheter.   5. Turn the stopcock valve back to its center position to allow for drainage to resume.   6. Remove flushing syringe from flushing port by untwisting the syringe (counter clockwise).  7. Squeeze bulb or accordion to reapply suction if you have one.  8. Record the output from your drain and flush amount on your drainage record.     If you have a Y flushing port / UreSil Flush Adapter:    1. Collect flushing supplies: 10***mL of sterile normal saline in syringe, alcohol pad.  2. Clamp off the tubing to the drain by pinching the white clamp closed. Clean the drain port with an alcohol wipe.   3. Attach the saline syringe to the drain port.   4. Twist the syringe (clockwise) to tighten it to the port   5. Flush sterile normal saline from the syringe into the drain. The saline should flow toward your body not toward the drainage bag.   6. Untwist the syringe (counter clockwise) and remove it.   7. Unclamp the white clamp making sure the drainage is able to flow freely into the bag.   8. Squeeze bulb or accordion to reapply suction if you have one.  9. Record the output from your drain and flush amount on your drainage record.      Follow-Up: Please contact [facility] Outpatient Scheduling at [phone]    Please seek medical evaluation for:  - Nausea and/or vomiting.  - Diffuse abdominal pain.  - Fevers (greater than 101 F (38.3C).  - Drainage tube falls out, is pulled back or felt to be out of position.     Call [facility] [phone] with " drain related questions or concerns:  - Unable to flush drainage tube.   - Leakage around drainage tube site.   - Extreme pain at drainage tube site.   - Outputs suddenly stop or significantly reduces.   - Warmth, redness, swelling or tenderness around the drainage tube.      Please connect with [facility] interventional radiology team about following up with your biliary tube.

## 2024-08-27 NOTE — PRE-PROCEDURE
GENERAL PRE-PROCEDURE:   Procedure:  Biliary drain check  Date/Time:  8/27/2024 12:46 PM    Written consent obtained?: Yes    Risks and benefits: Risks, benefits and alternatives were discussed    Consent given by:  Patient  Patient states understanding of procedure being performed: Yes    Patient's understanding of procedure matches consent: Yes    Procedure consent matches procedure scheduled: Yes    Expected level of sedation:  Moderate  Appropriately NPO:  Yes  ASA Class:  3  Mallampati  :  Grade 1- soft palate, uvula, tonsillar pillars, and posterior pharyngeal wall visible  Lungs:  Lungs clear with good breath sounds bilaterally  Heart:  Normal heart sounds and rate  History & Physical reviewed:  History and physical reviewed and no updates needed  Statement of review:  I have reviewed the lab findings, diagnostic data, medications, and the plan for sedation

## 2024-08-27 NOTE — PROGRESS NOTES
Interventional Radiology - Pre-Procedure Note:  Outpatient - [facility]  08/27/2024     Procedure Requested: Biliary tube check  Requested by: NASH RIOS CNP    History and Physical Reviewed: H&P documented within 30 days (by Kenya Guerrier PA-C on 08/12/2024). I have personally reviewed the patient's medical history and have updated the medical record as necessary.    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 Last exchanged 08/22/2024, per below. She has now had decreased outputs, abdominal pain, and leaking at her biliary drain site. Planning on combined GI/IR procedure at [facility] this Friday 08/30. Requesting check today.     [facility]  [facility]ATION[facility]  DATE: 8/22/2024     PROCEDURE: PERCUTANEOUS BILIARY DRAIN EXCHANGE     INTERVENTIONAL RADIOLOGIST: Rajan Robertson MD.     INDICATION: Pain and nausea with PBD; Decreased PBD output.     CONSENT: The risks, benefits and alternatives of percutaneous biliary drain exchange were discussed with the patient  in detail. All questions were answered. Informed consent was given to proceed with the procedure.     MODERATE SEDATION: Versed 3 mg IV; Fentanyl 100 mcg IV.  Under physician supervision, Versed and fentanyl were administered for moderate sedation. Pulse oximetry, heart rate and blood pressure were continuously monitored by an independent trained   observer. The physician spent 15 minutes of face-to-face sedation time with the patient.     CONTRAST: 10  ANTIBIOTICS: Ceftriaxone 1G  IV.  ADDITIONAL MEDICATIONS: Zofran 4 mg IV.     FLUOROSCOPIC TIME: 1.7 minutes.  RADIATION DOSE: Air Kerma: 38 mGy.     COMPLICATIONS: No immediate complications.     STERILE BARRIER TECHNIQUE: Maximum sterile barrier technique was used. Cutaneous antisepsis was performed at the operative site with application of 2% chlorhexidine and large sterile drape. Prior to the procedure, the  and assistant performed   hand hygiene and wore hat, mask, sterile gown, and sterile gloves during the entire procedure.     PROCEDURE/FINDINGS:    A  radiograph was taken demonstrating existing position of the left percutaneous biliary drain.  Contrast was injected which demonstrated opacification of the intrahepatic bile ducts and the small bowel.      A guidewire was inserted through the drainage catheter and coiled within the small bowel.  The existing tube was then removed.  A new 10 [place] citizen internal/external biliary drainage catheter was then advanced and secured using sutures.  A post placement   contrast injection shows good position and drainage. The catheter was attached to gravity drainage.                                                                         IMPRESSION:    Successful exchange of a 10 [place] citizen left internal/external percutaneous biliary drain.      PLAN:  PBD to external drainage.  Routine 10 cc NS flushes bid.      NPO: Midnight  ANTICOAGULANTS: None  ANTIBIOTICS: Rocephin 1 g if tube exchange required    ALLERGIES  Allergies   Allergen Reactions    Azithromycin Anaphylaxis    Latex Hives         LABS:  INR   Date Value Ref Range Status   07/12/2024 1.27 (H) 0.85 - 1.15 Final      Hemoglobin   Date Value Ref Range Status   07/16/2024 10.1 (L) 11.7 - 15.7 g/dL Final   12/08/2020 12.7 11.7 - 15.7 g/dL Final     Platelet Count   Date Value Ref Range Status   07/16/2024 193 150 - 450 10e3/uL Final   07/16/2024 193 150 - 450 10e3/uL Final   12/08/2020 149 (L) 150 - 450 10e9/L Final     Creatinine    Date Value Ref Range Status   07/16/2024 0.64 0.51 - 0.95 mg/dL Final   12/08/2020 0.61 0.52 - 1.04 mg/dL Final     Potassium   Date Value Ref Range Status   07/13/2024 3.9 3.4 - 5.3 mmol/L Final   07/24/2022 3.9 3.4 - 5.3 mmol/L Final   12/08/2020 4.8 3.4 - 5.3 mmol/L Final     UPT: Hx hysterectomy    EXAM:  /67 (BP Location: Left arm)   Pulse 100   Temp 98.1  F (36.7  C) (Oral)   Resp 18   LMP 08/26/2005   SpO2 95%   General: Stable. In no acute distress.    Neuro: Alert and oriented x 3. No focal deficits.  Psych: Appropriate mood and affect. Linear/coherent thought process.   Resp: Normal respirations. Lungs clear to auscultation bilaterally.  Cardio: S1S2, regular rate and rhythm, without murmur, clicks or rubs.  Skin: Warm and dry. Without excoriations, ecchymosis, erythema, lesions or open sores. Drain site CDI, bile noted in bag.      Pre-Sedation Assessment:  Mallampati Airway Classification:  I - Faucial pillars, soft palate, and uvula are visible  Previous reaction to anesthesia/sedation:  No  Sedation plan based on assessment: Moderate (conscious) sedation as needed  ASA Classification: Class 3 - SEVERE SYSTEMIC DISEASE, DEFINITE FUNCTIONAL LIMITATIONS.   Code Status: FULL CODE      ASSESSMENT/PLAN:   Biliary drain check with possible intervention with sedation as needed. Patient to definitively follow up with [facility] IR, as they will be doing her procedure on Friday 08/30 and she is already established with them. No follow up with [facility] IR needed.    Procedural education reviewed with patient/family in detail including, but not limited to risks, benefits and alternatives with understanding verbalized by patient/family.    Total time spent on the date of the encounter: 30 minutes.      NASH RIOS CNP  Interventional Radiology

## 2024-08-27 NOTE — TELEPHONE ENCOUNTER
Elza calling with concerns regarding output from existing biliary drain.    Elza confirms that she typically puts 400-600 cc out of biliary drain every 4-6 hours.      Yesterday, Elza only emptied her drain once at 4 pm and had 100 cc total out for day.      She increased food and fluid intake last evening and again this am only noted 75 cc output.      Elza noted slight resistance with her saline flush this am and a small amount of leakage at drain site, which is not her norm.    Elza is experiencing increased upper abdominal pain in last 24 hours.  States she typically manages discomfort at site with OTC med's, however, her abdominal pain has increased so much that she has taken prescribed narcotic 3 times in last 24 hours.      Elza was seen yesterday in [facility] IR for resuturing of drain.  She states that she has plans for ERCP and stent placement on Friday of this week.     I will communicate patient status with IR provider at [facility] and relay recommendations back to Elza.    I have asked her to remain NPO until I report back a plan.  She typically uses IV sedation during her IR encounters for drain management.      1100- Patient on schedule to be seen at [facility] today.      Darlin STOCKTON  Interventional Radiology RN   [phone]

## 2024-08-27 NOTE — SEDATION DOCUMENTATION
Interventional Radiology Intra-procedural Nursing Note  Patient Name: Elza Greer  Medical Record Number: [id number]  Today's Date: August 27, 2024    Procedure: bililary tube exhcange  Start time: 1307  End time: 1317  Sedation time: 10 minutes    Administered medication totals:  Versed 2 mg IVP  Fentanyl 50 mcg IVP

## 2024-08-29 ENCOUNTER — TELEPHONE (OUTPATIENT)
Dept: INTERVENTIONAL RADIOLOGY/VASCULAR | Facility: CLINIC | Age: 49
End: 2024-08-29
Payer: COMMERCIAL

## 2024-08-29 ASSESSMENT — ENCOUNTER SYMPTOMS: SEIZURES: 1

## 2024-08-29 ASSESSMENT — COPD QUESTIONNAIRES: COPD: 1

## 2024-08-29 ASSESSMENT — LIFESTYLE VARIABLES: TOBACCO_USE: 1

## 2024-08-29 NOTE — ANESTHESIA PREPROCEDURE EVALUATION
Anesthesia Pre-Procedure Evaluation    Patient: Elza Greer   MRN: [id number] : 1975        Procedure : Procedure(s):  ENDOSCOPIC RETROGRADE CHOLANGIOPANCREATOGRAPHY  **Latex Allergy**          Past Medical History:   Diagnosis Date    Adjustment disorder with mixed anxiety and depressed mood     Anxiety     Arthritis     COPD (chronic obstructive pulmonary disease) (H)     Depressive disorder     Gallbladder problem     Removed when i was 24    History of blood transfusion     Had a blood transfusion after my hysterectomy in     History of steroid therapy     Hypertension 10/27/2023    IBS (irritable bowel syndrome)     Immunosuppression (H24)     Incisional hernia, without obstruction or gangrene 2020    Infection 10/23/2023    Had an E-Coli blood infection, pneumonia and sepsis    Lactose intolerance 2010    Migraine 2010    Migraines     Mild intermittent asthma without complication 2010    Osteoporosis     PCOS (polycystic ovarian syndrome)     Skin disease     Psoriasis    Tobacco use disorder     Vitamin D deficiency       Past Surgical History:   Procedure Laterality Date    ABDOMEN SURGERY      APPENDECTOMY OPEN N/A     BLADDER SURGERY      COLONOSCOPY N/A 2023    Procedure: COLONOSCOPY, WITH POLYPECTOMY AND BIOPSY;  Surgeon: Chris Wyatt MD;  Location: [facility]    CYSTECTOMY OVARIAN BENIGN  2001    HEPATICOJEJUNOSTOMY      RnY jejunostomy from bile duct injury    HERNIORRHAPHY VENTRAL N/A 2012    open with mesh    HYSTERECTOMY TOTAL ABDOMINAL, BILATERAL SALPINGO-OOPHORECTOMY, COMBINED N/A     IR BILIARY DRAIN CONVERSION TO INT/EXT  07/15/2024    IR BILIARY DRAIN PLACEMENT  2024    IR BILIARY TUBE CHANGE  2024    IR BILIARY TUBE CHANGE  2024    LAPAROSCOPIC CHOLECYSTECTOMY  2006    complicated by bile duct injury    LAPAROSCOPIC HERNIORRHAPHY VENTRAL N/A 2021    Procedure: Open recurrent incarcerated ventral hernia repair X2;   Surgeon: Pradip Mckenzie MD;  Location: [facility] OR    BRYAN EN Y BOWEL        Allergies   Allergen Reactions    Azithromycin Anaphylaxis    Latex Hives      Social History     Tobacco Use    Smoking status: Some Days     Current packs/day: 0.00     Average packs/day: 1 pack/day for 23.0 years (23.0 ttl pk-yrs)     Types: Cigarettes, Vaping Device     Start date: 10/23/2000     Last attempt to quit: 10/23/2023     Years since quittin.8    Smokeless tobacco: Never    Tobacco comments:      Is using patches and gum.     15 cigarettes daily    Substance Use Topics    Alcohol use: Not Currently      Wt Readings from Last 1 Encounters:   24 108.8 kg (239 lb 12.8 oz)        Anesthesia Evaluation   Pt has had prior anesthetic.     History of anesthetic complications   reports she woke angry in the past.    ROS/MED HX  ENT/Pulmonary:     (+)     SHWETA risk factors, snores loudly, hypertension, obese,        tobacco use, Current use,     asthma  Treatment: Inhaler prn and Inhaler daily,   COPD,              Neurologic:     (+)      migraines, seizures,  features: reports seizure as a young child due to fever,                    (-) no CVA   Cardiovascular:     (+)  hypertension- -   -  - -                                 Previous cardiac testing   Echo: Date: 10/2023 Results:  Interpretation Summary     Left ventricular systolic function is normal.  The visual ejection fraction is 60-65%.  No significant valve dysfunction.  There is no pericardial effusion.  Dilation of the inferior vena cava is present with abnormal respiratory  variation in diameter.  Patient is tachycardic.     No prior for comparison.    Stress Test:  Date: Results:    ECG Reviewed:  Date: 10/2023 Results:  NSR  Cath:  Date: Results:   (-) taking anticoagulants/antiplatelets, murmur and wheezes   METS/Exercise Tolerance: 3 - Able to walk 1-2 blocks without stopping Comment: Walking around house. Can walk a block. Limitation due to current tubes, etc.  "Prior to recent illness, was walking about a mile on a trail with the dogs without CP, house work, etc. Some longstanding GARCIA   Hematologic:     (+)       history of blood transfusion, no previous transfusion reaction,     (-) history of blood clots   Musculoskeletal:   (+)  arthritis,             GI/Hepatic: Comment: Biliary stricture    (-) GERD   Renal/Genitourinary: Comment: PCOS      Endo:     (+)               Obesity (BMI 43),    (-) chronic steroid usage   Psychiatric/Substance Use:     (+) psychiatric history anxiety and depression       Infectious Disease:  - neg infectious disease ROS     Malignancy:  - neg malignancy ROS     Other:            Physical Exam    Airway        Mallampati: II   TM distance: < 3 FB   Neck ROM: full   Mouth opening: > 3 cm    Respiratory Devices and Support         Dental       (+) Edentulous      Cardiovascular          Rhythm and rate: regular and normal (-) no murmur    Pulmonary           breath sounds clear to auscultation   (-) no wheezes        OUTSIDE LABS:  CBC:   Lab Results   Component Value Date    WBC 6.4 07/16/2024    WBC 10.6 07/13/2024    HGB 10.1 (L) 07/16/2024    HGB 9.5 (L) 07/13/2024    HCT 30.9 (L) 07/16/2024    HCT 29.6 (L) 07/13/2024     07/16/2024     07/16/2024     BMP:   Lab Results   Component Value Date     07/13/2024     07/12/2024    POTASSIUM 3.9 07/13/2024    POTASSIUM 3.8 07/12/2024    CHLORIDE 104 07/13/2024    CHLORIDE 103 07/12/2024    CO2 27 07/13/2024    CO2 25 07/12/2024    BUN 4.5 (L) 07/13/2024    BUN 5.6 (L) 07/12/2024    CR 0.64 07/16/2024    CR 0.66 07/13/2024     (H) 07/13/2024     (H) 07/12/2024     COAGS:   Lab Results   Component Value Date    PTT 28 09/01/2022    INR 1.27 (H) 07/12/2024     POC: No results found for: \"BGM\", \"HCG\", \"HCGS\"  HEPATIC:   Lab Results   Component Value Date    ALBUMIN 3.0 (L) 07/16/2024    PROTTOTAL 6.2 (L) 07/16/2024     (H) 07/16/2024     (H) " "07/16/2024    GGT 40 (H) 09/18/2023    ALKPHOS 358 (H) 07/16/2024    BILITOTAL 1.5 (H) 07/16/2024     OTHER:   Lab Results   Component Value Date    LACT 0.4 (L) 07/12/2024    A1C 5.3 12/27/2023    DEBBIE 8.5 (L) 07/13/2024    MAG 2.2 12/27/2023    LIPASE 36 04/03/2024    TSH 2.62 09/05/2023    CRP 16.7 (H) 08/08/2022    SED 38 (H) 11/22/2023       Anesthesia Plan    ASA Status:  3    NPO Status:  NPO Appropriate    Anesthesia Type: General.     - Airway: ETT   Induction: Intravenous, Propofol.   Maintenance: Balanced.   Techniques and Equipment:     - Airway: Video-Laryngoscope     - Lines/Monitors: BIS     Consents    Anesthesia Plan(s) and associated risks, benefits, and realistic alternatives discussed. Questions answered and patient/representative(s) expressed understanding.     - Discussed: Risks, Benefits and Alternatives for BOTH SEDATION and the PROCEDURE were discussed     - Discussed with:  Patient, Spouse      - Extended Intubation/Ventilatory Support Discussed: Yes.      - Patient is DNR/DNI Status: No     Use of blood products discussed: Yes.     - Discussed with: Patient.     Postoperative Care    Pain management: IV analgesics, Oral pain medications, Multi-modal analgesia.   PONV prophylaxis: Ondansetron (or other 5HT-3), Dexamethasone or Solumedrol     Comments:    Other Comments: Discussed plan for general anesthetic with Oral ETT. Discussed risks of sore throat, post op pain/nausea, oropharyngeal damage, rare major complications.             Mk Koo MD    I have reviewed the pertinent notes and labs in the chart from the past 30 days and (re)examined the patient.  Any updates or changes from those notes are reflected in this note.              # Severe Obesity: Estimated body mass index is 41.16 kg/m  as calculated from the following:    Height as of 8/12/24: 1.626 m (5' 4\").    Weight as of 8/12/24: 108.8 kg (239 lb 12.8 oz).      "

## 2024-08-29 NOTE — TELEPHONE ENCOUNTER
IR chart check/medications reviewed.      Darlin STOCKTON  Interventional Radiology RN   [phone]

## 2024-08-30 ENCOUNTER — APPOINTMENT (OUTPATIENT)
Dept: GENERAL RADIOLOGY | Facility: CLINIC | Age: 49
End: 2024-08-30
Attending: INTERNAL MEDICINE
Payer: COMMERCIAL

## 2024-08-30 ENCOUNTER — APPOINTMENT (OUTPATIENT)
Dept: INTERVENTIONAL RADIOLOGY/VASCULAR | Facility: CLINIC | Age: 49
End: 2024-08-30
Attending: INTERNAL MEDICINE
Payer: COMMERCIAL

## 2024-08-30 ENCOUNTER — HOSPITAL ENCOUNTER (OUTPATIENT)
Facility: CLINIC | Age: 49
Discharge: HOME | End: 2024-08-30
Attending: INTERNAL MEDICINE | Admitting: INTERNAL MEDICINE
Payer: COMMERCIAL

## 2024-08-30 ENCOUNTER — ANESTHESIA (OUTPATIENT)
Dept: SURGERY | Facility: CLINIC | Age: 49
End: 2024-08-30
Payer: COMMERCIAL

## 2024-08-30 VITALS
BODY MASS INDEX: 40.76 KG/M2 | HEART RATE: 84 BPM | RESPIRATION RATE: 13 BRPM | TEMPERATURE: 97.8 F | WEIGHT: 238.76 LBS | HEIGHT: 64 IN | DIASTOLIC BLOOD PRESSURE: 65 MMHG | OXYGEN SATURATION: 96 % | SYSTOLIC BLOOD PRESSURE: 112 MMHG

## 2024-08-30 DIAGNOSIS — S36.13XS BILE DUCT INJURY, SEQUELA: ICD-10-CM

## 2024-08-30 DIAGNOSIS — K83.1 BILIARY STRICTURE (H): ICD-10-CM

## 2024-08-30 LAB
ALBUMIN SERPL BCG-MCNC: 4.1 G/DL (ref 3.5–5.2)
ALP SERPL-CCNC: 280 U/L (ref 40–150)
ALT SERPL W P-5'-P-CCNC: 85 U/L (ref 0–50)
ANION GAP SERPL CALCULATED.3IONS-SCNC: 12 MMOL/L (ref 7–15)
AST SERPL W P-5'-P-CCNC: 47 U/L (ref 0–45)
BILIRUB SERPL-MCNC: 0.4 MG/DL
BUN SERPL-MCNC: 24.5 MG/DL (ref 6–20)
CALCIUM SERPL-MCNC: 9.8 MG/DL (ref 8.8–10.4)
CHLORIDE SERPL-SCNC: 100 MMOL/L (ref 98–107)
CREAT SERPL-MCNC: 1.04 MG/DL (ref 0.51–0.95)
EGFRCR SERPLBLD CKD-EPI 2021: 66 ML/MIN/1.73M2
ERCP: NORMAL
ERYTHROCYTE [DISTWIDTH] IN BLOOD BY AUTOMATED COUNT: 13.7 % (ref 10–15)
GLUCOSE SERPL-MCNC: 110 MG/DL (ref 70–99)
HCO3 SERPL-SCNC: 23 MMOL/L (ref 22–29)
HCT VFR BLD AUTO: 39 % (ref 35–47)
HGB BLD-MCNC: 13.1 G/DL (ref 11.7–15.7)
INR PPP: 1.05 (ref 0.85–1.15)
LIPASE SERPL-CCNC: 25 U/L (ref 13–60)
MCH RBC QN AUTO: 30.8 PG (ref 26.5–33)
MCHC RBC AUTO-ENTMCNC: 33.6 G/DL (ref 31.5–36.5)
MCV RBC AUTO: 92 FL (ref 78–100)
PLATELET # BLD AUTO: 250 10E3/UL (ref 150–450)
POTASSIUM SERPL-SCNC: 3.5 MMOL/L (ref 3.4–5.3)
PROT SERPL-MCNC: 7.7 G/DL (ref 6.4–8.3)
RBC # BLD AUTO: 4.25 10E6/UL (ref 3.8–5.2)
SODIUM SERPL-SCNC: 135 MMOL/L (ref 135–145)
WBC # BLD AUTO: 12.5 10E3/UL (ref 4–11)

## 2024-08-30 PROCEDURE — 250N000025 HC SEVOFLURANE, PER MIN: Performed by: INTERNAL MEDICINE

## 2024-08-30 PROCEDURE — 80053 COMPREHEN METABOLIC PANEL: CPT | Performed by: INTERNAL MEDICINE

## 2024-08-30 PROCEDURE — 250N000013 HC RX MED GY IP 250 OP 250 PS 637

## 2024-08-30 PROCEDURE — 43260 ERCP W/SPECIMEN COLLECTION: CPT | Performed by: ANESTHESIOLOGY

## 2024-08-30 PROCEDURE — 710N000012 HC RECOVERY PHASE 2, PER MINUTE: Performed by: INTERNAL MEDICINE

## 2024-08-30 PROCEDURE — C1729 CATH, DRAINAGE: HCPCS | Performed by: INTERNAL MEDICINE

## 2024-08-30 PROCEDURE — 370N000017 HC ANESTHESIA TECHNICAL FEE, PER MIN: Performed by: INTERNAL MEDICINE

## 2024-08-30 PROCEDURE — C1769 GUIDE WIRE: HCPCS | Performed by: INTERNAL MEDICINE

## 2024-08-30 PROCEDURE — 85027 COMPLETE CBC AUTOMATED: CPT | Performed by: INTERNAL MEDICINE

## 2024-08-30 PROCEDURE — 360N000083 HC SURGERY LEVEL 3 W/ FLUORO, PER MIN: Performed by: INTERNAL MEDICINE

## 2024-08-30 PROCEDURE — C1769 GUIDE WIRE: HCPCS

## 2024-08-30 PROCEDURE — 88305 TISSUE EXAM BY PATHOLOGIST: CPT | Mod: TC | Performed by: INTERNAL MEDICINE

## 2024-08-30 PROCEDURE — C1887 CATHETER, GUIDING: HCPCS

## 2024-08-30 PROCEDURE — 255N000002 HC RX 255 OP 636: Mod: 59 | Performed by: INTERNAL MEDICINE

## 2024-08-30 PROCEDURE — 710N000010 HC RECOVERY PHASE 1, LEVEL 2, PER MIN: Performed by: INTERNAL MEDICINE

## 2024-08-30 PROCEDURE — C1894 INTRO/SHEATH, NON-LASER: HCPCS | Performed by: INTERNAL MEDICINE

## 2024-08-30 PROCEDURE — C1726 CATH, BAL DIL, NON-VASCULAR: HCPCS | Performed by: INTERNAL MEDICINE

## 2024-08-30 PROCEDURE — 83690 ASSAY OF LIPASE: CPT | Performed by: INTERNAL MEDICINE

## 2024-08-30 PROCEDURE — 88305 TISSUE EXAM BY PATHOLOGIST: CPT | Mod: 26 | Performed by: STUDENT IN AN ORGANIZED HEALTH CARE EDUCATION/TRAINING PROGRAM

## 2024-08-30 PROCEDURE — 272N000001 HC OR GENERAL SUPPLY STERILE: Performed by: INTERNAL MEDICINE

## 2024-08-30 PROCEDURE — 36415 COLL VENOUS BLD VENIPUNCTURE: CPT | Performed by: INTERNAL MEDICINE

## 2024-08-30 PROCEDURE — 258N000003 HC RX IP 258 OP 636

## 2024-08-30 PROCEDURE — 250N000011 HC RX IP 250 OP 636

## 2024-08-30 PROCEDURE — 999N000083 IR BILIARY TUBE CHANGE

## 2024-08-30 PROCEDURE — 85610 PROTHROMBIN TIME: CPT | Performed by: INTERNAL MEDICINE

## 2024-08-30 PROCEDURE — 250N000009 HC RX 250

## 2024-08-30 PROCEDURE — 999N000179 XR SURGERY CARM FLUORO LESS THAN 5 MIN W STILLS: Mod: TC

## 2024-08-30 PROCEDURE — 250N000009 HC RX 250: Performed by: INTERNAL MEDICINE

## 2024-08-30 PROCEDURE — 999N000141 HC STATISTIC PRE-PROCEDURE NURSING ASSESSMENT: Performed by: INTERNAL MEDICINE

## 2024-08-30 RX ORDER — LIDOCAINE 40 MG/G
CREAM TOPICAL
Status: DISCONTINUED | OUTPATIENT
Start: 2024-08-30 | End: 2024-08-30 | Stop reason: HOSPADM

## 2024-08-30 RX ORDER — ONDANSETRON 2 MG/ML
4 INJECTION INTRAMUSCULAR; INTRAVENOUS EVERY 30 MIN PRN
Status: DISCONTINUED | OUTPATIENT
Start: 2024-08-30 | End: 2024-08-30 | Stop reason: HOSPADM

## 2024-08-30 RX ORDER — NALOXONE HYDROCHLORIDE 0.4 MG/ML
0.1 INJECTION, SOLUTION INTRAMUSCULAR; INTRAVENOUS; SUBCUTANEOUS
Status: DISCONTINUED | OUTPATIENT
Start: 2024-08-30 | End: 2024-08-30 | Stop reason: HOSPADM

## 2024-08-30 RX ORDER — ONDANSETRON 4 MG/1
4 TABLET, ORALLY DISINTEGRATING ORAL EVERY 30 MIN PRN
Status: DISCONTINUED | OUTPATIENT
Start: 2024-08-30 | End: 2024-08-30 | Stop reason: HOSPADM

## 2024-08-30 RX ORDER — FENTANYL CITRATE 50 UG/ML
25 INJECTION, SOLUTION INTRAMUSCULAR; INTRAVENOUS EVERY 5 MIN PRN
Status: DISCONTINUED | OUTPATIENT
Start: 2024-08-30 | End: 2024-08-30 | Stop reason: HOSPADM

## 2024-08-30 RX ORDER — HALOPERIDOL 5 MG/ML
1 INJECTION INTRAMUSCULAR
Status: DISCONTINUED | OUTPATIENT
Start: 2024-08-30 | End: 2024-08-30 | Stop reason: HOSPADM

## 2024-08-30 RX ORDER — ONDANSETRON 2 MG/ML
INJECTION INTRAMUSCULAR; INTRAVENOUS PRN
Status: DISCONTINUED | OUTPATIENT
Start: 2024-08-30 | End: 2024-08-30

## 2024-08-30 RX ORDER — CIPROFLOXACIN 2 MG/ML
INJECTION, SOLUTION INTRAVENOUS PRN
Status: DISCONTINUED | OUTPATIENT
Start: 2024-08-30 | End: 2024-08-30

## 2024-08-30 RX ORDER — DEXAMETHASONE SODIUM PHOSPHATE 4 MG/ML
INJECTION, SOLUTION INTRA-ARTICULAR; INTRALESIONAL; INTRAMUSCULAR; INTRAVENOUS; SOFT TISSUE PRN
Status: DISCONTINUED | OUTPATIENT
Start: 2024-08-30 | End: 2024-08-30

## 2024-08-30 RX ORDER — LIDOCAINE HYDROCHLORIDE 20 MG/ML
INJECTION, SOLUTION INFILTRATION; PERINEURAL PRN
Status: DISCONTINUED | OUTPATIENT
Start: 2024-08-30 | End: 2024-08-30

## 2024-08-30 RX ORDER — SODIUM CHLORIDE, SODIUM LACTATE, POTASSIUM CHLORIDE, CALCIUM CHLORIDE 600; 310; 30; 20 MG/100ML; MG/100ML; MG/100ML; MG/100ML
INJECTION, SOLUTION INTRAVENOUS CONTINUOUS
Status: DISCONTINUED | OUTPATIENT
Start: 2024-08-30 | End: 2024-08-30 | Stop reason: HOSPADM

## 2024-08-30 RX ORDER — DEXAMETHASONE SODIUM PHOSPHATE 4 MG/ML
4 INJECTION, SOLUTION INTRA-ARTICULAR; INTRALESIONAL; INTRAMUSCULAR; INTRAVENOUS; SOFT TISSUE
Status: DISCONTINUED | OUTPATIENT
Start: 2024-08-30 | End: 2024-08-30 | Stop reason: HOSPADM

## 2024-08-30 RX ORDER — FENTANYL CITRATE 50 UG/ML
INJECTION, SOLUTION INTRAMUSCULAR; INTRAVENOUS PRN
Status: DISCONTINUED | OUTPATIENT
Start: 2024-08-30 | End: 2024-08-30

## 2024-08-30 RX ORDER — IOPAMIDOL 510 MG/ML
INJECTION, SOLUTION INTRAVASCULAR PRN
Status: DISCONTINUED | OUTPATIENT
Start: 2024-08-30 | End: 2024-08-30 | Stop reason: HOSPADM

## 2024-08-30 RX ORDER — SODIUM CHLORIDE, SODIUM LACTATE, POTASSIUM CHLORIDE, CALCIUM CHLORIDE 600; 310; 30; 20 MG/100ML; MG/100ML; MG/100ML; MG/100ML
INJECTION, SOLUTION INTRAVENOUS CONTINUOUS PRN
Status: DISCONTINUED | OUTPATIENT
Start: 2024-08-30 | End: 2024-08-30

## 2024-08-30 RX ORDER — IODIXANOL 320 MG/ML
INJECTION, SOLUTION INTRAVASCULAR PRN
Status: DISCONTINUED | OUTPATIENT
Start: 2024-08-30 | End: 2024-08-30 | Stop reason: HOSPADM

## 2024-08-30 RX ORDER — HYDROMORPHONE HCL IN WATER/PF 6 MG/30 ML
0.2 PATIENT CONTROLLED ANALGESIA SYRINGE INTRAVENOUS EVERY 5 MIN PRN
Status: DISCONTINUED | OUTPATIENT
Start: 2024-08-30 | End: 2024-08-30 | Stop reason: HOSPADM

## 2024-08-30 RX ORDER — PHENYLEPHRINE HCL IN 0.9% NACL 50MG/250ML
PLASTIC BAG, INJECTION (ML) INTRAVENOUS CONTINUOUS PRN
Status: DISCONTINUED | OUTPATIENT
Start: 2024-08-30 | End: 2024-08-30

## 2024-08-30 RX ORDER — ACETAMINOPHEN 325 MG/1
975 TABLET ORAL ONCE
Status: COMPLETED | OUTPATIENT
Start: 2024-08-30 | End: 2024-08-30

## 2024-08-30 RX ORDER — PROPOFOL 10 MG/ML
INJECTION, EMULSION INTRAVENOUS PRN
Status: DISCONTINUED | OUTPATIENT
Start: 2024-08-30 | End: 2024-08-30

## 2024-08-30 RX ORDER — OXYCODONE HYDROCHLORIDE 5 MG/1
5 TABLET ORAL
Status: DISCONTINUED | OUTPATIENT
Start: 2024-08-30 | End: 2024-08-30 | Stop reason: HOSPADM

## 2024-08-30 RX ORDER — OXYCODONE HYDROCHLORIDE 10 MG/1
10 TABLET ORAL
Status: DISCONTINUED | OUTPATIENT
Start: 2024-08-30 | End: 2024-08-30 | Stop reason: HOSPADM

## 2024-08-30 RX ORDER — HYDROMORPHONE HCL IN WATER/PF 6 MG/30 ML
0.4 PATIENT CONTROLLED ANALGESIA SYRINGE INTRAVENOUS EVERY 5 MIN PRN
Status: DISCONTINUED | OUTPATIENT
Start: 2024-08-30 | End: 2024-08-30 | Stop reason: HOSPADM

## 2024-08-30 RX ORDER — FENTANYL CITRATE 50 UG/ML
50 INJECTION, SOLUTION INTRAMUSCULAR; INTRAVENOUS EVERY 5 MIN PRN
Status: DISCONTINUED | OUTPATIENT
Start: 2024-08-30 | End: 2024-08-30 | Stop reason: HOSPADM

## 2024-08-30 RX ADMIN — ONDANSETRON 4 MG: 2 INJECTION INTRAMUSCULAR; INTRAVENOUS at 08:54

## 2024-08-30 RX ADMIN — PHENYLEPHRINE HYDROCHLORIDE 100 MCG: 10 INJECTION INTRAVENOUS at 07:57

## 2024-08-30 RX ADMIN — LIDOCAINE HYDROCHLORIDE 80 MG: 20 INJECTION, SOLUTION INFILTRATION; PERINEURAL at 07:38

## 2024-08-30 RX ADMIN — FENTANYL CITRATE 50 MCG: 50 INJECTION INTRAMUSCULAR; INTRAVENOUS at 08:25

## 2024-08-30 RX ADMIN — Medication 50 MG: at 07:38

## 2024-08-30 RX ADMIN — ACETAMINOPHEN 975 MG: 325 TABLET ORAL at 06:06

## 2024-08-30 RX ADMIN — MIDAZOLAM 1 MG: 1 INJECTION INTRAMUSCULAR; INTRAVENOUS at 07:27

## 2024-08-30 RX ADMIN — PROPOFOL 170 MG: 10 INJECTION, EMULSION INTRAVENOUS at 07:38

## 2024-08-30 RX ADMIN — PHENYLEPHRINE HYDROCHLORIDE 100 MCG: 10 INJECTION INTRAVENOUS at 08:10

## 2024-08-30 RX ADMIN — CIPROFLOXACIN 400 MG: 400 INJECTION, SOLUTION INTRAVENOUS at 07:45

## 2024-08-30 RX ADMIN — Medication 0.7 MCG/KG/MIN: at 08:17

## 2024-08-30 RX ADMIN — SODIUM CHLORIDE, POTASSIUM CHLORIDE, SODIUM LACTATE AND CALCIUM CHLORIDE: 600; 310; 30; 20 INJECTION, SOLUTION INTRAVENOUS at 07:27

## 2024-08-30 RX ADMIN — PHENYLEPHRINE HYDROCHLORIDE 100 MCG: 10 INJECTION INTRAVENOUS at 08:05

## 2024-08-30 RX ADMIN — PROPOFOL 20 MG: 10 INJECTION, EMULSION INTRAVENOUS at 08:56

## 2024-08-30 RX ADMIN — PHENYLEPHRINE HYDROCHLORIDE 100 MCG: 10 INJECTION INTRAVENOUS at 07:38

## 2024-08-30 RX ADMIN — PHENYLEPHRINE HYDROCHLORIDE 100 MCG: 10 INJECTION INTRAVENOUS at 08:14

## 2024-08-30 RX ADMIN — PHENYLEPHRINE HYDROCHLORIDE 100 MCG: 10 INJECTION INTRAVENOUS at 07:45

## 2024-08-30 RX ADMIN — FENTANYL CITRATE 25 MCG: 50 INJECTION, SOLUTION INTRAMUSCULAR; INTRAVENOUS at 09:38

## 2024-08-30 RX ADMIN — DEXAMETHASONE SODIUM PHOSPHATE 4 MG: 4 INJECTION, SOLUTION INTRA-ARTICULAR; INTRALESIONAL; INTRAMUSCULAR; INTRAVENOUS; SOFT TISSUE at 07:56

## 2024-08-30 RX ADMIN — FENTANYL CITRATE 50 MCG: 50 INJECTION INTRAMUSCULAR; INTRAVENOUS at 07:38

## 2024-08-30 RX ADMIN — PHENYLEPHRINE HYDROCHLORIDE 100 MCG: 10 INJECTION INTRAVENOUS at 07:50

## 2024-08-30 RX ADMIN — Medication 200 MG: at 08:57

## 2024-08-30 ASSESSMENT — ACTIVITIES OF DAILY LIVING (ADL)
ADLS_ACUITY_SCORE: 35
ADLS_ACUITY_SCORE: 35
ADLS_ACUITY_SCORE: 34
ADLS_ACUITY_SCORE: 35

## 2024-08-30 NOTE — LETTER
Patient:  Elza Greer  :   1975  MRN:     [id number]        Ms.Angela SARAH Greer  [address] [facility] [address]        2024    Dear ,    We are writing to inform you of your test results. In summary, good news. Sampling at the site of the narrowing of your bile duct revealed no evidence of dysplasia or malignancy, only benign findings. As discussed previously, our plan is to begin the process of drain removal with our IR team.    I have included the formal documtentation of the results below. It continues to be a pleasure participating in your care.  Please feel free to contact our clinic with any further questions.      Sincerely,    Freedom Nicole MD PhD FACG OLIVA ARIAS  Professor of Medicine, Surgery and Pediatrics  Interventional and Therapeutic Endoscopy  Chief, Division of Gastroenterology and Hepatology and Nutrition  GI Service     [facility] [address] [place] [id number]    New Consultations  [phone]  Procedure Scheduling [phone]  Clinical Nurse Coordinator [phone]  Clinical Fax   [phone]  Administrative   [phone]  Administrative Fax  [phone]        Resulted Orders   Surgical Pathology Exam   Result Value Ref Range    Case Report       Surgical Pathology Report                         Case: [id number]                                  Authorizing Provider:  Freedom Nicole MD  Collected:           2024 08:40 AM          Ordering Location:     [facility] OR                 Received:            2024 09:19 AM          Pathologist:           Ancelmo Rivero MD                                                          Specimen:    Common Bile Duct, Left primary bile duct                                                   Final Diagnosis       A. LEFT PRIMARY BILE DUCT, BIOPSY:   - Fibro-connective tissue with acute and chronic inflammation.  - Negative for  "malignancy.         Clinical Information       Procedure:  Cholangioscopy with biopsy and dilation; Percutaneous drain exchange with placement and removal of sheath  Pre-op Diagnosis: Biliary stricture (H28) [K83.1]  Post-op Diagnosis: K83.1 - Biliary stricture (H28) [ICD-10-CM]      Gross Description       A(1). Common Bile Duct, Left primary bile duct:  The specimen is received in formalin with proper patient identification, labeled \"left primary bile duct\".  The specimen consists of 2 irregular tan pieces of soft tissue each <0.1 cm in greatest dimension which are entirely submitted in cassette A1.      Microscopic Description       Microscopic examination has been performed.      I have personally reviewed all specimens and or slides, including the listed special stains, and used them with my medical judgement to determine the final diagnosis.      Case was reviewed by the following:  Resident Pathologist: Shirley Garcia MD  A resident or fellow in a training program was involved in the initial review, preparation, and/or interpretation of this case.  I, as the senior physician, attest that I have personally reviewed all specimens and or slides, including the listed special stains, and used them with my medical judgement to determine the final diagnosis.              Performing Labs       The technical component of this testing was completed at [facility].    Stain controls for all stains resulted within this report have been reviewed and show appropriate reactivity.       Case Images               "

## 2024-08-30 NOTE — ANESTHESIA CARE TRANSFER NOTE
Patient: Elza Greer    Procedure: Procedure(s):  Cholangioscopy with biopsy and dilation; Percutaneous drain exchange with placement and removal of sheath       Diagnosis: Biliary stricture (H28) [K83.1]  Diagnosis Additional Information: No value filed.    Anesthesia Type:   General     Note:    Oropharynx: oropharynx clear of all foreign objects and spontaneously breathing  Level of Consciousness: awake  Oxygen Supplementation: face mask  Level of Supplemental Oxygen (L/min / FiO2): 6  Independent Airway: airway patency satisfactory and stable  Dentition: dentition unchanged  Vital Signs Stable: post-procedure vital signs reviewed and stable  Report to RN Given: handoff report given  Patient transferred to: PACU    Handoff Report: Identifed the Patient, Identified the Reponsible Provider, Reviewed the pertinent medical history, Discussed the surgical course, Reviewed Intra-OP anesthesia mangement and issues during anesthesia, Set expectations for post-procedure period and Allowed opportunity for questions and acknowledgement of understanding      Vitals:  Vitals Value Taken Time   BP 94/60 08/30/24 0915   Temp 36.4  C (97.6  F) 08/30/24 0915   Pulse 86 08/30/24 0921   Resp 19 08/30/24 0921   SpO2 99 % 08/30/24 0921   Vitals shown include unfiled device data.    Electronically Signed By: Mk Koo MD  August 30, 2024  9:22 AM

## 2024-08-30 NOTE — PROCEDURES
Interventional Radiology Brief Post Procedure Note    Procedure(s):   IR BILIARY TUBE CHANGE and upsizing  Cholangioscopy and cholangioplasty per GI    Proceduralist: Parris Duke MD    Assistant: None    Time Out: Prior to the start of the procedure and with procedural staff participation, I verbally confirmed the patient s identity using two indicators, relevant allergies, that the procedure was appropriate and matched the consent or emergent situation, and that the correct equipment/implants were available. Immediately prior to starting the procedure I conducted the Time Out with the procedural staff and re-confirmed the patient s name, procedure, and site/side. (The Joint Commission universal protocol was followed.)  Yes    Medications   Medication Event Details Admin User Admin Time       Sedation: General Endotracheal Anesthesia (GET) administered and documented by Anesthesia Care Provider    Findings: Patent existing biliary drain, upsized for a 12 Fr peel away sheath for cholangioscopy access. After cholangioscopy and cholangioplasty per GI, a 12 Fr internal external biliary drain was placed. Please see GI note for cholangioscopy and cholangioplasty details.    Estimated Blood Loss: Minimal    Fluoroscopy Time:  minute(s)    SPECIMENS: None per IR, see GI note    Complications: 1. None     Condition: Stable    Plan: Per discussion with the GI team, will wait for biopsy results and in 2 weeks, will plan for sheath cholangiogram and possible externalization if benign path.    Comments: See dictated procedure note for full details.    Parris Duke MD

## 2024-08-30 NOTE — DISCHARGE INSTRUCTIONS
After Anesthesia (Sleep Medicine)    What Should I Do After Anesthesia?    --You should rest and relax for the next 24 hours.     Avoid risky or difficult (strenuous) activity.     A responsible adult should stay with you overnight.    --Don't drive or use any heavy equipment for 24 hours.      Even if you feel normal, your reactions may be affected by the sleep medicine you were given.    --Don't drink alcohol or make any important decisions for 24 hours.    --Slowly get back to your regular diet, as you feel able.      How Should I Expect to Feel?    --It's normal to feel dizzy, light-headed, or faint for up to a full day after anesthesia     or while taking pain medicine.        If This Happens:    --Sit down for a few minutes before standing.    --Have someone help you when you get up to walk or use the bathroom.    --If you have nausea (feel sick to your stomach) or vomit (throw up):    --Drink clear liquids (such as apple juice, ginger ale, broth or 7UP) until you feel better.    --If you feel sick to your stomach, or you keep vomiting for 24 hours, please CALL the DOCTOR.    What Else Should I Know?    --You might have a dry mouth, sore throat, muscle aches, or trouble sleeping. These should go away after 24 hours.    --Please contact your doctor if you have any other symptoms that concern you, such as fever, pain,      Bleeding, fluid drainage, swelling, or headache, or if it has been over 8 to 10 hours, and you have not been able to urinate (or pee).    --If you have a history of SLEEP APNEA, it is very important to use your CPAP machine for the next 24 hours when you doze, nap, or sleep.      If you have concerns, call Dr. Nicole in the GI clinic at [phone]   OR:    Call the  [facility] main number at [phone] and ask for the Gastroenterology doctor on call.    Dr. Nicole's impression and recommendation:    Impression:            - No definitive evidence of a stenosis (or a residual                          stenosis) of the left primary on cholangioscopy; this                         region was biosied and dilated, the latter without                         resistance                         - Cholangiography suggestive of a double barrel                         anastomoses with the jejunum  Recommendation:        - IR (Interventional Radiology) to replace the drain, now a 12F catheter                         - General anesthesia recovery with probable discharge                         home this morning                         - Drain to remain to gravity for 24 hours and then capped                         (turn the stop-cock); if tolerated the patient will                         return to our IR team (needs to be scheduled) within                         2 weeks if possible for externalization with a small                         catheter and if this is tolerated the drain may be                         removed; IF the patient fails any of these steps we                         will have her return for placement of a covered metal                         stent, either through GI/IR (Gastrointestinal/Interventional Radiology) combination or by IR alone                         - All medications, diet and activity may resume                         without delay                         - The findings and recommendations were discussed with                         the patient and their family

## 2024-08-30 NOTE — OP NOTE
[facility] 08/30/2024  7:28 AM [facility]   [address] [place] [address] [phone]     Endoscopy Department  _______________________________________________________________________________  Patient Name: Elza Greer            Procedure Date: 8/30/2024 7:28 AM  MRN: [id number]                       Account Number: [id number]  YOB: 1975               Admit Type: Outpatient  Age: 49                               Room: [place] [address]  Gender: Female                        Note Status: Finalized  Attending MD: DAVID BAEZ MD,   Total Sedation Time:  _______________________________________________________________________________     Procedure:             ERCP  Indications:           Follow-up of ascending cholangitis, Bile duct stricture  Providers:             DAVID BAEZ MD, Farooq Jhaveri RN  Patient Profile:       Ms Greer is a 48yo woman with recurrent E coli                         bacteremia. 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 MRCP suggested a left primary/anastomotic                         stenosis and she had a pecuatneous drain placed about                         8w back. She now presents for cholangioscopy following                         exchange to a sheath by our IR colleagues.  Referring MD:          KRUNAL CHRISTIANSEN  Medicines:             General Anesthesia  Complications:         No immediate complications.  _______________________________________________________________________________  Procedure:             Pre-Anesthesia Assessment:                         - Prior to the procedure, a History and Physical was                         performed, and patient medications and allergies were                         reviewed. The patient is competent. The risks and                         benefits of the procedure and the  sedation options and                         risks were discussed with the patient. All questions                         were answered and informed consent was obtained.                         Patient identification and proposed procedure were                         verified by the nurse in the pre-procedure area.                         Mental Status Examination: alert and oriented. Airway                         Examination: Mallampati Class II (the uvula but not                         tonsillar pillars visualized). Respiratory                         Examination: clear to auscultation. CV Examination:                         normal. ASA Grade Assessment: II - A patient with mild                         systemic disease. After reviewing the risks and                         benefits, the patient was deemed in satisfactory                         condition to undergo the procedure. The anesthesia                         plan was to use general anesthesia. Immediately prior                         to administration of medications, the patient was                         re-assessed for adequacy to receive sedatives. The                         heart rate, respiratory rate, oxygen saturations,                         blood pressure, adequacy of pulmonary ventilation, and                         response to care were monitored throughout the                         procedure. The physical status of the patient was                         re-assessed after the procedure. After obtaining                         informed consent, the scope was passed under direct                         vision. Throughout the procedure, the patient's blood                         pressure, pulse, and oxygen saturations were monitored                         continuously. The cholangioscope was introduced                         through the sheath and advanced to the duodenum and                         used to inject contrast into  the bile duct. The ERCP                         was accomplished without difficulty. The patient                         tolerated the procedure well.                                                                                   Findings:       The patient remained supine under general and a cholangioscope was used       across a sheath placed by our IR colleagues.  films demonstrated       the sheath and the guidewire placed by IR. We passed the cholangioscope       through the sheath to the left primary. Visibility with the scope was       excellent. Contrast was injected and I personally interpreted the bile       duct images. Ductal flow of contrast was adequate, image quality was       adequate and contrast extended throughout the left intrahepatics. The       right system appeared seperate. Fluoroscopically there was suggestion of       a smooth stenosis at the hepaticojejunostomy and a short portion of the       adjacent left primary. However this was not confirmed on cholangioscopy       nor was there any irregular biliary mucosa. This region was biopsied       regardless. The remainder of the left intrahepatics were grossly       decompressed. The distal primary was then dilated to 6mm without       resistance and the balloon moved freely while inflated. The devices were       then removed from the system.                                                                                   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  Recommendation:        - IR to replace the drain, now a 12F catheter                         - General anesthesia recovery with  probable discharge                         home this morning                         - Drain to remain to gravity for 24h and then capped                         (turn the stop-cock); if tolerated the patient will                         return to our IR team (needs to be scheduled) within                         2w if possible for externalization with a small                         catheter and if this is tolerated the drain may be                         removed; IF the patient fails any of these steps we                         will have her return for placement of a covered metal                         stent, either through GI/IR combination or by IR alone                         - All medications, diet and activity may resume                         without delay                         - The findings and recommendations were discussed with                         the patient and their family                                                                                       ________________________  DAVID BAEZ MD

## 2024-08-30 NOTE — ANESTHESIA POSTPROCEDURE EVALUATION
Patient: Elza Greer    Procedure: Procedure(s):  Cholangioscopy with biopsy and dilation; Percutaneous drain exchange with placement and removal of sheath       Anesthesia Type:  General    Note:  Disposition: Outpatient   Postop Pain Control: Uneventful            Sign Out: Well controlled pain   PONV: Yes            Sign Out: PONV/POV resolved with treatment   Neuro/Psych: Uneventful            Sign Out: Acceptable/Baseline neuro status   Airway/Respiratory: Uneventful            Sign Out: Acceptable/Baseline resp. status   CV/Hemodynamics: Uneventful            Sign Out: Acceptable CV status; No obvious hypovolemia; No obvious fluid overload   Other NRE: NONE   DID A NON-ROUTINE EVENT OCCUR?            Last vitals:  Vitals Value Taken Time   /59 08/30/24 0945   Temp 36.4  C (97.6  F) 08/30/24 0915   Pulse 85 08/30/24 0959   Resp 17 08/30/24 0959   SpO2 95 % 08/30/24 0959   Vitals shown include unfiled device data.    Electronically Signed By: Shala Price MD  August 30, 2024  10:00 AM

## 2024-08-30 NOTE — ANESTHESIA PROCEDURE NOTES
Airway       Patient location during procedure: OR       Procedure Start/Stop Times: 8/30/2024 7:41 AM  Staff -        Anesthesiologist:  Jarret Oneill MD       Resident/Fellow: Mk Koo MD       Performed By: resident  Consent for Airway        Urgency: elective  Indications and Patient Condition       Indications for airway management: puja-procedural       Induction type:intravenous       Mask difficulty assessment: 2 - vent by mask + OA or adjuvant +/- NMBA    Final Airway Details       Final airway type: endotracheal airway       Successful airway: ETT - single  Endotracheal Airway Details        ETT size (mm): 7.0       Cuffed: yes       Successful intubation technique: video laryngoscopy       VL Blade Size: Glidescope 3       Grade View of Cords: 1       Adjucts: stylet       Position: Right       Measured from: gums/teeth       Secured at (cm): 21       Bite block used: Soft    Post intubation assessment        Placement verified by: capnometry, equal breath sounds and chest rise        Number of attempts at approach: 1       Secured with: tape       Ease of procedure: easy       Dentition: Intact    Medication(s) Administered   Medication Administration Time: 8/30/2024 7:41 AM

## 2024-09-03 ENCOUNTER — PATIENT OUTREACH (OUTPATIENT)
Dept: GASTROENTEROLOGY | Facility: CLINIC | Age: 49
End: 2024-09-03
Payer: COMMERCIAL

## 2024-09-03 DIAGNOSIS — K83.1 BILIARY STRICTURE (H): ICD-10-CM

## 2024-09-03 DIAGNOSIS — S36.13XS BILE DUCT INJURY, SEQUELA: Primary | ICD-10-CM

## 2024-09-04 DIAGNOSIS — K83.1 BILIARY STRICTURE (H): ICD-10-CM

## 2024-09-04 DIAGNOSIS — S36.13XS BILE DUCT INJURY, SEQUELA: ICD-10-CM

## 2024-09-04 NOTE — CONSULTS
Outpatient IR Drain Referral  09/04/24    Referring Provider:  Dr. Nicole    IR Referral Request: biliary tube check   Procedure Approved for: Biliary tube externalization, patient reports drain is capped., has been capped since Sunday 9/1/24. Tolerating capping, no issues for now. See telephone note 9/3/24    Brief History:    Elza Greer is a 49 year old female with history of biliary drain placement on 8-30, please follow up with patient related to flushing and possible downsize/removal per ERCP note 8/30/24     Procedure order previously placed 8/30/24     Requesting team, Dr. Nicole, made aware of IR recommendations via Epic messaging.       NASH Armando CNP  Interventional Radiolog

## 2024-09-05 ENCOUNTER — MYC MEDICAL ADVICE (OUTPATIENT)
Dept: INTERVENTIONAL RADIOLOGY/VASCULAR | Facility: CLINIC | Age: 49
End: 2024-09-05
Payer: COMMERCIAL

## 2024-09-09 NOTE — TELEPHONE ENCOUNTER
Writer has spoken with Elza regarding planned procedure with IR via telephone.      Elza acknowledges understanding of pre-procedure instructions.         I have provided Elza with IR number ([phone]) for questions or concerns.    Elza does report an episode of fever yesterday.  101.7.  She treated with Advil and recheck was 100.1.  today while on phone with me she checked temp again and it is 99.7.  She states she has had some loose stools, but those can be common for her.      I have told Elza that I will report status to IR providers and alert her if they have any changes in plan of care.    Darlin STOCKTON  Interventional Radiology RN   [phone]

## 2024-09-09 NOTE — TELEPHONE ENCOUNTER
Elza informed of IR recommendations to continue monitoring symptom development/worsening and /or temperature greater than 101.5.  Elza verbalizes understanding that she is to call IR if wither instances occur.    Darlin STOCKTON  Interventional Radiology RN   [phone]

## 2024-09-12 ENCOUNTER — HOSPITAL ENCOUNTER (OUTPATIENT)
Facility: CLINIC | Age: 49
Discharge: HOME | End: 2024-09-12
Attending: RADIOLOGY | Admitting: RADIOLOGY
Payer: COMMERCIAL

## 2024-09-12 ENCOUNTER — APPOINTMENT (OUTPATIENT)
Dept: MEDSURG UNIT | Facility: CLINIC | Age: 49
End: 2024-09-12
Attending: RADIOLOGY
Payer: COMMERCIAL

## 2024-09-12 ENCOUNTER — APPOINTMENT (OUTPATIENT)
Dept: INTERVENTIONAL RADIOLOGY/VASCULAR | Facility: CLINIC | Age: 49
End: 2024-09-12
Attending: STUDENT IN AN ORGANIZED HEALTH CARE EDUCATION/TRAINING PROGRAM
Payer: COMMERCIAL

## 2024-09-12 VITALS
RESPIRATION RATE: 22 BRPM | WEIGHT: 255.8 LBS | BODY MASS INDEX: 43.67 KG/M2 | OXYGEN SATURATION: 97 % | HEART RATE: 94 BPM | DIASTOLIC BLOOD PRESSURE: 85 MMHG | SYSTOLIC BLOOD PRESSURE: 133 MMHG | TEMPERATURE: 98.8 F | HEIGHT: 64 IN

## 2024-09-12 DIAGNOSIS — K83.1 BILIARY STRICTURE (H): ICD-10-CM

## 2024-09-12 PROCEDURE — 255N000002 HC RX 255 OP 636: Performed by: STUDENT IN AN ORGANIZED HEALTH CARE EDUCATION/TRAINING PROGRAM

## 2024-09-12 PROCEDURE — 47536 EXCHANGE BILIARY DRG CATH: CPT | Performed by: STUDENT IN AN ORGANIZED HEALTH CARE EDUCATION/TRAINING PROGRAM

## 2024-09-12 PROCEDURE — 258N000003 HC RX IP 258 OP 636: Performed by: NURSE PRACTITIONER

## 2024-09-12 PROCEDURE — 999N000127 HC STATISTIC PERIPHERAL IV START W US GUIDANCE

## 2024-09-12 PROCEDURE — 999N000132 HC STATISTIC PP CARE STAGE 1

## 2024-09-12 PROCEDURE — C1769 GUIDE WIRE: HCPCS

## 2024-09-12 PROCEDURE — 99152 MOD SED SAME PHYS/QHP 5/>YRS: CPT

## 2024-09-12 PROCEDURE — 250N000011 HC RX IP 250 OP 636: Performed by: NURSE PRACTITIONER

## 2024-09-12 PROCEDURE — 999N000142 HC STATISTIC PROCEDURE PREP ONLY

## 2024-09-12 PROCEDURE — 250N000009 HC RX 250: Performed by: STUDENT IN AN ORGANIZED HEALTH CARE EDUCATION/TRAINING PROGRAM

## 2024-09-12 PROCEDURE — 250N000011 HC RX IP 250 OP 636: Performed by: STUDENT IN AN ORGANIZED HEALTH CARE EDUCATION/TRAINING PROGRAM

## 2024-09-12 PROCEDURE — 272N000567 HC SHEATH CR4

## 2024-09-12 PROCEDURE — C1729 CATH, DRAINAGE: HCPCS

## 2024-09-12 RX ORDER — AMPICILLIN AND SULBACTAM 2; 1 G/1; G/1
3 INJECTION, POWDER, FOR SOLUTION INTRAMUSCULAR; INTRAVENOUS
Status: COMPLETED | OUTPATIENT
Start: 2024-09-12 | End: 2024-09-12

## 2024-09-12 RX ORDER — IODIXANOL 320 MG/ML
100 INJECTION, SOLUTION INTRAVASCULAR ONCE
Status: COMPLETED | OUTPATIENT
Start: 2024-09-12 | End: 2024-09-12

## 2024-09-12 RX ORDER — ACETAMINOPHEN 500 MG
1000 TABLET ORAL EVERY 6 HOURS PRN
COMMUNITY

## 2024-09-12 RX ORDER — FENTANYL CITRATE 50 UG/ML
25-50 INJECTION, SOLUTION INTRAMUSCULAR; INTRAVENOUS EVERY 5 MIN PRN
Status: DISCONTINUED | OUTPATIENT
Start: 2024-09-12 | End: 2024-09-12 | Stop reason: HOSPADM

## 2024-09-12 RX ORDER — SODIUM CHLORIDE 9 MG/ML
INJECTION, SOLUTION INTRAVENOUS CONTINUOUS
Status: DISCONTINUED | OUTPATIENT
Start: 2024-09-12 | End: 2024-09-12 | Stop reason: HOSPADM

## 2024-09-12 RX ORDER — FLUMAZENIL 0.1 MG/ML
0.2 INJECTION, SOLUTION INTRAVENOUS
Status: DISCONTINUED | OUTPATIENT
Start: 2024-09-12 | End: 2024-09-12 | Stop reason: HOSPADM

## 2024-09-12 RX ORDER — IBUPROFEN 200 MG
400 TABLET ORAL EVERY 4 HOURS PRN
COMMUNITY

## 2024-09-12 RX ORDER — NALOXONE HYDROCHLORIDE 0.4 MG/ML
0.2 INJECTION, SOLUTION INTRAMUSCULAR; INTRAVENOUS; SUBCUTANEOUS
Status: DISCONTINUED | OUTPATIENT
Start: 2024-09-12 | End: 2024-09-12 | Stop reason: HOSPADM

## 2024-09-12 RX ORDER — NALOXONE HYDROCHLORIDE 0.4 MG/ML
0.4 INJECTION, SOLUTION INTRAMUSCULAR; INTRAVENOUS; SUBCUTANEOUS
Status: DISCONTINUED | OUTPATIENT
Start: 2024-09-12 | End: 2024-09-12 | Stop reason: HOSPADM

## 2024-09-12 RX ORDER — ONDANSETRON 2 MG/ML
4 INJECTION INTRAMUSCULAR; INTRAVENOUS
Status: COMPLETED | OUTPATIENT
Start: 2024-09-12 | End: 2024-09-12

## 2024-09-12 RX ORDER — LIDOCAINE 40 MG/G
CREAM TOPICAL
Status: DISCONTINUED | OUTPATIENT
Start: 2024-09-12 | End: 2024-09-12 | Stop reason: HOSPADM

## 2024-09-12 RX ADMIN — IODIXANOL 30 ML: 320 INJECTION, SOLUTION INTRAVASCULAR at 10:41

## 2024-09-12 RX ADMIN — MIDAZOLAM 0.5 MG: 1 INJECTION INTRAMUSCULAR; INTRAVENOUS at 10:30

## 2024-09-12 RX ADMIN — LIDOCAINE HYDROCHLORIDE 10 ML: 10 INJECTION, SOLUTION EPIDURAL; INFILTRATION; INTRACAUDAL; PERINEURAL at 10:46

## 2024-09-12 RX ADMIN — AMPICILLIN SODIUM AND SULBACTAM SODIUM 3 G: 2; 1 INJECTION, POWDER, FOR SOLUTION INTRAMUSCULAR; INTRAVENOUS at 09:39

## 2024-09-12 RX ADMIN — MIDAZOLAM 1 MG: 1 INJECTION INTRAMUSCULAR; INTRAVENOUS at 10:08

## 2024-09-12 RX ADMIN — MIDAZOLAM 0.5 MG: 1 INJECTION INTRAMUSCULAR; INTRAVENOUS at 10:23

## 2024-09-12 RX ADMIN — SODIUM CHLORIDE: 9 INJECTION, SOLUTION INTRAVENOUS at 09:41

## 2024-09-12 RX ADMIN — FENTANYL CITRATE 25 MCG: 50 INJECTION, SOLUTION INTRAMUSCULAR; INTRAVENOUS at 10:23

## 2024-09-12 RX ADMIN — FENTANYL CITRATE 25 MCG: 50 INJECTION, SOLUTION INTRAMUSCULAR; INTRAVENOUS at 10:30

## 2024-09-12 RX ADMIN — ONDANSETRON 4 MG: 2 INJECTION INTRAMUSCULAR; INTRAVENOUS at 09:58

## 2024-09-12 RX ADMIN — MIDAZOLAM 0.5 MG: 1 INJECTION INTRAMUSCULAR; INTRAVENOUS at 10:25

## 2024-09-12 RX ADMIN — FENTANYL CITRATE 25 MCG: 50 INJECTION, SOLUTION INTRAMUSCULAR; INTRAVENOUS at 10:18

## 2024-09-12 RX ADMIN — FENTANYL CITRATE 50 MCG: 50 INJECTION, SOLUTION INTRAMUSCULAR; INTRAVENOUS at 10:08

## 2024-09-12 RX ADMIN — MIDAZOLAM 0.5 MG: 1 INJECTION INTRAMUSCULAR; INTRAVENOUS at 10:18

## 2024-09-12 RX ADMIN — FENTANYL CITRATE 25 MCG: 50 INJECTION, SOLUTION INTRAMUSCULAR; INTRAVENOUS at 10:25

## 2024-09-12 ASSESSMENT — ACTIVITIES OF DAILY LIVING (ADL)
ADLS_ACUITY_SCORE: 37

## 2024-09-12 NOTE — PRE-PROCEDURE
GENERAL PRE-PROCEDURE:   Procedure:  Image guided internal external biliary tube check with possible conversion to external biliary drain    Written consent obtained?: Yes    Risks and benefits: Risks, benefits and alternatives were discussed    Consent given by:  Patient  Patient states understanding of procedure being performed: Yes    Patient's understanding of procedure matches consent: Yes    Procedure consent matches procedure scheduled: Yes    Expected level of sedation:  Moderate  Appropriately NPO:  Yes  ASA Class:  3  Mallampati  :  Grade 2- soft palate, base of uvula, tonsillar pillars, and portion of posterior pharyngeal wall visible  Lungs:  Lungs clear with good breath sounds bilaterally  Heart:  Normal heart sounds and rate  History & Physical reviewed:  History and physical reviewed and no updates needed  Statement of review:  I have reviewed the lab findings, diagnostic data, medications, and the plan for sedation    Biliary tube has been capped since 9/1/2024.  Had low grade fever after last combo IR/GI procedure 8/30/2024.  Denies any abdominal pain, fevers or chills, leaking around catheter.        
Initial (On Arrival)
as per pt he passed out 2 times at home tonight. laceration on the right eye brow and abrasion on the corner of left eye noted. complaining of chest discomfort first then light headed before passing out. h/o anemia

## 2024-09-12 NOTE — DISCHARGE INSTRUCTIONS
[facility]  Department of Vascular and Interventional Radiology (IR)   Biliary Drain Placement Discharge Instructions    You had a small tube biliary tube exchanged into an abnormal fluid collection by Dr Dorsey on 09/12/24.      If you received sedation:     For 24 hours:   Have an adult stay with you for 24 hours.   Drink plenty of fluids and resume your regular diet.  Do not drive or operate machinery at home or at work  No alcoholic beverages  Do not make any important legal decisions  No heavy lifting greater than 10 lb unless otherwise instructed by your physician.    Slowly advance toward your normal activities after two days    Bathing  We recommend sponge baths.  If you must shower, please secure the catheter, connecting tubes and collection device into plastic bag around the body with adhesive taping.   Be sure to keep the catheter site as dry as possible.      Components of Drainage Catheter System        How to  Care for Your Catheter  Inspect the catheter dressing and change the dressing if it is soiled or loose.  Inspect the catheter for any signs of kinking.      Empty the collection device  Keep biliary tube to the bag for 24 hours and then cap it.No need to flush tube when capped.    Contact Information  Please contact IR for the following:    -Fever ?101F and/or shaking chills  -worsening redness, warmth and pus drainage at/or around the site of catheter placement  -Inability to flush the catheter  -Significant leakage around the catheter site  -Catheter pulled out or falls out  -Change in the quality of the draining fluid (e.g. new feculant output)  -Abrupt change in the quantity of fluid output.  -New or increasing pain that does not respond to over the counter pain medication    The Interventional Radiology Nursing line is available at [phone] Monday to Friday (7:30AM-4:00PM).   Any voicemails left will be returned within 24 hours.      For emergencies  (that cannot wait until normal business hours) during night time hours and weekends,  please call [facility]  at [phone] and ask to page [facility] on call Interventional Radiology team.      Follow-Up  Revaluation of  tube will be done on Sept 23 and check in gold at 8am and your procedure will be at @ 930. You may or may not need additional imaging prior to removal of tube.      What to bring at your next IR appointment  List of current medications  A record of drain outputs    [facility] Locations (for filling of syringe prescriptions):  [facility]   [address] [place] [address]  [phone]  [facility]  [address]  [facility] [address] [phone] [facility]  [address]  [facility] [address]  [phone]     [facility] (Dressings and other medical supplies):  [email or web address] (for various locations)  [phone]

## 2024-09-12 NOTE — PROGRESS NOTES
Patient tolerated recovery stage well. VSS, right upper abd drain site clean/dry/intact, no hematoma, and denies pain. Patient tolerated PO food and fluids. Teaching was done and discharge instructions were given. Patient ambulated, voided, and PIV was removed. Patient discharged from the hospital via wheel chair to home with lashaunter.

## 2024-09-12 NOTE — PROGRESS NOTES
Pt prepped for biliary tube exchange.PIV placed and ABX started.Consent signed and questions answered.

## 2024-09-12 NOTE — IR NOTE
Patient Name: Elza Greer  Medical Record Number: [id number]  Today's Date: 9/12/2024    Procedure: Image Guided Biliary Tube check with possible change to extend biliary drain  Proceduralist: Dr. Ronak Dorsey    Procedure Start: 1018  Procedure end: 1031  Sedation medications administered: 3 mg Versed and 150 mcg fentanyl     Report given to: 2A    Other Notes: Pt arrived to IR room 4 from . Consent reviewed. Pt denies any questions or concerns regarding procedure. Pt positioned supine and monitored per protocol. Pt tolerated procedure without any noted complications. Pt transferred back to . Drain replaced with 10fr.

## 2024-09-12 NOTE — PROCEDURES
[facility]    Procedure: IR Procedure Note    Date/Time: 9/12/2024 10:42 AM    Performed by: Ronak Dorsey MD  Authorized by: Ronak Dorsey MD  IR Fellow Physician:    Pre Procedure Diagnosis: PTBD, HJ, I/E drain replacement  Post Procedure Diagnosis: External drain placement    UNIVERSAL PROTOCOL   Site Marked: Yes  Prior Images Obtained and Reviewed:  Yes  Required items: Required blood products, implants, devices and special equipment available    Patient identity confirmed:  Verbally with patient, arm band, provided demographic data and hospital-assigned identification number  Patient was reevaluated immediately before administering moderate or deep sedation or anesthesia  Confirmation Checklist:  Patient's identity using two indicators, relevant allergies, procedure was appropriate and matched the consent or emergent situation and correct equipment/implants were available  Time out: Immediately prior to the procedure a time out was called    Universal Protocol: the Joint Commission Universal Protocol was followed    Preparation: Patient was prepped and draped in usual sterile fashion       ANESTHESIA    Anesthesia:  Local infiltration  Local Anesthetic:  Lidocaine 1% without epinephrine      SEDATION    Patient Sedated: No    See dictated procedure note for full details.  Findings: Stenosis of HJ but contrast transits briskly to the bowel.     Specimens: none    Procedural Complications: None    Condition: Stable    Plan: External drain 10Fr  Return in 10 days for possible removal  Attach to bag tonight  Cap tomorrow      PROCEDURE  Describe Procedure: Removal of IE drain  Cholangiogram  Placement of J tip drain, proximal to stenosis of HJ    Patient Tolerance:  Patient tolerated the procedure well with no immediate complications  Length of time physician/provider present for 1:1 monitoring during sedation:  0 min and 0-22 min

## 2024-09-12 NOTE — PROGRESS NOTES
Pt returned from IR via liter accompanied by nurse at 1045.Right upper abd drain site dressing is C/D/I no bleeding and pt complains of some slight pain but declines any intervention.Biliary tube is to gravity drain bag.Pt tolerating food and fluids.

## 2024-09-16 ENCOUNTER — TELEPHONE (OUTPATIENT)
Dept: INTERVENTIONAL RADIOLOGY/VASCULAR | Facility: CLINIC | Age: 49
End: 2024-09-16
Payer: COMMERCIAL

## 2024-09-16 ENCOUNTER — PATIENT OUTREACH (OUTPATIENT)
Dept: GASTROENTEROLOGY | Facility: CLINIC | Age: 49
End: 2024-09-16
Payer: COMMERCIAL

## 2024-09-16 NOTE — TELEPHONE ENCOUNTER
Dr. Geller notified at 1040 via secured chat that patient needs a code status ordered as listed as prior currently.    Pt. Calls IR triage with complaints of bilateral lower extremity swelling. Most swelling to bilat ankles and top of feet. Reports having bilirary tube changed Thur 9/12. Capped biliary tube Friday 9/13. Noted swelling 9/13 evening.  Says over the weekend her temp was 99.4-100. Today is 98.4. Mild abd discomfort. No drainage at bili site. States the swelling does decrease with elevation however swelling does return if legs are in dependant position. LACHELLE provider updated. Encourage pt to keep legs elevated, wear compression sock, decrease sodium intake. At this time symptoms do not appear to be caused by biliary drain and pt is aware to call back if new symptoms,  fever, pain occur.

## 2024-09-16 NOTE — TELEPHONE ENCOUNTER
Patient had drain exchange last week, capped it, and her legs got swollen. Sometimes swollen legs is the first sign of infection.   No new pain in abdomen, tube site is sensitive. Didn't need oxy last night, 2-3/10 drain site pain.     Swelling in BLE- Lower half of calf, ankle and top of feet very swollen, not pitting edema. Has been elevating her legs at night, similar swelling on both feet/legs. Has compression stocking at home. Recommended continued elevation of her legs and compression stocking as needed if helpful     BP ok, sats and HR ok per patient. Drain still capped. Reporting fever about a week ago, tmax 101.4 before drain change last week. 98.1 this morning. IR advised her to reach out to Dr. Nicole for further triage. Reinforced keeping drain open for now, message sent to Dr. Nicole    ML

## 2024-09-16 NOTE — TELEPHONE ENCOUNTER
"Per Dr. Nicole  \"Those are unrelated symptoms. I agree with keeping the tube clamped unless she has right upper quadrant pain or fevers. She should follow up with her PCP for an exam of her lower ext edema. \"    Reviewed above with patient, discussed that if taking advil/tylenol could mask a fever. Patient knows to unclamp drain and contact us with above symptoms.    ML  "

## 2024-09-17 NOTE — TELEPHONE ENCOUNTER
I spoke with Elza regarding her upcoming biliary tube check, she feels comfortable with the instructions.    She reports continued pain at the biliary tube site that she discussed with our team yesterday. Still afebrile and pain is much less than she has had with the tube in the past.    Educated patient to call IR if pain worsens or signs of infection develop

## 2024-09-19 ENCOUNTER — MYC MEDICAL ADVICE (OUTPATIENT)
Dept: GASTROENTEROLOGY | Facility: CLINIC | Age: 49
End: 2024-09-19
Payer: COMMERCIAL

## 2024-09-19 DIAGNOSIS — K83.1 BILIARY STRICTURE (H): ICD-10-CM

## 2024-09-19 DIAGNOSIS — S36.13XS BILE DUCT INJURY, SEQUELA: Primary | ICD-10-CM

## 2024-09-19 RX ORDER — LIDOCAINE 40 MG/G
CREAM TOPICAL
Status: CANCELLED | OUTPATIENT
Start: 2024-09-19

## 2024-09-19 RX ORDER — AMPICILLIN AND SULBACTAM 2; 1 G/1; G/1
3 INJECTION, POWDER, FOR SOLUTION INTRAMUSCULAR; INTRAVENOUS
Status: CANCELLED | OUTPATIENT
Start: 2024-09-19

## 2024-09-19 RX ORDER — SODIUM CHLORIDE 9 MG/ML
INJECTION, SOLUTION INTRAVENOUS CONTINUOUS
Status: CANCELLED | OUTPATIENT
Start: 2024-09-19

## 2024-09-20 RX ORDER — OXYCODONE HYDROCHLORIDE 5 MG/1
5 TABLET ORAL EVERY 6 HOURS PRN
Qty: 12 TABLET | Refills: 0 | Status: SHIPPED | OUTPATIENT
Start: 2024-09-20 | End: 2024-09-23

## 2024-09-23 ENCOUNTER — HOSPITAL ENCOUNTER (OUTPATIENT)
Facility: CLINIC | Age: 49
Discharge: HOME | End: 2024-09-23
Attending: RADIOLOGY | Admitting: RADIOLOGY
Payer: COMMERCIAL

## 2024-09-23 ENCOUNTER — APPOINTMENT (OUTPATIENT)
Dept: INTERVENTIONAL RADIOLOGY/VASCULAR | Facility: CLINIC | Age: 49
End: 2024-09-23
Attending: PHYSICIAN ASSISTANT
Payer: COMMERCIAL

## 2024-09-23 ENCOUNTER — APPOINTMENT (OUTPATIENT)
Dept: MEDSURG UNIT | Facility: CLINIC | Age: 49
End: 2024-09-23
Attending: RADIOLOGY
Payer: COMMERCIAL

## 2024-09-23 VITALS
RESPIRATION RATE: 16 BRPM | DIASTOLIC BLOOD PRESSURE: 97 MMHG | OXYGEN SATURATION: 96 % | TEMPERATURE: 99.3 F | SYSTOLIC BLOOD PRESSURE: 164 MMHG

## 2024-09-23 DIAGNOSIS — K83.1 BILIARY STRICTURE (H): ICD-10-CM

## 2024-09-23 DIAGNOSIS — J45.20 MILD INTERMITTENT ASTHMA WITHOUT COMPLICATION: ICD-10-CM

## 2024-09-23 DIAGNOSIS — G47.34 NOCTURNAL HYPOXEMIA: ICD-10-CM

## 2024-09-23 DIAGNOSIS — E66.01 MORBID OBESITY (H): ICD-10-CM

## 2024-09-23 DIAGNOSIS — F41.9 ANXIETY: ICD-10-CM

## 2024-09-23 DIAGNOSIS — F17.200 TOBACCO USE DISORDER: ICD-10-CM

## 2024-09-23 DIAGNOSIS — G47.30 SLEEP APNEA, UNSPECIFIED TYPE: Primary | ICD-10-CM

## 2024-09-23 DIAGNOSIS — I10 PRIMARY HYPERTENSION: ICD-10-CM

## 2024-09-23 LAB
ALBUMIN SERPL BCG-MCNC: 3.6 G/DL (ref 3.5–5.2)
ALP SERPL-CCNC: 135 U/L (ref 40–150)
ALT SERPL W P-5'-P-CCNC: 12 U/L (ref 0–50)
AST SERPL W P-5'-P-CCNC: 18 U/L (ref 0–45)
BILIRUB DIRECT SERPL-MCNC: <0.2 MG/DL (ref 0–0.3)
BILIRUB SERPL-MCNC: 0.3 MG/DL
PROT SERPL-MCNC: 6.6 G/DL (ref 6.4–8.3)

## 2024-09-23 PROCEDURE — 999N000142 HC STATISTIC PROCEDURE PREP ONLY

## 2024-09-23 PROCEDURE — G0463 HOSPITAL OUTPT CLINIC VISIT: HCPCS

## 2024-09-23 PROCEDURE — 36415 COLL VENOUS BLD VENIPUNCTURE: CPT | Performed by: NURSE PRACTITIONER

## 2024-09-23 PROCEDURE — 99212 OFFICE O/P EST SF 10 MIN: CPT | Performed by: STUDENT IN AN ORGANIZED HEALTH CARE EDUCATION/TRAINING PROGRAM

## 2024-09-23 PROCEDURE — 82040 ASSAY OF SERUM ALBUMIN: CPT | Performed by: NURSE PRACTITIONER

## 2024-09-23 ASSESSMENT — ACTIVITIES OF DAILY LIVING (ADL)
ADLS_ACUITY_SCORE: 37

## 2024-09-23 NOTE — PROGRESS NOTES
IR procedure cancelled. Biliary tube removed at bedside on 2A per IR provider. Transparent dressing with gauze per IR provider. Additional dressing supplies provided with instructions to change daily and PRN. Pt ambulatory to exit. No further needs.

## 2024-09-23 NOTE — PROCEDURES
Interventional Radiology Brief Post Procedure Note    Procedure: Biliary drain removal    Proceduralist: Parris Duke MD;     Assistant: Radiology Resident Physician, Jonathan Sawyer MD    Sedation: None    Findings: Patient tolerated capping trial with externalization of the drain. Liver labs from today reviewed with normal LFTs and bilirubin. Drain was removed bedside.     Estimated Blood Loss: None    SPECIMENS: None    Complications: 1. None     Condition: Stable    Plan: No IR follow up is needed.    Parris Duke MD

## 2024-09-24 DIAGNOSIS — I10 PRIMARY HYPERTENSION: ICD-10-CM

## 2024-09-24 DIAGNOSIS — J45.20 MILD INTERMITTENT ASTHMA WITHOUT COMPLICATION: ICD-10-CM

## 2024-09-24 DIAGNOSIS — F17.200 TOBACCO USE DISORDER: ICD-10-CM

## 2024-09-24 DIAGNOSIS — G47.30 SLEEP APNEA, UNSPECIFIED TYPE: Primary | ICD-10-CM

## 2024-09-24 DIAGNOSIS — G47.34 NOCTURNAL HYPOXEMIA: ICD-10-CM

## 2024-09-24 DIAGNOSIS — E66.01 MORBID OBESITY (H): ICD-10-CM

## 2024-10-04 ENCOUNTER — TELEPHONE (OUTPATIENT)
Dept: SLEEP MEDICINE | Facility: CLINIC | Age: 49
End: 2024-10-04
Payer: COMMERCIAL

## 2024-10-04 ENCOUNTER — DOCUMENTATION ONLY (OUTPATIENT)
Dept: SLEEP MEDICINE | Facility: CLINIC | Age: 49
End: 2024-10-04
Payer: COMMERCIAL

## 2024-10-04 NOTE — PROGRESS NOTES
Elza Carlos has an upcoming lab appointment with us. Her appointment note says [place]. Please review and place orders if needed.  Thank you,  [facility]

## 2024-10-04 NOTE — PROGRESS NOTES
Patient has lab appointment on 10/09/2024. Patient states orders are for you. Please place orders for appointment on 10/09/2024.  Thank you,  Lab

## 2024-10-04 NOTE — TELEPHONE ENCOUNTER
Order/Referral Request    Who is requesting: patient     Orders being requested: are labs still needed for upcoming sleep study, if yes then orders need to be put in.    Reason service is needed/diagnosis: sleep study    When are orders needed by: as soon as possible before wednsday    Has this been discussed with Provider: No    Does patient have a preference on a Group/Provider/Facility? [facility]     Does patient have an appointment scheduled?: Yes: 10/9/24    Where to send orders: Place orders within Epic    Could we send this information to you in [place] or would you prefer to receive a phone call?:   Patient would prefer a phone call   Okay to leave a detailed message?: Yes at Cell number on file:    Telephone Information:   Mobile [phone]

## 2024-10-07 NOTE — TELEPHONE ENCOUNTER
Patient needs to be rescheduled for HST. Does she still need labs?    Marion GARCIA RN  [facility]

## 2024-10-08 NOTE — TELEPHONE ENCOUNTER
Spoke with patient. Advised in lab is not covered and insurance requires HST. Orders are in chart. Advised ABG no longer needed. Lab apt 10/9 ans PSG 10/10 canceled.     Info given to HST specialist for scheduling    Marion GARCIA RN  [facility]

## 2024-10-14 ENCOUNTER — OFFICE VISIT (OUTPATIENT)
Dept: FAMILY MEDICINE | Facility: CLINIC | Age: 49
End: 2024-10-14
Payer: COMMERCIAL

## 2024-10-14 VITALS
TEMPERATURE: 98 F | WEIGHT: 252 LBS | OXYGEN SATURATION: 96 % | DIASTOLIC BLOOD PRESSURE: 82 MMHG | HEIGHT: 64 IN | HEART RATE: 88 BPM | RESPIRATION RATE: 18 BRPM | SYSTOLIC BLOOD PRESSURE: 138 MMHG | BODY MASS INDEX: 43.02 KG/M2

## 2024-10-14 DIAGNOSIS — R05.1 ACUTE COUGH: ICD-10-CM

## 2024-10-14 DIAGNOSIS — R60.0 BILATERAL LEG EDEMA: Primary | ICD-10-CM

## 2024-10-14 PROBLEM — R65.21 SEPSIS DUE TO ESCHERICHIA COLI WITH ACUTE HYPOXIC RESPIRATORY FAILURE AND SEPTIC SHOCK (H): Status: RESOLVED | Noted: 2024-04-03 | Resolved: 2024-10-14

## 2024-10-14 PROBLEM — J96.01 SEPSIS DUE TO ESCHERICHIA COLI WITH ACUTE HYPOXIC RESPIRATORY FAILURE AND SEPTIC SHOCK (H): Status: RESOLVED | Noted: 2024-04-03 | Resolved: 2024-10-14

## 2024-10-14 PROBLEM — A41.51 SEPSIS DUE TO ESCHERICHIA COLI WITH ACUTE HYPOXIC RESPIRATORY FAILURE AND SEPTIC SHOCK (H): Status: RESOLVED | Noted: 2024-04-03 | Resolved: 2024-10-14

## 2024-10-14 PROCEDURE — 99214 OFFICE O/P EST MOD 30 MIN: CPT | Mod: 25 | Performed by: NURSE PRACTITIONER

## 2024-10-14 PROCEDURE — 90471 IMMUNIZATION ADMIN: CPT | Performed by: NURSE PRACTITIONER

## 2024-10-14 PROCEDURE — 90656 IIV3 VACC NO PRSV 0.5 ML IM: CPT | Performed by: NURSE PRACTITIONER

## 2024-10-14 RX ORDER — FUROSEMIDE 20 MG/1
20 TABLET ORAL DAILY
Qty: 7 TABLET | Refills: 0 | Status: SHIPPED | OUTPATIENT
Start: 2024-10-14 | End: 2024-10-21

## 2024-10-14 RX ORDER — ALBUTEROL SULFATE 90 UG/1
1-2 AEROSOL, METERED RESPIRATORY (INHALATION) EVERY 6 HOURS PRN
Qty: 18 G | Refills: 0 | Status: SHIPPED | OUTPATIENT
Start: 2024-10-14

## 2024-10-14 ASSESSMENT — ASTHMA QUESTIONNAIRES
QUESTION_1 LAST FOUR WEEKS HOW MUCH OF THE TIME DID YOUR ASTHMA KEEP YOU FROM GETTING AS MUCH DONE AT WORK, SCHOOL OR AT HOME: SOME OF THE TIME
QUESTION_3 LAST FOUR WEEKS HOW OFTEN DID YOUR ASTHMA SYMPTOMS (WHEEZING, COUGHING, SHORTNESS OF BREATH, CHEST TIGHTNESS OR PAIN) WAKE YOU UP AT NIGHT OR EARLIER THAN USUAL IN THE MORNING: NOT AT ALL
QUESTION_4 LAST FOUR WEEKS HOW OFTEN HAVE YOU USED YOUR RESCUE INHALER OR NEBULIZER MEDICATION (SUCH AS ALBUTEROL): ONE OR TWO TIMES PER DAY
QUESTION_2 LAST FOUR WEEKS HOW OFTEN HAVE YOU HAD SHORTNESS OF BREATH: ONCE OR TWICE A WEEK
ACT_TOTALSCORE: 17
ACT_TOTALSCORE: 17
QUESTION_5 LAST FOUR WEEKS HOW WOULD YOU RATE YOUR ASTHMA CONTROL: SOMEWHAT CONTROLLED

## 2024-10-14 ASSESSMENT — PAIN SCALES - GENERAL: PAINLEVEL: MILD PAIN (2)

## 2024-10-14 NOTE — PROGRESS NOTES
Assessment & Plan     Bilateral leg edema  Likely due to recent severe illness and hospitalizations.  Advised compression hose during the day, off at night.  Reduce sodium intake to <2 grams per day  One week of diuretic:  - furosemide (LASIX) 20 MG tablet; Take 1 tablet (20 mg) by mouth daily for 7 days.  Continue to walk as much as able.  Follow up with PCP as needed.    Acute cough  Refilled:  - VENTOLIN  (90 Base) MCG/ACT inhaler; Inhale 1-2 puffs into the lungs every 6 hours as needed for shortness of breath, wheezing or cough.      Patient's o2 sats were normal on room air today.  Lung exam clear.  She does not feel the need to have oxygen at home any longer  May discontinue oxygen at this time.      The risks, benefits and treatment options of prescribed medications or other treatments have been discussed with the patient. The patient verbalized their understanding and should call or follow up if no improvement or if they develop further problems.  Giovanna Lewis, KIRAN Cavanaugh   Elza is a 49 year old, presenting for the following health issues:  Edema (Bi-lat legs)        10/14/2024    10:12 AM   Additional Questions   Roomed by Marianela   Accompanied by self     History of Present Illness       Reason for visit:  Edema bi-lat legs   She is taking medications regularly.         Concern - edema  Onset: ~ 2 wks   Description: bilat leg swelling  Intensity: moderate  Progression of Symptoms:  same  Accompanying Signs & Symptoms: pain calf thru ankle   Previous history of similar problem: no  Precipitating factors:        Worsened by: walking  Alleviating factors:        Improved by: elevation   Therapies tried and outcome: None        Wt Readings from Last 4 Encounters:   10/14/24 114.3 kg (252 lb)   09/12/24 116 kg (255 lb 12.8 oz)   08/30/24 108.3 kg (238 lb 12.1 oz)   08/12/24 108.8 kg (239 lb 12.8 oz)       She would like lungs listened to - wants to discontinue home oxygen.  Needed  "after sepsis          Review of Systems  Constitutional, HEENT, cardiovascular, pulmonary, gi and gu systems are negative, except as otherwise noted.      Objective    /82   Pulse 88   Temp 98  F (36.7  C) (Tympanic)   Resp 18   Ht 1.626 m (5' 4\")   Wt 114.3 kg (252 lb)   LMP 08/26/2005   SpO2 96%   BMI 43.26 kg/m    Body mass index is 43.26 kg/m .  Physical Exam   GENERAL: alert and no distress  NECK: no adenopathy, no asymmetry, masses, or scars  RESP: lungs clear to auscultation - no rales, rhonchi or wheezes  CV: regular rate and rhythm, normal S1 S2, no S3 or S4, no murmur, click or rub  MS: +1 pitting BLE edema ankles to mid-shin.            Signed Electronically by: NASH Jay CNP    "

## 2024-10-15 ENCOUNTER — OFFICE VISIT (OUTPATIENT)
Dept: SLEEP MEDICINE | Facility: CLINIC | Age: 49
End: 2024-10-15
Payer: COMMERCIAL

## 2024-10-15 DIAGNOSIS — G47.34 NOCTURNAL HYPOXEMIA: ICD-10-CM

## 2024-10-15 DIAGNOSIS — J45.20 MILD INTERMITTENT ASTHMA WITHOUT COMPLICATION: ICD-10-CM

## 2024-10-15 DIAGNOSIS — I10 PRIMARY HYPERTENSION: ICD-10-CM

## 2024-10-15 DIAGNOSIS — E66.01 MORBID OBESITY (H): ICD-10-CM

## 2024-10-15 DIAGNOSIS — F17.200 TOBACCO USE DISORDER: ICD-10-CM

## 2024-10-15 DIAGNOSIS — G47.30 SLEEP APNEA, UNSPECIFIED TYPE: ICD-10-CM

## 2024-10-15 PROCEDURE — G0399 HOME SLEEP TEST/TYPE 3 PORTA: HCPCS | Performed by: INTERNAL MEDICINE

## 2024-10-15 NOTE — PROGRESS NOTES
Pt is completing a home sleep test. Pt was instructed on how to put on the Noxturnal T3 device and associated equipment before going to bed and given the opportunity to practice putting it on before leaving the sleep center. Pt was reminded to bring the home sleep test kit back to the center tomorrow, at the scheduled time for download and reporting. Patient was instructed to complete study using the following treatment?  None    Device number: 27  Tracey Garcia CMA on 10/15/2024 at 2:50 PM

## 2024-10-16 ENCOUNTER — DOCUMENTATION ONLY (OUTPATIENT)
Dept: SLEEP MEDICINE | Facility: CLINIC | Age: 49
End: 2024-10-16
Payer: COMMERCIAL

## 2024-10-16 DIAGNOSIS — E55.9 VITAMIN D DEFICIENCY: ICD-10-CM

## 2024-10-16 NOTE — PROGRESS NOTES
HST POST-STUDY QUESTIONNAIRE    What time did you go to bed?  10:40 pm  How long do you think it took to fall asleep?  5-10 minutes  What time did you wake up to start the day?  5:30 am  Did you get up during the night at all?  No  If you woke up, do you remember approximately what time(s)?   Did you have any difficulty with the equipment?  No  Did you us any type of treatment with this study?  None  Was the head of the bed elevated? Yes  Did you sleep in a recliner?  No  Did you stop using CPAP at least 3 days before this test?  NA  Any other information you'd like us to know? My little dog slept by my legs, I slept on the couch mostly on my side.

## 2024-10-16 NOTE — NURSING NOTE
Pt returned HST device. It was downloaded and forwarded data to the clinical specialist for scoring.   Tracey Garcia CMA on 10/16/2024 at 8:55 AM

## 2024-10-18 NOTE — TELEPHONE ENCOUNTER
Requested Prescriptions   Pending Prescriptions Disp Refills    cholecalciferol (VITAMIN D3) 1250 mcg (37753 units) capsule [Pharmacy Med Name: Vitamin D3 1.25 MG (51734 UT) Oral Capsule] 4 capsule 0     Sig: Take 1 capsule by mouth once a week       Vitamin Supplements Protocol Failed - 10/16/2024  5:21 PM        Failed - The patient does not have an order for high-dose vitamin D        Failed - Recent (12 mo) or future (90 days) visit within the authorizing provider's specialty     The patient must have completed an in-person or virtual visit within the past 12 months or has a future visit scheduled within the next 90 days with the authorizing provider s specialty.  Urgent care and e-visits do not quality as an office visit for this protocol.          Passed - Medication is active on med list        Passed - Medication indicated for associated diagnosis     The medication is prescribed for one or more of the following conditions:     Vitamin deficiency   Osteopenia   Osteoporosis   Nutrition counseling   Nutrition education   Feeding ability finding   Bone density finding   Morbid Obesity   History of bypass of stomach   Idiopathic peripheral neuropathy   General examination of patient   Vitamin D deficiency   Folate deficiency anemia due to dietary causes   Sleeve resection of stomach   Rheumatoid factor level - finding   Crohn's disease   Psoriatic arthritis   Transplant of Kidney   Arthropathy   Alcoholism              Passed - The patient is 2 years of age or older

## 2024-10-21 RX ORDER — CHOLECALCIFEROL (VITAMIN D3) 1250 MCG
1250 CAPSULE ORAL WEEKLY
Qty: 4 CAPSULE | Refills: 0 | Status: SHIPPED | OUTPATIENT
Start: 2024-10-21

## 2024-10-28 NOTE — PROGRESS NOTES
This HSAT was performed using a Noxturnal T3 device which recorded snore, sound, movement activity, body position, nasal pressure, oronasal thermal airflow, pulse, oximetry and both chest and abdominal respiratory effort. HSAT data was restricted to the time patient states they were in bed.     HSAT was scored using 1B 4% hypopnea rule.     HST AHI (Non-PAT): 11.6  Snoring was reported as mild.  Time with SpO2 below 89% was 24.7 minutes.   Overall signal quality was good     Pt will follow up with sleep provider to determine appropriate therapy.

## 2024-10-29 NOTE — PROCEDURES
Home Sleep Study Interpretation    Patient: Elza Greer  MRN: [id number]  YOB: 1975  Study Date: 10/15/2024  PCP/Referring Provider: Di Shea;  Ordering Provider: FAB Garay    Indications for Home Study: Elza is a 49 Female who presents with symptoms suggestive of obstructive sleep apnea      Weight: 255.0 lbs  BMI: 43.8   STOP BAN/8      Data: A full night home sleep study was performed recording the standard physiologic parameters including body position, movement, sound, nasal pressure, thermal oral airflow, chest and abdominal movements with respiratory inductance plethysmography, and oxygen saturation by pulse oximetry. Pulse rate was estimated by oximetry recording. This study was considered adequate based on > 4 hours of quality oximetry and respiratory recording. As specified by the AASM Manual for the Scoring of Sleep and Associated events, version 2.3, Rule VIII.D 1B, 4% oxygen desaturation scoring for hypopneas is used as a standard of care on all home sleep apnea testing.    Analysis Time: 404 minutes    Respiration:  Sleep Associated Hypoxemia: Sustained hypoxemia was present. Baseline oxygen saturation was 98. Time with saturation less than 89% was 24.7 minutes. Time with saturation less than 90% was 46.5 minutes. The lowest oxygen saturation was 82.0%.  Snoring: Snoring was present 16% of the time with an average level of 69 dB. Duration of time snoring above 70 dB was 62.4 minutes.    Respiratory events: The home study revealed a presence of 2 obstructive apneas and 0 mixed and central apneas. There were 74 hypopneas resulting in a combined apnea/hypopnea index [AHI] of 12 events per hour with  12 per hour supine, 0  per hour prone, 0 per hour upright, 11  per hour left side, and 8 per hour right side.    Position: Percent of time spent: Supine 60%, prone 0%, upright 3%, on left 35%, on right 2%.    Heart Rate: By Pulse Oximetry normal rate was noted.  Some episodic tachycardia appreciated.    Assessment:  mild obstructive sleep apnea.  Sleep associated hypoxemia was present.    Recommendations:  Consider auto-CPAP at 5-20 cmH2O, oral appliance therapy, positional therapy, or polysomnography with full night PAP titration  Suggest optimizing sleep hygiene and avoiding sleep deprivation  Weight management    Diagnosis Code(s): Obstructive Sleep Apnea G47.33, Hypoxemia G47.36    Electronically signed by: Zeb Broderick DO October 29, 2024  Diplomate, American Board of Internal Medicine, Sleep Medicine

## 2024-10-30 ENCOUNTER — HOSPITAL ENCOUNTER (EMERGENCY)
Facility: CLINIC | Age: 49
Discharge: HOME | End: 2024-10-30
Payer: COMMERCIAL

## 2024-10-30 ENCOUNTER — TELEPHONE (OUTPATIENT)
Dept: FAMILY MEDICINE | Facility: CLINIC | Age: 49
End: 2024-10-30
Payer: COMMERCIAL

## 2024-10-30 ENCOUNTER — APPOINTMENT (OUTPATIENT)
Dept: GENERAL RADIOLOGY | Facility: CLINIC | Age: 49
End: 2024-10-30
Payer: COMMERCIAL

## 2024-10-30 VITALS
RESPIRATION RATE: 20 BRPM | DIASTOLIC BLOOD PRESSURE: 91 MMHG | OXYGEN SATURATION: 97 % | TEMPERATURE: 98.5 F | HEART RATE: 97 BPM | SYSTOLIC BLOOD PRESSURE: 148 MMHG

## 2024-10-30 DIAGNOSIS — J20.9 ACUTE BRONCHITIS: ICD-10-CM

## 2024-10-30 DIAGNOSIS — U07.1 COVID-19 VIRUS INFECTION: ICD-10-CM

## 2024-10-30 PROCEDURE — 71046 X-RAY EXAM CHEST 2 VIEWS: CPT

## 2024-10-30 PROCEDURE — G0463 HOSPITAL OUTPT CLINIC VISIT: HCPCS | Mod: 25

## 2024-10-30 PROCEDURE — 99214 OFFICE O/P EST MOD 30 MIN: CPT

## 2024-10-30 PROCEDURE — 250N000009 HC RX 250

## 2024-10-30 RX ORDER — ALBUTEROL SULFATE 0.83 MG/ML
2.5 SOLUTION RESPIRATORY (INHALATION) EVERY 6 HOURS PRN
Qty: 90 ML | Refills: 0 | Status: SHIPPED | OUTPATIENT
Start: 2024-10-30

## 2024-10-30 RX ORDER — SOFT LENS DISINFECTANT
1 SOLUTION, NON-ORAL MISCELLANEOUS DAILY
Qty: 1 EACH | Refills: 0 | Status: SHIPPED | OUTPATIENT
Start: 2024-10-30 | End: 2024-11-29

## 2024-10-30 RX ORDER — IPRATROPIUM BROMIDE AND ALBUTEROL SULFATE 2.5; .5 MG/3ML; MG/3ML
3 SOLUTION RESPIRATORY (INHALATION) ONCE
Status: COMPLETED | OUTPATIENT
Start: 2024-10-30 | End: 2024-10-30

## 2024-10-30 RX ORDER — PREDNISONE 10 MG/1
TABLET ORAL
Qty: 32 TABLET | Refills: 0 | Status: SHIPPED | OUTPATIENT
Start: 2024-10-30 | End: 2024-11-09

## 2024-10-30 RX ORDER — CODEINE PHOSPHATE AND GUAIFENESIN 10; 100 MG/5ML; MG/5ML
1-2 SOLUTION ORAL EVERY 4 HOURS PRN
Qty: 120 ML | Refills: 0 | Status: SHIPPED | OUTPATIENT
Start: 2024-10-30

## 2024-10-30 RX ADMIN — IPRATROPIUM BROMIDE AND ALBUTEROL SULFATE 3 ML: 2.5; .5 SOLUTION RESPIRATORY (INHALATION) at 14:32

## 2024-10-30 ASSESSMENT — ACTIVITIES OF DAILY LIVING (ADL): ADLS_ACUITY_SCORE: 0

## 2024-10-30 NOTE — TELEPHONE ENCOUNTER
Received call from Patient.  States that she tested positive for covid week ago from today.  Most symptoms have resolved.  However still has a horrible cough and HA  Temp of 100.6 yesterday.  Using inhaler.  Cough keeping her awake at night.  Took nyquil last night and was up cough for 1 hour in her sleep per her spouse.  No appointments available at [facility] or [facility].  Recommended that Patient go to [facility].  Patient going to [place] to be evaluated.  Dereck Vogel RN

## 2024-10-30 NOTE — ED PROVIDER NOTES
Chief Complaint:   Chief Complaint   Patient presents with    Cough         HPI:   Elza Greer is a 49 year old female with PMH significant for tobacco use, asthma and previous pneumonia infections who presents to the  with a 1 week history of sore throat, congestion, post nasal drip, productive cough, achiness, and low grade fevers.  Had positive at-home COVID test 1 week ago.  Reports symptoms are overall improving, however cough is persistent and productive with greenish sputum.  Worse at nighttime.  She has tried oral analgesics, DayQuil, NyQuil with mild improvement of symptoms.  She denies chest congestion, chest pain, decreased appetite, decreased urine output, ear pain, fatigue, fever, headache, nausea, sore throat, swollen glands, and vomiting. I reviewed her recent hospitalization on 8/30/2024 for biliary stricture.      Meds:   Current Outpatient Medications   Medication Sig Dispense Refill    albuterol (PROVENTIL) (2.5 MG/3ML) 0.083% neb solution Take 1 vial (2.5 mg) by nebulization every 6 hours as needed for shortness of breath, wheezing or cough. 90 mL 0    guaiFENesin-codeine (ROBITUSSIN AC) 100-10 MG/5ML solution Take 5-10 mLs by mouth every 4 hours as needed for cough. 120 mL 0    predniSONE (DELTASONE) 10 MG tablet Take 4 tablets daily for 5 days,  take 2 tablets daily for 3 days, take 1 tablet daily for 3 days, take half a tablet for 3 days. 32 tablet 0    Respiratory Therapy Supplies (NEBULIZER CUP/TUBING) REMI Take 1 each by mouth daily. 1 each 0    acetaminophen (TYLENOL) 500 MG tablet Take 1,000 mg by mouth every 6 hours as needed for mild pain. (Patient not taking: Reported on 10/14/2024)      albuterol (PROVENTIL) (2.5 MG/3ML) 0.083% neb solution Take 1 vial (2.5 mg) by nebulization every 6 hours as needed for shortness of breath, wheezing or cough 90 mL 0    Baclofen (LIORESAL) 5 MG tablet Take 1 tablet (5 mg) by mouth 3 times daily (Patient not taking: Reported on 10/14/2024) 30  tablet 2    butalbital-acetaminophen-caffeine (ESGIC) -40 MG tablet TAKE 1 TABLET EVERY 6 HOURS AS NEEDED FOR HEADACHE      cholecalciferol (VITAMIN D3) 1250 mcg (49043 units) capsule Take 1 capsule by mouth once a week 4 capsule 0    clobetasol propionate (CLOBEX) 0.05 % external shampoo Leave on scalp for 15 minutes then rinse. Use 1-2 times weekly. 118 mL 5    Fluocinolone Acetonide Scalp 0.01 % OIL oil Daily as needed for scalp psoriasis 118.28 mL 0    fluticasone (FLONASE) 50 MCG/ACT nasal spray Spray 2 sprays into both nostrils daily 16 g 0    furosemide (LASIX) 20 MG tablet Take 1 tablet (20 mg) by mouth daily for 7 days. 7 tablet 0    gabapentin (NEURONTIN) 100 MG capsule Take 1 capsule (100 mg) by mouth 2 times daily 180 capsule 1    ibuprofen (ADVIL/MOTRIN) 200 MG tablet Take 400 mg by mouth every 4 hours as needed for pain.      losartan (COZAAR) 25 MG tablet Take 1 tablet (25 mg) by mouth daily. 90 tablet 0    Multiple Vitamins-Minerals (MULTIVITAMIN WOMEN PO) Take 2 chew tab by mouth every morning gummy      nicotine (NICODERM CQ) 21 MG/24HR 24 hr patch Place 1 patch onto the skin every 24 hours.      nicotine (NICORETTE) 2 MG gum Place 2 mg inside cheek every hour as needed for nicotine withdrawal symptoms      nystatin (MYCOSTATIN) 164995 UNIT/GM external cream Apply topically 2 times daily as needed for dry skin      ondansetron (ZOFRAN ODT) 4 MG ODT tab Take 1 tablet (4 mg) by mouth every 8 hours as needed for nausea 30 tablet 0    ondansetron (ZOFRAN) 4 MG tablet Take 4 mg by mouth every 8 hours as needed for vomiting or nausea (Patient not taking: Reported on 10/14/2024)      sodium chloride, PF, (NORMAL SALINE FLUSH) 0.9% PF flush Irrigate with 10 mLs as directed every 24 hours Flush both biliary drains with 10 mL NS qdaily. (Patient not taking: Reported on 10/14/2024) 300 mL 2    VENTOLIN  (90 Base) MCG/ACT inhaler Inhale 1-2 puffs into the lungs every 6 hours as needed for  shortness of breath, wheezing or cough. 18 g 0       Allergies:   Allergies   Allergen Reactions    Azithromycin Anaphylaxis    Latex Hives       Medications updated and reviewed.  Past, family and surgical history is updated and reviewed in the record.     Review of Systems:  General: see HPI  HEENT: see HPI  Respiratory: see HPI    Physical Exam:   BP (!) 148/91   Pulse 97   Temp 98.5  F (36.9  C) (Tympanic)   Resp 20   LMP 08/26/2005   SpO2 97%    General: alert and no acute distress  Eyes: negative, conjunctivae not injected /corneas clear. PERRL, EOM's intact.  Ears: negative, External ears normal. Canals clear. TM's normal.  Nose: Nares normal and no rhinorrhea  Mouth/Throat: NORMAL - moist mucus membranes, no erythema, no adenopathy, no exudates.  Neck: Neck supple. No adenopathy.   Chest/Pulmonary: Mild wheezing throughout, no rales, or rhonchi. No signs of respiratory distress.  Cardiovascular: RRR normal S1S2  Abdomen: Abdomen soft, non-tender. BS normal. No masses,   Skin:  Skin color, texture, turgor normal. No rashes or lesions.        Results for orders placed or performed during the hospital encounter of 10/30/24 (from the past 24 hours)   XR Chest 2 Views    Narrative    XR CHEST 2 VIEWS   10/30/2024 2:17 PM     HISTORY: Cough and congestion.    COMPARISON: 5/16/2024.      Impression    IMPRESSION: No acute cardiopulmonary disease.       Assessment:  Acute bronchitis  COVID-19 virus infection    Plan:   49-year-old female recently diagnosed with COVID-19 infection 1 week ago who presents for evaluation of ongoing cough.  Cough is productive with greenish sputum.  Has history of pneumonia in the past and would like this ruled out.  Other symptoms have overall improved and she is without fevers, chills, shortness of breath or trouble with breathing, chest pain or tightness, palpitations, abdominal pain, nausea or vomiting.    patient well-appearing on arrival with VSS aside from HTN. Satting well  at 97% with normal respiratory rate.  Afebrile.  Exam above with normal effort of breathing, mild wheezing without rales or rhonchi.  No signs of respiratory distress.  Chest x-ray ordered and independently reviewed without signs of acute cardiopulmonary disease.   Reassurance given regarding lack of signs of serious infection. Patient given DuoNeb here and reports some relief of her symptoms.  Albuterol neb solution and supplies refilled.  Prednisone taper sent due to the wheezing.  Recommended supportive cares to help with the cough.  Discussed strict return precautions with the patient, she is to return to  ED with fever, trouble swallowing or breathing, or any other concerns. .all questions answered.  Patient verbalizes understanding and agreement with the above plan.     Condition on disposition: Stable    Disclaimer: This note consists of symbols derived from keyboarding, dictation, and/or voice recognition software. As a result, there may be errors in the script that have gone undetected.  Please consider this when interpreting information found in the chart.       Elisha Love PA-C  10/31/24 [id number]

## 2024-10-30 NOTE — ED TRIAGE NOTES
Pt reports positive COVID last week and has concerns for pneumonia   Pt states she took Advil around 1300

## 2024-10-30 NOTE — DISCHARGE INSTRUCTIONS
-Your chest x-ray did not show any signs of pneumonia.  -I suspect your lungs are likely inflamed due to the COVID-19 virus.    -I sent a refill of your nebulizer tubing and albuterol solution.  I also sent a prednisone taper to aid with the inflammation.  -Please return if you have worsening symptoms like increased shortness of breath, chest pain, confusion, fevers or chills, or anything else that concerns you.

## 2024-11-13 ASSESSMENT — SLEEP AND FATIGUE QUESTIONNAIRES
HOW LIKELY ARE YOU TO NOD OFF OR FALL ASLEEP WHILE LYING DOWN TO REST IN THE AFTERNOON WHEN CIRCUMSTANCES PERMIT: MODERATE CHANCE OF DOZING
HOW LIKELY ARE YOU TO NOD OFF OR FALL ASLEEP WHILE WATCHING TV: HIGH CHANCE OF DOZING
HOW LIKELY ARE YOU TO NOD OFF OR FALL ASLEEP WHILE SITTING AND TALKING TO SOMEONE: WOULD NEVER DOZE
HOW LIKELY ARE YOU TO NOD OFF OR FALL ASLEEP WHEN YOU ARE A PASSENGER IN A CAR FOR AN HOUR WITHOUT A BREAK: MODERATE CHANCE OF DOZING
HOW LIKELY ARE YOU TO NOD OFF OR FALL ASLEEP IN A CAR, WHILE STOPPED FOR A FEW MINUTES IN TRAFFIC: WOULD NEVER DOZE
HOW LIKELY ARE YOU TO NOD OFF OR FALL ASLEEP WHILE SITTING INACTIVE IN A PUBLIC PLACE: MODERATE CHANCE OF DOZING
HOW LIKELY ARE YOU TO NOD OFF OR FALL ASLEEP WHILE SITTING AND READING: MODERATE CHANCE OF DOZING
HOW LIKELY ARE YOU TO NOD OFF OR FALL ASLEEP WHILE SITTING QUIETLY AFTER LUNCH WITHOUT ALCOHOL: SLIGHT CHANCE OF DOZING

## 2024-11-14 ENCOUNTER — VIRTUAL VISIT (OUTPATIENT)
Dept: SLEEP MEDICINE | Facility: CLINIC | Age: 49
End: 2024-11-14
Payer: COMMERCIAL

## 2024-11-14 VITALS — HEIGHT: 64 IN | BODY MASS INDEX: 41.83 KG/M2 | WEIGHT: 245 LBS

## 2024-11-14 DIAGNOSIS — J45.20 MILD INTERMITTENT ASTHMA WITHOUT COMPLICATION: ICD-10-CM

## 2024-11-14 DIAGNOSIS — F41.9 ANXIETY: ICD-10-CM

## 2024-11-14 DIAGNOSIS — E66.01 MORBID OBESITY (H): ICD-10-CM

## 2024-11-14 DIAGNOSIS — F17.200 TOBACCO USE DISORDER: ICD-10-CM

## 2024-11-14 DIAGNOSIS — G47.33 OSA (OBSTRUCTIVE SLEEP APNEA): Primary | ICD-10-CM

## 2024-11-14 DIAGNOSIS — I10 PRIMARY HYPERTENSION: ICD-10-CM

## 2024-11-14 PROCEDURE — 99214 OFFICE O/P EST MOD 30 MIN: CPT | Mod: 95 | Performed by: NURSE PRACTITIONER

## 2024-11-14 ASSESSMENT — PAIN SCALES - GENERAL: PAINLEVEL_OUTOF10: MILD PAIN (2)

## 2024-11-14 NOTE — PATIENT INSTRUCTIONS
Drive Safe... Drive Alive     Sleep health profoundly affects your health, mood, and your safety. 33% of the population (one in three of us) is not getting enough sleep and many have a sleep disorder. Not getting enough sleep or having an untreated / undertreated sleep condition may make us sleepy without even knowing it. In fact, our driving could be dramatically impaired due to our sleep health. As your provider, here are some things I would like you to know about driving:     Here are some warning signs for impairment and dangerous drowsy driving:              -Having been awake more than 16 hours               -Looking tired               -Eyelid drooping              -Head nodding (it could be too late at this point)              -Driving for more than 30 minutes     Some things you could do to make the driving safer if you are experiencing some drowsiness:              -Stop driving and rest              -Call for transportation              -Make sure your sleep disorder is adequately treated     Some things that have been shown NOT to work when experiencing drowsiness while driving:              -Turning on the radio              -Opening windows              -Eating any  distracting  /  entertaining  foods (e.g., sunflower seeds, candy, or any other)              -Talking on the phone      Your decision may not only impact your life, but also the life of others. Please, remember to drive safe for yourself and all of us.           Your BMI is Body mass index is 42.05 kg/m .    What is BMI?  Body mass index (BMI) is one way to tell whether you are at a healthy weight, overweight, or obese. It measures your weight in relation to your height.  A BMI of 18.5 to 24.9 is in the healthy range. A person with a BMI of 25 to 29.9 is considered overweight, and someone with a BMI of 30 or greater is considered obese.  Another way to find out if you are at risk for health problems caused by overweight and obesity is to  measure your waist. If you are a woman and your waist is more than 35 inches, or if you are a man and your waist is more than 40 inches, your risk of disease may be higher.  More than two-thirds of American adults are considered overweight or obese. Being overweight or obese increases the risk for further weight gain.  Excess weight may lead to heart disease and diabetes. Creating and following plans for healthy eating and physical activity may help you improve your health.    Methods for maintaining or losing weight.  Weight control is part of healthy lifestyle and includes exercise, emotional health, and healthy eating habits.  Careful eating habits lifelong is the mainstay of weight control.  Though there are significant health benefits from weight loss, long-term weight loss with diet alone may be very difficult to achieve- studies show long-term success with dietary management in less than 10% of people. Attaining a healthy weight may be especially difficult to achieve in those with severe obesity. In some cases, medications, devices and surgical management might be considered.    What can you do?  If you are overweight or obese and are interested in methods for weight loss, you should discuss this with your provider. In addition, we recommend that you review healthy life styles and methods for weight loss available through the National Institutes of Health patient information sites:   http://win.niddk.nih.gov/publications/index.htm     Drive Safe... Drive Alive     Sleep health profoundly affects your health, mood, and your safety. 33% of the population (one in three of us) is not getting enough sleep and many have a sleep disorder. Not getting enough sleep or having an untreated / undertreated sleep condition may make us sleepy without even knowing it. In fact, our driving could be dramatically impaired due to our sleep health. As your provider, here are some things I would like you to know about driving:      Here are some warning signs for impairment and dangerous drowsy driving:              -Having been awake more than 16 hours               -Looking tired               -Eyelid drooping              -Head nodding (it could be too late at this point)              -Driving for more than 30 minutes     Some things you could do to make the driving safer if you are experiencing some drowsiness:              -Stop driving and rest              -Call for transportation              -Make sure your sleep disorder is adequately treated     Some things that have been shown NOT to work when experiencing drowsiness while driving:              -Turning on the radio              -Opening windows              -Eating any  distracting  /  entertaining  foods (e.g., sunflower seeds, candy, or any other)              -Talking on the phone      Your decision may not only impact your life, but also the life of others. Please, remember to drive safe for yourself and all of us.     Your Body mass index is 42.05 kg/m .  Weight management is a personal decision.  If you are interested in exploring weight loss strategies, the following discussion covers the approaches that may be successful. Body mass index (BMI) is one way to tell whether you are at a healthy weight, overweight, or obese. It measures your weight in relation to your height.  A BMI of 18.5 to 24.9 is in the healthy range. A person with a BMI of 25 to 29.9 is considered overweight, and someone with a BMI of 30 or greater is considered obese. More than two-thirds of American adults are considered overweight or obese.  Being overweight or obese increases the risk for further weight gain. Excess weight may lead to heart disease and diabetes.  Creating and following plans for healthy eating and physical activity may help you improve your health.  Weight control is part of healthy lifestyle and includes exercise, emotional health, and healthy eating habits. Careful eating habits  lifelong are the mainstay of weight control. Though there are significant health benefits from weight loss, long-term weight loss with diet alone may be very difficult to achieve- studies show long-term success with dietary management in less than 10% of people. Attaining a healthy weight may be especially difficult to achieve in those with severe obesity. In some cases, medications, devices and surgical management might be considered.  What can you do?  If you are overweight or obese and are interested in methods for weight loss, you should discuss this with your provider.   Consider reducing daily calorie intake by 500 calories.   Keep a food journal.   Avoiding skipping meals, consider cutting portions instead.    Diet combined with exercise helps maintain muscle while optimizing fat loss. Strength training is particularly important for building and maintaining muscle mass. Exercise helps reduce stress, increase energy, and improves fitness. Increasing exercise without diet control, however, may not burn enough calories to loose weight.     Start walking three days a week 10-20 minutes at a time  Work towards walking thirty minutes five days a week   Eventually, increase the speed of your walking for 1-2 minutes at time    In addition, we recommend that you review healthy lifestyles and methods for weight loss available through the National Institutes of Health patient information sites:  http://win.niddk.nih.gov/publications/index.htm    And look into health and wellness programs that may be available through your health insurance provider, employer, local community center, or destini club.

## 2024-11-14 NOTE — PROGRESS NOTES
Virtual Visit Details    Type of service:  Video Visit 2:00 PM-2:30 PM    Originating Location (pt. Location): Home    Distant Location (provider location):  Off-site  Platform used for Video Visit: [facility]            Chief Complaint   Patient presents with    RECHECK       Elza Greer is a 49 year old female who returns to [facility] for review of sleep testing results. She presented with symptoms suggestive of obstructive sleep apnea.    Elza has a medical history pertinent for tobacco use disorder, anxiety, sepsis due to E. coli with acute hypoxic respiratory failure and sepsis, hypertension mild intermittent asthma, migraines, adjustment disorder with mixed anxiety and depression, chronic obstructive pulmonary disease, irritable bowel syndrome, migraines, osteoporosis, polycystic ovarian syndrome, psoriasis, tobacco use disorder, vitamin D deficiency, and morbid obesity with serious comorbidity.  Last recorded BMI was 43.99.  Last recorded blood pressure noted to be 167/92.     Patient hospitalized at [facility] in 2023 with acute respiratory distress syndrome, requiring oxygen supplementation upon discharge.  Notation in medical record that echocardiogram with dilated IVC however no right ventricular systolic pressure documented so was difficult to determine if there is a component of pulmonary hypertension, or if truly hypovolemia from fluid resuscitation and sepsis.    Weight: 255.0 lbs  BMI: 43.8   STOP BAN/8    Home Sleep Apnea Testing -  10/15/2024    Analysis Time: 404 minutes     Respiration:  Sleep Associated Hypoxemia: Sustained hypoxemia was present. Baseline oxygen saturation was 98. Time with saturation less than 89% was 24.7 minutes. Time with saturation less than 90% was 46.5 minutes. The lowest oxygen saturation was 82.0%.  Snoring: Snoring was present 16% of the time with an average level of 69 dB. Duration of time snoring above 70 dB was 62.4  "minutes.     Respiratory events: The home study revealed a presence of 2 obstructive apneas and 0 mixed and central apneas. There were 74 hypopneas resulting in a combined apnea/hypopnea index [AHI] of 12 events per hour with  12 per hour supine, 0  per hour prone, 0 per hour upright, 11  per hour left side, and 8 per hour right side.     Position: Percent of time spent: Supine 60%, prone 0%, upright 3%, on left 35%, on right 2%.     Heart Rate: By Pulse Oximetry normal rate was noted. Some episodic tachycardia appreciated.      9 surgeries over the summer    Reviewed by team:  Tobacco  Allergies  Meds  Problems  Med Hx  Surg Hx  Fam Hx        Reviewed by provider:  Tobacco  Allergies  Meds  Problems  Med Hx  Surg Hx  Fam Hx             Problem List:  Patient Active Problem List    Diagnosis Date Noted    Ascending cholangitis (H) 07/12/2024     Priority: Medium    Biliary stricture (H) 07/12/2024     Priority: Medium    Primary hypertension 06/13/2024     Priority: Medium    Elevated LFTs 10/23/2023     Priority: Medium    Upper abdominal pain 10/23/2023     Priority: Medium    Rheumatoid arthritis (H) 09/18/2023     Priority: Medium    Morbid obesity (H) 10/04/2021     Priority: Medium    Ventral hernia without obstruction or gangrene 01/13/2021     Priority: Medium     Added automatically from request for surgery [id number]      Anxiety 02/18/2020     Priority: Medium    Tobacco use disorder 11/14/2016     Priority: Medium    Mild intermittent asthma without complication 05/12/2010     Priority: Medium        Ht 1.626 m (5' 4\")   Wt 111.1 kg (245 lb)   LMP 08/26/2005   BMI 42.05 kg/m      Impression/Plan:    ICD-10-CM    1. SHWETA (obstructive sleep apnea)  G47.33 CPAP Order for DME - ONLY FOR DME      2. Tobacco use disorder  F17.200 CPAP Order for DME - ONLY FOR DME      3. Anxiety  F41.9 CPAP Order for DME - ONLY FOR DME      4. Mild intermittent asthma without complication  J45.20 CPAP Order for " DME - ONLY FOR DME      5. Morbid obesity (H)  E66.01 CPAP Order for DME - ONLY FOR DME      6. Primary hypertension  I10 CPAP Order for DME - ONLY FOR DME      Elza Greer is an extremely pleasant 49-year-old female with an extensive medical and mental health history which have resulted in multiple hospitalizations including [facility] requiring oxygen supplementation on discharge in October 2023.  She presents here today to review the results of her home sleep test which she underwent on 10/15/2024.  A formal in laboratory polysomnographic sleep study was suggested, however due to insurance reasons, it was felt that he should begin with a home sleep test.  The sleep study results were reviewed in detail and her questions were answered to her satisfaction.  Her sleep study revealed mild obstructive sleep apnea with sleep-related hypoxemia.  Patient does have oxygen at home which she uses nocturnally, and I suggested she continue to do so.  A comprehensive order for needed supplies and equipment including a new CPAP machine was placed today.  Patient was instructed to use her CPAP therapy minimum of 4 hours each day, 70% of the time.  However, in order to delcid maximum benefit, it was recommended that patient uses her CPAP the entirety of her sleep including naps.  Mask exchange policy was also explained to patient.  It is recommended that patient return to the sleep medicine center approximately 12 weeks after she obtain her new CPAP machine for of visit of efficacy and compliance.  The role of the sleep therapy management team was explained to her,  In addition, recommend patient optimize her sleep schedule as well as her sleep hygiene practices to mitigate any further sleep disruption.  Recommend patient employ safe driving practices such as not driving a motor vehicle should she become drowsy.  Recommend she continue her weight management efforts to reduce her BMI to 30.0 or less.  Elza may reach out at any time  through [employer] should she have any questions or concerns.    Assessment:  mild obstructive sleep apnea.  Sleep associated hypoxemia was present.     Recommendations:  Consider auto-CPAP at 5-20 cmH2O, oral appliance therapy, positional therapy, or polysomnography with full night PAP titration  Suggest optimizing sleep hygiene and avoiding sleep deprivation  Weight management     Diagnosis Code(s): Obstructive Sleep Apnea G47.33, Hypoxemia G47.36    30 minutes spent with patient, all of which were spent face-to-face counseling, consulting, coordinating plan of care.      NASH Antonio CNP    CC:  Di Shea,

## 2024-11-14 NOTE — NURSING NOTE
Current patient location: [address] [facility] [address]    Is the patient currently in the state of [place]? YES    Visit mode:VIDEO    If the visit is dropped, the patient can be reconnected by:VIDEO VISIT: Text to cell phone:   Telephone Information:   Mobile [phone]       Will anyone else be joining the visit? NO  (If patient encounters technical issues they should call [phone]    Are changes needed to the allergy or medication list? Pt stated no changes to allergies and Pt stated no med changes    Are refills needed on medications prescribed by this physician? NO    Rooming Documentation:  Questionnaire(s) completed    Reason for visit: FREIDA GARCIAF

## 2024-12-03 DIAGNOSIS — I10 BENIGN ESSENTIAL HYPERTENSION: ICD-10-CM

## 2024-12-04 RX ORDER — LOSARTAN POTASSIUM 25 MG/1
25 TABLET ORAL DAILY
Qty: 90 TABLET | Refills: 0 | Status: SHIPPED | OUTPATIENT
Start: 2024-12-04

## 2024-12-04 NOTE — TELEPHONE ENCOUNTER
Has appointment scheduled for 12/9/24.      Requested Prescriptions   Pending Prescriptions Disp Refills    losartan (COZAAR) 25 MG tablet [Pharmacy Med Name: Losartan Potassium 25 MG Oral Tablet] 90 tablet 0     Sig: Take 1 tablet by mouth once daily       Angiotensin-II Receptors Failed - 12/4/2024  1:11 PM        Failed - Most recent blood pressure under 140/90 in past 12 months     BP Readings from Last 3 Encounters:   10/30/24 (!) 148/91   10/14/24 138/82   09/23/24 (!) 164/97       No data recorded            Passed - Medication is active on med list        Passed - Has GFR on file in past 12 months and most recent value is normal        Passed - Medication indicated for associated diagnosis     The medication is prescribed for one or more of the following conditions:    Chronic Kidney Disease (CDK)    Heart Failure (HF)    Diabetes, Nephropathy   Hypertension    Coronary Artery Disease (CAD)   Raynaud's Disease   Ischemic cardiomyopathy   Cardiomyopathy   Pulmonary Hypertension          Passed - Recent (12 mo) or future (90days) visit within the authorizing provider's specialty     The patient must have completed an in-person or virtual visit within the past 12 months or has a future visit scheduled within the next 90 days with the authorizing provider s specialty.  Urgent care and e-visits do not qualify as an office visit for this protocol.          Passed - Patient is age 18 or older        Passed - No active pregnancy on record        Passed - Normal serum potassium on file in past 12 months     Recent Labs   Lab Test 08/30/24  0551   POTASSIUM 3.5                    Passed - No positive pregnancy test in past 12 months

## 2024-12-09 ENCOUNTER — ANCILLARY PROCEDURE (OUTPATIENT)
Dept: GENERAL RADIOLOGY | Facility: CLINIC | Age: 49
End: 2024-12-09
Attending: NURSE PRACTITIONER
Payer: COMMERCIAL

## 2024-12-09 ENCOUNTER — OFFICE VISIT (OUTPATIENT)
Dept: FAMILY MEDICINE | Facility: CLINIC | Age: 49
End: 2024-12-09
Payer: COMMERCIAL

## 2024-12-09 VITALS
BODY MASS INDEX: 42.05 KG/M2 | RESPIRATION RATE: 16 BRPM | DIASTOLIC BLOOD PRESSURE: 96 MMHG | TEMPERATURE: 98.8 F | OXYGEN SATURATION: 97 % | HEART RATE: 97 BPM | SYSTOLIC BLOOD PRESSURE: 148 MMHG | HEIGHT: 64 IN

## 2024-12-09 DIAGNOSIS — R60.0 HAND EDEMA: ICD-10-CM

## 2024-12-09 DIAGNOSIS — R05.2 SUBACUTE COUGH: ICD-10-CM

## 2024-12-09 DIAGNOSIS — L40.9 SCALP PSORIASIS: ICD-10-CM

## 2024-12-09 DIAGNOSIS — L40.9 PSORIASIS: Primary | ICD-10-CM

## 2024-12-09 DIAGNOSIS — R09.81 NASAL CONGESTION: ICD-10-CM

## 2024-12-09 DIAGNOSIS — J06.9 UPPER RESPIRATORY TRACT INFECTION, UNSPECIFIED TYPE: ICD-10-CM

## 2024-12-09 DIAGNOSIS — R76.8 ELEVATED RHEUMATOID FACTOR: ICD-10-CM

## 2024-12-09 DIAGNOSIS — I10 BENIGN ESSENTIAL HYPERTENSION: ICD-10-CM

## 2024-12-09 DIAGNOSIS — R60.0 PERIPHERAL EDEMA: ICD-10-CM

## 2024-12-09 PROBLEM — M06.9 RHEUMATOID ARTHRITIS (H): Status: RESOLVED | Noted: 2023-09-18 | Resolved: 2024-12-09

## 2024-12-09 LAB
ALBUMIN SERPL BCG-MCNC: 3.7 G/DL (ref 3.5–5.2)
ALP SERPL-CCNC: 137 U/L (ref 40–150)
ALT SERPL W P-5'-P-CCNC: 20 U/L (ref 0–50)
ANION GAP SERPL CALCULATED.3IONS-SCNC: 9 MMOL/L (ref 7–15)
AST SERPL W P-5'-P-CCNC: 19 U/L (ref 0–45)
BASOPHILS # BLD AUTO: 0 10E3/UL (ref 0–0.2)
BASOPHILS NFR BLD AUTO: 0 %
BILIRUB SERPL-MCNC: 0.3 MG/DL
BUN SERPL-MCNC: 11.3 MG/DL (ref 6–20)
CALCIUM SERPL-MCNC: 9.1 MG/DL (ref 8.8–10.4)
CHLORIDE SERPL-SCNC: 104 MMOL/L (ref 98–107)
CREAT SERPL-MCNC: 0.76 MG/DL (ref 0.51–0.95)
CRP SERPL-MCNC: 18.11 MG/L
EGFRCR SERPLBLD CKD-EPI 2021: >90 ML/MIN/1.73M2
EOSINOPHIL # BLD AUTO: 0.1 10E3/UL (ref 0–0.7)
EOSINOPHIL NFR BLD AUTO: 1 %
ERYTHROCYTE [DISTWIDTH] IN BLOOD BY AUTOMATED COUNT: 12.7 % (ref 10–15)
GLUCOSE SERPL-MCNC: 98 MG/DL (ref 70–99)
HCO3 SERPL-SCNC: 26 MMOL/L (ref 22–29)
HCT VFR BLD AUTO: 33.3 % (ref 35–47)
HGB BLD-MCNC: 11.7 G/DL (ref 11.7–15.7)
IMM GRANULOCYTES # BLD: 0 10E3/UL
IMM GRANULOCYTES NFR BLD: 0 %
LYMPHOCYTES # BLD AUTO: 2.1 10E3/UL (ref 0.8–5.3)
LYMPHOCYTES NFR BLD AUTO: 18 %
MCH RBC QN AUTO: 30.5 PG (ref 26.5–33)
MCHC RBC AUTO-ENTMCNC: 35.1 G/DL (ref 31.5–36.5)
MCV RBC AUTO: 87 FL (ref 78–100)
MONOCYTES # BLD AUTO: 0.6 10E3/UL (ref 0–1.3)
MONOCYTES NFR BLD AUTO: 5 %
NEUTROPHILS # BLD AUTO: 8.6 10E3/UL (ref 1.6–8.3)
NEUTROPHILS NFR BLD AUTO: 75 %
PLATELET # BLD AUTO: 191 10E3/UL (ref 150–450)
POTASSIUM SERPL-SCNC: 4 MMOL/L (ref 3.4–5.3)
PROT SERPL-MCNC: 6.8 G/DL (ref 6.4–8.3)
RBC # BLD AUTO: 3.83 10E6/UL (ref 3.8–5.2)
RHEUMATOID FACT SERPL-ACNC: <10 IU/ML
SODIUM SERPL-SCNC: 139 MMOL/L (ref 135–145)
TSH SERPL DL<=0.005 MIU/L-ACNC: 1.02 UIU/ML (ref 0.3–4.2)
WBC # BLD AUTO: 11.4 10E3/UL (ref 4–11)

## 2024-12-09 PROCEDURE — 84443 ASSAY THYROID STIM HORMONE: CPT | Performed by: NURSE PRACTITIONER

## 2024-12-09 PROCEDURE — 86038 ANTINUCLEAR ANTIBODIES: CPT | Performed by: NURSE PRACTITIONER

## 2024-12-09 PROCEDURE — 86140 C-REACTIVE PROTEIN: CPT | Performed by: NURSE PRACTITIONER

## 2024-12-09 PROCEDURE — 86431 RHEUMATOID FACTOR QUANT: CPT | Performed by: NURSE PRACTITIONER

## 2024-12-09 PROCEDURE — 99214 OFFICE O/P EST MOD 30 MIN: CPT | Performed by: NURSE PRACTITIONER

## 2024-12-09 PROCEDURE — 71046 X-RAY EXAM CHEST 2 VIEWS: CPT | Mod: TC | Performed by: RADIOLOGY

## 2024-12-09 PROCEDURE — 80053 COMPREHEN METABOLIC PANEL: CPT | Performed by: NURSE PRACTITIONER

## 2024-12-09 PROCEDURE — 36415 COLL VENOUS BLD VENIPUNCTURE: CPT | Performed by: NURSE PRACTITIONER

## 2024-12-09 PROCEDURE — 85025 COMPLETE CBC W/AUTO DIFF WBC: CPT | Performed by: NURSE PRACTITIONER

## 2024-12-09 RX ORDER — CLOBETASOL PROPIONATE 0.05 G/100ML
SHAMPOO TOPICAL
Qty: 118 ML | Refills: 5 | Status: SHIPPED | OUTPATIENT
Start: 2024-12-09

## 2024-12-09 RX ORDER — LOSARTAN POTASSIUM 25 MG/1
25 TABLET ORAL 2 TIMES DAILY
Qty: 180 TABLET | Refills: 1 | Status: SHIPPED | OUTPATIENT
Start: 2024-12-09

## 2024-12-09 RX ORDER — FLUOCINOLONE ACETONIDE 0.11 MG/ML
OIL TOPICAL
Qty: 118.28 ML | Refills: 2 | Status: SHIPPED | OUTPATIENT
Start: 2024-12-09

## 2024-12-09 RX ORDER — FLUTICASONE PROPIONATE 50 MCG
2 SPRAY, SUSPENSION (ML) NASAL DAILY
Qty: 16 G | Refills: 3 | Status: SHIPPED | OUTPATIENT
Start: 2024-12-09

## 2024-12-09 RX ORDER — FUROSEMIDE 20 MG/1
20 TABLET ORAL DAILY
Qty: 30 TABLET | Refills: 2 | Status: SHIPPED | OUTPATIENT
Start: 2024-12-09

## 2024-12-09 ASSESSMENT — ASTHMA QUESTIONNAIRES
ACT_TOTALSCORE: 14
ACT_TOTALSCORE: 14
QUESTION_3 LAST FOUR WEEKS HOW OFTEN DID YOUR ASTHMA SYMPTOMS (WHEEZING, COUGHING, SHORTNESS OF BREATH, CHEST TIGHTNESS OR PAIN) WAKE YOU UP AT NIGHT OR EARLIER THAN USUAL IN THE MORNING: NOT AT ALL
QUESTION_5 LAST FOUR WEEKS HOW WOULD YOU RATE YOUR ASTHMA CONTROL: POORLY CONTROLLED
QUESTION_2 LAST FOUR WEEKS HOW OFTEN HAVE YOU HAD SHORTNESS OF BREATH: MORE THAN ONCE A DAY
QUESTION_1 LAST FOUR WEEKS HOW MUCH OF THE TIME DID YOUR ASTHMA KEEP YOU FROM GETTING AS MUCH DONE AT WORK, SCHOOL OR AT HOME: A LITTLE OF THE TIME
QUESTION_4 LAST FOUR WEEKS HOW OFTEN HAVE YOU USED YOUR RESCUE INHALER OR NEBULIZER MEDICATION (SUCH AS ALBUTEROL): ONE OR TWO TIMES PER DAY

## 2024-12-09 NOTE — PROGRESS NOTES
Assessment & Plan     Psoriasis    - clobetasol propionate (CLOBEX) 0.05 % external shampoo; Leave on scalp for 15 minutes then rinse. Use 1-2 times weekly.  - CRP, inflammation; Future  - CRP, inflammation  - Adult Rheumatology  Referral; Future    Scalp psoriasis    - Fluocinolone Acetonide Scalp 0.01 % OIL oil; Daily as needed for scalp psoriasis    Nasal congestion    - fluticasone (FLONASE) 50 MCG/ACT nasal spray; Spray 2 sprays into both nostrils daily.    Peripheral edema  -improved  - Comprehensive metabolic panel (BMP + Alb, Alk Phos, ALT, AST, Total. Bili, TP); Future  - TSH with free T4 reflex; Future  - CBC with platelets and differential; Future  - furosemide (LASIX) 20 MG tablet; Take 1 tablet (20 mg) by mouth daily.  - Comprehensive metabolic panel (BMP + Alb, Alk Phos, ALT, AST, Total. Bili, TP)  - TSH with free T4 reflex  - CBC with platelets and differential    Upper respiratory tract infection, unspecified type    - XR Chest 2 Views; Future  - CBC with platelets and differential; Future  - CBC with platelets and differential  - CRP, inflammation; Future  - amoxicillin-clavulanate (AUGMENTIN) 875-125 MG tablet; Take 1 tablet by mouth 2 times daily for 7 days.  - CRP, inflammation    Bilateral Hand edema  -labs normal except for elevated inflammatory markers. I think patient possibly have psoriatic arthritis, recommended to schedule with rheumatologist   - Rheumatoid factor; Future  - Anti Nuclear Sandy IgG by IFA with Reflex; Future  - CBC with platelets and differential; Future  - Rheumatoid factor  - Anti Nuclear Sandy IgG by IFA with Reflex  - CBC with platelets and differential  - Adult Rheumatology  Referral; Future    Benign essential hypertension  - furosemide (LASIX) 20 MG tablet; Take 1 tablet (20 mg) by mouth daily.  - losartan (COZAAR) 25 MG tablet; Take 1 tablet (25 mg) by mouth 2 times daily.  -monitor BP at home and follow up with RN for BP recheck     Elevated  "rheumatoid factor  -was previously slightly elevated, advised patient that this is not specific for RA and abnormal RF was due to smoking. Her RF is within normal range now.   - Adult Rheumatology  Referral; Future        Subjective   Elza is a 49 year old, presenting for the following health issues:  Edema, Cough, and Hypertension        12/9/2024     6:56 AM   Additional Questions   Roomed by fabiano   Accompanied by self         12/9/2024     6:56 AM   Patient Reported Additional Medications   Patient reports taking the following new medications none     RESPIRATORY SYMPTOMS    Duration: cough   Description  Cough, fever, sob, wheezing   Severity: moderate  Accompanying signs and symptoms: None  History (predisposing factors):  none  Precipitating or alleviating factors: None  Therapies tried and outcome:  none     History of Present Illness       Hypertension: She presents for follow up of hypertension.  She does check blood pressure  regularly outside of the clinic. Outside blood pressures have been over 140/90. She does not follow a low salt diet.     Headaches:   Since the patient's last clinic visit, headaches are: worsened  The patient is getting headaches:  Daily  She is not able to do normal daily activities when she has a migraine.  The patient is taking the following rescue/relief medications:  Ibuprofen (Advil, Motrin)   Patient states \"I get some relief\" from the rescue/relief medications.   The patient is taking the following medications to prevent migraines:  No medications to prevent migraines  In the past 4 weeks, the patient has gone to an Urgent Care or Emergency Room 0 times times due to headaches.    Reason for visit:  Check up    She eats 2-3 servings of fruits and vegetables daily.She consumes 5 sweetened beverage(s) daily.She exercises with enough effort to increase her heart rate 20 to 29 minutes per day.  She exercises with enough effort to increase her heart rate 3 or less " "days per week. She is missing 1 dose(s) of medications per week.     Concern - EDEMA   Onset: one month +   Description: has swelling from her knees down   Intensity: moderate  Progression of Symptoms:  worsening  Accompanying Signs & Symptoms: saw eye doctor and she said her edema is now effecting her right eye   Previous history of similar problem: YES  Precipitating factors:        Worsened by: none   Alleviating factors:        Improved by: none   Therapies tried and outcome: Was on lasix       Review of Systems  Constitutional, HEENT, cardiovascular, pulmonary, gi and gu systems are negative, except as otherwise noted.      Objective    BP (!) 148/96   Pulse 97   Temp 98.8  F (37.1  C) (Tympanic)   Resp 16   Ht 1.626 m (5' 4\")   LMP 08/26/2005   SpO2 97%   BMI 42.05 kg/m    Body mass index is 42.05 kg/m .  Physical Exam   GENERAL: alert and no distress  EYES: Eyes grossly normal to inspection, PERRL and conjunctivae and sclerae normal  RESP: lungs clear to auscultation - no rales, rhonchi or wheezes  CV: regular rates and rhythm, normal S1 S2, no S3 or S4, no murmur, click or rub, peripheral pulses strong, and 1+ bilateral lower extremity pitting edema   SKIN: no suspicious lesions or rashes  NEURO: Normal strength and tone, mentation intact and speech normal  PSYCH: mentation appears normal, affect normal/bright            Signed Electronically by: NASH Huston CNP    "

## 2024-12-09 NOTE — PATIENT INSTRUCTIONS
Chest Xray    Losartan 25 mg twice daily     Start Lasix 20 mg daily AM    Labs: rheumatoid, SEVERINO, CMP, CBC, thyroid

## 2024-12-10 LAB — ANA SER QL IF: NEGATIVE

## 2024-12-12 ENCOUNTER — PATIENT OUTREACH (OUTPATIENT)
Dept: CARE COORDINATION | Facility: CLINIC | Age: 49
End: 2024-12-12
Payer: COMMERCIAL

## 2024-12-12 NOTE — PROGRESS NOTES
Clinical Product Navigator RN reviewed chart; patient on payer product coverage.  Review results:   CPN Initial Information Gathering  Referral Source: Health Plan     Obtaining further information to determine needs and next steps.  Patient identified by his health plan due to emerging risk of ED or inpatient admission. 49 yr old, with 3 ED visits, and 1 hospitalization in the past 12 months. PMHx of HTN, IBS, cholecystectomy in the remote past complicated by bile leak requiring surgical repair with hepaticojejunostomy, multiple prior episodes of E coli bacteremia attributed to biliary source, known biliary stricture with plan for outpatient complex IR/GI intervention. Careteam: [facility] PCP, Gastro, Pulmonology Rheumatology, Pain and surgery provider.  Patient is followed closely by PCP and Gastro. Utilization has been situational pneumonia, biliary stricture (followed by admission and surgery) and acute bronchitis. No CC needs identified.     Kathya Desai RN   Clinical Product Navigator   [email or web address]   Office: [phone]

## 2024-12-30 DIAGNOSIS — R05.1 ACUTE COUGH: ICD-10-CM

## 2024-12-30 DIAGNOSIS — E55.9 VITAMIN D DEFICIENCY: ICD-10-CM

## 2024-12-30 RX ORDER — CHOLECALCIFEROL (VITAMIN D3) 1250 MCG
1250 CAPSULE ORAL WEEKLY
Qty: 4 CAPSULE | Refills: 0 | OUTPATIENT
Start: 2024-12-30

## 2024-12-30 RX ORDER — ALBUTEROL SULFATE 90 UG/1
INHALANT RESPIRATORY (INHALATION)
Qty: 9 G | Refills: 0 | Status: SHIPPED | OUTPATIENT
Start: 2024-12-30

## 2025-01-25 ENCOUNTER — HEALTH MAINTENANCE LETTER (OUTPATIENT)
Age: 50
End: 2025-01-25

## 2025-01-31 ENCOUNTER — HOSPITAL ENCOUNTER (EMERGENCY)
Facility: CLINIC | Age: 50
Discharge: HOME | End: 2025-01-31
Payer: COMMERCIAL

## 2025-01-31 VITALS
DIASTOLIC BLOOD PRESSURE: 91 MMHG | SYSTOLIC BLOOD PRESSURE: 151 MMHG | RESPIRATION RATE: 16 BRPM | HEART RATE: 92 BPM | TEMPERATURE: 98.9 F | OXYGEN SATURATION: 95 %

## 2025-01-31 DIAGNOSIS — Z20.828 EXPOSURE TO THE FLU: ICD-10-CM

## 2025-01-31 DIAGNOSIS — R68.89 FLU-LIKE SYMPTOMS: ICD-10-CM

## 2025-01-31 PROCEDURE — 87637 SARSCOV2&INF A&B&RSV AMP PRB: CPT

## 2025-01-31 PROCEDURE — 99213 OFFICE O/P EST LOW 20 MIN: CPT

## 2025-01-31 PROCEDURE — 96372 THER/PROPH/DIAG INJ SC/IM: CPT

## 2025-01-31 PROCEDURE — 250N000011 HC RX IP 250 OP 636

## 2025-01-31 PROCEDURE — G0463 HOSPITAL OUTPT CLINIC VISIT: HCPCS

## 2025-01-31 RX ORDER — OSELTAMIVIR PHOSPHATE 75 MG/1
75 CAPSULE ORAL 2 TIMES DAILY
Qty: 10 CAPSULE | Refills: 0 | Status: SHIPPED | OUTPATIENT
Start: 2025-01-31 | End: 2025-02-05

## 2025-01-31 RX ORDER — KETOROLAC TROMETHAMINE 30 MG/ML
30 INJECTION, SOLUTION INTRAMUSCULAR; INTRAVENOUS ONCE
Status: COMPLETED | OUTPATIENT
Start: 2025-01-31 | End: 2025-01-31

## 2025-01-31 RX ORDER — BENZONATATE 100 MG/1
100 CAPSULE ORAL 3 TIMES DAILY PRN
Qty: 15 CAPSULE | Refills: 0 | Status: SHIPPED | OUTPATIENT
Start: 2025-01-31

## 2025-01-31 RX ADMIN — KETOROLAC TROMETHAMINE 30 MG: 30 INJECTION, SOLUTION INTRAMUSCULAR; INTRAVENOUS at 19:20

## 2025-01-31 ASSESSMENT — ACTIVITIES OF DAILY LIVING (ADL): ADLS_ACUITY_SCORE: 58

## 2025-02-01 NOTE — ED PROVIDER NOTES
History     Chief Complaint   Patient presents with    Flu Symptoms       Elza Greer is a 49 year old female with significant pmhx of asthma, hypertension, anxiety, tobacco use disorder who presents for evaluation of flulike symptoms.  Patient states she became suddenly ill on Thursday with symptoms of fever, body aches, cough, fatigue, congestion/rhinorrhea.  She was recently exposed to her daughter who had the flu.  She did get her flu shot this year.  Her  is sick with similar symptoms and is being seen here today.  She denies chest pain, difficulty breathing, nausea/vomiting.  She does have a history of mild asthma and SHWETA for which she uses a CPAP at night.    Allergies:  Allergies   Allergen Reactions    Azithromycin Anaphylaxis    Latex Hives       Problem List:    Patient Active Problem List    Diagnosis Date Noted    Ascending cholangitis (H) 07/12/2024     Priority: Medium    Biliary stricture (H) 07/12/2024     Priority: Medium    Primary hypertension 06/13/2024     Priority: Medium    Elevated LFTs 10/23/2023     Priority: Medium    Upper abdominal pain 10/23/2023     Priority: Medium    Morbid obesity (H) 10/04/2021     Priority: Medium    Ventral hernia without obstruction or gangrene 01/13/2021     Priority: Medium     Added automatically from request for surgery [id number]      Anxiety 02/18/2020     Priority: Medium    Tobacco use disorder 11/14/2016     Priority: Medium    Mild intermittent asthma without complication 05/12/2010     Priority: Medium        Past Medical History:    Past Medical History:   Diagnosis Date    Adjustment disorder with mixed anxiety and depressed mood     Anxiety     Arthritis     COPD (chronic obstructive pulmonary disease) (H)     Depressive disorder     Fibromyalgia 2023    Gallbladder problem     History of blood transfusion     History of steroid therapy     Hypertension 10/27/2023    IBS (irritable bowel syndrome)     Immunosuppression     Incisional  hernia, without obstruction or gangrene 2020    Infection 10/23/2023    Lactose intolerance 05/12/2010    Migraine 05/12/2010    Migraines     Mild intermittent asthma without complication 05/12/2010    Osteoporosis     PCOS (polycystic ovarian syndrome)     Sepsis due to Escherichia coli with acute hypoxic respiratory failure and septic shock (H) 04/03/2024    Skin disease     Tobacco use disorder     Vitamin D deficiency        Past Surgical History:    Past Surgical History:   Procedure Laterality Date    ABDOMEN SURGERY      APPENDECTOMY OPEN N/A     BLADDER SURGERY      COLONOSCOPY N/A 12/04/2023    Procedure: COLONOSCOPY, WITH POLYPECTOMY AND BIOPSY;  Surgeon: Chris Wyatt MD;  Location: [facility]    CYSTECTOMY OVARIAN BENIGN  2001    ENDOSCOPIC RETROGRADE CHOLANGIOPANCREATOGRAM N/A 8/30/2024    Procedure: Cholangioscopy with biopsy and dilation; Percutaneous drain exchange with placement and removal of sheath;  Surgeon: Freedom Nicole MD;  Location: [facility]    HEPATICOJEJUNOSTOMY  2000    RnY jejunostomy from bile duct injury    HERNIORRHAPHY VENTRAL N/A 02/17/2012    open with mesh    HYSTERECTOMY TOTAL ABDOMINAL, BILATERAL SALPINGO-OOPHORECTOMY, COMBINED N/A 2005    IR BILIARY DRAIN CONVERSION TO INT/EXT  07/15/2024    IR BILIARY DRAIN PLACEMENT  07/12/2024    IR BILIARY TUBE CHANGE  07/25/2024    IR BILIARY TUBE CHANGE  8/22/2024    IR BILIARY TUBE CHANGE  8/30/2024    IR BILIARY TUBE CHANGE  9/12/2024    LAPAROSCOPIC CHOLECYSTECTOMY  2006    complicated by bile duct injury    LAPAROSCOPIC HERNIORRHAPHY VENTRAL N/A 01/26/2021    Procedure: Open recurrent incarcerated ventral hernia repair X2;  Surgeon: Pradip Mckenzie MD;  Location: [facility]    [facility]         Family History:    Family History   Problem Relation Age of Onset    Dementia Mother     Coronary Artery Disease Mother     Hypertension Mother     Hyperlipidemia Mother     Depression Mother     Cerebrovascular Disease Mother     Asthma  Mother     Obesity Mother     Mental Illness Father         Schizophrenia    Colon Cancer Maternal Grandmother     Breast Cancer Maternal Grandmother     Other Cancer Maternal Grandmother     Asthma Maternal Grandmother     Obesity Maternal Grandmother     Osteoporosis Other     Deep Vein Thrombosis (DVT) No family hx of        Social History:  Marital Status:  Single [1]  Social History     Tobacco Use    Smoking status: Some Days     Current packs/day: 0.00     Average packs/day: 1 pack/day for 23.0 years (23.0 ttl pk-yrs)     Types: Cigarettes, Vaping Device     Start date: 10/23/2000     Last attempt to quit: 10/23/2023     Years since quittin.2    Smokeless tobacco: Never    Tobacco comments:      Is using patches and gum.     15 cigarettes daily    Vaping Use    Vaping status: Former    Substances: Nicotine   Substance Use Topics    Alcohol use: Not Currently    Drug use: Not Currently     Types: Marijuana     Comment: last use 2024        Medications:    benzonatate (TESSALON) 100 MG capsule  oseltamivir (TAMIFLU) 75 MG capsule  acetaminophen (TYLENOL) 500 MG tablet  albuterol (PROAIR HFA/PROVENTIL HFA/VENTOLIN HFA) 108 (90 Base) MCG/ACT inhaler  albuterol (PROVENTIL) (2.5 MG/3ML) 0.083% neb solution  albuterol (PROVENTIL) (2.5 MG/3ML) 0.083% neb solution  Baclofen (LIORESAL) 5 MG tablet  butalbital-acetaminophen-caffeine (ESGIC) -40 MG tablet  cholecalciferol (VITAMIN D3) 1250 mcg (19313 units) capsule  clobetasol (TEMOVATE) 0.05 % external solution  Fluocinolone Acetonide Scalp 0.01 % OIL oil  fluticasone (FLONASE) 50 MCG/ACT nasal spray  furosemide (LASIX) 20 MG tablet  gabapentin (NEURONTIN) 100 MG capsule  guaiFENesin-codeine (ROBITUSSIN AC) 100-10 MG/5ML solution  ibuprofen (ADVIL/MOTRIN) 200 MG tablet  losartan (COZAAR) 25 MG tablet  Multiple Vitamins-Minerals (MULTIVITAMIN WOMEN PO)  nicotine (NICODERM CQ) 21 MG/24HR 24 hr patch  nicotine (NICORETTE) 2 MG gum  nystatin (MYCOSTATIN) 556617  UNIT/GM external cream  ondansetron (ZOFRAN ODT) 4 MG ODT tab  ondansetron (ZOFRAN) 4 MG tablet  sodium chloride, PF, (NORMAL SALINE FLUSH) 0.9% PF flush          Physical Exam   BP: (!) 151/91  Pulse: 92  Temp: 98.9  F (37.2  C)  Resp: 16  SpO2: 95 %      Physical Exam  Vitals and nursing note reviewed.   Constitutional:       General: She is not in acute distress.     Appearance: She is ill-appearing. She is not toxic-appearing or diaphoretic.   HENT:      Head: Normocephalic and atraumatic.      Mouth/Throat:      Mouth: Mucous membranes are moist.      Pharynx: Oropharynx is clear.   Eyes:      Extraocular Movements: Extraocular movements intact.      Pupils: Pupils are equal, round, and reactive to light.   Cardiovascular:      Rate and Rhythm: Normal rate and regular rhythm.      Heart sounds: Normal heart sounds.   Pulmonary:      Effort: Pulmonary effort is normal.      Breath sounds: Normal breath sounds. No wheezing, rhonchi or rales.   Musculoskeletal:         General: Normal range of motion.      Cervical back: Normal range of motion.   Neurological:      General: No focal deficit present.      Mental Status: She is alert and oriented to person, place, and time.      Gait: Gait normal.   Psychiatric:         Mood and Affect: Mood normal.         Behavior: Behavior normal.         ED Course        Procedures                    Results for orders placed or performed during the hospital encounter of 01/31/25 (from the past 24 hours)   Influenza A/B, RSV and SARS-CoV2 PCR (COVID-19) Nasopharyngeal    Specimen: Nasopharyngeal; Swab   Result Value Ref Range    Influenza A PCR Negative Negative    Influenza B PCR Negative Negative    RSV PCR Negative Negative    SARS CoV2 PCR Negative Negative    Narrative    Testing was performed using the Xpert Xpress CoV2/Flu/RSV Assay on the Cepheid GeneXpert Instrument. This test should be ordered for the detection of SARS-CoV2, influenza, and RSV viruses in individuals  with signs and symptoms of respiratory tract infection. This test is for in vitro diagnostic use under the [place] FDA for laboratories certified under CLIA to perform high or moderate complexity testing. This test has been [place] FDA cleared. A negative result does not rule out the presence of PCR inhibitors in the specimen or target RNA in concentration below the limit of detection for the assay. If only one viral target is positive but coinfection with multiple targets is suspected, the sample should be re-tested with another FDA cleared, approved, or authorized test, if coninfection would change clinical management. This test was validated by the [facility]. These laboratories are certified under the Clinical Laboratory Improvement Amendments of 1988 (CLIA-88) as qualified to perfom high complexity laboratory testing.       Medications   ketorolac (TORADOL) injection 30 mg (30 mg Intramuscular $Given 1/31/25 1920)       Assessments & Plan (with Medical Decision Making)     I have reviewed the nursing notes.    I have reviewed the findings, diagnosis, plan and need for follow up with the patient.    Medical Decision Making  Elza Greer is a 49 year old female with significant pmhx of asthma, hypertension, anxiety, tobacco use disorder who presents for evaluation of flu like symptoms.  Differential diagnoses include COVID, flu, RSV, other viral URI, bronchitis, pneumonia, other occult infection.  Vital signs with hypertension at 151/91.  Patient is afebrile, she is not tachycardic, and she is satting 95% on room air with a regular respiratory rate and effort.    On examination patient is hearing but alert, oriented, no acute distress.  She does have a frequent dry cough, but no increased respiratory effort.  She is able to speak in full sentences without difficulty.  Lungs are clear to auscultation bilaterally, heart sounds are normal.  There is low suspicion for acute pneumonia or other  cardiopulmonary abnormality given short duration of symptoms (2 days), no fever, no hypoxia, no increased work of breathing.  Patient was comfortable deferring chest x-ray today, as she was primarily concerned about pneumonia given tendency to develop this with viral infections.COVID, flu, and RSV testing did come back negative.  However I am highly suspicious for influenza as her  tested positive for influenza A today, and she has a known exposure to flu from her daughter.    Recommended treatment with a 5-day course of Tamiflu given onset of symptoms within the last 48 hours.  We discussed common side effects.  Recommended she continue her rescue inhaler as needed, and albuterol nebulizer treatments at home as needed for cough and wheezing.  We discussed symptomatic care with Tessalon Perles, cough drops, Mucinex DM, increased hydration, humidified air, honey with tea.  She was given strict return precautions should her symptoms not start to improve after 7 days, or if she develops chest pain, difficulty breathing, vomiting, or if other concerning symptoms develop.  Patient voiced understanding of the plan and had no further questions.      New Prescriptions    BENZONATATE (TESSALON) 100 MG CAPSULE    Take 1 capsule (100 mg) by mouth 3 times daily as needed for cough.    OSELTAMIVIR (TAMIFLU) 75 MG CAPSULE    Take 1 capsule (75 mg) by mouth 2 times daily for 5 days.       Final diagnoses:   Flu-like symptoms   Exposure to the flu       Graciela Hyde PA-C  January 31, 2025  [facility] EMERGENCY DEPT     Graciela Hyde PA-C  01/31/25 1946

## 2025-02-01 NOTE — DISCHARGE INSTRUCTIONS
I suspect you have influenza despite the fact that you tested negative.  I recommend treating this with a 5-day course of Tamiflu which is an antiviral medication.  This can cause nausea, vomiting, and diarrhea.    For pain and fever I recommend taking Ibuprofen 600mg and/or Tylenol 500-1000mg every 6 hours as needed. You can alternate them so you are taking something every 3 hours (Ibuprofen 9AM, Tylenol 12PM, Ibuprofen 3PM, Tylenol 6PM, etc).     For congestion you can take Sudafed. Ask the pharmacist for the sudafed behind the counter (you have to show your license to purchase it). You can also try over the counter remedies such as neti-pot rinses 3-4 times a day, nasal saline spray.  If your congestion is persistent and is causing ear plugging/discomfort, I recommend starting Flonase nasal spray, 1 spray in each nostril daily.  This medication only works if it is used consistently for at least 3-5 days.  It can be used for as long as needed.  It works by decreasing nasal mucosa inflammation and mucus production to promote better drainage.    For cough you can use cough drops, drink honey with tea, humidified air.  Mucinex DM contains medications for both cough suppression and secretion thinning.    Push fluids to prevent dehydration. You can drink water, tea, liquid IV, gatorade, pedialyte, etc. Avoid caffeine or pop.     Return to the ER if you develop severe headache, vision changes, neck stiffness/pain, uncontrollable vomiting, difficulty breathing, altered mental status/confusion, or if other concerning symptoms develop.

## 2025-02-28 DIAGNOSIS — E55.9 VITAMIN D DEFICIENCY: ICD-10-CM

## 2025-03-03 RX ORDER — CHOLECALCIFEROL (VITAMIN D3) 1250 MCG
1250 CAPSULE ORAL WEEKLY
Qty: 4 CAPSULE | Refills: 0 | Status: SHIPPED | OUTPATIENT
Start: 2025-03-03

## 2025-03-16 ENCOUNTER — MYC REFILL (OUTPATIENT)
Dept: FAMILY MEDICINE | Facility: CLINIC | Age: 50
End: 2025-03-16
Payer: COMMERCIAL

## 2025-03-16 DIAGNOSIS — M79.7 FIBROMYALGIA: ICD-10-CM

## 2025-03-17 RX ORDER — GABAPENTIN 300 MG/1
300 CAPSULE ORAL 2 TIMES DAILY
Qty: 180 CAPSULE | Refills: 1 | Status: SHIPPED | OUTPATIENT
Start: 2025-03-17

## 2025-03-17 RX ORDER — GABAPENTIN 100 MG/1
100 CAPSULE ORAL 2 TIMES DAILY
Qty: 180 CAPSULE | Refills: 0 | Status: SHIPPED | OUTPATIENT
Start: 2025-03-17

## 2025-03-17 NOTE — TELEPHONE ENCOUNTER
Pending Prescriptions:                       Disp   Refills    gabapentin (NEURONTIN) 100 MG capsule      180 ca*1        Sig: Take 1 capsule (100 mg) by mouth 2 times daily.    Routing refill request to provider for review/approval because:  Drug not on the [facility] refill protocol     Patient comment: Last time I was in I asked for this med to be increased and it wasn t. I am feeling more and more pain and getting bad migraines again. Can you please up the dose of this medication? It is preferred to take bid rather than three times daily. Thank you.

## 2025-04-13 DIAGNOSIS — E55.9 VITAMIN D DEFICIENCY: ICD-10-CM

## 2025-04-14 RX ORDER — CHOLECALCIFEROL (VITAMIN D3) 1250 MCG
1250 CAPSULE ORAL WEEKLY
Qty: 4 CAPSULE | Refills: 0 | Status: SHIPPED | OUTPATIENT
Start: 2025-04-14

## 2025-04-17 ENCOUNTER — APPOINTMENT (OUTPATIENT)
Dept: GENERAL RADIOLOGY | Facility: CLINIC | Age: 50
End: 2025-04-17
Attending: PHYSICIAN ASSISTANT
Payer: COMMERCIAL

## 2025-04-17 ENCOUNTER — HOSPITAL ENCOUNTER (EMERGENCY)
Facility: CLINIC | Age: 50
Discharge: HOME | End: 2025-04-17
Attending: PHYSICIAN ASSISTANT
Payer: COMMERCIAL

## 2025-04-17 VITALS
DIASTOLIC BLOOD PRESSURE: 89 MMHG | SYSTOLIC BLOOD PRESSURE: 143 MMHG | OXYGEN SATURATION: 96 % | HEART RATE: 114 BPM | TEMPERATURE: 98.2 F | RESPIRATION RATE: 28 BRPM

## 2025-04-17 DIAGNOSIS — J06.9 VIRAL URI WITH COUGH: ICD-10-CM

## 2025-04-17 DIAGNOSIS — J45.901 ASTHMA EXACERBATION: ICD-10-CM

## 2025-04-17 PROCEDURE — G0463 HOSPITAL OUTPT CLINIC VISIT: HCPCS | Mod: 25 | Performed by: PHYSICIAN ASSISTANT

## 2025-04-17 PROCEDURE — 87637 SARSCOV2&INF A&B&RSV AMP PRB: CPT | Performed by: PHYSICIAN ASSISTANT

## 2025-04-17 PROCEDURE — 99214 OFFICE O/P EST MOD 30 MIN: CPT | Performed by: PHYSICIAN ASSISTANT

## 2025-04-17 PROCEDURE — 71046 X-RAY EXAM CHEST 2 VIEWS: CPT

## 2025-04-17 RX ORDER — ALBUTEROL SULFATE 90 UG/1
2 INHALANT RESPIRATORY (INHALATION) EVERY 6 HOURS PRN
Qty: 18 G | Refills: 0 | Status: SHIPPED | OUTPATIENT
Start: 2025-04-17

## 2025-04-17 RX ORDER — PREDNISONE 20 MG/1
40 TABLET ORAL DAILY
Qty: 10 TABLET | Refills: 0 | Status: SHIPPED | OUTPATIENT
Start: 2025-04-17 | End: 2025-04-22

## 2025-04-17 RX ORDER — ALBUTEROL SULFATE 0.83 MG/ML
2.5 SOLUTION RESPIRATORY (INHALATION) EVERY 4 HOURS PRN
Qty: 90 ML | Refills: 0 | Status: SHIPPED | OUTPATIENT
Start: 2025-04-17 | End: 2025-05-17

## 2025-04-17 ASSESSMENT — ACTIVITIES OF DAILY LIVING (ADL): ADLS_ACUITY_SCORE: 58

## 2025-04-17 ASSESSMENT — COLUMBIA-SUICIDE SEVERITY RATING SCALE - C-SSRS
2. HAVE YOU ACTUALLY HAD ANY THOUGHTS OF KILLING YOURSELF IN THE PAST MONTH?: NO
1. IN THE PAST MONTH, HAVE YOU WISHED YOU WERE DEAD OR WISHED YOU COULD GO TO SLEEP AND NOT WAKE UP?: NO
6. HAVE YOU EVER DONE ANYTHING, STARTED TO DO ANYTHING, OR PREPARED TO DO ANYTHING TO END YOUR LIFE?: NO

## 2025-04-17 ASSESSMENT — ENCOUNTER SYMPTOMS
COUGH: 1
WHEEZING: 1
FEVER: 1
SHORTNESS OF BREATH: 1

## 2025-04-18 NOTE — ED PROVIDER NOTES
History     Chief Complaint   Patient presents with    Cough    Fever     Cough with fever and wheezing , dizziness and feels faint at times   Left ear pain   Onset 4/15/25     HPI  Elza Greer is a 49 year old female who presents to Urgent Care with complaints of ongoing cough and fevers up to 100.6  F for the past 3 days.  Patient also complains of associated wheezing and shortness of breath.  She complains of left ear pain and associated dizziness.  Denies nausea, vomiting, diarrhea, abdominal pain, or chest pain.  Patient reports a history of asthma.  Concerned as she has a history of pneumonia.       Allergies:  Allergies   Allergen Reactions    Azithromycin Anaphylaxis    Latex Hives       Problem List:    Patient Active Problem List    Diagnosis Date Noted    Ascending cholangitis (H) 07/12/2024     Priority: Medium    Biliary stricture (H) 07/12/2024     Priority: Medium    Primary hypertension 06/13/2024     Priority: Medium    Elevated LFTs 10/23/2023     Priority: Medium    Upper abdominal pain 10/23/2023     Priority: Medium    Morbid obesity (H) 10/04/2021     Priority: Medium    Ventral hernia without obstruction or gangrene 01/13/2021     Priority: Medium     Added automatically from request for surgery [id number]      Anxiety 02/18/2020     Priority: Medium    Tobacco use disorder 11/14/2016     Priority: Medium    Mild intermittent asthma without complication 05/12/2010     Priority: Medium        Past Medical History:    Past Medical History:   Diagnosis Date    Adjustment disorder with mixed anxiety and depressed mood     Anxiety     Arthritis     COPD (chronic obstructive pulmonary disease) (H)     Depressive disorder     Fibromyalgia 2023    Gallbladder problem     History of blood transfusion     History of steroid therapy     Hypertension 10/27/2023    IBS (irritable bowel syndrome)     Immunosuppression     Incisional hernia, without obstruction or gangrene 2020    Infection 10/23/2023     Lactose intolerance 05/12/2010    Migraine 05/12/2010    Migraines     Mild intermittent asthma without complication 05/12/2010    Osteoporosis     PCOS (polycystic ovarian syndrome)     Sepsis due to Escherichia coli with acute hypoxic respiratory failure and septic shock (H) 04/03/2024    Skin disease     Tobacco use disorder     Vitamin D deficiency        Past Surgical History:    Past Surgical History:   Procedure Laterality Date    ABDOMEN SURGERY      APPENDECTOMY OPEN N/A     BLADDER SURGERY      COLONOSCOPY N/A 12/04/2023    Procedure: COLONOSCOPY, WITH POLYPECTOMY AND BIOPSY;  Surgeon: Chris Wyatt MD;  Location: [facility]    CYSTECTOMY OVARIAN BENIGN  2001    ENDOSCOPIC RETROGRADE CHOLANGIOPANCREATOGRAM N/A 8/30/2024    Procedure: Cholangioscopy with biopsy and dilation; Percutaneous drain exchange with placement and removal of sheath;  Surgeon: Freedom Nicole MD;  Location: [facility]    HEPATICOJEJUNOSTOMY  2000    RnY jejunostomy from bile duct injury    HERNIORRHAPHY VENTRAL N/A 02/17/2012    open with mesh    HYSTERECTOMY TOTAL ABDOMINAL, BILATERAL SALPINGO-OOPHORECTOMY, COMBINED N/A 2005    IR BILIARY DRAIN CONVERSION TO INT/EXT  07/15/2024    IR BILIARY DRAIN PLACEMENT  07/12/2024    IR BILIARY TUBE CHANGE  07/25/2024    IR BILIARY TUBE CHANGE  8/22/2024    IR BILIARY TUBE CHANGE  8/30/2024    IR BILIARY TUBE CHANGE  9/12/2024    LAPAROSCOPIC CHOLECYSTECTOMY  2006    complicated by bile duct injury    LAPAROSCOPIC HERNIORRHAPHY VENTRAL N/A 01/26/2021    Procedure: Open recurrent incarcerated ventral hernia repair X2;  Surgeon: Pradip Mckenzie MD;  Location: [facility]    [facility]         Family History:    Family History   Problem Relation Age of Onset    Dementia Mother     Coronary Artery Disease Mother     Hypertension Mother     Hyperlipidemia Mother     Depression Mother     Cerebrovascular Disease Mother     Asthma Mother     Obesity Mother     Mental Illness Father         Schizophrenia     Colon Cancer Maternal Grandmother     Breast Cancer Maternal Grandmother     Other Cancer Maternal Grandmother     Asthma Maternal Grandmother     Obesity Maternal Grandmother     Osteoporosis Other     Deep Vein Thrombosis (DVT) No family hx of        Social History:  Marital Status:   [2]  Social History     Tobacco Use    Smoking status: Some Days     Current packs/day: 0.00     Average packs/day: 1 pack/day for 23.0 years (23.0 ttl pk-yrs)     Types: Cigarettes, Vaping Device     Start date: 10/23/2000     Last attempt to quit: 10/23/2023     Years since quittin.4    Smokeless tobacco: Never    Tobacco comments:      Is using patches and gum.     15 cigarettes daily    Vaping Use    Vaping status: Former    Substances: Nicotine   Substance Use Topics    Alcohol use: Not Currently    Drug use: Not Currently     Types: Marijuana     Comment: last use 2024        Medications:    albuterol (PROAIR HFA/PROVENTIL HFA/VENTOLIN HFA) 108 (90 Base) MCG/ACT inhaler  albuterol (PROVENTIL) (2.5 MG/3ML) 0.083% neb solution  predniSONE (DELTASONE) 20 MG tablet  acetaminophen (TYLENOL) 500 MG tablet  albuterol (PROAIR HFA/PROVENTIL HFA/VENTOLIN HFA) 108 (90 Base) MCG/ACT inhaler  albuterol (PROVENTIL) (2.5 MG/3ML) 0.083% neb solution  albuterol (PROVENTIL) (2.5 MG/3ML) 0.083% neb solution  Baclofen (LIORESAL) 5 MG tablet  benzonatate (TESSALON) 100 MG capsule  butalbital-acetaminophen-caffeine (ESGIC) -40 MG tablet  cholecalciferol (VITAMIN D3) 1250 mcg (49796 units) capsule  clobetasol (TEMOVATE) 0.05 % external solution  Fluocinolone Acetonide Scalp 0.01 % OIL oil  fluticasone (FLONASE) 50 MCG/ACT nasal spray  furosemide (LASIX) 20 MG tablet  gabapentin (NEURONTIN) 100 MG capsule  gabapentin (NEURONTIN) 300 MG capsule  guaiFENesin-codeine (ROBITUSSIN AC) 100-10 MG/5ML solution  ibuprofen (ADVIL/MOTRIN) 200 MG tablet  losartan (COZAAR) 25 MG tablet  Multiple Vitamins-Minerals (MULTIVITAMIN WOMEN  PO)  nicotine (NICODERM CQ) 21 MG/24HR 24 hr patch  nicotine (NICORETTE) 2 MG gum  nystatin (MYCOSTATIN) 086640 UNIT/GM external cream  ondansetron (ZOFRAN ODT) 4 MG ODT tab  ondansetron (ZOFRAN) 4 MG tablet  sodium chloride, PF, (NORMAL SALINE FLUSH) 0.9% PF flush          Review of Systems   Constitutional:  Positive for fever.   Respiratory:  Positive for cough, shortness of breath and wheezing.    All other systems reviewed and are negative.      Physical Exam   BP: (!) 143/89  Pulse: 114  Temp: 98.2  F (36.8  C)  Resp: 28  SpO2: 96 %      Physical Exam  Constitutional:       General: She is not in acute distress.     Appearance: Normal appearance. She is well-developed. She is not ill-appearing, toxic-appearing or diaphoretic.   HENT:      Head: Normocephalic and atraumatic.      Right Ear: Tympanic membrane, ear canal and external ear normal.      Left Ear: Tympanic membrane, ear canal and external ear normal.      Nose: Nose normal. No congestion or rhinorrhea.      Mouth/Throat:      Lips: Pink.      Mouth: Mucous membranes are moist.      Pharynx: Oropharynx is clear. Uvula midline. No pharyngeal swelling, oropharyngeal exudate, posterior oropharyngeal erythema, uvula swelling or postnasal drip.      Tonsils: No tonsillar exudate or tonsillar abscesses.   Eyes:      Extraocular Movements: Extraocular movements intact.      Conjunctiva/sclera: Conjunctivae normal.      Pupils: Pupils are equal, round, and reactive to light.   Cardiovascular:      Rate and Rhythm: Regular rhythm. Tachycardia present.      Heart sounds: Normal heart sounds.   Pulmonary:      Effort: Pulmonary effort is normal. No respiratory distress.      Breath sounds: No stridor. Wheezing present. No rhonchi or rales.      Comments: Diffuse expiratory wheezing throughout lung fields on exam  Musculoskeletal:         General: Normal range of motion.      Cervical back: Full passive range of motion without pain, normal range of motion and  neck supple. No rigidity. Normal range of motion.   Lymphadenopathy:      Cervical: No cervical adenopathy.   Skin:     General: Skin is warm and dry.   Neurological:      Mental Status: She is alert and oriented to person, place, and time.   Psychiatric:         Behavior: Behavior is cooperative.         ED Course        Procedures    Results for orders placed or performed during the hospital encounter of 04/17/25 (from the past 24 hours)   Influenza A/B, RSV and SARS-CoV2 PCR (COVID-19) Nasopharyngeal    Specimen: Nasopharyngeal; Swab   Result Value Ref Range    Influenza A PCR Negative Negative    Influenza B PCR Negative Negative    RSV PCR Negative Negative    SARS CoV2 PCR Negative Negative    Narrative    Testing was performed using the Xpert Xpress CoV2/Flu/RSV Assay on the Cepheid GeneXpert Instrument. This test should be ordered for the detection of SARS-CoV2, influenza, and RSV viruses in individuals with signs and symptoms of respiratory tract infection. This test is for in vitro diagnostic use under the [place] FDA for laboratories certified under CLIA to perform high or moderate complexity testing. This test has been [place] FDA cleared. A negative result does not rule out the presence of PCR inhibitors in the specimen or target RNA in concentration below the limit of detection for the assay. If only one viral target is positive but coinfection with multiple targets is suspected, the sample should be re-tested with another FDA cleared, approved, or authorized test, if coninfection would change clinical management. This test was validated by the [facility]. These laboratories are certified under the Clinical Laboratory Improvement Amendments of 1988 (CLIA-88) as qualified to perfom high complexity laboratory testing.   XR Chest 2 Views    Narrative    EXAM: XR CHEST 2 VIEWS  LOCATION: [facility]  DATE: 4/17/2025    INDICATION: cough, fevers, wheezing  COMPARISON:  Chest radiograph 12/9/2024.      Impression    IMPRESSION: Upper limits of normal heart size. Pulmonary vascularity is normal. No focal consolidation, pleural effusion, or pneumothorax.       Medications - No data to display    Assessments & Plan (with Medical Decision Making)     Pt is a 49 year old female who presents to Urgent Care with complaints of ongoing cough and fevers up to 100.6  F for the past 3 days.  Patient also complains of associated wheezing and shortness of breath.  She complains of left ear pain and associated dizziness.     Pt is afebrile on arrival.  Exam as above.  O2 sats of 96% on room air.  Influenza and COVID-19 were negative.  CXR was negative for pneumonia.  Discussed results with patient.  Encouraged symptomatic treatments at home.  Return precautions were reviewed.  Hand-outs were provided.    Patient was sent with Prednisone and refills of her Albuterol and was instructed to follow-up with PCP for continued care and management.  She is to return to the ED for persistent and/or worsening symptoms.  Patient expressed understanding of the diagnosis and plan and was discharged home in good condition.    I have reviewed the nursing notes.    I have reviewed the findings, diagnosis, plan and need for follow up with the patient.    Discharge Medication List as of 4/17/2025  8:45 PM        START taking these medications    Details   !! albuterol (PROAIR HFA/PROVENTIL HFA/VENTOLIN HFA) 108 (90 Base) MCG/ACT inhaler Inhale 2 puffs into the lungs every 6 hours as needed for shortness of breath or wheezing., Disp-18 g, R-0, E-Prescribe      !! albuterol (PROVENTIL) (2.5 MG/3ML) 0.083% neb solution Take 1 vial (2.5 mg) by nebulization every 4 hours as needed for shortness of breath, wheezing or cough., Disp-90 mL, R-0, E-Prescribe      predniSONE (DELTASONE) 20 MG tablet Take 2 tablets (40 mg) by mouth daily for 5 days., Disp-10 tablet, R-0, [facility]       !! - Potential duplicate medications  found. Please discuss with provider.          Final diagnoses:   Viral URI with cough   Asthma exacerbation       4/17/2025   [facility] EMERGENCY DEPT      Disclaimer:  This note consists of symbols derived from keyboarding, dictation and/or voice recognition software.  As a result, there may be errors in the script that have gone undetected.  Please consider this when interpreting information found in this chart.     Cecilia Capps PA-C  04/17/25 2051       Cecilia Capps PA-C  04/17/25 2052

## 2025-05-12 ENCOUNTER — HOSPITAL ENCOUNTER (EMERGENCY)
Facility: CLINIC | Age: 50
Discharge: HOME | End: 2025-05-12
Attending: PHYSICIAN ASSISTANT
Payer: COMMERCIAL

## 2025-05-12 ENCOUNTER — APPOINTMENT (OUTPATIENT)
Dept: GENERAL RADIOLOGY | Facility: CLINIC | Age: 50
End: 2025-05-12
Attending: PHYSICIAN ASSISTANT
Payer: COMMERCIAL

## 2025-05-12 VITALS
RESPIRATION RATE: 16 BRPM | DIASTOLIC BLOOD PRESSURE: 84 MMHG | HEART RATE: 101 BPM | TEMPERATURE: 98.9 F | SYSTOLIC BLOOD PRESSURE: 134 MMHG | OXYGEN SATURATION: 97 %

## 2025-05-12 DIAGNOSIS — S86.009A ACHILLES TENDON INJURY: ICD-10-CM

## 2025-05-12 PROCEDURE — 73610 X-RAY EXAM OF ANKLE: CPT | Mod: LT

## 2025-05-12 PROCEDURE — G0463 HOSPITAL OUTPT CLINIC VISIT: HCPCS

## 2025-05-12 PROCEDURE — 73630 X-RAY EXAM OF FOOT: CPT | Mod: LT

## 2025-05-12 PROCEDURE — 99213 OFFICE O/P EST LOW 20 MIN: CPT | Performed by: PHYSICIAN ASSISTANT

## 2025-05-12 ASSESSMENT — ACTIVITIES OF DAILY LIVING (ADL): ADLS_ACUITY_SCORE: 58

## 2025-05-12 NOTE — Clinical Note
Elza Greer was seen and treated in our emergency department on 5/12/2025.    I recommend she rest for the next 24 hours.  After that she may return to limited activity only as tolerated by the cam boot and crutches for the next 7 days or until her next follow-up appointment.     Sincerely,     [facility] Emergency Dept

## 2025-05-12 NOTE — ED PROVIDER NOTES
History   No chief complaint on file.    HPI  Elza Greer is a 49 year old female who presents to urgent care with concerns over left posterior ankle and foot pain after injury 2 days ago.  Patient was playing pickle ball and she says that she stepped off of a curb irregularly resulting in sudden onset of pain of the posterior and medial aspect of her ankle and foot.  Pain radiates up her calf.  She has had some associated swelling, ecchymosis.  She denies any distal numbness or paresthesias.  She states concern that symptoms feel similar to when she has torn her Achilles tendon previously.      Allergies:  Allergies   Allergen Reactions    Azithromycin Anaphylaxis    Latex Hives     Problem List:    Patient Active Problem List    Diagnosis Date Noted    Ascending cholangitis (H) 07/12/2024     Priority: Medium    Biliary stricture (H) 07/12/2024     Priority: Medium    Primary hypertension 06/13/2024     Priority: Medium    Elevated LFTs 10/23/2023     Priority: Medium    Upper abdominal pain 10/23/2023     Priority: Medium    Morbid obesity (H) 10/04/2021     Priority: Medium    Ventral hernia without obstruction or gangrene 01/13/2021     Priority: Medium     Added automatically from request for surgery [id number]      Anxiety 02/18/2020     Priority: Medium    Tobacco use disorder 11/14/2016     Priority: Medium    Mild intermittent asthma without complication 05/12/2010     Priority: Medium      Past Medical History:    Past Medical History:   Diagnosis Date    Adjustment disorder with mixed anxiety and depressed mood     Anxiety     Arthritis     COPD (chronic obstructive pulmonary disease) (H)     Depressive disorder     Fibromyalgia 2023    Gallbladder problem     History of blood transfusion     History of steroid therapy     Hypertension 10/27/2023    IBS (irritable bowel syndrome)     Immunosuppression     Incisional hernia, without obstruction or gangrene 2020    Infection 10/23/2023    Lactose  intolerance 05/12/2010    Migraine 05/12/2010    Migraines     Mild intermittent asthma without complication 05/12/2010    Osteoporosis     PCOS (polycystic ovarian syndrome)     Sepsis due to Escherichia coli with acute hypoxic respiratory failure and septic shock (H) 04/03/2024    Skin disease     Tobacco use disorder     Vitamin D deficiency      Past Surgical History:    Past Surgical History:   Procedure Laterality Date    ABDOMEN SURGERY      APPENDECTOMY OPEN N/A     BLADDER SURGERY      COLONOSCOPY N/A 12/04/2023    Procedure: COLONOSCOPY, WITH POLYPECTOMY AND BIOPSY;  Surgeon: Chris Wyatt MD;  Location: [facility]    CYSTECTOMY OVARIAN BENIGN  2001    ENDOSCOPIC RETROGRADE CHOLANGIOPANCREATOGRAM N/A 8/30/2024    Procedure: Cholangioscopy with biopsy and dilation; Percutaneous drain exchange with placement and removal of sheath;  Surgeon: Freedom Nicole MD;  Location: [facility]    HEPATICOJEJUNOSTOMY  2000    RnY jejunostomy from bile duct injury    HERNIORRHAPHY VENTRAL N/A 02/17/2012    open with mesh    HYSTERECTOMY TOTAL ABDOMINAL, BILATERAL SALPINGO-OOPHORECTOMY, COMBINED N/A 2005    IR BILIARY DRAIN CONVERSION TO INT/EXT  07/15/2024    IR BILIARY DRAIN PLACEMENT  07/12/2024    IR BILIARY TUBE CHANGE  07/25/2024    IR BILIARY TUBE CHANGE  8/22/2024    IR BILIARY TUBE CHANGE  8/30/2024    IR BILIARY TUBE CHANGE  9/12/2024    LAPAROSCOPIC CHOLECYSTECTOMY  2006    complicated by bile duct injury    LAPAROSCOPIC HERNIORRHAPHY VENTRAL N/A 01/26/2021    Procedure: Open recurrent incarcerated ventral hernia repair X2;  Surgeon: Pradip Mckenzie MD;  Location: [facility]    [facility]       Family History:    Family History   Problem Relation Age of Onset    Dementia Mother     Coronary Artery Disease Mother     Hypertension Mother     Hyperlipidemia Mother     Depression Mother     Cerebrovascular Disease Mother     Asthma Mother     Obesity Mother     Mental Illness Father         Schizophrenia    Colon  Cancer Maternal Grandmother     Breast Cancer Maternal Grandmother     Other Cancer Maternal Grandmother     Asthma Maternal Grandmother     Obesity Maternal Grandmother     Osteoporosis Other     Deep Vein Thrombosis (DVT) No family hx of      Social History:  Marital Status:   [2]  Social History     Tobacco Use    Smoking status: Some Days     Current packs/day: 0.00     Average packs/day: 1 pack/day for 23.0 years (23.0 ttl pk-yrs)     Types: Cigarettes, Vaping Device     Start date: 10/23/2000     Last attempt to quit: 10/23/2023     Years since quittin.5    Smokeless tobacco: Never    Tobacco comments:      Is using patches and gum.     15 cigarettes daily    Vaping Use    Vaping status: Former    Substances: Nicotine   Substance Use Topics    Alcohol use: Not Currently    Drug use: Not Currently     Types: Marijuana     Comment: last use 2024      Medications:    acetaminophen (TYLENOL) 500 MG tablet  albuterol (PROAIR HFA/PROVENTIL HFA/VENTOLIN HFA) 108 (90 Base) MCG/ACT inhaler  albuterol (PROAIR HFA/PROVENTIL HFA/VENTOLIN HFA) 108 (90 Base) MCG/ACT inhaler  albuterol (PROVENTIL) (2.5 MG/3ML) 0.083% neb solution  albuterol (PROVENTIL) (2.5 MG/3ML) 0.083% neb solution  albuterol (PROVENTIL) (2.5 MG/3ML) 0.083% neb solution  Baclofen (LIORESAL) 5 MG tablet  benzonatate (TESSALON) 100 MG capsule  butalbital-acetaminophen-caffeine (ESGIC) -40 MG tablet  cholecalciferol (VITAMIN D3) 1250 mcg (79669 units) capsule  clobetasol (TEMOVATE) 0.05 % external solution  Fluocinolone Acetonide Scalp 0.01 % OIL oil  fluticasone (FLONASE) 50 MCG/ACT nasal spray  furosemide (LASIX) 20 MG tablet  gabapentin (NEURONTIN) 100 MG capsule  gabapentin (NEURONTIN) 300 MG capsule  guaiFENesin-codeine (ROBITUSSIN AC) 100-10 MG/5ML solution  ibuprofen (ADVIL/MOTRIN) 200 MG tablet  losartan (COZAAR) 25 MG tablet  Multiple Vitamins-Minerals (MULTIVITAMIN WOMEN PO)  nicotine (NICODERM CQ) 21 MG/24HR 24 hr  "patch  nicotine (NICORETTE) 2 MG gum  nystatin (MYCOSTATIN) 470617 UNIT/GM external cream  ondansetron (ZOFRAN ODT) 4 MG ODT tab  ondansetron (ZOFRAN) 4 MG tablet  sodium chloride, PF, (NORMAL SALINE FLUSH) 0.9% PF flush      Review of Systems  {ROS SHORT:602790}    Physical Exam   BP: 134/84  Pulse: 101  Temp: 98.9  F (37.2  C)  Resp: 16  SpO2: 97 %  Physical Exam    {[facility] EXAM FAST:030010::\"GENERAL APPEARANCE: healthy, alert and no distress\",\"EYES: EOMI,  PERRL, conjunctiva clear\",\"HENT: ear canals and TM's normal.  Nose and mouth without ulcers, erythema or lesions\",\"NECK: supple, nontender, no lymphadenopathy\",\"RESP: lungs clear to auscultation - no rales, rhonchi or wheezes\",\"CV: regular rates and rhythm, normal S1 S2, no murmur noted\",\"ABDOMEN:  soft, nontender, no HSM or masses and bowel sounds normal\",\"NEURO: Normal strength and tone, sensory exam grossly normal,  normal speech and mentation\",\"SKIN: no suspicious lesions or rashes\"}    ED Course        Procedures       Critical Care time:  none  None     No results found for this or any previous visit (from the past 24 hours).  Medications - No data to display    Assessments & Plan (with Medical Decision Making)     I have reviewed the nursing notes.    I have reviewed the findings, diagnosis, plan and need for follow up with the patient.       New Prescriptions    No medications on file       Final diagnoses:   None       5/12/2025   [facility] EMERGENCY DEPT    " without displaced fracture. Mild first metatarsophalangeal joint arthrosis. Trace plantar calcaneal spur.   XR Ankle Left G/E 3 Views     Status: None    Narrative    EXAM: XR ANKLE LEFT G/E 3 VIEWS  LOCATION: [facility]  DATE: 5/12/2025    INDICATION: pain after stepping of curb wrong  COMPARISON: None.      Impression    IMPRESSION: Normal joint spaces and alignment. No fracture. Intact ankle mortise. Soft tissue edema about the ankle.      Medications - No data to display    Assessments & Plan (with Medical Decision Making)     I have reviewed the nursing notes.    I have reviewed the findings, diagnosis, plan and need for follow up with the patient.       Discharge Medication List as of 5/12/2025  6:17 PM          Final diagnoses:   Achilles tendon injury     9-year-old female presents to urgent care with concerns over 2-day history of posterior left ankle pain, swelling after sustaining a fall 2 days ago.  Physical exam findings as described above.  As part of evaluation she had x-ray of the foot and ankle which were negative for acute fracture, dislocations.  Soft tissue edema about the ankle noted, trace plantar calcaneal spur.  Is concerning for Achilles tendon injury differential would also include ankle sprain.  I have low suspicion for gastroc anemias tear given the distal nature of symptoms.  She was placed in cam boot for comfort and instructed in limited weightbearing with crutches.  Follow-up with Ortho for recheck within the next week  given hitsory of similar symptoms previously, patient will contact her orthopedist directly. Worrisome reasons to return to ER/UC sooner discussed.     Disclaimer: This note consists of symbols derived from keyboarding, dictation, and/or voice recognition software. As a result, there may be errors in the script that have gone undetected.  Please consider this when interpreting information found in the chart.      5/12/2025   [facility]  [facility] EMERGENCY DEPT       Lalitha Bowers PA-C  05/17/25 1521

## 2025-06-04 ENCOUNTER — HOSPITAL ENCOUNTER (EMERGENCY)
Facility: CLINIC | Age: 50
Discharge: HOME | End: 2025-06-04
Payer: COMMERCIAL

## 2025-06-04 VITALS
DIASTOLIC BLOOD PRESSURE: 84 MMHG | OXYGEN SATURATION: 97 % | HEART RATE: 100 BPM | SYSTOLIC BLOOD PRESSURE: 131 MMHG | RESPIRATION RATE: 17 BRPM | TEMPERATURE: 99.4 F

## 2025-06-04 DIAGNOSIS — J06.9 VIRAL UPPER RESPIRATORY ILLNESS: ICD-10-CM

## 2025-06-04 LAB
ALBUMIN UR-MCNC: NEGATIVE MG/DL
APPEARANCE UR: CLEAR
BILIRUB UR QL STRIP: NEGATIVE
COLOR UR AUTO: ABNORMAL
GLUCOSE UR STRIP-MCNC: NEGATIVE MG/DL
HGB UR QL STRIP: ABNORMAL
KETONES UR STRIP-MCNC: NEGATIVE MG/DL
LEUKOCYTE ESTERASE UR QL STRIP: NEGATIVE
MONOCYTES NFR BLD AUTO: NEGATIVE %
NITRATE UR QL: NEGATIVE
PH UR STRIP: 6.5 [PH] (ref 5–7)
RBC URINE: 1 /HPF
S PYO DNA THROAT QL NAA+PROBE: NOT DETECTED
SP GR UR STRIP: 1 (ref 1–1.03)
SQUAMOUS EPITHELIAL: 2 /HPF
TRANSITIONAL EPI: <1 /HPF
UROBILINOGEN UR STRIP-MCNC: NORMAL MG/DL
WBC URINE: 1 /HPF

## 2025-06-04 PROCEDURE — 36415 COLL VENOUS BLD VENIPUNCTURE: CPT

## 2025-06-04 PROCEDURE — 87651 STREP A DNA AMP PROBE: CPT

## 2025-06-04 PROCEDURE — 81001 URINALYSIS AUTO W/SCOPE: CPT

## 2025-06-04 PROCEDURE — 86308 HETEROPHILE ANTIBODY SCREEN: CPT

## 2025-06-04 PROCEDURE — 99214 OFFICE O/P EST MOD 30 MIN: CPT

## 2025-06-04 PROCEDURE — G0463 HOSPITAL OUTPT CLINIC VISIT: HCPCS | Mod: 25

## 2025-06-04 RX ORDER — PREDNISONE 20 MG/1
TABLET ORAL
Qty: 10 TABLET | Refills: 0 | Status: SHIPPED | OUTPATIENT
Start: 2025-06-04

## 2025-06-04 ASSESSMENT — ACTIVITIES OF DAILY LIVING (ADL)
ADLS_ACUITY_SCORE: 58
ADLS_ACUITY_SCORE: 58

## 2025-06-04 NOTE — DISCHARGE INSTRUCTIONS
- Your testing came back negative for strep, mono, urinalysis was normal and negative chest x-ray.  However, based on your symptoms and positive exposure, I am suspicious that you may have mono, or another upper respiratory virus.  -Continue tylenol and ibuprofen.    Alternate these medications every three hours as needed for fever.  (For example, tylenol at 8am, ibuprofen at 11am, tylenol at 2pm, ibuprofen at 5pm, tylenol at 8pm...)  -I also send you prednisone which can aid with the coughing and bodyaches.  -Drink lots of fluids and get rest.    Return for reevaluation if you continue having fevers for greater than 3 days, start feeling worse, develop vomiting or weakness, or anything else that is concerning to you.

## 2025-06-04 NOTE — LETTER
June 4, 2025      To Whom It May Concern:      Elza Greer was seen in our urgent care today, 06/04/25.  Please allow her to remain home from work until her symptoms get better, and until she is fever free without the use of medications for at least 24 hours.    Sincerely,        Elisha Love PA-C  Electronically signed

## 2025-06-04 NOTE — ED PROVIDER NOTES
History   No chief complaint on file.    HPI  Elza Greer is a 49 year old female with PMH significant for COPD, HTN, ventral hernia, mild intermittent asthma who presents for evaluation of fevers, cough, nasal congestion, sore throat, fatigue, myalgias and headache.  Symptoms began 5 days ago with initial temperature of 101  F.  Patient has continued to have fevers daily with lowest of 100.8  F.  She has been taking Advil to help with symptoms with most recent dose this morning.  Does report exposure to mononucleosis about 2 weeks ago.  Denies any known tick exposures or bites.  Did a self swab for COVID and influenza A/B which came back negative. She has not had abdominal pain, vomiting or nausea however did have a single episode of loose stool yesterday which was normal in color, no blood.  Notes urinary urgency, no dysuria, urinary frequency, hematuria, flank pain.   is also ill with similar symptoms.  Patient currently denies chest pain or tightness, shortness of breath or trouble with breathing, hemoptysis, weakness, dizziness, vision changes, lightheadedness, focal deficits.      Allergies:  Allergies   Allergen Reactions    Azithromycin Anaphylaxis    Latex Hives       Problem List:    Patient Active Problem List    Diagnosis Date Noted    Ascending cholangitis (H) 07/12/2024     Priority: Medium    Biliary stricture (H) 07/12/2024     Priority: Medium    Primary hypertension 06/13/2024     Priority: Medium    Elevated LFTs 10/23/2023     Priority: Medium    Upper abdominal pain 10/23/2023     Priority: Medium    Morbid obesity (H) 10/04/2021     Priority: Medium    Ventral hernia without obstruction or gangrene 01/13/2021     Priority: Medium     Added automatically from request for surgery [id number]      Anxiety 02/18/2020     Priority: Medium    Tobacco use disorder 11/14/2016     Priority: Medium    Mild intermittent asthma without complication 05/12/2010     Priority: Medium        Past  Medical History:    Past Medical History:   Diagnosis Date    Adjustment disorder with mixed anxiety and depressed mood     Anxiety     Arthritis     COPD (chronic obstructive pulmonary disease) (H)     Depressive disorder     Fibromyalgia 2023    Gallbladder problem     History of blood transfusion     History of steroid therapy     Hypertension 10/27/2023    IBS (irritable bowel syndrome)     Immunosuppression     Incisional hernia, without obstruction or gangrene 2020    Infection 10/23/2023    Lactose intolerance 05/12/2010    Migraine 05/12/2010    Migraines     Mild intermittent asthma without complication 05/12/2010    Osteoporosis     PCOS (polycystic ovarian syndrome)     Sepsis due to Escherichia coli with acute hypoxic respiratory failure and septic shock (H) 04/03/2024    Skin disease     Tobacco use disorder     Vitamin D deficiency        Past Surgical History:    Past Surgical History:   Procedure Laterality Date    ABDOMEN SURGERY      APPENDECTOMY OPEN N/A     BLADDER SURGERY      COLONOSCOPY N/A 12/04/2023    Procedure: COLONOSCOPY, WITH POLYPECTOMY AND BIOPSY;  Surgeon: Chris Wyatt MD;  Location: [facility]    CYSTECTOMY OVARIAN BENIGN  2001    ENDOSCOPIC RETROGRADE CHOLANGIOPANCREATOGRAM N/A 8/30/2024    Procedure: Cholangioscopy with biopsy and dilation; Percutaneous drain exchange with placement and removal of sheath;  Surgeon: Freedom Nicole MD;  Location: [facility]    HEPATICOJEJUNOSTOMY  2000    RnY jejunostomy from bile duct injury    HERNIORRHAPHY VENTRAL N/A 02/17/2012    open with mesh    HYSTERECTOMY TOTAL ABDOMINAL, BILATERAL SALPINGO-OOPHORECTOMY, COMBINED N/A 2005    IR BILIARY DRAIN CONVERSION TO INT/EXT  07/15/2024    IR BILIARY DRAIN PLACEMENT  07/12/2024    IR BILIARY TUBE CHANGE  07/25/2024    IR BILIARY TUBE CHANGE  8/22/2024    IR BILIARY TUBE CHANGE  8/30/2024    IR BILIARY TUBE CHANGE  9/12/2024    LAPAROSCOPIC CHOLECYSTECTOMY  2006    complicated by bile duct injury     LAPAROSCOPIC HERNIORRHAPHY VENTRAL N/A 2021    Procedure: Open recurrent incarcerated ventral hernia repair X2;  Surgeon: Pradip Mckenzie MD;  Location: [facility]    BRYAN [facility]OWEL         Family History:    Family History   Problem Relation Age of Onset    Dementia Mother     Coronary Artery Disease Mother     Hypertension Mother     Hyperlipidemia Mother     Depression Mother     Cerebrovascular Disease Mother     Asthma Mother     Obesity Mother     Mental Illness Father         Schizophrenia    Colon Cancer Maternal Grandmother     Breast Cancer Maternal Grandmother     Other Cancer Maternal Grandmother     Asthma Maternal Grandmother     Obesity Maternal Grandmother     Osteoporosis Other     Deep Vein Thrombosis (DVT) No family hx of        Social History:  Marital Status:   [2]  Social History     Tobacco Use    Smoking status: Some Days     Current packs/day: 0.00     Average packs/day: 1 pack/day for 23.0 years (23.0 ttl pk-yrs)     Types: Cigarettes, Vaping Device     Start date: 10/23/2000     Last attempt to quit: 10/23/2023     Years since quittin.6    Smokeless tobacco: Never    Tobacco comments:      Is using patches and gum.     15 cigarettes daily    Vaping Use    Vaping status: Former    Substances: Nicotine   Substance Use Topics    Alcohol use: Not Currently    Drug use: Not Currently     Types: Marijuana     Comment: last use 2024        Medications:    predniSONE (DELTASONE) 20 MG tablet  acetaminophen (TYLENOL) 500 MG tablet  albuterol (PROAIR HFA/PROVENTIL HFA/VENTOLIN HFA) 108 (90 Base) MCG/ACT inhaler  albuterol (PROAIR HFA/PROVENTIL HFA/VENTOLIN HFA) 108 (90 Base) MCG/ACT inhaler  albuterol (PROVENTIL) (2.5 MG/3ML) 0.083% neb solution  albuterol (PROVENTIL) (2.5 MG/3ML) 0.083% neb solution  albuterol (PROVENTIL) (2.5 MG/3ML) 0.083% neb solution  Baclofen (LIORESAL) 5 MG tablet  benzonatate (TESSALON) 100 MG capsule  butalbital-acetaminophen-caffeine (ESGIC) -40 MG  tablet  cholecalciferol (VITAMIN D3) 1250 mcg (14217 units) capsule  clobetasol (TEMOVATE) 0.05 % external solution  Fluocinolone Acetonide Scalp 0.01 % OIL oil  fluticasone (FLONASE) 50 MCG/ACT nasal spray  furosemide (LASIX) 20 MG tablet  gabapentin (NEURONTIN) 100 MG capsule  gabapentin (NEURONTIN) 300 MG capsule  guaiFENesin-codeine (ROBITUSSIN AC) 100-10 MG/5ML solution  ibuprofen (ADVIL/MOTRIN) 200 MG tablet  losartan (COZAAR) 25 MG tablet  Multiple Vitamins-Minerals (MULTIVITAMIN WOMEN PO)  nicotine (NICODERM CQ) 21 MG/24HR 24 hr patch  nicotine (NICORETTE) 2 MG gum  nystatin (MYCOSTATIN) 136346 UNIT/GM external cream  ondansetron (ZOFRAN ODT) 4 MG ODT tab  ondansetron (ZOFRAN) 4 MG tablet  sodium chloride, PF, (NORMAL SALINE FLUSH) 0.9% PF flush          Review of Systems  Pertinent review of systems as documented per HPI above.    Physical Exam   BP: 131/84  Pulse: 100  Temp: 99.4  F (37.4  C)  Resp: 17  SpO2: 97 %      Physical Exam  Vitals and nursing note reviewed.   Constitutional:       General: She is not in acute distress.     Appearance: She is ill-appearing. She is not toxic-appearing or diaphoretic.   HENT:      Right Ear: Tympanic membrane, ear canal and external ear normal. No tenderness. No middle ear effusion. Tympanic membrane is not erythematous.      Left Ear: Tympanic membrane, ear canal and external ear normal. No tenderness.  No middle ear effusion. Tympanic membrane is not erythematous.      Nose: Congestion present.      Mouth/Throat:      Mouth: Mucous membranes are moist.      Pharynx: Oropharynx is clear. Uvula midline. Posterior oropharyngeal erythema and postnasal drip present. No pharyngeal swelling, oropharyngeal exudate or uvula swelling.      Tonsils: No tonsillar exudate or tonsillar abscesses.   Cardiovascular:      Rate and Rhythm: Normal rate.   Pulmonary:      Effort: Pulmonary effort is normal. No respiratory distress.      Breath sounds: Normal breath sounds. No  stridor. No wheezing, rhonchi or rales.   Abdominal:      General: Bowel sounds are normal. There is no distension.      Palpations: There is mass (ventral hernia, soft and nontender).      Tenderness: There is no abdominal tenderness. There is no right CVA tenderness, left CVA tenderness or guarding.      Hernia: No hernia is present.   Musculoskeletal:      Cervical back: Normal range of motion. No rigidity.   Lymphadenopathy:      Cervical: No cervical adenopathy.   Skin:     General: Skin is warm and dry.      Capillary Refill: Capillary refill takes less than 2 seconds.      Findings: No rash.   Neurological:      General: No focal deficit present.      Mental Status: She is alert and oriented to person, place, and time.   Psychiatric:         Mood and Affect: Mood normal.         Behavior: Behavior normal.         ED Course       Results for orders placed or performed during the hospital encounter of 06/04/25 (from the past 24 hours)   Group A Streptococcus PCR Throat Swab    Specimen: Throat; Swab   Result Value Ref Range    Group A strep by PCR Not Detected Not Detected    Narrative    The Xpert Xpress Strep A test, performed on the [employer], is a rapid, qualitative in vitro diagnostic test for the detection of Streptococcus pyogenes (Group A ß-hemolytic Streptococcus, Strep A) in throat swab specimens from patients with signs and symptoms of pharyngitis. The Xpert Xpress Strep A test can be used as an aid in the diagnosis of Group A Streptococcal pharyngitis. The assay is not intended to monitor treatment for Group A Streptococcus infections. The Xpert Xpress Strep A test utilizes an automated real-time polymerase chain reaction (PCR) to detect Streptococcus pyogenes DNA.   UA with Microscopic reflex to Culture    Specimen: Urine, Clean Catch   Result Value Ref Range    Color Urine Straw Colorless, Straw, Light Yellow, Yellow    Appearance Urine Clear Clear    Glucose Urine Negative  Negative mg/dL    Bilirubin Urine Negative Negative    Ketones Urine Negative Negative mg/dL    Specific Gravity Urine 1.005 1.003 - 1.035    Blood Urine Trace (A) Negative    pH Urine 6.5 5.0 - 7.0    Protein Albumin Urine Negative Negative mg/dL    Urobilinogen Urine Normal Normal mg/dL    Nitrite Urine Negative Negative    Leukocyte Esterase Urine Negative Negative    RBC Urine 1 <=2 /HPF    WBC Urine 1 <=5 /HPF    Squamous Epithelials Urine 2 (H) <=1 /HPF    Transitional Epithelials Urine <1 <=1 /HPF    Narrative    Urine Culture not indicated   Chest XR,  PA & LAT    Narrative    EXAM: XR CHEST 2 VIEWS  LOCATION: [facility]  DATE: 6/4/2025    INDICATION: Fever, cough, SOA x3 days.    COMPARISON: Chest x-ray 4/17/2025.      Impression    IMPRESSION: Negative chest.   Mono   Result Value Ref Range    Mononucleosis Screen Negative Negative       Medications - No data to display    Assessments & Plan (with Medical Decision Making)     I have reviewed the nursing notes.    I have reviewed the findings, diagnosis, plan and need for follow up with the patient.  49-year-old female presents for evaluation of fevers, cough, nasal congestion, sore throat, fatigue, myalgias and headache that began 5 days ago.  Had negative COVID and influenza A/B testing at home.  Did have exposure to mono about 2 weeks ago.    On arrival patient is ill-appearing however nontoxic.  Vital signs stable and she is afebrile.  On examination she has mild oropharyngeal erythema and postnasal drip is noted.  She also appears congested.  There is a ventral hernia present on abdominal examination without tenderness, patient reports it appears at baseline to her.  No additional positive findings on abdominal exam.  Lungs are clear without signs of respiratory distress.  Remainder of exam reassuring as above.  Urinalysis obtained and positive for trace amount of blood however contaminated with 2 squamous cells, overall not  concerning for infection.  Mono and strep tests were negative.  Chest x-ray without signs of acute cardiopulmonary disease including pneumonia.  Offered testing for Lyme today however patient declined as she does not recall any recent tick bites.  I do feel that her symptoms are likely related to viral illness, possibly mono which may be falsely negative today.  I recommended that she continue with supportive cares including alternating Tylenol and ibuprofen to manage fevers and aches, increasing fluid intake and resting.  She was provided with prednisone in addition to aid with her coughing.  I recommended that she be seen in her primary office on Friday if she continues to have fevers, or to return sooner should she develop any other symptoms that are concerning to her.  All questions were answered.  Patient verbalized understanding and agreement with the above plan.  Patient was discharged in stable condition.    Disclaimer: This note consists of symbols derived from keyboarding, dictation, and/or voice recognition software. As a result, there may be errors in the script that have gone undetected.  Please consider this when interpreting information found in the chart.      Discharge Medication List as of 6/4/2025  4:00 PM        START taking these medications    Details   predniSONE (DELTASONE) 20 MG tablet Take two tablets (= 40mg) each day for 5 (five) days, Disp-10 tablet, R-0, E-Prescribe             Final diagnoses:   Viral upper respiratory illness       6/4/2025   [facility] EMERGENCY DEPT       Elisha Love PA-C  06/04/25 1700

## 2025-06-13 ENCOUNTER — DOCUMENTATION ONLY (OUTPATIENT)
Dept: EMERGENCY MEDICINE | Facility: CLINIC | Age: 50
End: 2025-06-13
Payer: COMMERCIAL

## 2025-06-13 DIAGNOSIS — S86.002A INJURY OF LEFT ACHILLES TENDON, INITIAL ENCOUNTER: Primary | ICD-10-CM

## 2025-06-23 ENCOUNTER — DOCUMENTATION ONLY (OUTPATIENT)
Dept: EMERGENCY MEDICINE | Facility: CLINIC | Age: 50
End: 2025-06-23
Payer: COMMERCIAL

## 2025-06-23 DIAGNOSIS — S86.002A INJURY OF LEFT ACHILLES TENDON, INITIAL ENCOUNTER: Primary | ICD-10-CM

## 2025-07-06 DIAGNOSIS — E55.9 VITAMIN D DEFICIENCY: ICD-10-CM

## 2025-07-07 RX ORDER — CHOLECALCIFEROL (VITAMIN D3) 1250 MCG
1250 CAPSULE ORAL WEEKLY
Qty: 4 CAPSULE | Refills: 0 | OUTPATIENT
Start: 2025-07-07

## 2025-07-17 ENCOUNTER — OFFICE VISIT (OUTPATIENT)
Dept: FAMILY MEDICINE | Facility: CLINIC | Age: 50
End: 2025-07-17
Payer: COMMERCIAL

## 2025-07-17 VITALS
TEMPERATURE: 98.1 F | OXYGEN SATURATION: 98 % | BODY MASS INDEX: 45.65 KG/M2 | WEIGHT: 267.4 LBS | RESPIRATION RATE: 18 BRPM | HEART RATE: 87 BPM | SYSTOLIC BLOOD PRESSURE: 127 MMHG | DIASTOLIC BLOOD PRESSURE: 81 MMHG | HEIGHT: 64 IN

## 2025-07-17 DIAGNOSIS — M25.551 BILATERAL HIP PAIN: ICD-10-CM

## 2025-07-17 DIAGNOSIS — R68.89 FORGETFULNESS: ICD-10-CM

## 2025-07-17 DIAGNOSIS — R60.0 BILATERAL LEG EDEMA: ICD-10-CM

## 2025-07-17 DIAGNOSIS — M25.552 BILATERAL HIP PAIN: ICD-10-CM

## 2025-07-17 DIAGNOSIS — B37.2 YEAST INFECTION OF THE SKIN: ICD-10-CM

## 2025-07-17 DIAGNOSIS — E66.01 MORBID OBESITY (H): ICD-10-CM

## 2025-07-17 DIAGNOSIS — N39.41 URGE INCONTINENCE OF URINE: ICD-10-CM

## 2025-07-17 DIAGNOSIS — Z00.00 ANNUAL PHYSICAL EXAM: Primary | ICD-10-CM

## 2025-07-17 PROCEDURE — 3079F DIAST BP 80-89 MM HG: CPT | Performed by: NURSE PRACTITIONER

## 2025-07-17 PROCEDURE — 3049F LDL-C 100-129 MG/DL: CPT | Performed by: NURSE PRACTITIONER

## 2025-07-17 PROCEDURE — 99396 PREV VISIT EST AGE 40-64: CPT | Performed by: NURSE PRACTITIONER

## 2025-07-17 PROCEDURE — 99213 OFFICE O/P EST LOW 20 MIN: CPT | Mod: 25 | Performed by: NURSE PRACTITIONER

## 2025-07-17 PROCEDURE — 3074F SYST BP LT 130 MM HG: CPT | Performed by: NURSE PRACTITIONER

## 2025-07-17 PROCEDURE — 1125F AMNT PAIN NOTED PAIN PRSNT: CPT | Performed by: NURSE PRACTITIONER

## 2025-07-17 RX ORDER — NYSTATIN AND TRIAMCINOLONE ACETONIDE 100000; 1 [USP'U]/G; MG/G
CREAM TOPICAL 2 TIMES DAILY
Qty: 60 G | Refills: 3 | Status: SHIPPED | OUTPATIENT
Start: 2025-07-17

## 2025-07-17 RX ORDER — FLUCONAZOLE 150 MG/1
150 TABLET ORAL
Qty: 3 TABLET | Refills: 0 | Status: SHIPPED | OUTPATIENT
Start: 2025-07-17 | End: 2025-07-24

## 2025-07-17 RX ORDER — NYSTATIN 100000 U/G
CREAM TOPICAL 2 TIMES DAILY PRN
Status: CANCELLED | OUTPATIENT
Start: 2025-07-17

## 2025-07-17 SDOH — HEALTH STABILITY: PHYSICAL HEALTH: ON AVERAGE, HOW MANY DAYS PER WEEK DO YOU ENGAGE IN MODERATE TO STRENUOUS EXERCISE (LIKE A BRISK WALK)?: 1 DAY

## 2025-07-17 SDOH — HEALTH STABILITY: PHYSICAL HEALTH: ON AVERAGE, HOW MANY MINUTES DO YOU ENGAGE IN EXERCISE AT THIS LEVEL?: 30 MIN

## 2025-07-17 ASSESSMENT — PAIN SCALES - GENERAL: PAINLEVEL_OUTOF10: MILD PAIN (3)

## 2025-07-17 ASSESSMENT — ASTHMA QUESTIONNAIRES
ACT_TOTALSCORE: 19
QUESTION_4 LAST FOUR WEEKS HOW OFTEN HAVE YOU USED YOUR RESCUE INHALER OR NEBULIZER MEDICATION (SUCH AS ALBUTEROL): ONCE A WEEK OR LESS
QUESTION_5 LAST FOUR WEEKS HOW WOULD YOU RATE YOUR ASTHMA CONTROL: SOMEWHAT CONTROLLED
QUESTION_2 LAST FOUR WEEKS HOW OFTEN HAVE YOU HAD SHORTNESS OF BREATH: ONCE OR TWICE A WEEK
QUESTION_3 LAST FOUR WEEKS HOW OFTEN DID YOUR ASTHMA SYMPTOMS (WHEEZING, COUGHING, SHORTNESS OF BREATH, CHEST TIGHTNESS OR PAIN) WAKE YOU UP AT NIGHT OR EARLIER THAN USUAL IN THE MORNING: ONCE OR TWICE
QUESTION_1 LAST FOUR WEEKS HOW MUCH OF THE TIME DID YOUR ASTHMA KEEP YOU FROM GETTING AS MUCH DONE AT WORK, SCHOOL OR AT HOME: A LITTLE OF THE TIME

## 2025-07-17 ASSESSMENT — SOCIAL DETERMINANTS OF HEALTH (SDOH): HOW OFTEN DO YOU GET TOGETHER WITH FRIENDS OR RELATIVES?: ONCE A WEEK

## 2025-07-17 NOTE — PROGRESS NOTES
"Preventive Care Visit  [facility]  NASH Huston CNP, Family Medicine  Jul 17, 2025      Assessment & Plan     Annual physical exam    - Lipid panel reflex to direct LDL Fasting; Future    Morbid obesity (H)  -discussed healthy diet, advised Elza to try to work on weight loss. She tried Phentermine in the past and states it helped. She has history of cholecystectomy complicated by biliary stricture which resolved now, I advised her that she can also try GLP 1's with close monitoring of her liver enzymes.   - TSH with free T4 reflex; Future  - phentermine 15 MG capsule; Take 1 capsule (15 mg) by mouth every morning.    Bilateral hip pain  -recommended to try physical therapy   - XR Pelvis and Hip Bilateral 2 Views; Future    Urge incontinence of urine  -recommended urology consult   - UA with Microscopic reflex to Culture - lab collect; Future  - Adult Urology  Referral; Future    Bilateral leg edema  -advised patient to schedule venous US to rule out possible venous insufficiency   - Comprehensive metabolic panel (BMP + Alb, Alk Phos, ALT, AST, Total. Bili, TP); Future  - CBC with platelets and differential; Future  - US Venous Competency Bilateral; Future    Yeast infection of the skin    - nystatin-triamcinolone (MYCOLOG II) 293059-5.1 UNIT/GM-% external cream; Apply topically 2 times daily.  - fluconazole (DIFLUCAN) 150 MG tablet; Take 1 tablet (150 mg) by mouth every 3 days for 3 doses.    Forgetfulness  -thyroid function, B 12 levels normal   - Vitamin B12; Future      BMI  Estimated body mass index is 45.9 kg/m  as calculated from the following:    Height as of this encounter: 1.626 m (5' 4\").    Weight as of this encounter: 121.3 kg (267 lb 6.4 oz).   Weight management plan: Patient referred to endocrine and/or weight management specialty    Counseling  Appropriate preventive services were addressed with this patient via screening, questionnaire, or discussion " as appropriate for fall prevention, nutrition, physical activity, Tobacco-use cessation, social engagement, weight loss and cognition.  Checklist reviewing preventive services available has been given to the patient.  Reviewed patient's diet, addressing concerns and/or questions.   She is at risk for lack of exercise and has been provided with information to increase physical activity for the benefit of her well-being.   The patient was instructed to see the dentist every 6 months.       Mao Bertrand is a 50 year old, presenting for the following:  Physical, Hip Pain (Bilateral pain), and Urinary Problem        7/17/2025     2:56 PM   Additional Questions   Roomed by SHOAIB Youssef CMA          [facility]    Advance Care Planning    Document on file is a Health Care Directive or POLST.        7/17/2025   General Health   How would you rate your overall physical health? Good         7/17/2025   Nutrition   Three or more servings of calcium each day? (!) NO   Diet: Regular (no restrictions)   How many servings of fruit and vegetables per day? (!) 0-1   How many sweetened beverages each day? (!) 4+         10/30/2022   Exercise   Frequency of exercise: 2-3 days/week            10/30/2022   Dental   Dentist two times every year? No           Today's PHQ-2 Score:       7/17/2025     2:49 PM   PHQ-2 ( 1999 [employer])   Q1: Little interest or pleasure in doing things 1   Q2: Feeling down, depressed or hopeless 1   PHQ-2 Score 2    Q1: Little interest or pleasure in doing things Several days   Q2: Feeling down, depressed or hopeless Several days   PHQ-2 Score 2       Patient-reported         10/14/2024   Substance Use   If I could quit smoking, I would Neutral   I want to quit somking, worry about health affects Completely agree   Willing to make a plan to quit smoking Neutral   Willing to cut down before quitting Completely agree     Social History     Tobacco Use    Smoking status: Some Days     Current packs/day: 0.00     Average  "packs/day: 1 pack/day for 23.0 years (23.0 ttl pk-yrs)     Types: Cigarettes, Vaping Device     Start date: 10/23/2000     Last attempt to quit: 10/23/2023     Years since quittin.7    Smokeless tobacco: Never    Tobacco comments:      Is using patches and gum.     15 cigarettes daily    Vaping Use    Vaping status: Former    Substances: Nicotine   Substance Use Topics    Alcohol use: Not Currently    Drug use: Not Currently     Types: Marijuana     Comment: last use 2023   LAST FHS-7 RESULTS   1st degree relative breast or ovarian cancer No   Any relative bilateral breast cancer Unknown   Any male have breast cancer No   Any ONE woman have BOTH breast AND ovarian cancer No   Any woman with breast cancer before 50yrs No   2 or more relatives with breast AND/OR ovarian cancer No   2 or more relatives with breast AND/OR bowel cancer No              ASCVD Risk   The 10-year ASCVD risk score (Elroy FLANAGAN, et al., 2019) is: 6.7%    Values used to calculate the score:      Age: 50 years      Sex: Female      Is Non- : No      Diabetic: No      Tobacco smoker: Yes      Systolic Blood Pressure: 127 mmHg      Is BP treated: Yes      HDL Cholesterol: 35 mg/dL      Total Cholesterol: 181 mg/dL      Reviewed and updated as needed this visit by Provider    Allergies  Meds  Problems                     Review of Systems  Constitutional, HEENT, cardiovascular, pulmonary, gi and gu systems are negative, except as otherwise noted.     Objective    Exam  /81   Pulse 87   Temp 98.1  F (36.7  C) (Oral)   Resp 18   Ht 1.626 m (5' 4\")   Wt 121.3 kg (267 lb 6.4 oz)   LMP 2005   SpO2 98%   BMI 45.90 kg/m     Estimated body mass index is 45.9 kg/m  as calculated from the following:    Height as of this encounter: 1.626 m (5' 4\").    Weight as of this encounter: 121.3 kg (267 lb 6.4 oz).    Physical Exam  GENERAL: alert and no distress  EYES: Eyes grossly normal " to inspection, PERRL and conjunctivae and sclerae normal  HENT: ear canals and TM's normal, nose and mouth without ulcers or lesions  NECK: no adenopathy, no asymmetry, masses, or scars  RESP: lungs clear to auscultation - no rales, rhonchi or wheezes  CV: regular rate and rhythm, normal S1 S2, no S3 or S4, no murmur, click or rub, no peripheral edema   ABDOMEN: soft, nontender and large ventral hernia-non-tender  MS: no gross musculoskeletal defects noted, no edema  SKIN: erythematous skin under the left breast   NEURO: Normal strength and tone, mentation intact and speech normal  PSYCH: mentation appears normal, affect normal/bright    Signed Electronically by: NASH Huston CNP

## 2025-07-17 NOTE — PATIENT INSTRUCTIONS
Venous US    Labs    Hip Xray    Try Mycolog cream, apply twice daily  Start Diflucan 1 tablet every 3 days or every 72 hrs for 3 doses

## 2025-07-18 ENCOUNTER — RESULTS FOLLOW-UP (OUTPATIENT)
Dept: FAMILY MEDICINE | Facility: CLINIC | Age: 50
End: 2025-07-18

## 2025-07-18 DIAGNOSIS — G43.909 MIGRAINE WITHOUT STATUS MIGRAINOSUS, NOT INTRACTABLE, UNSPECIFIED MIGRAINE TYPE: ICD-10-CM

## 2025-07-18 DIAGNOSIS — M79.7 FIBROMYALGIA: ICD-10-CM

## 2025-07-18 DIAGNOSIS — R31.29 MICROSCOPIC HEMATURIA: Primary | ICD-10-CM

## 2025-07-18 RX ORDER — PHENTERMINE HYDROCHLORIDE 15 MG/1
15 CAPSULE ORAL EVERY MORNING
Qty: 30 CAPSULE | Refills: 2 | Status: SHIPPED | OUTPATIENT
Start: 2025-07-18

## 2025-07-21 ENCOUNTER — PATIENT OUTREACH (OUTPATIENT)
Dept: CARE COORDINATION | Facility: CLINIC | Age: 50
End: 2025-07-21
Payer: COMMERCIAL

## 2025-07-23 ENCOUNTER — HOSPITAL ENCOUNTER (OUTPATIENT)
Dept: MAMMOGRAPHY | Facility: CLINIC | Age: 50
Discharge: HOME | End: 2025-07-23
Attending: NURSE PRACTITIONER
Payer: COMMERCIAL

## 2025-07-23 DIAGNOSIS — Z12.31 VISIT FOR SCREENING MAMMOGRAM: ICD-10-CM

## 2025-07-23 PROCEDURE — 77063 BREAST TOMOSYNTHESIS BI: CPT

## 2025-07-23 RX ORDER — GABAPENTIN 100 MG/1
CAPSULE ORAL
Qty: 360 CAPSULE | Refills: 1 | Status: SHIPPED | OUTPATIENT
Start: 2025-07-23

## 2025-07-23 RX ORDER — BUTALBITAL, ACETAMINOPHEN AND CAFFEINE 50; 325; 40 MG/1; MG/1; MG/1
1 TABLET ORAL DAILY PRN
Qty: 8 TABLET | Refills: 1 | Status: SHIPPED | OUTPATIENT
Start: 2025-07-23

## 2025-08-13 ENCOUNTER — HOSPITAL ENCOUNTER (EMERGENCY)
Facility: CLINIC | Age: 50
Discharge: HOME | End: 2025-08-13
Payer: COMMERCIAL

## 2025-08-13 ENCOUNTER — APPOINTMENT (OUTPATIENT)
Dept: GENERAL RADIOLOGY | Facility: CLINIC | Age: 50
End: 2025-08-13
Payer: COMMERCIAL

## 2025-08-13 VITALS
TEMPERATURE: 98.4 F | HEART RATE: 93 BPM | RESPIRATION RATE: 20 BRPM | DIASTOLIC BLOOD PRESSURE: 86 MMHG | SYSTOLIC BLOOD PRESSURE: 134 MMHG | OXYGEN SATURATION: 98 %

## 2025-08-13 DIAGNOSIS — H66.001 RIGHT ACUTE SUPPURATIVE OTITIS MEDIA: ICD-10-CM

## 2025-08-13 DIAGNOSIS — J45.901 ASTHMA EXACERBATION: Primary | ICD-10-CM

## 2025-08-13 LAB
FLUAV RNA SPEC QL NAA+PROBE: NEGATIVE
FLUBV RNA RESP QL NAA+PROBE: NEGATIVE
RSV RNA SPEC NAA+PROBE: NEGATIVE
S PYO DNA THROAT QL NAA+PROBE: NOT DETECTED
SARS-COV-2 RNA RESP QL NAA+PROBE: NEGATIVE

## 2025-08-13 PROCEDURE — G0463 HOSPITAL OUTPT CLINIC VISIT: HCPCS | Mod: 25

## 2025-08-13 PROCEDURE — 87651 STREP A DNA AMP PROBE: CPT

## 2025-08-13 PROCEDURE — 71046 X-RAY EXAM CHEST 2 VIEWS: CPT

## 2025-08-13 PROCEDURE — 99213 OFFICE O/P EST LOW 20 MIN: CPT

## 2025-08-13 PROCEDURE — 87637 SARSCOV2&INF A&B&RSV AMP PRB: CPT

## 2025-08-13 RX ORDER — PREDNISONE 20 MG/1
TABLET ORAL
Qty: 10 TABLET | Refills: 0 | Status: SHIPPED | OUTPATIENT
Start: 2025-08-13

## 2025-08-13 RX ORDER — AMOXICILLIN 875 MG/1
875 TABLET, COATED ORAL 2 TIMES DAILY
Qty: 14 TABLET | Refills: 0 | Status: SHIPPED | OUTPATIENT
Start: 2025-08-13 | End: 2025-08-20

## 2025-08-13 ASSESSMENT — ACTIVITIES OF DAILY LIVING (ADL)
ADLS_ACUITY_SCORE: 58
ADLS_ACUITY_SCORE: 58

## (undated) DEVICE — WIRE GUIDE 0.025"X270CM STR VISIGLIDE G-240-2527S

## (undated) DEVICE — DRAPE IOBAN LG .375X23.5" 6648EZ

## (undated) DEVICE — DRSG DRAIN 2X2" 7087

## (undated) DEVICE — TUBING SUCTION 12"X1/4" N612

## (undated) DEVICE — DRAPE SHEET MED 44X70" 9355

## (undated) DEVICE — SU PROLENE 0 CT-1 30" 8424H

## (undated) DEVICE — INFLATION DEVICE BIG 60 ENDO-AN6012

## (undated) DEVICE — ENDO TROCAR FIRST ENTRY KII FIOS ADV FIX 05X100MM CFF03

## (undated) DEVICE — BIOPSY VALVE BIOSHIELD 00711135

## (undated) DEVICE — DEVICE ABSORBABLE TACK 5MM SINGLE ABSTACK30

## (undated) DEVICE — FORCEP BIOPSY SPYBITE MAX 286CMX1.2MM M00546470

## (undated) DEVICE — PACK ENDOSCOPY GI CUSTOM UMMC

## (undated) DEVICE — GLOVE PROTEXIS W/NEU-THERA 8.0  2D73TE80

## (undated) DEVICE — SOL NACL 0.9% IRRIG 1000ML BOTTLE 07138-09

## (undated) DEVICE — Device

## (undated) DEVICE — BLADE KNIFE SURG 10 371110

## (undated) DEVICE — SU VICRYL 4-0 PS-2 18" UND J496H

## (undated) DEVICE — BAG DRAIN BILIARY NEPHROSTOMY REMINGTON 600ML LF 600-D

## (undated) DEVICE — SOL WATER IRRIG 1000ML BOTTLE 2F7114

## (undated) DEVICE — CONNECTOR STOPCOCK 3 WAY MALE LL HI-FLO MX9311L

## (undated) DEVICE — ENDO CATH BALLOON DILATION HURRICANE 06MMX4X180CM M00545920

## (undated) DEVICE — BNDG ABDOMINAL BINDER 10X26-50" 08140145

## (undated) DEVICE — SUCTION MANIFOLD NEPTUNE 2 SYS 4 PORT 0702-020-000

## (undated) DEVICE — ENDO CAP AND TUBING STERILE FOR ENDOGATOR  100130

## (undated) DEVICE — INTRODUCER SHEATH PEEL AWAY 12FRX15.5CM G06492

## (undated) DEVICE — CATH SPYGLASS DS 2 DELIVERY & ACCESS M00546610

## (undated) DEVICE — SU VICRYL 3-0 CT-1 36" J944H

## (undated) DEVICE — DEVICE SUTURE PASSER 14GA WECK EFX EFXSP2

## (undated) DEVICE — BNDG ABDOMINAL BINDER 9X62-84" 79-89210

## (undated) DEVICE — SUCTION TIP YANKAUER STR K87

## (undated) DEVICE — PREP CHLORAPREP 26ML TINTED ORANGE  260815

## (undated) DEVICE — SPECIMEN CONTAINER 90ML W/10% FORMALIN C4320-45H

## (undated) DEVICE — SU VICRYL 0 CT-1 36" J946H

## (undated) DEVICE — GLOVE PROTEXIS W/NEU-THERA 7.5  2D73TE75

## (undated) DEVICE — ENDO BITE BLOCK ADULT OMNI-BLOC

## (undated) DEVICE — DRAIN BILIARY 12FRX35CM REG M001272620

## (undated) DEVICE — SOL WATER IRRIG 1000ML BOTTLE 07139-09

## (undated) DEVICE — ENDO FORCEP ENDOJAW BIOPSY 2.8MMX230CM FB-220U

## (undated) DEVICE — GOWN XLG DISP 9545

## (undated) DEVICE — ADH SKIN CLOSURE PREMIERPRO EXOFIN 1.0ML 3470

## (undated) DEVICE — SU VICRYL 2-0 CT-1 27" UND J259H

## (undated) DEVICE — DECANTER VIAL 2006S

## (undated) DEVICE — SOL NACL 0.9% IRRIG 1000ML BOTTLE 2F7124

## (undated) RX ORDER — FENTANYL CITRATE 50 UG/ML
INJECTION, SOLUTION INTRAMUSCULAR; INTRAVENOUS
Status: DISPENSED
Start: 2024-07-19

## (undated) RX ORDER — DEXAMETHASONE SODIUM PHOSPHATE 4 MG/ML
INJECTION, SOLUTION INTRA-ARTICULAR; INTRALESIONAL; INTRAMUSCULAR; INTRAVENOUS; SOFT TISSUE
Status: DISPENSED
Start: 2024-08-30

## (undated) RX ORDER — ONDANSETRON 2 MG/ML
INJECTION INTRAMUSCULAR; INTRAVENOUS
Status: DISPENSED
Start: 2024-08-27

## (undated) RX ORDER — ACETAMINOPHEN 325 MG/1
TABLET ORAL
Status: DISPENSED
Start: 2024-08-30

## (undated) RX ORDER — BUPIVACAINE HYDROCHLORIDE 2.5 MG/ML
INJECTION, SOLUTION EPIDURAL; INFILTRATION; INTRACAUDAL
Status: DISPENSED
Start: 2021-11-18

## (undated) RX ORDER — PROPOFOL 10 MG/ML
INJECTION, EMULSION INTRAVENOUS
Status: DISPENSED
Start: 2021-01-26

## (undated) RX ORDER — FENTANYL CITRATE 50 UG/ML
INJECTION, SOLUTION INTRAMUSCULAR; INTRAVENOUS
Status: DISPENSED
Start: 2024-08-30

## (undated) RX ORDER — ONDANSETRON 2 MG/ML
INJECTION INTRAMUSCULAR; INTRAVENOUS
Status: DISPENSED
Start: 2024-09-12

## (undated) RX ORDER — LIDOCAINE HYDROCHLORIDE 10 MG/ML
INJECTION, SOLUTION EPIDURAL; INFILTRATION; INTRACAUDAL; PERINEURAL
Status: DISPENSED
Start: 2024-09-12

## (undated) RX ORDER — LIDOCAINE HYDROCHLORIDE 10 MG/ML
INJECTION, SOLUTION INFILTRATION; PERINEURAL
Status: DISPENSED
Start: 2024-07-19

## (undated) RX ORDER — CEFTRIAXONE SODIUM 1 G
VIAL (EA) INJECTION
Status: DISPENSED
Start: 2024-08-22

## (undated) RX ORDER — LIDOCAINE HYDROCHLORIDE 10 MG/ML
INJECTION, SOLUTION EPIDURAL; INFILTRATION; INTRACAUDAL; PERINEURAL
Status: DISPENSED
Start: 2024-07-15

## (undated) RX ORDER — PROPOFOL 10 MG/ML
INJECTION, EMULSION INTRAVENOUS
Status: DISPENSED
Start: 2023-12-04

## (undated) RX ORDER — FENTANYL CITRATE-0.9 % NACL/PF 10 MCG/ML
PLASTIC BAG, INJECTION (ML) INTRAVENOUS
Status: DISPENSED
Start: 2024-08-30

## (undated) RX ORDER — AMPICILLIN AND SULBACTAM 2; 1 G/1; G/1
INJECTION, POWDER, FOR SOLUTION INTRAMUSCULAR; INTRAVENOUS
Status: DISPENSED
Start: 2024-09-12

## (undated) RX ORDER — ONDANSETRON 2 MG/ML
INJECTION INTRAMUSCULAR; INTRAVENOUS
Status: DISPENSED
Start: 2021-01-26

## (undated) RX ORDER — HYDROCODONE BITARTRATE AND ACETAMINOPHEN 5; 325 MG/1; MG/1
TABLET ORAL
Status: DISPENSED
Start: 2021-01-26

## (undated) RX ORDER — CEFTRIAXONE SODIUM 1 G
VIAL (EA) INJECTION
Status: DISPENSED
Start: 2024-07-25

## (undated) RX ORDER — FENTANYL CITRATE 50 UG/ML
INJECTION, SOLUTION INTRAMUSCULAR; INTRAVENOUS
Status: DISPENSED
Start: 2024-07-15

## (undated) RX ORDER — LIDOCAINE HYDROCHLORIDE 10 MG/ML
INJECTION, SOLUTION EPIDURAL; INFILTRATION; INTRACAUDAL; PERINEURAL
Status: DISPENSED
Start: 2024-08-30

## (undated) RX ORDER — FENTANYL CITRATE 50 UG/ML
INJECTION, SOLUTION INTRAMUSCULAR; INTRAVENOUS
Status: DISPENSED
Start: 2021-01-26

## (undated) RX ORDER — CELECOXIB 200 MG/1
CAPSULE ORAL
Status: DISPENSED
Start: 2021-01-26

## (undated) RX ORDER — HYDROXYZINE HYDROCHLORIDE 25 MG/1
TABLET, FILM COATED ORAL
Status: DISPENSED
Start: 2021-01-26

## (undated) RX ORDER — SODIUM CHLORIDE 9 MG/ML
INJECTION, SOLUTION INTRAVENOUS
Status: DISPENSED
Start: 2024-09-12

## (undated) RX ORDER — ONDANSETRON 2 MG/ML
INJECTION INTRAMUSCULAR; INTRAVENOUS
Status: DISPENSED
Start: 2024-07-19

## (undated) RX ORDER — ONDANSETRON 2 MG/ML
INJECTION INTRAMUSCULAR; INTRAVENOUS
Status: DISPENSED
Start: 2024-08-22

## (undated) RX ORDER — FENTANYL CITRATE 50 UG/ML
INJECTION, SOLUTION INTRAMUSCULAR; INTRAVENOUS
Status: DISPENSED
Start: 2024-08-22

## (undated) RX ORDER — ONDANSETRON 2 MG/ML
INJECTION INTRAMUSCULAR; INTRAVENOUS
Status: DISPENSED
Start: 2024-07-15

## (undated) RX ORDER — ALBUTEROL SULFATE 90 UG/1
AEROSOL, METERED RESPIRATORY (INHALATION)
Status: DISPENSED
Start: 2021-01-26

## (undated) RX ORDER — FENTANYL CITRATE 50 UG/ML
INJECTION, SOLUTION INTRAMUSCULAR; INTRAVENOUS
Status: DISPENSED
Start: 2024-09-12

## (undated) RX ORDER — PHENYLEPHRINE HCL IN 0.9% NACL 50MG/250ML
PLASTIC BAG, INJECTION (ML) INTRAVENOUS
Status: DISPENSED
Start: 2024-08-30

## (undated) RX ORDER — CEFTRIAXONE SODIUM 1 G
VIAL (EA) INJECTION
Status: DISPENSED
Start: 2024-07-19

## (undated) RX ORDER — LIDOCAINE HYDROCHLORIDE 10 MG/ML
INJECTION, SOLUTION EPIDURAL; INFILTRATION; INTRACAUDAL; PERINEURAL
Status: DISPENSED
Start: 2021-01-26

## (undated) RX ORDER — CEFAZOLIN SODIUM 2 G/100ML
INJECTION, SOLUTION INTRAVENOUS
Status: DISPENSED
Start: 2021-01-26

## (undated) RX ORDER — CEFTRIAXONE SODIUM 1 G
VIAL (EA) INJECTION
Status: DISPENSED
Start: 2024-08-27

## (undated) RX ORDER — ONDANSETRON 2 MG/ML
INJECTION INTRAMUSCULAR; INTRAVENOUS
Status: DISPENSED
Start: 2024-08-30

## (undated) RX ORDER — LIDOCAINE HYDROCHLORIDE 10 MG/ML
INJECTION, SOLUTION INFILTRATION; PERINEURAL
Status: DISPENSED
Start: 2024-07-12

## (undated) RX ORDER — PROPOFOL 10 MG/ML
INJECTION, EMULSION INTRAVENOUS
Status: DISPENSED
Start: 2024-08-30

## (undated) RX ORDER — FENTANYL CITRATE 50 UG/ML
INJECTION, SOLUTION INTRAMUSCULAR; INTRAVENOUS
Status: DISPENSED
Start: 2024-08-27

## (undated) RX ORDER — MAGNESIUM SULFATE HEPTAHYDRATE 40 MG/ML
INJECTION, SOLUTION INTRAVENOUS
Status: DISPENSED
Start: 2021-01-26

## (undated) RX ORDER — DEXAMETHASONE SODIUM PHOSPHATE 4 MG/ML
INJECTION, SOLUTION INTRA-ARTICULAR; INTRALESIONAL; INTRAMUSCULAR; INTRAVENOUS; SOFT TISSUE
Status: DISPENSED
Start: 2021-01-26

## (undated) RX ORDER — LIDOCAINE HYDROCHLORIDE 10 MG/ML
INJECTION, SOLUTION INFILTRATION; PERINEURAL
Status: DISPENSED
Start: 2024-08-26

## (undated) RX ORDER — FENTANYL CITRATE 50 UG/ML
INJECTION, SOLUTION INTRAMUSCULAR; INTRAVENOUS
Status: DISPENSED
Start: 2024-07-25

## (undated) RX ORDER — LIDOCAINE HYDROCHLORIDE 10 MG/ML
INJECTION, SOLUTION INFILTRATION; PERINEURAL
Status: DISPENSED
Start: 2024-07-25

## (undated) RX ORDER — FENTANYL CITRATE 50 UG/ML
INJECTION, SOLUTION INTRAMUSCULAR; INTRAVENOUS
Status: DISPENSED
Start: 2024-07-12

## (undated) RX ORDER — LIDOCAINE HYDROCHLORIDE 10 MG/ML
INJECTION, SOLUTION INFILTRATION; PERINEURAL
Status: DISPENSED
Start: 2024-08-22

## (undated) RX ORDER — LIDOCAINE HYDROCHLORIDE 10 MG/ML
INJECTION, SOLUTION INFILTRATION; PERINEURAL
Status: DISPENSED
Start: 2024-08-27

## (undated) RX ORDER — FENTANYL CITRATE-0.9 % NACL/PF 10 MCG/ML
PLASTIC BAG, INJECTION (ML) INTRAVENOUS
Status: DISPENSED
Start: 2021-01-26

## (undated) RX ORDER — KETOROLAC TROMETHAMINE 30 MG/ML
INJECTION, SOLUTION INTRAMUSCULAR; INTRAVENOUS
Status: DISPENSED
Start: 2021-01-26

## (undated) RX ORDER — GLYCOPYRROLATE 0.2 MG/ML
INJECTION, SOLUTION INTRAMUSCULAR; INTRAVENOUS
Status: DISPENSED
Start: 2021-01-26

## (undated) RX ORDER — LIDOCAINE HYDROCHLORIDE AND EPINEPHRINE 10; 10 MG/ML; UG/ML
INJECTION, SOLUTION INFILTRATION; PERINEURAL
Status: DISPENSED
Start: 2021-01-26

## (undated) RX ORDER — ACETAMINOPHEN 325 MG/1
TABLET ORAL
Status: DISPENSED
Start: 2021-01-26